# Patient Record
Sex: MALE | Race: WHITE | Employment: OTHER | URBAN - METROPOLITAN AREA
[De-identification: names, ages, dates, MRNs, and addresses within clinical notes are randomized per-mention and may not be internally consistent; named-entity substitution may affect disease eponyms.]

---

## 2017-01-30 ENCOUNTER — ANTICOAGULATION THERAPY VISIT (OUTPATIENT)
Dept: ANTICOAGULATION | Facility: CLINIC | Age: 77
End: 2017-01-30
Payer: COMMERCIAL

## 2017-01-30 DIAGNOSIS — Z79.01 LONG TERM CURRENT USE OF ANTICOAGULANT THERAPY: Primary | ICD-10-CM

## 2017-01-30 DIAGNOSIS — I63.9 COMPLETED STROKE (H): ICD-10-CM

## 2017-01-30 LAB — INR PPP: 1.6

## 2017-01-30 PROCEDURE — 99207 ZZC NO CHARGE NURSE ONLY: CPT | Performed by: REGISTERED NURSE

## 2017-01-30 NOTE — PROGRESS NOTES
ANTICOAGULATION FOLLOW-UP CLINIC VISIT    Patient Name:  Bradley Liz  Date:  1/30/2017  Contact Type:  INR resulted from an outside lab and faxed to [facility] today.  Pt called and given new dosing and recheck instructions  Pt veralized understanding.    SUBJECTIVE:     Patient Findings     Positives Diet Changes (more vit k than usual, he will get back to his usual weekly amount), Missed doses (he does not think he has missed any doses)           OBJECTIVE    INR   Date Value Ref Range Status   01/30/2017 1.6  Final       ASSESSMENT / PLAN  INR assessment SUB    Recheck INR In: 3 WEEKS    INR Location Outside lab      Anticoagulation Summary as of 1/30/2017     INR goal 2.0-3.0   Selected INR 1.6! (1/30/2017)   Maintenance plan 5 mg (5 mg x 1) every day   Full instructions 1/30: 7.5 mg; Otherwise 5 mg every day   Weekly total 35 mg   Plan last modified Ping Elizalde RN (9/16/2016)   Next INR check 2/20/2017   Priority INR   Target end date Indefinite    Indications   Completed stroke (H) [I63.9]  Long term current use of anticoagulant therapy [Z79.01]         Anticoagulation Episode Summary     INR check location     Preferred lab     Send INR reminders to [facility]    Comments * warfarin 5 mg tabs, leaving for [place], Sept 2016, will return around Thanksgiving. Leave message on MOBILE only. Takes warfarin in the AM      Anticoagulation Care Providers     Provider Role Specialty Phone number    Alex Mustafa MD [facility] [phone]            See the Encounter Report to view Anticoagulation Flowsheet and Dosing Calendar (Go to Encounters tab in chart review, and find the Anticoagulation Therapy Visit)        Mari Guerra RN

## 2017-02-22 ENCOUNTER — ANTICOAGULATION THERAPY VISIT (OUTPATIENT)
Dept: ANTICOAGULATION | Facility: CLINIC | Age: 77
End: 2017-02-22
Payer: COMMERCIAL

## 2017-02-22 DIAGNOSIS — Z79.01 LONG TERM CURRENT USE OF ANTICOAGULANT THERAPY: ICD-10-CM

## 2017-02-22 DIAGNOSIS — I63.9 COMPLETED STROKE (H): ICD-10-CM

## 2017-02-22 LAB — INR PPP: 1.7

## 2017-02-22 PROCEDURE — 99207 ZZC NO CHARGE NURSE ONLY: CPT

## 2017-02-22 RX ORDER — WARFARIN SODIUM 5 MG/1
TABLET ORAL
Qty: 120 TABLET | Refills: 3 | COMMUNITY
Start: 2017-02-22 | End: 2017-05-19

## 2017-02-22 NOTE — MR AVS SNAPSHOT
Bradley Liz   2/22/2017   Anticoagulation Therapy Visit    Description:  76 year old male   Provider:  Theresa Baltazar, RN   Department:  [facility]           INR as of 2/22/2017     Today's INR 1.7!      Anticoagulation Summary as of 2/22/2017     INR goal 2.0-3.0   Today's INR 1.7!   Full instructions 7.5 mg on Wed, Sat; 5 mg all other days   Next INR check 3/15/2017    Indications   Completed stroke (H) [I63.9]  Long term current use of anticoagulant therapy [Z79.01]         February 2017 Details    Sun Mon Tue Wed Thu Fri Sat        1               2               3               4                 5               6               7               8               9               10               11                 12               13               14               15               16               17               18                 19               20               21               22      7.5 mg   See details      23      5 mg         24      5 mg         25      7.5 mg           26      5 mg         27      5 mg         28      5 mg              Date Details   02/22 This INR check               How to take your warfarin dose     To take:  5 mg Take 1 of the 5 mg tablets.    To take:  7.5 mg Take 1.5 of the 5 mg tablets.           March 2017 Details    Sun Mon Tue Wed Thu Fri Sat        1      7.5 mg         2      5 mg         3      5 mg         4      7.5 mg           5      5 mg         6      5 mg         7      5 mg         8      7.5 mg         9      5 mg         10      5 mg         11      7.5 mg           12      5 mg         13      5 mg         14      5 mg         15            16               17               18                 19               20               21               22               23               24               25                 26               27               28               29               30               31                 Date Details   No additional details     Date of next INR:  3/15/2017         How to take your warfarin dose     To take:  5 mg Take 1 of the 5 mg tablets.    To take:  7.5 mg Take 1.5 of the 5 mg tablets.

## 2017-02-22 NOTE — PROGRESS NOTES
ANTICOAGULATION FOLLOW-UP CLINIC VISIT    Patient Name:  Bradley Liz  Date:  2/22/2017  Contact Type:  Telephone/ Tobin Shannonjayashree    SUBJECTIVE:     Patient Findings     Positives Unexplained INR or factor level change    Comments Two low INRs in a row. No missed doses or changes in diet (he is not eating extra greens). No changes in medications or activity level. Increasing dosing by ~14% and will recheck again at lab in 2-3 weeks.           OBJECTIVE    INR   Date Value Ref Range Status   02/22/2017 1.7  Final       ASSESSMENT / PLAN  INR assessment SUB    Recheck INR In: 3 WEEKS    INR Location Outside lab      Anticoagulation Summary as of 2/22/2017     INR goal 2.0-3.0   Today's INR 1.7!   Maintenance plan 7.5 mg (5 mg x 1.5) on Wed, Sat; 5 mg (5 mg x 1) all other days   Full instructions 7.5 mg on Wed, Sat; 5 mg all other days   Weekly total 40 mg   Plan last modified Theresa Baltazar RN (2/22/2017)   Next INR check 3/15/2017   Priority INR   Target end date Indefinite    Indications   Completed stroke (H) [I63.9]  Long term current use of anticoagulant therapy [Z79.01]         Anticoagulation Episode Summary     INR check location     Preferred lab     Send INR reminders to [facility]    Comments * warfarin 5 mg tabs, leaving for [place], Sept 2016, will return around Thanksgiving. Leave message on MOBILE only. Takes warfarin in the AM      Anticoagulation Care Providers     Provider Role Specialty Phone number    Alex Mustafa MD [facility] [phone]            See the Encounter Report to view Anticoagulation Flowsheet and Dosing Calendar (Go to Encounters tab in chart review, and find the Anticoagulation Therapy Visit)        Theresa Baltazar RN

## 2017-04-04 ENCOUNTER — ANTICOAGULATION THERAPY VISIT (OUTPATIENT)
Dept: ANTICOAGULATION | Facility: CLINIC | Age: 77
End: 2017-04-04
Payer: COMMERCIAL

## 2017-04-04 DIAGNOSIS — Z79.01 LONG TERM CURRENT USE OF ANTICOAGULANT THERAPY: ICD-10-CM

## 2017-04-04 DIAGNOSIS — I63.9 COMPLETED STROKE (H): ICD-10-CM

## 2017-04-04 LAB — INR PPP: 2

## 2017-04-04 PROCEDURE — 99207 ZZC NO CHARGE NURSE ONLY: CPT | Performed by: REGISTERED NURSE

## 2017-04-04 NOTE — PROGRESS NOTES
ANTICOAGULATION FOLLOW-UP CLINIC VISIT    Patient Name:  Bradley Liz  Date:  4/4/2017  Contact Type:  Face to Face    SUBJECTIVE:     Patient Findings     Positives No Problem Findings           OBJECTIVE    INR   Date Value Ref Range Status   04/04/2017 2.0  Final       ASSESSMENT / PLAN  No question data found.  Anticoagulation Summary as of 4/4/2017     INR goal 2.0-3.0   Today's INR 2.0   Maintenance plan 7.5 mg (5 mg x 1.5) on Wed, Sat; 5 mg (5 mg x 1) all other days   Full instructions 7.5 mg on Wed, Sat; 5 mg all other days   Weekly total 40 mg   Plan last modified Theresa Baltazar RN (2/22/2017)   Next INR check 5/23/2017   Priority INR   Target end date Indefinite    Indications   Completed stroke (H) [I63.9]  Long term current use of anticoagulant therapy [Z79.01]         Anticoagulation Episode Summary     INR check location     Preferred lab     Send INR reminders to [facility]    Comments * warfarin 5 mg tabs, leaving for [place], Sept 2016, will return around [facility]. Leave message on MOBILE only. Takes warfarin in the AM      Anticoagulation Care Providers     Provider Role Specialty Phone number    Alex Mustafa MD [facility] [phone]            See the Encounter Report to view Anticoagulation Flowsheet and Dosing Calendar (Go to Encounters tab in chart review, and find the Anticoagulation Therapy Visit)        Lela Lerner RN

## 2017-04-04 NOTE — MR AVS SNAPSHOT
Bradley Liz   4/4/2017   Anticoagulation Therapy Visit    Description:  76 year old male   Provider:  Lela Lerner, RN   Department:  [facility]           INR as of 4/4/2017     Today's INR 2.0      Anticoagulation Summary as of 4/4/2017     INR goal 2.0-3.0   Today's INR 2.0   Full instructions 7.5 mg on Wed, Sat; 5 mg all other days   Next INR check 5/23/2017    Indications   Completed stroke (H) [I63.9]  Long term current use of anticoagulant therapy [Z79.01]         Description     Warfarin dose: 7.5mg Wed/Sat and 5mg the rest of the days of the week.        April 2017 Details    Sun Mon Tue Wed Thu Fri Sat           1                 2               3               4      5 mg   See details      5      7.5 mg         6      5 mg         7      5 mg         8      7.5 mg           9      5 mg         10      5 mg         11      5 mg         12      7.5 mg         13      5 mg         14      5 mg         15      7.5 mg           16      5 mg         17      5 mg         18      5 mg         19      7.5 mg         20      5 mg         21      5 mg         22      7.5 mg           23      5 mg         24      5 mg         25      5 mg         26      7.5 mg         27      5 mg         28      5 mg         29      7.5 mg           30      5 mg                Date Details   04/04 This INR check               How to take your warfarin dose     To take:  5 mg Take 1 of the 5 mg tablets.    To take:  7.5 mg Take 1.5 of the 5 mg tablets.           May 2017 Details    Sun Mon Tue Wed Thu Fri Sat      1      5 mg         2      5 mg         3      7.5 mg         4      5 mg         5      5 mg         6      7.5 mg           7      5 mg         8      5 mg         9      5 mg         10      7.5 mg         11      5 mg         12      5 mg         13      7.5 mg           14      5 mg         15      5 mg         16      5 mg         17      7.5 mg         18      5 mg         19      5 mg         20       7.5 mg           21      5 mg         22      5 mg         23            24               25               26               27                 28               29               30               31                   Date Details   No additional details    Date of next INR:  5/23/2017         How to take your warfarin dose     To take:  5 mg Take 1 of the 5 mg tablets.    To take:  7.5 mg Take 1.5 of the 5 mg tablets.

## 2017-04-05 ENCOUNTER — TELEPHONE (OUTPATIENT)
Dept: FAMILY MEDICINE | Facility: CLINIC | Age: 77
End: 2017-04-05

## 2017-04-05 NOTE — TELEPHONE ENCOUNTER
Coumadin dose?  INR of 2.0 on 4/4/17.  Taking Coumadin 7.5 mg Wed and Sat, 5 mg ROW.  Pt in [place] and asking if needs to change his dose?  Advise.

## 2017-04-05 NOTE — TELEPHONE ENCOUNTER
Writer spoke with patient and reviewed instructions in 4-4-17 [facility] encounter.    BELINDA ManceraN, RN

## 2017-04-05 NOTE — TELEPHONE ENCOUNTER
Reason for Call:  Request for results:    Name of test or procedure: Pt had his coumadin checked in [place], where he is presently, yesterday and he states that Dr. Mustafa's nurse as supposed to call him with his dosage instructions.      Date of test of procedure: 04/05/17    Location of the test or procedure: [place]    OK to leave the result message on voice mail or with a family member? YES    Phone number Patient can be reached at:  Cell number on file:    Telephone Information:   Mobile [phone]       Additional comments: any    Call taken on 4/5/2017 at 12:59 PM by Margaret Marcum

## 2017-05-09 ENCOUNTER — HOSPITAL ENCOUNTER (EMERGENCY)
Facility: CLINIC | Age: 77
Discharge: HOME | End: 2017-05-09
Attending: STUDENT IN AN ORGANIZED HEALTH CARE EDUCATION/TRAINING PROGRAM | Admitting: STUDENT IN AN ORGANIZED HEALTH CARE EDUCATION/TRAINING PROGRAM
Payer: MEDICARE

## 2017-05-09 VITALS
OXYGEN SATURATION: 95 % | SYSTOLIC BLOOD PRESSURE: 145 MMHG | RESPIRATION RATE: 16 BRPM | DIASTOLIC BLOOD PRESSURE: 106 MMHG

## 2017-05-09 DIAGNOSIS — I48.91 NEW ONSET ATRIAL FIBRILLATION (H): ICD-10-CM

## 2017-05-09 LAB
ALBUMIN SERPL-MCNC: 3.2 G/DL (ref 3.4–5)
ALP SERPL-CCNC: 82 U/L (ref 40–150)
ALT SERPL W P-5'-P-CCNC: 25 U/L (ref 0–70)
ANION GAP SERPL CALCULATED.3IONS-SCNC: 9 MMOL/L (ref 3–14)
AST SERPL W P-5'-P-CCNC: 18 U/L (ref 0–45)
BASOPHILS # BLD AUTO: 0 10E9/L (ref 0–0.2)
BASOPHILS NFR BLD AUTO: 0.4 %
BILIRUB SERPL-MCNC: 0.5 MG/DL (ref 0.2–1.3)
BUN SERPL-MCNC: 11 MG/DL (ref 7–30)
CALCIUM SERPL-MCNC: 8.3 MG/DL (ref 8.5–10.1)
CHLORIDE SERPL-SCNC: 107 MMOL/L (ref 94–109)
CO2 SERPL-SCNC: 25 MMOL/L (ref 20–32)
CREAT SERPL-MCNC: 0.84 MG/DL (ref 0.66–1.25)
DIFFERENTIAL METHOD BLD: ABNORMAL
EOSINOPHIL # BLD AUTO: 0.2 10E9/L (ref 0–0.7)
EOSINOPHIL NFR BLD AUTO: 2.7 %
ERYTHROCYTE [DISTWIDTH] IN BLOOD BY AUTOMATED COUNT: 14.8 % (ref 10–15)
ETHANOL SERPL-MCNC: <0.01 G/DL
GFR SERPL CREATININE-BSD FRML MDRD: 89 ML/MIN/1.7M2
GLUCOSE SERPL-MCNC: 101 MG/DL (ref 70–99)
HCT VFR BLD AUTO: 53.9 % (ref 40–53)
HGB BLD-MCNC: 17.5 G/DL (ref 13.3–17.7)
IMM GRANULOCYTES # BLD: 0.1 10E9/L (ref 0–0.4)
IMM GRANULOCYTES NFR BLD: 0.7 %
INR PPP: 4.1 (ref 0.86–1.14)
LYMPHOCYTES # BLD AUTO: 2.1 10E9/L (ref 0.8–5.3)
LYMPHOCYTES NFR BLD AUTO: 24.3 %
MAGNESIUM SERPL-MCNC: 2.2 MG/DL (ref 1.6–2.3)
MCH RBC QN AUTO: 30.3 PG (ref 26.5–33)
MCHC RBC AUTO-ENTMCNC: 32.5 G/DL (ref 31.5–36.5)
MCV RBC AUTO: 93 FL (ref 78–100)
MONOCYTES # BLD AUTO: 0.6 10E9/L (ref 0–1.3)
MONOCYTES NFR BLD AUTO: 7.4 %
NEUTROPHILS # BLD AUTO: 5.5 10E9/L (ref 1.6–8.3)
NEUTROPHILS NFR BLD AUTO: 64.5 %
PLATELET # BLD AUTO: 268 10E9/L (ref 150–450)
POTASSIUM SERPL-SCNC: 4.2 MMOL/L (ref 3.4–5.3)
PROT SERPL-MCNC: 6.2 G/DL (ref 6.8–8.8)
RBC # BLD AUTO: 5.78 10E12/L (ref 4.4–5.9)
SODIUM SERPL-SCNC: 141 MMOL/L (ref 133–144)
TSH SERPL DL<=0.005 MIU/L-ACNC: 2 MU/L (ref 0.4–4)
WBC # BLD AUTO: 8.6 10E9/L (ref 4–11)

## 2017-05-09 PROCEDURE — 85025 COMPLETE CBC W/AUTO DIFF WBC: CPT | Performed by: STUDENT IN AN ORGANIZED HEALTH CARE EDUCATION/TRAINING PROGRAM

## 2017-05-09 PROCEDURE — 96374 THER/PROPH/DIAG INJ IV PUSH: CPT

## 2017-05-09 PROCEDURE — 25000125 ZZHC RX 250: Performed by: STUDENT IN AN ORGANIZED HEALTH CARE EDUCATION/TRAINING PROGRAM

## 2017-05-09 PROCEDURE — 93010 ELECTROCARDIOGRAM REPORT: CPT | Performed by: STUDENT IN AN ORGANIZED HEALTH CARE EDUCATION/TRAINING PROGRAM

## 2017-05-09 PROCEDURE — 96361 HYDRATE IV INFUSION ADD-ON: CPT

## 2017-05-09 PROCEDURE — 85610 PROTHROMBIN TIME: CPT | Performed by: STUDENT IN AN ORGANIZED HEALTH CARE EDUCATION/TRAINING PROGRAM

## 2017-05-09 PROCEDURE — 80053 COMPREHEN METABOLIC PANEL: CPT | Performed by: STUDENT IN AN ORGANIZED HEALTH CARE EDUCATION/TRAINING PROGRAM

## 2017-05-09 PROCEDURE — 99285 EMERGENCY DEPT VISIT HI MDM: CPT | Mod: 25 | Performed by: STUDENT IN AN ORGANIZED HEALTH CARE EDUCATION/TRAINING PROGRAM

## 2017-05-09 PROCEDURE — 25000132 ZZH RX MED GY IP 250 OP 250 PS 637: Mod: GY | Performed by: STUDENT IN AN ORGANIZED HEALTH CARE EDUCATION/TRAINING PROGRAM

## 2017-05-09 PROCEDURE — 93005 ELECTROCARDIOGRAM TRACING: CPT

## 2017-05-09 PROCEDURE — 80320 DRUG SCREEN QUANTALCOHOLS: CPT | Performed by: STUDENT IN AN ORGANIZED HEALTH CARE EDUCATION/TRAINING PROGRAM

## 2017-05-09 PROCEDURE — 83735 ASSAY OF MAGNESIUM: CPT | Performed by: STUDENT IN AN ORGANIZED HEALTH CARE EDUCATION/TRAINING PROGRAM

## 2017-05-09 PROCEDURE — 84443 ASSAY THYROID STIM HORMONE: CPT | Performed by: STUDENT IN AN ORGANIZED HEALTH CARE EDUCATION/TRAINING PROGRAM

## 2017-05-09 PROCEDURE — A9270 NON-COVERED ITEM OR SERVICE: HCPCS | Mod: GY | Performed by: STUDENT IN AN ORGANIZED HEALTH CARE EDUCATION/TRAINING PROGRAM

## 2017-05-09 PROCEDURE — 99284 EMERGENCY DEPT VISIT MOD MDM: CPT | Mod: 25

## 2017-05-09 PROCEDURE — 25000128 H RX IP 250 OP 636: Performed by: STUDENT IN AN ORGANIZED HEALTH CARE EDUCATION/TRAINING PROGRAM

## 2017-05-09 RX ORDER — LIDOCAINE 40 MG/G
CREAM TOPICAL
Status: DISCONTINUED | OUTPATIENT
Start: 2017-05-09 | End: 2017-05-09 | Stop reason: HOSPADM

## 2017-05-09 RX ORDER — METOPROLOL TARTRATE 25 MG/1
25 TABLET, FILM COATED ORAL EVERY 12 HOURS
Qty: 14 TABLET | Refills: 0 | Status: SHIPPED | OUTPATIENT
Start: 2017-05-09 | End: 2017-05-11

## 2017-05-09 RX ORDER — METOPROLOL TARTRATE 1 MG/ML
5 INJECTION, SOLUTION INTRAVENOUS EVERY 10 MIN PRN
Status: DISCONTINUED | OUTPATIENT
Start: 2017-05-09 | End: 2017-05-09 | Stop reason: HOSPADM

## 2017-05-09 RX ORDER — SODIUM CHLORIDE 9 MG/ML
INJECTION, SOLUTION INTRAVENOUS CONTINUOUS
Status: DISCONTINUED | OUTPATIENT
Start: 2017-05-09 | End: 2017-05-09 | Stop reason: HOSPADM

## 2017-05-09 RX ORDER — METOPROLOL TARTRATE 25 MG/1
25 TABLET, FILM COATED ORAL ONCE
Status: COMPLETED | OUTPATIENT
Start: 2017-05-09 | End: 2017-05-09

## 2017-05-09 RX ADMIN — SODIUM CHLORIDE: 9 INJECTION, SOLUTION INTRAVENOUS at 13:30

## 2017-05-09 RX ADMIN — METOPROLOL TARTRATE 5 MG: 5 INJECTION INTRAVENOUS at 14:02

## 2017-05-09 RX ADMIN — METOPROLOL TARTRATE 25 MG: 25 TABLET ORAL at 14:54

## 2017-05-09 NOTE — ED AVS SNAPSHOT
[facility]    [address]    [facility] [address]    Phone:  [phone]    Fax:  [phone]                                       Bradley Liz   MRN: [id number]    Department:  [facility]   Date of Visit:  5/9/2017           Patient Information     Date Of Birth          1940        Your diagnoses for this visit were:     New onset atrial fibrillation (H)        You were seen by Fab Canela DO.      Follow-up Information     Follow up with Alex Mustafa MD. Go in 1 day.    Specialty:  Family Practice    Why:  Follow-up at your scheduled appointment tomorrow, 8 AM, for reevaluation and continued management planning.    Contact information:    [facility]  [address]  [facility] [id number] [phone]          Discharge Instructions         Discharge Instructions for Atrial Fibrillation  You have been diagnosed with atrial fibrillation. With this condition, your heart s two upper chambers quiver rather than squeeze the blood out in a normal pattern. This leads to an irregular and sometimes rapid heartbeat. Some people will develop associated symptoms such as a flip-flopping heartbeat, lightheadedness, or shortness of breath. Other people may have no symptoms at all. Atrial fibrillation is serious because it affects the heart s ability to fill with blood as it should. Blood clots may form. This increases the risk for stroke. Untreated atrial fibrillation can also lead to heart failure. Atrial fibrillation can be controlled. With treatment, most people with atrial fibrillation lead normal lives. It is estimated that over 2.5 million Americans have atrial fibrillation.  Treatment options  Recommended treatment for atrial fibrillation depends on your age, symptoms, how long you have had atrial fibrillation, and other factors. You will have a complete evaluation to find out if you have any abnormalities that caused your  heart to go into atrial fibrillation. This might be blocked heart arteries or a thyroid problem. Your doctor will assess your particular case and discuss choices with you.  Treatment choices may include:    Treating an underlying disorder that puts you at risk for atrial fibrillation. For example, correcting an abnormal thyroid or electrolyte problem, or treating a blocked heart artery.    Restoring a normal heart rhythm with an electrical shock (cardioversion) or with an antiarrhythmic medicine (chemical cardioversion)    Using medication to control your heart rate in atrial fibrillation.    Preventing the risk for blood clot and stroke using blood-thinning medicines. Your doctor will tell you what he or she recommends. Choices may include aspirin, clopidogrel, warfarin, dabigatran, rivaroxaban, or apixaban.    Doing catheter ablation or maze procedure. These use different methods to destroy certain areas of heart tissue. This interrupts the electrical signals causing atrial fibrillation. One of these procedures may be a choice when medicines do not work.    Other treatment choices may be recommended for you by your doctor.  Managing risk factors for stroke and preventing heart failure are important parts of any treatment plan for atrial fibrillation.  Home care    Take your medicines exactly as directed. Don t skip doses.    Work with your doctor to find the right medicaines and doses for you.    Learn to take your own pulse. Keep a record of your results. Ask your doctor which pulse rates mean that you need medical attention. Slowing your pulse is often the goal of treatment. Ask your doctor if it s OK for you to use an automatic machine to check your pulse at home. Sometimes these machines don t count the pulse correctly when you have atrial fibrillation.    Limit your intake of coffee, tea, cola, and other beverages with caffeine to 2 cups per day. Talk with your doctor about whether you should eliminate  caffeine.    Avoid over-the-counter medicines that have caffeine in them.    Let your doctor know what medicines you take, including prescription and over-the-counter medicines, as well as any supplements. They interfere with some medicines given for atrial fibrillation.    Ask your doctor about whether you can drink alcohol. Some people need to avoid alcohol to better treat atrial fibrillation. If you are taking blood-thinner medicines, alcohol may interfere with them by increasing their effect.    Never take stimulants such as amphetamines or cocaine. These drugs can speed up your heart rate and trigger atrial fibrillation.  Follow-up  Make a follow-up appointment as directed by our staff.     When to call your doctor  Call your doctor immediately if you have any of the following:    Weakness    Dizziness    Fainting    Fatigue    Shortness of breath    Chest pain with increased activity    A change in the usual regularity of your heartbeat, or an unusually fast heartbeat     0371-4263 [employer]. [address] [place] [address]. All rights reserved. This information is not intended as a substitute for professional medical care. Always follow your healthcare professional's instructions.          Future Appointments        Provider Department Dept Phone Center    5/10/2017 8:00 AM Alex Mustafa MD [facility] [phone] [facility]      24 Hour Appointment Hotline       To make an appointment at any [facility], call [phone] ([phone]). If you don't have a family doctor or clinic, we will help you find one. [facility] are conveniently located to serve the needs of you and your family.             Review of your medicines      START taking        Dose / Directions Last dose taken    metoprolol 25 MG tablet   Commonly known as:  LOPRESSOR   Dose:  25 mg   Quantity:  14 tablet        Take 1 tablet (25 mg) by mouth every 12 hours for 7 days   Refills:  0           Our records show that you are taking the medicines listed below. If these are incorrect, please call your family doctor or clinic.        Dose / Directions Last dose taken    ASPIRIN NOT PRESCRIBED   Commonly known as:  INTENTIONAL        by Other route continuous prn.   Refills:  0        COUMADIN 5 MG tablet   Quantity:  120 tablet   Generic drug:  warfarin        As directed by Anticoagulation Clinic.  (Current dose 7.5mg Wed/Sat, 5 mg all other days)   Refills:  3        simvastatin 10 MG tablet   Commonly known as:  ZOCOR   Dose:  10 mg   Quantity:  90 tablet        Take 1 tablet (10 mg) by mouth At Bedtime   Refills:  3                Prescriptions were sent or printed at these locations (1 Prescription)                   [facility] - [place], [place] - [address] AT [facility] OF [address] [place] [address]   [address] [facility] [address]    Telephone:  [phone]   Fax:  [phone]   Hours:                  E-Prescribed (1 of 1)         metoprolol (LOPRESSOR) 25 MG tablet                Procedures and tests performed during your visit     Alcohol ethyl    CBC with platelets differential    Comprehensive metabolic panel    EKG 12-lead, tracing only    INR    Magnesium    TSH with free T4 reflex      Orders Needing Specimen Collection     None      Pending Results     Date and Time Order Name Status Description    5/9/2017 1328 Magnesium In process             Pending Culture Results     No orders found from 5/7/2017 to 5/10/2017.            Pending Results Instructions     If you had any lab results that were not finalized at the time of your Discharge, you can call the ED Lab Result RN at [phone]. You will be contacted by this team for any positive Lab results or changes in treatment. The nurses are available 7 days a week from 10A to 6:30P.  You can leave a message 24 hours per day and they will return your call.        Test Results From Your Hospital Stay        5/9/2017   1:59 PM      Component Results     Component Value Ref Range & Units Status    WBC 8.6 4.0 - 11.0 10e9/L Final    RBC Count 5.78 4.4 - 5.9 10e12/L Final    Hemoglobin 17.5 13.3 - 17.7 g/dL Final    Hematocrit 53.9 (H) 40.0 - 53.0 % Final    MCV 93 78 - 100 fl Final    MCH 30.3 26.5 - 33.0 pg Final    MCHC 32.5 31.5 - 36.5 g/dL Final    RDW 14.8 10.0 - 15.0 % Final    Platelet Count 268 150 - 450 10e9/L Final    Diff Method Automated Method  Final    % Neutrophils 64.5 % Final    % Lymphocytes 24.3 % Final    % Monocytes 7.4 % Final    % Eosinophils 2.7 % Final    % Basophils 0.4 % Final    % Immature Granulocytes 0.7 % Final    Absolute Neutrophil 5.5 1.6 - 8.3 10e9/L Final    Absolute Lymphocytes 2.1 0.8 - 5.3 10e9/L Final    Absolute Monocytes 0.6 0.0 - 1.3 10e9/L Final    Absolute Eosinophils 0.2 0.0 - 0.7 10e9/L Final    Absolute Basophils 0.0 0.0 - 0.2 10e9/L Final    Abs Immature Granulocytes 0.1 0 - 0.4 10e9/L Final         5/9/2017  3:14 PM      Component Results     Component Value Ref Range & Units Status    INR 4.10 (H) 0.86 - 1.14 Final         5/9/2017  2:14 PM      Component Results     Component Value Ref Range & Units Status    Sodium 141 133 - 144 mmol/L Final    Potassium 4.2 3.4 - 5.3 mmol/L Final    Chloride 107 94 - 109 mmol/L Final    Carbon Dioxide 25 20 - 32 mmol/L Final    Anion Gap 9 3 - 14 mmol/L Final    Glucose 101 (H) 70 - 99 mg/dL Final    Urea Nitrogen 11 7 - 30 mg/dL Final    Creatinine 0.84 0.66 - 1.25 mg/dL Final    GFR Estimate 89 >60 mL/min/1.7m2 Final    Non  GFR Calc    GFR Estimate If Black >90   GFR Calc   >60 mL/min/1.7m2 Final    Calcium 8.3 (L) 8.5 - 10.1 mg/dL Final    Bilirubin Total 0.5 0.2 - 1.3 mg/dL Final    Albumin 3.2 (L) 3.4 - 5.0 g/dL Final    Protein Total 6.2 (L) 6.8 - 8.8 g/dL Final    Alkaline Phosphatase 82 40 - 150 U/L Final    ALT 25 0 - 70 U/L Final    AST 18 0 - 45 U/L Final         5/9/2017  1:49 PM         5/9/2017   "2:20 PM      Component Results     Component Value Ref Range & Units Status    TSH 2.00 0.40 - 4.00 mU/L Final         2017  2:09 PM      Component Results     Component Value Ref Range & Units Status    Ethanol g/dL <0.01 <0.01 g/dL Final                Thank you for choosing [facility]       Thank you for choosing [facility] for your care. Our goal is always to provide you with excellent care. Hearing back from our patients is one way we can continue to improve our services. Please take a few minutes to complete the written survey that you may receive in the mail after you visit with us. Thank you!        [employer] Information     [employer] lets you send messages to your doctor, view your test results, renew your prescriptions, schedule appointments and more. To sign up, go to [email or web address] . Click on \"Log in\" on the left side of the screen, which will take you to the Welcome page. Then click on \"Sign up Now\" on the right side of the page.     You will be asked to enter the access code listed below, as well as some personal information. Please follow the directions to create your username and password.     Your access code is: [email or web address]  Expires: 2017  4:21 PM     Your access code will  in 90 days. If you need help or a new code, please call your [facility] clinic or [phone].        Care EveryWhere ID     This is your Care EveryWhere ID. This could be used by other organizations to access your [facility] medical records  [id number]        After Visit Summary       This is your record. Keep this with you and show to your community pharmacist(s) and doctor(s) at your next visit.                  "

## 2017-05-09 NOTE — ED NOTES
Lorena mayberry in store parking lot just pta with no injury - ems responded and pt found to have A FIB with RVR

## 2017-05-09 NOTE — ED AVS SNAPSHOT
[facility]    [address] [facility] [address]    Phone:  [phone]    Fax:  [phone]                                       Bradley Liz   MRN: [id number]    Department:  [facility]   Date of Visit:  5/9/2017           After Visit Summary Signature Page     I have received my discharge instructions, and my questions have been answered. I have discussed any challenges I see with this plan with the nurse or doctor.    ..........................................................................................................................................  Patient/Patient Representative Signature      ..........................................................................................................................................  Patient Representative Print Name and Relationship to Patient    ..................................................               ................................................  Date                                            Time    ..........................................................................................................................................  Reviewed by Signature/Title    ...................................................              ..............................................  Date                                                            Time

## 2017-05-09 NOTE — DISCHARGE INSTRUCTIONS
Discharge Instructions for Atrial Fibrillation  You have been diagnosed with atrial fibrillation. With this condition, your heart s two upper chambers quiver rather than squeeze the blood out in a normal pattern. This leads to an irregular and sometimes rapid heartbeat. Some people will develop associated symptoms such as a flip-flopping heartbeat, lightheadedness, or shortness of breath. Other people may have no symptoms at all. Atrial fibrillation is serious because it affects the heart s ability to fill with blood as it should. Blood clots may form. This increases the risk for stroke. Untreated atrial fibrillation can also lead to heart failure. Atrial fibrillation can be controlled. With treatment, most people with atrial fibrillation lead normal lives. It is estimated that over 2.5 million Americans have atrial fibrillation.  Treatment options  Recommended treatment for atrial fibrillation depends on your age, symptoms, how long you have had atrial fibrillation, and other factors. You will have a complete evaluation to find out if you have any abnormalities that caused your heart to go into atrial fibrillation. This might be blocked heart arteries or a thyroid problem. Your doctor will assess your particular case and discuss choices with you.  Treatment choices may include:    Treating an underlying disorder that puts you at risk for atrial fibrillation. For example, correcting an abnormal thyroid or electrolyte problem, or treating a blocked heart artery.    Restoring a normal heart rhythm with an electrical shock (cardioversion) or with an antiarrhythmic medicine (chemical cardioversion)    Using medication to control your heart rate in atrial fibrillation.    Preventing the risk for blood clot and stroke using blood-thinning medicines. Your doctor will tell you what he or she recommends. Choices may include aspirin, clopidogrel, warfarin, dabigatran, rivaroxaban, or apixaban.    Doing catheter ablation or  maze procedure. These use different methods to destroy certain areas of heart tissue. This interrupts the electrical signals causing atrial fibrillation. One of these procedures may be a choice when medicines do not work.    Other treatment choices may be recommended for you by your doctor.  Managing risk factors for stroke and preventing heart failure are important parts of any treatment plan for atrial fibrillation.  Home care    Take your medicines exactly as directed. Don t skip doses.    Work with your doctor to find the right medicaines and doses for you.    Learn to take your own pulse. Keep a record of your results. Ask your doctor which pulse rates mean that you need medical attention. Slowing your pulse is often the goal of treatment. Ask your doctor if it s OK for you to use an automatic machine to check your pulse at home. Sometimes these machines don t count the pulse correctly when you have atrial fibrillation.    Limit your intake of coffee, tea, cola, and other beverages with caffeine to 2 cups per day. Talk with your doctor about whether you should eliminate caffeine.    Avoid over-the-counter medicines that have caffeine in them.    Let your doctor know what medicines you take, including prescription and over-the-counter medicines, as well as any supplements. They interfere with some medicines given for atrial fibrillation.    Ask your doctor about whether you can drink alcohol. Some people need to avoid alcohol to better treat atrial fibrillation. If you are taking blood-thinner medicines, alcohol may interfere with them by increasing their effect.    Never take stimulants such as amphetamines or cocaine. These drugs can speed up your heart rate and trigger atrial fibrillation.  Follow-up  Make a follow-up appointment as directed by our staff.     When to call your doctor  Call your doctor immediately if you have any of the following:    Weakness    Dizziness    Fainting    Fatigue    Shortness of  breath    Chest pain with increased activity    A change in the usual regularity of your heartbeat, or an unusually fast heartbeat     3859-6983 [employer]. [address] [place] [address], [place] [address]. All rights reserved. This information is not intended as a substitute for professional medical care. Always follow your healthcare professional's instructions.
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
## 2017-05-10 ENCOUNTER — OFFICE VISIT (OUTPATIENT)
Dept: FAMILY MEDICINE | Facility: CLINIC | Age: 77
End: 2017-05-10
Payer: COMMERCIAL

## 2017-05-10 VITALS
HEIGHT: 69 IN | BODY MASS INDEX: 29.33 KG/M2 | HEART RATE: 89 BPM | OXYGEN SATURATION: 96 % | TEMPERATURE: 97.3 F | SYSTOLIC BLOOD PRESSURE: 98 MMHG | WEIGHT: 198 LBS | RESPIRATION RATE: 18 BRPM | DIASTOLIC BLOOD PRESSURE: 78 MMHG

## 2017-05-10 DIAGNOSIS — I48.91 ATRIAL FIBRILLATION, UNSPECIFIED TYPE (H): Primary | ICD-10-CM

## 2017-05-10 PROCEDURE — 99214 OFFICE O/P EST MOD 30 MIN: CPT | Performed by: FAMILY MEDICINE

## 2017-05-10 NOTE — MR AVS SNAPSHOT
After Visit Summary   5/10/2017    Bradley Liz    MRN: [id number]           Patient Information     Date Of Birth          1940        Visit Information        Provider Department      5/10/2017 8:00 AM Alex Mustafa MD [facility]        Today's Diagnoses     Atrial fibrillation, unspecified type (H)    -  1      Care Instructions          Thank you for choosing [facility].  You may be receiving a survey in the mail from [facility] [employer] regarding your visit today.  Please take a few minutes to complete and return the survey to let us know how we are doing.      Our Clinic hours are:  Mondays    7:20 am - 7 pm  Tues -  Fri  7:20 am - 5 pm    Clinic Phone: [phone]    The clinic lab opens at 7:30 am Mon - Fri and appointments are required.    [facility]. [phone]  Monday-Thursday 8 am - 7pm  Tues/Wed/Fri 8 am - 5:30 pm               Follow-ups after your visit        Additional Services     CARDIOLOGY EVAL ADULT REFERRAL       Your provider has referred you to:  [facility] [phone]   [email or web address]    Please be aware that coverage of these services is subject to the terms and limitations of your health insurance plan.  Call member services at your health plan with any benefit or coverage questions.      Type of Referral:  New Cardiology Consult    Timeframe requested:  1-3 Days    Please bring the following to your appointment:  >>   Any x-rays, CTs or MRIs which have been performed.  Contact the facility where they were done to arrange for  prior to your scheduled appointment.    >>   List of current medications  >>   This referral request   >>   Any documents/labs given to you for this referral                  Your next 10 appointments already scheduled     May 11, 2017 11:15 AM CDT   New Visit with Lai Brooks MD   [facility]  "[facility] ([facility])    [address]  [facility] [address]   [phone]            May 12, 2017 11:45 AM CDT   Anticoagulation Visit with CL ANTI COAG   [facility]    [address]  [facility] [id number] [phone]              Who to contact     If you have questions or need follow up information about today's clinic visit or your schedule please contact [facility] directly at [phone].  Normal or non-critical lab and imaging results will be communicated to you by MyChart, letter or phone within 4 business days after the clinic has received the results. If you do not hear from us within 7 days, please contact the clinic through MyChart or phone. If you have a critical or abnormal lab result, we will notify you by phone as soon as possible.  Submit refill requests through [employer] or call your pharmacy and they will forward the refill request to us. Please allow 3 business days for your refill to be completed.          Additional Information About Your Visit        MyChart Information     [employer] lets you send messages to your doctor, view your test results, renew your prescriptions, schedule appointments and more. To sign up, go to [email or web address] . Click on \"Log in\" on the left side of the screen, which will take you to the Welcome page. Then click on \"Sign up Now\" on the right side of the page.     You will be asked to enter the access code listed below, as well as some personal information. Please follow the directions to create your username and password.     Your access code is: [email or web address]  Expires: 2017  4:21 PM     Your access code will  in 90 days. If you need help or a new code, please call your [facility] or [phone].        Care EveryWhere ID     This is your Care EveryWhere ID. This could be used by other organizations to access your " "[facility] medical records  [id number]        Your Vitals Were     Pulse Temperature Respirations Height Pulse Oximetry BMI (Body Mass Index)    89 97.3  F (36.3  C) 18 5' 9\" (1.753 m) 96% 29.24 kg/m2       Blood Pressure from Last 3 Encounters:   05/10/17 98/78   05/09/17 (!) 145/106   08/08/16 112/64    Weight from Last 3 Encounters:   05/10/17 198 lb (89.8 kg)   08/08/16 196 lb (88.9 kg)   08/08/16 196 lb (88.9 kg)              We Performed the Following     CARDIOLOGY EVAL ADULT REFERRAL        Primary Care Provider Office Phone # Fax #    Alex Mustafa [phone]       [facility] [address] [facility] [address]        Thank you!     Thank you for choosing [facility]  for your care. Our goal is always to provide you with excellent care. Hearing back from our patients is one way we can continue to improve our services. Please take a few minutes to complete the written survey that you may receive in the mail after your visit with us. Thank you!             Your Updated Medication List - Protect others around you: Learn how to safely use, store and throw away your medicines at www.disposemymeds.org.          This list is accurate as of: 5/10/17 10:35 AM.  Always use your most recent med list.                   Brand Name Dispense Instructions for use    ASPIRIN NOT PRESCRIBED    INTENTIONAL     by Other route continuous prn.       COUMADIN 5 MG tablet   Generic drug:  warfarin     120 tablet    As directed by Anticoagulation Clinic.  (Current dose 7.5mg Wed/Sat, 5 mg all other days)       metoprolol 25 MG tablet    LOPRESSOR    14 tablet    Take 1 tablet (25 mg) by mouth every 12 hours for 7 days       simvastatin 10 MG tablet    ZOCOR    90 tablet    Take 1 tablet (10 mg) by mouth At Bedtime         "

## 2017-05-10 NOTE — PATIENT INSTRUCTIONS
Thank you for choosing [facility].  You may be receiving a survey in the mail from [facility] regarding your visit today.  Please take a few minutes to complete and return the survey to let us know how we are doing.      Our Clinic hours are:  Mondays    7:20 am - 7 pm  Tues -  Fri  7:20 am - 5 pm    Clinic Phone: [phone]    The clinic lab opens at 7:30 am Mon - Fri and appointments are required.    [facility]. [phone]  Monday-Thursday 8 am - 7pm  Tues/Wed/Fri 8 am - 5:30 pm

## 2017-05-10 NOTE — PROGRESS NOTES
SUBJECTIVE:                                                    Bradley Liz is a 76 year old male who presents to clinic today for the following health issues:      ED/UC Followup:    Facility:  [facility]   Date of visit: 5/9/17  Reason for visit: AFIB  Current Status: patient states he is feeling good today            Problem list and histories reviewed & adjusted, as indicated.  Additional history: as documented        Reviewed and updated as needed this visit by clinical staff  Tobacco  Allergies  Meds  Med Hx  Surg Hx  Fam Hx  Soc Hx      Reviewed and updated as needed this visit by Provider      Further history obtained, clarified or corrected by physician:  He was found to be in atrial fibrillation, new onset yesterday while in the emergency room. His rate was controlled with a single dose of oral metoprolol 25 mg. He has taken a second dose this morning. He has no complaints today and does not notice any heart issues including palpitations or heart racing or lightheadedness.    OBJECTIVE:  LUNGS: clear to auscultation, normal breath sounds  CV: IRIR without murmur  ABD: BS+, soft, nontender, no masses, no hepatosplenomegaly  EXTREMITIES: without joint tenderness, swelling or erythema.  No muscle tenderness or abnormality.  SKIN: No rashes or abnormalities  NEURO:non focal exam    ASSESSMENT:  Atrial fibrillation, unspecified type (H)    PLAN:  Orders Placed This Encounter     CARDIOLOGY EVAL ADULT REFERRAL     Discussed with cardiology on-call and since he has already anticoagulated because of his CVA he is in a good window for cardioversion so we are setting him up to see the cardiologist, appointment made for tomorrow to consider cardioversion.

## 2017-05-10 NOTE — NURSING NOTE
"Chief Complaint   Patient presents with     ER F/U       Initial BP 98/78  Pulse 89  Temp 97.3  F (36.3  C)  Resp 18  Ht 5' 9\" (1.753 m)  Wt 198 lb (89.8 kg)  SpO2 96%  BMI 29.24 kg/m2 Estimated body mass index is 29.24 kg/(m^2) as calculated from the following:    Height as of this encounter: 5' 9\" (1.753 m).    Weight as of this encounter: 198 lb (89.8 kg).  Medication Reconciliation: complete   Jennifer Peña CMA      "

## 2017-05-11 ENCOUNTER — OFFICE VISIT (OUTPATIENT)
Dept: CARDIOLOGY | Facility: CLINIC | Age: 77
End: 2017-05-11
Payer: COMMERCIAL

## 2017-05-11 VITALS
WEIGHT: 199 LBS | SYSTOLIC BLOOD PRESSURE: 124 MMHG | HEART RATE: 90 BPM | DIASTOLIC BLOOD PRESSURE: 69 MMHG | OXYGEN SATURATION: 94 % | BODY MASS INDEX: 29.39 KG/M2

## 2017-05-11 DIAGNOSIS — I63.9 CEREBROVASCULAR ACCIDENT (CVA), UNSPECIFIED MECHANISM (H): ICD-10-CM

## 2017-05-11 DIAGNOSIS — I48.21 PERMANENT ATRIAL FIBRILLATION (H): Primary | ICD-10-CM

## 2017-05-11 PROCEDURE — 99204 OFFICE O/P NEW MOD 45 MIN: CPT | Performed by: INTERNAL MEDICINE

## 2017-05-11 RX ORDER — METOPROLOL TARTRATE 25 MG/1
25 TABLET, FILM COATED ORAL EVERY 12 HOURS
Qty: 180 TABLET | Refills: 3 | Status: SHIPPED | OUTPATIENT
Start: 2017-05-11 | End: 2017-07-17

## 2017-05-11 NOTE — MR AVS SNAPSHOT
After Visit Summary   5/11/2017    Bradley Liz    MRN: [id number]           Patient Information     Date Of Birth          1940        Visit Information        Provider Department      5/11/2017 11:15 AM Lai Brooks MD [facility] PHYSICIAN HEART AT [facility]        Today's Diagnoses     Permanent atrial fibrillation (H)    -  1    Cerebrovascular accident (CVA), unspecified mechanism (H)           Follow-ups after your visit        Additional Services     Follow-Up with Cardiac Advanced Practice Provider                 Your next 10 appointments already scheduled     May 12, 2017 11:45 AM CDT   [facility]    [address] [id number] [phone]              Future tests that were ordered for you today     Open Future Orders        Priority Expected Expires Ordered    Follow-Up with Cardiac Advanced Practice Provider Routine 6/10/2017 5/11/2018 5/11/2017    Echocardiogram Routine 5/18/2017 5/11/2018 5/11/2017            Who to contact     If you have questions or need follow up information about today's clinic visit or your schedule please contact [facility] PHYSICIAN HEART AT [facility] directly at [phone].  Normal or non-critical lab and imaging results will be communicated to you by MyChart, letter or phone within 4 business days after the clinic has received the results. If you do not hear from us within 7 days, please contact the clinic through Well[employer] or phone. If you have a critical or abnormal lab result, we will notify you by phone as soon as possible.  Submit refill requests through [employer] or call your pharmacy and they will forward the refill request to us. Please allow 3 business days for your refill to be completed.          Additional Information About Your Visit        Wellhart Information     LeadPoint lets you send  "messages to your doctor, view your test results, renew your prescriptions, schedule appointments and more. To sign up, go to [email or web address] . Click on \"Log in\" on the left side of the screen, which will take you to the Welcome page. Then click on \"Sign up Now\" on the right side of the page.     You will be asked to enter the access code listed below, as well as some personal information. Please follow the directions to create your username and password.     Your access code is: [email or web address]  Expires: 2017  4:21 PM     Your access code will  in 90 days. If you need help or a new code, please call your [place] clinic or [phone].        Care EveryWhere ID     This is your Care EveryWhere ID. This could be used by other organizations to access your [place] medical records  [id number]        Your Vitals Were     Pulse Pulse Oximetry BMI (Body Mass Index)             90 94% 29.39 kg/m2          Blood Pressure from Last 3 Encounters:   17 124/69   05/10/17 98/78   17 (!) 145/106    Weight from Last 3 Encounters:   17 90.3 kg (199 lb)   05/10/17 89.8 kg (198 lb)   16 88.9 kg (196 lb)               Primary Care Provider Office Phone # Fax #    Alex Mustafa [phone]       [address] [address]        Thank you!     Thank you for choosing [facility] PHYSICIAN HEART AT [facility]  for your care. Our goal is always to provide you with excellent care. Hearing back from our patients is one way we can continue to improve our services. Please take a few minutes to complete the written survey that you may receive in the mail after your visit with us. Thank you!             Your Updated Medication List - Protect others around you: Learn how to safely use, store and throw away your medicines at www.disposemymeds.org.          This list is accurate as of: 17 11:20 AM.  Always use your most recent med " list.                   Brand Name Dispense Instructions for use    ASPIRIN NOT PRESCRIBED    INTENTIONAL     by Other route continuous prn.       COUMADIN 5 MG tablet   Generic drug:  warfarin     120 tablet    As directed by Anticoagulation Clinic.  (Current dose 7.5mg Wed/Sat, 5 mg all other days)       metoprolol 25 MG tablet    LOPRESSOR    14 tablet    Take 1 tablet (25 mg) by mouth every 12 hours for 7 days       simvastatin 10 MG tablet    ZOCOR    90 tablet    Take 1 tablet (10 mg) by mouth At Bedtime
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
## 2017-05-11 NOTE — PROGRESS NOTES
HPI and Plan:   See dictation    Orders Placed This Encounter   Procedures     Follow-Up with Cardiac Advanced Practice Provider     Echocardiogram       Orders Placed This Encounter   Medications     metoprolol (LOPRESSOR) 25 MG tablet     Sig: Take 1 tablet (25 mg) by mouth every 12 hours     Dispense:  180 tablet     Refill:  3       Medications Discontinued During This Encounter   Medication Reason     metoprolol (LOPRESSOR) 25 MG tablet Reorder         Encounter Diagnoses   Name Primary?     Permanent atrial fibrillation (H) Yes     Cerebrovascular accident (CVA), unspecified mechanism (H)        CURRENT MEDICATIONS:  Current Outpatient Prescriptions   Medication Sig Dispense Refill     metoprolol (LOPRESSOR) 25 MG tablet Take 1 tablet (25 mg) by mouth every 12 hours 180 tablet 3     warfarin (COUMADIN) 5 MG tablet As directed by Anticoagulation Clinic.  (Current dose 7.5mg Wed/Sat, 5 mg all other days) 120 tablet 3     simvastatin (ZOCOR) 10 MG tablet Take 1 tablet (10 mg) by mouth At Bedtime 90 tablet 3     ASPIRIN NOT PRESCRIBED, INTENTIONAL, by Other route continuous prn.  0     [DISCONTINUED] metoprolol (LOPRESSOR) 25 MG tablet Take 1 tablet (25 mg) by mouth every 12 hours for 7 days 14 tablet 0       ALLERGIES   No Known Allergies    PAST MEDICAL HISTORY:  No past medical history on file.    PAST SURGICAL HISTORY:  No past surgical history on file.    FAMILY HISTORY:  Family History   Problem Relation Age of Onset     Unknown/Adopted Mother      Prostate Cancer Father      C.A.D. Father      age 77       SOCIAL HISTORY:  Social History     Social History     Marital status:      Spouse name: N/A     Number of children: N/A     Years of education: N/A     Social History Main Topics     Smoking status: Former Smoker     Years: 50.00     Quit date: 12/5/2008     Smokeless tobacco: Never Used     Alcohol use Yes      Comment: couple beers occ     Drug use: No     Sexual activity: Yes     Partners:  Female     Other Topics Concern     Parent/Sibling W/ Cabg, Mi Or Angioplasty Before 65f 55m? No     Social History Narrative       Review of Systems:  Skin:  Negative       Eyes:  Negative      ENT:  Negative      Respiratory:  Negative for shortness of breath     Cardiovascular:  Negative for;palpitations;chest pain;syncope or near-syncope;fatigue;lightheadedness;dizziness edema;Positive for    Gastroenterology: Negative for nausea;vomiting;heartburn    Genitourinary:  Negative      Musculoskeletal:  not assessed      Neurologic:  Positive for stroke;numbness or tingling of hands;numbness or tingling of feet    Psychiatric:  Negative for anxiety;depression    Heme/Lymph/Imm:  Negative for bleeding disorder    Endocrine:  Negative for thyroid disorder;diabetes      Physical Exam:  Vitals: /69  Pulse 90  Wt 90.3 kg (199 lb)  SpO2 94%  BMI 29.39 kg/m2    Constitutional:  cooperative;well nourished        Skin:  warm and dry to the touch        Head:  normocephalic        Eyes:  sclera white        ENT:           Neck:  no stiffness        Chest:    prolonged expiration        Cardiac:   irregularly irregular rhythm     systolic ejection murmur          Abdomen:  abdomen soft        Vascular: pulses full and equal                                        Extremities and Back:        1+ 1+      Neurological:  affect appropriate              CC  No referring provider defined for this encounter.

## 2017-05-12 ENCOUNTER — ANTICOAGULATION THERAPY VISIT (OUTPATIENT)
Dept: ANTICOAGULATION | Facility: CLINIC | Age: 77
End: 2017-05-12
Payer: COMMERCIAL

## 2017-05-12 DIAGNOSIS — I63.9 COMPLETED STROKE (H): ICD-10-CM

## 2017-05-12 DIAGNOSIS — Z79.01 LONG TERM CURRENT USE OF ANTICOAGULANT THERAPY: ICD-10-CM

## 2017-05-12 LAB — INR POINT OF CARE: 4.2 (ref 0.86–1.14)

## 2017-05-12 PROCEDURE — 36416 COLLJ CAPILLARY BLOOD SPEC: CPT

## 2017-05-12 PROCEDURE — 99207 ZZC NO CHARGE NURSE ONLY: CPT

## 2017-05-12 PROCEDURE — 85610 PROTHROMBIN TIME: CPT | Mod: QW
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
## 2017-05-12 NOTE — MR AVS SNAPSHOT
Bradley Liz   5/12/2017 11:45 AM   Anticoagulation Therapy Visit    Description:  76 year old male   Provider:  [facility]   Department:  [facility]           INR as of 5/12/2017     Today's INR 4.2!      Anticoagulation Summary as of 5/12/2017     INR goal 2.0-3.0   Today's INR 4.2!   Full instructions 5/13: Hold; 5/14: 2.5 mg; Otherwise 5 mg every day   Next INR check 5/19/2017    Indications   Completed stroke (H) [I63.9]  Long term current use of anticoagulant therapy [Z79.01]         Description     Hold tomorrow then 2.5mg then begin 5mg daily      Your next Anticoagulation Clinic appointment(s)     May 19, 2017  2:00 PM CDT   Anticoagulation Visit with [place] ANTI COAG   [facility]     [facility] [id number] [phone]              Contact Numbers     Please call [phone] to cancel and/or reschedule your appointment.  Please call [phone] with any problems or questions regarding your therapy          May 2017 Details    Sun Mon Tue Wed Thu Fri Sat      1               2               3               4               5               6                 7               8               9               10               11               12      5 mg   See details      13      Hold           14      2.5 mg         15      5 mg         16      5 mg         17      5 mg         18      5 mg         19            20                 21               22               23               24               25               26               27                 28               29               30               31                   Date Details   05/12 This INR check       Date of next INR:  5/19/2017         How to take your warfarin dose     To take:  2.5 mg Take 0.5 of a 5 mg tablet.    To take:  5 mg Take 1 of the 5 mg tablets.    Hold Do not take your warfarin dose. See the Details table to the right for additional instructions.

## 2017-05-15 PROBLEM — I48.91 ATRIAL FIBRILLATION, UNSPECIFIED TYPE (H): Status: ACTIVE | Noted: 2017-05-15

## 2017-05-19 ENCOUNTER — ANTICOAGULATION THERAPY VISIT (OUTPATIENT)
Dept: ANTICOAGULATION | Facility: CLINIC | Age: 77
End: 2017-05-19
Payer: COMMERCIAL

## 2017-05-19 ENCOUNTER — HOSPITAL ENCOUNTER (OUTPATIENT)
Dept: CARDIOLOGY | Facility: CLINIC | Age: 77
Discharge: HOME | End: 2017-05-19
Attending: INTERNAL MEDICINE | Admitting: INTERNAL MEDICINE
Payer: MEDICARE

## 2017-05-19 DIAGNOSIS — Z79.01 LONG TERM CURRENT USE OF ANTICOAGULANT THERAPY: ICD-10-CM

## 2017-05-19 DIAGNOSIS — I48.21 PERMANENT ATRIAL FIBRILLATION (H): ICD-10-CM

## 2017-05-19 DIAGNOSIS — I63.9 CEREBROVASCULAR ACCIDENT (CVA), UNSPECIFIED MECHANISM (H): ICD-10-CM

## 2017-05-19 DIAGNOSIS — I63.9 COMPLETED STROKE (H): ICD-10-CM

## 2017-05-19 LAB — INR POINT OF CARE: 2.3 (ref 0.86–1.14)

## 2017-05-19 PROCEDURE — 36416 COLLJ CAPILLARY BLOOD SPEC: CPT

## 2017-05-19 PROCEDURE — 93306 TTE W/DOPPLER COMPLETE: CPT | Mod: 26 | Performed by: INTERNAL MEDICINE

## 2017-05-19 PROCEDURE — 25500064 ZZH RX 255 OP 636: Performed by: INTERNAL MEDICINE

## 2017-05-19 PROCEDURE — 40000264 ECHO COMPLETE WITH LUMASON

## 2017-05-19 PROCEDURE — 85610 PROTHROMBIN TIME: CPT | Mod: QW

## 2017-05-19 PROCEDURE — 99207 ZZC NO CHARGE NURSE ONLY: CPT

## 2017-05-19 RX ORDER — WARFARIN SODIUM 5 MG/1
TABLET ORAL
Qty: 120 TABLET | Refills: 3 | COMMUNITY
Start: 2017-05-19 | End: 2017-08-01

## 2017-05-19 RX ADMIN — SULFUR HEXAFLUORIDE 5 ML: KIT at 14:07

## 2017-05-19 NOTE — MR AVS SNAPSHOT
Bradley Liz   5/19/2017 2:00 PM   Anticoagulation Therapy Visit    Description:  76 year old male   Provider:  [place] [facility]   Department:  [place] [facility]           INR as of 5/19/2017     Today's INR 2.3      Anticoagulation Summary as of 5/19/2017     INR goal 2.0-3.0   Today's INR 2.3   Full instructions 5 mg every day   Next INR check 6/15/2017    Indications   Completed stroke (H) [I63.9]  Long term current use of anticoagulant therapy [Z79.01]         Your next Anticoagulation Clinic appointment(s)     Carlos 15, 2017 11:00 AM CDT   Anticoagulation Visit with [place] ANTI COAG   [facility]    [address] [facility] [address] [phone]              Contact Numbers     Please call [phone] to cancel and/or reschedule your appointment.  Please call [phone] with any problems or questions regarding your therapy          May 2017 Details    Sun Mon Tue Wed Thu Fri Sat      1               2               3               4               5               6                 7               8               9               10               11               12               13                 14               15               16               17               18               19      5 mg   See details      20      5 mg           21      5 mg         22      5 mg         23      5 mg         24      5 mg         25      5 mg         26      5 mg         27      5 mg           28      5 mg         29      5 mg         30      5 mg         31      5 mg             Date Details   05/19 This INR check               How to take your warfarin dose     To take:  5 mg Take 1 of the 5 mg tablets.           June 2017 Details    Sun Mon Tue Wed Thu Fri Sat         1      5 mg         2      5 mg         3      5 mg           4      5 mg         5      5 mg         6      5 mg         7      5 mg         8      5 mg         9      5 mg         10      5 mg           11       5 mg         12      5 mg         13      5 mg         14      5 mg         15            16               17                 18               19               20               21               22               23               24                 25               26               27               28               29               30                 Date Details   No additional details    Date of next INR:  6/15/2017         How to take your warfarin dose     To take:  5 mg Take 1 of the 5 mg tablets.
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
## 2017-06-15 ENCOUNTER — ANTICOAGULATION THERAPY VISIT (OUTPATIENT)
Dept: ANTICOAGULATION | Facility: CLINIC | Age: 77
End: 2017-06-15
Payer: COMMERCIAL

## 2017-06-15 ENCOUNTER — OFFICE VISIT (OUTPATIENT)
Dept: CARDIOLOGY | Facility: CLINIC | Age: 77
End: 2017-06-15
Attending: INTERNAL MEDICINE
Payer: COMMERCIAL

## 2017-06-15 VITALS
WEIGHT: 199 LBS | OXYGEN SATURATION: 95 % | DIASTOLIC BLOOD PRESSURE: 75 MMHG | SYSTOLIC BLOOD PRESSURE: 117 MMHG | HEART RATE: 84 BPM | BODY MASS INDEX: 29.39 KG/M2

## 2017-06-15 DIAGNOSIS — I48.19 PERSISTENT ATRIAL FIBRILLATION (H): ICD-10-CM

## 2017-06-15 DIAGNOSIS — Z79.01 LONG TERM CURRENT USE OF ANTICOAGULANT THERAPY: ICD-10-CM

## 2017-06-15 DIAGNOSIS — I63.9 COMPLETED STROKE (H): ICD-10-CM

## 2017-06-15 DIAGNOSIS — I42.9 CARDIOMYOPATHY, UNSPECIFIED (H): Primary | ICD-10-CM

## 2017-06-15 LAB — INR POINT OF CARE: 3.4 (ref 0.86–1.14)

## 2017-06-15 PROCEDURE — 99207 ZZC NO CHARGE NURSE ONLY: CPT

## 2017-06-15 PROCEDURE — 99213 OFFICE O/P EST LOW 20 MIN: CPT | Performed by: PHYSICIAN ASSISTANT

## 2017-06-15 PROCEDURE — 36416 COLLJ CAPILLARY BLOOD SPEC: CPT

## 2017-06-15 PROCEDURE — 85610 PROTHROMBIN TIME: CPT | Mod: QW
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
## 2017-06-15 NOTE — MR AVS SNAPSHOT
Bradley Liz   6/15/2017 11:00 AM   Anticoagulation Therapy Visit    Description:  76 year old male   Provider:  [facility]   Department:  [facility]           INR as of 6/15/2017     Today's INR 3.4!      Anticoagulation Summary as of 6/15/2017     INR goal 2.0-3.0   Today's INR 3.4!   Full instructions 5 mg every day   Next INR check 7/14/2017    Indications   Completed stroke (H) [I63.9]  Long term current use of anticoagulant therapy [Z79.01]         Your next Anticoagulation Clinic appointment(s)     Carlos 15, 2017 11:00 AM CDT   Anticoagulation Visit with [facility]   [facility]    [address] [address]   [phone]            Jul 14, 2017 11:00 AM CDT   Anticoagulation Visit with [facility] ANTI COAG   [facility] ([facility])    [address] [facility] [id number] [phone]              Contact Numbers     Please call [phone] to cancel and/or reschedule your appointment.  Please call [phone] with any problems or questions regarding your therapy          June 2017 Details    Sun Mon Tue Wed Thu Fri Sat         1               2               3                 4               5               6               7               8               9               10                 11               12               13               14               15      5 mg   See details      16      5 mg         17      5 mg           18      5 mg         19      5 mg         20      5 mg         21      5 mg         22      5 mg         23      5 mg         24      5 mg           25      5 mg         26      5 mg         27      5 mg         28      5 mg         29      5 mg         30      5 mg           Date Details   06/15 This INR check               How to take your warfarin dose     To take:  5 mg Take 1 of the 5 mg tablets.           July 2017 Details    Sun Mon Tue Wed Thu Fri Sat            1      5 mg           2      5 mg         3      5 mg         4      5 mg         5      5 mg         6      5 mg         7      5 mg         8      5 mg           9      5 mg         10      5 mg         11      5 mg         12      5 mg         13      5 mg         14            15                 16               17               18               19               20               21               22                 23               24               25               26               27               28               29                 30               31                     Date Details   No additional details    Date of next INR:  7/14/2017         How to take your warfarin dose     To take:  5 mg Take 1 of the 5 mg tablets.

## 2017-06-15 NOTE — PATIENT INSTRUCTIONS
Thank you for your [facility] visit today. Your provider has recommended the following:  Medication Changes:  No changes today  Recommendations:  3 Day ZioPatch (heart rhythm monitor)  Stress test in the next month  Echo in ~3 months  Follow-up:  See Dr Brooks for cardiology follow up in Sept.  We kindly ask that you call cardiology scheduling at [phone] three months prior to requested revisit date to schedule future cardiology appointments.  Reminder:  1. Please bring in your current medication list or your medication, over the counter supplements and vitamin bottles as we will review these at each office visit.               [facility]  [facility]~[address]~[place], [place]~[address]  Questions about your visit today?  Call your Cardiology Clinic RN's-Queenie Lin and/or Kay Echeverria at [phone].

## 2017-06-15 NOTE — MR AVS SNAPSHOT
After Visit Summary   6/15/2017    Bradley Liz    MRN: [id number]           Patient Information     Date Of Birth          1940        Visit Information        Provider Department      6/15/2017 10:20 AM Pamela Choi PA-C [facility] PHYSICIAN HEART AT [facility]        Today's Diagnoses     Cardiomyopathy, unspecified (H)    -  1    Permanent atrial fibrillation (H)        Cerebrovascular accident (CVA), unspecified mechanism (H)          Care Instructions    Thank you for your  Heart Care visit today. Your provider has recommended the following:  Medication Changes:  No changes today  Recommendations:  3 Day ZioPatch (heart rhythm monitor)  Stress test in the next month  Echo in ~3 months  Follow-up:  See Dr Brooks for cardiology follow up in Sept.  We kindly ask that you call cardiology scheduling at [phone] three months prior to requested revisit date to schedule future cardiology appointments.  Reminder:  1. Please bring in your current medication list or your medication, over the counter supplements and vitamin bottles as we will review these at each office visit.               [facility]~[address]~[place], [place]~[address]  Questions about your visit today?  Call your Cardiology Clinic RN's-Queenie Lin and/or Kay Echeverria at [phone].                Follow-ups after your visit        Additional Services     Follow-Up with Cardiologist                 Your next 10 appointments already scheduled     Carlos 15, 2017 11:00 AM CDT   Anticoagulation Visit with [facility]   [facility] ([facility]    [address] [id number] [phone]              Future tests that were ordered for you today     Open Future Orders        Priority Expected Expires Ordered    Follow-Up with Cardiologist Routine 9/15/2017 6/15/2019 6/15/2017    [facility] Lexiscan  "stress test Routine 2017 6/15/2018 6/15/2017    Echocardiogram Limited Routine 2017 6/15/2018 6/15/2017    Zio Patch Holter Routine 6/15/2017 2017 6/15/2017            Who to contact     If you have questions or need follow up information about today's clinic visit or your schedule please contact [facility] PHYSICIAN HEART AT [facility] directly at [phone].  Normal or non-critical lab and imaging results will be communicated to you by Jotkyhart, letter or phone within 4 business days after the clinic has received the results. If you do not hear from us within 7 days, please contact the clinic through Jotkyhart or phone. If you have a critical or abnormal lab result, we will notify you by phone as soon as possible.  Submit refill requests through Wix or call your pharmacy and they will forward the refill request to us. Please allow 3 business days for your refill to be completed.          Additional Information About Your Visit        Wix Information     Wix lets you send messages to your doctor, view your test results, renew your prescriptions, schedule appointments and more. To sign up, go to [email or web address] . Click on \"Log in\" on the left side of the screen, which will take you to the Welcome page. Then click on \"Sign up Now\" on the right side of the page.     You will be asked to enter the access code listed below, as well as some personal information. Please follow the directions to create your username and password.     Your access code is: [email or web address]  Expires: 2017  4:21 PM     Your access code will  in 90 days. If you need help or a new code, please call your [place] clinic or [phone].        Care EveryWhere ID     This is your Care EveryWhere ID. This could be used by other organizations to access your [place] medical records  [id number]        Your Vitals Were     Pulse Pulse Oximetry BMI (Body Mass Index)             84 95% 29.39 " kg/m2          Blood Pressure from Last 3 Encounters:   06/15/17 117/75   05/11/17 124/69   05/10/17 98/78    Weight from Last 3 Encounters:   06/15/17 90.3 kg (199 lb)   05/11/17 90.3 kg (199 lb)   05/10/17 89.8 kg (198 lb)              We Performed the Following     Follow-Up with Cardiac Advanced Practice Provider        Primary Care Provider Office Phone # Fax #    Alex Mustafa [phone]       [facility] [address] [facility] [address] [address]        Thank you!     Thank you for choosing [facility] PHYSICIAN HEART AT [facility]  for your care. Our goal is always to provide you with excellent care. Hearing back from our patients is one way we can continue to improve our services. Please take a few minutes to complete the written survey that you may receive in the mail after your visit with us. Thank you!             Your Updated Medication List - Protect others around you: Learn how to safely use, store and throw away your medicines at www.disposemymeds.org.          This list is accurate as of: 6/15/17 10:26 AM.  Always use your most recent med list.                   Brand Name Dispense Instructions for use    ASPIRIN NOT PRESCRIBED    INTENTIONAL     by Other route continuous prn.       COUMADIN 5 MG tablet   Generic drug:  warfarin     120 tablet    As directed by Anticoagulation Clinic.  (Current dose 5 mg daily)       metoprolol 25 MG tablet    LOPRESSOR    180 tablet    Take 1 tablet (25 mg) by mouth every 12 hours       simvastatin 10 MG tablet    ZOCOR    90 tablet    Take 1 tablet (10 mg) by mouth At Bedtime

## 2017-06-15 NOTE — PROGRESS NOTES
Please see separate dictation for HPI, PHYSICAL EXAM AND IMPRESSION/PLAN.    CURRENT MEDICATIONS:  Current Outpatient Prescriptions   Medication Sig Dispense Refill     warfarin (COUMADIN) 5 MG tablet As directed by Anticoagulation Clinic.  (Current dose 5 mg daily) 120 tablet 3     metoprolol (LOPRESSOR) 25 MG tablet Take 1 tablet (25 mg) by mouth every 12 hours 180 tablet 3     simvastatin (ZOCOR) 10 MG tablet Take 1 tablet (10 mg) by mouth At Bedtime 90 tablet 3     ASPIRIN NOT PRESCRIBED, INTENTIONAL, by Other route continuous prn.  0       ALLERGIES:   No Known Allergies    PAST MEDICAL HISTORY:  No past medical history on file.    PAST SURGICAL HISTORY:  No past surgical history on file.    SOCIAL HISTORY:  Social History     Social History     Marital status:      Spouse name: N/A     Number of children: N/A     Years of education: N/A     Social History Main Topics     Smoking status: Former Smoker     Years: 50.00     Quit date: 12/5/2008     Smokeless tobacco: Never Used     Alcohol use Yes      Comment: couple beers occ     Drug use: No     Sexual activity: Yes     Partners: Female     Other Topics Concern     Parent/Sibling W/ Cabg, Mi Or Angioplasty Before 65f 55m? No     Social History Narrative       FAMILY HISTORY:  Family History   Problem Relation Age of Onset     Unknown/Adopted Mother      Prostate Cancer Father      C.A.D. Father      age 77       Review of Systems:  Skin:  Negative       Eyes:  Negative      ENT:  Negative      Respiratory:  Negative for shortness of breath     Cardiovascular:  Negative for;palpitations;chest pain;syncope or near-syncope;fatigue;lightheadedness;dizziness;edema     Gastroenterology: Negative for nausea;vomiting;heartburn    Genitourinary:  Negative      Musculoskeletal:  not assessed      Neurologic:  Positive for stroke;numbness or tingling of hands;numbness or tingling of feet    Psychiatric:  Negative for anxiety;depression    Heme/Lymph/Imm:  Negative  for bleeding disorder    Endocrine:  Negative for thyroid disorder;diabetes       Reviewed. Remainder of the note dictated.    Pamela Choi PA-C
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
## 2017-06-16 ENCOUNTER — HOSPITAL ENCOUNTER (OUTPATIENT)
Dept: CARDIOLOGY | Facility: CLINIC | Age: 77
Discharge: HOME | End: 2017-06-16
Attending: PHYSICIAN ASSISTANT | Admitting: PHYSICIAN ASSISTANT
Payer: MEDICARE

## 2017-06-16 DIAGNOSIS — I48.19 PERSISTENT ATRIAL FIBRILLATION (H): ICD-10-CM

## 2017-06-16 PROCEDURE — 0296T ZIO PATCH HOLTER: CPT

## 2017-06-16 PROCEDURE — 0298T ZZC EXT ECG > 48HR TO 21 DAY REVIEW AND INTERPRETATN: CPT | Performed by: INTERNAL MEDICINE

## 2017-07-10 ENCOUNTER — HOSPITAL ENCOUNTER (OUTPATIENT)
Dept: NUCLEAR MEDICINE | Facility: CLINIC | Age: 77
Setting detail: NUCLEAR MEDICINE
Discharge: HOME | End: 2017-07-10
Attending: PHYSICIAN ASSISTANT | Admitting: PHYSICIAN ASSISTANT
Payer: MEDICARE

## 2017-07-10 DIAGNOSIS — I48.19 PERSISTENT ATRIAL FIBRILLATION (H): ICD-10-CM

## 2017-07-10 PROCEDURE — 93018 CV STRESS TEST I&R ONLY: CPT | Performed by: INTERNAL MEDICINE

## 2017-07-10 PROCEDURE — 78452 HT MUSCLE IMAGE SPECT MULT: CPT | Mod: 26 | Performed by: INTERNAL MEDICINE

## 2017-07-10 PROCEDURE — 34300033 ZZH RX 343: Performed by: PHYSICIAN ASSISTANT

## 2017-07-10 PROCEDURE — 25000128 H RX IP 250 OP 636: Performed by: PHYSICIAN ASSISTANT

## 2017-07-10 PROCEDURE — 93017 CV STRESS TEST TRACING ONLY: CPT

## 2017-07-10 PROCEDURE — A9502 TC99M TETROFOSMIN: HCPCS | Performed by: PHYSICIAN ASSISTANT

## 2017-07-10 PROCEDURE — 93016 CV STRESS TEST SUPVJ ONLY: CPT | Performed by: INTERNAL MEDICINE

## 2017-07-10 PROCEDURE — 78452 HT MUSCLE IMAGE SPECT MULT: CPT

## 2017-07-10 RX ORDER — REGADENOSON 0.08 MG/ML
0.4 INJECTION, SOLUTION INTRAVENOUS ONCE
Status: COMPLETED | OUTPATIENT
Start: 2017-07-10 | End: 2017-07-10

## 2017-07-10 RX ADMIN — REGADENOSON 0.4 MG: 0.08 INJECTION, SOLUTION INTRAVENOUS at 13:11

## 2017-07-10 RX ADMIN — TETROFOSMIN 32.7 MCI.: 1.38 INJECTION, POWDER, LYOPHILIZED, FOR SOLUTION INTRAVENOUS at 13:00

## 2017-07-10 RX ADMIN — TETROFOSMIN 10 MCI.: 1.38 INJECTION, POWDER, LYOPHILIZED, FOR SOLUTION INTRAVENOUS at 12:00

## 2017-07-14 ENCOUNTER — ANTICOAGULATION THERAPY VISIT (OUTPATIENT)
Dept: ANTICOAGULATION | Facility: CLINIC | Age: 77
End: 2017-07-14
Payer: COMMERCIAL

## 2017-07-14 DIAGNOSIS — I63.9 COMPLETED STROKE (H): ICD-10-CM

## 2017-07-14 DIAGNOSIS — Z79.01 LONG TERM CURRENT USE OF ANTICOAGULANT THERAPY: ICD-10-CM

## 2017-07-14 LAB — INR POINT OF CARE: 4.2 (ref 0.86–1.14)

## 2017-07-14 PROCEDURE — 99207 ZZC NO CHARGE NURSE ONLY: CPT

## 2017-07-14 PROCEDURE — 85610 PROTHROMBIN TIME: CPT | Mod: QW

## 2017-07-14 PROCEDURE — 36416 COLLJ CAPILLARY BLOOD SPEC: CPT

## 2017-07-14 NOTE — PROGRESS NOTES
ANTICOAGULATION FOLLOW-UP CLINIC VISIT    Patient Name:  Bradley Liz  Date:  7/14/2017  Contact Type:  Face to Face    SUBJECTIVE:     Patient Findings     Positives Change in diet/appetite (has 2-3 beers a day, nearly daily), No Problem Findings, Unexplained INR or factor level change    Comments Has cardiology appt Monday 7/17. Advised him to discuss the NOACs at that appt as pt expresses some displeasure with INRs and dose changes.  Med review done today.  Already took dose today.           OBJECTIVE    INR Protime   Date Value Ref Range Status   07/14/2017 4.2 (A) 0.86 - 1.14 Final       ASSESSMENT / PLAN  INR assessment SUPRA    Recheck INR In: 3 DAYS    INR Location Clinic      Anticoagulation Summary as of 7/14/2017     INR goal 2.0-3.0   Today's INR 4.2!   Maintenance plan 5 mg (5 mg x 1) every day   Full instructions 7/15: Hold; Otherwise 5 mg every day   Weekly total 35 mg   Plan last modified Lela Lerner RN (5/12/2017)   Next INR check 7/17/2017   Priority INR   Target end date Indefinite    Indications   Completed stroke (H) [I63.9]  Long term current use of anticoagulant therapy [Z79.01]         Anticoagulation Episode Summary     INR check location     Preferred lab     Send INR reminders to [facility] CLINIC POOL    Comments * warfarin 5 mg tabs. Leave message on MOBILE only. Takes warfarin in the AM      Anticoagulation Care Providers     Provider Role Specialty Phone number    Alex Mustafa MD [facility] [phone]            See the Encounter Report to view Anticoagulation Flowsheet and Dosing Calendar (Go to Encounters tab in chart review, and find the Anticoagulation Therapy Visit)    Ping Elizalde RN

## 2017-07-14 NOTE — MR AVS SNAPSHOT
Bradley Liz   7/14/2017 11:00 AM   Anticoagulation Therapy Visit    Description:  76 year old male   Provider:  YULIYA ANTI COAREDDY   Department:  Cl Anticoag           INR as of 7/14/2017     Today's INR 4.2!      Anticoagulation Summary as of 7/14/2017     INR goal 2.0-3.0   Today's INR 4.2!   Full instructions 7/15: Hold; Otherwise 5 mg every day   Next INR check 7/17/2017    Indications   Completed stroke (H) [I63.9]  Long term current use of anticoagulant therapy [Z79.01]         Description     No warfarin Saturday 7/15/17.  Take 5mg Sunday 7/16/17.  Wait until after your appointment Monday to take your warfarin.  INR recheck Monday 7/17/17 after cardiology appt at [place].   Take acetaminophen (Tylenol) for headache.      Your next Anticoagulation Clinic appointment(s)     Jul 14, 2017 11:00 AM CDT   Anticoagulation Visit with [facility] ANTI COAG   [facility]    [address] [id number] [phone]            Jul 17, 2017  2:45 PM CDT   [facility] [place] [facility]    [address] [address] [phone]              Contact Numbers     Please call [phone] to cancel and/or reschedule your appointment.  Please call [phone] with any problems or questions regarding your therapy          July 2017 Details    Sun Mon Tue Wed Thu Fri Sat           1                 2               3               4               5               6               7               8                 9               10               11               12               13               14      5 mg   See details      15      Hold           16      5 mg         17            18               19               20               21               22                 23               24               25               26               27               28               29                  30               31                     Date Details   07/14 This INR check       Date of next INR:  7/17/2017         How to take your warfarin dose     To take:  5 mg Take 1 of the 5 mg tablets.    Hold Do not take your warfarin dose. See the Details table to the right for additional instructions.

## 2017-07-17 ENCOUNTER — ANTICOAGULATION THERAPY VISIT (OUTPATIENT)
Dept: ANTICOAGULATION | Facility: CLINIC | Age: 77
End: 2017-07-17
Payer: COMMERCIAL

## 2017-07-17 ENCOUNTER — OFFICE VISIT (OUTPATIENT)
Dept: CARDIOLOGY | Facility: CLINIC | Age: 77
End: 2017-07-17
Payer: COMMERCIAL

## 2017-07-17 VITALS
SYSTOLIC BLOOD PRESSURE: 111 MMHG | OXYGEN SATURATION: 95 % | DIASTOLIC BLOOD PRESSURE: 81 MMHG | HEART RATE: 87 BPM | BODY MASS INDEX: 29.83 KG/M2 | WEIGHT: 202 LBS

## 2017-07-17 DIAGNOSIS — I42.8 NONISCHEMIC CARDIOMYOPATHY (H): ICD-10-CM

## 2017-07-17 DIAGNOSIS — Z79.01 LONG TERM CURRENT USE OF ANTICOAGULANT THERAPY: ICD-10-CM

## 2017-07-17 DIAGNOSIS — R94.39 ABNORMAL CARDIOVASCULAR STRESS TEST: ICD-10-CM

## 2017-07-17 DIAGNOSIS — I48.19 PERSISTENT ATRIAL FIBRILLATION (H): Primary | ICD-10-CM

## 2017-07-17 DIAGNOSIS — I63.9 COMPLETED STROKE (H): ICD-10-CM

## 2017-07-17 LAB — INR POINT OF CARE: 2.1 (ref 0.86–1.14)

## 2017-07-17 PROCEDURE — 99207 ZZC NO CHARGE NURSE ONLY: CPT

## 2017-07-17 PROCEDURE — 85610 PROTHROMBIN TIME: CPT | Mod: QW

## 2017-07-17 PROCEDURE — 99214 OFFICE O/P EST MOD 30 MIN: CPT | Performed by: NURSE PRACTITIONER

## 2017-07-17 PROCEDURE — 36416 COLLJ CAPILLARY BLOOD SPEC: CPT

## 2017-07-17 RX ORDER — METOPROLOL TARTRATE 25 MG/1
37.5 TABLET, FILM COATED ORAL EVERY 12 HOURS
Qty: 90 TABLET | Refills: 11
Start: 2017-07-17 | End: 2017-07-31
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
## 2017-07-17 NOTE — PATIENT INSTRUCTIONS
Thank you for your [facility] visit today. Your provider has recommended the following:  Medication Changes:  INCREASE metoprolol to 37.5 mg (1.5 tablets) twice a day   Recommendations:  1. Have holter monitor done prior to seeing Dr. Brooks to reassess heart rate after increasing metoprolol  2. Call with any lightheadedness/dizziness as metoprolol may make your blood pressure low  Follow-up:  See Dr. Brooks for cardiology follow up in September with echocardiogram and heart monitor prior.  Call [phone] two months prior to request date to schedule any future appointments.  Reminder:  1. Please bring in all current medications, over the counter supplements and vitamin bottles to your next appointment.               [facility]  [facility]~[address]~[place], [place]~[address]  Questions about your visit today?   Call your Cardiology Clinic RN's-Queeine Lin and/or Kay Echeverria at [phone].

## 2017-07-17 NOTE — PROGRESS NOTES
ANTICOAGULATION FOLLOW-UP CLINIC VISIT    Patient Name:  Bradley Liz  Date:  7/17/2017  Contact Type:  Face to Face    SUBJECTIVE:     Patient Findings     Positives Change in diet/appetite (he only likes spinach and asparagus and he eats these 2 times a week, encouraged to increase his serving size, verbalized understanding)    Comments He did not talk to the cardiologist about changing from warfarin  to NOAC's, states he will ask about then when he sees him next in Sept.           OBJECTIVE    INR Protime   Date Value Ref Range Status   07/17/2017 2.1 (A) 0.86 - 1.14 Final       ASSESSMENT / PLAN  INR assessment THER    Recheck INR In: 2 WEEKS    INR Location Clinic      Anticoagulation Summary as of 7/17/2017     INR goal 2.0-3.0   Today's INR 2.1   Maintenance plan 5 mg (5 mg x 1) every day   Full instructions 5 mg every day   Weekly total 35 mg   No change documented Mari Guerra RN   Plan last modified Lela Lerner RN (5/12/2017)   Next INR check 8/1/2017   Priority INR   Target end date Indefinite    Indications   Completed stroke (H) [I63.9]  Long term current use of anticoagulant therapy [Z79.01]         Anticoagulation Episode Summary     INR check location     Preferred lab     Send INR reminders to [facility] POOL    Comments * warfarin 5 mg tabs. Leave message on MOBILE only. Takes warfarin in the AM      Anticoagulation Care Providers     Provider Role Specialty Phone number    Alex Mustafa MD [facility] [phone]            See the Encounter Report to view Anticoagulation Flowsheet and Dosing Calendar (Go to Encounters tab in chart review, and find the Anticoagulation Therapy Visit)        Mari Guerra, RN
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
## 2017-07-17 NOTE — PROGRESS NOTES
HPI and Plan:   See dictation    Orders Placed This Encounter   Procedures     Holter Monitor 24 hour - Adult       Orders Placed This Encounter   Medications     metoprolol (LOPRESSOR) 25 MG tablet     Sig: Take 1.5 tablets (37.5 mg) by mouth every 12 hours     Dispense:  90 tablet     Refill:  11       Medications Discontinued During This Encounter   Medication Reason     metoprolol (LOPRESSOR) 25 MG tablet Reorder         Encounter Diagnoses   Name Primary?     Persistent atrial fibrillation (H) Yes     Nonischemic cardiomyopathy (H)      Abnormal cardiovascular stress test        CURRENT MEDICATIONS:  Current Outpatient Prescriptions   Medication Sig Dispense Refill     metoprolol (LOPRESSOR) 25 MG tablet Take 1.5 tablets (37.5 mg) by mouth every 12 hours 90 tablet 11     warfarin (COUMADIN) 5 MG tablet As directed by Anticoagulation Clinic.  (Current dose 5 mg daily) 120 tablet 3     simvastatin (ZOCOR) 10 MG tablet Take 1 tablet (10 mg) by mouth At Bedtime 90 tablet 3     ASPIRIN NOT PRESCRIBED, INTENTIONAL, by Other route continuous prn.  0     [DISCONTINUED] metoprolol (LOPRESSOR) 25 MG tablet Take 1 tablet (25 mg) by mouth every 12 hours 180 tablet 3       ALLERGIES   No Known Allergies    PAST MEDICAL HISTORY:  No past medical history on file.    PAST SURGICAL HISTORY:  No past surgical history on file.    FAMILY HISTORY:  Family History   Problem Relation Age of Onset     Unknown/Adopted Mother      Prostate Cancer Father      C.A.D. Father      age 77       SOCIAL HISTORY:  Social History     Social History     Marital status:      Spouse name: N/A     Number of children: N/A     Years of education: N/A     Social History Main Topics     Smoking status: Former Smoker     Years: 50.00     Quit date: 12/5/2008     Smokeless tobacco: Never Used     Alcohol use Yes      Comment: couple beers occ     Drug use: No     Sexual activity: Yes     Partners: Female     Other Topics Concern     Parent/Sibling  W/ Cabg, Mi Or Angioplasty Before 65f 55m? No     Social History Narrative       Review of Systems:  Skin:  Negative       Eyes:  Negative      ENT:  Negative      Respiratory:  Negative for shortness of breath;cough     Cardiovascular:  Negative for;palpitations;chest pain;syncope or near-syncope;fatigue;lightheadedness;dizziness;edema      Gastroenterology: Negative for nausea;vomiting;heartburn    Genitourinary:  Negative      Musculoskeletal:  not assessed      Neurologic:  Positive for stroke;numbness or tingling of hands;numbness or tingling of feet    Psychiatric:  Negative for anxiety;depression    Heme/Lymph/Imm:  Negative for bleeding disorder    Endocrine:  Negative for thyroid disorder;diabetes      Physical Exam:  Vitals: /81  Pulse 87  Wt 91.6 kg (202 lb)  SpO2 95%  BMI 29.83 kg/m2    Constitutional:  cooperative;well nourished        Skin:  warm and dry to the touch        Head:  normocephalic        Eyes:  sclera white        ENT:  no pallor or cyanosis        Neck:  no stiffness        Chest:    prolonged expiration (Coarse lung sounds bilaterally )        Cardiac:   irregularly irregular rhythm     systolic ejection murmur          Abdomen:  abdomen soft        Vascular: pulses full and equal                                        Extremities and Back:      bilateral LE edema;pitting;1+          Neurological:  affect appropriate              CC  No referring provider defined for this encounter.

## 2017-07-17 NOTE — MR AVS SNAPSHOT
Bradley Liz   7/17/2017 2:45 PM   Anticoagulation Therapy Visit    Description:  76 year old male   Provider:  [facility] [facility]   Department:  [facility]           INR as of 7/17/2017     Today's INR 2.1      Anticoagulation Summary as of 7/17/2017     INR goal 2.0-3.0   Today's INR 2.1   Full instructions 5 mg every day   Next INR check 8/1/2017    Indications   Completed stroke (H) [I63.9]  Long term current use of anticoagulant therapy [Z79.01]         Description     Recheck INR 2 weeks, 8/1/17  Continue warfarin 5 mg daily        Your next Anticoagulation Clinic appointment(s)     Jul 17, 2017  2:45 PM CDT   Anticoagulation Visit with [facility] [facility]   [facility] ([facility])    [address] [address]   [phone]            Aug 01, 2017 11:15 AM CDT   Anticoagulation Visit with [facility]   [facility]    [address]  [facility] [id number] [phone]              Contact Numbers     Please call [phone] to cancel and/or reschedule your appointment.  Please call [phone] with any problems or questions regarding your therapy          July 2017 Details    Sun Mon Tue Wed Thu Fri Sat           1                 2               3               4               5               6               7               8                 9               10               11               12               13               14               15                 16               17      5 mg   See details      18      5 mg         19      5 mg         20      5 mg         21      5 mg         22      5 mg           23      5 mg         24      5 mg         25      5 mg         26      5 mg         27      5 mg         28      5 mg         29      5 mg           30      5 mg         31      5 mg               Date Details   07/17 This INR check   increase intake of spinach and asparagus               How  to take your warfarin dose     To take:  5 mg Take 1 of the 5 mg tablets.           August 2017 Details    Sun Mon Tue Wed Thu Fri Sat       1            2               3               4               5                 6               7               8               9               10               11               12                 13               14               15               16               17               18               19                 20               21               22               23               24               25               26                 27               28               29               30               31                  Date Details   No additional details    Date of next INR:  8/1/2017         How to take your warfarin dose     To take:  5 mg Take 1 of the 5 mg tablets.

## 2017-07-31 DIAGNOSIS — I48.91 ATRIAL FIBRILLATION, UNSPECIFIED TYPE (H): Primary | ICD-10-CM

## 2017-07-31 RX ORDER — METOPROLOL TARTRATE 25 MG/1
37.5 TABLET, FILM COATED ORAL EVERY 12 HOURS
Qty: 270 TABLET | Refills: 3 | Status: SHIPPED | OUTPATIENT
Start: 2017-07-31 | End: 2017-09-19

## 2017-08-01 ENCOUNTER — ANTICOAGULATION THERAPY VISIT (OUTPATIENT)
Dept: ANTICOAGULATION | Facility: CLINIC | Age: 77
End: 2017-08-01
Payer: COMMERCIAL

## 2017-08-01 DIAGNOSIS — Z79.01 LONG TERM CURRENT USE OF ANTICOAGULANT THERAPY: ICD-10-CM

## 2017-08-01 DIAGNOSIS — I63.9 COMPLETED STROKE (H): ICD-10-CM

## 2017-08-01 LAB — INR POINT OF CARE: 3.8 (ref 0.86–1.14)

## 2017-08-01 PROCEDURE — 36416 COLLJ CAPILLARY BLOOD SPEC: CPT

## 2017-08-01 PROCEDURE — 99207 ZZC NO CHARGE NURSE ONLY: CPT

## 2017-08-01 PROCEDURE — 85610 PROTHROMBIN TIME: CPT | Mod: QW

## 2017-08-01 RX ORDER — WARFARIN SODIUM 5 MG/1
TABLET ORAL
Qty: 120 TABLET | Refills: 3 | COMMUNITY
Start: 2017-08-01 | End: 2017-08-11

## 2017-08-01 NOTE — MR AVS SNAPSHOT
Bradley Liz   8/1/2017 11:15 AM   Anticoagulation Therapy Visit    Description:  76 year old male   Provider:  Y[facility]TI [facility]AG   Department:  [facility] Anticoag           INR as of 8/1/2017     Today's INR 3.8!      Anticoagulation Summary as of 8/1/2017     INR goal 2.0-3.0   Today's INR 3.8!   Full instructions 2.5 mg on Tue, Sat; 5 mg all other days   Next INR check 8/11/2017    Indications   Completed stroke (H) [I63.9]  Long term current use of anticoagulant therapy [Z79.01]         Description     Recheck INR 10 days, 8/11/17  Decrease warfarin to 2.5 mg Tues, Fri, 5 mg rest of week       Your next Anticoagulation Clinic appointment(s)     Aug 11, 2017 11:15 AM CDT   [facility] with [facility]   [facility]    [address] [id number] [phone]              Contact Numbers     Please call [phone] to cancel and/or reschedule your appointment.  Please call [phone] with any problems or questions regarding your therapy          August 2017 Details    Sun Mon Tue Wed Thu Fri Sat       1      2.5 mg   See details      2      5 mg         3      5 mg         4      5 mg         5      2.5 mg           6      5 mg         7      5 mg         8      2.5 mg         9      5 mg         10      5 mg         11            12                 13               14               15               16               17               18               19                 20               21               22               23               24               25               26                 27               28               29               30               31                  Date Details   08/01 This INR check   Have an extra serving of greens today       Date of next INR:  8/11/2017         How to take your warfarin dose     To take:  2.5 mg Take 0.5 of a 5 mg tablet.    To take:  5 mg Take 1 of the 5 mg tablets.

## 2017-08-01 NOTE — PROGRESS NOTES
ANTICOAGULATION FOLLOW-UP CLINIC VISIT    Patient Name:  Bradley Liz  Date:  8/1/2017  Contact Type:  Face to Face    SUBJECTIVE:     Patient Findings     Positives Change in diet/appetite (he states he has increased his serving size of Vit K foods, he will have an extra serving today), Unexplained INR or factor level change (no reason found for elevated INR, will derease warfarin  dose)           OBJECTIVE    INR Protime   Date Value Ref Range Status   08/01/2017 3.8 (A) 0.86 - 1.14 Final       ASSESSMENT / PLAN  INR assessment SUPRA    Recheck INR In: 10 DAYS    INR Location Clinic      Anticoagulation Summary as of 8/1/2017     INR goal 2.0-3.0   Today's INR 3.8!   Maintenance plan 2.5 mg (5 mg x 0.5) on Tue, Sat; 5 mg (5 mg x 1) all other days   Full instructions 2.5 mg on Tue, Sat; 5 mg all other days   Weekly total 30 mg   Plan last modified Mari Guerra RN (8/1/2017)   Next INR check 8/11/2017   Priority INR   Target end date Indefinite    Indications   Completed stroke (H) [I63.9]  Long term current use of anticoagulant therapy [Z79.01]         Anticoagulation Episode Summary     INR check location     Preferred lab     Send INR reminders to [facility]    Comments * warfarin 5 mg tabs. Leave message on MOBILE only. Takes warfarin in the AM      Anticoagulation Care Providers     Provider Role Specialty Phone number    Alex Mustafa MD [facility] [phone]            See the Encounter Report to view Anticoagulation Flowsheet and Dosing Calendar (Go to Encounters tab in chart review, and find the Anticoagulation Therapy Visit)        Mari Guerra RN

## 2017-08-11 ENCOUNTER — ANTICOAGULATION THERAPY VISIT (OUTPATIENT)
Dept: ANTICOAGULATION | Facility: CLINIC | Age: 77
End: 2017-08-11
Payer: COMMERCIAL

## 2017-08-11 DIAGNOSIS — Z79.01 LONG TERM CURRENT USE OF ANTICOAGULANT THERAPY: ICD-10-CM

## 2017-08-11 DIAGNOSIS — I63.9 COMPLETED STROKE (H): ICD-10-CM

## 2017-08-11 LAB — INR POINT OF CARE: 3.8 (ref 0.86–1.14)

## 2017-08-11 PROCEDURE — 99207 ZZC NO CHARGE NURSE ONLY: CPT

## 2017-08-11 PROCEDURE — 85610 PROTHROMBIN TIME: CPT | Mod: QW

## 2017-08-11 PROCEDURE — 36416 COLLJ CAPILLARY BLOOD SPEC: CPT

## 2017-08-11 RX ORDER — WARFARIN SODIUM 5 MG/1
TABLET ORAL
Qty: 120 TABLET | Refills: 3 | COMMUNITY
Start: 2017-08-11 | End: 2017-09-15

## 2017-08-11 NOTE — MR AVS SNAPSHOT
Bradley Liz   8/11/2017 11:15 AM   Anticoagulation Therapy Visit    Description:  76 year old male   Provider:  Y[facility] ANTI [facility]AG   Department:  Cl Anticoag           INR as of 8/11/2017     Today's INR 3.8!      Anticoagulation Summary as of 8/11/2017     INR goal 2.0-3.0   Today's INR 3.8!   Full instructions 8/11: Hold; Otherwise 5 mg on Mon, Wed, Fri; 2.5 mg all other days   Next INR check 8/25/2017    Indications   Completed stroke (H) [I63.9]  Long term current use of anticoagulant therapy [Z79.01]         Description     Recheck INR 2 week, 8/25/17  No warfarin today, then decrease dose to 5 mg Mon, Wed, Fri, 2.5 mg rest of week       Your next Anticoagulation Clinic appointment(s)     Aug 25, 2017 11:45 AM CDT   Anticoagulation Visit with CL ANTI COAG   [facility] ([facility])    [address] [address] [phone]              Contact Numbers     Please call [phone] to cancel and/or reschedule your appointment.  Please call [phone] with any problems or questions regarding your therapy          August 2017 Details    Sun Mon Tue Wed Thu Fri Sat       1               2               3               4               5                 6               7               8               9               10               11      Hold   See details      12      2.5 mg           13      2.5 mg         14      5 mg         15      2.5 mg         16      5 mg         17      2.5 mg         18      5 mg         19      2.5 mg           20      2.5 mg         21      5 mg         22      2.5 mg         23      5 mg         24      2.5 mg         25            26                 27               28               29               30               31                  Date Details   08/11 This INR check   Hold dose   have an extra serving of spinach today if possible       Date of next INR:  8/25/2017         How to take your warfarin dose     To take:   2.5 mg Take 0.5 of a 5 mg tablet.    To take:  5 mg Take 1 of the 5 mg tablets.    Hold Do not take your warfarin dose. See the Details table to the right for additional instructions.

## 2017-08-11 NOTE — PROGRESS NOTES
ANTICOAGULATION FOLLOW-UP CLINIC VISIT    Patient Name:  Bradley Liz  Date:  8/11/2017  Contact Type:  Face to Face    SUBJECTIVE:     Patient Findings     Positives Change in diet/appetite (pt states he is consistent with spinach 2 times a week.  He does not like any  of the other Vit K foods.), Activity level change (he states no change in activity), Unexplained INR or factor level change (no reason found for the INR not decreasing in spite of lowering his dose.)           OBJECTIVE    INR Protime   Date Value Ref Range Status   08/11/2017 3.8 (A) 0.86 - 1.14 Final       ASSESSMENT / PLAN  INR assessment SUPRA    Recheck INR In: 2 WEEKS    INR Location Clinic      Anticoagulation Summary as of 8/11/2017     INR goal 2.0-3.0   Today's INR 3.8!   Maintenance plan 5 mg (5 mg x 1) on Mon, Wed, Fri; 2.5 mg (5 mg x 0.5) all other days   Full instructions 8/11: Hold; Otherwise 5 mg on Mon, Wed, Fri; 2.5 mg all other days   Weekly total 25 mg   Plan last modified Mari Guerra RN (8/11/2017)   Next INR check 8/25/2017   Priority INR   Target end date Indefinite    Indications   Completed stroke (H) [I63.9]  Long term current use of anticoagulant therapy [Z79.01]         Anticoagulation Episode Summary     INR check location     Preferred lab     Send INR reminders to  [facility] POOL    Comments * warfarin 5 mg tabs. Leave message on MOBILE only. Takes warfarin in the AM      Anticoagulation Care Providers     Provider Role Specialty Phone number    Alex Mustafa MD [facility] [phone]            See the Encounter Report to view Anticoagulation Flowsheet and Dosing Calendar (Go to Encounters tab in chart review, and find the Anticoagulation Therapy Visit)        Mari Guerra, RN

## 2017-08-25 ENCOUNTER — ANTICOAGULATION THERAPY VISIT (OUTPATIENT)
Dept: ANTICOAGULATION | Facility: CLINIC | Age: 77
End: 2017-08-25
Payer: COMMERCIAL

## 2017-08-25 DIAGNOSIS — I63.9 COMPLETED STROKE (H): ICD-10-CM

## 2017-08-25 DIAGNOSIS — Z79.01 LONG TERM CURRENT USE OF ANTICOAGULANT THERAPY: ICD-10-CM

## 2017-08-25 LAB — INR POINT OF CARE: 1.6 (ref 0.86–1.14)

## 2017-08-25 PROCEDURE — 36416 COLLJ CAPILLARY BLOOD SPEC: CPT

## 2017-08-25 PROCEDURE — 85610 PROTHROMBIN TIME: CPT | Mod: QW

## 2017-08-25 PROCEDURE — 99207 ZZC NO CHARGE NURSE ONLY: CPT

## 2017-08-25 NOTE — PROGRESS NOTES
ANTICOAGULATION FOLLOW-UP CLINIC VISIT    Patient Name:  Bradley Liz  Date:  8/25/2017  Contact Type:  Face to Face    SUBJECTIVE:     Patient Findings     Comments Pt denies missed doses, pt refused come in any sooner than 3 weeks.           OBJECTIVE    INR Protime   Date Value Ref Range Status   08/25/2017 1.6 (A) 0.86 - 1.14 Final       ASSESSMENT / PLAN  INR assessment SUB    Recheck INR In: 3 WEEKS    INR Location Clinic      Anticoagulation Summary as of 8/25/2017     INR goal 2.0-3.0   Today's INR 1.6!   Maintenance plan 2.5 mg (5 mg x 0.5) on Mon, Wed, Fri; 5 mg (5 mg x 1) all other days   Full instructions 8/25: 5 mg; Otherwise 2.5 mg on Mon, Wed, Fri; 5 mg all other days   Weekly total 27.5 mg   Plan last modified Lela Lerner RN (8/25/2017)   Next INR check 9/15/2017   Priority INR   Target end date Indefinite    Indications   Completed stroke (H) [I63.9]  Long term current use of anticoagulant therapy [Z79.01]         Anticoagulation Episode Summary     INR check location     Preferred lab     Send INR reminders to [facility] POOL    Comments * warfarin 5 mg tabs. Leave message on MOBILE only. Takes warfarin in the AM      Anticoagulation Care Providers     Provider Role Specialty Phone number    Alex Mustafa MD [facility] [phone]            See the Encounter Report to view Anticoagulation Flowsheet and Dosing Calendar (Go to Encounters tab in chart review, and find the Anticoagulation Therapy Visit)        Lela Lerner RN

## 2017-08-25 NOTE — MR AVS SNAPSHOT
Bradley Liz   8/25/2017 11:45 AM   Anticoagulation Therapy Visit    Description:  77 year old male   Provider:  YULIYA ANTI COAG   Department:  Cl Anticoag           INR as of 8/25/2017     Today's INR 1.6!      Anticoagulation Summary as of 8/25/2017     INR goal 2.0-3.0   Today's INR 1.6!   Full instructions 8/25: 5 mg; Otherwise 2.5 mg on Mon, Wed, Fri; 5 mg all other days   Next INR check 9/15/2017    Indications   Completed stroke (H) [I63.9]  Long term current use of anticoagulant therapy [Z79.01]         Description     Warfarin dose: 5mg Today then begin 2.5mg MWF and 5mg the rest of the days of the week.        Your next Anticoagulation Clinic appointment(s)     Sep 15, 2017 11:45 AM CDT   Anticoagulation Visit with YULIYA ANTI COAG   [facility] ([facility])    [address] [address] [phone]              Contact Numbers     Please call [phone] to cancel and/or reschedule your appointment.  Please call [phone] with any problems or questions regarding your therapy          August 2017 Details    Sun Mon Tue Wed Thu Fri Sat       1               2               3               4               5                 6               7               8               9               10               11               12                 13               14               15               16               17               18               19                 20               21               22               23               24               25      5 mg   See details      26      5 mg           27      5 mg         28      2.5 mg         29      5 mg         30      2.5 mg         31      5 mg            Date Details   08/25 This INR check               How to take your warfarin dose     To take:  2.5 mg Take 0.5 of a 5 mg tablet.    To take:  5 mg Take 1 of the 5 mg tablets.           September 2017 Details    Sun Mon Tue Wed Thu Fri Sat           1      2.5 mg         2      5 mg           3      5 mg         4      2.5 mg         5      5 mg         6      2.5 mg         7      5 mg         8      2.5 mg         9      5 mg           10      5 mg         11      2.5 mg         12      5 mg         13      2.5 mg         14      5 mg         15            16                 17               18               19               20               21               22               23                 24               25               26               27               28               29               30                Date Details   No additional details    Date of next INR:  9/15/2017         How to take your warfarin dose     To take:  2.5 mg Take 0.5 of a 5 mg tablet.    To take:  5 mg Take 1 of the 5 mg tablets.

## 2017-09-12 ENCOUNTER — HOSPITAL ENCOUNTER (OUTPATIENT)
Dept: CARDIOLOGY | Facility: CLINIC | Age: 77
Discharge: HOME | End: 2017-09-12
Attending: NURSE PRACTITIONER | Admitting: NURSE PRACTITIONER
Payer: MEDICARE

## 2017-09-12 DIAGNOSIS — I48.19 PERSISTENT ATRIAL FIBRILLATION (H): ICD-10-CM

## 2017-09-12 PROCEDURE — 93225 XTRNL ECG REC<48 HRS REC: CPT

## 2017-09-12 PROCEDURE — 93227 XTRNL ECG REC<48 HR R&I: CPT | Performed by: INTERNAL MEDICINE

## 2017-09-14 ENCOUNTER — HOSPITAL ENCOUNTER (OUTPATIENT)
Dept: CARDIOLOGY | Facility: CLINIC | Age: 77
Discharge: HOME | End: 2017-09-14
Attending: PHYSICIAN ASSISTANT | Admitting: PHYSICIAN ASSISTANT
Payer: MEDICARE

## 2017-09-14 DIAGNOSIS — I42.9 CARDIOMYOPATHY, UNSPECIFIED (H): ICD-10-CM

## 2017-09-14 DIAGNOSIS — I48.19 PERSISTENT ATRIAL FIBRILLATION (H): ICD-10-CM

## 2017-09-14 PROCEDURE — 93321 DOPPLER ECHO F-UP/LMTD STD: CPT | Mod: 26 | Performed by: INTERNAL MEDICINE

## 2017-09-14 PROCEDURE — 25500064 ZZH RX 255 OP 636: Performed by: PHYSICIAN ASSISTANT

## 2017-09-14 PROCEDURE — 40000264 ECHO LIMITED WITH OPTISON

## 2017-09-14 PROCEDURE — 93308 TTE F-UP OR LMTD: CPT

## 2017-09-14 PROCEDURE — 93308 TTE F-UP OR LMTD: CPT | Mod: 26 | Performed by: INTERNAL MEDICINE

## 2017-09-14 PROCEDURE — 93325 DOPPLER ECHO COLOR FLOW MAPG: CPT | Mod: 26 | Performed by: INTERNAL MEDICINE

## 2017-09-14 RX ADMIN — HUMAN ALBUMIN MICROSPHERES AND PERFLUTREN 2 ML: 10; .22 INJECTION, SOLUTION INTRAVENOUS at 13:39

## 2017-09-15 ENCOUNTER — ANTICOAGULATION THERAPY VISIT (OUTPATIENT)
Dept: ANTICOAGULATION | Facility: CLINIC | Age: 77
End: 2017-09-15
Payer: COMMERCIAL

## 2017-09-15 DIAGNOSIS — I63.9 COMPLETED STROKE (H): ICD-10-CM

## 2017-09-15 DIAGNOSIS — Z79.01 LONG TERM CURRENT USE OF ANTICOAGULANT THERAPY: ICD-10-CM

## 2017-09-15 LAB — INR POINT OF CARE: 1.7 (ref 0.86–1.14)

## 2017-09-15 PROCEDURE — 99207 ZZC NO CHARGE NURSE ONLY: CPT

## 2017-09-15 PROCEDURE — 85610 PROTHROMBIN TIME: CPT | Mod: QW

## 2017-09-15 PROCEDURE — 36416 COLLJ CAPILLARY BLOOD SPEC: CPT

## 2017-09-15 RX ORDER — WARFARIN SODIUM 5 MG/1
TABLET ORAL
Qty: 120 TABLET | Refills: 3 | COMMUNITY
Start: 2017-09-15 | End: 2017-11-20

## 2017-09-15 NOTE — MR AVS SNAPSHOT
Bradley Liz   9/15/2017 11:45 AM   Anticoagulation Therapy Visit    Description:  77 year old male   Provider:  YULIYA ANTI COAG   Department:  Cl Anticoag           INR as of 9/15/2017     Today's INR 1.7!      Anticoagulation Summary as of 9/15/2017     INR goal 2.0-3.0   Today's INR 1.7!   Full instructions 9/15: 5 mg; Otherwise 2.5 mg on Mon, Fri; 5 mg all other days   Next INR check 9/29/2017    Indications   Completed stroke (H) [I63.9]  Long term current use of anticoagulant therapy [Z79.01]         Description     Recheck INR 2 weeks, 9/29/17  Take warfarin 5 mg today, then increase dose to 2.5 mg Mon, Fri, 5 mg rest of week       Your next Anticoagulation Clinic appointment(s)     Sep 29, 2017 11:45 AM CDT   Anticoagulation Visit with CL ANTI COAG   [facility]    [address] [address] [phone]              Contact Numbers     Please call [phone] to cancel and/or reschedule your appointment.  Please call [phone] with any problems or questions regarding your therapy          September 2017 Details    Sun Mon Tue Wed Thu Fri Sat          1               2                 3               4               5               6               7               8               9                 10               11               12               13               14               15      5 mg   See details      16      5 mg           17      5 mg         18      2.5 mg         19      5 mg         20      5 mg         21      5 mg         22      2.5 mg         23      5 mg           24      5 mg         25      2.5 mg         26      5 mg         27      5 mg         28      5 mg         29            30                Date Details   09/15 This INR check       Date of next INR:  9/29/2017         How to take your warfarin dose     To take:  2.5 mg Take 0.5 of a 5 mg tablet.    To take:  5 mg Take 1 of the 5 mg tablets.

## 2017-09-15 NOTE — PROGRESS NOTES
ANTICOAGULATION FOLLOW-UP CLINIC VISIT    Patient Name:  Bradley Liz  Date:  9/15/2017  Contact Type:  Face to Face    SUBJECTIVE:     Patient Findings     Positives Unexplained INR or factor level change (no reason found for low INR today, pt denies any change in diet, medications or activity)    Comments He did not want to come back in 2 weeks but changed his mind when writer explained dose changes and keeping him safe.  Pt goes to [place] after [place] for 3 to 4 months.  He needs his snow bird letter  actually signed by Dr. Mustafa           OBJECTIVE    INR Protime   Date Value Ref Range Status   09/15/2017 1.7 (A) 0.86 - 1.14 Final       ASSESSMENT / PLAN  INR assessment SUB    Recheck INR In: 2 WEEKS    INR Location Clinic      Anticoagulation Summary as of 9/15/2017     INR goal 2.0-3.0   Today's INR 1.7!   Maintenance plan 2.5 mg (5 mg x 0.5) on Mon, Fri; 5 mg (5 mg x 1) all other days   Full instructions 9/15: 5 mg; Otherwise 2.5 mg on Mon, Fri; 5 mg all other days   Weekly total 30 mg   Plan last modified Mari Guerra RN (9/15/2017)   Next INR check 9/29/2017   Priority INR   Target end date Indefinite    Indications   Completed stroke (H) [I63.9]  Long term current use of anticoagulant therapy [Z79.01]         Anticoagulation Episode Summary     INR check location     Preferred lab     Send INR reminders to [facility] POOL    Comments * warfarin 5 mg tabs. Leave message on MOBILE only. Takes warfarin in the AM      Anticoagulation Care Providers     Provider Role Specialty Phone number    Alex Mustafa MD [facility] [phone]            See the Encounter Report to view Anticoagulation Flowsheet and Dosing Calendar (Go to Encounters tab in chart review, and find the Anticoagulation Therapy Visit)        Mari Guerra, RN

## 2017-09-19 ENCOUNTER — OFFICE VISIT (OUTPATIENT)
Dept: CARDIOLOGY | Facility: CLINIC | Age: 77
End: 2017-09-19
Attending: PHYSICIAN ASSISTANT
Payer: COMMERCIAL

## 2017-09-19 VITALS
BODY MASS INDEX: 30.33 KG/M2 | WEIGHT: 205.4 LBS | SYSTOLIC BLOOD PRESSURE: 117 MMHG | OXYGEN SATURATION: 96 % | DIASTOLIC BLOOD PRESSURE: 68 MMHG | HEART RATE: 85 BPM

## 2017-09-19 DIAGNOSIS — I48.19 PERSISTENT ATRIAL FIBRILLATION (H): ICD-10-CM

## 2017-09-19 DIAGNOSIS — I42.8 NICM (NONISCHEMIC CARDIOMYOPATHY) (H): Primary | ICD-10-CM

## 2017-09-19 DIAGNOSIS — I48.91 ATRIAL FIBRILLATION, UNSPECIFIED TYPE (H): ICD-10-CM

## 2017-09-19 PROCEDURE — 99214 OFFICE O/P EST MOD 30 MIN: CPT | Performed by: INTERNAL MEDICINE

## 2017-09-19 RX ORDER — METOPROLOL TARTRATE 50 MG
50 TABLET ORAL EVERY 12 HOURS
Qty: 180 TABLET | Refills: 3 | Status: SHIPPED | OUTPATIENT
Start: 2017-09-19 | End: 2018-06-13

## 2017-09-19 NOTE — LETTER
9/19/2017    Alex Mustafa MD  [address]  [facility] [address]    RE: Bradley Liz       Dear Colleague,    I had the pleasure of seeing Bradley SORIANO Agusto in the [facility].    HPI and Plan:     The patient is a pleasant 77-year-old gentleman with a history of a CVA in 2008, prior tobacco abuse, and new onset persistent atrial fibrillation for which he prefers to pursue a rate control strategy, along with a mild nonischemic cardiomyopathy with an ejection fraction of roughly 45% who returns in close clinical follow-up.    The patient underwent a repeat transthoracic echocardiogram prior to this visit showing an ejection fraction of 40-50%.  A previous transthoracic echogram showed an ejection fraction of 45-50%.  His nuclear stress test showed an ejection fraction of 53%.    The patient is asymptomatic from a cardiovascular standpoint denying any chest pain, chest pressure, shortness of breath, orthopnea, or paroxysmal nocturnal dyspnea.    At his last visit his beta blocker dose was increased due to poor rate control.  He tolerated this adjustment well.  The patient underwent a Holter monitor prior to this visit showing poor rate control with an average heart rate of 106 bpm.  There were no significant positives.    I reviewed his stress test again which showed no evidence for myocardial ischemia.    Orders Placed This Encounter   Procedures     Follow-Up with Cardiac Advanced Practice Provider       Orders Placed This Encounter   Medications     metoprolol (LOPRESSOR) 50 MG tablet     Sig: Take 1 tablet (50 mg) by mouth every 12 hours     Dispense:  180 tablet     Refill:  3       Medications Discontinued During This Encounter   Medication Reason     metoprolol (LOPRESSOR) 25 MG tablet Reorder         Encounter Diagnoses   Name Primary?     Persistent atrial fibrillation (H)      NICM (nonischemic cardiomyopathy) (H) Yes     Atrial fibrillation, unspecified type (H)         CURRENT MEDICATIONS:  Current Outpatient Prescriptions   Medication Sig Dispense Refill     metoprolol (LOPRESSOR) 50 MG tablet Take 1 tablet (50 mg) by mouth every 12 hours 180 tablet 3     warfarin (COUMADIN) 5 MG tablet As directed by Anticoagulation Clinic.  (Current dose 2.5 mg Mon, Fri, 5 mg rest of week ) 120 tablet 3     simvastatin (ZOCOR) 10 MG tablet Take 1 tablet (10 mg) by mouth At Bedtime 90 tablet 3     [DISCONTINUED] metoprolol (LOPRESSOR) 25 MG tablet Take 1.5 tablets (37.5 mg) by mouth every 12 hours 270 tablet 3     ASPIRIN NOT PRESCRIBED, INTENTIONAL, by Other route continuous prn.  0       ALLERGIES   No Known Allergies    PAST MEDICAL HISTORY:  No past medical history on file.    PAST SURGICAL HISTORY:  No past surgical history on file.    FAMILY HISTORY:  Family History   Problem Relation Age of Onset     Unknown/Adopted Mother      Prostate Cancer Father      C.A.D. Father      age 77       SOCIAL HISTORY:  Social History     Social History     Marital status:      Spouse name: N/A     Number of children: N/A     Years of education: N/A     Social History Main Topics     Smoking status: Former Smoker     Years: 50.00     Quit date: 12/5/2008     Smokeless tobacco: Never Used     Alcohol use Yes      Comment: couple beers occ     Drug use: No     Sexual activity: Yes     Partners: Female     Other Topics Concern     Parent/Sibling W/ Cabg, Mi Or Angioplasty Before 65f 55m? No     Social History Narrative       Review of Systems:  Skin:  Positive for bruising     Eyes:  Negative      ENT:  Negative      Respiratory:  Positive for shortness of breath     Cardiovascular:  Negative      Gastroenterology: Negative      Genitourinary:  Positive for urinary frequency;urgency    Musculoskeletal:  Negative      Neurologic:  Positive for stroke;numbness or tingling of hands;numbness or tingling of feet    Psychiatric:  Negative      Heme/Lymph/Imm:  Negative      Endocrine:  Negative         Physical Exam:  Vitals: /68 (BP Location: Right arm, Patient Position: Sitting, Cuff Size: Adult Regular)  Pulse 85  Wt 93.2 kg (205 lb 6.4 oz)  SpO2 96%  BMI 30.33 kg/m2    Constitutional:  cooperative;well nourished        Skin:  warm and dry to the touch        Head:  normocephalic        Eyes:  sclera white        ENT:  no pallor or cyanosis        Neck:  no stiffness        Chest:    prolonged expiration (Coarse lung sounds bilaterally )        Cardiac:   irregularly irregular rhythm     systolic ejection murmur          Abdomen:  abdomen soft        Vascular: pulses full and equal                                        Extremities and Back:        trace trace      Neurological:  affect appropriate        Impression/plan:    The patient is a pleasant 77-year-old gentleman with a prior history of a CVA, prior tobacco abuse, permanent atrial fibrillation with poor rate control, and mild nonischemic cardiomyopathy with an ejection fraction of roughly 45%.  He has had a previous stress test showing no evidence for myocardial ischemia.  At today's visit I asked him to increase his metoprolol tartrate 50 mg twice a day.  He will remain on warfarin unchanged for stroke prophylaxis.  Unfortunately he is traveling to [place] for the winter so we cannot set up a four to six month visit and I therefore asked him to come see us in nine months.  He does have a medical office near his winter residence where he will be having his INRs checked.  I have asked him to check in with one of the physicians there if he does not tolerate the beta blocker dosage adjustment that I have made at today's visit.    Thank you for allowing me to participate in the care of your patient.    Sincerely,     Lai Brooks MD     [facility]

## 2017-09-19 NOTE — PROGRESS NOTES
HPI and Plan:     The patient is a pleasant 77-year-old gentleman with a history of a CVA in 2008, prior tobacco abuse, and new onset persistent atrial fibrillation for which he prefers to pursue a rate control strategy, along with a mild nonischemic cardiomyopathy with an ejection fraction of roughly 45% who returns in close clinical follow-up.    The patient underwent a repeat transthoracic echocardiogram prior to this visit showing an ejection fraction of 40-50%.  A previous transthoracic echogram showed an ejection fraction of 45-50%.  His nuclear stress test showed an ejection fraction of 53%.    The patient is asymptomatic from a cardiovascular standpoint denying any chest pain, chest pressure, shortness of breath, orthopnea, or paroxysmal nocturnal dyspnea.    At his last visit his beta blocker dose was increased due to poor rate control.  He tolerated this adjustment well.  The patient underwent a Holter monitor prior to this visit showing poor rate control with an average heart rate of 106 bpm.  There were no significant positives.    I reviewed his stress test again which showed no evidence for myocardial ischemia.    Orders Placed This Encounter   Procedures     Follow-Up with Cardiac Advanced Practice Provider       Orders Placed This Encounter   Medications     metoprolol (LOPRESSOR) 50 MG tablet     Sig: Take 1 tablet (50 mg) by mouth every 12 hours     Dispense:  180 tablet     Refill:  3       Medications Discontinued During This Encounter   Medication Reason     metoprolol (LOPRESSOR) 25 MG tablet Reorder         Encounter Diagnoses   Name Primary?     Persistent atrial fibrillation (H)      NICM (nonischemic cardiomyopathy) (H) Yes     Atrial fibrillation, unspecified type (H)        CURRENT MEDICATIONS:  Current Outpatient Prescriptions   Medication Sig Dispense Refill     metoprolol (LOPRESSOR) 50 MG tablet Take 1 tablet (50 mg) by mouth every 12 hours 180 tablet 3     warfarin (COUMADIN) 5 MG  tablet As directed by Anticoagulation Clinic.  (Current dose 2.5 mg Mon, Fri, 5 mg rest of week ) 120 tablet 3     simvastatin (ZOCOR) 10 MG tablet Take 1 tablet (10 mg) by mouth At Bedtime 90 tablet 3     [DISCONTINUED] metoprolol (LOPRESSOR) 25 MG tablet Take 1.5 tablets (37.5 mg) by mouth every 12 hours 270 tablet 3     ASPIRIN NOT PRESCRIBED, INTENTIONAL, by Other route continuous prn.  0       ALLERGIES   No Known Allergies    PAST MEDICAL HISTORY:  No past medical history on file.    PAST SURGICAL HISTORY:  No past surgical history on file.    FAMILY HISTORY:  Family History   Problem Relation Age of Onset     Unknown/Adopted Mother      Prostate Cancer Father      C.A.D. Father      age 77       SOCIAL HISTORY:  Social History     Social History     Marital status:      Spouse name: N/A     Number of children: N/A     Years of education: N/A     Social History Main Topics     Smoking status: Former Smoker     Years: 50.00     Quit date: 12/5/2008     Smokeless tobacco: Never Used     Alcohol use Yes      Comment: couple beers occ     Drug use: No     Sexual activity: Yes     Partners: Female     Other Topics Concern     Parent/Sibling W/ Cabg, Mi Or Angioplasty Before 65f 55m? No     Social History Narrative       Review of Systems:  Skin:  Positive for bruising     Eyes:  Negative      ENT:  Negative      Respiratory:  Positive for shortness of breath     Cardiovascular:  Negative      Gastroenterology: Negative      Genitourinary:  Positive for urinary frequency;urgency    Musculoskeletal:  Negative      Neurologic:  Positive for stroke;numbness or tingling of hands;numbness or tingling of feet    Psychiatric:  Negative      Heme/Lymph/Imm:  Negative      Endocrine:  Negative        Physical Exam:  Vitals: /68 (BP Location: Right arm, Patient Position: Sitting, Cuff Size: Adult Regular)  Pulse 85  Wt 93.2 kg (205 lb 6.4 oz)  SpO2 96%  BMI 30.33 kg/m2    Constitutional:  cooperative;well  nourished        Skin:  warm and dry to the touch        Head:  normocephalic        Eyes:  sclera white        ENT:  no pallor or cyanosis        Neck:  no stiffness        Chest:    prolonged expiration (Coarse lung sounds bilaterally )        Cardiac:   irregularly irregular rhythm     systolic ejection murmur          Abdomen:  abdomen soft        Vascular: pulses full and equal                                        Extremities and Back:        trace trace      Neurological:  affect appropriate        Impression/plan:    The patient is a pleasant 77-year-old gentleman with a prior history of a CVA, prior tobacco abuse, permanent atrial fibrillation with poor rate control, and mild nonischemic cardiomyopathy with an ejection fraction of roughly 45%.  He has had a previous stress test showing no evidence for myocardial ischemia.  At today's visit I asked him to increase his metoprolol tartrate 50 mg twice a day.  He will remain on warfarin unchanged for stroke prophylaxis.  Unfortunately he is traveling to [place] for the winter so we cannot set up a four to six month visit and I therefore asked him to come see us in nine months.  He does have a medical office near his winter residence where he will be having his INRs checked.  I have asked him to check in with one of the physicians there if he does not tolerate the beta blocker dosage adjustment that I have made at today's visit.    Lai Brooks MD        CC  Pamela Choi PA-C  [address] [place] [address]

## 2017-09-19 NOTE — MR AVS SNAPSHOT
After Visit Summary   9/19/2017    Bradley Liz    MRN: [id number]           Patient Information     Date Of Birth          1940        Visit Information        Provider Department      9/19/2017 10:30 AM Lai Brooks MD [facility] PHYSICIAN HEART AT [facility]        Today's Diagnoses     NICM (nonischemic cardiomyopathy) (H)    -  1    Persistent atrial fibrillation (H)        Atrial fibrillation, unspecified type (H)           Follow-ups after your visit        Additional Services     Follow-Up with Cardiac Advanced Practice Provider                 Your next 10 appointments already scheduled     Sep 29, 2017 11:45 AM CDT   [facility] with CL [facility]   [facility]    [address] [facility] [address] [phone]              Future tests that were ordered for you today     Open Future Orders        Priority Expected Expires Ordered    Follow-Up with Cardiac Advanced Practice Provider Routine 6/16/2018 9/19/2018 9/19/2017            Who to contact     If you have questions or need follow up information about today's clinic visit or your schedule please contact [facility] PHYSICIAN HEART AT [facility] directly at [phone].  Normal or non-critical lab and imaging results will be communicated to you by MyChart, letter or phone within 4 business days after the clinic has received the results. If you do not hear from us within 7 days, please contact the clinic through MyChart or phone. If you have a critical or abnormal lab result, we will notify you by phone as soon as possible.  Submit refill requests through [employer] or call your pharmacy and they will forward the refill request to us. Please allow 3 business days for your refill to be completed.          Additional Information About Your Visit        MyChart Information     [employer] lets you send messages to your doctor, view  "your test results, renew your prescriptions, schedule appointments and more. To sign up, go to [email or web address] . Click on \"Log in\" on the left side of the screen, which will take you to the Welcome page. Then click on \"Sign up Now\" on the right side of the page.     You will be asked to enter the access code listed below, as well as some personal information. Please follow the directions to create your username and password.     Your access code is: [email or web address]  Expires: 2017 10:39 AM     Your access code will  in 90 days. If you need help or a new code, please call your [facility] clinic or [phone].        Care EveryWhere ID     This is your Care EveryWhere ID. This could be used by other organizations to access your [facility] medical records  [id number]        Your Vitals Were     Pulse Pulse Oximetry BMI (Body Mass Index)             85 96% 30.33 kg/m2          Blood Pressure from Last 3 Encounters:   17 117/68   17 111/81   06/15/17 117/75    Weight from Last 3 Encounters:   17 93.2 kg (205 lb 6.4 oz)   17 91.6 kg (202 lb)   06/15/17 90.3 kg (199 lb)              We Performed the Following     Follow-Up with Cardiologist          Today's Medication Changes          These changes are accurate as of: 17 10:39 AM.  If you have any questions, ask your nurse or doctor.               These medicines have changed or have updated prescriptions.        Dose/Directions    metoprolol 50 MG tablet   Commonly known as:  LOPRESSOR   This may have changed:    - medication strength  - how much to take   Used for:  Atrial fibrillation, unspecified type (H)   Changed by:  Lai Brooks MD        Dose:  50 mg   Take 1 tablet (50 mg) by mouth every 12 hours   Quantity:  180 tablet   Refills:  3            Where to get your medicines      These medications were sent to [id number] - [facility] [address] AT [facility] [address]  [address] [place] [address]     Phone:  [phone]     metoprolol 50 MG tablet                Primary Care Provider Office Phone # Fax #    Alex Mustafa [phone] [address]  [facility] [address]        Equal Access to Services     JONNATHAN POPE : Hadii aad ku hadasho Soomaali, waaxda luqadaha, qaybta kaalmada adeegyada, waxay idiin hayaan adeeg khdiegosh la'aan ah. So [facility] [phone].    ATENCIÓN: Si habla español, tiene a katz disposición servicios gratuitos de asistencia lingüística. Llame al [phone].    We comply with applicable federal civil rights laws and [place] laws. We do not discriminate on the basis of race, color, national origin, age, disability sex, sexual orientation or gender identity.            Thank you!     Thank you for choosing [facility] PHYSICIAN HEART AT [facility]  for your care. Our goal is always to provide you with excellent care. Hearing back from our patients is one way we can continue to improve our services. Please take a few minutes to complete the written survey that you may receive in the mail after your visit with us. Thank you!             Your Updated Medication List - Protect others around you: Learn how to safely use, store and throw away your medicines at www.disposemymeds.org.          This list is accurate as of: 9/19/17 10:39 AM.  Always use your most recent med list.                   Brand Name Dispense Instructions for use Diagnosis    ASPIRIN NOT PRESCRIBED    INTENTIONAL     by Other route continuous prn.        COUMADIN 5 MG tablet   Generic drug:  warfarin     120 tablet    As directed by Anticoagulation Clinic.  (Current dose 2.5 mg Mon, Fri, 5 mg rest of week )    Long term current use of anticoagulant therapy       metoprolol 50 MG tablet    LOPRESSOR    180 tablet    Take 1 tablet (50 mg) by mouth every 12 hours    Atrial fibrillation, unspecified type (H)       simvastatin 10 MG tablet    ZOCOR    90  tablet    Take 1 tablet (10 mg) by mouth At Bedtime    CARDIOVASCULAR SCREENING; LDL GOAL LESS THAN 100

## 2017-09-29 ENCOUNTER — ANTICOAGULATION THERAPY VISIT (OUTPATIENT)
Dept: ANTICOAGULATION | Facility: CLINIC | Age: 77
End: 2017-09-29
Payer: COMMERCIAL

## 2017-09-29 DIAGNOSIS — I63.9 COMPLETED STROKE (H): ICD-10-CM

## 2017-09-29 DIAGNOSIS — Z79.01 LONG TERM CURRENT USE OF ANTICOAGULANT THERAPY: ICD-10-CM

## 2017-09-29 LAB — INR POINT OF CARE: 2.3 (ref 0.86–1.14)

## 2017-09-29 PROCEDURE — 36416 COLLJ CAPILLARY BLOOD SPEC: CPT

## 2017-09-29 PROCEDURE — 99207 ZZC NO CHARGE NURSE ONLY: CPT

## 2017-09-29 PROCEDURE — 85610 PROTHROMBIN TIME: CPT | Mod: QW

## 2017-09-29 NOTE — MR AVS SNAPSHOT
Bradley Liz   9/29/2017 11:45 AM   Anticoagulation Therapy Visit    Description:  77 year old male   Provider:  YULIYA ANTI COAG   Department:  Cl Anticoag           INR as of 9/29/2017     Today's INR 2.3      Anticoagulation Summary as of 9/29/2017     INR goal 2.0-3.0   Today's INR 2.3   Full instructions 2.5 mg on Mon, Fri; 5 mg all other days   Next INR check 10/20/2017    Indications   Completed stroke (H) [I63.9]  Long term current use of anticoagulant therapy [Z79.01]         Description     No change, recheck INR in three weeks.       Your next Anticoagulation Clinic appointment(s)     Oct 20, 2017 11:15 AM CDT   [facility] CL [facility]TI [facility]AG   [facility]    [address] [id number] [phone]              Contact Numbers     Please call [phone] to cancel and/or reschedule your appointment.  Please call [phone] with any problems or questions regarding your therapy          September 2017 Details    Sun Mon Tue Wed Thu Fri Sat          1               2                 3               4               5               6               7               8               9                 10               11               12               13               14               15               16                 17               18               19               20               21               22               23                 24               25               26               27               28               29      2.5 mg   See details      30      5 mg          Date Details   09/29 This INR check               How to take your warfarin dose     To take:  2.5 mg Take 0.5 of a 5 mg tablet.    To take:  5 mg Take 1 of the 5 mg tablets.           October 2017 Details    Sun Mon Tue Wed Thu Fri Sat     1      5 mg         2      2.5 mg         3      5 mg         4      5 mg         5      5 mg         6      2.5 mg          7      5 mg           8      5 mg         9      2.5 mg         10      5 mg         11      5 mg         12      5 mg         13      2.5 mg         14      5 mg           15      5 mg         16      2.5 mg         17      5 mg         18      5 mg         19      5 mg         20            21                 22               23               24               25               26               27               28                 29               30               31                    Date Details   No additional details    Date of next INR:  10/20/2017         How to take your warfarin dose     To take:  2.5 mg Take 0.5 of a 5 mg tablet.    To take:  5 mg Take 1 of the 5 mg tablets.

## 2017-10-02 ENCOUNTER — OFFICE VISIT (OUTPATIENT)
Dept: FAMILY MEDICINE | Facility: CLINIC | Age: 77
End: 2017-10-02
Payer: COMMERCIAL

## 2017-10-02 VITALS
RESPIRATION RATE: 20 BRPM | WEIGHT: 201 LBS | HEIGHT: 69 IN | TEMPERATURE: 97.8 F | BODY MASS INDEX: 29.77 KG/M2 | DIASTOLIC BLOOD PRESSURE: 72 MMHG | SYSTOLIC BLOOD PRESSURE: 111 MMHG | OXYGEN SATURATION: 96 % | HEART RATE: 62 BPM

## 2017-10-02 DIAGNOSIS — Z79.01 LONG TERM CURRENT USE OF ANTICOAGULANT THERAPY: ICD-10-CM

## 2017-10-02 DIAGNOSIS — Z00.00 ROUTINE HISTORY AND PHYSICAL EXAMINATION OF ADULT: Primary | ICD-10-CM

## 2017-10-02 DIAGNOSIS — I63.9 COMPLETED STROKE (H): ICD-10-CM

## 2017-10-02 DIAGNOSIS — I48.91 ATRIAL FIBRILLATION, UNSPECIFIED TYPE (H): ICD-10-CM

## 2017-10-02 LAB
ANION GAP SERPL CALCULATED.3IONS-SCNC: 7 MMOL/L (ref 3–14)
BUN SERPL-MCNC: 10 MG/DL (ref 7–30)
CALCIUM SERPL-MCNC: 9.2 MG/DL (ref 8.5–10.1)
CHLORIDE SERPL-SCNC: 107 MMOL/L (ref 94–109)
CHOLEST SERPL-MCNC: 196 MG/DL
CO2 SERPL-SCNC: 27 MMOL/L (ref 20–32)
CREAT SERPL-MCNC: 0.97 MG/DL (ref 0.66–1.25)
GFR SERPL CREATININE-BSD FRML MDRD: 75 ML/MIN/1.7M2
GLUCOSE SERPL-MCNC: 96 MG/DL (ref 70–99)
HDLC SERPL-MCNC: 55 MG/DL
LDLC SERPL CALC-MCNC: 112 MG/DL
NONHDLC SERPL-MCNC: 141 MG/DL
POTASSIUM SERPL-SCNC: 4.2 MMOL/L (ref 3.4–5.3)
SODIUM SERPL-SCNC: 141 MMOL/L (ref 133–144)
TRIGL SERPL-MCNC: 143 MG/DL

## 2017-10-02 PROCEDURE — 80048 BASIC METABOLIC PNL TOTAL CA: CPT | Performed by: FAMILY MEDICINE

## 2017-10-02 PROCEDURE — 36415 COLL VENOUS BLD VENIPUNCTURE: CPT | Performed by: FAMILY MEDICINE

## 2017-10-02 PROCEDURE — G0438 PPPS, INITIAL VISIT: HCPCS | Performed by: FAMILY MEDICINE

## 2017-10-02 PROCEDURE — 80061 LIPID PANEL: CPT | Performed by: FAMILY MEDICINE

## 2017-10-02 NOTE — MR AVS SNAPSHOT
After Visit Summary   10/2/2017    Bradley Liz    MRN: [id number]           Patient Information     Date Of Birth          1940        Visit Information        Provider Department      10/2/2017 8:00 AM Alex Mustafa MD [facility]        Today's Diagnoses     Routine history and physical examination of adult    -  1    Atrial fibrillation, unspecified type (H)        Completed stroke (H)        Long term current use of anticoagulant therapy          Care Instructions      Preventive Health Recommendations:   Male Ages 65 and over    Yearly exam:             See your health care provider every year in order to  o   Review health changes.   o   Discuss preventive care.    o   Review your medicines if your doctor has prescribed any.    Talk with your health care provider about whether you should have a test to screen for prostate cancer (PSA).    Every 3 years, have a diabetes test (fasting glucose). If you are at risk for diabetes, you should have this test more often.    Every 5 years, have a cholesterol test. Have this test more often if you are at risk for high cholesterol or heart disease.     Every 10 years, have a colonoscopy. Or, have a yearly FIT test (stool test). These exams will check for colon cancer.    Talk to with your health care provider about screening for Abdominal Aortic Aneurysm if you have a family history of AAA or have a history of smoking.    Shots:     Get a flu shot each year.     Get a tetanus shot every 10 years.     Talk to your doctor about your pneumonia vaccines. There are now two you should receive - Pneumovax (PPSV 23) and Prevnar (PCV 13).     Talk to your doctor about a shingles vaccine.     Talk to your doctor about the hepatitis B vaccine.  Nutrition:     Eat at least 5 servings of fruits and vegetables each day.     Eat whole-grain bread, whole-wheat pasta and brown rice instead of white grains and rice.     Talk to your provider  "about Calcium and Vitamin D.   Lifestyle    Exercise for at least 150 minutes a week (30 minutes a day, 5 days a week). This will help you control your weight and prevent disease.     Limit alcohol to one drink per day.     No smoking.     Wear sunscreen to prevent skin cancer.     See your dentist every six months for an exam and cleaning.     See your eye doctor every 1 to 2 years to screen for conditions such as glaucoma, macular degeneration, cataracts, etc           Follow-ups after your visit        Your next 10 appointments already scheduled     Oct 20, 2017 11:15 AM CDT   Anticoagulation Visit with CL ANTI COAG   [facility] ([facility])    [address]  [facility] [address] [phone]              Who to contact     If you have questions or need follow up information about today's clinic visit or your schedule please contact [facility] directly at [phone].  Normal or non-critical lab and imaging results will be communicated to you by [employer], letter or phone within 4 business days after the clinic has received the results. If you do not hear from us within 7 days, please contact the clinic through [employer] or phone. If you have a critical or abnormal lab result, we will notify you by phone as soon as possible.  Submit refill requests through [employer] or call your pharmacy and they will forward the refill request to us. Please allow 3 business days for your refill to be completed.          Additional Information About Your Visit        [employer] Information     [employer] lets you send messages to your doctor, view your test results, renew your prescriptions, schedule appointments and more. To sign up, go to [email or web address] . Click on \"Log in\" on the left side of the screen, which will take you to the Welcome page. Then click on \"Sign up Now\" on the right side of the page.     You will be asked to enter the access code listed " "below, as well as some personal information. Please follow the directions to create your username and password.     Your access code is: [email or web address]  Expires: 2017 10:39 AM     Your access code will  in 90 days. If you need help or a new code, please call your [place] clinic or [phone].        Care EveryWhere ID     This is your Care EveryWhere ID. This could be used by other organizations to access your [place] medical records  [id number]        Your Vitals Were     Pulse Temperature Respirations Height Pulse Oximetry BMI (Body Mass Index)    62 97.8  F (36.6  C) 20 5' 9\" (1.753 m) 96% 29.68 kg/m2       Blood Pressure from Last 3 Encounters:   10/02/17 111/72   17 117/68   17 111/81    Weight from Last 3 Encounters:   10/02/17 201 lb (91.2 kg)   17 205 lb 6.4 oz (93.2 kg)   17 202 lb (91.6 kg)              We Performed the Following     Basic metabolic panel     Lipid panel reflex to direct LDL        Primary Care Provider Office Phone # Fax #    Alex Mustafa [phone] [address] [facility] [address]        Equal Access to Services     [facility]: Hadii aad ku hadasho Soomaali, waaxda luqadaha, qaybta kaalmada adeegyada, waxay idiin haymisha gan . So [facility] [phone].    ATENCIÓN: Si habla español, tiene a katz disposición servicios gratuitos de asistencia lingüística. Llame al [phone].    We comply with applicable federal civil rights laws and [place] laws. We do not discriminate on the basis of race, color, national origin, age, disability, sex, sexual orientation, or gender identity.            Thank you!     Thank you for choosing [facility]  for your care. Our goal is always to provide you with excellent care. Hearing back from our patients is one way we can continue to improve our services. Please take a few minutes to complete the written survey that you may receive in the mail after " your visit with us. Thank you!             Your Updated Medication List - Protect others around you: Learn how to safely use, store and throw away your medicines at www.disposemymeds.org.          This list is accurate as of: 10/2/17  8:33 AM.  Always use your most recent med list.                   Brand Name Dispense Instructions for use Diagnosis    ASPIRIN NOT PRESCRIBED    INTENTIONAL     by Other route continuous prn.        COUMADIN 5 MG tablet   Generic drug:  warfarin     120 tablet    As directed by Anticoagulation Clinic.  (Current dose 2.5 mg Mon, Fri, 5 mg rest of week )    Long term current use of anticoagulant therapy       metoprolol 50 MG tablet    LOPRESSOR    180 tablet    Take 1 tablet (50 mg) by mouth every 12 hours    Atrial fibrillation, unspecified type (H)       simvastatin 10 MG tablet    ZOCOR    90 tablet    Take 1 tablet (10 mg) by mouth At Bedtime    CARDIOVASCULAR SCREENING; LDL GOAL LESS THAN 100

## 2017-10-02 NOTE — NURSING NOTE
"Chief Complaint   Patient presents with     Physical       Initial /72  Pulse 62  Temp 97.8  F (36.6  C)  Resp 20  Ht 5' 9\" (1.753 m)  Wt 201 lb (91.2 kg)  SpO2 96%  BMI 29.68 kg/m2 Estimated body mass index is 29.68 kg/(m^2) as calculated from the following:    Height as of this encounter: 5' 9\" (1.753 m).    Weight as of this encounter: 201 lb (91.2 kg).  Medication Reconciliation: complete   Jennifer Peña CMA      "

## 2017-10-02 NOTE — PROGRESS NOTES
SUBJECTIVE:   CC: Bradley Liz is an 77 year old male who presents for preventative health visit.     Healthy Habits:    Do you get at least three servings of calcium containing foods daily (dairy, green leafy vegetables, etc.)? yes    Amount of exercise or daily activities, outside of work: 1 day(s) per week    Problems taking medications regularly No    Medication side effects: No    Have you had an eye exam in the past two years? no    Do you see a dentist twice per year? yes    Do you have sleep apnea, excessive snoring or daytime drowsiness?no          Hyperlipidemia Follow-Up      Rate your low fat/cholesterol diet?: good    Taking statin?  Yes, possible muscle aches from statin    Other lipid medications/supplements?:  none    Hypertension Follow-up      Outpatient blood pressures are not being checked.    Low Salt Diet: no added salt            Today's PHQ-2 Score: PHQ-2 ( 1999 Pfizer) 10/2/2017 8/8/2016   Q1: Little interest or pleasure in doing things 0 0   Q2: Feeling down, depressed or hopeless 0 0   PHQ-2 Score 0 0         Abuse: Current or Past(Physical, Sexual or Emotional)- No  Do you feel safe in your environment - Yes  Social History   Substance Use Topics     Smoking status: Former Smoker     Years: 50.00     Quit date: 12/5/2008     Smokeless tobacco: Never Used     Alcohol use Yes      Comment: couple beers occ     The patient does not drink >3 drinks per day nor >7 drinks per week.    Last PSA:   PSA   Date Value Ref Range Status   07/23/2012 0.97 0 - 4 ug/L Final       Reviewed orders with patient. Reviewed health maintenance and updated orders accordingly - Yes  Labs reviewed in EPIC    Reviewed and updated as needed this visit by clinical staff  Tobacco  Allergies  Meds  Med Hx  Surg Hx  Fam Hx  Soc Hx        Reviewed and updated as needed this visit by Provider            ROS:  C: NEGATIVE for fever, chills, change in weight  I: NEGATIVE for worrisome rashes, moles or  "lesions  E: NEGATIVE for vision changes or irritation  ENT: NEGATIVE for ear, mouth and throat problems  R: NEGATIVE for significant cough or SOB  CV: NEGATIVE for chest pain, palpitations or peripheral edema  GI: NEGATIVE for nausea, abdominal pain, heartburn, or change in bowel habits   male: negative for dysuria, hematuria, decreased urinary stream, erectile dysfunction, urethral discharge  M: NEGATIVE for significant arthralgias or myalgia  N: NEGATIVE for weakness, dizziness or paresthesias  P: NEGATIVE for changes in mood or affect    OBJECTIVE:   /72  Pulse 62  Temp 97.8  F (36.6  C)  Resp 20  Ht 5' 9\" (1.753 m)  Wt 201 lb (91.2 kg)  SpO2 96%  BMI 29.68 kg/m2  EXAM:  GENERAL: healthy, alert and no distress  EYES: Eyes grossly normal to inspection, PERRL and conjunctivae and sclerae normal  HENT: ear canals and TM's normal, nose and mouth without ulcers or lesions  NECK: no adenopathy, no asymmetry, masses, or scars and thyroid normal to palpation  RESP: lungs clear to auscultation - no rales, rhonchi or wheezes  CV: irregularly irregular rhythm, no murmur, click or rub, peripheral pulses strong and no peripheral edema  ABDOMEN: soft, nontender, no hepatosplenomegaly, no masses and bowel sounds normal  MS: no gross musculoskeletal defects noted, no edema  SKIN: no suspicious lesions or rashes  NEURO: Normal strength and tone, mentation intact and speech normal  PSYCH: mentation appears normal, affect normal/bright    ASSESSMENT/PLAN:   Bradley was seen today for physical.    Diagnoses and all orders for this visit:    Routine history and physical examination of adult  -     Basic metabolic panel  -     Lipid panel reflex to direct LDL    Atrial fibrillation, unspecified type (H)    Completed stroke (H)    Long term current use of anticoagulant therapy        COUNSELING:  Reviewed preventive health counseling, as reflected in patient instructions       Regular exercise       Healthy " "diet/nutrition         reports that he quit smoking about 8 years ago. He quit after 50.00 years of use. He has never used smokeless tobacco.      Estimated body mass index is 29.68 kg/(m^2) as calculated from the following:    Height as of this encounter: 5' 9\" (1.753 m).    Weight as of this encounter: 201 lb (91.2 kg).         Counseling Resources:  ATP IV Guidelines  Pooled Cohorts Equation Calculator  FRAX Risk Assessment  ICSI Preventive Guidelines  Dietary Guidelines for Americans, 2010  USDA's MyPlate  ASA Prophylaxis  Lung CA Screening    Alex Mustafa MD  [facility]  "

## 2017-10-20 ENCOUNTER — ANTICOAGULATION THERAPY VISIT (OUTPATIENT)
Dept: ANTICOAGULATION | Facility: CLINIC | Age: 77
End: 2017-10-20
Payer: COMMERCIAL

## 2017-10-20 DIAGNOSIS — Z79.01 LONG TERM CURRENT USE OF ANTICOAGULANT THERAPY: ICD-10-CM

## 2017-10-20 DIAGNOSIS — I63.9 COMPLETED STROKE (H): ICD-10-CM

## 2017-10-20 LAB — INR POINT OF CARE: 2.7 (ref 0.86–1.14)

## 2017-10-20 PROCEDURE — 85610 PROTHROMBIN TIME: CPT | Mod: QW

## 2017-10-20 PROCEDURE — 36416 COLLJ CAPILLARY BLOOD SPEC: CPT

## 2017-10-20 PROCEDURE — 99207 ZZC NO CHARGE NURSE ONLY: CPT

## 2017-10-20 NOTE — MR AVS SNAPSHOT
Bradley Liz   10/20/2017 11:15 AM   Anticoagulation Therapy Visit    Description:  77 year old male   Provider:  YULIYA ANTI COAG   Department:  Cl Anticoag           INR as of 10/20/2017     Today's INR 2.7      Anticoagulation Summary as of 10/20/2017     INR goal 2.0-3.0   Today's INR 2.7   Full instructions 2.5 mg on Mon, Fri; 5 mg all other days   Next INR check 11/24/2017    Indications   Completed stroke (H) [I63.9]  Long term current use of anticoagulant therapy [Z79.01]         Description     No change recheck INR in 5 weeks.      Your next Anticoagulation Clinic appointment(s)     Nov 24, 2017 11:30 AM CST   Anticoagulation Visit with CL ANTI COAG   [facility] ([facility]    [address] [facility] [address] [phone]              Contact Numbers     Please call [phone] to cancel and/or reschedule your appointment.  Please call [phone] with any problems or questions regarding your therapy          October 2017 Details    Sun Mon Tue Wed Thu Fri Sat     1               2               3               4               5               6               7                 8               9               10               11               12               13               14                 15               16               17               18               19               20      2.5 mg   See details      21      5 mg           22      5 mg         23      2.5 mg         24      5 mg         25      5 mg         26      5 mg         27      2.5 mg         28      5 mg           29      5 mg         30      2.5 mg         31      5 mg              Date Details   10/20 This INR check               How to take your warfarin dose     To take:  2.5 mg Take 0.5 of a 5 mg tablet.    To take:  5 mg Take 1 of the 5 mg tablets.           November 2017 Details    Sun Mon Tue Wed Thu Fri Sat        1      5 mg         2      5 mg         3      2.5 mg          4      5 mg           5      5 mg         6      2.5 mg         7      5 mg         8      5 mg         9      5 mg         10      2.5 mg         11      5 mg           12      5 mg         13      2.5 mg         14      5 mg         15      5 mg         16      5 mg         17      2.5 mg         18      5 mg           19      5 mg         20      2.5 mg         21      5 mg         22      5 mg         23      5 mg         24            25                 26               27               28               29               30                  Date Details   No additional details    Date of next INR:  11/24/2017         How to take your warfarin dose     To take:  2.5 mg Take 0.5 of a 5 mg tablet.    To take:  5 mg Take 1 of the 5 mg tablets.

## 2017-10-20 NOTE — PROGRESS NOTES
ANTICOAGULATION FOLLOW-UP CLINIC VISIT    Patient Name:  Bradley Liz  Date:  10/20/2017  Contact Type:  Face to Face    SUBJECTIVE:     Patient Findings     Positives No Problem Findings           OBJECTIVE    INR Protime   Date Value Ref Range Status   10/20/2017 2.7 (A) 0.86 - 1.14 Final       ASSESSMENT / PLAN  INR assessment THER    Recheck INR In: 5 WEEKS    INR Location Clinic      Anticoagulation Summary as of 10/20/2017     INR goal 2.0-3.0   Today's INR 2.7   Maintenance plan 2.5 mg (5 mg x 0.5) on Mon, Fri; 5 mg (5 mg x 1) all other days   Full instructions 2.5 mg on Mon, Fri; 5 mg all other days   Weekly total 30 mg   No change documented Ping Elizalde RN   Plan last modified Mari Guerra RN (9/15/2017)   Next INR check 11/24/2017   Priority INR   Target end date Indefinite    Indications   Completed stroke (H) [I63.9]  Long term current use of anticoagulant therapy [Z79.01]         Anticoagulation Episode Summary     INR check location     Preferred lab     Send INR reminders to CL ANTICOAG POOL    Comments * warfarin 5 mg tabs. Leave message on MOBILE only. Takes warfarin in the AM. Leaves for [place] at the end of Dec (will need snowbird - Dr. Mustafa has to sign paperwork!)      Anticoagulation Care Providers     Provider Role Specialty Phone number    Alex Mustafa MD [facility] [phone]            See the Encounter Report to view Anticoagulation Flowsheet and Dosing Calendar (Go to Encounters tab in chart review, and find the Anticoagulation Therapy Visit)      Ping Elizalde RN

## 2017-10-27 DIAGNOSIS — Z13.6 CARDIOVASCULAR SCREENING; LDL GOAL LESS THAN 100: ICD-10-CM

## 2017-10-31 RX ORDER — SIMVASTATIN 10 MG
TABLET ORAL
Qty: 90 TABLET | Refills: 1 | Status: SHIPPED | OUTPATIENT
Start: 2017-10-31 | End: 2018-07-25

## 2017-11-18 DIAGNOSIS — Z79.01 LONG TERM CURRENT USE OF ANTICOAGULANT THERAPY: ICD-10-CM

## 2017-11-20 RX ORDER — WARFARIN SODIUM 5 MG/1
TABLET ORAL
Qty: 72 TABLET | Refills: 0 | Status: SHIPPED | OUTPATIENT
Start: 2017-11-20 | End: 2018-03-13

## 2017-11-24 ENCOUNTER — ANTICOAGULATION THERAPY VISIT (OUTPATIENT)
Dept: ANTICOAGULATION | Facility: CLINIC | Age: 77
End: 2017-11-24
Payer: COMMERCIAL

## 2017-11-24 DIAGNOSIS — Z79.01 LONG TERM CURRENT USE OF ANTICOAGULANT THERAPY: ICD-10-CM

## 2017-11-24 DIAGNOSIS — I63.9 COMPLETED STROKE (H): ICD-10-CM

## 2017-11-24 LAB — INR POINT OF CARE: 2.2 (ref 0.86–1.14)

## 2017-11-24 PROCEDURE — 99207 ZZC NO CHARGE NURSE ONLY: CPT

## 2017-11-24 PROCEDURE — 85610 PROTHROMBIN TIME: CPT | Mod: QW

## 2017-11-24 PROCEDURE — 36416 COLLJ CAPILLARY BLOOD SPEC: CPT

## 2017-11-24 NOTE — MR AVS SNAPSHOT
Bradley Liz   11/24/2017 11:30 AM   Anticoagulation Therapy Visit    Description:  77 year old male   Provider:  YULIYA ANTI COAG   Department:  Cl Anticoag           INR as of 11/24/2017     Today's INR 2.2      Anticoagulation Summary as of 11/24/2017     INR goal 2.0-3.0   Today's INR 2.2   Full instructions 2.5 mg on Mon, Fri; 5 mg all other days   Next INR check 12/29/2017    Indications   Completed stroke (H) [I63.9]  Long term current use of anticoagulant therapy [Z79.01]         Description     No change, recheck INR in 5 weeks.      Your next Anticoagulation Clinic appointment(s)     Dec 29, 2017 11:15 AM CST   Anticoagulation Visit with CL ANTI COAG   [facility] ([facility]    [address] [address] [phone]              Contact Numbers     Please call [phone] to cancel and/or reschedule your appointment.  Please call [phone] with any problems or questions regarding your therapy          November 2017 Details    Sun Mon Tue Wed Thu Fri Sat        1               2               3               4                 5               6               7               8               9               10               11                 12               13               14               15               16               17               18                 19               20               21               22               23               24      2.5 mg   See details      25      5 mg           26      5 mg         27      2.5 mg         28      5 mg         29      5 mg         30      5 mg            Date Details   11/24 This INR check               How to take your warfarin dose     To take:  2.5 mg Take 0.5 of a 5 mg tablet.    To take:  5 mg Take 1 of the 5 mg tablets.           December 2017 Details    Sun Mon Tue Wed Thu Fri Sat          1      2.5 mg         2      5 mg           3      5 mg         4      2.5 mg         5       5 mg         6      5 mg         7      5 mg         8      2.5 mg         9      5 mg           10      5 mg         11      2.5 mg         12      5 mg         13      5 mg         14      5 mg         15      2.5 mg         16      5 mg           17      5 mg         18      2.5 mg         19      5 mg         20      5 mg         21      5 mg         22      2.5 mg         23      5 mg           24      5 mg         25      2.5 mg         26      5 mg         27      5 mg         28      5 mg         29            30                 31                      Date Details   No additional details    Date of next INR:  12/29/2017         How to take your warfarin dose     To take:  2.5 mg Take 0.5 of a 5 mg tablet.    To take:  5 mg Take 1 of the 5 mg tablets.

## 2017-11-24 NOTE — PROGRESS NOTES
ANTICOAGULATION FOLLOW-UP CLINIC VISIT    Patient Name:  Bradley Liz  Date:  11/24/2017  Contact Type:  Face to Face    SUBJECTIVE:     Patient Findings     Positives No Problem Findings           OBJECTIVE    INR Protime   Date Value Ref Range Status   11/24/2017 2.2 (A) 0.86 - 1.14 Final       ASSESSMENT / PLAN  No question data found.  Anticoagulation Summary as of 11/24/2017     INR goal 2.0-3.0   Today's INR 2.2   Maintenance plan 2.5 mg (5 mg x 0.5) on Mon, Fri; 5 mg (5 mg x 1) all other days   Full instructions 2.5 mg on Mon, Fri; 5 mg all other days   Weekly total 30 mg   No change documented Ping Elizalde RN   Plan last modified Mari Guerra RN (9/15/2017)   Next INR check 12/29/2017   Priority INR   Target end date Indefinite    Indications   Completed stroke (H) [I63.9]  Long term current use of anticoagulant therapy [Z79.01]         Anticoagulation Episode Summary     INR check location     Preferred lab     Send INR reminders to CL ANTICOAG POOL    Comments * warfarin 5 mg tabs. Leave message on MOBILE only. Takes warfarin in the AM. Leaves for [place] at the end of Dec (will need snowbird - Dr. Mustafa has to sign paperwork!)      Anticoagulation Care Providers     Provider Role Specialty Phone number    Alex Mustafa MD [facility] [phone]            See the Encounter Report to view Anticoagulation Flowsheet and Dosing Calendar (Go to Encounters tab in chart review, and find the Anticoagulation Therapy Visit)      Ping Elizalde, GEMA

## 2017-12-22 DIAGNOSIS — I48.91 ATRIAL FIBRILLATION, UNSPECIFIED TYPE (H): ICD-10-CM

## 2017-12-22 DIAGNOSIS — Z79.01 LONG TERM CURRENT USE OF ANTICOAGULANT THERAPY: Primary | ICD-10-CM

## 2017-12-22 DIAGNOSIS — I63.9 COMPLETED STROKE (H): ICD-10-CM

## 2017-12-26 ENCOUNTER — ANTICOAGULATION THERAPY VISIT (OUTPATIENT)
Dept: ANTICOAGULATION | Facility: CLINIC | Age: 77
End: 2017-12-26
Payer: COMMERCIAL

## 2017-12-26 DIAGNOSIS — Z79.01 LONG TERM CURRENT USE OF ANTICOAGULANT THERAPY: ICD-10-CM

## 2017-12-26 DIAGNOSIS — I63.9 COMPLETED STROKE (H): ICD-10-CM

## 2017-12-26 LAB — INR POINT OF CARE: 2.7 (ref 0.86–1.14)

## 2017-12-26 PROCEDURE — 85610 PROTHROMBIN TIME: CPT | Mod: QW

## 2017-12-26 PROCEDURE — 99207 ZZC NO CHARGE NURSE ONLY: CPT

## 2017-12-26 PROCEDURE — 36416 COLLJ CAPILLARY BLOOD SPEC: CPT

## 2017-12-26 NOTE — PROGRESS NOTES
ANTICOAGULATION FOLLOW-UP CLINIC VISIT    Patient Name:  Bradley Liz  Date:  12/26/2017  Contact Type:  Face to Face    SUBJECTIVE:        OBJECTIVE    INR Protime   Date Value Ref Range Status   12/26/2017 2.7 (A) 0.86 - 1.14 Corrected       ASSESSMENT / PLAN  No question data found.  Anticoagulation Summary as of 12/26/2017     INR goal 2.0-3.0   Today's INR 2.7   Maintenance plan 2.5 mg (5 mg x 0.5) on Mon, Fri; 5 mg (5 mg x 1) all other days   Full instructions 2.5 mg on Mon, Fri; 5 mg all other days   Weekly total 30 mg   Plan last modified Mari Guerra RN (9/15/2017)   Next INR check 2/6/2018   Priority INR   Target end date Indefinite    Indications   Completed stroke (H) [I63.9]  Long term current use of anticoagulant therapy [Z79.01]         Anticoagulation Episode Summary     INR check location     Preferred lab     Send INR reminders to  ANTICOAG POOL    Comments * warfarin 5 mg tabs. Leave message on MOBILE only. Takes warfarin in the AM. Leaves for [place] at the end of Dec (will need snowbird - Dr. Mustafa has to sign paperwork!) [phone] cell [address] [place] [id number]      Anticoagulation Care Providers     Provider Role Specialty Phone number    Alex Mustafa MD [facility] [phone]            See the Encounter Report to view Anticoagulation Flowsheet and Dosing Calendar (Go to Encounters tab in chart review, and find the Anticoagulation Therapy Visit)    Patient to continue the same warfarin maintenance dose, INR therapeutic.  A [place] [facility] [place]bird [place] was given to patient.    Lela Lerner RN

## 2017-12-26 NOTE — MR AVS SNAPSHOT
Bradley Liz   12/26/2017 10:45 AM   Anticoagulation Therapy Visit    Description:  77 year old male   Provider:  [facility]   Department:  [facility]           INR as of 12/26/2017     Today's INR 2.1      Anticoagulation Summary as of 12/26/2017     INR goal 2.0-3.0   Today's INR 2.1   Full instructions 2.5 mg on Mon, Fri; 5 mg all other days   Next INR check 2/6/2018    Indications   Completed stroke (H) [I63.9]  Long term current use of anticoagulant therapy [Z79.01]         December 2017 Details    Sun Mon Tue Wed Thu Fri Sat          1               2                 3               4               5               6               7               8               9                 10               11               12               13               14               15               16                 17               18               19               20               21               22               23                 24               25               26      5 mg   See details      27      5 mg         28      5 mg         29      2.5 mg         30      5 mg           31      5 mg                Date Details   12/26 This INR check               How to take your warfarin dose     To take:  2.5 mg Take 0.5 of a 5 mg tablet.    To take:  5 mg Take 1 of the 5 mg tablets.           January 2018 Details    Sun Mon Tue Wed Thu Fri Sat      1      2.5 mg         2      5 mg         3      5 mg         4      5 mg         5      2.5 mg         6      5 mg           7      5 mg         8      2.5 mg         9      5 mg         10      5 mg         11      5 mg         12      2.5 mg         13      5 mg           14      5 mg         15      2.5 mg         16      5 mg         17      5 mg         18      5 mg         19      2.5 mg         20      5 mg           21      5 mg         22      2.5 mg         23      5 mg         24      5 mg         25      5 mg         26      2.5 mg         27      5 mg            28      5 mg         29      2.5 mg         30      5 mg         31      5 mg             Date Details   No additional details            How to take your warfarin dose     To take:  2.5 mg Take 0.5 of a 5 mg tablet.    To take:  5 mg Take 1 of the 5 mg tablets.           February 2018 Details    Sun Mon Tue Wed Thu Fri Sat         1      5 mg         2      2.5 mg         3      5 mg           4      5 mg         5      2.5 mg         6            7               8               9               10                 11               12               13               14               15               16               17                 18               19               20               21               22               23               24                 25               26               27               28                   Date Details   No additional details    Date of next INR:  2/6/2018         How to take your warfarin dose     To take:  2.5 mg Take 0.5 of a 5 mg tablet.    To take:  5 mg Take 1 of the 5 mg tablets.

## 2018-01-27 ENCOUNTER — TRANSFERRED RECORDS (OUTPATIENT)
Dept: HEALTH INFORMATION MANAGEMENT | Facility: CLINIC | Age: 78
End: 2018-01-27

## 2018-01-27 LAB — TSH SERPL-ACNC: 1.33 UIU/ML (ref 0.34–5.6)

## 2018-01-28 ENCOUNTER — TRANSFERRED RECORDS (OUTPATIENT)
Dept: HEALTH INFORMATION MANAGEMENT | Facility: CLINIC | Age: 78
End: 2018-01-28

## 2018-01-28 LAB — EJECTION FRACTION: 38

## 2018-01-29 LAB — HBA1C MFR BLD: 5.7 % (ref 4.6–6.3)

## 2018-01-31 LAB
CREAT SERPL-MCNC: 0.8 MG/DL (ref 0.64–1.27)
GFR SERPL CREATININE-BSD FRML MDRD: >60 ML/MIN/1.73M2
GLUCOSE SERPL-MCNC: 161 MG/DL (ref 70–99)
INR PPP: 3.9
POTASSIUM SERPL-SCNC: 4 MMOL/L (ref 3.4–5.5)

## 2018-02-06 ENCOUNTER — ANTICOAGULATION THERAPY VISIT (OUTPATIENT)
Dept: ANTICOAGULATION | Facility: CLINIC | Age: 78
End: 2018-02-06
Payer: COMMERCIAL

## 2018-02-06 ENCOUNTER — TRANSFERRED RECORDS (OUTPATIENT)
Dept: HEALTH INFORMATION MANAGEMENT | Facility: CLINIC | Age: 78
End: 2018-02-06

## 2018-02-06 DIAGNOSIS — I63.9 COMPLETED STROKE (H): ICD-10-CM

## 2018-02-06 DIAGNOSIS — Z79.01 LONG TERM CURRENT USE OF ANTICOAGULANT THERAPY: ICD-10-CM

## 2018-02-06 LAB — INR PPP: 3.6

## 2018-02-06 PROCEDURE — 99207 ZZC NO CHARGE NURSE ONLY: CPT

## 2018-02-06 NOTE — PROGRESS NOTES
ANTICOAGULATION FOLLOW-UP CLINIC VISIT    Patient Name:  Bradley Liz  Date:  2/6/2018  Contact Type:  Telephone/ spoke with pt    SUBJECTIVE:     Patient Findings     Positives Unexplained INR or factor level change    Comments Pt denies changes in medications, diet, activity or health, reports taking warfarin as directed.    Patient had 30 mg in the previous 7 days, will have pt take 1x decreased dose to 27.5 mg (~8% decrease) on Wed 2/6 (pt already took today's dose), then resume maintenance dose and recheck INR in 2 weeks.             OBJECTIVE    INR   Date Value Ref Range Status   02/06/2018 3.6  Final       ASSESSMENT / PLAN  No question data found.  Anticoagulation Summary as of 2/6/2018     INR goal 2.0-3.0   Today's INR 3.6!   Maintenance plan 2.5 mg (5 mg x 0.5) on Mon, Fri; 5 mg (5 mg x 1) all other days   Full instructions 2/7: 2.5 mg; Otherwise 2.5 mg on Mon, Fri; 5 mg all other days   Weekly total 30 mg   Plan last modified Mari Guerra RN (9/15/2017)   Next INR check 2/20/2018   Priority INR   Target end date Indefinite    Indications   Completed stroke (H) [I63.9]  Long term current use of anticoagulant therapy [Z79.01]         Anticoagulation Episode Summary     INR check location     Preferred lab     Send INR reminders to [facility] POOL    Comments * warfarin 5 mg tabs. Leave message on MOBILE only. Takes warfarin in the AM. Leaves for [place] at the end of Dec (will need snowbird - Dr. Mustafa has to sign paperwork!) [phone] [facility] [address] [place] [id number]      Anticoagulation Care Providers     Provider Role Specialty Phone number    Alex Mustafa MD [facility] [phone]            See the Encounter Report to view Anticoagulation Flowsheet and Dosing Calendar (Go to Encounters tab in chart review, and find the Anticoagulation Therapy Visit)        Afua Farnsworth RN

## 2018-02-06 NOTE — MR AVS SNAPSHOT
Bradley Liz   2/6/2018   Anticoagulation Therapy Visit    Description:  77 year old male   Provider:  Afua Farnsworth, RN   Department:  [facility]           INR as of 2/6/2018     Today's INR 3.6!      Anticoagulation Summary as of 2/6/2018     INR goal 2.0-3.0   Today's INR 3.6!   Full instructions 2/7: 2.5 mg; Otherwise 2.5 mg on Mon, Fri; 5 mg all other days   Next INR check 2/20/2018    Indications   Completed stroke (H) [I63.9]  Long term current use of anticoagulant therapy [Z79.01]         February 2018 Details    Sun Mon Tue Wed Thu Fri Sat         1               2               3                 4               5               6      5 mg   See details      7      2.5 mg         8      5 mg         9      2.5 mg         10      5 mg           11      5 mg         12      2.5 mg         13      5 mg         14      5 mg         15      5 mg         16      2.5 mg         17      5 mg           18      5 mg         19      2.5 mg         20            21               22               23               24                 25               26               27               28                   Date Details   02/06 This INR check       Date of next INR:  2/20/2018         How to take your warfarin dose     To take:  2.5 mg Take 0.5 of a 5 mg tablet.    To take:  5 mg Take 1 of the 5 mg tablets.

## 2018-02-27 ENCOUNTER — TELEPHONE (OUTPATIENT)
Dept: ANTICOAGULATION | Facility: CLINIC | Age: 78
End: 2018-02-27

## 2018-02-27 NOTE — TELEPHONE ENCOUNTER
Writer left voicemail to call [facility] back regarding if he has had his INR checked yet. He is currently in [place] and was due on 2-20-18.    Theresa Baltazar, BELINDAN, RN

## 2018-02-28 ENCOUNTER — TRANSFERRED RECORDS (OUTPATIENT)
Dept: HEALTH INFORMATION MANAGEMENT | Facility: CLINIC | Age: 78
End: 2018-02-28

## 2018-03-02 ENCOUNTER — ANTICOAGULATION THERAPY VISIT (OUTPATIENT)
Dept: ANTICOAGULATION | Facility: CLINIC | Age: 78
End: 2018-03-02
Payer: COMMERCIAL

## 2018-03-02 DIAGNOSIS — I63.9 COMPLETED STROKE (H): ICD-10-CM

## 2018-03-02 DIAGNOSIS — Z79.01 LONG TERM CURRENT USE OF ANTICOAGULANT THERAPY: ICD-10-CM

## 2018-03-02 LAB — INR PPP: 2.4

## 2018-03-02 PROCEDURE — 99207 ZZC NO CHARGE NURSE ONLY: CPT

## 2018-03-02 NOTE — MR AVS SNAPSHOT
Bradley Liz   3/2/2018   Anticoagulation Therapy Visit    Description:  77 year old male   Provider:  Afua Farnsworth, RN   Department:  [facility]           INR as of 3/2/2018     Today's INR 2.4 (2/28/2018)      Anticoagulation Summary as of 3/2/2018     INR goal 2.0-3.0   Today's INR 2.4 (2/28/2018)   Full instructions 2.5 mg on Mon, Fri; 5 mg all other days   Next INR check 4/4/2018    Indications   Completed stroke (H) [I63.9]  Long term current use of anticoagulant therapy [Z79.01]         March 2018 Details    Sun Mon Tue Wed Thu Fri Sat         1               2      2.5 mg   See details      3      5 mg           4      5 mg         5      2.5 mg         6      5 mg         7      5 mg         8      5 mg         9      2.5 mg         10      5 mg           11      5 mg         12      2.5 mg         13      5 mg         14      5 mg         15      5 mg         16      2.5 mg         17      5 mg           18      5 mg         19      2.5 mg         20      5 mg         21      5 mg         22      5 mg         23      2.5 mg         24      5 mg           25      5 mg         26      2.5 mg         27      5 mg         28      5 mg         29      5 mg         30      2.5 mg         31      5 mg          Date Details   03/02 This INR check               How to take your warfarin dose     To take:  2.5 mg Take 0.5 of a 5 mg tablet.    To take:  5 mg Take 1 of the 5 mg tablets.           April 2018 Details    Sun Mon Tue Wed Thu Fri Sat     1      5 mg         2      2.5 mg         3      5 mg         4            5               6               7                 8               9               10               11               12               13               14                 15               16               17               18               19               20               21                 22               23               24               25               26               27                28                 29               30                     Date Details   No additional details    Date of next INR:  4/4/2018         How to take your warfarin dose     To take:  2.5 mg Take 0.5 of a 5 mg tablet.    To take:  5 mg Take 1 of the 5 mg tablets.

## 2018-03-02 NOTE — PROGRESS NOTES
"    ANTICOAGULATION FOLLOW-UP CLINIC VISIT    Patient Name:  Bradley Liz  Date:  3/2/2018  Contact Type:  Telephone/ spoke with pt and [facility] lab personnel, FAX not received at time of phone call    SUBJECTIVE:     Patient Findings     Positives No Problem Findings    Comments Pt called [facility] wondering if we had received results of his lab draw, which we had not. Writer instructed pt to call the lab he had INR drawn at, but pt stated \"I've been doing this for years and I've never had to call, you guys do that\".  Pt did not have phone number for lab, but stated it was [facility] in [place], [place] ([phone]).  Writer able to contact lab, who reported INR and stated they had the wrong FAX number for [facility], reports they will fax results as well.    .Pt denies changes in medications, diet, activity or health, reports taking warfarin as directed.             OBJECTIVE    INR   Date Value Ref Range Status   02/28/2018 2.4  Final       ASSESSMENT / PLAN  No question data found.  Anticoagulation Summary as of 3/2/2018     INR goal 2.0-3.0   Today's INR 2.4 (2/28/2018)   Maintenance plan 2.5 mg (5 mg x 0.5) on Mon, Fri; 5 mg (5 mg x 1) all other days   Full instructions 2.5 mg on Mon, Fri; 5 mg all other days   Weekly total 30 mg   Plan last modified Mari Guerra RN (9/15/2017)   Next INR check 4/4/2018   Priority INR   Target end date Indefinite    Indications   Completed stroke (H) [I63.9]  Long term current use of anticoagulant therapy [Z79.01]         Anticoagulation Episode Summary     INR check location     Preferred lab     Send INR reminders to [facility] POOL    Comments * warfarin 5 mg tabs. Leave message on MOBILE only. Takes warfarin in the AM. Leaves for [place] at the end of Dec (will need snowbird - Dr. Mustafa has to sign paperwork!) [phone] cell [address] [facility] [address]      Anticoagulation Care Providers     Provider Role Specialty Phone number    Moon, " Alex AHMADI MD [facility] [phone]            See the Encounter Report to view Anticoagulation Flowsheet and Dosing Calendar (Go to Encounters tab in chart review, and find the Anticoagulation Therapy Visit)        Afua Farnsworth RN

## 2018-04-04 ENCOUNTER — ANTICOAGULATION THERAPY VISIT (OUTPATIENT)
Dept: ANTICOAGULATION | Facility: CLINIC | Age: 78
End: 2018-04-04
Payer: COMMERCIAL

## 2018-04-04 ENCOUNTER — TRANSFERRED RECORDS (OUTPATIENT)
Dept: HEALTH INFORMATION MANAGEMENT | Facility: CLINIC | Age: 78
End: 2018-04-04

## 2018-04-04 DIAGNOSIS — I63.9 COMPLETED STROKE (H): ICD-10-CM

## 2018-04-04 DIAGNOSIS — Z79.01 LONG TERM CURRENT USE OF ANTICOAGULANT THERAPY: ICD-10-CM

## 2018-04-04 LAB — INR PPP: 2.9

## 2018-04-04 PROCEDURE — 99207 ZZC NO CHARGE NURSE ONLY: CPT

## 2018-04-04 NOTE — MR AVS SNAPSHOT
Bradley Liz   4/4/2018   Anticoagulation Therapy Visit    Description:  77 year old male   Provider:  Theresa Baltazar, RN   [facility]           INR as of 4/4/2018     Today's INR 2.9      Anticoagulation Summary as of 4/4/2018     INR goal 2.0-3.0   Today's INR 2.9   Full instructions 2.5 mg on Mon, Fri; 5 mg all other days   Next INR check 5/15/2018    Indications   Completed stroke (H) [I63.9]  Long term current use of anticoagulant therapy [Z79.01]         April 2018 Details    Sun Mon Tue Wed Thu Fri Sat     1               2               3               4      5 mg   See details      5      5 mg         6      2.5 mg         7      5 mg           8      5 mg         9      2.5 mg         10      5 mg         11      5 mg         12      5 mg         13      2.5 mg         14      5 mg           15      5 mg         16      2.5 mg         17      5 mg         18      5 mg         19      5 mg         20      2.5 mg         21      5 mg           22      5 mg         23      2.5 mg         24      5 mg         25      5 mg         26      5 mg         27      2.5 mg         28      5 mg           29      5 mg         30      2.5 mg               Date Details   04/04 This INR check               How to take your warfarin dose     To take:  2.5 mg Take 0.5 of a 5 mg tablet.    To take:  5 mg Take 1 of the 5 mg tablets.           May 2018 Details    Sun Mon Tue Wed Thu Fri Sat       1      5 mg         2      5 mg         3      5 mg         4      2.5 mg         5      5 mg           6      5 mg         7      2.5 mg         8      5 mg         9      5 mg         10      5 mg         11      2.5 mg         12      5 mg           13      5 mg         14      2.5 mg         15            16               17               18               19                 20               21               22               23               24               25               26                 27                28               29               30               31                  Date Details   No additional details    Date of next INR:  5/15/2018         How to take your warfarin dose     To take:  2.5 mg Take 0.5 of a 5 mg tablet.    To take:  5 mg Take 1 of the 5 mg tablets.

## 2018-04-04 NOTE — PROGRESS NOTES
ANTICOAGULATION FOLLOW-UP CLINIC VISIT    Patient Name:  Bradley Liz  Date:  4/4/2018  Contact Type:  Telephone/ INR faxed from [facility]. Writer spoke with Bradley on phone    SUBJECTIVE:     Patient Findings     Positives No Problem Findings    Comments No changes in diet, activity level, medications (including over the counter), or health. No missed doses of warfarin. Patient took dosing as prescribed. No signs of clots or bleeding concerns. Patient will continue maintenance warfarin dosing.    Patient will be back in [place] at the end of April. He will call once back to schedule next [facility] appointment.           OBJECTIVE    INR   Date Value Ref Range Status   04/04/2018 2.9  Final       ASSESSMENT / PLAN  INR assessment THER    Recheck INR In: 6 WEEKS    INR Location Outside lab      Anticoagulation Summary as of 4/4/2018     INR goal 2.0-3.0   Today's INR 2.9   Maintenance plan 2.5 mg (5 mg x 0.5) on Mon, Fri; 5 mg (5 mg x 1) all other days   Full instructions 2.5 mg on Mon, Fri; 5 mg all other days   Weekly total 30 mg   No change documented Theresa Baltazar, RN   Plan last modified Mari Guerra RN (9/15/2017)   Next INR check 5/15/2018   Priority INR   Target end date Indefinite    Indications   Completed stroke (H) [I63.9]  Long term current use of anticoagulant therapy [Z79.01]         Anticoagulation Episode Summary     INR check location     Preferred lab     Send INR reminders to [facility] POOL    Comments * warfarin 5 mg tabs. Leave message on MOBILE only. Takes warfarin in the AM. Leaves for [place] at the end of Dec (will need snowbird - Dr. Mustafa has to sign paperwork!) [phone] cell [phone] [facility] [address]      Anticoagulation Care Providers     Provider Role Specialty Phone number    Alex Mustafa MD [facility] [phone]            See the Encounter Report to view Anticoagulation Flowsheet and Dosing Calendar (Go to Encounters tab  in chart review, and find the Anticoagulation Therapy Visit)        Theresa Baltazar RN

## 2018-05-22 ENCOUNTER — ANTICOAGULATION THERAPY VISIT (OUTPATIENT)
Dept: ANTICOAGULATION | Facility: CLINIC | Age: 78
End: 2018-05-22
Payer: COMMERCIAL

## 2018-05-22 DIAGNOSIS — Z79.01 LONG TERM CURRENT USE OF ANTICOAGULANT THERAPY: ICD-10-CM

## 2018-05-22 DIAGNOSIS — I63.9 COMPLETED STROKE (H): ICD-10-CM

## 2018-05-22 LAB — INR POINT OF CARE: 3.5 (ref 0.86–1.14)

## 2018-05-22 PROCEDURE — 85610 PROTHROMBIN TIME: CPT | Mod: QW

## 2018-05-22 PROCEDURE — 99207 ZZC NO CHARGE NURSE ONLY: CPT

## 2018-05-22 PROCEDURE — 36416 COLLJ CAPILLARY BLOOD SPEC: CPT

## 2018-05-22 NOTE — MR AVS SNAPSHOT
Bradley Liz   5/22/2018 11:45 AM   Anticoagulation Therapy Visit    Description:  77 year old male   Provider:  [facility]   Department:  [facility]           INR as of 5/22/2018     Today's INR 3.5!      Anticoagulation Summary as of 5/22/2018     INR goal 2.0-3.0   Today's INR 3.5!   Full instructions 5/23: Hold; Otherwise 2.5 mg on Mon, Fri; 5 mg all other days   Next INR check 6/5/2018    Indications   Completed stroke (H) [I63.9]  Long term current use of anticoagulant therapy [Z79.01]         Your next Anticoagulation Clinic appointment(s)     Jun 05, 2018 11:30 AM CDT   Anticoagulation Visit with [facility]   [facility] ([facility]    [address] [facility] [address] [phone]              Contact Numbers     Please call [phone] with any problems or questions regarding your therapy.    If you need to cancel and/or reschedule your appointment please call one of the following numbers:  [facility] [phone]  [place] - [phone]  [facility] [phone]  [facility] [place] [facility]"" [phone]  [place] - [phone]            May 2018 Details    Sun Mon Tue Wed Thu Fri Sat       1               2               3               4               5                 6               7               8               9               10               11               12                 13               14               15               16               17               18               19                 20               21               22      5 mg   See details      23      Hold         24      5 mg         25      2.5 mg         26      5 mg           27      5 mg         28      2.5 mg         29      5 mg         30      5 mg         31      5 mg            Date Details   05/22 This INR check               How to take your warfarin dose     To take:  2.5 mg Take 0.5 of a 5 mg tablet.    To take:  5 mg Take 1 of the 5 mg tablets.    Hold Do not  take your warfarin dose. See the Details table to the right for additional instructions.                June 2018 Details    Sun Mon Tue Wed Thu Fri Sat          1      2.5 mg         2      5 mg           3      5 mg         4      2.5 mg         5            6               7               8               9                 10               11               12               13               14               15               16                 17               18               19               20               21               22               23                 24               25               26               27               28               29               30                Date Details   No additional details    Date of next INR:  6/5/2018         How to take your warfarin dose     To take:  2.5 mg Take 0.5 of a 5 mg tablet.    To take:  5 mg Take 1 of the 5 mg tablets.

## 2018-05-22 NOTE — PROGRESS NOTES
"  ANTICOAGULATION FOLLOW-UP CLINIC VISIT    Patient Name:  Bradley Liz  Date:  5/22/2018  Contact Type:  Face to Face    SUBJECTIVE:     Patient Findings     Positives Change in diet/appetite    Comments Patient is angry and tells writer he has been eating different because he has not had a refrigerator for about a week. He does plan on getting a new one. He states he already took his warfarin dose today, writer will have him hold his warfarin dose tomorrow then and then resume his current warfarin maintenance dose. Writer requested that he recheck his INR in 10 days. He states he \"will not come in on a Friday period\". Writer make his recheck appt for two weeks on a Tuesday. Patient denies signs and symptoms of bleeding. Patient was educated regarding what signs and symptoms to be aware of and when seek immediate medical attention at ED versus PCP's Clinic. Patient verbalized understanding.             OBJECTIVE    INR Protime   Date Value Ref Range Status   05/22/2018 3.5 (A) 0.86 - 1.14 Final       ASSESSMENT / PLAN  INR assessment SUPRA    Recheck INR In: 2 WEEKS    INR Location Clinic      Anticoagulation Summary as of 5/22/2018     INR goal 2.0-3.0   Today's INR 3.5!   Maintenance plan 2.5 mg (5 mg x 0.5) on Mon, Fri; 5 mg (5 mg x 1) all other days   Full instructions 5/23: Hold; Otherwise 2.5 mg on Mon, Fri; 5 mg all other days   Weekly total 30 mg   Plan last modified Mari Guerra RN (9/15/2017)   Next INR check 6/5/2018   Priority INR   Target end date Indefinite    Indications   Completed stroke (H) [I63.9]  Long term current use of anticoagulant therapy [Z79.01]         Anticoagulation Episode Summary     INR check location     Preferred lab     Send INR reminders to [facility] PO[facility]    Comments * warfarin 5 mg tabs. Leave message on MOBILE only. Takes warfarin in the AM. Leaves for [place] at the end of Dec (will need snowbird - Dr. Mustafa has to sign paperwork!) [phone] cell [address] [facility] " [facility] [address]      Anticoagulation Care Providers     Provider Role Specialty Phone number    Alex Mustafa MD [facility] [phone]            See the Encounter Report to view Anticoagulation Flowsheet and Dosing Calendar (Go to Encounters tab in chart review, and find the Anticoagulation Therapy Visit)        Lela Lerner RN

## 2018-06-05 ENCOUNTER — ANTICOAGULATION THERAPY VISIT (OUTPATIENT)
Dept: ANTICOAGULATION | Facility: CLINIC | Age: 78
End: 2018-06-05
Payer: COMMERCIAL

## 2018-06-05 DIAGNOSIS — Z79.01 LONG TERM CURRENT USE OF ANTICOAGULANT THERAPY: ICD-10-CM

## 2018-06-05 DIAGNOSIS — I63.9 COMPLETED STROKE (H): ICD-10-CM

## 2018-06-05 LAB — INR POINT OF CARE: 2.8 (ref 0.86–1.14)

## 2018-06-05 PROCEDURE — 85610 PROTHROMBIN TIME: CPT | Mod: QW

## 2018-06-05 PROCEDURE — 36416 COLLJ CAPILLARY BLOOD SPEC: CPT

## 2018-06-05 PROCEDURE — 99207 ZZC NO CHARGE NURSE ONLY: CPT

## 2018-06-05 NOTE — MR AVS SNAPSHOT
Bradley Liz   6/5/2018 11:30 AM   Anticoagulation Therapy Visit    Description:  77 year old male   Provider:  [facility]   Department:  [facility]           INR as of 6/5/2018     Today's INR 2.8      Anticoagulation Summary as of 6/5/2018     INR goal 2.0-3.0   Today's INR 2.8   Full warfarin instructions 2.5 mg on Mon, Fri; 5 mg all other days   Next INR check 7/17/2018    Indications   Completed stroke (H) [I63.9]  Long term current use of anticoagulant therapy [Z79.01]         Your next Anticoagulation Clinic appointment(s)     Jul 17, 2018 11:00 AM CDT   Anticoagulation Visit with [facility]   [facility] ([facility]    [address] [facility] [address] [phone]              Contact Numbers     Please call [phone] with any problems or questions regarding your therapy.    If you need to cancel and/or reschedule your appointment please call one of the following numbers:  [facility] [phone]  [place] - [phone]  [facility] [phone]  [facility] [phone]  [place] - [phone]            June 2018 Details    Sun Mon Tue Wed Thu Fri Sat          1               2                 3               4               5      5 mg   See details      6      5 mg         7      5 mg         8      2.5 mg         9      5 mg           10      5 mg         11      2.5 mg         12      5 mg         13      5 mg         14      5 mg         15      2.5 mg         16      5 mg           17      5 mg         18      2.5 mg         19      5 mg         20      5 mg         21      5 mg         22      2.5 mg         23      5 mg           24      5 mg         25      2.5 mg         26      5 mg         27      5 mg         28      5 mg         29      2.5 mg         30      5 mg          Date Details   06/05 This INR check               How to take your warfarin dose     To take:  2.5 mg Take 0.5 of a 5 mg tablet.    To take:  5 mg  Take 1 of the 5 mg tablets.           July 2018 Details    Sun Mon Tue Wed Thu Fri Sat     1      5 mg         2      2.5 mg         3      5 mg         4      5 mg         5      5 mg         6      2.5 mg         7      5 mg           8      5 mg         9      2.5 mg         10      5 mg         11      5 mg         12      5 mg         13      2.5 mg         14      5 mg           15      5 mg         16      2.5 mg         17            18               19               20               21                 22               23               24               25               26               27               28                 29               30               31                    Date Details   No additional details    Date of next INR:  7/17/2018         How to take your warfarin dose     To take:  2.5 mg Take 0.5 of a 5 mg tablet.    To take:  5 mg Take 1 of the 5 mg tablets.

## 2018-06-05 NOTE — PROGRESS NOTES
ANTICOAGULATION FOLLOW-UP CLINIC VISIT    Patient Name:  Bradley Liz  Date:  6/5/2018  Contact Type:  Face to Face    SUBJECTIVE:     Patient Findings     Positives Intentional hold of therapy (5-23-18)    Comments Patient is back to his usual diet and amount of greens. No changes in activity level, medications (including over the counter), or health. No missed doses of warfarin. Patient took dosing as prescribed. No signs of clots or bleeding concerns. Patient will continue maintenance warfarin dosing.             OBJECTIVE    INR Protime   Date Value Ref Range Status   06/05/2018 2.8 (A) 0.86 - 1.14 Final       ASSESSMENT / PLAN  INR assessment THER    Recheck INR In: 6 WEEKS    INR Location Clinic      Anticoagulation Summary as of 6/5/2018     INR goal 2.0-3.0   Today's INR 2.8   Warfarin maintenance plan 2.5 mg (5 mg x 0.5) on Mon, Fri; 5 mg (5 mg x 1) all other days   Full warfarin instructions 2.5 mg on Mon, Fri; 5 mg all other days   Weekly warfarin total 30 mg   No change documented Theresa Baltazar RN   Plan last modified Mari Guerra RN (9/15/2017)   Next INR check 7/17/2018   Priority INR   Target end date Indefinite    Indications   Completed stroke (H) [I63.9]  Long term current use of anticoagulant therapy [Z79.01]         Anticoagulation Episode Summary     INR check location     Preferred lab     Send INR reminders to [facility] POOL    Comments * warfarin 5 mg tabs. Leave message on MOBILE only. Takes warfarin in the AM. Leaves for [place] at the end of Dec (will need snowbird - Dr. Mustafa has to sign paperwork!) [phone] cell [address] [address]      Anticoagulation Care Providers     Provider Role Specialty Phone number    Alex Mustafa MD [facility] [phone]            See the Encounter Report to view Anticoagulation Flowsheet and Dosing Calendar (Go to Encounters tab in chart review, and find the Anticoagulation Therapy  Visit)        Theresa Baltazar RN

## 2018-06-13 ENCOUNTER — OFFICE VISIT (OUTPATIENT)
Dept: CARDIOLOGY | Facility: CLINIC | Age: 78
End: 2018-06-13
Attending: INTERNAL MEDICINE
Payer: COMMERCIAL

## 2018-06-13 VITALS
HEART RATE: 80 BPM | SYSTOLIC BLOOD PRESSURE: 118 MMHG | WEIGHT: 200.8 LBS | BODY MASS INDEX: 29.65 KG/M2 | DIASTOLIC BLOOD PRESSURE: 86 MMHG | OXYGEN SATURATION: 97 %

## 2018-06-13 DIAGNOSIS — I48.20 CHRONIC ATRIAL FIBRILLATION (H): Primary | ICD-10-CM

## 2018-06-13 DIAGNOSIS — I42.8 NICM (NONISCHEMIC CARDIOMYOPATHY) (H): ICD-10-CM

## 2018-06-13 PROCEDURE — 99214 OFFICE O/P EST MOD 30 MIN: CPT | Performed by: NURSE PRACTITIONER

## 2018-06-13 RX ORDER — METOPROLOL TARTRATE 100 MG
100 TABLET ORAL EVERY 12 HOURS
COMMUNITY
Start: 2018-06-13 | End: 2018-08-06

## 2018-06-13 NOTE — PATIENT INSTRUCTIONS
Thank you for your [facility] visit today. Your provider has recommended the following:  Medication Changes:  No changes   Recommendations:  1. Check daily weights and call the clinic if your weight has increased more than 2 lbs in one day or 5 lbs in one week.  2. 2000 mg sodium/salt diet   Follow-up:  See Dr. Brooks for cardiology follow up in 1 month.  Call [phone] two months prior to request date to schedule any future appointments.  Reminder:  1. Please bring in all current medications, over the counter supplements and vitamin bottles to your next appointment.               [facility]  [facility]~[address]~[place], [place]~[address]  Questions about your visit today?   Call your Cardiology Clinic RN's-Queenie Lin and/or Kay Echeverria at [phone].

## 2018-06-13 NOTE — MR AVS SNAPSHOT
After Visit Summary   6/13/2018    Bradley Liz    MRN: [id number]           Patient Information     Date Of Birth          1940        Visit Information        Provider Department      6/13/2018 11:00 AM Lela Leos APRN CNP [facility]        Today's Diagnoses     Chronic atrial fibrillation (H)    -  1    NICM (nonischemic cardiomyopathy) (H)          Care Instructions    Thank you for your [facility] visit today. Your provider has recommended the following:  Medication Changes:  No changes   Recommendations:  1. Check daily weights and call the clinic if your weight has increased more than 2 lbs in one day or 5 lbs in one week.  2. 2000 mg sodium/salt diet   Follow-up:  See Dr. Brooks for cardiology follow up in 1 month.  Call [phone] two months prior to request date to schedule any future appointments.  Reminder:  1. Please bring in all current medications, over the counter supplements and vitamin bottles to your next appointment.               [facility]~[address]~[place], [place]~[address]  Questions about your visit today?   Call your Cardiology Clinic RN's-Queenie Lin and/or Kay Echeverria at [phone].            Follow-ups after your visit        Additional Services     Follow-Up with Cardiologist                 Your next 10 appointments already scheduled     Jul 17, 2018 11:00 AM CDT   Anticoagulation Visit with CL ANTI COAG   [facility] ([facility])    [address] [id number] [phone]              Future tests that were ordered for you today     Open Future Orders        Priority Expected Expires Ordered    Holter Monitor 24 hour - Adult Routine 6/13/2018 6/13/2019 6/13/2018    Follow-Up with Cardiologist Routine 6/13/2019 6/12/2020 6/13/2018            Who to contact     If you have  questions or need follow up information about today's clinic visit or your schedule please contact [facility] directly at [phone].  Normal or non-critical lab and imaging results will be communicated to you by MyChart, letter or phone within 4 business days after the clinic has received the results. If you do not hear from us within 7 days, please contact the clinic through MyChart or phone. If you have a critical or abnormal lab result, we will notify you by phone as soon as possible.  Submit refill requests through [employer] or call your pharmacy and they will forward the refill request to us. Please allow 3 business days for your refill to be completed.          Additional Information About Your Visit        Care EveryWhere ID     This is your Care EveryWhere ID. This could be used by other organizations to access your [place] medical records  [id number]        Your Vitals Were     Pulse Pulse Oximetry BMI (Body Mass Index)             80 97% 29.65 kg/m2          Blood Pressure from Last 3 Encounters:   06/13/18 118/86   10/02/17 111/72   09/19/17 117/68    Weight from Last 3 Encounters:   06/13/18 91.1 kg (200 lb 12.8 oz)   10/02/17 91.2 kg (201 lb)   09/19/17 93.2 kg (205 lb 6.4 oz)              We Performed the Following     Follow-Up with Cardiac Advanced Practice Provider        Primary Care Provider Office Phone # Fax #    Alex Mustafa [phone] [address] [address]        Equal Access to Services     JONNATHAN POPE : Hadii aad ku hadasho Soomaali, waaxda luqadaha, qaybta kaalmada adeegyada, val metcalf. So [facility] [phone].    ATENCIÓN: Si habla español, tiene a katz disposición servicios gratuitos de asistencia lingüística. Llame al [phone].    We comply with applicable federal civil rights laws and [place] laws. We do not discriminate on the basis of race, color, national  origin, age, disability, sex, sexual orientation, or gender identity.            Thank you!     Thank you for choosing [facility]  for your care. Our goal is always to provide you with excellent care. Hearing back from our patients is one way we can continue to improve our services. Please take a few minutes to complete the written survey that you may receive in the mail after your visit with us. Thank you!             Your Updated Medication List - Protect others around you: Learn how to safely use, store and throw away your medicines at www.disposemymeds.org.          This list is accurate as of 6/13/18 11:36 AM.  Always use your most recent med list.                   Brand Name Dispense Instructions for use Diagnosis    ASPIRIN NOT PRESCRIBED    INTENTIONAL     by Other route continuous prn.        metoprolol tartrate 50 MG tablet    LOPRESSOR    180 tablet    Take 1 tablet (50 mg) by mouth every 12 hours    Atrial fibrillation, unspecified type (H)       order for DME     3 Month    Please draw INR as ordered and as needed. Call results to [facility] at (p) [phone] or fax them to (f) [phone]    Long term current use of anticoagulant therapy, Completed stroke (H), Atrial fibrillation, unspecified type (H)       simvastatin 10 MG tablet    ZOCOR    90 tablet    TAKE 1 TABLET BY MOUTH AT BEDTIME    CARDIOVASCULAR SCREENING; LDL GOAL LESS THAN 100       warfarin 5 MG tablet    COUMADIN    72 tablet    TAKE ONE-HALF TABLET BY MOUTH EVERY MONDAY AND FRIDAY, AND ONE TABLET ALL OTHER DAYS OR AS DIRECTED BY CLINIC    Long term current use of anticoagulant therapy

## 2018-06-13 NOTE — PROGRESS NOTES
HPI and Plan:   See dictation    Orders Placed This Encounter   Procedures     Follow-Up with Cardiologist     Holter Monitor 24 hour - Adult       Orders Placed This Encounter   Medications     metoprolol tartrate (LOPRESSOR) 100 MG tablet     Sig: Take 1 tablet (100 mg) by mouth every 12 hours       Medications Discontinued During This Encounter   Medication Reason     metoprolol (LOPRESSOR) 50 MG tablet Reorder         Encounter Diagnoses   Name Primary?     NICM (nonischemic cardiomyopathy) (H)      Chronic atrial fibrillation (H) Yes       CURRENT MEDICATIONS:  Current Outpatient Prescriptions   Medication Sig Dispense Refill     metoprolol tartrate (LOPRESSOR) 100 MG tablet Take 1 tablet (100 mg) by mouth every 12 hours       simvastatin (ZOCOR) 10 MG tablet TAKE 1 TABLET BY MOUTH AT BEDTIME 90 tablet 1     warfarin (COUMADIN) 5 MG tablet TAKE ONE-HALF TABLET BY MOUTH EVERY MONDAY AND FRIDAY, AND ONE TABLET ALL OTHER DAYS OR AS DIRECTED BY CLINIC 72 tablet 0     ASPIRIN NOT PRESCRIBED, INTENTIONAL, by Other route continuous prn.  0     order for DME Please draw INR as ordered and as needed. Call results to [facility] at (p) [phone] or fax them to (f) [phone] (Patient not taking: Reported on 6/13/2018) 3 Month 3     [DISCONTINUED] metoprolol (LOPRESSOR) 50 MG tablet Take 1 tablet (50 mg) by mouth every 12 hours 180 tablet 3       ALLERGIES   No Known Allergies    PAST MEDICAL HISTORY:  No past medical history on file.    PAST SURGICAL HISTORY:  No past surgical history on file.    FAMILY HISTORY:  Family History   Problem Relation Age of Onset     Unknown/Adopted Mother      Prostate Cancer Father      C.A.D. Father      age 77       SOCIAL HISTORY:  Social History     Social History     Marital status:      Spouse name: N/A     Number of children: N/A     Years of education: N/A     Social History Main Topics     Smoking status: Former Smoker     Years: 50.00     Quit  date: 12/5/2008     Smokeless tobacco: Never Used     Alcohol use Yes      Comment: couple beers occ     Drug use: No     Sexual activity: Yes     Partners: Female     Other Topics Concern     Parent/Sibling W/ Cabg, Mi Or Angioplasty Before 65f 55m? No     Social History Narrative       Review of Systems:  Skin:  Positive for bruising     Eyes:  Negative      ENT:  Negative      Respiratory:  Positive for shortness of breath     Cardiovascular:    lower extremity symptoms;edema;Positive for    Gastroenterology: Negative      Genitourinary:  Positive for urinary frequency;urgency    Musculoskeletal:  Negative      Neurologic:  Positive for stroke;numbness or tingling of hands;numbness or tingling of feet    Psychiatric:  Negative      Heme/Lymph/Imm:  Negative      Endocrine:  Negative        Physical Exam:  Vitals: /86 (BP Location: Right arm, Patient Position: Sitting, Cuff Size: Adult Regular)  Pulse 80  Wt 91.1 kg (200 lb 12.8 oz)  SpO2 97%  BMI 29.65 kg/m2    Constitutional:  cooperative;well nourished        Skin:  warm and dry to the touch          Head:  normocephalic        Eyes:  sclera white        Lymph:      ENT:  no pallor or cyanosis        Neck:    JVP 8-10      Respiratory:    prolonged expiration       Cardiac:   irregularly irregular rhythm     systolic ejection murmur        pulses full and equal                                        GI:  abdomen soft        Extremities and Muscular Skeletal:      bilateral LE edema;pitting;2+          Neurological:  affect appropriate        Psych:  Alert and Oriented x 3        CC  Lai Brooks MD  [address]  [place] [address]

## 2018-06-13 NOTE — LETTER
6/13/2018    Alex Mustafa MD  [address]  [facility] [address]    RE: Bradley Liz       Dear Colleague,    I had the pleasure of seeing Bradley Liz in the [facility].    Cardiology Clinic Progress Note  Bradley Liz MRN# [id number]   YOB: 1940 Age: 76 year old     Reason For Visit:  9 month follow-up   Primary Cardiologist:   Dr. Brooks          History of Presenting Illness:    Bradley Liz is a pleasant 76 year old patient with a past cardiac history significant for atrial fibrillation on Coumadin, hyperlipidemia, and cardiomyopathy with LVEF 45-50%.  Past medical history significant for CVA in 2008 and prior tobacco use with cessation in 2008.      Patient was last seen by Dr. Brooks on 9/18/2017.  Repeat echocardiogram showed LVEF was stable at 40-50% with nuclear stress test showing LVEF 53%.  The patient remained asymptomatic.  Holter monitor prior to this visit showed average heart rate of 106 so metoprolol was increased to 50 mg twice daily.    Pt presents today for 9 month follow-up.  Unfortunately, the patient was hospitalized in January or February of this year for atrial fibrillation with RVR.  This occurred while he was in [place] and I do not have access to those records at this time.  He did bring with him a hospital discharge medication list.  He is unsure if he had a repeat echocardiogram during his hospitalization.  His Lopressor was increased to 100 mg twice daily and started on digoxin 0.125 mg daily.  During his cardiology follow-up, in [place], he was started on Entresto 24/26 mg daily and Lasix 20 mg daily with potassium 10 mEq daily.  During his second cardiology follow-up, his Lasix was discontinued and Entresto was increased to 24/26 mg twice daily.    Today, the patient tells me that he only took digoxin for 1 month as he did not have any refills.  Also, his Entresto was denied by insurance, originally, so he never  actually took this medication.  It has since been approved but was never filled.  He has been wearing a wrist monitor and tells me HR is as low as 50 bpm and as high as 110 bpm.  His weight today in the clinic has been stable at 200 pounds.  At home he checks his weight about once a week with no increases greater than 5 pounds per week.  He does have chronic bilateral lower extremity edema which is 3+ pitting to above the ankle. His JVP is elevated but denies SOB, orthopnea, or PND. He denies any side effects of increased metoprolol.  We discussed 2000 mg sodium diet, daily weights, and compression stockings.  He does not believe he has had any other episodes of RVR.  On exam today, his heart rhythm remains irregularly irregular.  Patient reports no chest pain, shortness of breath, PND, orthopnea, presyncope, syncope, heart racing, or palpitations.      Current Cardiac Medications   Coumadin   Lopressor 100 mg b.i.d.  Simvastatin 10 mg daily                    Assessment and Plan:     Plan  1.  24-hour Holter monitor to assess average heart rate in atrial fibrillation  2.  Follow-up with [facility] in 1 month      1. Chronic atrial fibrillation    Asymptomatic    beta blocker for rate control and Coumadin for anticoagulation      2. Nonischemic cardiomyopathy    Likely related to atrial fibrillation with tachycardia mediated cardiomyopathy    LVEF 40-50% 9/2017    Stress test showing no ischemia with EF 53%    Lower extremity edema which has been chronic, no other signs of heart failure    Continue beta blocker      3. Presumed coronary disease    Lexiscan showing fixed defect of the apex and inferior inferoseptal wall, no ischemia noted    No known history of coronary disease    No angina    Continue statin, beta blocker         Thank you for allowing me to participate in this delightful patient's care.      This note was completed in part using Dragon voice recognition software. Although reviewed after completion, some  word and grammatical errors may occur.    Lela Leos, APRN, CNP           Data:   All laboratory data reviewed      HPI and Plan:   See dictation    Orders Placed This Encounter   Procedures     Follow-Up with Cardiologist     Holter Monitor 24 hour - Adult       Orders Placed This Encounter   Medications     metoprolol tartrate (LOPRESSOR) 100 MG tablet     Sig: Take 1 tablet (100 mg) by mouth every 12 hours       Medications Discontinued During This Encounter   Medication Reason     metoprolol (LOPRESSOR) 50 MG tablet Reorder         Encounter Diagnoses   Name Primary?     NICM (nonischemic cardiomyopathy) (H)      Chronic atrial fibrillation (H) Yes       CURRENT MEDICATIONS:  Current Outpatient Prescriptions   Medication Sig Dispense Refill     metoprolol tartrate (LOPRESSOR) 100 MG tablet Take 1 tablet (100 mg) by mouth every 12 hours       simvastatin (ZOCOR) 10 MG tablet TAKE 1 TABLET BY MOUTH AT BEDTIME 90 tablet 1     warfarin (COUMADIN) 5 MG tablet TAKE ONE-HALF TABLET BY MOUTH EVERY MONDAY AND FRIDAY, AND ONE TABLET ALL OTHER DAYS OR AS DIRECTED BY CLINIC 72 tablet 0     ASPIRIN NOT PRESCRIBED, INTENTIONAL, by Other route continuous prn.  0     order for DME Please draw INR as ordered and as needed. Call results to [facility] at (p) [phone] or fax them to (f) [phone] (Patient not taking: Reported on 6/13/2018) 3 Month 3     [DISCONTINUED] metoprolol (LOPRESSOR) 50 MG tablet Take 1 tablet (50 mg) by mouth every 12 hours 180 tablet 3       ALLERGIES   No Known Allergies    PAST MEDICAL HISTORY:  No past medical history on file.    PAST SURGICAL HISTORY:  No past surgical history on file.    FAMILY HISTORY:  Family History   Problem Relation Age of Onset     Unknown/Adopted Mother      Prostate Cancer Father      C.A.D. Father      age 77       SOCIAL HISTORY:  Social History     Social History     Marital status:      Spouse name: N/A     Number of children:  N/A     Years of education: N/A     Social History Main Topics     Smoking status: Former Smoker     Years: 50.00     Quit date: 12/5/2008     Smokeless tobacco: Never Used     Alcohol use Yes      Comment: couple beers occ     Drug use: No     Sexual activity: Yes     Partners: Female     Other Topics Concern     Parent/Sibling W/ Cabg, Mi Or Angioplasty Before 65f 55m? No     Social History Narrative       Review of Systems:  Skin:  Positive for bruising     Eyes:  Negative      ENT:  Negative      Respiratory:  Positive for shortness of breath     Cardiovascular:    lower extremity symptoms;edema;Positive for    Gastroenterology: Negative      Genitourinary:  Positive for urinary frequency;urgency    Musculoskeletal:  Negative      Neurologic:  Positive for stroke;numbness or tingling of hands;numbness or tingling of feet    Psychiatric:  Negative      Heme/Lymph/Imm:  Negative      Endocrine:  Negative        Physical Exam:  Vitals: /86 (BP Location: Right arm, Patient Position: Sitting, Cuff Size: Adult Regular)  Pulse 80  Wt 91.1 kg (200 lb 12.8 oz)  SpO2 97%  BMI 29.65 kg/m2    Constitutional:  cooperative;well nourished        Skin:  warm and dry to the touch          Head:  normocephalic        Eyes:  sclera white        Lymph:      ENT:  no pallor or cyanosis        Neck:    JVP 8-10      Respiratory:    prolonged expiration       Cardiac:   irregularly irregular rhythm     systolic ejection murmur        pulses full and equal                                        GI:  abdomen soft        Extremities and Muscular Skeletal:      bilateral LE edema;pitting;2+          Neurological:  affect appropriate        Psych:  Alert and Oriented x 3        Thank you for allowing me to participate in the care of your patient.    Sincerely,     PALOMO Glover [facility]

## 2018-06-13 NOTE — PROGRESS NOTES
Cardiology Clinic Progress Note  Bradley Liz MRN# [id number]   YOB: 1940 Age: 76 year old     Reason For Visit:  9 month follow-up   Primary Cardiologist:   Dr. Brooks          History of Presenting Illness:    Bradley Liz is a pleasant 76 year old patient with a past cardiac history significant for atrial fibrillation on Coumadin, hyperlipidemia, and cardiomyopathy with LVEF 45-50%.  Past medical history significant for CVA in 2008 and prior tobacco use with cessation in 2008.      Patient was last seen by Dr. Brooks on 9/18/2017.  Repeat echocardiogram showed LVEF was stable at 40-50% with nuclear stress test showing LVEF 53%.  The patient remained asymptomatic.  Holter monitor prior to this visit showed average heart rate of 106 so metoprolol was increased to 50 mg twice daily.    Pt presents today for 9 month follow-up.  Unfortunately, the patient was hospitalized in January or February of this year for atrial fibrillation with RVR.  This occurred while he was in [place] and I do not have access to those records at this time.  He did bring with him a hospital discharge medication list.  He is unsure if he had a repeat echocardiogram during his hospitalization.  His Lopressor was increased to 100 mg twice daily and started on digoxin 0.125 mg daily.  During his cardiology follow-up, in [place], he was started on Entresto 24/26 mg daily and Lasix 20 mg daily with potassium 10 mEq daily.  During his second cardiology follow-up, his Lasix was discontinued and Entresto was increased to 24/26 mg twice daily.    Today, the patient tells me that he only took digoxin for 1 month as he did not have any refills.  Also, his Entresto was denied by insurance, originally, so he never actually took this medication.  It has since been approved but was never filled.  He has been wearing a wrist monitor and tells me HR is as low as 50 bpm and as high as 110 bpm.  His weight today in the clinic has been  stable at 200 pounds.  At home he checks his weight about once a week with no increases greater than 5 pounds per week.  He does have chronic bilateral lower extremity edema which is 3+ pitting to above the ankle. His JVP is elevated but denies SOB, orthopnea, or PND. He denies any side effects of increased metoprolol.  We discussed 2000 mg sodium diet, daily weights, and compression stockings.  He does not believe he has had any other episodes of RVR.  On exam today, his heart rhythm remains irregularly irregular.  Patient reports no chest pain, shortness of breath, PND, orthopnea, presyncope, syncope, heart racing, or palpitations.    ADDENDUM:   [facility] records were reviewed. 1/2018 AF RVR found after a fall. Echo showed EF 35-40% (prev 40-50), mild MR, RV mod to sever dilatation and dec function, mod KARSON. He was started on digoxin and metop inc prior to discharge with adequate rate control, no heart failure during admission. No notes from outpatient cardiology clinic were sent.       Current Cardiac Medications   Coumadin   Lopressor 100 mg b.i.d.  Simvastatin 10 mg daily                    Assessment and Plan:     Plan  1.  24-hour Holter monitor to assess average heart rate in atrial fibrillation  2.  Follow-up with [facility] in 1 month       1. Chronic atrial fibrillation    Asymptomatic    beta blocker for rate control and Coumadin for anticoagulation      2. Nonischemic cardiomyopathy    Likely related to atrial fibrillation with tachycardia mediated cardiomyopathy    LVEF 40-50% 9/2017    Stress test 2017 showing no ischemia with EF 53%    Lower extremity edema which has been chronic, no other signs of heart failure    Continue beta blocker      3. Presumed coronary disease    Lexiscan 2017 showing fixed defect of the apex and inferior inferoseptal wall, no ischemia noted    No known history of coronary disease    No angina    Continue statin, beta blocker         Thank you for allowing me to  participate in this delightful patient's care.      This note was completed in part using Dragon voice recognition software. Although reviewed after completion, some word and grammatical errors may occur.    PALOMO Wade, CNP           Data:   All laboratory data reviewed

## 2018-06-20 ENCOUNTER — HOSPITAL ENCOUNTER (OUTPATIENT)
Dept: CARDIOLOGY | Facility: CLINIC | Age: 78
Discharge: HOME | End: 2018-06-20
Attending: NURSE PRACTITIONER | Admitting: NURSE PRACTITIONER
Payer: MEDICARE

## 2018-06-20 DIAGNOSIS — I48.20 CHRONIC ATRIAL FIBRILLATION (H): ICD-10-CM

## 2018-06-20 PROCEDURE — 93227 XTRNL ECG REC<48 HR R&I: CPT | Performed by: INTERNAL MEDICINE

## 2018-06-20 PROCEDURE — 93225 XTRNL ECG REC<48 HRS REC: CPT

## 2018-06-22 LAB — INTERPRETATION MONITOR -MUSE: NORMAL

## 2018-06-25 ENCOUNTER — TELEPHONE (OUTPATIENT)
Dept: FAMILY MEDICINE | Facility: CLINIC | Age: 78
End: 2018-06-25

## 2018-06-25 NOTE — TELEPHONE ENCOUNTER
Daughter called to report some concerns she has for the patient.  We do not have permission to discuss his chart with her so no information was provider to her.   Daughter is concerned about the patient driving to [place] each year and would like the patient to consider flying but is not sure if he should do to his heart condition.  Daughter is concerned about his hospitalization in [place] if he had an concussion.  Writer did not see records in the chart.  Advised her to contact them to get records from the hospitalization.   Daughter does not want provider to say the daughter is concerned because he will get upset with her.  Will send to provider for FYI.    Thank you  Pamela STINSON RN

## 2018-06-25 NOTE — TELEPHONE ENCOUNTER
Reason for Call:  Other health concern    Detailed comments: Daughter Karon would like to speak with the nurse about concerns  she and her brothers have.    Phone Number Patient can be reached at: Home number on file [phone] (home)    Best Time: any    Can we leave a detailed message on this number? YES    Call taken on 6/25/2018 at 3:57 PM by Karon Smith

## 2018-06-29 DIAGNOSIS — I48.91 ATRIAL FIBRILLATION (H): Primary | ICD-10-CM

## 2018-06-29 RX ORDER — DILTIAZEM HYDROCHLORIDE 120 MG/1
120 CAPSULE, EXTENDED RELEASE ORAL DAILY
Qty: 90 CAPSULE | Refills: 3 | Status: SHIPPED | OUTPATIENT
Start: 2018-06-29 | End: 2018-07-25 | Stop reason: SINTOL

## 2018-06-29 RX ORDER — DILTIAZEM HYDROCHLORIDE 120 MG/1
120 CAPSULE, EXTENDED RELEASE ORAL DAILY
Qty: 30 CAPSULE | Refills: 3 | Status: SHIPPED | OUTPATIENT
Start: 2018-06-29 | End: 2018-06-29

## 2018-07-20 ENCOUNTER — ANTICOAGULATION THERAPY VISIT (OUTPATIENT)
Dept: ANTICOAGULATION | Facility: CLINIC | Age: 78
End: 2018-07-20
Payer: COMMERCIAL

## 2018-07-20 ENCOUNTER — HOSPITAL ENCOUNTER (OUTPATIENT)
Dept: CARDIOLOGY | Facility: CLINIC | Age: 78
Discharge: HOME | End: 2018-07-20
Attending: NURSE PRACTITIONER | Admitting: NURSE PRACTITIONER
Payer: MEDICARE

## 2018-07-20 DIAGNOSIS — I48.91 ATRIAL FIBRILLATION (H): ICD-10-CM

## 2018-07-20 DIAGNOSIS — Z79.01 LONG TERM CURRENT USE OF ANTICOAGULANT THERAPY: ICD-10-CM

## 2018-07-20 DIAGNOSIS — I63.9 COMPLETED STROKE (H): ICD-10-CM

## 2018-07-20 LAB — INR POINT OF CARE: 2.4 (ref 0.86–1.14)

## 2018-07-20 PROCEDURE — 99207 ZZC NO CHARGE NURSE ONLY: CPT

## 2018-07-20 PROCEDURE — 36416 COLLJ CAPILLARY BLOOD SPEC: CPT

## 2018-07-20 PROCEDURE — 93227 XTRNL ECG REC<48 HR R&I: CPT | Performed by: INTERNAL MEDICINE

## 2018-07-20 PROCEDURE — 93225 XTRNL ECG REC<48 HRS REC: CPT | Performed by: NURSE PRACTITIONER

## 2018-07-20 PROCEDURE — 85610 PROTHROMBIN TIME: CPT | Mod: QW

## 2018-07-20 NOTE — MR AVS SNAPSHOT
Bradley Liz   7/20/2018 11:00 AM   Anticoagulation Therapy Visit    Description:  77 year old male   Provider:  YULIYA ANTI COAREDDY   Department:  Cl Anticoag           INR as of 7/20/2018     Today's INR 2.4      Anticoagulation Summary as of 7/20/2018     INR goal 2.0-3.0   Today's INR 2.4   Full warfarin instructions 2.5 mg on Mon, Fri; 5 mg all other days   Next INR check 8/28/2018    Indications   Completed stroke (H) [I63.9]  Long term current use of anticoagulant therapy [Z79.01]         Your next Anticoagulation Clinic appointment(s)     Aug 28, 2018 11:15 AM CDT   Anticoagulation Visit with YULIYA ANTI COAG   [facility] ([facility])    [address]  [facility] [address] [phone]              Contact Numbers     Please call [phone] with any problems or questions regarding your therapy.    If you need to cancel and/or reschedule your appointment please call one of the following numbers:  [facility] [phone]  [facility] [phone]  [facility] [phone]  \[facility] [place] [facility]\"" [phone]  [place] - [phone]            July 2018 Details    Sun Mon Tue Wed Thu Fri Sat     1               2               3               4               5               6               7                 8               9               10               11               12               13               14                 15               16               17               18               19               20      2.5 mg   See details      21      5 mg           22      5 mg         23      2.5 mg         24      5 mg         25      5 mg         26      5 mg         27      2.5 mg         28      5 mg           29      5 mg         30      2.5 mg         31      5 mg              Date Details   07/20 This INR check               How to take your warfarin dose     To take:  2.5 mg Take 0.5 of a 5 mg tablet.    To take:  5 mg Take 1 of the 5 mg tablets.           August  2018 Details    Sun Mon Tue Wed Thu Fri Sat        1      5 mg         2      5 mg         3      2.5 mg         4      5 mg           5      5 mg         6      2.5 mg         7      5 mg         8      5 mg         9      5 mg         10      2.5 mg         11      5 mg           12      5 mg         13      2.5 mg         14      5 mg         15      5 mg         16      5 mg         17      2.5 mg         18      5 mg           19      5 mg         20      2.5 mg         21      5 mg         22      5 mg         23      5 mg         24      2.5 mg         25      5 mg           26      5 mg         27      2.5 mg         28            29               30               31                 Date Details   No additional details    Date of next INR:  8/28/2018         How to take your warfarin dose     To take:  2.5 mg Take 0.5 of a 5 mg tablet.    To take:  5 mg Take 1 of the 5 mg tablets.

## 2018-07-20 NOTE — PROGRESS NOTES
ANTICOAGULATION FOLLOW-UP CLINIC VISIT    Patient Name:  Bradley Liz  Date:  7/20/2018  Contact Type:  Face to Face    SUBJECTIVE:     Patient Findings     Positives No Problem Findings    Comments No changes in medications, diet, or activity noted. Took warfarin as instructed, denies any missed doses.             OBJECTIVE    INR Protime   Date Value Ref Range Status   07/20/2018 2.4 (A) 0.86 - 1.14 Final       ASSESSMENT / PLAN  INR assessment THER    Recheck INR In: 6 WEEKS    INR Location Clinic      Anticoagulation Summary as of 7/20/2018     INR goal 2.0-3.0   Today's INR 2.4   Warfarin maintenance plan 2.5 mg (5 mg x 0.5) on Mon, Fri; 5 mg (5 mg x 1) all other days   Full warfarin instructions 2.5 mg on Mon, Fri; 5 mg all other days   Weekly warfarin total 30 mg   No change documented Paulina Montesinos RN   Plan last modified Mari Guerra RN (9/15/2017)   Next INR check 8/28/2018   Priority INR   Target end date Indefinite    Indications   Completed stroke (H) [I63.9]  Long term current use of anticoagulant therapy [Z79.01]         Anticoagulation Episode Summary     INR check location     Preferred lab     Send INR reminders to CL ANTICOAG POOL    Comments * warfarin 5 mg tabs. Leave message on MOBILE only. Takes warfarin in the AM. Leaves for [place] at the end of Dec (will need snowbird - Dr. Mustafa has to sign paperwork!) [phone] cell [address] [facility] [address]      Anticoagulation Care Providers     Provider Role Specialty Phone number    Alex Mustafa MD [facility] [phone]            See the Encounter Report to view Anticoagulation Flowsheet and Dosing Calendar (Go to Encounters tab in chart review, and find the Anticoagulation Therapy Visit)        Paulina Montesinos RN, CACP

## 2018-07-25 ENCOUNTER — OFFICE VISIT (OUTPATIENT)
Dept: CARDIOLOGY | Facility: CLINIC | Age: 78
End: 2018-07-25
Payer: COMMERCIAL

## 2018-07-25 VITALS
DIASTOLIC BLOOD PRESSURE: 64 MMHG | WEIGHT: 206.2 LBS | SYSTOLIC BLOOD PRESSURE: 100 MMHG | HEART RATE: 77 BPM | BODY MASS INDEX: 30.45 KG/M2 | OXYGEN SATURATION: 96 %

## 2018-07-25 DIAGNOSIS — I50.23 ACUTE ON CHRONIC SYSTOLIC CONGESTIVE HEART FAILURE (H): Primary | ICD-10-CM

## 2018-07-25 DIAGNOSIS — I48.20 CHRONIC ATRIAL FIBRILLATION (H): ICD-10-CM

## 2018-07-25 DIAGNOSIS — I42.8 NICM (NONISCHEMIC CARDIOMYOPATHY) (H): ICD-10-CM

## 2018-07-25 LAB — INTERPRETATION MONITOR -MUSE: NORMAL

## 2018-07-25 PROCEDURE — 99214 OFFICE O/P EST MOD 30 MIN: CPT | Performed by: NURSE PRACTITIONER

## 2018-07-25 RX ORDER — SIMVASTATIN 10 MG
10 TABLET ORAL AT BEDTIME
Qty: 90 TABLET | Refills: 1 | COMMUNITY
Start: 2018-07-25 | End: 2018-07-25 | Stop reason: SINTOL

## 2018-07-25 RX ORDER — FUROSEMIDE 20 MG
20 TABLET ORAL DAILY
Qty: 90 TABLET | Refills: 3 | Status: CANCELLED | OUTPATIENT
Start: 2018-07-25

## 2018-07-25 RX ORDER — FUROSEMIDE 20 MG
20 TABLET ORAL DAILY
Qty: 30 TABLET | Refills: 3 | Status: SHIPPED | OUTPATIENT
Start: 2018-07-25 | End: 2018-08-24

## 2018-07-25 RX ORDER — ROSUVASTATIN CALCIUM 5 MG/1
5 TABLET, COATED ORAL DAILY
Qty: 30 TABLET | Refills: 11 | Status: SHIPPED | OUTPATIENT
Start: 2018-07-25 | End: 2018-08-24

## 2018-07-25 NOTE — PROGRESS NOTES
Cardiology Clinic Progress Note  Bradley Liz MRN# [id number]   YOB: 1940 Age: 76 year old     Reason For Visit:  1 month follow-up   Primary Cardiologist:   Dr. Brooks          History of Presenting Illness:    Bradley Liz is a pleasant 76 year old patient with a past cardiac history significant for atrial fibrillation on Coumadin, hyperlipidemia, and cardiomyopathy with LVEF 35-40% (previously 40-50%).  Past medical history significant for CVA in 2008 and prior tobacco use with cessation in 2008.      Patient saw Dr. Brooks on 9/18/2017.  Repeat echocardiogram showed LVEF was stable at 40-50% with nuclear stress test showing LVEF 53%.  His metoprolol had been increased due to poor rate control on Holter monitor.    He had been hospitalized in [place], in January, for atrial fibrillation with RVR which was found after having a fall.  Echocardiogram showed LVEF 35-40% which was decreased from his previous 40-50%, along with mild MR, moderate to severely dilated RV and decreased RV function with moderate KARSON.  He had been started on digoxin and metoprolol was increased, prior to discharging.  At his follow-up cardiology visit in [place], he had been prescribed Entresto but had not actually started this medication.  He also required some diuresis with Lasix in the outpatient setting.    Patient was last seen by me on 6/13/2018 for follow-up after returning home from [place]. He had no longer been on his digoxin as he only had enough for 1 month and did not get this refilled.  He had not noted any increases in weight and continued with chronic bilateral lower extremity edema.  He denied any other symptoms of heart failure.     24-hour Holter monitor 6/22/2018 showed atrial fibrillation with average heart rate 85, minimum 51, maximum 160, 1 pause at 2.1 seconds, occasional PVCs, and no events were reported.  He was started on diltiazem  mg daily with recommendations to repeat Holter  monitor.    Pt presents today for 1 month follow-up.  Repeat Holter monitor 7/20/2018 showed atrial fibrillation with average heart rate 74, minimum 38, maximum 142, there were 140 pauses greater than 2 seconds with the longest pause being 3.1 seconds, occasional PVCs, and no events were reported.  These results were reviewed with him today.    Although his average heart rate is improved, he now has a significant number of pauses after starting diltiazem.  We have discussed discontinuing diltiazem and we will continue with metoprolol for rate control.  His blood pressure today is 100/64 and he denies any significant lightheadedness.  His weight today in the clinic is up 6 pounds from a month ago.  He tells me he has been checking his weights approximately once a week and is unable to read the scale as he has not been wearing his glasses when he checks his weight.  He has noted increased lower extremity edema since he was last seen.  On exam, his JVP is difficult to assess given his neck fullness.  We again reviewed low-sodium diet and daily weights, in detail.  He is agreeable to starting Lasix.  We will consider his dry weight to be 200 pounds.  He has noted lower extremity myalgias while being on simvastatin and would like to change to a different statin medication. On exam, his heart rhythm remains irregularly irregular.  Patient reports no chest pain, shortness of breath, PND, orthopnea, presyncope, syncope, heart racing, or palpitations.      Current Cardiac Medications   Coumadin   Lopressor 100 mg b.i.d.  Diltiazem  mg daily  Simvastatin 10 mg daily                    Assessment and Plan:     Plan  1.  Discontinue diltiazem d/t significant number of pauses   2.  Change simvastatin to rosuvastatin 5 mg daily d/t myalgias   3.  Start lasix 20 mg daily for HF   4.  Check daily weights and call the clinic if your weight has increased more than 2 lbs in one day or 5 lbs in one week.  5.  CORE enrollment with  BMP prior in 2 weeks   6.  Echocardiogram in 3 months to reassess LVEF after improved rate control  7.  Follow-up with Dr. Brooks 6/2019      1. Chronic atrial fibrillation    Asymptomatic    Holter monitor 6/2018 with average heart rate 85 and 1 pause 2.1 sec     After adding diltiazem, he had 140 pauses, longest 3.1 sec, so this was discontinued     beta blocker for rate control and Coumadin for anticoagulation      2. Nonischemic cardiomyopathy with acute on chronic heart failure     Likely related to atrial fibrillation with tachycardia mediated cardiomyopathy    LVEF 35-40% 1/2018 after episode of AF RVR (40-50% 9/2017)    Stress test 2017 showing no ischemia with EF 53%    Bilateral Lower extremity edema    Continue beta blocker    Consider starting ACE inhibitor or Entresto if LVEF remains less than 40% and there is room in his BP       3. Presumed coronary disease    Lexiscan 2017 showing fixed defect of the apex and inferior inferoseptal wall, no ischemia noted    No known history of coronary disease    No angina     Continue statin, beta blocker         Thank you for allowing me to participate in this delightful patient's care.      This note was completed in part using Dragon voice recognition software. Although reviewed after completion, some word and grammatical errors may occur.    Lela Leos, PALOMO, CNP           Data:   All laboratory data reviewed

## 2018-07-25 NOTE — MR AVS SNAPSHOT
After Visit Summary   7/25/2018    Bradley Liz    MRN: [id number]           Patient Information     Date Of Birth          1940        Visit Information        Provider Department      7/25/2018 11:00 AM Lela Leos APRN CNP [facility]        Today's Diagnoses     Acute on chronic systolic congestive heart failure (H)    -  1      Care Instructions    Thank you for your [facility] visit today. Your provider has recommended the following:  Medication Changes:  1. STOP diltiazem   2. START rosuvastatin 5 mg daily   3. START lasix (furosemide) 20 mg daily to help get rid of your excess water retention.   Recommendations:  1. Check daily weights and call the clinic if your weight has increased more than 2 lbs in one day or 5 lbs in one week.    2.  Your baseline weight was 200 lb   3. 2000 mg of sodium/salt daily   Follow-up:  See Bernice Link PA-C for CORE Clinic enrollment in [place], [place] on: Monday, August 6th at 11:00 am.  Please arrive at 10:00 am to have a non-fasting lab drawn downstairs. These will be reviewed with you at your visit at 11:00 am  Call [phone] two months prior to request date to schedule any future appointments.  Reminder:  1. Please bring in all current medications, over the counter supplements and vitamin bottles to your next appointment.               [facility]~[address]~[place], [place]~[address]  Questions about your visit today?   Call your Cardiology Clinic RN's-Queenie Lin and/or Kay Echeverria at [phone].            Follow-ups after your visit        Your next 10 appointments already scheduled     Aug 06, 2018 11:00 AM CDT   CORE Enrollment with Bernice Link PA-C   [facility] ([facility])    [address] [facility] [address] [phone]            Aug 28,  2018 11:15 AM CDT   Anticoagulation Visit with CL ANTI COAG   [facility] ([facility])    [address]  [facility] [id number] [phone]              Future tests that were ordered for you today     Open Future Orders        Priority Expected Expires Ordered    Basic metabolic panel Routine 8/8/2018 7/25/2019 7/25/2018            Who to contact     If you have questions or need follow up information about today's clinic visit or your schedule please contact [facility] directly at [phone].  Normal or non-critical lab and imaging results will be communicated to you by MyChart, letter or phone within 4 business days after the clinic has received the results. If you do not hear from us within 7 days, please contact the clinic through MyChart or phone. If you have a critical or abnormal lab result, we will notify you by phone as soon as possible.  Submit refill requests through [employer] or call your pharmacy and they will forward the refill request to us. Please allow 3 business days for your refill to be completed.          Additional Information About Your Visit        Care EveryWhere ID     This is your Care EveryWhere ID. This could be used by other organizations to access your [place] medical records  [id number]        Your Vitals Were     Pulse Pulse Oximetry BMI (Body Mass Index)             77 96% 30.45 kg/m2          Blood Pressure from Last 3 Encounters:   07/25/18 100/64   06/13/18 118/86   10/02/17 111/72    Weight from Last 3 Encounters:   07/25/18 93.5 kg (206 lb 3.2 oz)   06/13/18 91.1 kg (200 lb 12.8 oz)   10/02/17 91.2 kg (201 lb)                 Today's Medication Changes          These changes are accurate as of 7/25/18 12:07 PM.  If you have any questions, ask your nurse or doctor.               Start taking these medicines.        Dose/Directions    furosemide 20 MG tablet   Commonly known as:  LASIX    Used for:  Acute on chronic systolic congestive heart failure (H)   Started by:  Lela Leos APRN CNP        Dose:  20 mg   Take 1 tablet (20 mg) by mouth daily   Quantity:  30 tablet   Refills:  3       rosuvastatin 5 MG tablet   Commonly known as:  CRESTOR   Used for:  Acute on chronic systolic congestive heart failure (H)   Started by:  Lela Leos APRN CNP        Dose:  5 mg   Take 1 tablet (5 mg) by mouth daily   Quantity:  30 tablet   Refills:  11         Stop taking these medicines if you haven't already. Please contact your care team if you have questions.     diltiazem 120 MG 24 hr capsule   Commonly known as:  DILACOR XR   Stopped by:  Lela Leos APRN CNP           simvastatin 10 MG tablet   Commonly known as:  ZOCOR   Stopped by:  Lela Leos APRN CNP                Where to get your medicines      These medications were sent to [facility] [address] [place] [address] AT [address] [address]     Phone:  [phone]     furosemide 20 MG tablet    rosuvastatin 5 MG tablet                Primary Care Provider Office Phone # Fax #    Alexalex Mustafa [phone] [address] [facility] [address]        Equal Access to Services     JONNATHAN POPE : Hadii aad ku hadasho Soomaali, waaxda luqadaha, qaybta kaalmada adeegyada, val metcalf. So [facility] [phone].    ATENCIÓN: Si habla español, tiene a katz disposición servicios gratuitos de asistencia lingüística. Lltiti al [phone].    We comply with applicable federal civil rights laws and [place] laws. We do not discriminate on the basis of race, color, national origin, age, disability, sex, sexual orientation, or gender identity.            Thank you!     Thank you for choosing [facility]  for your care. Our goal is always to  provide you with excellent care. Hearing back from our patients is one way we can continue to improve our services. Please take a few minutes to complete the written survey that you may receive in the mail after your visit with us. Thank you!             Your Updated Medication List - Protect others around you: Learn how to safely use, store and throw away your medicines at www.disposemymeds.org.          This list is accurate as of 7/25/18 12:07 PM.  Always use your most recent med list.                   Brand Name Dispense Instructions for use Diagnosis    ASPIRIN NOT PRESCRIBED    INTENTIONAL     by Other route continuous prn.        furosemide 20 MG tablet    LASIX    30 tablet    Take 1 tablet (20 mg) by mouth daily    Acute on chronic systolic congestive heart failure (H)       metoprolol tartrate 100 MG tablet    LOPRESSOR     Take 1 tablet (100 mg) by mouth every 12 hours        order for DME     3 Month    Please draw INR as ordered and as needed. Call results to [facility] at (p) [phone] or fax them to (f) [phone]    Long term current use of anticoagulant therapy, Completed stroke (H), Atrial fibrillation, unspecified type (H)       rosuvastatin 5 MG tablet    CRESTOR    30 tablet    Take 1 tablet (5 mg) by mouth daily    Acute on chronic systolic congestive heart failure (H)       warfarin 5 MG tablet    COUMADIN    72 tablet    TAKE ONE-HALF TABLET BY MOUTH EVERY MONDAY AND FRIDAY, AND ONE TABLET ALL OTHER DAYS OR AS DIRECTED BY CLINIC    Long term current use of anticoagulant therapy

## 2018-07-25 NOTE — LETTER
7/25/2018    Alex Mustafa MD  [id number]  [facility] [address]    RE: Bradley Liz       Dear Colleague,    I had the pleasure of seeing Bradley Liz in the [facility].    Cardiology Clinic Progress Note  Bradley Liz MRN# [id number]   YOB: 1940 Age: 76 year old     Reason For Visit:  1 month follow-up   Primary Cardiologist:   Dr. Brooks          History of Presenting Illness:    Bradley Liz is a pleasant 76 year old patient with a past cardiac history significant for atrial fibrillation on Coumadin, hyperlipidemia, and cardiomyopathy with LVEF 35-40% (previously 40-50%).  Past medical history significant for CVA in 2008 and prior tobacco use with cessation in 2008.      Patient saw Dr. Brooks on 9/18/2017.  Repeat echocardiogram showed LVEF was stable at 40-50% with nuclear stress test showing LVEF 53%.  His metoprolol had been increased due to poor rate control on Holter monitor.    He had been hospitalized in [place], in January, for atrial fibrillation with RVR which was found after having a fall.  Echocardiogram showed LVEF 35-40% which was decreased from his previous 40-50%, along with mild MR, moderate to severely dilated RV and decreased RV function with moderate KARSON.  He had been started on digoxin and metoprolol was increased, prior to discharging.  At his follow-up cardiology visit in [place], he had been prescribed Entresto but had not actually started this medication.  He also required some diuresis with Lasix in the outpatient setting.    Patient was last seen by me on 6/13/2018 for follow-up after returning home from [place]. He had no longer been on his digoxin as he only had enough for 1 month and did not get this refilled.  He had not noted any increases in weight and continued with chronic bilateral lower extremity edema.  He denied any other symptoms of heart failure.     24-hour Holter monitor 6/22/2018 showed atrial  fibrillation with average heart rate 85, minimum 51, maximum 160, 1 pause at 2.1 seconds, occasional PVCs, and no events were reported.  He was started on diltiazem  mg daily with recommendations to repeat Holter monitor.    Pt presents today for 1 month follow-up.  Repeat Holter monitor 7/20/2018 showed atrial fibrillation with average heart rate 74, minimum 38, maximum 142, there were 140 pauses greater than 2 seconds with the longest pause being 3.1 seconds, occasional PVCs, and no events were reported.  These results were reviewed with him today.    Although his average heart rate is improved, he now has a significant number of pauses after starting diltiazem.  We have discussed discontinuing diltiazem and we will continue with metoprolol for rate control.  His blood pressure today is 100/64 and he denies any significant lightheadedness.  His weight today in the clinic is up 6 pounds from a month ago.  He tells me he has been checking his weights approximately once a week and is unable to read the scale as he has not been wearing his glasses when he checks his weight.  He has noted increased lower extremity edema since he was last seen.  On exam, his JVP is difficult to assess given his neck fullness.  We again reviewed low-sodium diet and daily weights, in detail.  He is agreeable to starting Lasix.  We will consider his dry weight to be 200 pounds.  He has noted lower extremity myalgias while being on simvastatin and would like to change to a different statin medication. On exam, his heart rhythm remains irregularly irregular.  Patient reports no chest pain, shortness of breath, PND, orthopnea, presyncope, syncope, heart racing, or palpitations.      Current Cardiac Medications   Coumadin   Lopressor 100 mg b.i.d.  Diltiazem  mg daily  Simvastatin 10 mg daily                    Assessment and Plan:     Plan  1.  Discontinue diltiazem d/t significant number of pauses   2.  Change simvastatin to  rosuvastatin 5 mg daily d/t myalgias   3.  Start lasix 20 mg daily for HF   4.  Check daily weights and call the clinic if your weight has increased more than 2 lbs in one day or 5 lbs in one week.  5.  [facility] enrollment with BMP prior in 2 weeks   6.  Echocardiogram in 3 months to reassess LVEF after improved rate control  7.  Follow-up with Dr. Brooks 6/2019      1. Chronic atrial fibrillation    Asymptomatic    Holter monitor 6/2018 with average heart rate 85 and 1 pause 2.1 sec     After adding diltiazem, he had 140 pauses, longest 3.1 sec, so this was discontinued     beta blocker for rate control and Coumadin for anticoagulation      2. Nonischemic cardiomyopathy with acute on chronic heart failure     Likely related to atrial fibrillation with tachycardia mediated cardiomyopathy    LVEF 35-40% 1/2018 after episode of AF RVR (40-50% 9/2017)    Stress test 2017 showing no ischemia with EF 53%    Bilateral Lower extremity edema    Continue beta blocker    Consider starting ACE inhibitor or Entresto if LVEF remains less than 40% and there is room in his BP       3. Presumed coronary disease    Lexiscan 2017 showing fixed defect of the apex and inferior inferoseptal wall, no ischemia noted    No known history of coronary disease    No angina     Continue statin, beta blocker         Thank you for allowing me to participate in this delightful patient's care.      This note was completed in part using Dragon voice recognition software. Although reviewed after completion, some word and grammatical errors may occur.    Lela Leos, APRN, CNP           Data:   All laboratory data reviewed      HPI and Plan:   See dictation    Orders Placed This Encounter   Procedures     Basic metabolic panel     Follow-Up with [facility]     Echocardiogram Complete       Orders Placed This Encounter   Medications     DISCONTD: simvastatin (ZOCOR) 10 MG tablet     Sig: Take 1 tablet (10 mg) by mouth At Bedtime     Dispense:   90 tablet     Refill:  1     furosemide (LASIX) 20 MG tablet     Sig: Take 1 tablet (20 mg) by mouth daily     Dispense:  30 tablet     Refill:  3     rosuvastatin (CRESTOR) 5 MG tablet     Sig: Take 1 tablet (5 mg) by mouth daily     Dispense:  30 tablet     Refill:  11       Medications Discontinued During This Encounter   Medication Reason     simvastatin (ZOCOR) 10 MG tablet      diltiazem (DILACOR XR) 120 MG 24 hr capsule Side effects     simvastatin (ZOCOR) 10 MG tablet Side effects         Encounter Diagnoses   Name Primary?     Acute on chronic systolic congestive heart failure (H) Yes     NICM (nonischemic cardiomyopathy) (H)      Chronic atrial fibrillation (H)        CURRENT MEDICATIONS:  Current Outpatient Prescriptions   Medication Sig Dispense Refill     ASPIRIN NOT PRESCRIBED, INTENTIONAL, by Other route continuous prn.  0     furosemide (LASIX) 20 MG tablet Take 1 tablet (20 mg) by mouth daily 30 tablet 3     metoprolol tartrate (LOPRESSOR) 100 MG tablet Take 1 tablet (100 mg) by mouth every 12 hours       order for DME Please draw INR as ordered and as needed. Call results to [facility] at [phone]) [phone] or fax them to (f) [phone] 3 Month 3     rosuvastatin (CRESTOR) 5 MG tablet Take 1 tablet (5 mg) by mouth daily 30 tablet 11     warfarin (COUMADIN) 5 MG tablet TAKE ONE-HALF TABLET BY MOUTH EVERY MONDAY AND FRIDAY, AND ONE TABLET ALL OTHER DAYS OR AS DIRECTED BY CLINIC 72 tablet 0       ALLERGIES   No Known Allergies    PAST MEDICAL HISTORY:  No past medical history on file.    PAST SURGICAL HISTORY:  No past surgical history on file.    FAMILY HISTORY:  Family History   Problem Relation Age of Onset     Unknown/Adopted Mother      Prostate Cancer Father      C.A.D. Father      age 77       SOCIAL HISTORY:  Social History     Social History     Marital status:      Spouse name: N/A     Number of children: N/A     Years of education: N/A     Social History Main  Topics     Smoking status: Former Smoker     Years: 50.00     Quit date: 12/5/2008     Smokeless tobacco: Never Used     Alcohol use Yes      Comment: couple beers occ     Drug use: No     Sexual activity: Yes     Partners: Female     Other Topics Concern     Parent/Sibling W/ Cabg, Mi Or Angioplasty Before 65f 55m? No     Social History Narrative       Review of Systems:  Skin:  Positive for bruising     Eyes:  Negative      ENT:  Negative      Respiratory:  Negative for shortness of breath;cough     Cardiovascular:  Negative for;palpitations;chest pain;syncope or near-syncope;fatigue;lightheadedness;dizziness lower extremity symptoms;edema;Positive for    Gastroenterology: Negative nausea;vomiting;heartburn    Genitourinary:  Positive for urinary frequency;urgency    Musculoskeletal:  Negative      Neurologic:  Positive for stroke;numbness or tingling of hands;numbness or tingling of feet    Psychiatric:  Negative anxiety;depression    Heme/Lymph/Imm:  Negative bleeding disorder    Endocrine:  Negative thyroid disorder;diabetes      Physical Exam:  Vitals: /64  Pulse 77  Wt 93.5 kg (206 lb 3.2 oz)  SpO2 96%  BMI 30.45 kg/m2    Constitutional:  cooperative;well nourished        Skin:  warm and dry to the touch          Head:  normocephalic        Eyes:  sclera white        Lymph:      ENT:  no pallor or cyanosis        Neck:  no stiffness        Respiratory:    prolonged expiration       Cardiac:   irregularly irregular rhythm     systolic ejection murmur        pulses full and equal                                        GI:  abdomen soft        Extremities and Muscular Skeletal:      bilateral LE edema;pitting;2+          Neurological:  affect appropriate        Psych:  Alert and Oriented x 3        CC  No referring provider defined for this encounter.                Thank you for allowing me to participate in the care of your patient.      Sincerely,     PALOMO Glover CNP      [facility]    cc:   No referring provider defined for this encounter.

## 2018-07-25 NOTE — LETTER
7/25/2018    Alex Mustafa MD  [id number]  [facility] [address]    RE: Bradley Liz       Dear Colleague,    I had the pleasure of seeing Bradley Liz in the [facility].    Cardiology Clinic Progress Note  Bradley Liz MRN# [id number]   YOB: 1940 Age: 76 year old     Reason For Visit:  1 month follow-up   Primary Cardiologist:   Dr. Brooks          History of Presenting Illness:    Bradley Liz is a pleasant 76 year old patient with a past cardiac history significant for atrial fibrillation on Coumadin, hyperlipidemia, and cardiomyopathy with LVEF 35-40% (previously 40-50%).  Past medical history significant for CVA in 2008 and prior tobacco use with cessation in 2008.      Patient saw Dr. Brooks on 9/18/2017.  Repeat echocardiogram showed LVEF was stable at 40-50% with nuclear stress test showing LVEF 53%.  His metoprolol had been increased due to poor rate control on Holter monitor.    He had been hospitalized in [place], in January, for atrial fibrillation with RVR which was found after having a fall.  Echocardiogram showed LVEF 35-40% which was decreased from his previous 40-50%, along with mild MR, moderate to severely dilated RV and decreased RV function with moderate KARSON.  He had been started on digoxin and metoprolol was increased, prior to discharging.  At his follow-up cardiology visit in [place], he had been prescribed Entresto but had not actually started this medication.  He also required some diuresis with Lasix in the outpatient setting.    Patient was last seen by me on 6/13/2018 for follow-up after returning home from [place]. He had no longer been on his digoxin as he only had enough for 1 month and did not get this refilled.  He had not noted any increases in weight and continued with chronic bilateral lower extremity edema.  He denied any other symptoms of heart failure.     24-hour Holter monitor 6/22/2018 showed atrial  fibrillation with average heart rate 85, minimum 51, maximum 160, 1 pause at 2.1 seconds, occasional PVCs, and no events were reported.  He was started on diltiazem  mg daily with recommendations to repeat Holter monitor.    Pt presents today for 1 month follow-up.  Repeat Holter monitor 7/20/2018 showed atrial fibrillation with average heart rate 74, minimum 38, maximum 142, there were 140 pauses greater than 2 seconds with the longest pause being 3.1 seconds, occasional PVCs, and no events were reported.  These results were reviewed with him today.    Although his average heart rate is improved, he now has a significant number of pauses after starting diltiazem.  We have discussed discontinuing diltiazem and we will continue with metoprolol for rate control.  His blood pressure today is 100/64 and he denies any significant lightheadedness.  His weight today in the clinic is up 6 pounds from a month ago.  He tells me he has been checking his weights approximately once a week and is unable to read the scale as he has not been wearing his glasses when he checks his weight.  He has noted increased lower extremity edema since he was last seen.  On exam, his JVP is difficult to assess given his neck fullness.  We again reviewed low-sodium diet and daily weights, in detail.  He is agreeable to starting Lasix.  We will consider his dry weight to be 200 pounds.  He has noted lower extremity myalgias while being on simvastatin and would like to change to a different statin medication. On exam, his heart rhythm remains irregularly irregular.  Patient reports no chest pain, shortness of breath, PND, orthopnea, presyncope, syncope, heart racing, or palpitations.      Current Cardiac Medications   Coumadin   Lopressor 100 mg b.i.d.  Diltiazem  mg daily  Simvastatin 10 mg daily                    Assessment and Plan:     Plan  1.  Discontinue diltiazem d/t significant number of pauses   2.  Change simvastatin to  rosuvastatin 5 mg daily d/t myalgias   3.  Start lasix 20 mg daily for HF   4.  Check daily weights and call the clinic if your weight has increased more than 2 lbs in one day or 5 lbs in one week.  5.  [facility] enrollment with BMP prior in 2 weeks   6.  Echocardiogram in 3 months to reassess LVEF after improved rate control  7.  Follow-up with Dr. Brooks 6/2019      1. Chronic atrial fibrillation    Asymptomatic    Holter monitor 6/2018 with average heart rate 85 and 1 pause 2.1 sec     After adding diltiazem, he had 140 pauses, longest 3.1 sec, so this was discontinued     beta blocker for rate control and Coumadin for anticoagulation      2. Nonischemic cardiomyopathy with acute on chronic heart failure     Likely related to atrial fibrillation with tachycardia mediated cardiomyopathy    LVEF 35-40% 1/2018 after episode of AF RVR (40-50% 9/2017)    Stress test 2017 showing no ischemia with EF 53%    Bilateral Lower extremity edema    Continue beta blocker    Consider starting ACE inhibitor or Entresto if LVEF remains less than 40% and there is room in his BP       3. Presumed coronary disease    Lexiscan 2017 showing fixed defect of the apex and inferior inferoseptal wall, no ischemia noted    No known history of coronary disease    No angina     Continue statin, beta blocker         Thank you for allowing me to participate in this delightful patient's care.      This note was completed in part using Dragon voice recognition software. Although reviewed after completion, some word and grammatical errors may occur.    Lela Leos, APRN, CNP           Data:   All laboratory data reviewed      HPI and Plan:   See dictation    Orders Placed This Encounter   Procedures     Basic metabolic panel     Follow-Up with [facility]     Echocardiogram Complete       Orders Placed This Encounter   Medications     DISCONTD: simvastatin (ZOCOR) 10 MG tablet     Sig: Take 1 tablet (10 mg) by mouth At Bedtime     Dispense:   90 tablet     Refill:  1     furosemide (LASIX) 20 MG tablet     Sig: Take 1 tablet (20 mg) by mouth daily     Dispense:  30 tablet     Refill:  3     rosuvastatin (CRESTOR) 5 MG tablet     Sig: Take 1 tablet (5 mg) by mouth daily     Dispense:  30 tablet     Refill:  11       Medications Discontinued During This Encounter   Medication Reason     simvastatin (ZOCOR) 10 MG tablet      diltiazem (DILACOR XR) 120 MG 24 hr capsule Side effects     simvastatin (ZOCOR) 10 MG tablet Side effects         Encounter Diagnoses   Name Primary?     Acute on chronic systolic congestive heart failure (H) Yes     NICM (nonischemic cardiomyopathy) (H)      Chronic atrial fibrillation (H)        CURRENT MEDICATIONS:  Current Outpatient Prescriptions   Medication Sig Dispense Refill     ASPIRIN NOT PRESCRIBED, INTENTIONAL, by Other route continuous prn.  0     furosemide (LASIX) 20 MG tablet Take 1 tablet (20 mg) by mouth daily 30 tablet 3     metoprolol tartrate (LOPRESSOR) 100 MG tablet Take 1 tablet (100 mg) by mouth every 12 hours       order for DME Please draw INR as ordered and as needed. Call results to [facility] at (p) [phone] or fax them to (f) [phone] 3 Month 3     rosuvastatin (CRESTOR) 5 MG tablet Take 1 tablet (5 mg) by mouth daily 30 tablet 11     warfarin (COUMADIN) 5 MG tablet TAKE ONE-HALF TABLET BY MOUTH EVERY MONDAY AND FRIDAY, AND ONE TABLET ALL OTHER DAYS OR AS DIRECTED BY CLINIC 72 tablet 0       ALLERGIES   No Known Allergies    PAST MEDICAL HISTORY:  No past medical history on file.    PAST SURGICAL HISTORY:  No past surgical history on file.    FAMILY HISTORY:  Family History   Problem Relation Age of Onset     Unknown/Adopted Mother      Prostate Cancer Father      C.A.D. Father      age 77       SOCIAL HISTORY:  Social History     Social History     Marital status:      Spouse name: N/A     Number of children: N/A     Years of education: N/A     Social History Main  Topics     Smoking status: Former Smoker     Years: 50.00     Quit date: 12/5/2008     Smokeless tobacco: Never Used     Alcohol use Yes      Comment: couple beers occ     Drug use: No     Sexual activity: Yes     Partners: Female     Other Topics Concern     Parent/Sibling W/ Cabg, Mi Or Angioplasty Before 65f 55m? No     Social History Narrative       Review of Systems:  Skin:  Positive for bruising     Eyes:  Negative      ENT:  Negative      Respiratory:  Negative for shortness of breath;cough     Cardiovascular:  Negative for;palpitations;chest pain;syncope or near-syncope;fatigue;lightheadedness;dizziness lower extremity symptoms;edema;Positive for    Gastroenterology: Negative nausea;vomiting;heartburn    Genitourinary:  Positive for urinary frequency;urgency    Musculoskeletal:  Negative      Neurologic:  Positive for stroke;numbness or tingling of hands;numbness or tingling of feet    Psychiatric:  Negative anxiety;depression    Heme/Lymph/Imm:  Negative bleeding disorder    Endocrine:  Negative thyroid disorder;diabetes      Physical Exam:  Vitals: /64  Pulse 77  Wt 93.5 kg (206 lb 3.2 oz)  SpO2 96%  BMI 30.45 kg/m2    Constitutional:  cooperative;well nourished        Skin:  warm and dry to the touch          Head:  normocephalic        Eyes:  sclera white        Lymph:      ENT:  no pallor or cyanosis        Neck:  no stiffness        Respiratory:    prolonged expiration       Cardiac:   irregularly irregular rhythm     systolic ejection murmur        pulses full and equal                                        GI:  abdomen soft        Extremities and Muscular Skeletal:      bilateral LE edema;pitting;2+          Neurological:  affect appropriate        Psych:  Alert and Oriented x 3        Thank you for allowing me to participate in the care of your patient.    Sincerely,     PALOMO Glover [facility]

## 2018-07-25 NOTE — PATIENT INSTRUCTIONS
Thank you for your [facility] visit today. Your provider has recommended the following:  Medication Changes:  1. STOP diltiazem   2. START rosuvastatin 5 mg daily   3. START lasix (furosemide) 20 mg daily to help get rid of your excess water retention.   Recommendations:  1. Check daily weights and call the clinic if your weight has increased more than 2 lbs in one day or 5 lbs in one week.    2.  Your baseline weight was 200 lb   3. 2000 mg of sodium/salt daily   Follow-up:  See Bernice Link PA-C for [facility] enrollment in [place], [place] on: Monday, August 6th at 11:00 am.  Please arrive at 10:00 am to have a non-fasting lab drawn downstairs. These will be reviewed with you at your visit at 11:00 am  Call [phone] two months prior to request date to schedule any future appointments.  Reminder:  1. Please bring in all current medications, over the counter supplements and vitamin bottles to your next appointment.               [facility]  [facility]~[address]~[place], [place]~[address]  Questions about your visit today?   Call your Cardiology Clinic RN's-Queenie Lin and/or Kay Echeverria at [phone].

## 2018-07-25 NOTE — PROGRESS NOTES
HPI and Plan:   See dictation    Orders Placed This Encounter   Procedures     Basic metabolic panel     Follow-Up with [facility]     Echocardiogram Complete       Orders Placed This Encounter   Medications     DISCONTD: simvastatin (ZOCOR) 10 MG tablet     Sig: Take 1 tablet (10 mg) by mouth At Bedtime     Dispense:  90 tablet     Refill:  1     furosemide (LASIX) 20 MG tablet     Sig: Take 1 tablet (20 mg) by mouth daily     Dispense:  30 tablet     Refill:  3     rosuvastatin (CRESTOR) 5 MG tablet     Sig: Take 1 tablet (5 mg) by mouth daily     Dispense:  30 tablet     Refill:  11       Medications Discontinued During This Encounter   Medication Reason     simvastatin (ZOCOR) 10 MG tablet      diltiazem (DILACOR XR) 120 MG 24 hr capsule Side effects     simvastatin (ZOCOR) 10 MG tablet Side effects         Encounter Diagnoses   Name Primary?     Acute on chronic systolic congestive heart failure (H) Yes     NICM (nonischemic cardiomyopathy) (H)      Chronic atrial fibrillation (H)        CURRENT MEDICATIONS:  Current Outpatient Prescriptions   Medication Sig Dispense Refill     ASPIRIN NOT PRESCRIBED, INTENTIONAL, by Other route continuous prn.  0     furosemide (LASIX) 20 MG tablet Take 1 tablet (20 mg) by mouth daily 30 tablet 3     metoprolol tartrate (LOPRESSOR) 100 MG tablet Take 1 tablet (100 mg) by mouth every 12 hours       order for DME Please draw INR as ordered and as needed. Call results to [facility] at (p) [phone] or fax them to (f) [phone] 3 Month 3     rosuvastatin (CRESTOR) 5 MG tablet Take 1 tablet (5 mg) by mouth daily 30 tablet 11     warfarin (COUMADIN) 5 MG tablet TAKE ONE-HALF TABLET BY MOUTH EVERY MONDAY AND FRIDAY, AND ONE TABLET ALL OTHER DAYS OR AS DIRECTED BY CLINIC 72 tablet 0       ALLERGIES   No Known Allergies    PAST MEDICAL HISTORY:  No past medical history on file.    PAST SURGICAL HISTORY:  No past surgical history on file.    FAMILY  HISTORY:  Family History   Problem Relation Age of Onset     Unknown/Adopted Mother      Prostate Cancer Father      C.A.D. Father      age 77       SOCIAL HISTORY:  Social History     Social History     Marital status:      Spouse name: N/A     Number of children: N/A     Years of education: N/A     Social History Main Topics     Smoking status: Former Smoker     Years: 50.00     Quit date: 12/5/2008     Smokeless tobacco: Never Used     Alcohol use Yes      Comment: couple beers occ     Drug use: No     Sexual activity: Yes     Partners: Female     Other Topics Concern     Parent/Sibling W/ Cabg, Mi Or Angioplasty Before 65f 55m? No     Social History Narrative       Review of Systems:  Skin:  Positive for bruising     Eyes:  Negative      ENT:  Negative      Respiratory:  Negative for shortness of breath;cough     Cardiovascular:  Negative for;palpitations;chest pain;syncope or near-syncope;fatigue;lightheadedness;dizziness lower extremity symptoms;edema;Positive for    Gastroenterology: Negative nausea;vomiting;heartburn    Genitourinary:  Positive for urinary frequency;urgency    Musculoskeletal:  Negative      Neurologic:  Positive for stroke;numbness or tingling of hands;numbness or tingling of feet    Psychiatric:  Negative anxiety;depression    Heme/Lymph/Imm:  Negative bleeding disorder    Endocrine:  Negative thyroid disorder;diabetes      Physical Exam:  Vitals: /64  Pulse 77  Wt 93.5 kg (206 lb 3.2 oz)  SpO2 96%  BMI 30.45 kg/m2    Constitutional:  cooperative;well nourished        Skin:  warm and dry to the touch          Head:  normocephalic        Eyes:  sclera white        Lymph:      ENT:  no pallor or cyanosis        Neck:  no stiffness        Respiratory:    prolonged expiration       Cardiac:   irregularly irregular rhythm     systolic ejection murmur        pulses full and equal                                        GI:  abdomen soft        Extremities and Muscular Skeletal:       bilateral LE edema;pitting;2+          Neurological:  affect appropriate        Psych:  Alert and Oriented x 3        CC  No referring provider defined for this encounter.

## 2018-08-06 ENCOUNTER — OFFICE VISIT (OUTPATIENT)
Dept: CARDIOLOGY | Facility: CLINIC | Age: 78
End: 2018-08-06
Payer: COMMERCIAL

## 2018-08-06 VITALS
WEIGHT: 200.4 LBS | BODY MASS INDEX: 29.59 KG/M2 | HEART RATE: 92 BPM | OXYGEN SATURATION: 95 % | SYSTOLIC BLOOD PRESSURE: 112 MMHG | DIASTOLIC BLOOD PRESSURE: 62 MMHG

## 2018-08-06 DIAGNOSIS — I50.23 ACUTE ON CHRONIC SYSTOLIC CONGESTIVE HEART FAILURE (H): ICD-10-CM

## 2018-08-06 DIAGNOSIS — I48.91 ATRIAL FIBRILLATION, UNSPECIFIED TYPE (H): Primary | ICD-10-CM

## 2018-08-06 LAB
ANION GAP SERPL CALCULATED.3IONS-SCNC: 6 MMOL/L (ref 3–14)
BUN SERPL-MCNC: 11 MG/DL (ref 7–30)
CALCIUM SERPL-MCNC: 9 MG/DL (ref 8.5–10.1)
CHLORIDE SERPL-SCNC: 107 MMOL/L (ref 94–109)
CO2 SERPL-SCNC: 28 MMOL/L (ref 20–32)
CREAT SERPL-MCNC: 0.94 MG/DL (ref 0.66–1.25)
GFR SERPL CREATININE-BSD FRML MDRD: 77 ML/MIN/1.7M2
GLUCOSE SERPL-MCNC: 150 MG/DL (ref 70–99)
POTASSIUM SERPL-SCNC: 4.2 MMOL/L (ref 3.4–5.3)
SODIUM SERPL-SCNC: 141 MMOL/L (ref 133–144)

## 2018-08-06 PROCEDURE — 99214 OFFICE O/P EST MOD 30 MIN: CPT | Performed by: PHYSICIAN ASSISTANT

## 2018-08-06 PROCEDURE — 36415 COLL VENOUS BLD VENIPUNCTURE: CPT | Performed by: NURSE PRACTITIONER

## 2018-08-06 PROCEDURE — 80048 BASIC METABOLIC PNL TOTAL CA: CPT | Performed by: NURSE PRACTITIONER

## 2018-08-06 RX ORDER — METOPROLOL SUCCINATE 200 MG/1
200 TABLET, EXTENDED RELEASE ORAL DAILY
Qty: 90 TABLET | Refills: 3 | Status: SHIPPED | OUTPATIENT
Start: 2018-08-06 | End: 2018-08-24

## 2018-08-06 NOTE — MR AVS SNAPSHOT
After Visit Summary   8/6/2018    Bradley Liz    MRN: [id number]           Patient Information     Date Of Birth          1940        Visit Information        Provider Department      8/6/2018 11:00 AM Bernice Link PA-C [facility]        Today's Diagnoses     Atrial fibrillation, unspecified type (H)    -  1    Acute on chronic systolic congestive heart failure (H)          Care Instructions    Please call [facility] nurse for any questions or concerns:     [phone]    1. Medication changes from today:  STOP Metoprolol tartrate. START Metoprolol succinate (Metoprolol XL) 200 mg ONCE daily. This is to help control your heart rate.      2. Weigh yourself daily and write it down.     3. Call [facility] nurse if your weight is up more than 2 pounds in one day or 5 pounds in one week.     4. Call [facility] nurse if you feel more short of breath, have more abdominal bloating, or leg swelling OR if you notice that your heart rate is > 100 several times a day.      5. Continue low sodium diet (less than 2000 mg daily). If you eat less salt, you will retain less fluid.     6. Alcohol can weaken your heart further. You should avoid alcohol or limit its use to special times, such as a holiday or birthday.      7. Do NOT take Aleve or ibuprofen without talking to your doctor first.      8. Lab Results: your labs today are still pending. We will call you with the results.    9. Follow up plan: scheduled an echocardiogram to re-evaluate your heart failure. Schedule an appointment with Dr. Brooks after the ultrasound.     Phone numbers:              [facility]: Cardiomyopathy, Optimization, Rehabilitation, Education  The [facility] is a heart failure specialty clinic within the [facility] where you will work with specialized nurse practitioners, physician assistants, doctors, and registered nurses. They are dedicated to helping patients with heart  failure to carefully adjust medications, receive education, and learn who and when to call if symptoms develop. They specialize in helping you better understand your condition, slow the progression of your disease, improve the length and quality of your life, help you detect future heart problems before they become life threatening, and avoid hospitalizations.              Follow-ups after your visit        Additional Services     Follow-Up with Cardiologist                 Your next 10 appointments already scheduled     Aug 28, 2018 11:15 AM CDT   Anticoagulation Visit with CL ANTI COAG   [facility] ([facility]    [address]  [facility] [address]   [phone]              Future tests that were ordered for you today     Open Future Orders        Priority Expected Expires Ordered    Follow-Up with Cardiologist Routine 10/25/2018 8/6/2019 8/6/2018            Who to contact     If you have questions or need follow up information about today's clinic visit or your schedule please contact [facility] directly at [phone].  Normal or non-critical lab and imaging results will be communicated to you by MyChart, letter or phone within 4 business days after the clinic has received the results. If you do not hear from us within 7 days, please contact the clinic through MyChart or phone. If you have a critical or abnormal lab result, we will notify you by phone as soon as possible.  Submit refill requests through [employer] or call your pharmacy and they will forward the refill request to us. Please allow 3 business days for your refill to be completed.          Additional Information About Your Visit        Care EveryWhere ID     This is your Care EveryWhere ID. This could be used by other organizations to access your [place] medical records  [id number]        Your Vitals Were     Pulse Pulse Oximetry BMI (Body Mass Index)              92 95% 29.59 kg/m2          Blood Pressure from Last 3 Encounters:   08/06/18 112/62   07/25/18 100/64   06/13/18 118/86    Weight from Last 3 Encounters:   08/06/18 90.9 kg (200 lb 6.4 oz)   07/25/18 93.5 kg (206 lb 3.2 oz)   06/13/18 91.1 kg (200 lb 12.8 oz)              We Performed the Following     Follow-Up with [facility]          Today's Medication Changes          These changes are accurate as of 8/6/18 11:18 AM.  If you have any questions, ask your nurse or doctor.               Start taking these medicines.        Dose/Directions    metoprolol succinate 200 MG 24 hr tablet   Commonly known as:  TOPROL-XL   Used for:  Atrial fibrillation, unspecified type (H)   Started by:  Bernice Link PA-C        Dose:  200 mg   Take 1 tablet (200 mg) by mouth daily   Quantity:  90 tablet   Refills:  3         Stop taking these medicines if you haven't already. Please contact your care team if you have questions.     metoprolol tartrate 100 MG tablet   Commonly known as:  LOPRESSOR   Stopped by:  Bernice Link PA-C                Where to get your medicines      These medications were sent to [facility] [address] [place] [address] AT [address]  [address] [facility] [address]     Phone:  [phone]     metoprolol succinate 200 MG 24 hr tablet                Primary Care Provider Office Phone # Fax #    Alex Mustafa [phone]       [address] [place] [id number]        Equal Access to Services     [facility] AH: Hadii harmeet reid Sobk, wasueda luqadaha, qaybta kaalval lindsay. So [facility] [phone].    ATENCIÓN: Si habla español, tiene a katz disposición servicios gratuitos de asistencia lingüística. Lisa christine [phone].    We comply with applicable federal civil rights laws and [place] laws. We do not discriminate on the basis of race, color,  national origin, age, disability, sex, sexual orientation, or gender identity.            Thank you!     Thank you for choosing [facility]  for your care. Our goal is always to provide you with excellent care. Hearing back from our patients is one way we can continue to improve our services. Please take a few minutes to complete the written survey that you may receive in the mail after your visit with us. Thank you!             Your Updated Medication List - Protect others around you: Learn how to safely use, store and throw away your medicines at www.disposemymeds.org.          This list is accurate as of 8/6/18 11:18 AM.  Always use your most recent med list.                   Brand Name Dispense Instructions for use Diagnosis    ASPIRIN NOT PRESCRIBED    INTENTIONAL     by Other route continuous prn.        furosemide 20 MG tablet    LASIX    30 tablet    Take 1 tablet (20 mg) by mouth daily    Acute on chronic systolic congestive heart failure (H)       metoprolol succinate 200 MG 24 hr tablet    TOPROL-XL    90 tablet    Take 1 tablet (200 mg) by mouth daily    Atrial fibrillation, unspecified type (H)       order for DME     3 Month    Please draw INR as ordered and as needed. Call results to [facility] at (p) [phone] or fax them to (f) [phone]    Long term current use of anticoagulant therapy, Completed stroke (H), Atrial fibrillation, unspecified type (H)       rosuvastatin 5 MG tablet    CRESTOR    30 tablet    Take 1 tablet (5 mg) by mouth daily    Acute on chronic systolic congestive heart failure (H)       warfarin 5 MG tablet    COUMADIN    72 tablet    TAKE ONE-HALF TABLET BY MOUTH EVERY MONDAY AND FRIDAY, AND ONE TABLET ALL OTHER DAYS OR AS DIRECTED BY CLINIC    Long term current use of anticoagulant therapy

## 2018-08-06 NOTE — NURSING NOTE
The After Visit Summary was reviewed with the patient following their office visit with Yina Link PA-C. Patient was educated about any medication changes, the importance of following a low sodium diet, importance of recording daily weights, and when to call [facility] clinic. Patient verbalized understanding of the information and agrees to call with any questions or concerns.     Return appointment: Patient was brought to scheduling to schedule Echocardiogram and follow up with Dr. Brooks.     Cindy Stoll RN  [facility] Care Coordinator  [phone]

## 2018-08-06 NOTE — LETTER
8/6/2018    Alex Mustafa MD  [address]  [facility] [address]    RE: Bradley Howarddanni       Dear Colleague,    I had the pleasure of seeing Bradley Liz in the [facility].      Cardiology Progress Note    Date of Service: 08/06/2018  Patient seen today in follow up of: [facility] enrollment  Primary cardiologist: Dr. Brooks    HPI:  Bradley Liz is a very pleasant 77 year old male with a history of permanent atrial fibrillation maintained on Coumadin, hyperlipidemia, cardiomyopathy with an LVEF of 35-40% (previously 40-50%), CVA in 2008, and prior tobacco use with cessation in 2008.    He was initially seen by Dr. Brooks in May 2017 for atrial fibrillation with some rapid rates.  Rate control strategy was pursued as he was asymptomatic from a cardiac standpoint. Prior echocardiograms have shown a mild cardiomyopathy with an LVEF of 40-50%. Last summer, his metoprolol was titrated up for better rate control as repeat Holter monitors showed poor heart rate control. He had a nuclear stress test done in July 2017 which was technically difficult and showed a fixed defect in the apex and inferior septal wall but no evidence of ischemia.      He recently was seen in clinic by PALOMO Man in June after her returned from [place]. Unfortunately, he was hospitalized in January 2018 with atrial fibrillation with RVR in [place].  Echocardiogram showed an LVEF of 35-40%.  There was mild MR. The RV was moderately to severely dilated with decreased RV function.  He was started on digoxin and metoprolol was increased prior to discharge.  Additionally, they discuss and starting Entresto but he had not done so. Additionally, he was not taking his digoxin.     He had a Holter monitor this June which showed an average heart rate of 85 bpm with a maximum heart rate of 160 bpm.  Diltiazem was started but a follow up holter showed many pauses greater than 2 seconds and this  was discontinued. He was also started on low dose lasix 20 mg daily.     He is here today for [facility] clinic enrollment. Overall, he tells me he feels pretty good.  His leg edema has improved with the addition of Lasix.  He denies any shortness of breath, orthopnea, or PND.  He wears a heart rate monitor on his wrist and tells me his heart rates are typically less than 100.  They occasionally go over 100 but not for long.  The most activity he does in a days of walking about 2 blocks or so.  He does this without symptoms.  His weight is down from his last visit about 6 pounds.    He lives at home with his wife.  She does all the cooking.  He manages his own medications.    ASSESSMENT/PLAN:  1.  Chronic systolic congestive heart failure.  Lasix 20 mg daily started at last visit.  2.  Persistent atrial fibrillation.  On metoprolol for rate control and warfarin for anticoagulations.  3.  Cardiomyopathy, nonischemic. Possibly tachycardia induced. Prior stress testing showed no evidence of ischemia. Last echocardiogram from [place] in January showed LVEF of 35 - 40%.  4.  Hyperlipidemia.  Simvastatin was recently changed to rosuvastatin due to myalgias.  He has noted improvement with this.    Bradley is here today for core clinic enrollment.  He has a nonischemic cardiomyopathy with an LVEF of 35-40% on last echocardiogram.  Prior ischemic workup is negative and presumably this is due to persistent rapid atrial fibrillation.  He has now on metoprolol tartrate 100 mg twice daily for rate control. His last Holter monitor showed fairly decent rate control. Today, given his systolic dysfunction I recommend we change him from metoprolol tartrate to metoprolol succinate 200 mg daily.  He is agreeable.  We could consider the addition of lisinopril or Entresto in the future if his blood pressure allows, particularly if his LVEF remains depressed.  His weight is down about 6 pounds and his peripheral edema has improved since low-dose Lasix  was added at his last visit.  He continues to have some edema.  We discussed possibly wearing compression stockings but he has used these previously and does not want to consider this.  A basic metabolic panel was drawn today but is pending at the time of this visit.  Assuming this is stable, we will continue him on this dose.  CHF teaching was given today and we discussed sodium restriction.  Asked him to continue to weigh himself daily and to call us with any weight gain, worsening peripheral edema, worsening shortness of breath, or if he notices his heart rates are running greater than 100 bpm more frequently.  A repeat echocardiogram is already been ordered for October for reevaluation of his LVEF.  I will plan to have him see Dr. Brooks at that time as he is due for yearly follow-up. Of course, I will see him sooner with any concerns or issues.     This note was completed in part using Dragon voice recognition software. Although reviewed after completion, some word and grammatical errors may occur.    Orders this Visit:  Orders Placed This Encounter   Procedures     Follow-Up with Cardiologist     Orders Placed This Encounter   Medications     metoprolol succinate (TOPROL-XL) 200 MG 24 hr tablet     Sig: Take 1 tablet (200 mg) by mouth daily     Dispense:  90 tablet     Refill:  3     Medications Discontinued During This Encounter   Medication Reason     metoprolol tartrate (LOPRESSOR) 100 MG tablet        CURRENT MEDICATIONS:  Current Outpatient Prescriptions   Medication Sig Dispense Refill     furosemide (LASIX) 20 MG tablet Take 1 tablet (20 mg) by mouth daily 30 tablet 3     metoprolol succinate (TOPROL-XL) 200 MG 24 hr tablet Take 1 tablet (200 mg) by mouth daily 90 tablet 3     rosuvastatin (CRESTOR) 5 MG tablet Take 1 tablet (5 mg) by mouth daily 30 tablet 11     warfarin (COUMADIN) 5 MG tablet TAKE ONE-HALF TABLET BY MOUTH EVERY MONDAY AND FRIDAY, AND ONE TABLET ALL OTHER DAYS OR AS DIRECTED BY CLINIC  72 tablet 0     ASPIRIN NOT PRESCRIBED, INTENTIONAL, by Other route continuous prn.  0     order for DME Please draw INR as ordered and as needed. Call results to [facility] at (p) [phone] or fax them to (f) [phone] (Patient not taking: Reported on 8/6/2018) 3 Month 3     ALLERGIES  No Known Allergies  PAST MEDICAL HISTORY:  No past medical history on file.  PAST SURGICAL HISTORY:  No past surgical history on file.  FAMILY HISTORY:  Family History   Problem Relation Age of Onset     Unknown/Adopted Mother      Prostate Cancer Father      C.A.D. Father      age 77     SOCIAL HISTORY:  Social History     Social History     Marital status:      Spouse name: N/A     Number of children: N/A     Years of education: N/A     Social History Main Topics     Smoking status: Former Smoker     Years: 50.00     Quit date: 12/5/2008     Smokeless tobacco: Never Used     Alcohol use Yes      Comment: couple beers occ     Drug use: No     Sexual activity: Yes     Partners: Female     Other Topics Concern     Parent/Sibling W/ Cabg, Mi Or Angioplasty Before 65f 55m? No     Social History Narrative     Review of Systems:  Skin:  Positive for bruising   Eyes:  Negative    ENT:  Negative    Respiratory:  Positive for dyspnea on exertion  Cardiovascular:    Positive for;lower extremity symptoms;edema  Gastroenterology: Negative    Genitourinary:  Positive for urinary frequency;urgency  Musculoskeletal:  Negative    Neurologic:  Positive for stroke;numbness or tingling of hands;numbness or tingling of feet  Psychiatric:  Negative    Heme/Lymph/Imm:  Negative    Endocrine:  Negative       Physical Exam:  Vitals: /62 (BP Location: Right arm, Patient Position: Sitting, Cuff Size: Adult Regular)  Pulse 92  Wt 90.9 kg (200 lb 6.4 oz)  SpO2 95%  BMI 29.59 kg/m2   Wt Readings from Last 4 Encounters:   08/06/18 90.9 kg (200 lb 6.4 oz)   07/25/18 93.5 kg (206 lb 3.2 oz)   06/13/18 91.1 kg (200 lb  12.8 oz)   10/02/17 91.2 kg (201 lb)     GEN: well nourished, in no acute distress.  HEENT:  Pupils equal, round. Sclerae nonicteric.   NECK: Supple, no masses appreciated.  JVP is difficult to assess but it does not appear significantly elevated at 30 .  C/V: Irregular rate and rhythm, no murmur, rub or gallop.    RESP: Respirations are unlabored.  Breath sounds are fairly clear.  GI: Abdomen soft, nontender.  EXTREM: 2+ LE edema.  NEURO: Alert and oriented, cooperative.  SKIN: Warm and dry.     Recent Lab Results:  LIPID RESULTS:  Lab Results   Component Value Date    CHOL 196 10/02/2017    HDL 55 10/02/2017     (H) 10/02/2017    TRIG 143 10/02/2017    CHOLHDLRATIO 3.1 08/25/2015     LIVER ENZYME RESULTS:  Lab Results   Component Value Date    AST 18 05/09/2017    ALT 25 05/09/2017     CBC RESULTS:  Lab Results   Component Value Date    WBC 8.6 05/09/2017    RBC 5.78 05/09/2017    HGB 17.5 05/09/2017    HCT 53.9 (H) 05/09/2017    MCV 93 05/09/2017    MCH 30.3 05/09/2017    MCHC 32.5 05/09/2017    RDW 14.8 05/09/2017     05/09/2017     BMP RESULTS:  Lab Results   Component Value Date     10/02/2017    POTASSIUM 4.0 01/31/2018    CHLORIDE 107 10/02/2017    CO2 27 10/02/2017    ANIONGAP 7 10/02/2017     (H) 01/31/2018    BUN 10 10/02/2017    CR 0.8 01/31/2018    GFRESTIMATED >60 01/31/2018    GFRESTBLACK >90 10/02/2017    DARON 9.2 10/02/2017      A1C RESULTS:  Lab Results   Component Value Date    A1C 5.7 01/29/2018     INR RESULTS:  Lab Results   Component Value Date    INR 2.4 (A) 07/20/2018    INR 2.8 (A) 06/05/2018    INR 2.9 04/04/2018    INR 2.4 02/28/2018       New/Pertinent imaging results since last visit:  None        Thank you for allowing me to participate in the care of your patient.    Sincerely,     Bernice Link PA-C     [facility]

## 2018-08-06 NOTE — PROGRESS NOTES
Cardiology Progress Note    Date of Service: 08/06/2018  Patient seen today in follow up of: CORE enrollment  Primary cardiologist: Dr. Brooks    HPI:  Bradley Liz is a very pleasant 77 year old male with a history of permanent atrial fibrillation maintained on Coumadin, hyperlipidemia, cardiomyopathy with an LVEF of 35-40% (previously 40-50%), CVA in 2008, and prior tobacco use with cessation in 2008.    He was initially seen by Dr. Brooks in May 2017 for atrial fibrillation with some rapid rates.  Rate control strategy was pursued as he was asymptomatic from a cardiac standpoint. Prior echocardiograms have shown a mild cardiomyopathy with an LVEF of 40-50%. Last summer, his metoprolol was titrated up for better rate control as repeat Holter monitors showed poor heart rate control. He had a nuclear stress test done in July 2017 which was technically difficult and showed a fixed defect in the apex and inferior septal wall but no evidence of ischemia.      He recently was seen in clinic by PALOMO Man in June after her returned from [place]. Unfortunately, he was hospitalized in January 2018 with atrial fibrillation with RVR in [place].  Echocardiogram showed an LVEF of 35-40%.  There was mild MR. The RV was moderately to severely dilated with decreased RV function.  He was started on digoxin and metoprolol was increased prior to discharge.  Additionally, they discuss and starting Entresto but he had not done so. Additionally, he was not taking his digoxin.     He had a Holter monitor this June which showed an average heart rate of 85 bpm with a maximum heart rate of 160 bpm.  Diltiazem was started but a follow up holter showed many pauses greater than 2 seconds and this was discontinued. He was also started on low dose lasix 20 mg daily.     He is here today for CORE clinic enrollment. Overall, he tells me he feels pretty good.  His leg edema has improved with the addition of Lasix.  He  denies any shortness of breath, orthopnea, or PND.  He wears a heart rate monitor on his wrist and tells me his heart rates are typically less than 100.  They occasionally go over 100 but not for long.  The most activity he does in a days of walking about 2 blocks or so.  He does this without symptoms.  His weight is down from his last visit about 6 pounds.    He lives at home with his wife.  She does all the cooking.  He manages his own medications.    ASSESSMENT/PLAN:  1.  Chronic systolic congestive heart failure.  Lasix 20 mg daily started at last visit.  2.  Persistent atrial fibrillation.  On metoprolol for rate control and warfarin for anticoagulations.  3.  Cardiomyopathy, nonischemic. Possibly tachycardia induced. Prior stress testing showed no evidence of ischemia. Last echocardiogram from [place] in January showed LVEF of 35 - 40%.  4.  Hyperlipidemia.  Simvastatin was recently changed to rosuvastatin due to myalgias.  He has noted improvement with this.    Bradley is here today for core clinic enrollment.  He has a nonischemic cardiomyopathy with an LVEF of 35-40% on last echocardiogram.  Prior ischemic workup is negative and presumably this is due to persistent rapid atrial fibrillation.  He has now on metoprolol tartrate 100 mg twice daily for rate control. His last Holter monitor showed fairly decent rate control. Today, given his systolic dysfunction I recommend we change him from metoprolol tartrate to metoprolol succinate 200 mg daily.  He is agreeable.  We could consider the addition of lisinopril or Entresto in the future if his blood pressure allows, particularly if his LVEF remains depressed.  His weight is down about 6 pounds and his peripheral edema has improved since low-dose Lasix was added at his last visit.  He continues to have some edema.  We discussed possibly wearing compression stockings but he has used these previously and does not want to consider this.  A basic metabolic panel was drawn  today but is pending at the time of this visit.  Assuming this is stable, we will continue him on this dose.  CHF teaching was given today and we discussed sodium restriction.  Asked him to continue to weigh himself daily and to call us with any weight gain, worsening peripheral edema, worsening shortness of breath, or if he notices his heart rates are running greater than 100 bpm more frequently.  A repeat echocardiogram is already been ordered for October for reevaluation of his LVEF.  I will plan to have him see Dr. Brooks at that time as he is due for yearly follow-up. Of course, I will see him sooner with any concerns or issues.     This note was completed in part using Dragon voice recognition software. Although reviewed after completion, some word and grammatical errors may occur.    Orders this Visit:  Orders Placed This Encounter   Procedures     Follow-Up with Cardiologist     Orders Placed This Encounter   Medications     metoprolol succinate (TOPROL-XL) 200 MG 24 hr tablet     Sig: Take 1 tablet (200 mg) by mouth daily     Dispense:  90 tablet     Refill:  3     Medications Discontinued During This Encounter   Medication Reason     metoprolol tartrate (LOPRESSOR) 100 MG tablet        CURRENT MEDICATIONS:  Current Outpatient Prescriptions   Medication Sig Dispense Refill     furosemide (LASIX) 20 MG tablet Take 1 tablet (20 mg) by mouth daily 30 tablet 3     metoprolol succinate (TOPROL-XL) 200 MG 24 hr tablet Take 1 tablet (200 mg) by mouth daily 90 tablet 3     rosuvastatin (CRESTOR) 5 MG tablet Take 1 tablet (5 mg) by mouth daily 30 tablet 11     warfarin (COUMADIN) 5 MG tablet TAKE ONE-HALF TABLET BY MOUTH EVERY MONDAY AND FRIDAY, AND ONE TABLET ALL OTHER DAYS OR AS DIRECTED BY CLINIC 72 tablet 0     ASPIRIN NOT PRESCRIBED, INTENTIONAL, by Other route continuous prn.  0     order for DME Please draw INR as ordered and as needed. Call results to [facility] at (p) [phone] or  fax them to (f) [phone] (Patient not taking: Reported on 8/6/2018) 3 Month 3     ALLERGIES  No Known Allergies  PAST MEDICAL HISTORY:  No past medical history on file.  PAST SURGICAL HISTORY:  No past surgical history on file.  FAMILY HISTORY:  Family History   Problem Relation Age of Onset     Unknown/Adopted Mother      Prostate Cancer Father      C.A.D. Father      age 77     SOCIAL HISTORY:  Social History     Social History     Marital status:      Spouse name: N/A     Number of children: N/A     Years of education: N/A     Social History Main Topics     Smoking status: Former Smoker     Years: 50.00     Quit date: 12/5/2008     Smokeless tobacco: Never Used     Alcohol use Yes      Comment: couple beers occ     Drug use: No     Sexual activity: Yes     Partners: Female     Other Topics Concern     Parent/Sibling W/ Cabg, Mi Or Angioplasty Before 65f 55m? No     Social History Narrative     Review of Systems:  Skin:  Positive for bruising   Eyes:  Negative    ENT:  Negative    Respiratory:  Positive for dyspnea on exertion  Cardiovascular:    Positive for;lower extremity symptoms;edema  Gastroenterology: Negative    Genitourinary:  Positive for urinary frequency;urgency  Musculoskeletal:  Negative    Neurologic:  Positive for stroke;numbness or tingling of hands;numbness or tingling of feet  Psychiatric:  Negative    Heme/Lymph/Imm:  Negative    Endocrine:  Negative       Physical Exam:  Vitals: /62 (BP Location: Right arm, Patient Position: Sitting, Cuff Size: Adult Regular)  Pulse 92  Wt 90.9 kg (200 lb 6.4 oz)  SpO2 95%  BMI 29.59 kg/m2   Wt Readings from Last 4 Encounters:   08/06/18 90.9 kg (200 lb 6.4 oz)   07/25/18 93.5 kg (206 lb 3.2 oz)   06/13/18 91.1 kg (200 lb 12.8 oz)   10/02/17 91.2 kg (201 lb)     GEN: well nourished, in no acute distress.  HEENT:  Pupils equal, round. Sclerae nonicteric.   NECK: Supple, no masses appreciated.  JVP is difficult to assess but it does not  appear significantly elevated at 30 .  C/V: Irregular rate and rhythm, no murmur, rub or gallop.    RESP: Respirations are unlabored.  Breath sounds are fairly clear.  GI: Abdomen soft, nontender.  EXTREM: 2+ LE edema.  NEURO: Alert and oriented, cooperative.  SKIN: Warm and dry.     Recent Lab Results:  LIPID RESULTS:  Lab Results   Component Value Date    CHOL 196 10/02/2017    HDL 55 10/02/2017     (H) 10/02/2017    TRIG 143 10/02/2017    CHOLHDLRATIO 3.1 08/25/2015     LIVER ENZYME RESULTS:  Lab Results   Component Value Date    AST 18 05/09/2017    ALT 25 05/09/2017     CBC RESULTS:  Lab Results   Component Value Date    WBC 8.6 05/09/2017    RBC 5.78 05/09/2017    HGB 17.5 05/09/2017    HCT 53.9 (H) 05/09/2017    MCV 93 05/09/2017    MCH 30.3 05/09/2017    MCHC 32.5 05/09/2017    RDW 14.8 05/09/2017     05/09/2017     BMP RESULTS:  Lab Results   Component Value Date     10/02/2017    POTASSIUM 4.0 01/31/2018    CHLORIDE 107 10/02/2017    CO2 27 10/02/2017    ANIONGAP 7 10/02/2017     (H) 01/31/2018    BUN 10 10/02/2017    CR 0.8 01/31/2018    GFRESTIMATED >60 01/31/2018    GFRESTBLACK >90 10/02/2017    DARON 9.2 10/02/2017      A1C RESULTS:  Lab Results   Component Value Date    A1C 5.7 01/29/2018     INR RESULTS:  Lab Results   Component Value Date    INR 2.4 (A) 07/20/2018    INR 2.8 (A) 06/05/2018    INR 2.9 04/04/2018    INR 2.4 02/28/2018       New/Pertinent imaging results since last visit:  None    Bernice Link PA-C  [facility]

## 2018-08-06 NOTE — PATIENT INSTRUCTIONS
Please call CORE nurse for any questions or concerns:     [phone]    1. Medication changes from today:  STOP Metoprolol tartrate. START Metoprolol succinate (Metoprolol XL) 200 mg ONCE daily. This is to help control your heart rate.      2. Weigh yourself daily and write it down.     3. Call CORE nurse if your weight is up more than 2 pounds in one day or 5 pounds in one week.     4. Call CORE nurse if you feel more short of breath, have more abdominal bloating, or leg swelling OR if you notice that your heart rate is > 100 several times a day.      5. Continue low sodium diet (less than 2000 mg daily). If you eat less salt, you will retain less fluid.     6. Alcohol can weaken your heart further. You should avoid alcohol or limit its use to special times, such as a holiday or birthday.      7. Do NOT take Aleve or ibuprofen without talking to your doctor first.      8. Lab Results: your labs today are still pending. We will call you with the results.    9. Follow up plan: scheduled an echocardiogram to re-evaluate your heart failure. Schedule an appointment with Dr. Brooks after the ultrasound.     Phone numbers:              [facility]: Cardiomyopathy, Optimization, Rehabilitation, Education  The [facility] is a heart failure specialty clinic within the [facility] where you will work with specialized nurse practitioners, physician assistants, doctors, and registered nurses. They are dedicated to helping patients with heart failure to carefully adjust medications, receive education, and learn who and when to call if symptoms develop. They specialize in helping you better understand your condition, slow the progression of your disease, improve the length and quality of your life, help you detect future heart problems before they become life threatening, and avoid hospitalizations.

## 2018-08-24 ENCOUNTER — OFFICE VISIT (OUTPATIENT)
Dept: FAMILY MEDICINE | Facility: CLINIC | Age: 78
End: 2018-08-24
Payer: COMMERCIAL

## 2018-08-24 VITALS
HEART RATE: 92 BPM | BODY MASS INDEX: 28.49 KG/M2 | SYSTOLIC BLOOD PRESSURE: 110 MMHG | TEMPERATURE: 96.4 F | WEIGHT: 199 LBS | RESPIRATION RATE: 18 BRPM | HEIGHT: 70 IN | OXYGEN SATURATION: 96 % | DIASTOLIC BLOOD PRESSURE: 70 MMHG

## 2018-08-24 DIAGNOSIS — I48.91 ATRIAL FIBRILLATION, UNSPECIFIED TYPE (H): ICD-10-CM

## 2018-08-24 DIAGNOSIS — Z79.01 LONG TERM CURRENT USE OF ANTICOAGULANT THERAPY: ICD-10-CM

## 2018-08-24 DIAGNOSIS — Z00.00 ROUTINE GENERAL MEDICAL EXAMINATION AT A HEALTH CARE FACILITY: Primary | ICD-10-CM

## 2018-08-24 DIAGNOSIS — I50.23 ACUTE ON CHRONIC SYSTOLIC CONGESTIVE HEART FAILURE (H): ICD-10-CM

## 2018-08-24 LAB
ANION GAP SERPL CALCULATED.3IONS-SCNC: 7 MMOL/L (ref 3–14)
BUN SERPL-MCNC: 14 MG/DL (ref 7–30)
CALCIUM SERPL-MCNC: 8.4 MG/DL (ref 8.5–10.1)
CHLORIDE SERPL-SCNC: 102 MMOL/L (ref 94–109)
CHOLEST SERPL-MCNC: 138 MG/DL
CO2 SERPL-SCNC: 29 MMOL/L (ref 20–32)
CREAT SERPL-MCNC: 0.92 MG/DL (ref 0.66–1.25)
GFR SERPL CREATININE-BSD FRML MDRD: 79 ML/MIN/1.7M2
GLUCOSE SERPL-MCNC: 97 MG/DL (ref 70–99)
HDLC SERPL-MCNC: 42 MG/DL
LDLC SERPL CALC-MCNC: 66 MG/DL
NONHDLC SERPL-MCNC: 96 MG/DL
POTASSIUM SERPL-SCNC: 4.9 MMOL/L (ref 3.4–5.3)
SODIUM SERPL-SCNC: 138 MMOL/L (ref 133–144)
TRIGL SERPL-MCNC: 152 MG/DL

## 2018-08-24 PROCEDURE — 36415 COLL VENOUS BLD VENIPUNCTURE: CPT | Performed by: FAMILY MEDICINE

## 2018-08-24 PROCEDURE — 99397 PER PM REEVAL EST PAT 65+ YR: CPT | Performed by: FAMILY MEDICINE

## 2018-08-24 PROCEDURE — 80061 LIPID PANEL: CPT | Performed by: FAMILY MEDICINE

## 2018-08-24 PROCEDURE — 80048 BASIC METABOLIC PNL TOTAL CA: CPT | Performed by: FAMILY MEDICINE

## 2018-08-24 RX ORDER — WARFARIN SODIUM 5 MG/1
TABLET ORAL
Qty: 80 TABLET | Refills: 3 | Status: SHIPPED | OUTPATIENT
Start: 2018-08-24 | End: 2018-09-21

## 2018-08-24 RX ORDER — ROSUVASTATIN CALCIUM 5 MG/1
5 TABLET, COATED ORAL DAILY
Qty: 90 TABLET | Refills: 3 | Status: SHIPPED | OUTPATIENT
Start: 2018-08-24 | End: 2019-08-15

## 2018-08-24 RX ORDER — FUROSEMIDE 20 MG
20 TABLET ORAL DAILY
Qty: 90 TABLET | Refills: 3 | Status: SHIPPED | OUTPATIENT
Start: 2018-08-24 | End: 2019-07-30

## 2018-08-24 RX ORDER — METOPROLOL SUCCINATE 200 MG/1
200 TABLET, EXTENDED RELEASE ORAL DAILY
Qty: 90 TABLET | Refills: 3 | Status: SHIPPED | OUTPATIENT
Start: 2018-08-24 | End: 2019-08-15

## 2018-08-24 ASSESSMENT — PAIN SCALES - GENERAL: PAINLEVEL: NO PAIN (0)

## 2018-08-24 NOTE — PROGRESS NOTES
SUBJECTIVE:   Bradley Liz is a 77 year old male who presents for Preventive Visit.      Are you in the first 12 months of your Medicare Part B coverage?  No    Healthy Habits:    Do you get at least three servings of calcium containing foods daily (dairy, green leafy vegetables, etc.)? yes    Amount of exercise or daily activities, outside of work: 2 day(s) per week    Problems taking medications regularly No    Medication side effects: No    Have you had an eye exam in the past two years? no    Do you see a dentist twice per year? yes    Do you have sleep apnea, excessive snoring or daytime drowsiness?no      Ability to successfully perform activities of daily living: no    Home safety:  none identified     Hearing impairment: No    Fall risk:  Fallen 2 or more times in the past year?: No  Any fall with injury in the past year?: No        COGNITIVE SCREEN  1) Repeat 3 items (Leader, Season, Table)    2) Clock draw: NORMAL  3) 3 item recall: Recalls 3 objects  Results: 3 items recalled: COGNITIVE IMPAIRMENT LESS LIKELY    Mini-CogTM Copyright [employer]. Licensed by the author for use in [facility] cookdinner; reprinted with permission (soob@[facility]). All rights reserved.            Hyperlipidemia Follow-Up      Rate your low fat/cholesterol diet?: good    Taking statin?  Yes, no muscle aches from statin    Other lipid medications/supplements?:  none    Hypertension Follow-up      Outpatient blood pressures are not being checked.    Low Salt Diet: no added salt      Reviewed and updated as needed this visit by clinical staff  Tobacco  Allergies  Meds         Reviewed and updated as needed this visit by Provider        Social History   Substance Use Topics     Smoking status: Former Smoker     Years: 50.00     Quit date: 12/5/2008     Smokeless tobacco: Never Used     Alcohol use Yes      Comment: couple beers occ       If you drink alcohol do you typically have >3 drinks per day or >7 drinks per week? No    "                     Today's PHQ-2 Score:   PHQ-2 ( 1999 [employer]) 8/24/2018 10/2/2017   Q1: Little interest or pleasure in doing things 0 0   Q2: Feeling down, depressed or hopeless 0 0   PHQ-2 Score 0 0       Do you feel safe in your environment - Yes    Do you have a Health Care Directive?: No: Advance care planning was reviewed with patient; patient declined at this time.    Current providers sharing in care for this patient include:   Patient Care Team:  Alex Mustafa MD as PCP - General    The following health maintenance items are reviewed in Epic and correct as of today:  Health Maintenance   Topic Date Due     HF ACTION PLAN Q3 YR  1940     OP ANNUAL INR REFERRAL  02/08/2012     ADVANCE DIRECTIVE PLANNING Q5 YRS  08/24/2016     ALT Q1 YR  05/09/2018     CBC Q1 YR  05/09/2018     INFLUENZA VACCINE (1) 09/01/2018     FALL RISK ASSESSMENT  10/02/2018     LIPID MONITORING Q1 YEAR  10/02/2018     PHQ-2 Q1 YR  10/02/2018     BMP Q6 MOS  02/06/2019     TETANUS IMMUNIZATION (SYSTEM ASSIGNED)  12/02/2019     PNEUMOCOCCAL  Completed     Labs reviewed in EPIC        ROS:  Constitutional, HEENT, cardiovascular, pulmonary, GI, , musculoskeletal, neuro, skin, endocrine and psych systems are negative, except as otherwise noted.    OBJECTIVE:   /70  Pulse 92  Temp 96.4  F (35.8  C)  Resp 18  Ht 5' 9.5\" (1.765 m)  Wt 199 lb (90.3 kg)  SpO2 96%  BMI 28.97 kg/m2 Estimated body mass index is 28.97 kg/(m^2) as calculated from the following:    Height as of this encounter: 5' 9.5\" (1.765 m).    Weight as of this encounter: 199 lb (90.3 kg).  EXAM:   GENERAL: healthy, alert and no distress  EYES: Eyes grossly normal to inspection, PERRL and conjunctivae and sclerae normal  HENT: ear canals and TM's normal, nose and mouth without ulcers or lesions  NECK: no adenopathy, no asymmetry, masses, or scars and thyroid normal to palpation  RESP: lungs clear to auscultation - no rales, rhonchi or wheezes  CV: regular " "rate and rhythm, normal S1 S2, no S3 or S4, no murmur, click or rub, no peripheral edema and peripheral pulses strong  ABDOMEN: soft, nontender, no hepatosplenomegaly, no masses and bowel sounds normal  MS: no gross musculoskeletal defects noted, no edema  SKIN: no suspicious lesions or rashes  NEURO: Normal strength and tone, mentation intact and speech normal  PSYCH: mentation appears normal, affect normal/bright    Diagnostic Test Results:  none     ASSESSMENT / PLAN:   Bradley was seen today for physical.    Diagnoses and all orders for this visit:    Routine general medical examination at a health care facility  -     Basic metabolic panel  -     Lipid panel reflex to direct LDL Fasting    Acute on chronic systolic congestive heart failure (H)  -     furosemide (LASIX) 20 MG tablet; Take 1 tablet (20 mg) by mouth daily  -     rosuvastatin (CRESTOR) 5 MG tablet; Take 1 tablet (5 mg) by mouth daily    Atrial fibrillation, unspecified type (H)  -     metoprolol succinate (TOPROL-XL) 200 MG 24 hr tablet; Take 1 tablet (200 mg) by mouth daily    Long term current use of anticoagulant therapy  -     warfarin (COUMADIN) 5 MG tablet; TAKE ONE-HALF TABLET BY MOUTH EVERY MONDAY AND FRIDAY, AND ONE TABLET ALL OTHER DAYS OR AS DIRECTED BY CLINIC        End of Life Planning:  Patient currently has an advanced directive:     COUNSELING:  Reviewed preventive health counseling, as reflected in patient instructions       Regular exercise       Healthy diet/nutrition    BP Readings from Last 1 Encounters:   08/24/18 110/70     Estimated body mass index is 28.97 kg/(m^2) as calculated from the following:    Height as of this encounter: 5' 9.5\" (1.765 m).    Weight as of this encounter: 199 lb (90.3 kg).           reports that he quit smoking about 9 years ago. He quit after 50.00 years of use. He has never used smokeless tobacco.      Appropriate preventive services were discussed with this patient, including applicable screening " as appropriate for cardiovascular disease, diabetes, osteopenia/osteoporosis, and glaucoma.  As appropriate for age/gender, discussed screening for colorectal cancer, prostate cancer, breast cancer, and cervical cancer. Checklist reviewing preventive services available has been given to the patient.    Reviewed patients plan of care and provided an AVS. The Intermediate Care Plan ( asthma action plan, low back pain action plan, and migraine action plan) for Bradley meets the Care Plan requirement. This Care Plan has been established and reviewed with the Patient.    Counseling Resources:  ATP IV Guidelines  Pooled Cohorts Equation Calculator  Breast Cancer Risk Calculator  FRAX Risk Assessment  ICSI Preventive Guidelines  Dietary Guidelines for Americans, 2010  USDA's MyPlate  ASA Prophylaxis  Lung CA Screening    Alex Mustafa MD  [facility]

## 2018-08-24 NOTE — MR AVS SNAPSHOT
After Visit Summary   8/24/2018    Bradley Liz    MRN: [id number]           Patient Information     Date Of Birth          1940        Visit Information        Provider Department      8/24/2018 9:40 AM Alex Mustafa MD [facility]        Today's Diagnoses     Routine general medical examination at a health care facility    -  1    Acute on chronic systolic congestive heart failure (H)        Atrial fibrillation, unspecified type (H)        Long term current use of anticoagulant therapy          Care Instructions      Preventive Health Recommendations:       Male Ages 65 and over    Yearly exam:             See your health care provider every year in order to  o   Review health changes.   o   Discuss preventive care.    o   Review your medicines if your doctor has prescribed any.    Talk with your health care provider about whether you should have a test to screen for prostate cancer (PSA).    Every 3 years, have a diabetes test (fasting glucose). If you are at risk for diabetes, you should have this test more often.    Every 5 years, have a cholesterol test. Have this test more often if you are at risk for high cholesterol or heart disease.     Every 10 years, have a colonoscopy. Or, have a yearly FIT test (stool test). These exams will check for colon cancer.    Talk to with your health care provider about screening for Abdominal Aortic Aneurysm if you have a family history of AAA or have a history of smoking.  Shots:     Get a flu shot each year.     Get a tetanus shot every 10 years.     Talk to your doctor about your pneumonia vaccines. There are now two you should receive - Pneumovax (PPSV 23) and Prevnar (PCV 13).    Talk to your pharmacist about a shingles vaccine.     Talk to your doctor about the hepatitis B vaccine.  Nutrition:     Eat at least 5 servings of fruits and vegetables each day.     Eat whole-grain bread, whole-wheat pasta and brown rice instead of  white grains and rice.     Get adequate Calcium and Vitamin D.   Lifestyle    Exercise for at least 150 minutes a week (30 minutes a day, 5 days a week). This will help you control your weight and prevent disease.     Limit alcohol to one drink per day.     No smoking.     Wear sunscreen to prevent skin cancer.     See your dentist every six months for an exam and cleaning.     See your eye doctor every 1 to 2 years to screen for conditions such as glaucoma, macular degeneration and cataracts.          Follow-ups after your visit        Your next 10 appointments already scheduled     Aug 28, 2018 11:15 AM CDT   Anticoagulation [facility] with CL ANTI COAG   [facility]    [address]  [facility] [address] [phone]            Oct 08, 2018 10:45 AM CDT   Ech Complete with KATIE   [facility] ([facility])    [address] [facility] [address] [phone]           1.  Please bring or wear a comfortable two-piece outfit. 2.  You may eat, drink and take your normal medicines. 3.  For any questions that cannot be answered, please contact the ordering physician 4.  Please do not wear perfumes or scented lotions on the day of your exam.            Oct 10, 2018  9:30 AM CDT   Return Visit with Lai Brooks MD   [facility] ([facility])    [address] [facility] [address] [phone]              Who to contact     If you have questions or need follow up information about today's clinic visit or your schedule please contact [facility] directly at [phone].  Normal or non-critical lab and imaging results will be communicated to you by MyChart, letter or phone within 4 business days after the clinic has received the results. If you do not hear from us within 7 days, please contact the clinic through MyChart or phone. If you have a  "critical or abnormal lab result, we will notify you by phone as soon as possible.  Submit refill requests through [employer] or call your pharmacy and they will forward the refill request to us. Please allow 3 business days for your refill to be completed.          Additional Information About Your Visit        Care EveryWhere ID     This is your Care EveryWhere ID. This could be used by other organizations to access your [facility] medical records  [id number]        Your Vitals Were     Pulse Temperature Respirations Height Pulse Oximetry BMI (Body Mass Index)    92 96.4  F (35.8  C) 18 5' 9.5\" (1.765 m) 96% 28.97 kg/m2       Blood Pressure from Last 3 Encounters:   08/24/18 110/70   08/06/18 112/62   07/25/18 100/64    Weight from Last 3 Encounters:   08/24/18 199 lb (90.3 kg)   08/06/18 200 lb 6.4 oz (90.9 kg)   07/25/18 206 lb 3.2 oz (93.5 kg)              We Performed the Following     Basic metabolic panel     Lipid panel reflex to direct LDL Fasting          Where to get your medicines      These medications were sent to [facility] [address] [place] [address] [address] [facility] AT [facility] OF [address] [place] [address]  [address] [facility] [address]     Phone:  [phone]     furosemide 20 MG tablet    metoprolol succinate 200 MG 24 hr tablet    rosuvastatin 5 MG tablet    warfarin 5 MG tablet          Primary Care Provider Office Phone # Fax #    Alex Mustafa [phone] [address] [facility] [address]        Equal Access to Services     [facility] AH: Hadii aad jany hadasho Soomaali, waaxda luqadaha, qaybta kaalmada sauloegyalinda, avl metcalf. So [facility] [phone].    ATENCIÓN: Si habla español, tiene a katz disposición servicios gratuitos de asistencia lingüística. Llame al [phone].    We comply with applicable federal civil rights laws and [place] laws. We do not discriminate on the basis of race, color, national origin, age, " disability, sex, sexual orientation, or gender identity.            Thank you!     Thank you for choosing [facility]  for your care. Our goal is always to provide you with excellent care. Hearing back from our patients is one way we can continue to improve our services. Please take a few minutes to complete the written survey that you may receive in the mail after your visit with us. Thank you!             Your Updated Medication List - Protect others around you: Learn how to safely use, store and throw away your medicines at www.disposemymeds.org.          This list is accurate as of 8/24/18 11:08 AM.  Always use your most recent med list.                   Brand Name Dispense Instructions for use Diagnosis    ASPIRIN NOT PRESCRIBED    INTENTIONAL     by Other route continuous prn.        furosemide 20 MG tablet    LASIX    90 tablet    Take 1 tablet (20 mg) by mouth daily    Acute on chronic systolic congestive heart failure (H)       metoprolol succinate 200 MG 24 hr tablet    TOPROL-XL    90 tablet    Take 1 tablet (200 mg) by mouth daily    Atrial fibrillation, unspecified type (H)       order for DME     3 Month    Please draw INR as ordered and as needed. Call results to [facility] at ([phone]) [phone] or fax them to (f) [phone]    Long term current use of anticoagulant therapy, Completed stroke (H), Atrial fibrillation, unspecified type (H)       rosuvastatin 5 MG tablet    CRESTOR    90 tablet    Take 1 tablet (5 mg) by mouth daily    Acute on chronic systolic congestive heart failure (H)       warfarin 5 MG tablet    COUMADIN    80 tablet    TAKE ONE-HALF TABLET BY MOUTH EVERY MONDAY AND FRIDAY, AND ONE TABLET ALL OTHER DAYS OR AS DIRECTED BY CLINIC    Long term current use of anticoagulant therapy

## 2018-08-27 PROBLEM — I50.23 ACUTE ON CHRONIC SYSTOLIC CONGESTIVE HEART FAILURE (H): Status: ACTIVE | Noted: 2018-08-27

## 2018-08-28 ENCOUNTER — ANTICOAGULATION THERAPY VISIT (OUTPATIENT)
Dept: ANTICOAGULATION | Facility: CLINIC | Age: 78
End: 2018-08-28
Payer: COMMERCIAL

## 2018-08-28 DIAGNOSIS — Z79.01 LONG TERM CURRENT USE OF ANTICOAGULANT THERAPY: ICD-10-CM

## 2018-08-28 DIAGNOSIS — I63.9 COMPLETED STROKE (H): ICD-10-CM

## 2018-08-28 LAB — INR POINT OF CARE: 4.3 (ref 0.86–1.14)

## 2018-08-28 PROCEDURE — 99207 ZZC NO CHARGE NURSE ONLY: CPT

## 2018-08-28 PROCEDURE — 36416 COLLJ CAPILLARY BLOOD SPEC: CPT

## 2018-08-28 PROCEDURE — 85610 PROTHROMBIN TIME: CPT | Mod: QW

## 2018-08-28 NOTE — PROGRESS NOTES
ANTICOAGULATION FOLLOW-UP CLINIC VISIT    Patient Name:  Bradley Liz  Date:  8/28/2018  Contact Type:  Face to Face    SUBJECTIVE:     Patient Findings     Positives Change in medications    Comments Chronic systolic congestive heart failure.  Lasix 20 mg daily started at last cardiology visit. Patient had bilat KAY today. Patient already took warfarin today so will eat a spinach salad today, hold tomorrow then take 2.5mg Thursday then resume his current maintenance dose. He will recheck his INR 9/7/18.           OBJECTIVE    INR Protime   Date Value Ref Range Status   08/28/2018 4.3 (A) 0.86 - 1.14 Final       ASSESSMENT / PLAN  INR assessment SUPRA    Recheck INR In: 10 DAYS    INR Location Clinic      Anticoagulation Summary as of 8/28/2018     INR goal 2.0-3.0   Today's INR 4.3!   Warfarin maintenance plan 2.5 mg (5 mg x 0.5) on Mon, Fri; 5 mg (5 mg x 1) all other days   Full warfarin instructions 8/29: Hold; 8/30: 2.5 mg; Otherwise 2.5 mg on Mon, Fri; 5 mg all other days   Weekly warfarin total 30 mg   Plan last modified Mari Guerra RN (9/15/2017)   Next INR check 9/7/2018   Priority INR   Target end date Indefinite    Indications   Completed stroke (H) [I63.9]  Long term current use of anticoagulant therapy [Z79.01]         Anticoagulation Episode Summary     INR check location     Preferred lab     Send INR reminders to [facility] POOL    Comments * warfarin 5 mg tabs. Leave message on MOBILE only. Takes warfarin in the AM. Leaves for [place] at the end of Dec (will need snowbird - Dr. Mustafa has to sign paperwork!) [phone] cell [phone] [facility] [address]      Anticoagulation Care Providers     Provider Role Specialty Phone number    Alex Mustafa MD [facility] [phone]            See the Encounter Report to view Anticoagulation Flowsheet and Dosing Calendar (Go to Encounters tab in chart review, and find the Anticoagulation Therapy  Visit)        Lela Lerner RN

## 2018-08-28 NOTE — MR AVS SNAPSHOT
Bradley Liz   8/28/2018 11:15 AM   Anticoagulation Therapy Visit    Description:  78 year old male   Provider:  YULIYA ANTI COAG   Department:  Cl Anticoag           INR as of 8/28/2018     Today's INR 4.3!      Anticoagulation Summary as of 8/28/2018     INR goal 2.0-3.0   Today's INR 4.3!   Full warfarin instructions 8/29: Hold; 8/30: 2.5 mg; Otherwise 2.5 mg on Mon, Fri; 5 mg all other days   Next INR check 9/7/2018    Indications   Completed stroke (H) [I63.9]  Long term current use of anticoagulant therapy [Z79.01]         Your next Anticoagulation Clinic appointment(s)     Sep 07, 2018  1:00 PM CDT   Anticoagulation Visit with YULIYA ANTI ZARINAG   [facility]    [address] [facility] [address] [phone]              Contact Numbers     Please call [phone] with any problems or questions regarding your therapy.    If you need to cancel and/or reschedule your appointment please call one of the following numbers:  [facility] - [phone]  [place] - [phone]  [facility] [phone]  [facility] [phone]  [place] - [phone]            August 2018 Details    Sun Mon Tue Wed Thu Fri Sat        1               2               3               4                 5               6               7               8               9               10               11                 12               13               14               15               16               17               18                 19               20               21               22               23               24               25                 26               27               28      5 mg   See details      29      Hold         30      2.5 mg         31      2.5 mg           Date Details   08/28 This INR check   patient will have a serving of spinach today               How to take your warfarin dose     To take:  2.5 mg Take 0.5 of a 5 mg tablet.    To take:   5 mg Take 1 of the 5 mg tablets.    Hold Do not take your warfarin dose. See the Details table to the right for additional instructions.                September 2018 Details    Sun Mon Tue Wed Thu Fri Sat           1      5 mg           2      5 mg         3      2.5 mg         4      5 mg         5      5 mg         6      5 mg         7            8                 9               10               11               12               13               14               15                 16               17               18               19               20               21               22                 23               24               25               26               27               28               29                 30                      Date Details   No additional details    Date of next INR:  9/7/2018         How to take your warfarin dose     To take:  2.5 mg Take 0.5 of a 5 mg tablet.    To take:  5 mg Take 1 of the 5 mg tablets.

## 2018-09-07 ENCOUNTER — ANTICOAGULATION THERAPY VISIT (OUTPATIENT)
Dept: ANTICOAGULATION | Facility: CLINIC | Age: 78
End: 2018-09-07
Payer: COMMERCIAL

## 2018-09-07 DIAGNOSIS — Z79.01 LONG TERM CURRENT USE OF ANTICOAGULANT THERAPY: ICD-10-CM

## 2018-09-07 DIAGNOSIS — I63.9 COMPLETED STROKE (H): ICD-10-CM

## 2018-09-07 LAB — INR POINT OF CARE: 3.8 (ref 0.86–1.14)

## 2018-09-07 PROCEDURE — 85610 PROTHROMBIN TIME: CPT | Mod: QW

## 2018-09-07 PROCEDURE — 99207 ZZC NO CHARGE NURSE ONLY: CPT

## 2018-09-07 PROCEDURE — 36416 COLLJ CAPILLARY BLOOD SPEC: CPT

## 2018-09-07 NOTE — MR AVS SNAPSHOT
Bradley Liz   9/7/2018 1:00 PM   Anticoagulation Therapy Visit    Description:  78 year old male   Provider:  YULIYA ANTI COAG   Department:  Cl Anticoag           INR as of 9/7/2018     Today's INR 3.8!      Anticoagulation Summary as of 9/7/2018     INR goal 2.0-3.0   Today's INR 3.8!   Full warfarin instructions 9/8: Hold; Otherwise 5 mg on Sun, Tue, Thu; 2.5 mg all other days   Next INR check 9/21/2018    Indications   Completed stroke (H) [I63.9]  Long term current use of anticoagulant therapy [Z79.01]         Your next Anticoagulation Clinic appointment(s)     Sep 21, 2018 11:15 AM CDT   Anticoagulation Visit with CL ANTI COAG   [facility]    [address] [facility] [address] [phone]              Contact Numbers     Please call [phone] with any problems or questions regarding your therapy.    If you need to cancel and/or reschedule your appointment please call one of the following numbers:  [facility] [phone]  [place] - [phone]  [facility] [phone]  [facility] [phone]  [place] - [phone]            September 2018 Details    Sun Mon Tue Wed Thu Fri Sat           1                 2               3               4               5               6               7      2.5 mg   See details      8      Hold           9      5 mg         10      2.5 mg         11      5 mg         12      2.5 mg         13      5 mg         14      2.5 mg         15      2.5 mg           16      5 mg         17      2.5 mg         18      5 mg         19      2.5 mg         20      5 mg         21            22                 23               24               25               26               27               28               29                 30                      Date Details   09/07 This INR check       Date of next INR:  9/21/2018         How to take your warfarin dose     To take:  2.5 mg Take 0.5 of a 5 mg tablet.     To take:  5 mg Take 1 of the 5 mg tablets.    Hold Do not take your warfarin dose. See the Details table to the right for additional instructions.

## 2018-09-07 NOTE — PROGRESS NOTES
ANTICOAGULATION FOLLOW-UP CLINIC VISIT    Patient Name:  Bradley Liz  Date:  9/7/2018  Contact Type:  Face to Face    SUBJECTIVE:     Patient Findings     Positives Unexplained INR or factor level change    Comments Patient denies missed warfarin doses changes to diet, activity or medications, states took warfarin as instructed. No signs or symptoms of increased bruising or bleeding reported. He is angry about his INR and having to come back. Writer explained to him it is for his safety. Writer instructed him to hold his warfarin tomorrow, then begin 5mg Sun,Tue,Thu and 2.5mg the rest of the days of the week, a 16.7% reduction.           OBJECTIVE    INR Protime   Date Value Ref Range Status   09/07/2018 3.8 (A) 0.86 - 1.14 Final       ASSESSMENT / PLAN  INR assessment SUPRA    Recheck INR In: 2 WEEKS    INR Location Clinic      Anticoagulation Summary as of 9/7/2018     INR goal 2.0-3.0   Today's INR 3.8!   Warfarin maintenance plan 5 mg (5 mg x 1) on Sun, Tue, Thu; 2.5 mg (5 mg x 0.5) all other days   Full warfarin instructions 9/8: Hold; Otherwise 5 mg on Sun, Tue, Thu; 2.5 mg all other days   Weekly warfarin total 25 mg   Plan last modified Lela Lerner, RN (9/7/2018)   Next INR check 9/21/2018   Priority INR   Target end date Indefinite    Indications   Completed stroke (H) [I63.9]  Long term current use of anticoagulant therapy [Z79.01]         Anticoagulation Episode Summary     INR check location     Preferred lab     Send INR reminders to  ANTICOAG POOL    Comments * warfarin 5 mg tabs. Leave message on MOBILE only. Takes warfarin in the AM. Leaves for [place] at the end of Dec (will need snowchund - Dr. Mustafa has to sign paperwork!) [phone] cell [phone] [facility] [address]      Anticoagulation Care Providers     Provider Role Specialty Phone number    Alex Mustafa MD [facility] [phone]            See the Encounter Report to view Anticoagulation  Flowsheet and Dosing Calendar (Go to Encounters tab in chart review, and find the Anticoagulation Therapy Visit)        Lela Lerner RN

## 2018-09-21 ENCOUNTER — ANTICOAGULATION THERAPY VISIT (OUTPATIENT)
Dept: ANTICOAGULATION | Facility: CLINIC | Age: 78
End: 2018-09-21
Payer: COMMERCIAL

## 2018-09-21 DIAGNOSIS — Z79.01 LONG TERM CURRENT USE OF ANTICOAGULANT THERAPY: ICD-10-CM

## 2018-09-21 DIAGNOSIS — I63.9 COMPLETED STROKE (H): ICD-10-CM

## 2018-09-21 LAB — INR POINT OF CARE: 2.7 (ref 0.86–1.14)

## 2018-09-21 PROCEDURE — 99207 ZZC NO CHARGE NURSE ONLY: CPT

## 2018-09-21 PROCEDURE — 36416 COLLJ CAPILLARY BLOOD SPEC: CPT

## 2018-09-21 PROCEDURE — 85610 PROTHROMBIN TIME: CPT | Mod: QW

## 2018-09-21 RX ORDER — WARFARIN SODIUM 5 MG/1
TABLET ORAL
Qty: 80 TABLET | Refills: 3 | COMMUNITY
Start: 2018-09-21 | End: 2019-07-23

## 2018-09-21 NOTE — PROGRESS NOTES
ANTICOAGULATION FOLLOW-UP CLINIC VISIT    Patient Name:  Bradley Liz  Date:  9/21/2018  Contact Type:  Face to Face    SUBJECTIVE:     Patient Findings     Positives Intentional hold of therapy (9-8-18)    Comments No changes in diet, activity level, medications (including over the counter), or health. No missed doses of warfarin. Patient took dosing as prescribed. No signs of clots or bleeding concerns. Patient will continue recently reduced maintenance warfarin dosing.             OBJECTIVE    INR Protime   Date Value Ref Range Status   09/21/2018 2.7 (A) 0.86 - 1.14 Final       ASSESSMENT / PLAN  INR assessment THER    Recheck INR In: 4 WEEKS    INR Location Clinic      Anticoagulation Summary as of 9/21/2018     INR goal 2.0-3.0   Today's INR 2.7   Warfarin maintenance plan 5 mg (5 mg x 1) on Sun, Tue, Thu; 2.5 mg (5 mg x 0.5) all other days   Full warfarin instructions 5 mg on Sun, Tue, Thu; 2.5 mg all other days   Weekly warfarin total 25 mg   No change documented Theresa Baltazar RN   Plan last modified Lela Lerner RN (9/7/2018)   Next INR check 10/19/2018   Priority INR   Target end date Indefinite    Indications   Completed stroke (H) [I63.9]  Long term current use of anticoagulant therapy [Z79.01]         Anticoagulation Episode Summary     INR check location     Preferred lab     Send INR reminders to  [facility] POOL    Comments * warfarin 5 mg tabs. Leave message on MOBILE only. Takes warfarin in the AM. Leaves for [place] at the end of Dec ([facility] [phone] [facility] [address] [facility] [address]      Anticoagulation Care Providers     Provider Role Specialty Phone number    Alex Mustafa MD [facility] [phone]            See the Encounter Report to view Anticoagulation Flowsheet and Dosing Calendar (Go to Encounters tab in chart review, and find the Anticoagulation Therapy Visit)        Theresa Baltazar RN

## 2018-09-21 NOTE — MR AVS SNAPSHOT
Bradley Liz   9/21/2018 11:15 AM   Anticoagulation Therapy Visit    Description:  78 year old male   Provider:  YULIYA ANTI COAG   Department:  Cl Anticoag           INR as of 9/21/2018     Today's INR 2.7      Anticoagulation Summary as of 9/21/2018     INR goal 2.0-3.0   Today's INR 2.7   Full warfarin instructions 5 mg on Sun, Tue, Thu; 2.5 mg all other days   Next INR check 10/19/2018    Indications   Completed stroke (H) [I63.9]  Long term current use of anticoagulant therapy [Z79.01]         Your next Anticoagulation Clinic appointment(s)     Oct 19, 2018 11:15 AM CDT   Anticoagulation [facility] with YULIYA ANTI COAG   [facility]    [address] [facility] [address] [phone]              Contact Numbers     Please call [phone] with any problems or questions regarding your therapy.    If you need to cancel and/or reschedule your appointment please call one of the following numbers:  [facility] - [phone]  [place] - [phone]  [place] - [phone]  [place] - [phone]  [place] - [phone]            September 2018 Details    Sun Mon Tue Wed Thu Fri Sat           1                 2               3               4               5               6               7               8                 9               10               11               12               13               14               15                 16               17               18               19               20               21      2.5 mg   See details      22      2.5 mg           23      5 mg         24      2.5 mg         25      5 mg         26      2.5 mg         27      5 mg         28      2.5 mg         29      2.5 mg           30      5 mg                Date Details   09/21 This INR check               How to take your warfarin dose     To take:  2.5 mg Take 0.5 of a 5 mg tablet.    To take:  5 mg Take 1 of the 5 mg tablets.            October 2018 Details    Sun Mon Tue Wed Thu Fri Sat      1      2.5 mg         2      5 mg         3      2.5 mg         4      5 mg         5      2.5 mg         6      2.5 mg           7      5 mg         8      2.5 mg         9      5 mg         10      2.5 mg         11      5 mg         12      2.5 mg         13      2.5 mg           14      5 mg         15      2.5 mg         16      5 mg         17      2.5 mg         18      5 mg         19            20                 21               22               23               24               25               26               27                 28               29               30               31                   Date Details   No additional details    Date of next INR:  10/19/2018         How to take your warfarin dose     To take:  2.5 mg Take 0.5 of a 5 mg tablet.    To take:  5 mg Take 1 of the 5 mg tablets.

## 2018-10-08 ENCOUNTER — HOSPITAL ENCOUNTER (OUTPATIENT)
Dept: CARDIOLOGY | Facility: CLINIC | Age: 78
Discharge: HOME | End: 2018-10-08
Attending: NURSE PRACTITIONER | Admitting: NURSE PRACTITIONER
Payer: MEDICARE

## 2018-10-08 DIAGNOSIS — I50.23 ACUTE ON CHRONIC SYSTOLIC CONGESTIVE HEART FAILURE (H): ICD-10-CM

## 2018-10-08 DIAGNOSIS — I42.8 NICM (NONISCHEMIC CARDIOMYOPATHY) (H): ICD-10-CM

## 2018-10-08 PROCEDURE — 25500064 ZZH RX 255 OP 636: Performed by: NURSE PRACTITIONER

## 2018-10-08 PROCEDURE — 93306 TTE W/DOPPLER COMPLETE: CPT | Mod: 26 | Performed by: INTERNAL MEDICINE

## 2018-10-08 PROCEDURE — 93306 TTE W/DOPPLER COMPLETE: CPT

## 2018-10-08 RX ADMIN — HUMAN ALBUMIN MICROSPHERES AND PERFLUTREN 2 ML: 10; .22 INJECTION, SOLUTION INTRAVENOUS at 11:35

## 2018-10-10 ENCOUNTER — OFFICE VISIT (OUTPATIENT)
Dept: CARDIOLOGY | Facility: CLINIC | Age: 78
End: 2018-10-10
Attending: PHYSICIAN ASSISTANT
Payer: COMMERCIAL

## 2018-10-10 VITALS
WEIGHT: 204 LBS | BODY MASS INDEX: 29.69 KG/M2 | OXYGEN SATURATION: 95 % | HEART RATE: 104 BPM | DIASTOLIC BLOOD PRESSURE: 66 MMHG | SYSTOLIC BLOOD PRESSURE: 113 MMHG

## 2018-10-10 DIAGNOSIS — I50.23 ACUTE ON CHRONIC SYSTOLIC CONGESTIVE HEART FAILURE (H): ICD-10-CM

## 2018-10-10 DIAGNOSIS — I48.91 ATRIAL FIBRILLATION, UNSPECIFIED TYPE (H): Primary | ICD-10-CM

## 2018-10-10 PROCEDURE — 99214 OFFICE O/P EST MOD 30 MIN: CPT | Performed by: INTERNAL MEDICINE

## 2018-10-10 NOTE — PROGRESS NOTES
HPI and Plan:   See dictation    Orders Placed This Encounter   Procedures     Basic metabolic panel     Follow-Up with Cardiologist     Echocardiogram       No orders of the defined types were placed in this encounter.      There are no discontinued medications.      Encounter Diagnoses   Name Primary?     Acute on chronic systolic congestive heart failure (H)      Atrial fibrillation, unspecified type (H) Yes       CURRENT MEDICATIONS:  Current Outpatient Prescriptions   Medication Sig Dispense Refill     ASPIRIN NOT PRESCRIBED, INTENTIONAL, by Other route continuous prn.  0     furosemide (LASIX) 20 MG tablet Take 1 tablet (20 mg) by mouth daily 90 tablet 3     metoprolol succinate (TOPROL-XL) 200 MG 24 hr tablet Take 1 tablet (200 mg) by mouth daily 90 tablet 3     order for DME Please draw INR as ordered and as needed. Call results to [facility] at (p) [phone] or fax them to (f) [phone] 3 Month 3     rosuvastatin (CRESTOR) 5 MG tablet Take 1 tablet (5 mg) by mouth daily 90 tablet 3     warfarin (COUMADIN) 5 MG tablet 5 mg Sun, Tues, Thurs; 2.5 mg all other days or as directed by ACC 80 tablet 3       ALLERGIES   No Known Allergies    PAST MEDICAL HISTORY:  No past medical history on file.    PAST SURGICAL HISTORY:  No past surgical history on file.    FAMILY HISTORY:  Family History   Problem Relation Age of Onset     Unknown/Adopted Mother      Prostate Cancer Father      C.A.D. Father      age 77       SOCIAL HISTORY:  Social History     Social History     Marital status:      Spouse name: N/A     Number of children: N/A     Years of education: N/A     Social History Main Topics     Smoking status: Former Smoker     Years: 50.00     Quit date: 12/5/2008     Smokeless tobacco: Never Used     Alcohol use Yes      Comment: couple beers occ     Drug use: No     Sexual activity: Yes     Partners: Female     Other Topics Concern     Parent/Sibling W/ Cabg, Mi Or Angioplasty  Before 65f 55m? No     Social History Narrative       Review of Systems:  Skin:  Positive for bruising     Eyes:  Negative      ENT:  Negative      Respiratory:  Positive for shortness of breath     Cardiovascular:    Positive for;lower extremity symptoms;edema    Gastroenterology: Negative      Genitourinary:  Positive for urinary frequency;urgency    Musculoskeletal:  Negative      Neurologic:  Positive for stroke    Psychiatric:  Negative      Heme/Lymph/Imm:  Negative      Endocrine:  Negative        Physical Exam:  Vitals: /66 (BP Location: Right arm, Patient Position: Sitting, Cuff Size: Adult Regular)  Pulse 104  Wt 92.5 kg (204 lb)  SpO2 95%  BMI 29.69 kg/m2    Constitutional:  cooperative;well nourished        Skin:  warm and dry to the touch          Head:  normocephalic        Eyes:  sclera white        Lymph:No Cervical lymphadenopathy present     ENT:  no pallor or cyanosis        Neck:  no stiffness        Respiratory:  clear to auscultation         Cardiac:   irregularly irregular rhythm;tachycardic     systolic ejection murmur        pulses full and equal                                        GI:  abdomen soft obese      Extremities and Muscular Skeletal:        1+ trace      Neurological:  affect appropriate        Psych:  Alert and Oriented x 3        CC  Bernice Link PA-C  [address]  [place] [id number]

## 2018-10-10 NOTE — LETTER
10/10/2018      Alex Mustafa MD  [address]  [facility] [address]      RE: Bradley SORIANO Agusto       Dear Colleague,    I had the pleasure of seeing Bradley Liz in the [facility].    Service Date: 10/10/2018      HISTORY OF PRESENT ILLNESS:  Mr. 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  He saw Yina [facility] on 08/[facility]/2018.      Unfortunately, the patient was admitted to the hospital in [place] in 01/2018 with atrial fibrillation with a rapid ventricular response.  A transthoracic echocardiogram showed a reduction in his ejection fraction to 35%-40% and the patient was placed on higher dose beta blocker.  He was also initially placed on digoxin, but the patient did not remain on digoxin.  Previously there has been an attempt to put him on dual AV london blocking agents including high dose beta blocker and diltiazem, but this resulted in significant pauses.  When he saw Yina Link last, he was stable and she replaced his metoprolol tartrate with metoprolol succinate 200 mg daily.      Since the patient has seen Yina, he has been clinically stable.  He denies any chest pain, chest pressure, shortness of breath, orthopnea or paroxysmal nocturnal dyspnea.  He has persistent edema, but this is improved with his Lasix.  It has not worsened since January of this year.      The patient underwent a transthoracic echocardiogram prior to our visit.  This shows a visually estimated ejection fraction that has improved into the low normal range of 50%-55%.  The patient also has normal right ventricular systolic function.  There was a question in [place] as to whether he had RV dysfunction.      Please see my separate note with his full physical examination.       IMPRESSION AND PLAN:   1.  Atrial fibrillation - the patient is well rate controlled as witnessed by his improved ejection fraction.  While his heart rate is slightly elevated in the office today at 104, his most recent Holter monitor showed an average heart rate of 85 beats per minute.  At this time, he will remain on warfarin for stroke prophylaxis unchanged.  I will continue his metoprolol succinate to 100 mg daily for rate control.  We will continue to pursue a rate control strategy at this time.   2.  Nonischemic cardiomyopathy - the patient has undergone a previous nuclear stress test showing no evidence for myocardial ischemia.  With increased beta blockade and treatment with metoprolol succinate, his EF has normalized into the low normal range.  At this time, he is not manifesting any significant signs or symptoms of heart failure.  While he does have some lower extremity edema, this is improved and I will continue his Lasix unchanged.      I will plan to see Mr. Liz back in routine followup in 1 year with a transthoracic echocardiogram and BMP to be performed at that time.         ABDIRASHID CARVAJAL MD             D: 10/10/2018   T: 10/10/2018   MT: SHANA      Name:     YVONNE LIZ   MRN:      [id number]        Account:      [id number]   :      1940           Service Date: 10/10/2018      Document: [id number]         Outpatient Encounter Prescriptions as of 10/10/2018   Medication Sig Dispense Refill     ASPIRIN NOT PRESCRIBED, INTENTIONAL, by Other route continuous prn.  0     furosemide (LASIX) 20 MG tablet Take 1 tablet (20 mg) by mouth daily 90 tablet 3     metoprolol succinate (TOPROL-XL) 200 MG 24 hr tablet Take 1 tablet (200 mg) by mouth daily 90 tablet 3     order for DME Please draw INR as ordered and as needed. Call results to [facility] at p) [phone] or fax them to (f) [phone] 3 Month 3     rosuvastatin (CRESTOR) 5 MG tablet Take 1 tablet (5 mg) by  mouth daily 90 tablet 3     warfarin (COUMADIN) 5 MG tablet 5 mg Sun, Tues, Thurs; 2.5 mg all other days or as directed by ACC 80 tablet 3     No facility-administered encounter medications on file as of 10/10/2018.        Again, thank you for allowing me to participate in the care of your patient.      Sincerely,    Lai Brooks MD     [facility]

## 2018-10-10 NOTE — PROGRESS NOTES
Service Date: 10/10/2018      HISTORY OF PRESENT ILLNESS:  Mr. 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  He saw [facility] Link in [facility] on 08/06/2018.      Unfortunately, the patient was admitted to the hospital in [place] in 01/2018 with atrial fibrillation with a rapid ventricular response.  A transthoracic echocardiogram showed a reduction in his ejection fraction to 35%-40% and the patient was placed on higher dose beta blocker.  He was also initially placed on digoxin, but the patient did not remain on digoxin.  Previously there has been an attempt to put him on dual AV lodnon blocking agents including high dose beta blocker and diltiazem, but this resulted in significant pauses.  When he saw Yina Link last, he was stable and she replaced his metoprolol tartrate with metoprolol succinate 200 mg daily.      Since the patient has seen Yina, he has been clinically stable.  He denies any chest pain, chest pressure, shortness of breath, orthopnea or paroxysmal nocturnal dyspnea.  He has persistent edema, but this is improved with his Lasix.  It has not worsened since January of this year.      The patient underwent a transthoracic echocardiogram prior to our visit.  This shows a visually estimated ejection fraction that has improved into the low normal range of 50%-55%.  The patient also has normal right ventricular systolic function.  There was a question in [place] as to whether he had RV dysfunction.      Please see my separate note with his full physical examination.      IMPRESSION AND PLAN:   1.  Atrial fibrillation - the patient is well rate controlled as witnessed by his improved ejection fraction.  While his heart rate is slightly elevated in the office today at 104, his most recent  Holter monitor showed an average heart rate of 85 beats per minute.  At this time, he will remain on warfarin for stroke prophylaxis unchanged.  I will continue his metoprolol succinate to 100 mg daily for rate control.  We will continue to pursue a rate control strategy at this time.   2.  Nonischemic cardiomyopathy - the patient has undergone a previous nuclear stress test showing no evidence for myocardial ischemia.  With increased beta blockade and treatment with metoprolol succinate, his EF has normalized into the low normal range.  At this time, he is not manifesting any significant signs or symptoms of heart failure.  While he does have some lower extremity edema, this is improved and I will continue his Lasix unchanged.      I will plan to see Mr. Liz back in routine followup in 1 year with a transthoracic echocardiogram and BMP to be performed at that time.         ABDIRASHID CARVAJAL MD             D: 10/10/2018   T: 10/10/2018   MT: SHANA      Name:     YVONNE LIZ   MRN:      [id number]        Account:      [id number]   :      1940           Service Date: 10/10/2018      Document: [id number]

## 2018-10-10 NOTE — MR AVS SNAPSHOT
After Visit Summary   10/10/2018    Bradley Liz    MRN: [id number]           Patient Information     Date Of Birth          1940        Visit Information        Provider Department      10/10/2018 9:30 AM Lai Brooks MD [facility]        Today's Diagnoses     Atrial fibrillation, unspecified type (H)    -  1    Acute on chronic systolic congestive heart failure (H)           Follow-ups after your visit        Additional Services     Follow-Up with Cardiologist                 Your next 10 appointments already scheduled     Oct 19, 2018 11:15 AM CDT   Anticoagulation Visit with CL ANTI COAG   [facility] (Formerly named [facility])    [address] [facility] [id number] [phone]              Future tests that were ordered for you today     Open Future Orders        Priority Expected Expires Ordered    Basic metabolic panel Routine 10/10/2019 10/11/2019 10/10/2018    Echocardiogram Routine 10/10/2019 10/11/2019 10/10/2018    Follow-Up with Cardiologist Routine 10/10/2019 10/11/2019 10/10/2018            Who to contact     If you have questions or need follow up information about today's clinic visit or your schedule please contact [facility] directly at [phone].  Normal or non-critical lab and imaging results will be communicated to you by MyChart, letter or phone within 4 business days after the clinic has received the results. If you do not hear from us within 7 days, please contact the clinic through MyChart or phone. If you have a critical or abnormal lab result, we will notify you by phone as soon as possible.  Submit refill requests through [employer] or call your pharmacy and they will forward the refill request to us. Please allow 3 business days for your refill to be completed.          Additional Information About Your Visit        Care EveryWhere ID     This  is your Care EveryWhere ID. This could be used by other organizations to access your [place] medical records  [id number]        Your Vitals Were     Pulse Pulse Oximetry BMI (Body Mass Index)             104 95% 29.69 kg/m2          Blood Pressure from Last 3 Encounters:   10/10/18 113/66   08/24/18 110/70   08/06/18 112/62    Weight from Last 3 Encounters:   10/10/18 92.5 kg (204 lb)   08/24/18 90.3 kg (199 lb)   08/06/18 90.9 kg (200 lb 6.4 oz)              We Performed the Following     Follow-Up with Cardiologist        Primary Care Provider Office Phone # Fax #    Alex Mustafa [phone] [address] [facility] [address]        Equal Access to Services     JONNATHAN POPE : Hadii harmeet carmichael hadasho Soomaali, waaxda luqadaha, qaybta kaalmada adeegyada, val gan . So [facility] [phone].    ATENCIÓN: Si habla español, tiene a katz disposición servicios gratuitos de asistencia lingüística. Lisa al [phone].    We comply with applicable federal civil rights laws and [place] laws. We do not discriminate on the basis of race, color, national origin, age, disability, sex, sexual orientation, or gender identity.            Thank you!     Thank you for choosing [facility]  for your care. Our goal is always to provide you with excellent care. Hearing back from our patients is one way we can continue to improve our services. Please take a few minutes to complete the written survey that you may receive in the mail after your visit with us. Thank you!             Your Updated Medication List - Protect others around you: Learn how to safely use, store and throw away your medicines at www.disposemymeds.org.          This list is accurate as of 10/10/18  9:39 AM.  Always use your most recent med list.                   Brand Name Dispense Instructions for use Diagnosis    ASPIRIN NOT PRESCRIBED    INTENTIONAL     by Other  route continuous prn.        furosemide 20 MG tablet    LASIX    90 tablet    Take 1 tablet (20 mg) by mouth daily    Acute on chronic systolic congestive heart failure (H)       metoprolol succinate 200 MG 24 hr tablet    TOPROL-XL    90 tablet    Take 1 tablet (200 mg) by mouth daily    Atrial fibrillation, unspecified type (H)       order for DME     3 Month    Please draw INR as ordered and as needed. Call results to [facility] at (p) [phone] or fax them to (f) [phone]    Long term current use of anticoagulant therapy, Completed stroke (H), Atrial fibrillation, unspecified type (H)       rosuvastatin 5 MG tablet    CRESTOR    90 tablet    Take 1 tablet (5 mg) by mouth daily    Acute on chronic systolic congestive heart failure (H)       warfarin 5 MG tablet    COUMADIN    80 tablet    5 mg Sun, Tues, Thurs; 2.5 mg all other days or as directed by ACC    Long term current use of anticoagulant therapy

## 2018-10-19 ENCOUNTER — ANTICOAGULATION THERAPY VISIT (OUTPATIENT)
Dept: ANTICOAGULATION | Facility: CLINIC | Age: 78
End: 2018-10-19
Payer: COMMERCIAL

## 2018-10-19 DIAGNOSIS — I63.9 COMPLETED STROKE (H): ICD-10-CM

## 2018-10-19 DIAGNOSIS — Z79.01 LONG TERM CURRENT USE OF ANTICOAGULANT THERAPY: ICD-10-CM

## 2018-10-19 LAB — INR POINT OF CARE: 2 (ref 0.86–1.14)

## 2018-10-19 PROCEDURE — 99207 ZZC NO CHARGE NURSE ONLY: CPT

## 2018-10-19 PROCEDURE — 85610 PROTHROMBIN TIME: CPT | Mod: QW

## 2018-10-19 PROCEDURE — 36416 COLLJ CAPILLARY BLOOD SPEC: CPT

## 2018-10-19 NOTE — MR AVS SNAPSHOT
Bradley Liz   10/19/2018 11:15 AM   Anticoagulation Therapy Visit    Description:  78 year old male   Provider:  YULIYA ANTI COAG   Department:  Cl Anticoag           INR as of 10/19/2018     Today's INR 2.0      Anticoagulation Summary as of 10/19/2018     INR goal 2.0-3.0   Today's INR 2.0   Full warfarin instructions 5 mg on Sun, Tue, Thu; 2.5 mg all other days   Next INR check 11/30/2018    Indications   Completed stroke (H) [I63.9]  Long term current use of anticoagulant therapy [Z79.01]         Your next Anticoagulation Clinic appointment(s)     Nov 30, 2018 11:15 AM CST   Anticoagulation Visit with CL ANTI COAG   [facility]    [address]  [facility] [address] [phone]              Contact Numbers     Please call [phone] with any problems or questions regarding your therapy.    If you need to cancel and/or reschedule your appointment please call one of the following numbers:  [facility] - [phone]  [facility] [phone]  [place] - [phone]  [place] - [phone]  [place] - [phone]            October 2018 Details    Sun Mon Tue Wed Thu Fri Sat      1               2               3               4               5               6                 7               8               9               10               11               12               13                 14               15               16               17               18               19      2.5 mg   See details      20      2.5 mg           21      5 mg         22      2.5 mg         23      5 mg         24      2.5 mg         25      5 mg         26      2.5 mg         27      2.5 mg           28      5 mg         29      2.5 mg         30      5 mg         31      2.5 mg             Date Details   10/19 This INR check               How to take your warfarin dose     To take:  2.5 mg Take 0.5 of a 5 mg tablet.    To take:  5 mg Take 1 of the 5 mg  tablets.           November 2018 Details    Sun Mon Tue Wed Thu Fri Sat         1      5 mg         2      2.5 mg         3      2.5 mg           4      5 mg         5      2.5 mg         6      5 mg         7      2.5 mg         8      5 mg         9      2.5 mg         10      2.5 mg           11      5 mg         12      2.5 mg         13      5 mg         14      2.5 mg         15      5 mg         16      2.5 mg         17      2.5 mg           18      5 mg         19      2.5 mg         20      5 mg         21      2.5 mg         22      5 mg         23      2.5 mg         24      2.5 mg           25      5 mg         26      2.5 mg         27      5 mg         28      2.5 mg         29      5 mg         30              Date Details   No additional details    Date of next INR:  11/30/2018         How to take your warfarin dose     To take:  2.5 mg Take 0.5 of a 5 mg tablet.    To take:  5 mg Take 1 of the 5 mg tablets.

## 2018-10-19 NOTE — PROGRESS NOTES
ANTICOAGULATION FOLLOW-UP CLINIC VISIT    Patient Name:  Bradley Liz  Date:  10/19/2018  Contact Type:  Face to Face    SUBJECTIVE:     Patient Findings     Positives Change in diet/appetite (had some spinach last night)    Comments No changes in activity level, medications (including over the counter), or health. No missed doses of warfarin. Patient took dosing as prescribed. No signs of clots or bleeding concerns. Patient will continue maintenance warfarin dosing.    Pt is aware to be careful when eating foods high in vit K and that consistency would be best. He says he has been doing this for 20 years and knows what an okay amount is to eat. States he has spinach once every 2 weeks. Writer did review which foods are going to be higher in vitamin K.             OBJECTIVE    INR Protime   Date Value Ref Range Status   10/19/2018 2.0 (A) 0.86 - 1.14 Final       ASSESSMENT / PLAN  INR assessment THER    Recheck INR In: 6 WEEKS    INR Location Clinic      Anticoagulation Summary as of 10/19/2018     INR goal 2.0-3.0   Today's INR 2.0   Warfarin maintenance plan 5 mg (5 mg x 1) on Sun, Tue, Thu; 2.5 mg (5 mg x 0.5) all other days   Full warfarin instructions 5 mg on Sun, Tue, Thu; 2.5 mg all other days   Weekly warfarin total 25 mg   No change documented Theresa Baltazar, RN   Plan last modified Lela Lerner, RN (9/7/2018)   Next INR check 11/30/2018   Priority INR   Target end date Indefinite    Indications   Completed stroke (H) [I63.9]  Long term current use of anticoagulant therapy [Z79.01]         Anticoagulation Episode Summary     INR check location     Preferred lab     Send INR reminders to CL ANTICOAG POOL    Comments * warfarin 5 mg tabs. Leave message on MOBILE only. Takes warfarin in the AM. Leaves for [place] at the end of Dec (will need snowbird - Dr. Mustafa has to sign paperwork!) [phone] cell [address] [facility] [address]      Anticoagulation Care Providers     Provider Role  Specialty Phone number    Alex Mustafa MD [facility] [phone]            See the Encounter Report to view Anticoagulation Flowsheet and Dosing Calendar (Go to Encounters tab in chart review, and find the Anticoagulation Therapy Visit)        Theresa Baltazar RN

## 2018-11-30 ENCOUNTER — ANTICOAGULATION THERAPY VISIT (OUTPATIENT)
Dept: ANTICOAGULATION | Facility: CLINIC | Age: 78
End: 2018-11-30
Payer: COMMERCIAL

## 2018-11-30 DIAGNOSIS — Z79.01 LONG TERM CURRENT USE OF ANTICOAGULANT THERAPY: ICD-10-CM

## 2018-11-30 DIAGNOSIS — I63.9 COMPLETED STROKE (H): ICD-10-CM

## 2018-11-30 LAB — INR POINT OF CARE: 2.5 (ref 0.86–1.14)

## 2018-11-30 PROCEDURE — 36416 COLLJ CAPILLARY BLOOD SPEC: CPT

## 2018-11-30 PROCEDURE — 99207 ZZC NO CHARGE NURSE ONLY: CPT

## 2018-11-30 PROCEDURE — 85610 PROTHROMBIN TIME: CPT | Mod: QW

## 2018-11-30 NOTE — MR AVS SNAPSHOT
Bradley Liz   11/30/2018 11:15 AM   Anticoagulation Therapy Visit    Description:  78 year old male   Provider:  [facility]   Department:  [facility]           INR as of 11/30/2018     Today's INR 2.5      Anticoagulation Summary as of 11/30/2018     INR goal 2.0-3.0   Today's INR 2.5   Full warfarin instructions 5 mg on Sun, Tue, Thu; 2.5 mg all other days   Next INR check 1/11/2019    Indications   Completed stroke (H) [I63.9]  Long term current use of anticoagulant therapy [Z79.01]         Contact Numbers     Please call [phone] with any problems or questions regarding your therapy.    If you need to cancel and/or reschedule your appointment please call one of the following numbers:  [facility] - [phone]  [place] - [phone]  [place] - [phone]  [place] - [phone]  [place] - [phone]            November 2018 Details    Sun Mon Tue Wed Thu Fri Sat         1               2               3                 4               5               6               7               8               9               10                 11               12               13               14               15               16               17                 18               19               20               21               22               23               24                 25               26               27               28               29               30      2.5 mg   See details        Date Details   11/30 This INR check               How to take your warfarin dose     To take:  2.5 mg Take 0.5 of a 5 mg tablet.           December 2018 Details    Sun Mon Tue Wed Thu Fri Sat           1      2.5 mg           2      5 mg         3      2.5 mg         4      5 mg         5      2.5 mg         6      5 mg         7      2.5 mg         8      2.5 mg           9      5 mg         10      2.5 mg         11      5 mg         12      2.5 mg         13      5 mg         14      2.5 mg          15      2.5 mg           16      5 mg         17      2.5 mg         18      5 mg         19      2.5 mg         20      5 mg         21      2.5 mg         22      2.5 mg           23      5 mg         24      2.5 mg         25      5 mg         26      2.5 mg         27      5 mg         28      2.5 mg         29      2.5 mg           30      5 mg         31      2.5 mg               Date Details   No additional details            How to take your warfarin dose     To take:  2.5 mg Take 0.5 of a 5 mg tablet.    To take:  5 mg Take 1 of the 5 mg tablets.           January 2019 Details    Sun Mon Tue Wed Thu Fri Sat       1      5 mg         2      2.5 mg         3      5 mg         4      2.5 mg         5      2.5 mg           6      5 mg         7      2.5 mg         8      5 mg         9      2.5 mg         10      5 mg         11            12                 13               14               15               16               17               18               19                 20               21               22               23               24               25               26                 27               28               29               30               31                  Date Details   No additional details    Date of next INR:  1/11/2019         How to take your warfarin dose     To take:  2.5 mg Take 0.5 of a 5 mg tablet.    To take:  5 mg Take 1 of the 5 mg tablets.

## 2018-11-30 NOTE — PROGRESS NOTES
Addendum: Patient called [facility] back. He is still in [place] and will check INR on 1-18-19 at [facility].    Theresa Baltazar RN, BSN, PHN  1-15-19 at 12:07 pm  ADDENDUM: Writer called [phone] (number in [facility] encounter), left a non-detailed voicemail for patient to call [facility] back once the message was received.    Patient is due for an INR check, since he is [place] of state, writer was calling to see if he did indeed to in for a lab draw. If not, would it be possible for him to do so by the end of this week?    Xavier AHMADI RN, CACP 1/15/2019 at 10:08 AM    ANTICOAGULATION FOLLOW-UP CLINIC VISIT    Patient Name:  Bradley Liz  Date:  11/30/2018  Contact Type:  Face to Face    SUBJECTIVE:     Patient Findings     Positives No Problem Findings    Comments No changes in diet, activity level, medications (including over the counter), or health. No missed doses of warfarin. Patient took dosing as prescribed. No signs of clots or bleeding concerns. Patient will continue maintenance warfarin dosing.    Pt is leaving for [place] after Christmas. He will be having INR drawn at a lab there. Writer gave patient a SIGNED standing lab order from Dr. Mustafa today.            OBJECTIVE    INR Protime   Date Value Ref Range Status   11/30/2018 2.5 (A) 0.86 - 1.14 Final       ASSESSMENT / PLAN  INR assessment THER    Recheck INR In: 6 WEEKS    INR Location Clinic      Anticoagulation Summary as of 11/30/2018     INR goal 2.0-3.0   Today's INR 2.5   Warfarin maintenance plan 5 mg (5 mg x 1) on Sun, Tue, Thu; 2.5 mg (5 mg x 0.5) all other days   Full warfarin instructions 5 mg on Sun, Tue, Thu; 2.5 mg all other days   Weekly warfarin total 25 mg   No change documented Theresa Baltazar, RN   Plan last modified Lela Lerner RN (9/7/2018)   Next INR check 1/11/2019   Priority INR   Target end date Indefinite    Indications   Completed stroke (H) [I63.9]  Long term current use of anticoagulant therapy [Z79.01]         Anticoagulation Episode  Summary     INR check location     Preferred lab     Send INR reminders to [place] PHONE ANTICO POOL    Comments * warfarin 5 mg tabs. Leave message on MOBILE only. Takes warfarin in the AM. Leaves for [place] at the end of Dec (will need snowbird - Dr. Mustafa has to sign paperwork!) [phone] cell [address] [facility] [address]      Anticoagulation Care Providers     Provider Role Specialty Phone number    Alex Mustafa MD [facility] [phone]            See the Encounter Report to view Anticoagulation Flowsheet and Dosing Calendar (Go to Encounters tab in chart review, and find the Anticoagulation Therapy Visit)        Theresa Baltazar RN

## 2019-01-18 ENCOUNTER — ANTICOAGULATION THERAPY VISIT (OUTPATIENT)
Dept: ANTICOAGULATION | Facility: CLINIC | Age: 79
End: 2019-01-18
Payer: COMMERCIAL

## 2019-01-18 DIAGNOSIS — Z79.01 LONG TERM CURRENT USE OF ANTICOAGULANT THERAPY: ICD-10-CM

## 2019-01-18 DIAGNOSIS — I63.9 COMPLETED STROKE (H): ICD-10-CM

## 2019-01-18 LAB — INR POINT OF CARE: 2.9 (ref 0.86–1.14)

## 2019-01-18 PROCEDURE — 99207 ZZC NO CHARGE NURSE ONLY: CPT

## 2019-01-18 PROCEDURE — 36416 COLLJ CAPILLARY BLOOD SPEC: CPT

## 2019-01-18 PROCEDURE — 85610 PROTHROMBIN TIME: CPT | Mod: QW

## 2019-01-18 NOTE — PROGRESS NOTES
ADDENDUM: Writer called patient's number again. There is a number listed for patient in [facility] encounter, [phone]. Writer spoke with patient's wife. She will ask patient to call the local hospital/lab to have them re-fax the INR result as [facility] did not receive it.     Patient to call back with any questions.    Xavier AHMADI RN, LÁZAROP 3/11/2019 at 11:58 AM       ADDENDUM: Patient left a voicemail for [facility] stating he had in INR drawn this week but has not heard from our clinic. There is no fax available from the lab he went to. Writer attempted to call the phone number listed, it is his home phone -- patient is currently in [place]. He did not leave a call back number so writer is unable to return his call.    Xavier AHMADI RN, LÁZAROP 3/7/2019 at 1:13 PM         ADDENDUM: Patient due for an INR. Writer spoke with patient who stated he will go in early next week to the lab for his INR check.     Karlene Napoles RN  Anticoagulation Clinic   [phone]      ANTICOAGULATION FOLLOW-UP CLINIC VISIT    Patient Name:  Bradley Liz  Date:  1/18/2019  Contact Type:  Face to Face    SUBJECTIVE:     Patient Findings     Positives:   No Problem Findings    Comments:   No changes in medication, activity, or diet. Patient reports has taken warfarin as instructed, no missed doses. Patient reports no abnormal bruising or bleeding.   Will continue maintenance dose and recheck INR in 6 weeks. Patient states he will be in [place] at that time so will have his INR drawn at a lab there. Patient states that he already has his order for that. Patient to call [facility] with any changes or concerns. Patient verbalized understanding of all instructions, denies questions or concerns at this time.              OBJECTIVE    INR Protime   Date Value Ref Range Status   11/30/2018 2.5 (A) 0.86 - 1.14 Final       ASSESSMENT / PLAN  INR assessment THER    Recheck INR In: 6 WEEKS    INR Location Clinic      Anticoagulation Summary  As of 1/18/2019    INR goal:    2.0-3.0   TTR:   51.9 % (3.7 y)   INR used for dosing:      Warfarin maintenance plan:   5 mg (5 mg x 1) every Sun, Tue, Thu; 2.5 mg (5 mg x 0.5) all other days   Full warfarin instructions:   5 mg every Sun, Tue, Thu; 2.5 mg all other days   Weekly warfarin total:   25 mg   No change documented:   Karlene Napoles RN   Plan last modified:   Lela Lerner RN (9/7/2018)   Next INR check:   3/1/2019   Priority:   INR   Target end date:   Indefinite    Indications    Completed stroke (H) [I63.9]  Long term current use of anticoagulant therapy [Z79.01]             Anticoagulation Episode Summary     INR check location:       Preferred lab:       Send INR reminders to:   [facility] POOL    Comments:   * warfarin 5 mg tabs. Leave message on MOBILE only. Takes warfarin in the AM. Leaves for [place] at the end of Dec (will need snowbird - Dr. Mustafa has to sign paperwork!) [phone] cell [phone] [facility] [phone]      Anticoagulation Care Providers     Provider Role Specialty Phone number    Alex Mustafa MD [facility] [phone]            See the Encounter Report to view Anticoagulation Flowsheet and Dosing Calendar (Go to Encounters tab in chart review, and find the Anticoagulation Therapy Visit)        Karlene Napoles, GEMA

## 2019-01-23 ENCOUNTER — TELEPHONE (OUTPATIENT)
Dept: FAMILY MEDICINE | Facility: CLINIC | Age: 79
End: 2019-01-23

## 2019-02-07 ENCOUNTER — DOCUMENTATION ONLY (OUTPATIENT)
Dept: OTHER | Facility: CLINIC | Age: 79
End: 2019-02-07

## 2019-03-06 ENCOUNTER — TRANSFERRED RECORDS (OUTPATIENT)
Dept: HEALTH INFORMATION MANAGEMENT | Facility: CLINIC | Age: 79
End: 2019-03-06

## 2019-03-06 LAB
CREAT SERPL-MCNC: 0.81 MG/DL (ref 0.64–1.27)
GFR SERPL CREATININE-BSD FRML MDRD: >60 ML/MIN/1.73M2
GLUCOSE SERPL-MCNC: 90 MG/DL (ref 70–99)
INR PPP: 2.5
POTASSIUM SERPL-SCNC: 4.7 MMOL/L (ref 3.4–5.5)

## 2019-04-10 NOTE — LETTER
10/10/2018    Alex Mustafa MD  [address]  [facility] [address]    RE: Bradley Liz       Dear Colleague,    I had the pleasure of seeing Bradley Liz in the [facility].    HPI and Plan:   See dictation    Orders Placed This Encounter   Procedures     Basic metabolic panel     Follow-Up with Cardiologist     Echocardiogram       No orders of the defined types were placed in this encounter.      There are no discontinued medications.      Encounter Diagnoses   Name Primary?     Acute on chronic systolic congestive heart failure (H)      Atrial fibrillation, unspecified type (H) Yes       CURRENT MEDICATIONS:  Current Outpatient Prescriptions   Medication Sig Dispense Refill     ASPIRIN NOT PRESCRIBED, INTENTIONAL, by Other route continuous prn.  0     furosemide (LASIX) 20 MG tablet Take 1 tablet (20 mg) by mouth daily 90 tablet 3     metoprolol succinate (TOPROL-XL) 200 MG 24 hr tablet Take 1 tablet (200 mg) by mouth daily 90 tablet 3     order for DME Please draw INR as ordered and as needed. Call results to [facility] at (p) [phone] or fax them to (f) [phone] 3 Month 3     rosuvastatin (CRESTOR) 5 MG tablet Take 1 tablet (5 mg) by mouth daily 90 tablet 3     warfarin (COUMADIN) 5 MG tablet 5 mg Sun, Tues, Thurs; 2.5 mg all other days or as directed by ACC 80 tablet 3       ALLERGIES   No Known Allergies    PAST MEDICAL HISTORY:  No past medical history on file.    PAST SURGICAL HISTORY:  No past surgical history on file.    FAMILY HISTORY:  Family History   Problem Relation Age of Onset     Unknown/Adopted Mother      Prostate Cancer Father      C.A.D. Father      age 77       SOCIAL HISTORY:  Social History     Social History     Marital status:      Spouse name: N/A     Number of children: N/A     Years of education: N/A     Social History Main Topics     Smoking status: Former Smoker     Years: 50.00     Quit date: 12/5/2008      Smokeless tobacco: Never Used     Alcohol use Yes      Comment: couple beers occ     Drug use: No     Sexual activity: Yes     Partners: Female     Other Topics Concern     Parent/Sibling W/ Cabg, Mi Or Angioplasty Before 65f 55m? No     Social History Narrative       Review of Systems:  Skin:  Positive for bruising     Eyes:  Negative      ENT:  Negative      Respiratory:  Positive for shortness of breath     Cardiovascular:    Positive for;lower extremity symptoms;edema    Gastroenterology: Negative      Genitourinary:  Positive for urinary frequency;urgency    Musculoskeletal:  Negative      Neurologic:  Positive for stroke    Psychiatric:  Negative      Heme/Lymph/Imm:  Negative      Endocrine:  Negative        Physical Exam:  Vitals: /66 (BP Location: Right arm, Patient Position: Sitting, Cuff Size: Adult Regular)  Pulse 104  Wt 92.5 kg (204 lb)  SpO2 95%  BMI 29.69 kg/m2    Constitutional:  cooperative;well nourished        Skin:  warm and dry to the touch          Head:  normocephalic        Eyes:  sclera white        Lymph:No Cervical lymphadenopathy present     ENT:  no pallor or cyanosis        Neck:  no stiffness        Respiratory:  clear to auscultation         Cardiac:   irregularly irregular rhythm;tachycardic     systolic ejection murmur        pulses full and equal                                        GI:  abdomen soft obese      Extremities and Muscular Skeletal:        1+ trace      Neurological:  affect appropriate        Psych:  Alert and Oriented x 3        CC  Bernice Link PA-C  [address]  [address]  [place] [address]                Thank you for allowing me to participate in the care of your patient.      Sincerely,     Lai Brooks MD     [facility]    cc:   Bernice Link PA-C  [address]  [address]  [place] [address]         B/L finger flexion contractures, R knee ROM (0-20), R ankle DF to neutral position. L Knee flexion 90 degree

## 2019-04-17 ENCOUNTER — TRANSFERRED RECORDS (OUTPATIENT)
Dept: HEALTH INFORMATION MANAGEMENT | Facility: CLINIC | Age: 79
End: 2019-04-17

## 2019-04-17 LAB — INR PPP: 2.1

## 2019-06-07 ENCOUNTER — ANTICOAGULATION THERAPY VISIT (OUTPATIENT)
Dept: ANTICOAGULATION | Facility: CLINIC | Age: 79
End: 2019-06-07
Payer: COMMERCIAL

## 2019-06-07 DIAGNOSIS — I63.9 COMPLETED STROKE (H): ICD-10-CM

## 2019-06-07 DIAGNOSIS — Z79.01 LONG TERM CURRENT USE OF ANTICOAGULANT THERAPY: ICD-10-CM

## 2019-06-07 LAB — INR POINT OF CARE: 2.3 (ref 0.86–1.14)

## 2019-06-07 PROCEDURE — 99207 ZZC NO CHARGE NURSE ONLY: CPT

## 2019-06-07 PROCEDURE — 85610 PROTHROMBIN TIME: CPT | Mod: QW

## 2019-06-07 PROCEDURE — 36416 COLLJ CAPILLARY BLOOD SPEC: CPT

## 2019-06-07 NOTE — PROGRESS NOTES
"ANTICOAGULATION FOLLOW-UP CLINIC VISIT    Patient Name:  Bradley Liz  Date:  6/7/2019  Contact Type:  Face to Face    SUBJECTIVE:  Patient Findings     Comments:   Patient was in [place] since the beginning of January. He stated he went to the clinic there for a lab draw every 6 weeks, but they did not send us the results. He is frustrated with this, states \"well maybe I need to switch to Eliquis or something so I don't need to worry about doing the INRs\". Patient encouraged to speak with his PCP if this is something he would like to do, also advised that he check with his insurance regarding the cost of Eliquis. Also advised that [facility] could speak with the clinic he is using in [place] to help resolve any communication/fax issues with getting his results to our clinic. For now, will plan to continue his current maintenance dose and recheck INR in 6 weeks. Patient denies bleeding or abnormal bruising concerns. Patient to call ACC with any changes or concerns. Patient verbalized understanding of all instructions, denies questions or concerns at this time.             Clinical Outcomes     Negatives:   Major bleeding event, Thromboembolic event, Anticoagulation-related hospital admission, Anticoagulation-related ED visit, Anticoagulation-related fatality    Comments:   Patient was in [place] since the beginning of January. He stated he went to the clinic there for a lab draw every 6 weeks, but they did not send us the results. He is frustrated with this, states \"well maybe I need to switch to Eliquis or something so I don't need to worry about doing the INRs\". Patient encouraged to speak with his PCP if this is something he would like to do, also advised that he check with his insurance regarding the cost of Eliquis. Also advised that ACC could speak with the clinic he is using in [place] to help resolve any communication/fax issues with getting his results to our clinic. For now, will plan to continue his current " maintenance dose and recheck INR in 6 weeks. Patient denies bleeding or abnormal bruising concerns. Patient to call [facility] with any changes or concerns. Patient verbalized understanding of all instructions, denies questions or concerns at this time.                OBJECTIVE    INR Protime   Date Value Ref Range Status   2019 2.3 (A) 0.86 - 1.14 Final       ASSESSMENT / PLAN  INR assessment THER    Recheck INR In: 6 WEEKS    INR Location Clinic      Anticoagulation Summary  As of 2019    INR goal:   2.0-3.0   TTR:   57.9 % (4.2 y)   INR used for dosin.3 (2019)   Warfarin maintenance plan:   5 mg (5 mg x 1) every Sun, Tue, Thu; 2.5 mg (5 mg x 0.5) all other days   Full warfarin instructions:   5 mg every Sun, Tue, Thu; 2.5 mg all other days   Weekly warfarin total:   25 mg   No change documented:   Karlene Napoles RN   Plan last modified:   Lela Lerner RN (2018)   Next INR check:   2019   Priority:   INR   Target end date:   Indefinite    Indications    Completed stroke (H) [I63.9]  Long term current use of anticoagulant therapy [Z79.01]             Anticoagulation Episode Summary     INR check location:       Preferred lab:       Send INR reminders to:   [facility] POOL    Comments:   * warfarin 5 mg tabs. Leave message on MOBILE only. Takes warfarin in the AM. Leaves for [place] at the end of Dec (will need snowbird - Dr. Mustafa has to sign paperwork!) [phone] cell [address] [place] [phone]      Anticoagulation Care Providers     Provider Role Specialty Phone number    Alex Mustafa MD [facility] [phone]            See the Encounter Report to view Anticoagulation Flowsheet and Dosing Calendar (Go to Encounters tab in chart review, and find the Anticoagulation Therapy Visit)      Karlene Napoles, GEMA

## 2019-07-09 ENCOUNTER — ANESTHESIA EVENT (OUTPATIENT)
Dept: SURGERY | Facility: CLINIC | Age: 79
DRG: 853 | End: 2019-07-09
Payer: COMMERCIAL

## 2019-07-09 ENCOUNTER — APPOINTMENT (OUTPATIENT)
Dept: CT IMAGING | Facility: CLINIC | Age: 79
End: 2019-07-09
Attending: EMERGENCY MEDICINE
Payer: COMMERCIAL

## 2019-07-09 ENCOUNTER — ANESTHESIA (OUTPATIENT)
Dept: SURGERY | Facility: CLINIC | Age: 79
DRG: 853 | End: 2019-07-09
Payer: COMMERCIAL

## 2019-07-09 ENCOUNTER — TELEPHONE (OUTPATIENT)
Dept: FAMILY MEDICINE | Facility: CLINIC | Age: 79
End: 2019-07-09

## 2019-07-09 ENCOUNTER — APPOINTMENT (OUTPATIENT)
Dept: ULTRASOUND IMAGING | Facility: CLINIC | Age: 79
End: 2019-07-09
Attending: EMERGENCY MEDICINE
Payer: COMMERCIAL

## 2019-07-09 ENCOUNTER — APPOINTMENT (OUTPATIENT)
Dept: GENERAL RADIOLOGY | Facility: CLINIC | Age: 79
DRG: 853 | End: 2019-07-09
Attending: INTERNAL MEDICINE
Payer: COMMERCIAL

## 2019-07-09 ENCOUNTER — HOSPITAL ENCOUNTER (EMERGENCY)
Facility: CLINIC | Age: 79
Discharge: ANOTHER HOSPITAL | End: 2019-07-09
Attending: EMERGENCY MEDICINE | Admitting: EMERGENCY MEDICINE
Payer: COMMERCIAL

## 2019-07-09 ENCOUNTER — HOSPITAL ENCOUNTER (INPATIENT)
Facility: CLINIC | Age: 79
LOS: 8 days | Discharge: NURSING, REHAB OR LONG-TERM CARE | DRG: 853 | End: 2019-07-17
Attending: INTERNAL MEDICINE | Admitting: INTERNAL MEDICINE
Payer: COMMERCIAL

## 2019-07-09 VITALS
WEIGHT: 195 LBS | HEART RATE: 147 BPM | HEIGHT: 69 IN | RESPIRATION RATE: 28 BRPM | OXYGEN SATURATION: 92 % | TEMPERATURE: 97.6 F | SYSTOLIC BLOOD PRESSURE: 90 MMHG | BODY MASS INDEX: 28.88 KG/M2 | DIASTOLIC BLOOD PRESSURE: 72 MMHG

## 2019-07-09 DIAGNOSIS — K81.9 CHOLECYSTITIS: ICD-10-CM

## 2019-07-09 DIAGNOSIS — K56.609 SBO (SMALL BOWEL OBSTRUCTION) (H): ICD-10-CM

## 2019-07-09 DIAGNOSIS — A41.9 SEPSIS, DUE TO UNSPECIFIED ORGANISM: ICD-10-CM

## 2019-07-09 DIAGNOSIS — Z79.01 LONG TERM CURRENT USE OF ANTICOAGULANT THERAPY: ICD-10-CM

## 2019-07-09 DIAGNOSIS — I48.91 ATRIAL FIBRILLATION, UNSPECIFIED TYPE (H): ICD-10-CM

## 2019-07-09 DIAGNOSIS — K81.0 ACUTE GANGRENOUS CHOLECYSTITIS: Primary | ICD-10-CM

## 2019-07-09 PROBLEM — K83.09 CHOLANGITIS (H): Status: ACTIVE | Noted: 2019-07-09

## 2019-07-09 LAB
ABO + RH BLD: NORMAL
ABO + RH BLD: NORMAL
ALBUMIN SERPL-MCNC: 2.9 G/DL (ref 3.4–5)
ALBUMIN SERPL-MCNC: 3.6 G/DL (ref 3.4–5)
ALP SERPL-CCNC: 290 U/L (ref 40–150)
ALP SERPL-CCNC: 325 U/L (ref 40–150)
ALT SERPL W P-5'-P-CCNC: 620 U/L (ref 0–70)
ALT SERPL W P-5'-P-CCNC: 792 U/L (ref 0–70)
ANION GAP SERPL CALCULATED.3IONS-SCNC: 10 MMOL/L (ref 3–14)
ANION GAP SERPL CALCULATED.3IONS-SCNC: 6 MMOL/L (ref 3–14)
AST SERPL W P-5'-P-CCNC: 395 U/L (ref 0–45)
AST SERPL W P-5'-P-CCNC: 569 U/L (ref 0–45)
BASE DEFICIT BLDA-SCNC: 2.3 MMOL/L
BASOPHILS # BLD AUTO: 0.2 10E9/L (ref 0–0.2)
BASOPHILS NFR BLD AUTO: 0.6 %
BILIRUB SERPL-MCNC: 6.5 MG/DL (ref 0.2–1.3)
BILIRUB SERPL-MCNC: 9.9 MG/DL (ref 0.2–1.3)
BLD GP AB SCN SERPL QL: NORMAL
BLOOD BANK CMNT PATIENT-IMP: NORMAL
BUN SERPL-MCNC: 43 MG/DL (ref 7–30)
BUN SERPL-MCNC: 52 MG/DL (ref 7–30)
CALCIUM SERPL-MCNC: 10.3 MG/DL (ref 8.5–10.1)
CALCIUM SERPL-MCNC: 9.5 MG/DL (ref 8.5–10.1)
CHLORIDE SERPL-SCNC: 106 MMOL/L (ref 94–109)
CHLORIDE SERPL-SCNC: 107 MMOL/L (ref 94–109)
CO2 SERPL-SCNC: 24 MMOL/L (ref 20–32)
CO2 SERPL-SCNC: 30 MMOL/L (ref 20–32)
CREAT SERPL-MCNC: 2.73 MG/DL (ref 0.66–1.25)
CREAT SERPL-MCNC: 3.01 MG/DL (ref 0.66–1.25)
DIFFERENTIAL METHOD BLD: ABNORMAL
EOSINOPHIL # BLD AUTO: 0 10E9/L (ref 0–0.7)
EOSINOPHIL NFR BLD AUTO: 0 %
ERYTHROCYTE [DISTWIDTH] IN BLOOD BY AUTOMATED COUNT: 15.6 % (ref 10–15)
ERYTHROCYTE [DISTWIDTH] IN BLOOD BY AUTOMATED COUNT: 17.2 % (ref 10–15)
GFR SERPL CREATININE-BSD FRML MDRD: 19 ML/MIN/{1.73_M2}
GFR SERPL CREATININE-BSD FRML MDRD: 21 ML/MIN/{1.73_M2}
GLUCOSE BLDC GLUCOMTR-MCNC: 119 MG/DL (ref 70–99)
GLUCOSE SERPL-MCNC: 117 MG/DL (ref 70–99)
GLUCOSE SERPL-MCNC: 150 MG/DL (ref 70–99)
HCO3 BLD-SCNC: 22 MMOL/L (ref 21–28)
HCT VFR BLD AUTO: 58.1 % (ref 40–53)
HCT VFR BLD AUTO: 64.1 % (ref 40–53)
HGB BLD-MCNC: 19.9 G/DL (ref 13.3–17.7)
HGB BLD-MCNC: 20.5 G/DL (ref 13.3–17.7)
IMM GRANULOCYTES # BLD: 0.6 10E9/L (ref 0–0.4)
IMM GRANULOCYTES NFR BLD: 2.5 %
INR PPP: 3.38 (ref 0.86–1.14)
INR PPP: NORMAL (ref 0.86–1.14)
INR PPP: NORMAL (ref 0.86–1.14)
LACTATE BLD-SCNC: 2.5 MMOL/L (ref 0.7–2)
LACTATE SERPL-SCNC: 2.3 MMOL/L (ref 0.4–2)
LIPASE SERPL-CCNC: 34 U/L (ref 73–393)
LYMPHOCYTES # BLD AUTO: 2.4 10E9/L (ref 0.8–5.3)
LYMPHOCYTES NFR BLD AUTO: 9.9 %
MCH RBC QN AUTO: 29.8 PG (ref 26.5–33)
MCH RBC QN AUTO: 31.9 PG (ref 26.5–33)
MCHC RBC AUTO-ENTMCNC: 32 G/DL (ref 31.5–36.5)
MCHC RBC AUTO-ENTMCNC: 34.3 G/DL (ref 31.5–36.5)
MCV RBC AUTO: 93 FL (ref 78–100)
MCV RBC AUTO: 93 FL (ref 78–100)
MONOCYTES # BLD AUTO: 1.4 10E9/L (ref 0–1.3)
MONOCYTES NFR BLD AUTO: 5.6 %
NEUTROPHILS # BLD AUTO: 20 10E9/L (ref 1.6–8.3)
NEUTROPHILS NFR BLD AUTO: 81.4 %
NRBC # BLD AUTO: 0 10*3/UL
NRBC BLD AUTO-RTO: 0 /100
OXYHGB MFR BLD: 97 % (ref 92–100)
PCO2 BLD: 36 MM HG (ref 35–45)
PH BLD: 7.39 PH (ref 7.35–7.45)
PLATELET # BLD AUTO: 397 10E9/L (ref 150–450)
PLATELET # BLD AUTO: 502 10E9/L (ref 150–450)
PO2 BLD: 115 MM HG (ref 80–105)
POTASSIUM SERPL-SCNC: 4.8 MMOL/L (ref 3.4–5.3)
POTASSIUM SERPL-SCNC: 5.4 MMOL/L (ref 3.4–5.3)
PROT SERPL-MCNC: 6.3 G/DL (ref 6.8–8.8)
PROT SERPL-MCNC: 7.4 G/DL (ref 6.8–8.8)
RADIOLOGIST FLAGS: ABNORMAL
RBC # BLD AUTO: 6.24 10E12/L (ref 4.4–5.9)
RBC # BLD AUTO: 6.89 10E12/L (ref 4.4–5.9)
SODIUM SERPL-SCNC: 141 MMOL/L (ref 133–144)
SODIUM SERPL-SCNC: 142 MMOL/L (ref 133–144)
SPECIMEN EXP DATE BLD: NORMAL
WBC # BLD AUTO: 24.6 10E9/L (ref 4–11)
WBC # BLD AUTO: 24.6 10E9/L (ref 4–11)

## 2019-07-09 PROCEDURE — 40000170 ZZH STATISTIC PRE-PROCEDURE ASSESSMENT II: Performed by: SURGERY

## 2019-07-09 PROCEDURE — 96366 THER/PROPH/DIAG IV INF ADDON: CPT | Performed by: EMERGENCY MEDICINE

## 2019-07-09 PROCEDURE — 20000003 ZZH R&B ICU

## 2019-07-09 PROCEDURE — 25000128 H RX IP 250 OP 636: Performed by: EMERGENCY MEDICINE

## 2019-07-09 PROCEDURE — 86901 BLOOD TYPING SEROLOGIC RH(D): CPT | Performed by: ANESTHESIOLOGY

## 2019-07-09 PROCEDURE — 76705 ECHO EXAM OF ABDOMEN: CPT

## 2019-07-09 PROCEDURE — 96361 HYDRATE IV INFUSION ADD-ON: CPT | Performed by: EMERGENCY MEDICINE

## 2019-07-09 PROCEDURE — 25800025 ZZH RX 258: Performed by: SURGERY

## 2019-07-09 PROCEDURE — 85610 PROTHROMBIN TIME: CPT | Performed by: FAMILY MEDICINE

## 2019-07-09 PROCEDURE — 87040 BLOOD CULTURE FOR BACTERIA: CPT | Performed by: EMERGENCY MEDICINE

## 2019-07-09 PROCEDURE — 99285 EMERGENCY DEPT VISIT HI MDM: CPT | Mod: 25 | Performed by: EMERGENCY MEDICINE

## 2019-07-09 PROCEDURE — 0FT40ZZ RESECTION OF GALLBLADDER, OPEN APPROACH: ICD-10-PCS | Performed by: SURGERY

## 2019-07-09 PROCEDURE — 96365 THER/PROPH/DIAG IV INF INIT: CPT | Performed by: EMERGENCY MEDICINE

## 2019-07-09 PROCEDURE — 80053 COMPREHEN METABOLIC PANEL: CPT | Performed by: INTERNAL MEDICINE

## 2019-07-09 PROCEDURE — C9132 KCENTRA, PER I.U.: HCPCS

## 2019-07-09 PROCEDURE — 87641 MR-STAPH DNA AMP PROBE: CPT | Performed by: INTERNAL MEDICINE

## 2019-07-09 PROCEDURE — 99291 CRITICAL CARE FIRST HOUR: CPT | Performed by: INTERNAL MEDICINE

## 2019-07-09 PROCEDURE — 36000058 ZZH SURGERY LEVEL 3 EA 15 ADDTL MIN: Performed by: SURGERY

## 2019-07-09 PROCEDURE — 25000125 ZZHC RX 250: Performed by: INTERNAL MEDICINE

## 2019-07-09 PROCEDURE — 83690 ASSAY OF LIPASE: CPT | Performed by: EMERGENCY MEDICINE

## 2019-07-09 PROCEDURE — 86900 BLOOD TYPING SEROLOGIC ABO: CPT | Performed by: ANESTHESIOLOGY

## 2019-07-09 PROCEDURE — 83605 ASSAY OF LACTIC ACID: CPT | Performed by: EMERGENCY MEDICINE

## 2019-07-09 PROCEDURE — 85025 COMPLETE CBC W/AUTO DIFF WBC: CPT | Performed by: EMERGENCY MEDICINE

## 2019-07-09 PROCEDURE — 37000009 ZZH ANESTHESIA TECHNICAL FEE, EACH ADDTL 15 MIN: Performed by: SURGERY

## 2019-07-09 PROCEDURE — 36000056 ZZH SURGERY LEVEL 3 1ST 30 MIN: Performed by: SURGERY

## 2019-07-09 PROCEDURE — 96375 TX/PRO/DX INJ NEW DRUG ADDON: CPT | Performed by: EMERGENCY MEDICINE

## 2019-07-09 PROCEDURE — 25000566 ZZH SEVOFLURANE, EA 15 MIN: Performed by: SURGERY

## 2019-07-09 PROCEDURE — 85027 COMPLETE CBC AUTOMATED: CPT | Performed by: INTERNAL MEDICINE

## 2019-07-09 PROCEDURE — 96367 TX/PROPH/DG ADDL SEQ IV INF: CPT | Performed by: EMERGENCY MEDICINE

## 2019-07-09 PROCEDURE — 82805 BLOOD GASES W/O2 SATURATION: CPT | Performed by: SURGERY

## 2019-07-09 PROCEDURE — 86850 RBC ANTIBODY SCREEN: CPT | Performed by: ANESTHESIOLOGY

## 2019-07-09 PROCEDURE — 25000128 H RX IP 250 OP 636

## 2019-07-09 PROCEDURE — 25000128 H RX IP 250 OP 636: Performed by: INTERNAL MEDICINE

## 2019-07-09 PROCEDURE — 36415 COLL VENOUS BLD VENIPUNCTURE: CPT | Performed by: INTERNAL MEDICINE

## 2019-07-09 PROCEDURE — 27210794 ZZH OR GENERAL SUPPLY STERILE: Performed by: SURGERY

## 2019-07-09 PROCEDURE — 25800030 ZZH RX IP 258 OP 636: Performed by: EMERGENCY MEDICINE

## 2019-07-09 PROCEDURE — 83605 ASSAY OF LACTIC ACID: CPT | Performed by: INTERNAL MEDICINE

## 2019-07-09 PROCEDURE — 80048 BASIC METABOLIC PNL TOTAL CA: CPT | Performed by: INTERNAL MEDICINE

## 2019-07-09 PROCEDURE — 87640 STAPH A DNA AMP PROBE: CPT | Performed by: INTERNAL MEDICINE

## 2019-07-09 PROCEDURE — 71045 X-RAY EXAM CHEST 1 VIEW: CPT

## 2019-07-09 PROCEDURE — 25000128 H RX IP 250 OP 636: Performed by: NURSE ANESTHETIST, CERTIFIED REGISTERED

## 2019-07-09 PROCEDURE — 25800030 ZZH RX IP 258 OP 636: Performed by: NURSE ANESTHETIST, CERTIFIED REGISTERED

## 2019-07-09 PROCEDURE — 99221 1ST HOSP IP/OBS SF/LOW 40: CPT | Mod: 57 | Performed by: SURGERY

## 2019-07-09 PROCEDURE — 85610 PROTHROMBIN TIME: CPT | Performed by: EMERGENCY MEDICINE

## 2019-07-09 PROCEDURE — 00000146 ZZHCL STATISTIC GLUCOSE BY METER IP

## 2019-07-09 PROCEDURE — 37000008 ZZH ANESTHESIA TECHNICAL FEE, 1ST 30 MIN: Performed by: SURGERY

## 2019-07-09 PROCEDURE — 80053 COMPREHEN METABOLIC PANEL: CPT | Performed by: EMERGENCY MEDICINE

## 2019-07-09 PROCEDURE — 99291 CRITICAL CARE FIRST HOUR: CPT | Mod: Z6 | Performed by: EMERGENCY MEDICINE

## 2019-07-09 PROCEDURE — 85610 PROTHROMBIN TIME: CPT | Performed by: INTERNAL MEDICINE

## 2019-07-09 PROCEDURE — 47562 LAPAROSCOPIC CHOLECYSTECTOMY: CPT | Performed by: SURGERY

## 2019-07-09 PROCEDURE — 25800030 ZZH RX IP 258 OP 636: Performed by: INTERNAL MEDICINE

## 2019-07-09 PROCEDURE — 25000125 ZZHC RX 250: Performed by: NURSE ANESTHETIST, CERTIFIED REGISTERED

## 2019-07-09 PROCEDURE — 74176 CT ABD & PELVIS W/O CONTRAST: CPT

## 2019-07-09 PROCEDURE — 25000555 ZZHC RX FACTOR IP 250 OP 636

## 2019-07-09 RX ORDER — SODIUM CHLORIDE 9 MG/ML
INJECTION, SOLUTION INTRAVENOUS CONTINUOUS PRN
Status: DISCONTINUED | OUTPATIENT
Start: 2019-07-09 | End: 2019-07-10

## 2019-07-09 RX ORDER — SODIUM CHLORIDE, SODIUM LACTATE, POTASSIUM CHLORIDE, CALCIUM CHLORIDE 600; 310; 30; 20 MG/100ML; MG/100ML; MG/100ML; MG/100ML
INJECTION, SOLUTION INTRAVENOUS CONTINUOUS PRN
Status: DISCONTINUED | OUTPATIENT
Start: 2019-07-09 | End: 2019-07-10

## 2019-07-09 RX ORDER — ONDANSETRON 2 MG/ML
4 INJECTION INTRAMUSCULAR; INTRAVENOUS ONCE
Status: COMPLETED | OUTPATIENT
Start: 2019-07-09 | End: 2019-07-09

## 2019-07-09 RX ORDER — SODIUM CHLORIDE 9 MG/ML
INJECTION, SOLUTION INTRAVENOUS CONTINUOUS
Status: DISCONTINUED | OUTPATIENT
Start: 2019-07-09 | End: 2019-07-12

## 2019-07-09 RX ORDER — VASOPRESSIN 20 U/ML
INJECTION PARENTERAL PRN
Status: DISCONTINUED | OUTPATIENT
Start: 2019-07-09 | End: 2019-07-10

## 2019-07-09 RX ORDER — FENTANYL CITRATE 50 UG/ML
INJECTION, SOLUTION INTRAMUSCULAR; INTRAVENOUS PRN
Status: DISCONTINUED | OUTPATIENT
Start: 2019-07-09 | End: 2019-07-10

## 2019-07-09 RX ORDER — PROPOFOL 10 MG/ML
INJECTION, EMULSION INTRAVENOUS PRN
Status: DISCONTINUED | OUTPATIENT
Start: 2019-07-09 | End: 2019-07-10

## 2019-07-09 RX ORDER — AMOXICILLIN 250 MG
1 CAPSULE ORAL 2 TIMES DAILY PRN
Status: DISCONTINUED | OUTPATIENT
Start: 2019-07-09 | End: 2019-07-17 | Stop reason: HOSPADM

## 2019-07-09 RX ORDER — ONDANSETRON 2 MG/ML
INJECTION INTRAMUSCULAR; INTRAVENOUS
Status: COMPLETED
Start: 2019-07-09 | End: 2019-07-09

## 2019-07-09 RX ORDER — IOPAMIDOL 755 MG/ML
90 INJECTION, SOLUTION INTRAVASCULAR ONCE
Status: DISCONTINUED | OUTPATIENT
Start: 2019-07-09 | End: 2019-07-09 | Stop reason: HOSPADM

## 2019-07-09 RX ORDER — ESMOLOL HYDROCHLORIDE 10 MG/ML
INJECTION INTRAVENOUS PRN
Status: DISCONTINUED | OUTPATIENT
Start: 2019-07-09 | End: 2019-07-10

## 2019-07-09 RX ORDER — HYDROMORPHONE HYDROCHLORIDE 1 MG/ML
.3-.5 INJECTION, SOLUTION INTRAMUSCULAR; INTRAVENOUS; SUBCUTANEOUS
Status: DISCONTINUED | OUTPATIENT
Start: 2019-07-09 | End: 2019-07-17 | Stop reason: HOSPADM

## 2019-07-09 RX ORDER — AMOXICILLIN 250 MG
2 CAPSULE ORAL 2 TIMES DAILY PRN
Status: DISCONTINUED | OUTPATIENT
Start: 2019-07-09 | End: 2019-07-17 | Stop reason: HOSPADM

## 2019-07-09 RX ORDER — NALOXONE HYDROCHLORIDE 0.4 MG/ML
.1-.4 INJECTION, SOLUTION INTRAMUSCULAR; INTRAVENOUS; SUBCUTANEOUS
Status: DISCONTINUED | OUTPATIENT
Start: 2019-07-09 | End: 2019-07-17 | Stop reason: HOSPADM

## 2019-07-09 RX ORDER — LIDOCAINE HYDROCHLORIDE 20 MG/ML
INJECTION, SOLUTION INFILTRATION; PERINEURAL PRN
Status: DISCONTINUED | OUTPATIENT
Start: 2019-07-09 | End: 2019-07-10

## 2019-07-09 RX ORDER — POLYETHYLENE GLYCOL 3350 17 G/17G
17 POWDER, FOR SOLUTION ORAL DAILY PRN
Status: DISCONTINUED | OUTPATIENT
Start: 2019-07-09 | End: 2019-07-17 | Stop reason: HOSPADM

## 2019-07-09 RX ORDER — ACETAMINOPHEN 325 MG/1
650 TABLET ORAL EVERY 4 HOURS PRN
Status: DISCONTINUED | OUTPATIENT
Start: 2019-07-09 | End: 2019-07-17 | Stop reason: HOSPADM

## 2019-07-09 RX ORDER — BISACODYL 10 MG
10 SUPPOSITORY, RECTAL RECTAL DAILY PRN
Status: DISCONTINUED | OUTPATIENT
Start: 2019-07-09 | End: 2019-07-17 | Stop reason: HOSPADM

## 2019-07-09 RX ORDER — SODIUM CHLORIDE, SODIUM LACTATE, POTASSIUM CHLORIDE, CALCIUM CHLORIDE 600; 310; 30; 20 MG/100ML; MG/100ML; MG/100ML; MG/100ML
INJECTION, SOLUTION INTRAVENOUS CONTINUOUS
Status: DISCONTINUED | OUTPATIENT
Start: 2019-07-09 | End: 2019-07-10 | Stop reason: HOSPADM

## 2019-07-09 RX ORDER — METOPROLOL TARTRATE 1 MG/ML
5 INJECTION, SOLUTION INTRAVENOUS EVERY 4 HOURS PRN
Status: DISCONTINUED | OUTPATIENT
Start: 2019-07-09 | End: 2019-07-17 | Stop reason: HOSPADM

## 2019-07-09 RX ORDER — ONDANSETRON 2 MG/ML
4 INJECTION INTRAMUSCULAR; INTRAVENOUS EVERY 6 HOURS PRN
Status: DISCONTINUED | OUTPATIENT
Start: 2019-07-09 | End: 2019-07-17 | Stop reason: HOSPADM

## 2019-07-09 RX ORDER — ONDANSETRON 4 MG/1
4 TABLET, ORALLY DISINTEGRATING ORAL EVERY 6 HOURS PRN
Status: DISCONTINUED | OUTPATIENT
Start: 2019-07-09 | End: 2019-07-17 | Stop reason: HOSPADM

## 2019-07-09 RX ORDER — PIPERACILLIN SODIUM, TAZOBACTAM SODIUM 2; .25 G/10ML; G/10ML
2.25 INJECTION, POWDER, LYOPHILIZED, FOR SOLUTION INTRAVENOUS EVERY 6 HOURS
Status: DISCONTINUED | OUTPATIENT
Start: 2019-07-09 | End: 2019-07-10

## 2019-07-09 RX ORDER — METOPROLOL TARTRATE 1 MG/ML
5 INJECTION, SOLUTION INTRAVENOUS EVERY 6 HOURS
Status: DISCONTINUED | OUTPATIENT
Start: 2019-07-09 | End: 2019-07-10

## 2019-07-09 RX ORDER — ACETAMINOPHEN 650 MG/1
650 SUPPOSITORY RECTAL EVERY 4 HOURS PRN
Status: DISCONTINUED | OUTPATIENT
Start: 2019-07-09 | End: 2019-07-17 | Stop reason: HOSPADM

## 2019-07-09 RX ADMIN — SODIUM CHLORIDE, POTASSIUM CHLORIDE, SODIUM LACTATE AND CALCIUM CHLORIDE 1000 ML: 600; 310; 30; 20 INJECTION, SOLUTION INTRAVENOUS at 18:46

## 2019-07-09 RX ADMIN — PHENYLEPHRINE HYDROCHLORIDE 200 MCG: 10 INJECTION INTRAVENOUS at 23:33

## 2019-07-09 RX ADMIN — ONDANSETRON 4 MG: 2 INJECTION INTRAMUSCULAR; INTRAVENOUS at 20:27

## 2019-07-09 RX ADMIN — PHENYLEPHRINE HYDROCHLORIDE 200 MCG: 10 INJECTION INTRAVENOUS at 23:28

## 2019-07-09 RX ADMIN — PHENYLEPHRINE HYDROCHLORIDE 200 MCG: 10 INJECTION INTRAVENOUS at 23:32

## 2019-07-09 RX ADMIN — PHENYLEPHRINE HYDROCHLORIDE 200 MCG: 10 INJECTION INTRAVENOUS at 23:34

## 2019-07-09 RX ADMIN — ONDANSETRON 4 MG: 2 INJECTION INTRAMUSCULAR; INTRAVENOUS at 14:04

## 2019-07-09 RX ADMIN — SODIUM CHLORIDE, POTASSIUM CHLORIDE, SODIUM LACTATE AND CALCIUM CHLORIDE 1000 ML: 600; 310; 30; 20 INJECTION, SOLUTION INTRAVENOUS at 15:15

## 2019-07-09 RX ADMIN — ESMOLOL HYDROCHLORIDE 40 MG: 10 INJECTION, SOLUTION INTRAVENOUS at 23:28

## 2019-07-09 RX ADMIN — ONDANSETRON 4 MG: 2 INJECTION INTRAMUSCULAR; INTRAVENOUS at 18:46

## 2019-07-09 RX ADMIN — VASOPRESSIN 0.5 UNITS: 20 INJECTION INTRAVENOUS at 23:51

## 2019-07-09 RX ADMIN — SODIUM CHLORIDE: 9 INJECTION, SOLUTION INTRAVENOUS at 23:09

## 2019-07-09 RX ADMIN — PHENYLEPHRINE HYDROCHLORIDE 200 MCG: 10 INJECTION INTRAVENOUS at 23:29

## 2019-07-09 RX ADMIN — SODIUM CHLORIDE, POTASSIUM CHLORIDE, SODIUM LACTATE AND CALCIUM CHLORIDE: 600; 310; 30; 20 INJECTION, SOLUTION INTRAVENOUS at 23:34

## 2019-07-09 RX ADMIN — VASOPRESSIN 0.5 UNITS: 20 INJECTION INTRAVENOUS at 23:44

## 2019-07-09 RX ADMIN — ESMOLOL HYDROCHLORIDE 60 MG: 10 INJECTION, SOLUTION INTRAVENOUS at 23:31

## 2019-07-09 RX ADMIN — PIPERACILLIN SODIUM,TAZOBACTAM SODIUM 2.25 G: 2; .25 INJECTION, POWDER, FOR SOLUTION INTRAVENOUS at 20:30

## 2019-07-09 RX ADMIN — VASOPRESSIN 0.5 UNITS: 20 INJECTION INTRAVENOUS at 23:39

## 2019-07-09 RX ADMIN — VASOPRESSIN 0.5 UNITS: 20 INJECTION INTRAVENOUS at 23:54

## 2019-07-09 RX ADMIN — PHENYLEPHRINE HYDROCHLORIDE 200 MCG: 10 INJECTION INTRAVENOUS at 23:31

## 2019-07-09 RX ADMIN — PHENYLEPHRINE HYDROCHLORIDE 0.5 MCG/KG/MIN: 10 INJECTION INTRAVENOUS at 23:51

## 2019-07-09 RX ADMIN — PHENYLEPHRINE HYDROCHLORIDE 200 MCG: 10 INJECTION INTRAVENOUS at 23:25

## 2019-07-09 RX ADMIN — VANCOMYCIN HYDROCHLORIDE 1500 MG: 1 INJECTION, POWDER, LYOPHILIZED, FOR SOLUTION INTRAVENOUS at 17:23

## 2019-07-09 RX ADMIN — LIDOCAINE HYDROCHLORIDE 100 MG: 20 INJECTION, SOLUTION INFILTRATION; PERINEURAL at 23:23

## 2019-07-09 RX ADMIN — SODIUM CHLORIDE, POTASSIUM CHLORIDE, SODIUM LACTATE AND CALCIUM CHLORIDE 1000 ML: 600; 310; 30; 20 INJECTION, SOLUTION INTRAVENOUS at 14:03

## 2019-07-09 RX ADMIN — VASOPRESSIN 0.5 UNITS: 20 INJECTION INTRAVENOUS at 23:40

## 2019-07-09 RX ADMIN — VASOPRESSIN 0.5 UNITS: 20 INJECTION INTRAVENOUS at 23:42

## 2019-07-09 RX ADMIN — PROPOFOL 70 MG: 10 INJECTION, EMULSION INTRAVENOUS at 23:23

## 2019-07-09 RX ADMIN — PHENYLEPHRINE HYDROCHLORIDE 200 MCG: 10 INJECTION INTRAVENOUS at 23:30

## 2019-07-09 RX ADMIN — VASOPRESSIN 0.5 UNITS: 20 INJECTION INTRAVENOUS at 23:32

## 2019-07-09 RX ADMIN — PHENYLEPHRINE HYDROCHLORIDE 200 MCG: 10 INJECTION INTRAVENOUS at 23:27

## 2019-07-09 RX ADMIN — PHYTONADIONE 10 MG: 10 INJECTION, EMULSION INTRAMUSCULAR; INTRAVENOUS; SUBCUTANEOUS at 22:14

## 2019-07-09 RX ADMIN — PHENYLEPHRINE HYDROCHLORIDE 200 MCG: 10 INJECTION INTRAVENOUS at 23:26

## 2019-07-09 RX ADMIN — TAZOBACTAM SODIUM AND PIPERACILLIN SODIUM 2.25 G: 250; 2 INJECTION, SOLUTION INTRAVENOUS at 15:09

## 2019-07-09 RX ADMIN — FAMOTIDINE 20 MG: 10 INJECTION, SOLUTION INTRAVENOUS at 21:00

## 2019-07-09 RX ADMIN — VASOPRESSIN 0.5 UNITS: 20 INJECTION INTRAVENOUS at 23:36

## 2019-07-09 RX ADMIN — ROCURONIUM BROMIDE 100 MG: 10 INJECTION INTRAVENOUS at 23:23

## 2019-07-09 RX ADMIN — FENTANYL CITRATE 125 MCG: 50 INJECTION, SOLUTION INTRAMUSCULAR; INTRAVENOUS at 23:23

## 2019-07-09 RX ADMIN — SODIUM CHLORIDE: 9 INJECTION, SOLUTION INTRAVENOUS at 20:26

## 2019-07-09 ASSESSMENT — MIFFLIN-ST. JEOR: SCORE: 1594.89

## 2019-07-09 NOTE — TELEPHONE ENCOUNTER
Reason for call:  Patient reporting a symptom    Symptom or request: Pt's spouse Barbara calling.  Pt has bad back pain since last night and pt is unable to get up by himself.  He also vomited x 2 last night.  Transferred call to Clinic RN as high priority    Duration (how long have symptoms been present): 2 days    Have you been treated for this before? Yes    Additional comments:     Phone Number patient can be reached at:  Home number on file [phone] (home)    Best Time:  any    Can we leave a detailed message on this number:  YES    Call taken on 7/9/2019 at 12:24 PM by Margaret Marcum

## 2019-07-09 NOTE — TELEPHONE ENCOUNTER
Pts legs are very weak, hard to stand after sitting.  Also, vomited in bed x 2 last night.  Vomit was very dark brown, concerned it could be blood?  Spoke with wife and daughter-in-law and they will take the pt to the ER for evaluation of weakness and possible blood in emesis.  KpavelRN

## 2019-07-09 NOTE — ED NOTES
Increased weakness starting 2 days ago, has had n/v. Emesis 2x/day. Denies diarrhea. Had low back pain center of back, that is gone now. Denies other pain. No fevers. States is limited to 2 beers/day now since stroke.

## 2019-07-09 NOTE — ED NOTES
DATE:  7/9/2019   TIME OF RECEIPT FROM LAB:  5274  LAB TEST:  Lactic Acid  LAB VALUE:  2.5  RESULTS GIVEN WITH READ-BACK TO (PROVIDER):  Cliff Ashford MD  TIME LAB VALUE REPORTED TO PROVIDER:   4293

## 2019-07-09 NOTE — PHARMACY-VANCOMYCIN DOSING SERVICE
Pharmacy Vancomycin Initial Note  Date of Service 2019  Patient's  1940  78 year old, male    Indication: Intra-abdominal infection    Current estimated CrCl = Estimated Creatinine Clearance: 22.3 mL/min (A) (based on SCr of 3.01 mg/dL (H)).    Creatinine for last 3 days  2019:  1:37 PM Creatinine 3.01 mg/dL    Recent Vancomycin Level(s) for last 3 days  No results found for requested labs within last 72 hours.      Vancomycin IV Administrations (past 72 hours)      No vancomycin orders with administrations in past 72 hours.                Nephrotoxins and other renal medications (From now, onward)    Start     Dose/Rate Route Frequency Ordered Stop    19 1654  vancomycin (VANCOCIN) 1,500 mg in sodium chloride 0.9 % 250 mL intermittent infusion      1,500 mg  over 90 Minutes Intravenous EVERY 48 HOURS 19 1654      19 1445  piperacillin-tazobactam (ZOSYN) infusion 2.25 g      2.25 g  100 mL/hr over 30 Minutes Intravenous EVERY 6 HOURS 19 1435            Contrast Orders - past 72 hours (72h ago, onward)    Start     Dose/Rate Route Frequency Ordered Stop    19 1351  iopamidol (ISOVUE-370) solution 90 mL      90 mL Intravenous ONCE 19 1350                  Plan:  1.  Start vancomycin  1500 mg IV q48h.   2.  Goal Trough Level: 10-15 mg/L   3.  Pharmacy will check trough levels as appropriate in 1-3 Days.    4. Serum creatinine levels will be ordered daily for the first week of therapy and at least twice weekly for subsequent weeks.    5. Brewster method utilized to dose vancomycin therapy: Method 1    Danielle Juares

## 2019-07-10 ENCOUNTER — APPOINTMENT (OUTPATIENT)
Dept: GENERAL RADIOLOGY | Facility: CLINIC | Age: 79
DRG: 853 | End: 2019-07-10
Payer: COMMERCIAL

## 2019-07-10 LAB
ALBUMIN SERPL-MCNC: 2.4 G/DL (ref 3.4–5)
ALBUMIN UR-MCNC: 30 MG/DL
ALP SERPL-CCNC: 203 U/L (ref 40–150)
ALT SERPL W P-5'-P-CCNC: 411 U/L (ref 0–70)
ANION GAP SERPL CALCULATED.3IONS-SCNC: 10 MMOL/L (ref 3–14)
ANION GAP SERPL CALCULATED.3IONS-SCNC: 12 MMOL/L (ref 3–14)
APPEARANCE UR: ABNORMAL
AST SERPL W P-5'-P-CCNC: 256 U/L (ref 0–45)
BACTERIA #/AREA URNS HPF: ABNORMAL /HPF
BASE DEFICIT BLDA-SCNC: 3.5 MMOL/L
BILIRUB SERPL-MCNC: 6.3 MG/DL (ref 0.2–1.3)
BILIRUB UR QL STRIP: ABNORMAL
BLD PROD TYP BPU: NORMAL
BLD UNIT ID BPU: 0
BLD UNIT ID BPU: 0
BLOOD PRODUCT CODE: NORMAL
BLOOD PRODUCT CODE: NORMAL
BPU ID: NORMAL
BPU ID: NORMAL
BUN SERPL-MCNC: 50 MG/DL (ref 7–30)
BUN SERPL-MCNC: 52 MG/DL (ref 7–30)
CALCIUM SERPL-MCNC: 8.3 MG/DL (ref 8.5–10.1)
CALCIUM SERPL-MCNC: 8.6 MG/DL (ref 8.5–10.1)
CHLORIDE SERPL-SCNC: 111 MMOL/L (ref 94–109)
CHLORIDE SERPL-SCNC: 111 MMOL/L (ref 94–109)
CO2 SERPL-SCNC: 23 MMOL/L (ref 20–32)
CO2 SERPL-SCNC: 24 MMOL/L (ref 20–32)
COLOR UR AUTO: ABNORMAL
CORTIS SERPL-MCNC: 66.4 UG/DL (ref 4–22)
CREAT SERPL-MCNC: 2.27 MG/DL (ref 0.66–1.25)
CREAT SERPL-MCNC: 2.55 MG/DL (ref 0.66–1.25)
ERYTHROCYTE [DISTWIDTH] IN BLOOD BY AUTOMATED COUNT: 15.6 % (ref 10–15)
ERYTHROCYTE [DISTWIDTH] IN BLOOD BY AUTOMATED COUNT: 15.9 % (ref 10–15)
GFR SERPL CREATININE-BSD FRML MDRD: 23 ML/MIN/{1.73_M2}
GFR SERPL CREATININE-BSD FRML MDRD: 26 ML/MIN/{1.73_M2}
GLUCOSE BLDC GLUCOMTR-MCNC: 111 MG/DL (ref 70–99)
GLUCOSE BLDC GLUCOMTR-MCNC: 138 MG/DL (ref 70–99)
GLUCOSE BLDC GLUCOMTR-MCNC: 144 MG/DL (ref 70–99)
GLUCOSE BLDC GLUCOMTR-MCNC: 151 MG/DL (ref 70–99)
GLUCOSE BLDC GLUCOMTR-MCNC: 156 MG/DL (ref 70–99)
GLUCOSE BLDC GLUCOMTR-MCNC: 159 MG/DL (ref 70–99)
GLUCOSE SERPL-MCNC: 117 MG/DL (ref 70–99)
GLUCOSE SERPL-MCNC: 141 MG/DL (ref 70–99)
GLUCOSE UR STRIP-MCNC: NEGATIVE MG/DL
HCO3 BLD-SCNC: 24 MMOL/L (ref 21–28)
HCT VFR BLD AUTO: 50.5 % (ref 40–53)
HCT VFR BLD AUTO: 51.7 % (ref 40–53)
HGB BLD-MCNC: 16.9 G/DL (ref 13.3–17.7)
HGB BLD-MCNC: 17.9 G/DL (ref 13.3–17.7)
HGB UR QL STRIP: ABNORMAL
INR PPP: 1.28 (ref 0.86–1.14)
INR PPP: 1.37 (ref 0.86–1.14)
KETONES UR STRIP-MCNC: NEGATIVE MG/DL
LACTATE BLD-SCNC: 1.7 MMOL/L (ref 0.7–2)
LEUKOCYTE ESTERASE UR QL STRIP: ABNORMAL
MCH RBC QN AUTO: 31.4 PG (ref 26.5–33)
MCH RBC QN AUTO: 31.9 PG (ref 26.5–33)
MCHC RBC AUTO-ENTMCNC: 33.5 G/DL (ref 31.5–36.5)
MCHC RBC AUTO-ENTMCNC: 34.6 G/DL (ref 31.5–36.5)
MCV RBC AUTO: 92 FL (ref 78–100)
MCV RBC AUTO: 94 FL (ref 78–100)
MRSA DNA SPEC QL NAA+PROBE: NEGATIVE
MUCOUS THREADS #/AREA URNS LPF: PRESENT /LPF
NITRATE UR QL: NEGATIVE
NUM BPU REQUESTED: 2
OXYHGB MFR BLD: 96 % (ref 92–100)
PCO2 BLD: 50 MM HG (ref 35–45)
PH BLD: 7.29 PH (ref 7.35–7.45)
PH UR STRIP: 5 PH (ref 5–7)
PLATELET # BLD AUTO: 367 10E9/L (ref 150–450)
PLATELET # BLD AUTO: 430 10E9/L (ref 150–450)
PO2 BLD: 95 MM HG (ref 80–105)
POTASSIUM SERPL-SCNC: 4.4 MMOL/L (ref 3.4–5.3)
POTASSIUM SERPL-SCNC: 4.8 MMOL/L (ref 3.4–5.3)
PROT SERPL-MCNC: 5.3 G/DL (ref 6.8–8.8)
RBC # BLD AUTO: 5.38 10E12/L (ref 4.4–5.9)
RBC # BLD AUTO: 5.62 10E12/L (ref 4.4–5.9)
RBC #/AREA URNS AUTO: >182 /HPF (ref 0–2)
SODIUM SERPL-SCNC: 145 MMOL/L (ref 133–144)
SODIUM SERPL-SCNC: 146 MMOL/L (ref 133–144)
SOURCE: ABNORMAL
SP GR UR STRIP: 1.02 (ref 1–1.03)
SPECIMEN SOURCE: NORMAL
SQUAMOUS #/AREA URNS AUTO: 1 /HPF (ref 0–1)
TRANSFUSION STATUS PATIENT QL: NORMAL
UROBILINOGEN UR STRIP-MCNC: 2 MG/DL (ref 0–2)
WBC # BLD AUTO: 22.3 10E9/L (ref 4–11)
WBC # BLD AUTO: 24.7 10E9/L (ref 4–11)
WBC #/AREA URNS AUTO: 19 /HPF (ref 0–5)

## 2019-07-10 PROCEDURE — 88304 TISSUE EXAM BY PATHOLOGIST: CPT | Mod: 26 | Performed by: SURGERY

## 2019-07-10 PROCEDURE — 00000146 ZZHCL STATISTIC GLUCOSE BY METER IP

## 2019-07-10 PROCEDURE — P9059 PLASMA, FRZ BETWEEN 8-24HOUR: HCPCS | Performed by: INTERNAL MEDICINE

## 2019-07-10 PROCEDURE — 25000128 H RX IP 250 OP 636: Performed by: NURSE ANESTHETIST, CERTIFIED REGISTERED

## 2019-07-10 PROCEDURE — 93010 ELECTROCARDIOGRAM REPORT: CPT | Performed by: INTERNAL MEDICINE

## 2019-07-10 PROCEDURE — 20000003 ZZH R&B ICU

## 2019-07-10 PROCEDURE — 87102 FUNGUS ISOLATION CULTURE: CPT | Performed by: SURGERY

## 2019-07-10 PROCEDURE — 3E043XZ INTRODUCTION OF VASOPRESSOR INTO CENTRAL VEIN, PERCUTANEOUS APPROACH: ICD-10-PCS | Performed by: SURGERY

## 2019-07-10 PROCEDURE — 93005 ELECTROCARDIOGRAM TRACING: CPT

## 2019-07-10 PROCEDURE — 87070 CULTURE OTHR SPECIMN AEROBIC: CPT | Performed by: SURGERY

## 2019-07-10 PROCEDURE — 25000125 ZZHC RX 250: Performed by: NURSE ANESTHETIST, CERTIFIED REGISTERED

## 2019-07-10 PROCEDURE — 40000344 ZZHCL STATISTIC THAWING COMPONENT: Performed by: INTERNAL MEDICINE

## 2019-07-10 PROCEDURE — 88304 TISSUE EXAM BY PATHOLOGIST: CPT | Performed by: SURGERY

## 2019-07-10 PROCEDURE — 81001 URINALYSIS AUTO W/SCOPE: CPT

## 2019-07-10 PROCEDURE — 25000128 H RX IP 250 OP 636: Performed by: INTERNAL MEDICINE

## 2019-07-10 PROCEDURE — 25000125 ZZHC RX 250

## 2019-07-10 PROCEDURE — 99291 CRITICAL CARE FIRST HOUR: CPT

## 2019-07-10 PROCEDURE — 83605 ASSAY OF LACTIC ACID: CPT

## 2019-07-10 PROCEDURE — 80053 COMPREHEN METABOLIC PANEL: CPT | Performed by: INTERNAL MEDICINE

## 2019-07-10 PROCEDURE — 94002 VENT MGMT INPAT INIT DAY: CPT

## 2019-07-10 PROCEDURE — 25000131 ZZH RX MED GY IP 250 OP 636 PS 637: Performed by: INTERNAL MEDICINE

## 2019-07-10 PROCEDURE — 25000125 ZZHC RX 250: Performed by: INTERNAL MEDICINE

## 2019-07-10 PROCEDURE — 25800030 ZZH RX IP 258 OP 636: Performed by: NURSE ANESTHETIST, CERTIFIED REGISTERED

## 2019-07-10 PROCEDURE — 82533 TOTAL CORTISOL: CPT | Performed by: INTERNAL MEDICINE

## 2019-07-10 PROCEDURE — 25800030 ZZH RX IP 258 OP 636: Performed by: INTERNAL MEDICINE

## 2019-07-10 PROCEDURE — 25000128 H RX IP 250 OP 636: Performed by: SURGERY

## 2019-07-10 PROCEDURE — 40000275 ZZH STATISTIC RCP TIME EA 10 MIN

## 2019-07-10 PROCEDURE — 85610 PROTHROMBIN TIME: CPT | Performed by: INTERNAL MEDICINE

## 2019-07-10 PROCEDURE — 87075 CULTR BACTERIA EXCEPT BLOOD: CPT | Performed by: SURGERY

## 2019-07-10 PROCEDURE — 82805 BLOOD GASES W/O2 SATURATION: CPT | Performed by: INTERNAL MEDICINE

## 2019-07-10 PROCEDURE — 25000125 ZZHC RX 250: Performed by: SURGERY

## 2019-07-10 PROCEDURE — 71045 X-RAY EXAM CHEST 1 VIEW: CPT

## 2019-07-10 PROCEDURE — 25800030 ZZH RX IP 258 OP 636

## 2019-07-10 PROCEDURE — 25000132 ZZH RX MED GY IP 250 OP 250 PS 637

## 2019-07-10 PROCEDURE — 25000128 H RX IP 250 OP 636

## 2019-07-10 PROCEDURE — 40000008 ZZH STATISTIC AIRWAY CARE

## 2019-07-10 PROCEDURE — 85027 COMPLETE CBC AUTOMATED: CPT | Performed by: INTERNAL MEDICINE

## 2019-07-10 RX ORDER — NOREPINEPHRINE BITARTRATE 0.06 MG/ML
0.03-0.4 INJECTION, SOLUTION INTRAVENOUS CONTINUOUS
Status: DISCONTINUED | OUTPATIENT
Start: 2019-07-10 | End: 2019-07-12

## 2019-07-10 RX ORDER — NICOTINE POLACRILEX 4 MG
15-30 LOZENGE BUCCAL
Status: DISCONTINUED | OUTPATIENT
Start: 2019-07-10 | End: 2019-07-17 | Stop reason: HOSPADM

## 2019-07-10 RX ORDER — NALOXONE HYDROCHLORIDE 0.4 MG/ML
.1-.4 INJECTION, SOLUTION INTRAMUSCULAR; INTRAVENOUS; SUBCUTANEOUS
Status: DISCONTINUED | OUTPATIENT
Start: 2019-07-10 | End: 2019-07-11 | Stop reason: CLARIF

## 2019-07-10 RX ORDER — SODIUM CHLORIDE, SODIUM LACTATE, POTASSIUM CHLORIDE, CALCIUM CHLORIDE 600; 310; 30; 20 MG/100ML; MG/100ML; MG/100ML; MG/100ML
INJECTION, SOLUTION INTRAVENOUS CONTINUOUS
Status: DISCONTINUED | OUTPATIENT
Start: 2019-07-10 | End: 2019-07-13

## 2019-07-10 RX ORDER — CHLORHEXIDINE GLUCONATE ORAL RINSE 1.2 MG/ML
15 SOLUTION DENTAL EVERY 12 HOURS
Status: DISCONTINUED | OUTPATIENT
Start: 2019-07-10 | End: 2019-07-10

## 2019-07-10 RX ORDER — BUPIVACAINE HYDROCHLORIDE 2.5 MG/ML
INJECTION, SOLUTION INFILTRATION; PERINEURAL PRN
Status: DISCONTINUED | OUTPATIENT
Start: 2019-07-10 | End: 2019-07-10 | Stop reason: HOSPADM

## 2019-07-10 RX ORDER — SODIUM CHLORIDE, SODIUM LACTATE, POTASSIUM CHLORIDE, CALCIUM CHLORIDE 600; 310; 30; 20 MG/100ML; MG/100ML; MG/100ML; MG/100ML
INJECTION, SOLUTION INTRAVENOUS CONTINUOUS PRN
Status: DISCONTINUED | OUTPATIENT
Start: 2019-07-10 | End: 2019-07-10

## 2019-07-10 RX ORDER — MAGNESIUM HYDROXIDE 1200 MG/15ML
LIQUID ORAL PRN
Status: DISCONTINUED | OUTPATIENT
Start: 2019-07-10 | End: 2019-07-10 | Stop reason: HOSPADM

## 2019-07-10 RX ORDER — PROPOFOL 10 MG/ML
5-75 INJECTION, EMULSION INTRAVENOUS CONTINUOUS
Status: DISCONTINUED | OUTPATIENT
Start: 2019-07-10 | End: 2019-07-10 | Stop reason: CLARIF

## 2019-07-10 RX ORDER — PIPERACILLIN SODIUM, TAZOBACTAM SODIUM 3; .375 G/15ML; G/15ML
3.38 INJECTION, POWDER, LYOPHILIZED, FOR SOLUTION INTRAVENOUS EVERY 6 HOURS
Status: DISCONTINUED | OUTPATIENT
Start: 2019-07-10 | End: 2019-07-17

## 2019-07-10 RX ORDER — DEXTROSE MONOHYDRATE 25 G/50ML
25-50 INJECTION, SOLUTION INTRAVENOUS
Status: DISCONTINUED | OUTPATIENT
Start: 2019-07-10 | End: 2019-07-17 | Stop reason: HOSPADM

## 2019-07-10 RX ORDER — PROPOFOL 10 MG/ML
INJECTION, EMULSION INTRAVENOUS CONTINUOUS PRN
Status: DISCONTINUED | OUTPATIENT
Start: 2019-07-10 | End: 2019-07-10

## 2019-07-10 RX ORDER — DILTIAZEM HYDROCHLORIDE 5 MG/ML
INJECTION INTRAVENOUS PRN
Status: DISCONTINUED | OUTPATIENT
Start: 2019-07-10 | End: 2019-07-10

## 2019-07-10 RX ADMIN — SODIUM CHLORIDE, POTASSIUM CHLORIDE, SODIUM LACTATE AND CALCIUM CHLORIDE: 600; 310; 30; 20 INJECTION, SOLUTION INTRAVENOUS at 00:19

## 2019-07-10 RX ADMIN — PROPOFOL 40 MCG/KG/MIN: 10 INJECTION, EMULSION INTRAVENOUS at 00:59

## 2019-07-10 RX ADMIN — VASOPRESSIN 2.4 UNITS/HR: 20 INJECTION INTRAVENOUS at 04:10

## 2019-07-10 RX ADMIN — SODIUM CHLORIDE: 9 INJECTION, SOLUTION INTRAVENOUS at 07:47

## 2019-07-10 RX ADMIN — DILTIAZEM HYDROCHLORIDE 5 MG: 5 INJECTION INTRAVENOUS at 00:35

## 2019-07-10 RX ADMIN — FAMOTIDINE 20 MG: 10 INJECTION, SOLUTION INTRAVENOUS at 20:45

## 2019-07-10 RX ADMIN — PROPOFOL 15 MCG/KG/MIN: 10 INJECTION, EMULSION INTRAVENOUS at 07:17

## 2019-07-10 RX ADMIN — SODIUM CHLORIDE, POTASSIUM CHLORIDE, SODIUM LACTATE AND CALCIUM CHLORIDE: 600; 310; 30; 20 INJECTION, SOLUTION INTRAVENOUS at 17:21

## 2019-07-10 RX ADMIN — NOREPINEPHRINE BITARTRATE 0.12 MCG/KG/MIN: 1 INJECTION INTRAVENOUS at 07:41

## 2019-07-10 RX ADMIN — SODIUM CHLORIDE, POTASSIUM CHLORIDE, SODIUM LACTATE AND CALCIUM CHLORIDE: 600; 310; 30; 20 INJECTION, SOLUTION INTRAVENOUS at 03:13

## 2019-07-10 RX ADMIN — SODIUM CHLORIDE, POTASSIUM CHLORIDE, SODIUM LACTATE AND CALCIUM CHLORIDE 500 ML: 600; 310; 30; 20 INJECTION, SOLUTION INTRAVENOUS at 03:55

## 2019-07-10 RX ADMIN — PIPERACILLIN SODIUM,TAZOBACTAM SODIUM 3.38 G: 3; .375 INJECTION, POWDER, FOR SOLUTION INTRAVENOUS at 20:45

## 2019-07-10 RX ADMIN — HYDROCORTISONE SODIUM SUCCINATE 50 MG: 100 INJECTION, POWDER, FOR SOLUTION INTRAMUSCULAR; INTRAVENOUS at 09:17

## 2019-07-10 RX ADMIN — VASOPRESSIN 2.4 UNITS/HR: 20 INJECTION INTRAVENOUS at 11:54

## 2019-07-10 RX ADMIN — DILTIAZEM HYDROCHLORIDE 10 MG: 5 INJECTION INTRAVENOUS at 00:14

## 2019-07-10 RX ADMIN — DILTIAZEM HYDROCHLORIDE 5 MG/HR: 5 INJECTION INTRAVENOUS at 00:34

## 2019-07-10 RX ADMIN — HYDROCORTISONE SODIUM SUCCINATE 50 MG: 100 INJECTION, POWDER, FOR SOLUTION INTRAMUSCULAR; INTRAVENOUS at 14:49

## 2019-07-10 RX ADMIN — VASOPRESSIN 0.5 UNITS: 20 INJECTION INTRAVENOUS at 00:49

## 2019-07-10 RX ADMIN — SODIUM CHLORIDE, POTASSIUM CHLORIDE, SODIUM LACTATE AND CALCIUM CHLORIDE: 600; 310; 30; 20 INJECTION, SOLUTION INTRAVENOUS at 10:19

## 2019-07-10 RX ADMIN — PIPERACILLIN SODIUM,TAZOBACTAM SODIUM 3.38 G: 3; .375 INJECTION, POWDER, FOR SOLUTION INTRAVENOUS at 09:20

## 2019-07-10 RX ADMIN — CHLORHEXIDINE GLUCONATE 15 ML: 1.2 RINSE ORAL at 07:59

## 2019-07-10 RX ADMIN — VASOPRESSIN 0.5 UNITS: 20 INJECTION INTRAVENOUS at 00:37

## 2019-07-10 RX ADMIN — INSULIN ASPART 1 UNITS: 100 INJECTION, SOLUTION INTRAVENOUS; SUBCUTANEOUS at 21:43

## 2019-07-10 RX ADMIN — PIPERACILLIN SODIUM,TAZOBACTAM SODIUM 2.25 G: 2; .25 INJECTION, POWDER, FOR SOLUTION INTRAVENOUS at 02:52

## 2019-07-10 RX ADMIN — DILTIAZEM HYDROCHLORIDE 5 MG/HR: 5 INJECTION INTRAVENOUS at 01:52

## 2019-07-10 RX ADMIN — HYDROMORPHONE HYDROCHLORIDE 0.5 MG: 1 INJECTION, SOLUTION INTRAMUSCULAR; INTRAVENOUS; SUBCUTANEOUS at 06:27

## 2019-07-10 RX ADMIN — HYDROMORPHONE HYDROCHLORIDE 0.5 MG: 1 INJECTION, SOLUTION INTRAMUSCULAR; INTRAVENOUS; SUBCUTANEOUS at 02:51

## 2019-07-10 RX ADMIN — PIPERACILLIN SODIUM,TAZOBACTAM SODIUM 3.38 G: 3; .375 INJECTION, POWDER, FOR SOLUTION INTRAVENOUS at 14:55

## 2019-07-10 RX ADMIN — Medication 1 MCG/KG/MIN: at 08:52

## 2019-07-10 RX ADMIN — HYDROCORTISONE SODIUM SUCCINATE 50 MG: 100 INJECTION, POWDER, FOR SOLUTION INTRAMUSCULAR; INTRAVENOUS at 20:45

## 2019-07-10 ASSESSMENT — ACTIVITIES OF DAILY LIVING (ADL)
ADLS_ACUITY_SCORE: 13

## 2019-07-10 ASSESSMENT — LIFESTYLE VARIABLES: TOBACCO_USE: 1

## 2019-07-10 NOTE — ANESTHESIA POSTPROCEDURE EVALUATION
Patient: Bradley Liz    Procedure(s):  Laparoscopic cholecystectomy    Diagnosis:unknown  Diagnosis Additional Information: No value filed.    Anesthesia Type:  General, RSI, ETT    Note:  Anesthesia Post Evaluation    Patient location during evaluation: ICU  Patient participation: Unable to evaluate secondary to administered sedation  Pain management: unable to assess  Airway patency: patent  Cardiovascular status: acceptable  Respiratory status: ETT and ventilator  Hydration status: acceptable  PONV: unable to assess     Anesthetic complications: None    Comments: Pt transferred to the ICU intubated and sedated with propofol.  Pt in Afib with RVR the majority of the procedure, but able to rate control at the end with diltiazem.  He was on a little bit of phenylephrine to maintain BP (avoided norepinephrine given Afib).  Given his age and hemodynamic instability during the case, along with his underlying disease, decision made with the surgeon to leave him intubated and transferred to the ICU.          Last vitals:  Vitals:    07/09/19 2130 07/09/19 2145 07/09/19 2200   BP: 94/66 92/80 96/73   Pulse: 134 133 142   Resp: 27 27 28   Temp:      SpO2: 97% 96% 96%         Electronically Signed By: Ravindra Coon MD  July 10, 2019  1:21 AM

## 2019-07-10 NOTE — PLAN OF CARE
Patient arrived to unit around 2000. BP was soft. 5 L NC. Pain rated 4/10. Disoriented to year. Transferred to Pre-op around 2220. Returned to unit 0130. Sedated and intubated. BP supported by 3 pressors. Diltiazem stopped. Sedation held for about 30 minutes. Patient woke, started following commands, sedation re-started at lower dose. A. Fib CVR/RVR. Lung sounds coarse/diminished. Minimal secretions from ETT. NG to low intermittent suction with thick, dark red/brown output. NG flushed multiple times to maintain patency. Abdominal incisions CDI. MATT drain dressing reinforced for serosanguinous drainage. Hypoactive bowel sounds. Allen patent with low urine output, MD aware.

## 2019-07-10 NOTE — ANESTHESIA PROCEDURE NOTES
ARTERIAL LINE PROCEDURE NOTE:   Pre-Procedure  Performed by Ravindra Coon MD  Location: pre-op      Pre-Anesthestic Checklist: patient identified, IV checked, risks and benefits discussed, informed consent and pre-op evaluation    Timeout  Correct Patient: Yes   Correct Procedure: Yes   Correct Site: Yes   Correct Laterality: Yes   Correct Position: Yes   Site Marked: N/A   .   Procedure Documentation  Procedure: arterial line    Supine  Insertion Site:radial, right.Skin infiltrated with mL of 1% lidocaine. Injection technique: ultrasound guided  .  Artery evaluated via ultrasound confirming patency.  Using realtime imaging the artery was punctured and needle was observed entering artery..  Patient Prep;chlorhexidine gluconate and isopropyl alcohol    Assessment/Narrative    Catheter: 20 gauge, 1.75 in/4.5 cm quick cath (integral wire)     Secured by other  Tegaderm dressing used.    Arterial waveform: Yes     Comments:  Ultrasound used for placement (no image)  Secured with adhesive spray, tegaderm, and tape

## 2019-07-10 NOTE — ANESTHESIA PREPROCEDURE EVALUATION
Anesthesia Pre-Procedure Evaluation    Patient: Bradley Liz   MRN: [id number] : 1940          Preoperative Diagnosis: unknown    Procedure(s):  Laparoscopic cholecystectomy    History reviewed. No pertinent past medical history.  History reviewed. No pertinent surgical history.    Anesthesia Evaluation     . Pt has not had prior anesthetic            ROS/MED HX    ENT/Pulmonary:     (+)tobacco use (50yr history), , . .   (-) sleep apnea   Neurologic:     (+)CVA date:  with deficits- Some right sided weakness,     Cardiovascular:     (+) Dyslipidemia, hypertension-range: controlled, ---. : . . . :. .       METS/Exercise Tolerance:     Hematologic:         Musculoskeletal:         GI/Hepatic:     (+) cholecystitis/cholelithiasis, Other GI/Hepatic septic from cholecystitis     (-) GERD   Renal/Genitourinary:         Endo:         Psychiatric:         Infectious Disease:         Malignancy:         Other:                          Physical Exam      Airway   Mallampati: III  TM distance: >3 FB  Neck ROM: full    Dental   (+) chipped    Cardiovascular   Rhythm and rate: irregular and abnormal      Pulmonary    breath sounds clear to auscultation            Lab Results   Component Value Date    WBC 22.3 (H) 2019    HGB 17.9 (H) 2019    HCT 51.7 2019     2019    SED 10 2009     (H) 2019    POTASSIUM 4.4 2019    CHLORIDE 111 (H) 2019    CO2 23 2019    BUN 52 (H) 2019    CR 2.55 (H) 2019     (H) 2019    DARON 8.6 2019    MAG 2.2 2017    ALBUMIN 2.9 (L) 2019    PROTTOTAL 6.3 (L) 2019     (HH) 2019     (H) 2019    ALKPHOS 290 (H) 2019    BILITOTAL 6.5 (H) 2019    LIPASE 34 (L) 2019    PTT  2008     Test canceled - Lab  error  CHELSIE [id number]  CORRECTED ON  AT 0759: PREVIOUSLY REPORTED AS 41    INR 3.38 (H) 2019    TSH 1.334  "01/27/2018       Preop Vitals  BP Readings from Last 3 Encounters:   07/09/19 96/73   07/09/19 90/72   10/10/18 113/66    Pulse Readings from Last 3 Encounters:   07/09/19 142   07/09/19 147   10/10/18 104      Resp Readings from Last 3 Encounters:   07/09/19 28   07/09/19 28   08/24/18 18    SpO2 Readings from Last 3 Encounters:   07/09/19 96%   07/09/19 92%   10/10/18 95%      Temp Readings from Last 1 Encounters:   07/09/19 37.1  C (98.8  F) (Oral)    Ht Readings from Last 1 Encounters:   07/09/19 1.753 m (5' 9\")      Wt Readings from Last 1 Encounters:   07/09/19 88.5 kg (195 lb)    Estimated body mass index is 28.8 kg/m  as calculated from the following:    Height as of an earlier encounter on 7/9/19: 1.753 m (5' 9\").    Weight as of an earlier encounter on 7/9/19: 88.5 kg (195 lb).       Anesthesia Plan      History & Physical Review  History and physical reviewed and following examination; no interval change.    ASA Status:  4 emergent.    NPO Status:  > 8 hours    Plan for General, RSI and ETT with Intravenous induction. Maintenance will be Balanced.      Additional equipment: Videolaryngoscope, 2nd IV, Arterial Line and Central Line Pre induction Sera  Post induction central line  Bolus IVF until labs improve  Order FFP        Postoperative Care  Postoperative pain management:  IV analgesics.      Consents  Anesthetic plan, risks, benefits and alternatives discussed with:  Patient..                 Ravindra Coon MD  "

## 2019-07-10 NOTE — H&P
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     CT showing for bowel necrosis with possible SBO and portal venous gas and pneumatosis of the gastric body and fundus.     At presentation he is afebrile with a temperature 98.8, and atrial fibrillation with heart rate of 131 blood pressure 103/71 respirations 25 oxygen 91% on room air, his lactic acid is only 2.3 but white blood cell count is 25 hemoglobin 20 platelets 397 creatinine 2.73 up from a baseline of 1.8 BUN 52.  Liver panel reveals bilirubin of 6.5 with ALT of 620 AST of 395.  INR was corrected for hemoglobin at 3.5.     Ultrasound revealed cholelithiasis and gallbladder's sludge with diffuse gallbladder wall thickening.  Common bile talked was only 9 mm.      Sepsis with concern for bowel ischemia - suspect possible small bowel obstruction verses cholecystitis. He does have an inguinal hernia but per exam it does not appear that he has any bowel incarcerated in the hernia. No history of abdominal surgery.   - General surgery was consulted and I discussed the case with Dr. Peck who came to bedside to examine is recommending urgent exploratory laparotomy.   - We will reverse INR with Kcentra, vitamin K 10 mg IV  - He received Zosyn in the outside ER and will continue this.   - S/p 3 L lactated Ringer's, blood pressures stable, start IVF @ 200 ml/h as long as respiratory status stable (CXR clear).     Possible Cholecystitis with gallstones.   Acute hepatitis with hyperbilirubinemia,  without evidence of CBD obstruction  -liver enzymes elevated as above.  Bilirubin was previously 9.9 at outside hospital, now 6.5.  Ultrasound showed stones, sludge and distended GB but does not reveal an obstruction.  Discussed the case with Dr. West.  Bilirubin and liver enzyme elevation is likely secondary to portal venous air from bowel necrosis.  Agrees with surgical consult.  3.9 cm nonspecific hyperechoic focus in the posterior right lobe of the liver without abnormality on noncontrast CT.   - F/u CT with contrast recommended to further evaluate when Cr improved.      Hypoxia - No documented hypoxia but patient is currently on 4 L satting at 91% . Exam reveals bilateral rhonchi.  Chest x-ray personally reviewed reveals some platelike atelectasis in the right lower lung fields but otherwise clear without evidence of CHF or infiltrate.  He has a history of smoking quit 10 years ago.  -Continue oxygen to keep saturations > 90%   -IS post surgery.     Acute kidney injury -creatinine 3.01 at presentation outside hospital (baseline 0.8) this is secondary to sepsis with dehydration and is improving with IV fluids  Mild hyperkalemia (5.4)   -Continue to follow with IV fluid hydration avoid potassium and IV fluids.  Recent Labs   Lab 07/09/19 2043 07/09/19  1337   CR 2.73* 3.01*       A. fib with rapid ventricular rate, h/o stroke in 2008 on Coumadin with INR 3.5  -IV metoprolol ordered 5 mg every 6 hours with hold parameters.  -Holding PTA metoprolol 200 mg daily  -Reversal of coumadin as above. Resume anticoagulant, consider DOAC, as soon as cleared by surgery given h/o stroke.      Polycythemia and thrombocytosis, resolving likely secondary to dehydration acute stress.   Recent Labs   Lab 07/09/19 2043 07/09/19  1337   HGB 19.9* 20.5*   WBC 24.6* 24.6*    502*       History of probable tachycardia mediated cardiomyopathy patient has a history of a low EF of 35 to 40%.  Subsequent echocardiograms showed  normalization to 50 to 55%.  Patient denies any history of fluid overload.  He had a nuclear stress test which revealed no evidence of ischemia. it is unclear whether he still takes Lasix.     Hyperlipidemia  -Holding PTA Crestor     Diet: NPO for Medical/Clinical Reasons Except for: Meds    DVT Prophylaxis: Pneumatic Compression Devices  GI prophylaxis: Ranitidine.   Allen Catheter: not present    Disposition Plan   Expected discharge: 4 - 7 days,   Entered: Roseanna Renee MD 07/09/2019, 9:02 PM     The patient's care was discussed with the Patient, Patient's Family and surgery and GI  Consultant.    Roseanna Renee MD  [facility]    Time Spent on this Encounter   Bradley was seen and evaluated by me on 07/09/19.  He was in critical condition as the result of acute abdomen with sepsis.    His condition is now stable but needs urgent surgery.     Total Critical Care time spent by me, excluding procedures, was 70 minutes.    Roseanna Renee MD      ______________________________________________________________________    Chief Complaint   Abdominal pain distention nausea vomiting    History is obtained from the patient    History of Present Illness   Bradley Liz is a 78 year old male with history of persistent atrial fibrillation on Coumadin who is transferred from outside hospital due to concern for bowel necrosis with sepsis.  Patient woke up yesterday and had 2 episodes of episodes of coffee-ground emesis.  He subsequently had severe back pain throughout the day and was not able to move very well.  This then resolved.  He continues to have distention mild diffuse abdominal pain.  He had further nausea with coffee-ground emesis this morning and once again in the ED where he presented.     CT showed for bowel necrosis with possible SBO and portal venous gas and pneumatosis of the gastric body and fundus.     Ultrasound revealed cholelithiasis and gallbladder's sludge with diffuse gallbladder  wall thickening.  Common bile talked was only 9 mm.      At presentation to [facility] he is afebrile with a temperature 98.8, and atrial fibrillation with heart rate of 131 blood pressure 103/71 respirations 25 oxygen 91% on room air, his lactic acid is only 2.3 but white blood cell count is 25 hemoglobin 20 platelets 397 creatinine 2.73 (baseline 0.8)  BUN 52.  Liver panel reveals bilirubin of 6.5 (9.9 at outside hospital) with ALT of 620 AST of 395.  INR was corrected for hemoglobin at 3.5.     Review of Systems    The 10 point Review of Systems is negative other than noted in the HPI.     Past Medical History    Atrial fibrillation on coumadin  CVA in 2008  Nonischemic cardiomyopathy without history of decompensation. Prior NM stress test showed no evidence of ischemia. EF has normalized.   HTN  HLD      Past Surgical History   I have reviewed this patient's surgical history and updated it with pertinent information if needed.  No past surgical history on file.    Social History   I have reviewed this patient's social history and updated it with pertinent information if needed.  Social History     Tobacco Use     Smoking status: Former Smoker     Years: 50.00     Last attempt to quit: 12/5/2008     Years since quitting: 10.5     Smokeless tobacco: Never Used   Substance Use Topics     Alcohol use: Yes     Comment: couple beers occ     Drug use: No       Family History   I have reviewed this patient's family history and updated it with pertinent information if needed.   Family History   Problem Relation Age of Onset     Unknown/Adopted Mother      Prostate Cancer Father      C.A.D. Father         age 77       Prior to Admission Medications   Prior to Admission Medications   Prescriptions Last Dose Informant Patient Reported? Taking?   ASPIRIN NOT PRESCRIBED, INTENTIONAL,  Self Yes No   Sig: by Other route continuous prn.   furosemide (LASIX) 20 MG tablet Past Month at Unknown time Self No Yes   Sig: Take 1 tablet (20  mg) by mouth daily   metoprolol succinate (TOPROL-XL) 200 MG 24 hr tablet 2019 at am Self No Yes   Sig: Take 1 tablet (200 mg) by mouth daily   order for DME  Self No No   Sig: Please draw INR as ordered and as needed. Call results to [facility] at p) [phone] or fax them to (f) [phone]   rosuvastatin (CRESTOR) 5 MG tablet Past Month at Unknown time Self No Yes   Sig: Take 1 tablet (5 mg) by mouth daily   warfarin (COUMADIN) 5 MG tablet 2019 at am Self Yes Yes   Si mg Sun, Tues, Thurs; 2.5 mg all other days or as directed by [facility]      Facility-Administered Medications: None     Allergies   No Known Allergies    Physical Exam   Vital Signs: Temp: 98.8  F (37.1  C) Temp src: Oral BP: 111/76 Pulse: 135 Heart Rate: 138 Resp: 25 SpO2: 96 % O2 Device: Nasal cannula Oxygen Delivery: 5 LPM  Weight: 0 lbs 0 oz    Constitutional:   awake, alert, cooperative, no apparent distress, and appears stated age     Eyes:   Lids and lashes normal, pupils equal, round and reactive to light, extra ocular muscles intact, sclera clear, conjunctiva normal     ENT:   Normocephalic, without obvious abnormality, atramatic, oral pharynx with moist mucus membranes, tonsils without erythema or exudates.      Neck:   Supple, symmetrical, trachea midline, no adenopathy, thyroid symmetric, not enlarged and no tenderness, skin normal     Lungs:   Mild increased work of breathing, decreased air exchange, bilateral rhonchi, no crackles or wheezing     Cardiovascular:   Irregular rate and rhythm, normal S1 and S2, no S3 or S4, and no murmur noted. Extremities are warm. No edema.      Abdomen:   Decreased bowel sounds, distended with diffuse tenderness throughout the upper abdomen with deep palpation, no guarding or rebound, palpable R inguinal hernia without tenderness, no masses palpated, no hepatosplenomegally     Musculoskeletal:   There is no redness, warmth, or swelling of the joints. Normal build.       Neurologic:   Awake, alert, oriented to name, place and time.  Cranial nerves II-XII are grossly intact.  Moving a 4 extremities without gross focal weakness.     Neuropsychiatric:   General: normal, calm and normal eye contact     Skin:   no bruising or bleeding, no redness, warmth, or swelling and no rashes       Data   Data reviewed today: I reviewed all medications, new labs and imaging results over the last 24 hours. I personally reviewed the chest x-ray image(s) showing as above  and the chest CT image(s) showing as above.    Recent Labs   Lab 07/09/19  2212 07/09/19  2043 07/09/19  1458 07/09/19  1337   WBC  --  24.6*  --  24.6*   HGB  --  19.9*  --  20.5*   MCV  --  93  --  93   PLT  --  397  --  502*   INR 3.38*  --  Special tube used to correct for high hematocrit Canceled, Test credited   NA  --  142  --  141   POTASSIUM  --  5.4*  --  4.8   CHLORIDE  --  106  --  107   CO2  --  30  --  24   BUN  --  52*  --  43*   CR  --  2.73*  --  3.01*   ANIONGAP  --  6  --  10   DARON  --  9.5  --  10.3*   GLC  --  117*  --  150*   ALBUMIN  --  2.9*  --  3.6   PROTTOTAL  --  6.3*  --  7.4   BILITOTAL  --  6.5*  --  9.9*   ALKPHOS  --  290*  --  325*   ALT  --  620*  --  792*   AST  --  395*  --  569*   LIPASE  --   --   --  34*     Recent Results (from the past 24 hour(s))   CT Abdomen Pelvis w/o Contrast   Result Value    Radiologist flags (AA)     Gastric pneumatosis and small bowel obstruction with    Narrative    CT ABDOMEN AND PELVIS WITHOUT CONTRAST 7/9/2019 3:08 PM     HISTORY: Abdominal distension. Abdominal pain, diverticulitis  suspected.     TECHNIQUE: Axial images from the lung bases to the upper thighs are  performed with additional coronal reformatted images. No contrast is  utilized. Radiation dose for this scan was reduced using automated  exposure control, adjustment of the mA and/or kV according to patient  size, or iterative reconstruction technique.    FINDINGS:     Calcified granulomas noted in  the right middle lobe. Lung bases are  otherwise clear.    Abdomen: Pneumatosis is noted in the region of the fundus of the  stomach extending down towards the gastric body. This does not appear  to affect the gastric antrum. Air is noted in the portal veins  concerning for necrotic bowel. Additionally there is a calcified  gallstone in the gallbladder on image 37 with marked gallbladder wall  thickening concerning for acute cholecystitis. The spleen, pancreas,  adrenal glands and kidneys are unremarkable. No hydronephrosis or  renal calculi. Left renal cortical cyst is noted of doubtful clinical  significance on series 2, image 38 measuring 2 cm in diameter. No  enlarged lymph nodes. Aorta demonstrates calcified plaque without  aneurysm. Numerous loops of dilated small bowel are present with  air-fluid levels concerning for small bowel obstruction. Distal small  bowel is decompressed as is the colon. No diverticulitis. Appendix is  normal. Transition point is at the level of a large right inguinal  hernia containing loops of small bowel. The small bowel loops in the  right inguinal hernia show no evidence of wall thickening or  incarceration. This in combination with the portal venous gas is  concerning for bowel ischemia and possible necrosis. Questionable  additional transition point in the mid abdomen involving a loop of  small bowel just proximal to the bowel loop extending down towards the  hernia and is noted on series 2, image 56. Pneumatosis of the stomach  is of uncertain etiology but can be seen in patients with diabetes  therefore correlation with patient's medical history would be helpful.    Pelvis: Large right inguinal hernia contains loops of small bowel as  described above. The bladder, prostate and rectum are unremarkable. No  enlarged pelvic lymph nodes or free fluid. Bone window examination  demonstrates degenerative spine changes.      Impression    IMPRESSION:  1. Multiple acute changes are  noted in the abdomen and pelvis. There  appears to be a small bowel obstruction due to a right inguinal hernia  containing multiple loops of small bowel. This causes dilatation of  the more proximal small bowel in the abdomen. Bowel loops in the  hernia do not appear significantly abnormal and no surrounding  inflammation is evident in the mesenteric fat involving these inguinal  bowel loops. However, there is a questionable, more proximal area of  stenosis in the region of the small bowel possibly due to an adhesion.  2. Portal venous gas highly concerning for bowel necrosis. Pneumatosis  is noted in the region of the gastric body and fundus. This can be  seen in patients with underlying diabetes with a more benign  prognosis, but with the presence of portal venous gas gastric wall  necrosis must be considered.  3. CT findings suspicious for acute cholecystitis.  4. Simple left renal cyst.    [Critical Result: Gastric pneumatosis and small bowel obstruction with  portal venous gas]    Finding was identified on 7/9/2019 3:28 PM.     Dr. Ashford was contacted by me on 7/9/2019 3:41 PM and verbalized  understanding of the critical result.     ARIANA GARCÍA MD   Abdomen US, limited (RUQ only)    Narrative    US ABDOMEN LIMITED 7/9/2019 4:21 PM    HISTORY: Abnormal gallbladder on CT.    COMPARISON: CT abdomen and pelvis 7/9/2019 at 1448.    FINDINGS:  Gallbladder: Multiple gallstones as well as a large amount of sludge  are seen in the gallbladder lumen. There is abnormal diffuse  thickening of the gallbladder wall, up to 7 mm. A small amount of  pericholecystic fluid is also present.    Common bile duct: Slightly prominent at 9 mm.    Liver: Heterogeneous echotexture. There is a hyperechoic focus in the  posterior right lobe of the liver measuring 2.8 x 2.9 x 3.9 cm. No  definite abnormality was seen on noncontrast CT in this region. A  contrast-enhanced CT would be helpful when the patient is clinically  able. The  ultrasound findings are nonspecific    Pancreas: Completely obscured by overlying bowel gas.    Right kidney: Normal.      Impression    IMPRESSION:   1. Cholelithiasis and gallbladder sludge, diffuse gallbladder wall  thickening and some pericholecystic fluid.  2. Slightly prominent common bile duct.  3. Pancreas completely obscured.  4. 3.9 cm nonspecific hyperechoic focus in the posterior right lobe of  the liver without abnormality on noncontrast CT. Contrast-enhanced CT  is recommended when the patient is clinically able.    EDI VERNON MD   XR Chest Port 1 View    Narrative    PORTABLE CHEST ONE VIEW   7/9/2019 9:40 PM    HISTORY: Hypoxia in patient with sepsis.    COMPARISON: None.      Impression    IMPRESSION: There is mild atelectasis of the right lung base. The  lungs are otherwise clear. No other abnormality is seen.     JIGNESH OGLESBY MD

## 2019-07-10 NOTE — OP NOTE
General Surgery Operative Note    PREOPERATIVE DIAGNOSIS:  Sepsis, possible cholecystitis, possible gastric necrosis    POSTOPERATIVE DIAGNOSIS:  Severe gangrenous cholecystitis    PROCEDURE:   Procedure(s):  Laparoscopic subtotal cholecystectomy, (D-1 severe gangrene)    ANESTHESIA:  General.      SURGEON:  Ad Peck MD    ASSISTANT:  Seema Monique MD The physician  was medically necessary for their expertise in camera management, suctioning, and retraction    INDICATIONS:  The patient has signs of sepsis and a worrisome scan for cholecystitis or gastric ischemia. The risks, including but not limited to bleeding, infection, bile duct or bowel injury, anesthesia, and the possible need for an open approach were reviewed. The patient appeared to understand and wished to proceed with operation.    PROCEDURE:  The patient was taken to the operating suite.  The operative area was prepped and draped in a sterile fashion.  Surgeon initiated timeout was acknowledged.      Under general anesthesia the abdomen was insufflated through a supraumbilical incision with a veress needle. Over 3 liters were place with low pressures. A 5mm trocar was placed. There was no injury seen when the camera was placed. Under direct vision two 5mm trocars were placed in the right upper quadrant and an 11mm trocar placed below the xiphoid. The stomach was distended but viable. The bowel was inflamed but viable. The gallbladder was gangrenous.The gallbladder was grasped. It was friable. I tried to drain it, only 20cc of black tar came out. I tried to dissect out the lower part of the gallbladder, it fell apart. I took it down with cautery from the top down. I got to where I could not be certain where the critical structures lay. I had most of the gallbladder free, so I stapled the small remaining gallbladder with a 45 green load. The gallbladder was set aside and hemostasis assured on the liver. Irrigation was used and suctioned out. The bed  was dry and the clips were intact. The gallbladder was placed into a bag and  removed through the larger incision, which was enlarged as needed to permit passage of the gallbladder. We then irrigated again, and saw no sign of blood of bile leak. We checked for veress needle injury, there was none. The large trocar was removed and the fascia closed with 0 Vicryl. Marcaine was instilled. Gas was suctioned out. Trocars were removed. Sponge count was reported as correct. All incisions were closed with 4-0 Vicryl and steri-strips.            INTRAOPERATIVE FINDINGS:  Gangrenous cholecystitis    Ad Peck

## 2019-07-10 NOTE — ANESTHESIA CARE TRANSFER NOTE
Patient: Bradley Liz    Procedure(s):  Laparoscopic cholecystectomy    Diagnosis: unknown  Diagnosis Additional Information: No value filed.    Anesthesia Type:   General, RSI, ETT     Note:  Airway :Ventilator and ETT  Patient transferred to:ICU  Comments: Level of Conscious:Intubated, sedated on propofol gtt  Vital Signs   BP:105/59   HR:79   RR:12, , PEEP 5, FiO2 0.7   O2 Saturation:99  Dentition:Unchanged from preop  Patient Status:Stable on nam gtt  Report to ICU RN.ICU Handoff: Call for PAUSE to initiate/utilize ICU HANDOFF, Identified Patient, Identified Responsible Provider, Reviewed the Pertinent Medical History, Discussed Surgical Course, Reviewed Intra-OP Anesthesia Management and Issues during Anesthesia, Set Expectations for Post Procedure Period and Allowed Opportunity for Questions and Acknowledgement of Understanding      Vitals: (Last set prior to Anesthesia Care Transfer)    CRNA VITALS  7/10/2019 0037 - 7/10/2019 0120      7/10/2019             Ht Rate:  120    Resp Rate (set):  10                Electronically Signed By: Christine Marie Volp Hodgkins, CRNA, APRN CRNA  July 10, 2019  1:20 AM

## 2019-07-10 NOTE — PROGRESS NOTES
Surgery Progress Note  7/10/19    POD#0 from laparoscopic cholecystectomy for gangrenous cholecystitis.    S:  Intubated and sedated. Septic.  He is on a significant amount of pressor.  Afib appears rate controlled.    O: Awakens, follows commands.  Abdomen soft, minimally distended, expected tenderness to palpation  R inguinal hernia stable, non tender  Incisions clean and dry except for s/s drainage from RUQ MATT drain to bulb suction.  There is some drainage from the drain tract itself.  Drain stripped.    Orange urine in Allen bag.    Lactate has normalized.  WBC relatively stable 24K.    Hgb reflective of hemoconcentration.  INR 1.4  Cr 2.27 downtrending. K 4.8.  ALT/AST downtrending, with ongoing elevation tBili.     A/P:  Patient is critically ill.  Sepsis 2/2 gangrenous cholecystitis with significant peritoneal contamination at risk for cyst duct/artery leak.  Also likely ileus.  Low suspicion for obstructive process of the bowel related to his hernia at this time.  -- Appreciate ongoing ICU cares, pressor weaning as tolerated.  -- Continue zosyn.  Follow-up on intra-op cultures as available.  -- Continue to trend LFTs.  -- Continue NG to LIS.  NPO until ROBF.  -- RUQ MATT drain to bulb suction.  Please record output and quality.  Monitoring for biliary or cystic artery stump leak.  Please notify surgery with any concerning changes.    Will discuss further with Dr. Peck/on call surgery staff today.    Seema Monique, PGY4 General Surgery Resident

## 2019-07-10 NOTE — PROGRESS NOTES
[facility] ICU RESPIRATORY NOTE        Date of Admission: 7/9/2019    Date of Intubation (most recent): 7/9/2019    Reason for Mechanical Ventilation: Protect airway    Number of Days on Mechanical Ventilation: 1    Met Criteria for Pressure Support Trial: No    Reason for No Pressure Support Trial: Per MD    Significant Events Today: None    ABG Results:   Recent Labs   Lab 07/09/19  2300   PH 7.39   PCO2 36   PO2 115*   HCO3 22       Current Vent Settings: Ventilation Mode: CMV/AC  (Continuous Mandatory Ventilation/ Assist Control)  FiO2 (%): 75 %  Rate Set (breaths/minute): 12 breaths/min  Tidal Volume Set (mL): 550 mL  PEEP (cm H2O): 5 cmH2O  Oxygen Concentration (%): 75 %  Resp: 12      Skin Assessment: Intact      Plan: Continue to provide ventilatory support and assess ability to wean     Jesse Mathews

## 2019-07-10 NOTE — BRIEF OP NOTE
[facility]    Brief Operative Note    Pre-operative diagnosis: cholecystitis  Post-operative diagnosis * No post-op diagnosis entered *same  Procedure: Procedure(s):  Laparoscopic cholecystectomy  Surgeon: Surgeon(s) and Role:     * Ad Peck MD - Primary     * Seema Monique MD - Assisting  Anesthesia: * No anesthesia type entered *   Estimated blood loss: Less than 100 ml  Drains: Anil-Chun  Specimens:   ID Type Source Tests Collected by Time Destination   1 : Bile Fluid Other ANAEROBIC BACTERIAL CULTURE, FLUID CULTURE AEROBIC BACTERIAL, FUNGUS CULTURE Ad Peck MD 7/10/2019 12:10 AM    A :  Tissue Gallbladder and Contents SURGICAL PATHOLOGY EXAM Ad Peck MD 7/10/2019 12:42 AM      Findings:   None.  Complications: None.  Implants:  * No implants in log *

## 2019-07-10 NOTE — PROGRESS NOTES
Afebrile, on pressors and vent. Drain output serous. NG output getting more thin  OK to anti-coagulate. Hold on nutrition for now.

## 2019-07-10 NOTE — CONSULTS
[facility] Consultation/H&P    Bradley Liz MRN#: [id number]   Age: 78 year old YOB: 1940     Date of Admission:          7/9/2019  Reason for consult/H&P: Abdominal pain   Surgeon:      Ad Peck MD                  Chief Complaint:   vomiting         History of Present Illness:   This patient is a 78 year old  male who presented to the [facility] ER with transfer from [facility]. Denies fever, chills, change in BM or urination. He has known gallstones. He has a known hernia. No recent trauma, no jaundice, no cancers.  Denies having any previous episodes or abdominal surgery. History is obtained from the patient and chart.         Past Medical History:    has no past medical history on file. Complex:  Stroke, A-fib          Past Surgical History:   No past surgical history on file.         Medications:     Prior to Admission medications    Medication Sig Start Date End Date Taking? Authorizing Provider   furosemide (LASIX) 20 MG tablet Take 1 tablet (20 mg) by mouth daily 8/24/18  Yes Alex Mustafa MD   metoprolol succinate (TOPROL-XL) 200 MG 24 hr tablet Take 1 tablet (200 mg) by mouth daily 8/24/18  Yes Alex Mustafa MD   rosuvastatin (CRESTOR) 5 MG tablet Take 1 tablet (5 mg) by mouth daily 8/24/18  Yes Alex Mustafa MD   warfarin (COUMADIN) 5 MG tablet 5 mg Sun, Tues, Thurs; 2.5 mg all other days or as directed by [facility] 9/21/18  Yes Alex Mustafa MD   ASPIRIN NOT PRESCRIBED, INTENTIONAL, by Other route continuous prn. 9/14/11   Alex Mustafa MD   order for DME Please draw INR as ordered and as needed. Call results to [facility] at (p) [phone] or fax them to (f) [phone] 12/22/17   Alex Mustafa MD            Allergies:   No Known Allergies         Social History:     Social History     Tobacco Use     Smoking status: Former Smoker     Years: 50.00     Last attempt to quit: 12/5/2008     Years since quitting: 10.5     Smokeless  tobacco: Never Used   Substance Use Topics     Alcohol use: Yes     Comment: couple beers occ             Family History:    This patient has no significant relevant family history.  Family history is reviewed in detail.          Review of Systems:   Complete ROS is negative other than noted in the HPI.  C: NEGATIVE for fever, chills, change in weight  R: NEGATIVE for significant cough or SOB  CV: NEGATIVE for chest pain, palpitations or peripheral edema  GI:  NEGATIVE for dysuria, heartburn, or change in bowel habits  H: NEGATIVE for bleeding problems         Physical Exam:   Blood pressure 92/80, pulse 133, temperature 98.8  F (37.1  C), temperature source Oral, resp. rate 27, SpO2 96 %.  No intake/output data recorded.    General - This is a well developed, well nourished male .  HEENT - Normocephalic. Atraumatic. Moist mucous membranes. Pupils equal.  No scleral icterus. Nose normal.  Neck - Supple without masses. No cervical adenopathy or thyromegaly  Lungs - Breathing not labored  Chest - Not tender. CVA's nontender  Heart - Regular rate & rhythm   Abdomen - He is fairly tender, upper more than lower. Both sides fairly equal. No point RUQ tenderness, nondistended with +bowel sounds, no organomegaly.  Extremities - Moves all extremities. Warm without edema. Pulses noted  Neurologic - Nonfocal. Alert and oriented          Data:   Labs:  Recent Labs   Lab Test 07/09/19 1337 05/09/17  1336   WBC 24.6* 8.6   HGB 20.5* 17.5   HCT 64.1* 53.9*   * 268     Recent Labs   Lab Test 07/09/19 2043 07/09/19 1337 03/06/19 08/24/18  1014   POTASSIUM 5.4* 4.8 4.7 4.9   CHLORIDE 106 107  --  102   CO2 30 24  --  29   BUN 52* 43*  --  14   CR 2.73* 3.01* 0.81 0.92     Recent Labs   Lab Test 07/09/19 2043 07/09/19 1337 05/09/17  1336   BILITOTAL 6.5* 9.9* 0.5   * 792* 25   * 569* 18   ALKPHOS 290* 325* 82   LIPASE  --  34*  --      Recent Labs   Lab Test 07/09/19  1458 07/09/19 1337 06/07/19   INR  Special tube used to correct for high hematocrit Canceled, Test credited 2.3*     Recent Labs   Lab Test 07/09/19 2043 07/09/19 1337 08/24/18  1014  05/09/17  1336   DARON 9.5 10.3* 8.4*   < > 8.3*   MAG  --   --   --   --  2.2    < > = values in this interval not displayed.     Recent Labs   Lab Test 07/09/19 2043 07/09/19 1337 08/24/18  1014  05/09/17  1336   ANIONGAP 6 10 7   < > 9   ALBUMIN 2.9* 3.6  --   --  3.2*    < > = values in this interval not displayed.       CT scan of the abdomen: images reviewed in detail. Intramural gastric air. Thick gallbladder. Large gallstones. Right hernia with bowel, very wide mouthed, not obstructing    Ultrasound of the abdomen: gallstones, CBD 9mm. Thick GB wall.                Assessment:   Cholecystitis, gallstones, possible ischemic stomach. Asymptomatic hernia. Bili up.          Plan:    laparoscopy, possible open exploration. Possible cholecystectomy, possible gastric or bowel resection. High risk. Anti-coagulated. Will start to reverse warfarin. Discussed with patient and family. He appears to understand and wishes to proceed.        I have discussed the history, physical, and plan with the patient.   Ad Peck M.D.

## 2019-07-10 NOTE — ED PROVIDER NOTES
History     Chief Complaint   Patient presents with     Generalized Weakness     bilateral legs     HPI  Bradley Liz is a 78 year old male who presents from home with daughter-in-law, 2 days of vomiting, anorectic.  Denies associated fever.  No hematemesis or coffee-ground emesis.  He is chronically anticoagulated on Coumadin secondary to atrial fibrillation.  Denies black or bloody stool.  No prior abdominal surgery.  Denies cough chest pain or shortness of air.  Generalized weakness inability to get up out of his chair today prompting presentation here for further evaluation.  No headache, focal neurologic change, visual change, difficulty speaking or swallowing.    Allergies:  No Known Allergies    Problem List:    Patient Active Problem List    Diagnosis Date Noted     Cholangitis 07/09/2019     Priority: Medium     Acute on chronic systolic congestive heart failure (H) 08/27/2018     Priority: Medium     Atrial fibrillation, unspecified type (H) 05/15/2017     Priority: Medium     Long term current use of anticoagulant therapy 03/24/2015     Priority: Medium     Problem list name updated by automated process. Provider to review       CARDIOVASCULAR SCREENING; LDL GOAL LESS THAN 100 10/31/2010     Priority: Medium     Completed stroke (H) 01/06/2009     Priority: Medium     L hemisphere 12/2008    Diagnosis updated by automated process. Provider to review and confirm.          Past Medical History:    History reviewed. No pertinent past medical history.    Past Surgical History:    History reviewed. No pertinent surgical history.    Family History:    Family History   Problem Relation Age of Onset     Unknown/Adopted Mother      Prostate Cancer Father      C.A.D. Father         age 77       Social History:  Marital Status:   [2]  Social History     Tobacco Use     Smoking status: Former Smoker     Years: 50.00     Last attempt to quit: 12/5/2008     Years since quitting: 10.5     Smokeless tobacco:  "Never Used   Substance Use Topics     Alcohol use: Yes     Comment: couple beers occ     Drug use: No        Medications:      No current outpatient medications on file.      Review of Systems  All other systems reviewed and are negative.    Physical Exam   BP: 129/74  Pulse: 101  Heart Rate: 59  Temp: 97.6  F (36.4  C)  Resp: 16  Height: 175.3 cm (5' 9\")  Weight: 88.5 kg (195 lb)  SpO2: 93 %      Physical Exam  Nontoxic appearing no respiratory distress alert and oriented ×3  Head atraumatic normocephalic  Conjunctiva noninjected, oropharynx moist without lesions or erythema  Neck supple full active painless range of motion  Lungs clear to auscultation  Heart regular no murmur  Abdomen soft mildly distended, tympanitic, tenderness bilateral upper quadrants with voluntary guarding no rebound bowel sounds positive no masses or HSM  Generalized weakness, requires 1-2 person assist to go from supine to sitting, unable to stand on his own.  He has 4/5 strength in his extremities when lying on the bed.  Speech is fluent, good eye contact, thought processes are rational  Lower extremities without swelling, redness or tenderness  Pedal pulses symmetrical and strong    ED Course        Procedures               Critical Care time:  none       The patient has signs of Severe Sepsis as evidenced by:    1. 2 SIRS criteria, AND  2. Suspected infection, AND   3. Organ dysfunction: Lactic Acid > 1.9          3 Hour Severe Sepsis Bundle Completion:  1. Initial Lactic Acid Result:   Recent Labs   Lab Test 07/09/19 2043 07/09/19  1337   LACT 2.3* 2.5*     2. Blood Cultures before Antibiotics: No, antibiotics were started prior to blood culture collection because waiting for the blood culture to be collected would have resulted in a delay of 45 minutes or more to the administration of antibiotics.  3. Broad Spectrum Antibiotics Administered: Yes     Anti-infectives (From admission through now)    None        4. 3000 ml of IV " fluids.  Ideal body weight: 70.7 kg (155 lb 13.8 oz)  Adjusted ideal body weight: 77.8 kg (171 lb 8.3 oz)    Severe Sepsis reassessment:  1. Repeat Lactic Acid Level:   2. MAP>65 after initial IVF bolus, will continue to monitor fluid status and vital signs                 Results for orders placed or performed during the hospital encounter of 07/09/19 (from the past 24 hour(s))   CBC with platelets differential   Result Value Ref Range    WBC 24.6 (H) 4.0 - 11.0 10e9/L    RBC Count 6.89 (H) 4.4 - 5.9 10e12/L    Hemoglobin 20.5 (H) 13.3 - 17.7 g/dL    Hematocrit 64.1 (H) 40.0 - 53.0 %    MCV 93 78 - 100 fl    MCH 29.8 26.5 - 33.0 pg    MCHC 32.0 31.5 - 36.5 g/dL    RDW 17.2 (H) 10.0 - 15.0 %    Platelet Count 502 (H) 150 - 450 10e9/L    Diff Method Automated Method     % Neutrophils 81.4 %    % Lymphocytes 9.9 %    % Monocytes 5.6 %    % Eosinophils 0.0 %    % Basophils 0.6 %    % Immature Granulocytes 2.5 %    Nucleated RBCs 0 0 /100    Absolute Neutrophil 20.0 (H) 1.6 - 8.3 10e9/L    Absolute Lymphocytes 2.4 0.8 - 5.3 10e9/L    Absolute Monocytes 1.4 (H) 0.0 - 1.3 10e9/L    Absolute Eosinophils 0.0 0.0 - 0.7 10e9/L    Absolute Basophils 0.2 0.0 - 0.2 10e9/L    Abs Immature Granulocytes 0.6 (H) 0 - 0.4 10e9/L    Absolute Nucleated RBC 0.0    Comprehensive metabolic panel   Result Value Ref Range    Sodium 141 133 - 144 mmol/L    Potassium 4.8 3.4 - 5.3 mmol/L    Chloride 107 94 - 109 mmol/L    Carbon Dioxide 24 20 - 32 mmol/L    Anion Gap 10 3 - 14 mmol/L    Glucose 150 (H) 70 - 99 mg/dL    Urea Nitrogen 43 (H) 7 - 30 mg/dL    Creatinine 3.01 (H) 0.66 - 1.25 mg/dL    GFR Estimate 19 (L) >60 mL/min/[1.73_m2]    GFR Estimate If Black 22 (L) >60 mL/min/[1.73_m2]    Calcium 10.3 (H) 8.5 - 10.1 mg/dL    Bilirubin Total 9.9 (H) 0.2 - 1.3 mg/dL    Albumin 3.6 3.4 - 5.0 g/dL    Protein Total 7.4 6.8 - 8.8 g/dL    Alkaline Phosphatase 325 (H) 40 - 150 U/L     (HH) 0 - 70 U/L     (HH) 0 - 45 U/L   INR   Result  Value Ref Range    INR Canceled, Test credited 0.86 - 1.14   Lactic acid whole blood   Result Value Ref Range    Lactic Acid 2.5 (H) 0.7 - 2.0 mmol/L   Lipase   Result Value Ref Range    Lipase 34 (L) 73 - 393 U/L   INR   Result Value Ref Range    INR Special tube used to correct for high hematocrit 0.86 - 1.14   CT Abdomen Pelvis w/o Contrast   Result Value Ref Range    Radiologist flags (AA)      Gastric pneumatosis and small bowel obstruction with    Narrative    CT ABDOMEN AND PELVIS WITHOUT CONTRAST 7/9/2019 3:08 PM     HISTORY: Abdominal distension. Abdominal pain, diverticulitis  suspected.     TECHNIQUE: Axial images from the lung bases to the upper thighs are  performed with additional coronal reformatted images. No contrast is  utilized. Radiation dose for this scan was reduced using automated  exposure control, adjustment of the mA and/or kV according to patient  size, or iterative reconstruction technique.    FINDINGS:     Calcified granulomas noted in the right middle lobe. Lung bases are  otherwise clear.    Abdomen: Pneumatosis is noted in the region of the fundus of the  stomach extending down towards the gastric body. This does not appear  to affect the gastric antrum. Air is noted in the portal veins  concerning for necrotic bowel. Additionally there is a calcified  gallstone in the gallbladder on image 37 with marked gallbladder wall  thickening concerning for acute cholecystitis. The spleen, pancreas,  adrenal glands and kidneys are unremarkable. No hydronephrosis or  renal calculi. Left renal cortical cyst is noted of doubtful clinical  significance on series 2, image 38 measuring 2 cm in diameter. No  enlarged lymph nodes. Aorta demonstrates calcified plaque without  aneurysm. Numerous loops of dilated small bowel are present with  air-fluid levels concerning for small bowel obstruction. Distal small  bowel is decompressed as is the colon. No diverticulitis. Appendix is  normal. Transition  point is at the level of a large right inguinal  hernia containing loops of small bowel. The small bowel loops in the  right inguinal hernia show no evidence of wall thickening or  incarceration. This in combination with the portal venous gas is  concerning for bowel ischemia and possible necrosis. Questionable  additional transition point in the mid abdomen involving a loop of  small bowel just proximal to the bowel loop extending down towards the  hernia and is noted on series 2, image 56. Pneumatosis of the stomach  is of uncertain etiology but can be seen in patients with diabetes  therefore correlation with patient's medical history would be helpful.    Pelvis: Large right inguinal hernia contains loops of small bowel as  described above. The bladder, prostate and rectum are unremarkable. No  enlarged pelvic lymph nodes or free fluid. Bone window examination  demonstrates degenerative spine changes.      Impression    IMPRESSION:  1. Multiple acute changes are noted in the abdomen and pelvis. There  appears to be a small bowel obstruction due to a right inguinal hernia  containing multiple loops of small bowel. This causes dilatation of  the more proximal small bowel in the abdomen. Bowel loops in the  hernia do not appear significantly abnormal and no surrounding  inflammation is evident in the mesenteric fat involving these inguinal  bowel loops. However, there is a questionable, more proximal area of  stenosis in the region of the small bowel possibly due to an adhesion.  2. Portal venous gas highly concerning for bowel necrosis. Pneumatosis  is noted in the region of the gastric body and fundus. This can be  seen in patients with underlying diabetes with a more benign  prognosis, but with the presence of portal venous gas gastric wall  necrosis must be considered.  3. CT findings suspicious for acute cholecystitis.  4. Simple left renal cyst.    [Critical Result: Gastric pneumatosis and small bowel  obstruction with  portal venous gas]    Finding was identified on 7/9/2019 3:28 PM.     Dr. Ashford was contacted by me on 7/9/2019 3:41 PM and verbalized  understanding of the critical result.     ARIANA GARCÍA MD   Abdomen US, limited (RUQ only)    Narrative    US ABDOMEN LIMITED 7/9/2019 4:21 PM    HISTORY: Abnormal gallbladder on CT.    COMPARISON: CT abdomen and pelvis 7/9/2019 at 1448.    FINDINGS:  Gallbladder: Multiple gallstones as well as a large amount of sludge  are seen in the gallbladder lumen. There is abnormal diffuse  thickening of the gallbladder wall, up to 7 mm. A small amount of  pericholecystic fluid is also present.    Common bile duct: Slightly prominent at 9 mm.    Liver: Heterogeneous echotexture. There is a hyperechoic focus in the  posterior right lobe of the liver measuring 2.8 x 2.9 x 3.9 cm. No  definite abnormality was seen on noncontrast CT in this region. A  contrast-enhanced CT would be helpful when the patient is clinically  able. The ultrasound findings are nonspecific    Pancreas: Completely obscured by overlying bowel gas.    Right kidney: Normal.      Impression    IMPRESSION:   1. Cholelithiasis and gallbladder sludge, diffuse gallbladder wall  thickening and some pericholecystic fluid.  2. Slightly prominent common bile duct.  3. Pancreas completely obscured.  4. 3.9 cm nonspecific hyperechoic focus in the posterior right lobe of  the liver without abnormality on noncontrast CT. Contrast-enhanced CT  is recommended when the patient is clinically able.    EDI VERNON MD   Blood culture   Result Value Ref Range    Specimen Description Blood Right Arm     Special Requests Aerobic and anaerobic bottles received     Culture Micro PENDING    Blood culture   Result Value Ref Range    Specimen Description Blood Right Hand     Special Requests Aerobic and anaerobic bottles received     Culture Micro PENDING        Medications   lactated ringers BOLUS 1,000 mL (0 mLs Intravenous  Stopped 7/9/19 1509)   ondansetron (ZOFRAN) injection 4 mg (4 mg Intravenous Given 7/9/19 1404)   lactated ringers BOLUS 1,000 mL (0 mLs Intravenous Stopped 7/9/19 1700)   ondansetron (ZOFRAN) injection 4 mg (4 mg Intravenous Given 7/9/19 1846)   lactated ringers BOLUS 1,000 mL (0 mLs Intravenous ED Infusing on Admission/transfer 7/9/19 1856)       Assessments & Plan (with Medical Decision Making)  78-year-old male presents with vomiting abdominal distention mild diffuse abdominal tenderness.  CT scan abdomen significant for cholecystitis cholelithiasis, pneumatosis of the stomach, air in the portal vein, small bowel obstruction/ileus thought to be related to right inguinal hernia, although right inguinal hernia is easily reducible x2 during patient stay, he was without significant complaint with respect to his hernia tells me that the discomfort is baseline.  He has no clinical evidence for incarcerated hernia.  Believe findings on CT scan secondary to ileus secondary to cholecystitis.  Patient has chronic atrial fibrillation with rapid ventricular response, despite 3 L crystalloid heart rates in the 140-150 range throughout stay.  Despite this the pressures remain within normal limits.  He is treated with Zosyn and vancomycin.  Reviewed with Dr.Iqbal Jinny Quinn accepts patient in transfer.  He likely will require IR procedure for source control with respect to cholecystitis.  He is currently a poor surgical candidate given the fact that he is chronically anticoagulated and septic.  He is critically ill, this is reviewed with he and his family.  There was prolonged delay in transferring him with respect to finding an ICU bed.     I have reviewed the nursing notes.    I have reviewed the findings, diagnosis, plan and need for follow up with the patient.     Critical Care Addendum    My initial assessment, based on my review of nursing observations, review of vital signs, focused history and physical exam,  established that Bradley Liz has and Sepsis, which requires immediate intervention, and therefore he is critically ill.     After the initial assessment, the care team initiated multiple lab tests, initiated IV fluid administration and initiated medication therapy with Zosyn and vancomycin to provide stabilization care. Due to the critical nature of this patient, I reassessed nursing observations, vital signs, physical exam, review of cardiac rhythm monitor, 12 lead ECG analysis, mental status and respiratory status multiple times prior to his disposition.     Time also spent performing documentation, discussion with family to obtain medical information for decision making, reviewing test results, discussion with consultants and coordination of care.     Critical care time (excluding teaching time and procedures): 60 minutes.        Medication List      There are no discharge medications for this visit.         Final diagnoses:   Cholecystitis   SBO (small bowel obstruction) (H)   Sepsis, due to unspecified organism (H)       7/9/2019   [facility] EMERGENCY DEPARTMENT     Cliff Ashford MD  07/09/19 4537

## 2019-07-10 NOTE — PROGRESS NOTES
Report given to Roxann WILLIS RN in pre-op. Informed consent obtained. K-centra and vitamin K started. Patient transported on bed to pre-op. Family at bedside.

## 2019-07-10 NOTE — PROGRESS NOTES
Pt extubated to high flow nasal cannula 40% and 30Litres. Sats>90%.   Will continue to monitor    Memo [email or web address]

## 2019-07-10 NOTE — ANESTHESIA PROCEDURE NOTES
CENTRAL LINE INSERTION PROCEDURE NOTE:   Pre-Procedure  Performed by Ravindra Coon MD  Location:     Pre-Anesthestic Checklist: patient identified, IV checked, risks and benefits discussed and pre-op evaluation    Timeout  Correct Patient: Yes   Correct Procedure: Yes       Correct Position: Yes     .   Procedure Documentation   Procedure: central line  Position: Trendelenburg  Patient Prep;all elements of maximal sterile barrier technique followed, mask, hat, sterile gown, sterile gloves, draped, hand hygiene, chlorhexidine gluconate and isopropyl alcohol, patient draped      Insertion Site:right, internal jugular    Skin infiltrated with 5 mL of 1% lidocaine.    Assessment/Narrative         Secured by suture  Tegaderm and Biopatch dressing used.  blood aspirated from all lumens  All lumens flushed: Yes  Verification method: Placement to be verified post-op  Comments:  -2-Lumen central placed in OR  -Tegaderm completely covering central line site including insertion site and suture site.  -Ultrasound used (no image).  Wire visualized within vessel in plane with ultrasound prior to placement of catheter  -Central line placed from 2326 to 0010

## 2019-07-10 NOTE — PROGRESS NOTES
Critical Care Progress Note      07/10/2019    Name: Bradley Liz MRN#: [id number]   Age: 78 year old YOB: 1940     Hsptl Day# 1  ICU DAY # 0    MV DAY # 0             Problem List:   Active Problems:    Cholangitis   Septic Shock   Atrial Fibrillation with RVR   Acute Renal Failure   Hypernatremia         Summary/Hospital Course:     78 year old male that was admitted to the hospital on on 7/9 after presenting with abdominal pain, and coffee ground emesis.  Abdominal CT scan showed portal venous gas and signs concerning for SBO.  Was taken for laparoscopy and found to have a gangrenous gall bladder.  Cholecystectomy was performed.  Bowel appeared inflamed, but not ischemic.  He had hemodynamic instability during the case with a fib RVR.  Rates in the 180s.  Was started on diltiazem and phenylephrine.  Remained intubated after the surgery and was transferred to the ICU.       Assessment and plan :     Bradley Liz IS a 78 year old male admitted on 7/9/2019 for septic shock from cholecystitis  I have personally reviewed the daily labs, imaging studies, cultures     My assessment and plan by system for this patient is as follows:    Neurology/Psychiatry:   1. Sedation / pain.     Plan  Propofol RASS goal -1.   Dilaudid PRN    Cardiovascular:   1. A fib RVR  2. Shock  3. History of NICM    Plan  Diltiazem infusion - goal   Phenylephrine and vasopressin to keep MAP > 65  Avoiding levophed due to tachyarrhythmia  IV fluids.   Restart full dose anticoagulation when okay per surgery    Pulmonary/Ventilator Management:   1. Hypoxemic respiratory failure - likely post op related as well as due to a fib and sepsis with pulmonary edema  Plan  - wean FIO2  - continue mechanical ventilation as his hemodynamics recover     GI and Nutrition :   1. Cholecystitis  2. Portal venous gas - related to gangrenous cholecystitis    Plan  - see ID  - NG tube to LIS    Renal/Fluids/Electrolytes:   1. Acute Renal  failure - suspect at least pre-renal. Likely also ATN    Plan  - check UA micro macro  - monitor function and electrolytes as needed with replacement per ICU protocols.  - generally avoid nephrotoxic agents such as NSAID, IV contrast unless specifically required  - adjust medications as needed for renal clearance  - follow I/O's as appropriate.    Infectious Disease:   1. Septic shock  2. cholecystitis    Plan  - zosyn  - await blood culture and OR cultures    Endocrine:   1. No acute issues  Plan  - ICU insulin protocol, goal sugar <180      Hematology/Oncology:   1. Polycythemia - suspect due to hemoconcentration  2. Coagulopathy - due to warfarin.  S/p Kcentra and FFP  Plan  - follow CBC and coags        IV/Access:   1. Venous access - CVC  2. Arterial access - art line  3.  Plan  - central access required and necessary      ICU Prophylaxis:   1. DVT: will discuss with surgery today.   2. VAP: HOB 30 degrees, chlorhexidine rinse  3. Stress Ulcer: H2 blocker  4. Restraints: Nonviolent soft two point restraints required and necessary for patient safety and continued cares and good effect as patient continues to pull at necessary lines, tubes despite education and distraction. Will readdress daily.   5. Wound care - per unit routine   6. Feeding - NPO   7. Family Update:no  8. Disposition - critically ill        Key goals for next 24 hours:   1. Wean sedation  2. Decrease diltiazem and vasopressor dose  3. Continue IV fluids            Key Medications:       famotidine  20 mg Intravenous Q24H     lactated ringers  500 mL Intravenous Once     piperacillin-tazobactam  2.25 g Intravenous Q6H       diltiazem (CARDIZEM) infusion ADULT Stopped (07/10/19 0333)     lactated ringers 150 mL/hr at 07/10/19 0313     norepinephrine 0.09 mcg/kg/min (07/10/19 0421)     phenylephrine 1 mcg/kg/min (07/10/19 0357)     propofol (DIPRIVAN) infusion 20 mcg/kg/min (07/10/19 0411)     sodium chloride 10 mL/hr at 07/10/19 0311      vasopressin (PITRESSIN) infusion ADULT (40 mL) 2.4 Units/hr (07/10/19 0410)               Physical Examination:   Temp:  [96.1  F (35.6  C)-98.8  F (37.1  C)] 96.8  F (36  C)  Pulse:  [] 105  Heart Rate:  [] 112  Resp:  [9-32] 12  BP: ()/() 129/71  MAP:  [55 mmHg-96 mmHg] 69 mmHg  Arterial Line BP: ()/(40-74) 91/51  FiO2 (%):  [50 %-75 %] 50 %  SpO2:  [89 %-100 %] 94 %    Intake/Output Summary (Last 24 hours) at 7/10/2019 0452  Last data filed at 7/10/2019 0400  Gross per 24 hour   Intake 5440.84 ml   Output 1175 ml   Net 4265.84 ml     Wt Readings from Last 4 Encounters:   07/09/19 88.5 kg (195 lb)   10/10/18 92.5 kg (204 lb)   08/24/18 90.3 kg (199 lb)   08/06/18 90.9 kg (200 lb 6.4 oz)     Arterial Line BP: ()/(40-74) 91/51  MAP:  [55 mmHg-96 mmHg] 69 mmHg  BP - Mean:  [] 89  CVP:  [10 mmHg-17 mmHg] 11 mmHg  Ventilation Mode: CMV/AC  (Continuous Mandatory Ventilation/ Assist Control)  FiO2 (%): 50 %  Rate Set (breaths/minute): 12 breaths/min  Tidal Volume Set (mL): 550 mL  PEEP (cm H2O): 5 cmH2O  Oxygen Concentration (%): 75 %  Resp: 12    Recent Labs   Lab 07/09/19  2300   PH 7.39   PCO2 36   PO2 115*   HCO3 22       GEN: no acute distress   HEENT: head ncat, sclera anicteric, OP patent, trachea midline   PULM: unlabored synchronous with vent, clear anteriorly    CV/COR: tachycardic, S1S2 no gallop,  No rub, no murmur  ABD: soft nontender, hypoactive bowel sounds, no mass  EXT:  warm  NEURO: sedated, not following commands.  withdrawals to pain.   SKIN: no obvious rash  LINES: clean, dry intact         Data:   All data and imaging reviewed     ROUTINE ICU LABS (Last four results)  CMP  Recent Labs   Lab 07/10/19  0324 07/09/19  2300 07/09/19  2043 07/09/19  1337   * 146* 142 141   POTASSIUM 4.8 4.4 5.4* 4.8   CHLORIDE 111* 111* 106 107   CO2 24 23 30 24   ANIONGAP 10 12 6 10   * 117* 117* 150*   BUN 50* 52* 52* 43*   CR 2.27* 2.55* 2.73* 3.01*   GFRESTIMATED  26* 23* 21* 19*   GFRESTBLACK 31* 27* 25* 22*   DARON 8.3* 8.6 9.5 10.3*   PROTTOTAL 5.3*  --  6.3* 7.4   ALBUMIN 2.4*  --  2.9* 3.6   BILITOTAL 6.3*  --  6.5* 9.9*   ALKPHOS 203*  --  290* 325*   *  --  395* 569*   *  --  620* 792*     CBC  Recent Labs   Lab 07/10/19  0324 07/09/19  2300 07/09/19  2043 07/09/19  1337   WBC 24.7* 22.3* 24.6* 24.6*   RBC 5.38 5.62 6.24* 6.89*   HGB 16.9 17.9* 19.9* 20.5*   HCT 50.5 51.7 58.1* 64.1*   MCV 94 92 93 93   MCH 31.4 31.9 31.9 29.8   MCHC 33.5 34.6 34.3 32.0   RDW 15.9* 15.6* 15.6* 17.2*    367 397 502*     INR  Recent Labs   Lab 07/10/19  0324 07/09/19  2212 07/09/19  1458 07/09/19  1337   INR 1.37* 3.38* Special tube used to correct for high hematocrit Canceled, Test credited     Arterial Blood Gas  Recent Labs   Lab 07/09/19  2300   PH 7.39   PCO2 36   PO2 115*   HCO3 22       All cultures:  Recent Labs   Lab 07/09/19  1722 07/09/19  1715   CULT No growth after 6 hours No growth after 6 hours     Recent Results (from the past 24 hour(s))   CT Abdomen Pelvis w/o Contrast   Result Value    Radiologist flags (AA)     Gastric pneumatosis and small bowel obstruction with    Narrative    CT ABDOMEN AND PELVIS WITHOUT CONTRAST 7/9/2019 3:08 PM     HISTORY: Abdominal distension. Abdominal pain, diverticulitis  suspected.     TECHNIQUE: Axial images from the lung bases to the upper thighs are  performed with additional coronal reformatted images. No contrast is  utilized. Radiation dose for this scan was reduced using automated  exposure control, adjustment of the mA and/or kV according to patient  size, or iterative reconstruction technique.    FINDINGS:     Calcified granulomas noted in the right middle lobe. Lung bases are  otherwise clear.    Abdomen: Pneumatosis is noted in the region of the fundus of the  stomach extending down towards the gastric body. This does not appear  to affect the gastric antrum. Air is noted in the portal veins  concerning for  necrotic bowel. Additionally there is a calcified  gallstone in the gallbladder on image 37 with marked gallbladder wall  thickening concerning for acute cholecystitis. The spleen, pancreas,  adrenal glands and kidneys are unremarkable. No hydronephrosis or  renal calculi. Left renal cortical cyst is noted of doubtful clinical  significance on series 2, image 38 measuring 2 cm in diameter. No  enlarged lymph nodes. Aorta demonstrates calcified plaque without  aneurysm. Numerous loops of dilated small bowel are present with  air-fluid levels concerning for small bowel obstruction. Distal small  bowel is decompressed as is the colon. No diverticulitis. Appendix is  normal. Transition point is at the level of a large right inguinal  hernia containing loops of small bowel. The small bowel loops in the  right inguinal hernia show no evidence of wall thickening or  incarceration. This in combination with the portal venous gas is  concerning for bowel ischemia and possible necrosis. Questionable  additional transition point in the mid abdomen involving a loop of  small bowel just proximal to the bowel loop extending down towards the  hernia and is noted on series 2, image 56. Pneumatosis of the stomach  is of uncertain etiology but can be seen in patients with diabetes  therefore correlation with patient's medical history would be helpful.    Pelvis: Large right inguinal hernia contains loops of small bowel as  described above. The bladder, prostate and rectum are unremarkable. No  enlarged pelvic lymph nodes or free fluid. Bone window examination  demonstrates degenerative spine changes.      Impression    IMPRESSION:  1. Multiple acute changes are noted in the abdomen and pelvis. There  appears to be a small bowel obstruction due to a right inguinal hernia  containing multiple loops of small bowel. This causes dilatation of  the more proximal small bowel in the abdomen. Bowel loops in the  hernia do not appear  significantly abnormal and no surrounding  inflammation is evident in the mesenteric fat involving these inguinal  bowel loops. However, there is a questionable, more proximal area of  stenosis in the region of the small bowel possibly due to an adhesion.  2. Portal venous gas highly concerning for bowel necrosis. Pneumatosis  is noted in the region of the gastric body and fundus. This can be  seen in patients with underlying diabetes with a more benign  prognosis, but with the presence of portal venous gas gastric wall  necrosis must be considered.  3. CT findings suspicious for acute cholecystitis.  4. Simple left renal cyst.    [Critical Result: Gastric pneumatosis and small bowel obstruction with  portal venous gas]    Finding was identified on 7/9/2019 3:28 PM.     Dr. Ashford was contacted by me on 7/9/2019 3:41 PM and verbalized  understanding of the critical result.     ARIANA GARCÍA MD   Abdomen US, limited (RUQ only)    Narrative    US ABDOMEN LIMITED 7/9/2019 4:21 PM    HISTORY: Abnormal gallbladder on CT.    COMPARISON: CT abdomen and pelvis 7/9/2019 at 1448.    FINDINGS:  Gallbladder: Multiple gallstones as well as a large amount of sludge  are seen in the gallbladder lumen. There is abnormal diffuse  thickening of the gallbladder wall, up to 7 mm. A small amount of  pericholecystic fluid is also present.    Common bile duct: Slightly prominent at 9 mm.    Liver: Heterogeneous echotexture. There is a hyperechoic focus in the  posterior right lobe of the liver measuring 2.8 x 2.9 x 3.9 cm. No  definite abnormality was seen on noncontrast CT in this region. A  contrast-enhanced CT would be helpful when the patient is clinically  able. The ultrasound findings are nonspecific    Pancreas: Completely obscured by overlying bowel gas.    Right kidney: Normal.      Impression    IMPRESSION:   1. Cholelithiasis and gallbladder sludge, diffuse gallbladder wall  thickening and some pericholecystic fluid.  2.  Slightly prominent common bile duct.  3. Pancreas completely obscured.  4. 3.9 cm nonspecific hyperechoic focus in the posterior right lobe of  the liver without abnormality on noncontrast CT. Contrast-enhanced CT  is recommended when the patient is clinically able.    EDI VERNON MD   XR Chest Port 1 View    Narrative    PORTABLE CHEST ONE VIEW   7/9/2019 9:40 PM    HISTORY: Hypoxia in patient with sepsis.    COMPARISON: None.      Impression    IMPRESSION: There is mild atelectasis of the right lung base. The  lungs are otherwise clear. No other abnormality is seen.     JIGNESH OGLESBY MD         Billing: This patient is critically ill: Yes. Total critical care time today 40 min.

## 2019-07-11 ENCOUNTER — APPOINTMENT (OUTPATIENT)
Dept: OCCUPATIONAL THERAPY | Facility: CLINIC | Age: 79
DRG: 853 | End: 2019-07-11
Attending: NURSE PRACTITIONER
Payer: COMMERCIAL

## 2019-07-11 ENCOUNTER — APPOINTMENT (OUTPATIENT)
Dept: PHYSICAL THERAPY | Facility: CLINIC | Age: 79
DRG: 853 | End: 2019-07-11
Attending: NURSE PRACTITIONER
Payer: COMMERCIAL

## 2019-07-11 ENCOUNTER — APPOINTMENT (OUTPATIENT)
Dept: ULTRASOUND IMAGING | Facility: CLINIC | Age: 79
DRG: 853 | End: 2019-07-11
Attending: INTERNAL MEDICINE
Payer: COMMERCIAL

## 2019-07-11 ENCOUNTER — APPOINTMENT (OUTPATIENT)
Dept: ULTRASOUND IMAGING | Facility: CLINIC | Age: 79
DRG: 853 | End: 2019-07-11
Attending: NURSE PRACTITIONER
Payer: COMMERCIAL

## 2019-07-11 LAB
ALBUMIN SERPL-MCNC: 2 G/DL (ref 3.4–5)
ALP SERPL-CCNC: 129 U/L (ref 40–150)
ALT SERPL W P-5'-P-CCNC: 225 U/L (ref 0–70)
ANION GAP SERPL CALCULATED.3IONS-SCNC: 7 MMOL/L (ref 3–14)
AST SERPL W P-5'-P-CCNC: 64 U/L (ref 0–45)
BILIRUB SERPL-MCNC: 3.3 MG/DL (ref 0.2–1.3)
BUN SERPL-MCNC: 51 MG/DL (ref 7–30)
CALCIUM SERPL-MCNC: 7.6 MG/DL (ref 8.5–10.1)
CHLORIDE SERPL-SCNC: 114 MMOL/L (ref 94–109)
CO2 SERPL-SCNC: 25 MMOL/L (ref 20–32)
COPATH REPORT: NORMAL
CREAT SERPL-MCNC: 2 MG/DL (ref 0.66–1.25)
ERYTHROCYTE [DISTWIDTH] IN BLOOD BY AUTOMATED COUNT: 16 % (ref 10–15)
GFR SERPL CREATININE-BSD FRML MDRD: 31 ML/MIN/{1.73_M2}
GLUCOSE BLDC GLUCOMTR-MCNC: 118 MG/DL (ref 70–99)
GLUCOSE BLDC GLUCOMTR-MCNC: 121 MG/DL (ref 70–99)
GLUCOSE BLDC GLUCOMTR-MCNC: 131 MG/DL (ref 70–99)
GLUCOSE BLDC GLUCOMTR-MCNC: 142 MG/DL (ref 70–99)
GLUCOSE BLDC GLUCOMTR-MCNC: 159 MG/DL (ref 70–99)
GLUCOSE BLDC GLUCOMTR-MCNC: 98 MG/DL (ref 70–99)
GLUCOSE SERPL-MCNC: 167 MG/DL (ref 70–99)
HBA1C MFR BLD: 5.5 % (ref 0–5.6)
HCT VFR BLD AUTO: 46.4 % (ref 40–53)
HGB BLD-MCNC: 15.3 G/DL (ref 13.3–17.7)
INR PPP: 1.31 (ref 0.86–1.14)
MCH RBC QN AUTO: 31.2 PG (ref 26.5–33)
MCHC RBC AUTO-ENTMCNC: 33 G/DL (ref 31.5–36.5)
MCV RBC AUTO: 95 FL (ref 78–100)
PLATELET # BLD AUTO: 316 10E9/L (ref 150–450)
POTASSIUM SERPL-SCNC: 4.3 MMOL/L (ref 3.4–5.3)
PROT SERPL-MCNC: 5 G/DL (ref 6.8–8.8)
RBC # BLD AUTO: 4.91 10E12/L (ref 4.4–5.9)
SODIUM SERPL-SCNC: 146 MMOL/L (ref 133–144)
WBC # BLD AUTO: 16.8 10E9/L (ref 4–11)

## 2019-07-11 PROCEDURE — 99291 CRITICAL CARE FIRST HOUR: CPT | Mod: GC | Performed by: INTERNAL MEDICINE

## 2019-07-11 PROCEDURE — 00000146 ZZHCL STATISTIC GLUCOSE BY METER IP

## 2019-07-11 PROCEDURE — 93931 UPPER EXTREMITY STUDY: CPT | Mod: LT

## 2019-07-11 PROCEDURE — 25000128 H RX IP 250 OP 636: Performed by: INTERNAL MEDICINE

## 2019-07-11 PROCEDURE — 25000125 ZZHC RX 250

## 2019-07-11 PROCEDURE — 97535 SELF CARE MNGMENT TRAINING: CPT | Mod: GO

## 2019-07-11 PROCEDURE — 40000275 ZZH STATISTIC RCP TIME EA 10 MIN

## 2019-07-11 PROCEDURE — 97530 THERAPEUTIC ACTIVITIES: CPT | Mod: GO

## 2019-07-11 PROCEDURE — 25800030 ZZH RX IP 258 OP 636

## 2019-07-11 PROCEDURE — 20000003 ZZH R&B ICU

## 2019-07-11 PROCEDURE — 25800030 ZZH RX IP 258 OP 636: Performed by: SURGERY

## 2019-07-11 PROCEDURE — 25000128 H RX IP 250 OP 636: Performed by: NURSE PRACTITIONER

## 2019-07-11 PROCEDURE — 97110 THERAPEUTIC EXERCISES: CPT | Mod: GP

## 2019-07-11 PROCEDURE — 25000128 H RX IP 250 OP 636

## 2019-07-11 PROCEDURE — 97163 PT EVAL HIGH COMPLEX 45 MIN: CPT | Mod: GP

## 2019-07-11 PROCEDURE — 85610 PROTHROMBIN TIME: CPT | Performed by: INTERNAL MEDICINE

## 2019-07-11 PROCEDURE — 97166 OT EVAL MOD COMPLEX 45 MIN: CPT | Mod: GO

## 2019-07-11 PROCEDURE — 25000125 ZZHC RX 250: Performed by: INTERNAL MEDICINE

## 2019-07-11 PROCEDURE — 80053 COMPREHEN METABOLIC PANEL: CPT | Performed by: INTERNAL MEDICINE

## 2019-07-11 PROCEDURE — 83036 HEMOGLOBIN GLYCOSYLATED A1C: CPT | Performed by: INTERNAL MEDICINE

## 2019-07-11 PROCEDURE — 93971 EXTREMITY STUDY: CPT | Mod: LT

## 2019-07-11 PROCEDURE — 85027 COMPLETE CBC AUTOMATED: CPT | Performed by: INTERNAL MEDICINE

## 2019-07-11 PROCEDURE — 27210300 ZZH CANNULA HIGH FLOW, ADULT

## 2019-07-11 RX ORDER — HEPARIN SODIUM 5000 [USP'U]/.5ML
5000 INJECTION, SOLUTION INTRAVENOUS; SUBCUTANEOUS EVERY 12 HOURS
Status: DISCONTINUED | OUTPATIENT
Start: 2019-07-11 | End: 2019-07-17 | Stop reason: HOSPADM

## 2019-07-11 RX ADMIN — HYDROCORTISONE SODIUM SUCCINATE 50 MG: 100 INJECTION, POWDER, FOR SOLUTION INTRAMUSCULAR; INTRAVENOUS at 02:06

## 2019-07-11 RX ADMIN — FAMOTIDINE 20 MG: 10 INJECTION, SOLUTION INTRAVENOUS at 20:47

## 2019-07-11 RX ADMIN — PIPERACILLIN SODIUM,TAZOBACTAM SODIUM 3.38 G: 3; .375 INJECTION, POWDER, FOR SOLUTION INTRAVENOUS at 08:58

## 2019-07-11 RX ADMIN — HEPARIN SODIUM 5000 UNITS: 5000 INJECTION, SOLUTION INTRAVENOUS; SUBCUTANEOUS at 20:48

## 2019-07-11 RX ADMIN — SODIUM CHLORIDE, POTASSIUM CHLORIDE, SODIUM LACTATE AND CALCIUM CHLORIDE: 600; 310; 30; 20 INJECTION, SOLUTION INTRAVENOUS at 18:04

## 2019-07-11 RX ADMIN — SODIUM CHLORIDE: 9 INJECTION, SOLUTION INTRAVENOUS at 07:47

## 2019-07-11 RX ADMIN — HEPARIN SODIUM 5000 UNITS: 5000 INJECTION, SOLUTION INTRAVENOUS; SUBCUTANEOUS at 11:15

## 2019-07-11 RX ADMIN — HYDROCORTISONE SODIUM SUCCINATE 50 MG: 100 INJECTION, POWDER, FOR SOLUTION INTRAMUSCULAR; INTRAVENOUS at 20:47

## 2019-07-11 RX ADMIN — SODIUM CHLORIDE, POTASSIUM CHLORIDE, SODIUM LACTATE AND CALCIUM CHLORIDE 150 ML/HR: 600; 310; 30; 20 INJECTION, SOLUTION INTRAVENOUS at 02:10

## 2019-07-11 RX ADMIN — SODIUM CHLORIDE, POTASSIUM CHLORIDE, SODIUM LACTATE AND CALCIUM CHLORIDE: 600; 310; 30; 20 INJECTION, SOLUTION INTRAVENOUS at 08:39

## 2019-07-11 RX ADMIN — HYDROCORTISONE SODIUM SUCCINATE 50 MG: 100 INJECTION, POWDER, FOR SOLUTION INTRAMUSCULAR; INTRAVENOUS at 08:47

## 2019-07-11 RX ADMIN — PIPERACILLIN SODIUM,TAZOBACTAM SODIUM 3.38 G: 3; .375 INJECTION, POWDER, FOR SOLUTION INTRAVENOUS at 02:06

## 2019-07-11 RX ADMIN — INSULIN ASPART 1 UNITS: 100 INJECTION, SOLUTION INTRAVENOUS; SUBCUTANEOUS at 02:01

## 2019-07-11 RX ADMIN — VASOPRESSIN 2.4 UNITS/HR: 20 INJECTION INTRAVENOUS at 02:06

## 2019-07-11 RX ADMIN — PIPERACILLIN SODIUM,TAZOBACTAM SODIUM 3.38 G: 3; .375 INJECTION, POWDER, FOR SOLUTION INTRAVENOUS at 14:25

## 2019-07-11 RX ADMIN — PIPERACILLIN SODIUM,TAZOBACTAM SODIUM 3.38 G: 3; .375 INJECTION, POWDER, FOR SOLUTION INTRAVENOUS at 20:47

## 2019-07-11 RX ADMIN — HYDROCORTISONE SODIUM SUCCINATE 50 MG: 100 INJECTION, POWDER, FOR SOLUTION INTRAMUSCULAR; INTRAVENOUS at 14:25

## 2019-07-11 RX ADMIN — FAMOTIDINE 20 MG: 10 INJECTION, SOLUTION INTRAVENOUS at 10:08

## 2019-07-11 RX ADMIN — NOREPINEPHRINE BITARTRATE 0.07 MCG/KG/MIN: 1 INJECTION INTRAVENOUS at 15:41

## 2019-07-11 ASSESSMENT — ACTIVITIES OF DAILY LIVING (ADL)
ADLS_ACUITY_SCORE: 13

## 2019-07-11 NOTE — PROGRESS NOTES
Paged Dr. Ad Peck of surgery team for increased output around MATT drain site since 1400, Dr. Peck is OK to keep changing ABD pads and/or bagging the incision site so long as the drainage is serosanguinous, please notify surgery teamo for bilious drainage.

## 2019-07-11 NOTE — PROGRESS NOTES
07/11/19 1410   Quick Adds   Type of Visit Initial PT Evaluation   Living Environment   Lives With spouse   Living Arrangements house   Home Accessibility no concerns   Living Environment Comment All needs met on one level - no stairs to enter home or basement per pt   Self-Care   Usual Activity Tolerance good   Current Activity Tolerance poor   Equipment Currently Used at Home none   Functional Level Prior   Ambulation 0-->independent   Transferring 0-->independent   Toileting 0-->independent   Bathing 0-->independent   Communication 0-->understands/communicates without difficulty   Swallowing 0-->swallows foods/liquids without difficulty   Cognition 0 - no cognition issues reported   Fall history within last six months no   Number of times patient has fallen within last six months 1  (per chart 1 fall, pt denies falls)   Which of the above functional risks had a recent onset or change? ambulation;transferring   Prior Functional Level Comment  Lives in house with no stairs with his wife, reports drives, does grocery shopping and IND with all aspects of mobility without an assistive device, reports typically able to walk about 200yds without stopping.    General Information   Onset of Illness/Injury or Date of Surgery - Date 07/09/19   Referring Physician Angel Andino NP   Patient/Family Goals Statement none stated.    Pertinent History of Current Problem (include personal factors and/or comorbidities that impact the POC) 77y/o F POD#1 laparoscopic cholecystectomy for gangrenous cholecystitis, septic shock, a-fib with RVR, acute renal failure. Extubated this AM and was up with OT standing earlier today.  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 art line   Cognitive Status Examination   Orientation person;place   Level of Consciousness alert   Follows  Commands and Answers Questions 100% of the time   Personal Safety and Judgment at risk behaviors demonstrated   Cognitive Comment oriented to year, not specific date   Pain Assessment   Patient Currently in Pain No   Range of Motion (ROM)   ROM Comment bilateral LE WFL, left UE appears WFL below 90deg, pt did not lift his right arm due to ICU monitoring and lines   Strength   Strength Comments left LE: 5/5 strength, right LE: ankle DF/PF: 5/5, hamstring and quad: 5/5, hip abd:4/5, hip flex: 3+/5, decreased strength noted with AROM right UE with mild drift, right LE appears WFL   Bed Mobility   Bed Mobility Comments mod A x 2 with supine <>sit with cues for log rolling with elevated HOB, max A x 2 with supine boosting.    Transfer Skills   Transfer Comments deferred to next session due to fatigue level   Gait   Gait Comments deferred to next session due to fatigue level   Balance   Balance Comments min to mod A x 1 required with seated balance at EOB   Sensory Examination   Sensory Perception Comments intact to light touch grossly bilateral feet, pt denies numbness/tingling   General Therapy Interventions   Planned Therapy Interventions balance training;bed mobility training;gait training;strengthening;transfer training;home program guidelines;progressive activity/exercise   Clinical Impression   Criteria for Skilled Therapeutic Intervention yes, treatment indicated   PT Diagnosis impaired transfers   Influenced by the following impairments impaired balance, impaired strength, pain, impaired activity tolerance   Functional limitations due to impairments impaired IND with functional mobility    Clinical Presentation Unstable/Unpredictable   Clinical Presentation Rationale based on clinical status, ICU monitoring, PMHx, clinical judgement   Clinical Decision Making (Complexity) High complexity   Therapy Frequency Daily   Predicted Duration of Therapy Intervention (days/wks) 1 week   Anticipated Equipment Needs at  "Discharge front wheeled walker  (TBD based on progress)   Anticipated Discharge Disposition Acute Rehabilitation Facility   Risk & Benefits of therapy have been explained Yes   Patient, Family & other staff in agreement with plan of care Yes   Clinical Impression Comments  Tolerated sitting at EOB with mod to min A x 1, fatigued following 3 min and requesting to lie back down with mod A x 2. Max A x 2 to assist with boosting in bed. Elevated GU=266-496's on telemetry with sitting EOB. GQ=987/93mmHg, SPO2=98% on [place]. (supine BP post ex: 146/82mmHg, ZB=658vph). Pt presents with generalized weakness, impaired activity tolerance,    [facility] AM-PAC TM \"6 Clicks\"   2016, Trustees of [facility], under license to [employer].  All rights reserved.   6 Clicks Short Forms Basic Mobility Inpatient Short Form   [facility] AM-PAC  \"6 Clicks\" V.2 Basic Mobility Inpatient Short Form   1. Turning from your back to your side while in a flat bed without using bedrails? 2 - A Lot   2. Moving from lying on your back to sitting on the side of a flat bed without using bedrails? 2 - A Lot   3. Moving to and from a bed to a chair (including a wheelchair)? 2 - A Lot   4. Standing up from a chair using your arms (e.g., wheelchair, or bedside chair)? 2 - A Lot   5. To walk in hospital room? 1 - Total   6. Climbing 3-5 steps with a railing? 1 - Total   Basic Mobility Raw Score (Score out of 24.Lower scores equate to lower levels of function) 10   Total Evaluation Time   Total Evaluation Time (Minutes) 18     "

## 2019-07-11 NOTE — PROGRESS NOTES
[facility]  Comprehensive Daily ICU Note        Bradley Liz MRN# [id number]   Age: 78 year old YOB: 1940     Date of Admission: 7/9/2019    Primary care provider: Alex Mustafa     CODE STATUS: Full    Problem List:   Gangrenous cholecystitis  Septic Shock  Atrial Fibrillation with RVR  Acute Renal Failure   Hypernatremia         Treatment goals for next 24 hours:   Wean pressors  Hold diet until pressors down  LUE ultrasound pending given aberrant BP reading  Continue abx for now; cultures pending        Subjective/ Last 24 hours:   Events: Pressors being weaned. Had bowel movement. Pain tolerable. Denies chest pain or shortness of breath. NICOM hooked up, doesn't appear fluid responsive.          Mechanical Ventilation/Vitalsigns/IsandOs:   B/P: 112/53, T: 98.4, P: 86, R: 10    Ventilation Mode: CPAP/PS  (Continuous positive airway pressure with Pressure Support)  FiO2 (%): 50 %  Rate Set (breaths/minute): 12 breaths/min  Tidal Volume Set (mL): 550 mL  PEEP (cm H2O): 5 cmH2O  Pressure Support (cm H2O): 5 cmH2O  Oxygen Concentration (%): 30 %  Resp: 10    IN: 5.3L  OUT: 1365         Physical Examination:   General: Sleeping but awakens, appears comfortabel  HEENT: No icterus  Lungs: Slightly coarse  CVS: Irregular  Abdomen: Soft, nondistended. Appropriate incisional tenderness. Drain output is serous, NOT bilious  Extremities/musculoskeletal: No significant edema. Palpable distal pulses  Neurology: Interactive and conversant. Disoriented. Follows commands x4.  Skin: Irritation around RUQ drain site  Psychiatry: Appropriate  Exam of Line sites:  Art line, internal jugular no erythema or drainage    Hospital Procedures   Laparoscopic cholecystectomy, drain placement  internal jugular CVC placement  Arterial line placement         Feeding/Glucose:   Sips of clears    Blood glucose/Insulin requirement last 24 hours: SSI         Medications:       famotidine  20 mg Intravenous Q24H      hydrocortisone sodium succinate PF  50 mg Intravenous Q6H     insulin aspart  1-6 Units Subcutaneous Q4H     piperacillin-tazobactam  3.375 g Intravenous Q6H             Labs/Diagnostic studies:   LABS:  Results for BRADLEY LIZ (MRN [id number]) as of 7/11/2019 11:08   7/11/2019 04:23   Sodium 146 (H)   Potassium 4.3   Chloride 114 (H)   Carbon Dioxide 25   Urea Nitrogen 51 (H)   Creatinine 2.00 (H)   GFR Estimate 31 (L)   GFR Estimate If Black 36 (L)   Calcium 7.6 (L)   Anion Gap 7   Albumin 2.0 (L)   Protein Total 5.0 (L)   Bilirubin Total 3.3 (H)   Alkaline Phosphatase 129    (H)   AST 64 (H)   Hemoglobin A1C 5.5   Glucose 167 (H)   WBC 16.8 (H)   Hemoglobin 15.3   Hematocrit 46.4   Platelet Count 316   INR 1.31 (H)           Imaging:   US pending         Assessment and Plan:     Summary:  Bradley Liz IS a 78 year old male admitted on 7/9/2019 for septic shock from gangrenous cholecystitis. He is s/p laparoscopic cholecystectomy. Slowly improving post operatively.     I have personally reviewed the daily labs, imaging studies, cultures      My assessment and plan by system for this patient is as follows:     Neurology/Psychiatry:   1. Sedation / pain.     Plan  Tylenol, dilaudid PRN     Cardiovascular:   1. A fib RVR  2. Shock  3. History of NICM     Plan  -Weaning levophed as able.   -Stopping vaso. If tachycardic will switch back.   -Continue steroids through today     Pulmonary/Ventilator Management:   -Extubated. On high flow. Continue to wean O2 as able.   -Pulm toilet: Aggressive IS     GI and Nutrition :   1. Cholecystitis  2. Portal venous gas - related to gangrenous cholecystitis     Plan  -Sips of clears  -LFTs improving, follow     Renal/Fluids/Electrolytes:   1. Acute Renal failure - suspect at least pre-renal. Likely also ATN. Improving.      Plan  - Monitor UOP, continue MIVF. Replete lytes PRN     Infectious Disease:   1. Septic shock  2. cholecystitis     Plan  - zosyn  -  await blood culture and OR cultures     Endocrine:   1. No acute issues  Plan  - ICU insulin protocol, goal sugar <180  - Continue hydrocortisone through today        Hematology/Oncology:   1. Polycythemia - suspect due to hemoconcentration  2. Coagulopathy - due to warfarin.  S/p Kcentra and FFP  Plan  - follow CBC and coags           IV/Access:   1. Venous access - CVC  2. Arterial access - art line  3.  Plan  - central access required and necessary        ICU Prophylaxis:   1. DVT: sq heparin   2. VAP: Not indicated  3. Stress Ulcer: H2 blocker  4. Restraints: Not required  5. Wound care - per unit routine   6. Feeding - Sips of clears  7. Family Update: no  8. Disposition - critically ill but improving    Kami Catalan MD, PhD  SICU fellow  [phone]

## 2019-07-11 NOTE — PLAN OF CARE
Discharge Planner OT   Patient plan for discharge: hopes to return home w/spouse A  Current status: OT corine completed and treatment initiated in ICU this AM POD#1 lap valentin. Daughter present. Pt lives with spouse in house, all needs met on one level. At baseline, he is indep with mobilities without an adaptive device and all self-cares, driving, meds, finances. Pt's history includes CVA with residual mild R sided weakness. Today pt O x 3, completed supine to sit with Min A of 1, sit to stand CGA to Min A of 2, tolerated standing 3min with CGA. Sit to supine Mod A of 2, Max A of 2 to scoot to HOB. Pt able to feed himself, required Max A to don socks due to abdominal pain (and per post-op precautions).  OT to see daily to advance ADLs.  Barriers to return to prior living situation: current level of A  Recommendations for discharge: ARC  Rationale for recommendations: Pt meets criteria for IP acute rehab based on level of motivation, good tolerance and functional gains made on POD#1 with OT, anticipate he will be able to tolerate 3o daily therapy upon discharge. He has supportive family and spouse available to assist at home as needed.  Pending pt's LOS and progress he may advance enough to return home.         Entered by: Molina Ruiz 07/11/2019 12:05 PM

## 2019-07-11 NOTE — PROGRESS NOTES
Surgery Progress Note  7/11/19    POD1 from laparoscopic cholecystectomy for gangrenous cholecystitis.    S:  Extubated on HFNC this morning.  Decreasing pressor requirement.  Awake, though sleepy this morning.  Interactive.  Oriented to person and situation overall.  He denies much pain.  He is requesting ice water.    O:   Abdomen soft, expected diffuse tenderness to palpation  R inguinal hernia stable, non tender  Incisions cdi  S/s drainage, scanr from RUQ MATT drain to bulb suction.  There is some drainage from the drain tract itself.  Drain stripped.    Allen remains.    Lactate has normalized.  WBC improving 24K>16.    Hgb reflective of hemoconcentration.  INR 1.4  Cr 2.00 downtrending. K 4.3  ALT/AST downtrending, tbili downtrending 6>3    A/P:  Sepsis 2/2 gangrenous cholecystitis with significant peritoneal contamination at risk for cyst duct/artery leak.  Also likely ileus.  Low suspicion for obstructive process of the bowel related to his hernia at this time.  He is improving overall.    -- Appreciate ongoing ICU cares, pressor weaning as tolerated.  -- Continue zosyn.  Follow-up on intra-op cultures as available.  -- Continue to trend LFTs.  -- Ok for SLP evaluation and sips of clears today if safe for that.  -- Ok for DVT ppx.  -- RUQ MATT drain to bulb suction.  Please record output and quality.  Monitoring for biliary or cystic artery stump leak.  Please notify surgery with any concerning changes.    Will discuss further with Dr. Peck today.    Seema Monique, PGY4 General Surgery Resident

## 2019-07-11 NOTE — PROGRESS NOTES
07/11/19 1144   Quick Adds   Type of Visit Initial Occupational Therapy Evaluation   Living Environment   Lives With spouse   Living Arrangements house   Home Accessibility no concerns   Living Environment Comment All needs met on one level - no stairs to enter home   Self-Care   Usual Activity Tolerance good   Current Activity Tolerance poor   Functional Level   Ambulation 0-->independent   Transferring 0-->independent   Toileting 0-->independent   Bathing 0-->independent   Dressing 0-->independent   Eating 0-->independent   Communication 0-->understands/communicates without difficulty   Swallowing 0-->swallows foods/liquids without difficulty   Cognition 0 - no cognition issues reported   Fall history within last six months no   Number of times patient has fallen within last six months 1   Prior Functional Level Comment Pt reports being indep all self-cares, meds, finances, amb. He and spouse share HH tasks, he does not do outdoor work.    General Information   Onset of Illness/Injury or Date of Surgery - Date 07/09/19   Referring Physician Angel Andino NP   Patient/Family Goals Statement return home   Additional Occupational Profile Info/Pertinent History of Current Problem 79yo male admitted with nausea/vomiting and found to be septic - now POD#1 lap cholecystectomy due to gangrenous cholecystitis. Extubated and now on HFNC. PMH includes CHF, afib and CVI w/mild residual R side weakness.    Precautions/Limitations fall precautions;abdominal precautions;oxygen therapy device and L/min  (HFNC, arterial line (in ICU))   Cognitive Status Examination   Orientation orientation to person, place and time   Level of Consciousness alert   Follows Commands (Cognition) follows one step commands;over 90% accuracy   Visual Perception   Visual Perception No deficits were identified  (readers only)   Pain Assessment   Patient Currently in Pain Yes, see Vital Sign flowsheet   Integumentary/Edema   Integumentary/Edema Comments  Pt in ICU: many dressings on abdomen and R side.    Range of Motion (ROM)   ROM Comment Not assessed due to multiple lines/tubing. Pt reports WNL at baseline.   Strength   Strength Comments Not assessed due to multiple lines/tubing. Pt reports BUE strength WFL, L stronger than R due to old stroke per above   Mobility   Bed Mobility Comments Supine to sit Min A of 1, sit to supine Mod A of 2, scoot to HOB Max A of 2   Transfer Skill: Sit to Stand   Level of [place]: Sit/Stand contact guard   Physical Assist/Nonphysical Assist: Sit/Stand 2 persons   Assistive Device for Transfer: Sit/Stand   (none available - does not use AD at baseline)   Balance   Balance Comments Static standing at EOB CGA of 2 provided for safety, pt able to take small steps to R side toward HOB with Nicolasa of 1, CGA of another provided for safety and monitoring of lines/equip   Activities of Daily Living Analysis   Impairments Contributing to Impaired Activities of Daily Living pain;post surgical precautions;strength decreased   ADL Comments Not fully assessed in ICU setting as yet - pt's first time up to EOB/stand post-op   General Therapy Interventions   Planned Therapy Interventions ADL retraining;cognition;transfer training   Clinical Impression   Criteria for Skilled Therapeutic Interventions Met yes, treatment indicated   OT Diagnosis decreased ADL/IADL performance   Influenced by the following impairments weakness, post-op abdominal precautions/pain   Assessment of Occupational Performance 3-5 Performance Deficits   Identified Performance Deficits functional mobility, dressing, toileting, bathing, household mgmt   Clinical Decision Making (Complexity) Moderate complexity   Therapy Frequency Daily   Predicted Duration of Therapy Intervention (days/wks) 5 days   Anticipated Discharge Disposition Acute Rehabilitation Facility   Risks and Benefits of Treatment have been explained. Yes   Patient, Family & other staff in agreement with plan  "of care Yes   [facility] AM-PAC TM \"6 Clicks\"   2016, Trustees of [facility], under license to [employer].  All rights reserved.   6 Clicks Short Forms Daily Activity Inpatient Short Form   [facility]  \"6 Clicks\" Daily Activity Inpatient Short Form   1. Putting on and taking off regular lower body clothing? 2 - A Lot   2. Bathing (including washing, rinsing, drying)? 2 - A Lot   3. Toileting, which includes using toilet, bedpan or urinal? 3 - A Little   4. Putting on and taking off regular upper body clothing? 3 - A Little   5. Taking care of personal grooming such as brushing teeth? 3 - A Little   6. Eating meals? 4 - None   Daily Activity Raw Score (Score out of 24.Lower scores equate to lower levels of function) 17   Total Evaluation Time   Total Evaluation Time (Minutes) 15     "

## 2019-07-11 NOTE — PLAN OF CARE
Discharge Planner PT   Patient plan for discharge: hopes to return home with spouse  Current status: PT eval and treatment initiated in ICU. 77y/o F POD#1 laparoscopic cholecystectomy for gangrenous cholecystitis, septic shock, a-fib with RVR, acute renal failure. Extubated this AM and was up with OT standing earlier today. PLOF: Lives in house with no stairs with his wife, reports drives, does grocery shopping and IND with all aspects of mobility without an assistive device, reports typically able to walk about 200yds without stopping.     Pt oriented to situation, self, place and year. Mild fatigued with supine LE ex, noted weakness on right LE/UE (hx of CVA with residual mild right sided weakness). Mod A x 2 with supine to sit with cues for technique. Tolerated sitting at EOB with mod to min A x 1, fatigued following 3 min and requesting to lie back down with mod A x 2. Max A x 2 to assist with boosting in bed. Elevated NJ=153-636's on telemetry with sitting EOB. OS=614/93mmHg, SPO2=98% on [place]. (supine BP post ex: 146/82mmHg, YU=092bns). Pt presents with generalized weakness, impaired activity tolerance, impaired balance and would benefit from skilled PT to promote return to independence.   Barriers to return to prior living situation: level of assistance, medical status  Recommendations for discharge: ARC  Rationale for recommendations: PT motivated, appears to have good family support. Would benefit from intensive skilled PT to maximize his return to IND with mobility prior to return home with his family. Anticipate pt will be able to tolerate 3 hours of therapy by time of discharge.        Entered by: Kaci Samuel 07/11/2019 4:38 PM

## 2019-07-11 NOTE — PLAN OF CARE
Pt. Remains on HFNC, coarse lung sounds, using IP and acapella, good urine output through treadwell, CVl and barbie-line in place, vaso turned off, and titrating levo., left arm ultrasound, OT worked with pt. And pt. Stood, small BM, sips of water and ice chips tolerated, MATT continues to drain at site and dressing changed, pt. Remains confused at baseline, and is pleasant.

## 2019-07-11 NOTE — PROGRESS NOTES
Discussed pt's INR values with Dr. Emil Chu of the ICU, no need to start anti-coagulation studies tonight.

## 2019-07-11 NOTE — PLAN OF CARE
Pt. extuubted to HFNC early in 12H shift successfully, adequate perfusion, art line not correlating with cuff pressure and investigated with new art-line re-threaded by MD which was then found to correlate with lower leg cuff pressures, ultrasound ordered for left arm (arm currently shows good pulses, refill and color), adequate concentrated urine output through treadwell, CVL in place with nam. Gtt ultimately turned off and levo increased with vaso drip still on, pt' remains confused to time and place at baseline and is still drowsy but does arouse to voice and is pleasant to talk to, he is somewhat slower and weaker with his right extremities at baseline from a previous CVA per his and his wife's report.

## 2019-07-11 NOTE — PLAN OF CARE
Pts arouses to voices and oriented to self, place and situation. Follows command. Afib control ventricular rhythm. On low dose of levo/vaso gtt for blood pressure support. Clear and diminish in the bases. Infrequent cough. Aggressive pulmonary hygiene done overnight with IS / aerobika.Enrolled in vit C study. Lab noted this am. Adequate moo UOP. Pulled NG out this am. No drainage from NG since 7/10 evening. Will defer decision to re-insert NG to am nurse when sx rounds. No audible bowel sounds. Noninvasive fluid responsiveness challenge done with cheetah device at beginning of th shift. Pts SVI 8.9% meaning wont benefit from fluid. Will continue to monitor.

## 2019-07-11 NOTE — PROGRESS NOTES
Doing well. Extubated. Drain serous. Wounds OK. Sit as tolerated. Give a few ice chips. Pressors down

## 2019-07-12 ENCOUNTER — APPOINTMENT (OUTPATIENT)
Dept: PHYSICAL THERAPY | Facility: CLINIC | Age: 79
DRG: 853 | End: 2019-07-12
Attending: NURSE PRACTITIONER
Payer: COMMERCIAL

## 2019-07-12 ENCOUNTER — APPOINTMENT (OUTPATIENT)
Dept: OCCUPATIONAL THERAPY | Facility: CLINIC | Age: 79
DRG: 853 | End: 2019-07-12
Attending: INTERNAL MEDICINE
Payer: COMMERCIAL

## 2019-07-12 LAB
ALBUMIN SERPL-MCNC: 1.8 G/DL (ref 3.4–5)
ALP SERPL-CCNC: 129 U/L (ref 40–150)
ALT SERPL W P-5'-P-CCNC: 145 U/L (ref 0–70)
ANION GAP SERPL CALCULATED.3IONS-SCNC: 8 MMOL/L (ref 3–14)
AST SERPL W P-5'-P-CCNC: 29 U/L (ref 0–45)
BILIRUB SERPL-MCNC: 1.8 MG/DL (ref 0.2–1.3)
BUN SERPL-MCNC: 48 MG/DL (ref 7–30)
CALCIUM SERPL-MCNC: 7.8 MG/DL (ref 8.5–10.1)
CHLORIDE SERPL-SCNC: 116 MMOL/L (ref 94–109)
CO2 SERPL-SCNC: 25 MMOL/L (ref 20–32)
CREAT SERPL-MCNC: 1.72 MG/DL (ref 0.66–1.25)
ERYTHROCYTE [DISTWIDTH] IN BLOOD BY AUTOMATED COUNT: 16.5 % (ref 10–15)
GFR SERPL CREATININE-BSD FRML MDRD: 37 ML/MIN/{1.73_M2}
GLUCOSE BLDC GLUCOMTR-MCNC: 102 MG/DL (ref 70–99)
GLUCOSE BLDC GLUCOMTR-MCNC: 119 MG/DL (ref 70–99)
GLUCOSE BLDC GLUCOMTR-MCNC: 90 MG/DL (ref 70–99)
GLUCOSE BLDC GLUCOMTR-MCNC: 92 MG/DL (ref 70–99)
GLUCOSE BLDC GLUCOMTR-MCNC: 92 MG/DL (ref 70–99)
GLUCOSE BLDC GLUCOMTR-MCNC: 95 MG/DL (ref 70–99)
GLUCOSE SERPL-MCNC: 109 MG/DL (ref 70–99)
HCT VFR BLD AUTO: 44.5 % (ref 40–53)
HGB BLD-MCNC: 14.2 G/DL (ref 13.3–17.7)
INR PPP: 1.27 (ref 0.86–1.14)
INTERPRETATION ECG - MUSE: NORMAL
MCH RBC QN AUTO: 30.7 PG (ref 26.5–33)
MCHC RBC AUTO-ENTMCNC: 31.9 G/DL (ref 31.5–36.5)
MCV RBC AUTO: 96 FL (ref 78–100)
PLATELET # BLD AUTO: 251 10E9/L (ref 150–450)
POTASSIUM SERPL-SCNC: 3.8 MMOL/L (ref 3.4–5.3)
PROT SERPL-MCNC: 4.8 G/DL (ref 6.8–8.8)
RBC # BLD AUTO: 4.62 10E12/L (ref 4.4–5.9)
SODIUM SERPL-SCNC: 149 MMOL/L (ref 133–144)
WBC # BLD AUTO: 10.8 10E9/L (ref 4–11)

## 2019-07-12 PROCEDURE — 97535 SELF CARE MNGMENT TRAINING: CPT | Mod: GO

## 2019-07-12 PROCEDURE — 25000132 ZZH RX MED GY IP 250 OP 250 PS 637: Performed by: INTERNAL MEDICINE

## 2019-07-12 PROCEDURE — 25000128 H RX IP 250 OP 636: Performed by: NURSE PRACTITIONER

## 2019-07-12 PROCEDURE — 25000125 ZZHC RX 250: Performed by: INTERNAL MEDICINE

## 2019-07-12 PROCEDURE — 25000128 H RX IP 250 OP 636: Performed by: INTERNAL MEDICINE

## 2019-07-12 PROCEDURE — 00000146 ZZHCL STATISTIC GLUCOSE BY METER IP

## 2019-07-12 PROCEDURE — 25000132 ZZH RX MED GY IP 250 OP 250 PS 637: Performed by: SURGERY

## 2019-07-12 PROCEDURE — 80053 COMPREHEN METABOLIC PANEL: CPT | Performed by: INTERNAL MEDICINE

## 2019-07-12 PROCEDURE — 85610 PROTHROMBIN TIME: CPT | Performed by: INTERNAL MEDICINE

## 2019-07-12 PROCEDURE — 20000003 ZZH R&B ICU

## 2019-07-12 PROCEDURE — 99291 CRITICAL CARE FIRST HOUR: CPT | Mod: GC | Performed by: INTERNAL MEDICINE

## 2019-07-12 PROCEDURE — 25800030 ZZH RX IP 258 OP 636: Performed by: SURGERY

## 2019-07-12 PROCEDURE — 85027 COMPLETE CBC AUTOMATED: CPT | Performed by: INTERNAL MEDICINE

## 2019-07-12 PROCEDURE — 97530 THERAPEUTIC ACTIVITIES: CPT | Mod: GP | Performed by: PHYSICAL THERAPIST

## 2019-07-12 PROCEDURE — 25000125 ZZHC RX 250: Performed by: SURGERY

## 2019-07-12 RX ORDER — FUROSEMIDE 10 MG/ML
40 INJECTION INTRAMUSCULAR; INTRAVENOUS ONCE
Status: COMPLETED | OUTPATIENT
Start: 2019-07-12 | End: 2019-07-12

## 2019-07-12 RX ORDER — METOPROLOL TARTRATE 1 MG/ML
5 INJECTION, SOLUTION INTRAVENOUS EVERY 4 HOURS
Status: COMPLETED | OUTPATIENT
Start: 2019-07-12 | End: 2019-07-12

## 2019-07-12 RX ORDER — METOPROLOL TARTRATE 100 MG
100 TABLET ORAL 2 TIMES DAILY
Status: DISCONTINUED | OUTPATIENT
Start: 2019-07-12 | End: 2019-07-17 | Stop reason: HOSPADM

## 2019-07-12 RX ORDER — WARFARIN SODIUM 5 MG/1
5 TABLET ORAL
Status: COMPLETED | OUTPATIENT
Start: 2019-07-12 | End: 2019-07-12

## 2019-07-12 RX ADMIN — PIPERACILLIN SODIUM,TAZOBACTAM SODIUM 3.38 G: 3; .375 INJECTION, POWDER, FOR SOLUTION INTRAVENOUS at 03:21

## 2019-07-12 RX ADMIN — FUROSEMIDE 40 MG: 10 INJECTION, SOLUTION INTRAVENOUS at 18:33

## 2019-07-12 RX ADMIN — HEPARIN SODIUM 5000 UNITS: 5000 INJECTION, SOLUTION INTRAVENOUS; SUBCUTANEOUS at 21:10

## 2019-07-12 RX ADMIN — SODIUM CHLORIDE, POTASSIUM CHLORIDE, SODIUM LACTATE AND CALCIUM CHLORIDE: 600; 310; 30; 20 INJECTION, SOLUTION INTRAVENOUS at 06:24

## 2019-07-12 RX ADMIN — PIPERACILLIN SODIUM,TAZOBACTAM SODIUM 3.38 G: 3; .375 INJECTION, POWDER, FOR SOLUTION INTRAVENOUS at 15:35

## 2019-07-12 RX ADMIN — HYDROCORTISONE SODIUM SUCCINATE 50 MG: 100 INJECTION, POWDER, FOR SOLUTION INTRAMUSCULAR; INTRAVENOUS at 03:21

## 2019-07-12 RX ADMIN — HYDROCORTISONE SODIUM SUCCINATE 50 MG: 100 INJECTION, POWDER, FOR SOLUTION INTRAMUSCULAR; INTRAVENOUS at 08:45

## 2019-07-12 RX ADMIN — HEPARIN SODIUM 5000 UNITS: 5000 INJECTION, SOLUTION INTRAVENOUS; SUBCUTANEOUS at 08:45

## 2019-07-12 RX ADMIN — METOPROLOL TARTRATE 100 MG: 100 TABLET, FILM COATED ORAL at 21:10

## 2019-07-12 RX ADMIN — METOPROLOL TARTRATE 5 MG: 5 INJECTION INTRAVENOUS at 17:35

## 2019-07-12 RX ADMIN — PIPERACILLIN SODIUM,TAZOBACTAM SODIUM 3.38 G: 3; .375 INJECTION, POWDER, FOR SOLUTION INTRAVENOUS at 21:11

## 2019-07-12 RX ADMIN — FAMOTIDINE 20 MG: 10 INJECTION, SOLUTION INTRAVENOUS at 21:10

## 2019-07-12 RX ADMIN — METOPROLOL TARTRATE 5 MG: 5 INJECTION INTRAVENOUS at 13:34

## 2019-07-12 RX ADMIN — FAMOTIDINE 20 MG: 10 INJECTION, SOLUTION INTRAVENOUS at 08:45

## 2019-07-12 RX ADMIN — PIPERACILLIN SODIUM,TAZOBACTAM SODIUM 3.38 G: 3; .375 INJECTION, POWDER, FOR SOLUTION INTRAVENOUS at 08:58

## 2019-07-12 RX ADMIN — WARFARIN SODIUM 5 MG: 5 TABLET ORAL at 17:35

## 2019-07-12 ASSESSMENT — ACTIVITIES OF DAILY LIVING (ADL)
ADLS_ACUITY_SCORE: 13
ADLS_ACUITY_SCORE: 12
ADLS_ACUITY_SCORE: 19
ADLS_ACUITY_SCORE: 12
ADLS_ACUITY_SCORE: 19
ADLS_ACUITY_SCORE: 19

## 2019-07-12 NOTE — PLAN OF CARE
Alert and oriented but forgetful. Flat affect. Afebrile. Tele Afib +RVR. Off of all vasopressor medication.  LS diminished,  crackles in bases, Loose nonproductive cough.  IV and PO metoprolol initiated for HR control.  Up with PT/OT today, Up with strong assist of 2. Treadwell patent. BMs x3. New Scrotal and akin area edema.  RUQ MATT drain patent, Copious serous fluid draining, dressings changed multiple times. Family updated at bedside with plan of care, Supportive.     Update 2238. Pt remains alert but forgetful. SOB and tachypnea observed, Pt denied any difficulty breathing.  LS coarse, loose cough. IVF stopped per verbal order from ICU MD, Lasix given. See treadwell output. Supportive CPAP. Pt resting comfortably at this time. Copious drainage around MATT site continued. Nursing to follow closely.

## 2019-07-12 NOTE — PROGRESS NOTES
Surgery Progress Note  7/12/19    POD2 from laparoscopic cholecystectomy for gangrenous cholecystitis.    S:  On nasal cannula this morning.  Denies pain. Tolerating ice chips.  No complaints.    O: VSSAF, Afib rate controlled.  Abdomen distended somewhat firm but non tender  Incisions cdi, large ABD over drain site.  S/s drainage, scant from RUQ MATT drain to bulb suction.  Allen remains.    Lactate has normalized.  WBC now normal.  Cr 1.72 downtrending. K 3.8  ALT/AST downtrending, tbili downtrending 6>3>2    A/P:  Sepsis 2/2 gangrenous cholecystitis, now resolved.   Also likely ileus.  Low suspicion for obstructive process of the bowel related to his hernia at this time.  He is improving.    -- Could transition to intermediate cares.  -- Could likely discontinue zosyn POD3 if cultures remain negative.  -- Ok for diet advancement to clears today if appropriate per SLP.  -- Ok for DVT ppx, ok to resume therapeutic anti-coag for Afib.  -- RUQ MATT drain to bulb suction.  To remain in place at least 7 days post op.    Seema Monique, PGY4 General Surgery Resident

## 2019-07-12 NOTE — PROGRESS NOTES
CM    I: SW received request to meet with spouse regarding some questions about insurance/financial. GABI and RN CC have attempted to meet with spouse X3 today, spouse has not been present in room or on unit. SW also was updated therapies are recommending ARU for patient. GABI placed referral thru DOD to: [facility]. Pt has [employer] Medicare Advantage, thus GABI called [employer] to begin authorization process, faxing all requested info. [employer] Request #: [id number]    P: Continue to assist as needed.    ANGELINE Nassar

## 2019-07-12 NOTE — PLAN OF CARE
The patient is alert and oriented. He has slept some this shift. Follows commands. Strength appears equal in all extremities with some generalized weakness.   PAtient weaned to 2L nasal cannula from high flow nasal cannula.   No pressors used during my shift. BP via cuff within goal range. Arterial line becomes positional on occasion.   No BM.   Allen cares done.   Deniz Wayne RN 7:24 AM 07/12/19

## 2019-07-12 NOTE — PLAN OF CARE
Discharge Planner PT   Patient plan for discharge: None stated during session.  Current status: Pt in supine upon arrival of therapist, initially refused to participate in therapy, although agreeable to sit EOB with encouragement. Pt performed supine <> sit with Linda of 2. Pt tolerated sitting EOB x 10 minutes with SBA. Pt assisted back to supine at end of session, dependent for boost up in bed.   Barriers to return to prior living situation: Level of assist, Decreased activity tolerance  Recommendations for discharge: ARC  Rationale for recommendations: Pt will benefit from intensive therapy in order to increase independence and safety with mobility. Anticipate pt will be able to tolerate 3 hours of therapy a day at time of discharge.        Entered by: Esther Baig 07/12/2019 3:18 PM

## 2019-07-12 NOTE — PLAN OF CARE
Discharge Planner OT   Patient plan for discharge: none stated   Current status: Pt went from supine to sit EOB with moderate assist of 2. Pt went from sit<>Stand at the walker with minimum assist. Pt transferred to the bedside chair with moderate assist and walker, and extra time. Pt demonstrated increase in SOB and fatigue with activity.   Barriers to return to prior living situation: Decreased independence in I/ADLs; current level of assist   Recommendations for discharge: ARU  Rationale for recommendations: Pt is below baseline in I/ADLs and will benefit from skilled therapy to address independence and safety in I/ADLs. Anticipate pt will tolerate 3 hours of therapy.        Entered by: Judy Law 07/12/2019 10:00 AM

## 2019-07-12 NOTE — PHARMACY-ANTICOAGULATION SERVICE
Clinical Pharmacy - Warfarin Dosing Consult     Pharmacy has been consulted to manage this patient s warfarin therapy.  Indication: Atrial Fibrillation  Therapy Goal: INR 2-3  Warfarin Prior to Admission: Yes  Warfarin PTA Regimen: 5 mg Sun, Tu,Th - 2.5 mg ROW  Significant drug interactions: none at this time  Recent documented change in oral intake/nutrition: Unknown    INR   Date Value Ref Range Status   07/12/2019 1.27 (H) 0.86 - 1.14 Final   07/11/2019 1.31 (H) 0.86 - 1.14 Final       Recommend warfarin 5 mg today.  Pharmacy will monitor Bradley SORIANO Agusto daily and order warfarin doses to achieve specified goal.      Please contact pharmacy as soon as possible if the warfarin needs to be held for a procedure or if the warfarin goals change.

## 2019-07-13 LAB
ALBUMIN SERPL-MCNC: 2.1 G/DL (ref 3.4–5)
ALP SERPL-CCNC: 115 U/L (ref 40–150)
ALT SERPL W P-5'-P-CCNC: 112 U/L (ref 0–70)
ANION GAP SERPL CALCULATED.3IONS-SCNC: 6 MMOL/L (ref 3–14)
AST SERPL W P-5'-P-CCNC: 28 U/L (ref 0–45)
BILIRUB SERPL-MCNC: 1.5 MG/DL (ref 0.2–1.3)
BUN SERPL-MCNC: 47 MG/DL (ref 7–30)
CALCIUM SERPL-MCNC: 8.1 MG/DL (ref 8.5–10.1)
CHLORIDE SERPL-SCNC: 116 MMOL/L (ref 94–109)
CO2 SERPL-SCNC: 30 MMOL/L (ref 20–32)
CREAT SERPL-MCNC: 1.63 MG/DL (ref 0.66–1.25)
ERYTHROCYTE [DISTWIDTH] IN BLOOD BY AUTOMATED COUNT: 16.2 % (ref 10–15)
GFR SERPL CREATININE-BSD FRML MDRD: 40 ML/MIN/{1.73_M2}
GLUCOSE BLDC GLUCOMTR-MCNC: 80 MG/DL (ref 70–99)
GLUCOSE BLDC GLUCOMTR-MCNC: 87 MG/DL (ref 70–99)
GLUCOSE BLDC GLUCOMTR-MCNC: 88 MG/DL (ref 70–99)
GLUCOSE BLDC GLUCOMTR-MCNC: 95 MG/DL (ref 70–99)
GLUCOSE BLDC GLUCOMTR-MCNC: 96 MG/DL (ref 70–99)
GLUCOSE SERPL-MCNC: 90 MG/DL (ref 70–99)
HCT VFR BLD AUTO: 50.6 % (ref 40–53)
HGB BLD-MCNC: 16.2 G/DL (ref 13.3–17.7)
INR PPP: 1.27 (ref 0.86–1.14)
LACTATE BLD-SCNC: 1.6 MMOL/L (ref 0.7–2)
MCH RBC QN AUTO: 31.2 PG (ref 26.5–33)
MCHC RBC AUTO-ENTMCNC: 32 G/DL (ref 31.5–36.5)
MCV RBC AUTO: 98 FL (ref 78–100)
PLATELET # BLD AUTO: 290 10E9/L (ref 150–450)
POTASSIUM SERPL-SCNC: 3.5 MMOL/L (ref 3.4–5.3)
PROT SERPL-MCNC: 5.1 G/DL (ref 6.8–8.8)
RBC # BLD AUTO: 5.19 10E12/L (ref 4.4–5.9)
SODIUM SERPL-SCNC: 152 MMOL/L (ref 133–144)
WBC # BLD AUTO: 13.1 10E9/L (ref 4–11)

## 2019-07-13 PROCEDURE — 25000128 H RX IP 250 OP 636: Performed by: INTERNAL MEDICINE

## 2019-07-13 PROCEDURE — 25000132 ZZH RX MED GY IP 250 OP 250 PS 637: Performed by: INTERNAL MEDICINE

## 2019-07-13 PROCEDURE — 25000125 ZZHC RX 250: Performed by: INTERNAL MEDICINE

## 2019-07-13 PROCEDURE — 25800030 ZZH RX IP 258 OP 636: Performed by: INTERNAL MEDICINE

## 2019-07-13 PROCEDURE — 80053 COMPREHEN METABOLIC PANEL: CPT | Performed by: INTERNAL MEDICINE

## 2019-07-13 PROCEDURE — 25000132 ZZH RX MED GY IP 250 OP 250 PS 637: Performed by: SURGERY

## 2019-07-13 PROCEDURE — 00000146 ZZHCL STATISTIC GLUCOSE BY METER IP

## 2019-07-13 PROCEDURE — 85027 COMPLETE CBC AUTOMATED: CPT | Performed by: INTERNAL MEDICINE

## 2019-07-13 PROCEDURE — 99233 SBSQ HOSP IP/OBS HIGH 50: CPT | Performed by: INTERNAL MEDICINE

## 2019-07-13 PROCEDURE — 36415 COLL VENOUS BLD VENIPUNCTURE: CPT | Performed by: INTERNAL MEDICINE

## 2019-07-13 PROCEDURE — 25000128 H RX IP 250 OP 636: Performed by: NURSE PRACTITIONER

## 2019-07-13 PROCEDURE — 83605 ASSAY OF LACTIC ACID: CPT | Performed by: INTERNAL MEDICINE

## 2019-07-13 PROCEDURE — 85610 PROTHROMBIN TIME: CPT | Performed by: INTERNAL MEDICINE

## 2019-07-13 PROCEDURE — 12000000 ZZH R&B MED SURG/OB

## 2019-07-13 RX ORDER — DEXTROSE MONOHYDRATE, SODIUM CHLORIDE, AND POTASSIUM CHLORIDE 50; 1.49; 4.5 G/1000ML; G/1000ML; G/1000ML
INJECTION, SOLUTION INTRAVENOUS CONTINUOUS
Status: DISCONTINUED | OUTPATIENT
Start: 2019-07-13 | End: 2019-07-16

## 2019-07-13 RX ORDER — WARFARIN SODIUM 5 MG/1
5 TABLET ORAL
Status: COMPLETED | OUTPATIENT
Start: 2019-07-13 | End: 2019-07-13

## 2019-07-13 RX ORDER — FUROSEMIDE 10 MG/ML
40 INJECTION INTRAMUSCULAR; INTRAVENOUS ONCE
Status: COMPLETED | OUTPATIENT
Start: 2019-07-13 | End: 2019-07-13

## 2019-07-13 RX ORDER — DEXTROSE MONOHYDRATE 25 G/50ML
25-50 INJECTION, SOLUTION INTRAVENOUS
Status: DISCONTINUED | OUTPATIENT
Start: 2019-07-13 | End: 2019-07-13

## 2019-07-13 RX ORDER — NICOTINE POLACRILEX 4 MG
15-30 LOZENGE BUCCAL
Status: DISCONTINUED | OUTPATIENT
Start: 2019-07-13 | End: 2019-07-13

## 2019-07-13 RX ADMIN — PIPERACILLIN SODIUM,TAZOBACTAM SODIUM 3.38 G: 3; .375 INJECTION, POWDER, FOR SOLUTION INTRAVENOUS at 20:12

## 2019-07-13 RX ADMIN — PIPERACILLIN SODIUM,TAZOBACTAM SODIUM 3.38 G: 3; .375 INJECTION, POWDER, FOR SOLUTION INTRAVENOUS at 15:24

## 2019-07-13 RX ADMIN — METOPROLOL TARTRATE 100 MG: 100 TABLET, FILM COATED ORAL at 09:41

## 2019-07-13 RX ADMIN — POTASSIUM CHLORIDE, DEXTROSE MONOHYDRATE AND SODIUM CHLORIDE: 150; 5; 450 INJECTION, SOLUTION INTRAVENOUS at 15:20

## 2019-07-13 RX ADMIN — FUROSEMIDE 40 MG: 10 INJECTION, SOLUTION INTRAVENOUS at 09:41

## 2019-07-13 RX ADMIN — HEPARIN SODIUM 5000 UNITS: 5000 INJECTION, SOLUTION INTRAVENOUS; SUBCUTANEOUS at 09:41

## 2019-07-13 RX ADMIN — PIPERACILLIN SODIUM,TAZOBACTAM SODIUM 3.38 G: 3; .375 INJECTION, POWDER, FOR SOLUTION INTRAVENOUS at 09:41

## 2019-07-13 RX ADMIN — FAMOTIDINE 20 MG: 10 INJECTION, SOLUTION INTRAVENOUS at 20:13

## 2019-07-13 RX ADMIN — HEPARIN SODIUM 5000 UNITS: 5000 INJECTION, SOLUTION INTRAVENOUS; SUBCUTANEOUS at 20:13

## 2019-07-13 RX ADMIN — FAMOTIDINE 20 MG: 10 INJECTION, SOLUTION INTRAVENOUS at 09:42

## 2019-07-13 RX ADMIN — WARFARIN SODIUM 5 MG: 5 TABLET ORAL at 17:38

## 2019-07-13 RX ADMIN — METOPROLOL TARTRATE 100 MG: 100 TABLET, FILM COATED ORAL at 20:13

## 2019-07-13 RX ADMIN — PIPERACILLIN SODIUM,TAZOBACTAM SODIUM 3.38 G: 3; .375 INJECTION, POWDER, FOR SOLUTION INTRAVENOUS at 03:26

## 2019-07-13 ASSESSMENT — ACTIVITIES OF DAILY LIVING (ADL)
ADLS_ACUITY_SCORE: 15
ADLS_ACUITY_SCORE: 17
ADLS_ACUITY_SCORE: 19

## 2019-07-13 NOTE — PROGRESS NOTES
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 CT showing for possible bowel necrosis with possible SBO and portal venous gas and pneumatosis of the gastric body and fundus. Ultrasound revealed cholelithiasis and gallbladder's sludge with diffuse gallbladder wall thickening.  Common bile talked was only 9 mm.       Acute respiratory failure secondary to gangrenous cholecystitis with sepsis.  -- s/p intubated and now extubated and stable on nasal cannula oxygen  -- continue IS, pulmonary toilet and increase activity as able.  -- currently on zosyn  -- no evidence for pulmonary infection  -- He has a history of smoking quit 10 years ago.    Sepsis secondary to gangrenous cholecystitis POD # 3      - General surgery following  - diet as tolerated  - drains in place with surgery management.   - continue IVF until he is able to eat more     Acute kidney injury   --creatinine 3.01 at presentation outside hospital (baseline 0.8) this is secondary to sepsis with dehydration and is improving with IV fluids  -- creatinine improving 2 > 1.72 > 1.63 although not at baseline.    Mild hyperkalemia/hypernatremia   -- Continue to follow with IV fluid hydration   -- change to D5 1/2 NS with 20 KCl.       A. fib with rapid ventricular rate, h/o stroke in 2008 on Coumadin   -continue metoprolol 200 mg daily  -coumadin restarted, INR 1.27       History of probable tachycardia mediated cardiomyopathy patient has a history of a low EF of 35 to 40%.  Subsequent echocardiograms showed  normalization to 50 to 55%.  Patient denies any history of fluid overload.  He had a nuclear stress test which revealed no evidence of ischemia. it is unclear whether he still takes Lasix.      Hyperlipidemia  -Holding PTA Crestor     Diet:  full liquid diet  DVT Prophylaxis: sq heparin  GI prophylaxis: iv pepcid  Allen Catheter: not present     Disposition Plan  -- transfer to medical floor  -- start PT/OT  -- will need TCU near [facility]      Interval History   -- discussed with critical care  -- daughter at bedside  -- poor appetite    -Data reviewed today: I reviewed all new labs and imaging over the last 24 hours. I personally reviewed no images or EKG's today.    Physical Exam   Heart Rate: 98, Blood pressure 137/84, pulse 110, temperature 97.6  F (36.4  C), temperature source Axillary, resp. rate 16, weight 96 kg (211 lb 10.3 oz), SpO2 96 %.  Vitals:    07/09/19 2000 07/11/19 0615   Weight: 86.3 kg (190 lb 4.1 oz) 96 kg (211 lb 10.3 oz)     Vital Signs with Ranges  Temp:  [97.6  F (36.4  C)-98.7  F (37.1  C)] 97.6  F (36.4  C)  Pulse:  [] 110  Heart Rate:  [] 98  Resp:  [8-23] 16  BP: (125-158)/() 137/84  SpO2:  [94 %-100 %] 96 %  I/O's Last 24 hours  I/O last 3 completed shifts:  In: 1040 [P.O.:640; I.V.:400]  Out: 4880 [Urine:4725; Drains:155]    Constitutional: Awake, alert, cooperative, no apparent distress  Respiratory: Clear to auscultation bilaterally, no crackles or wheezing  Cardiovascular: Regular rate and rhythm, normal S1 and S2, and no murmur noted  GI: hypoactive bowel sounds, soft, non-distended, non-tender  Skin/Integumen: No rashes, no cyanosis, no edema  Other:      Medications   All medications were reviewed.    dextrose 5% and 0.45% NaCl + KCl 20 mEq/L 100 mL/hr at 07/13/19 1520     Warfarin Therapy Reminder         famotidine  20 mg Intravenous Q12H     heparin  5,000 Units Subcutaneous Q12H     insulin aspart  1-7 Units Subcutaneous TID AC     insulin aspart  1-5  Units Subcutaneous At Bedtime     metoprolol tartrate  100 mg Oral BID     piperacillin-tazobactam  3.375 g Intravenous Q6H     sodium chloride (PF)  10 mL Intracatheter Q8H     sodium chloride (PF)  3 mL Intracatheter Q8H     warfarin  5 mg Oral ONCE at 18:00        Data   Recent Labs   Lab 07/13/19  0417 07/12/19  0420 07/11/19  0423  07/09/19  1337   WBC 13.1* 10.8 16.8*   < > 24.6*   HGB 16.2 14.2 15.3   < > 20.5*   MCV 98 96 95   < > 93    251 316   < > 502*   INR 1.27* 1.27* 1.31*   < > Canceled, Test credited   * 149* 146*   < > 141   POTASSIUM 3.5 3.8 4.3   < > 4.8   CHLORIDE 116* 116* 114*   < > 107   CO2 30 25 25   < > 24   BUN 47* 48* 51*   < > 43*   CR 1.63* 1.72* 2.00*   < > 3.01*   ANIONGAP 6 8 7   < > 10   DARON 8.1* 7.8* 7.6*   < > 10.3*   GLC 90 109* 167*   < > 150*   ALBUMIN 2.1* 1.8* 2.0*   < > 3.6   PROTTOTAL 5.1* 4.8* 5.0*   < > 7.4   BILITOTAL 1.5* 1.8* 3.3*   < > 9.9*   ALKPHOS 115 129 129   < > 325*   * 145* 225*   < > 792*   AST 28 29 64*   < > 569*   LIPASE  --   --   --   --  34*    < > = values in this interval not displayed.       No results found for this or any previous visit (from the past 24 hour(s)).    Ad Vizcarra MD  Text Page  (7am to 6pm)

## 2019-07-13 NOTE — PROGRESS NOTES
DATE & TIME: 7/13/2019 6062-2361                   Cognitive Concerns Orientation: alert, disoriented to time, forgetful  BEHAVIOR & AGGRESSION TOOL COLOR: Green  CIWA SCORE: N/A  ABNL VS/O2: VSS on 3L O2 ex can be tachycardic, in a fib (no tele needed dr FRANCO)  MOBILITY: heavy A2 GB and walker   PAIN MANAGMENT: denies pain  DIET: full liquids, fair appetite   BOWEL/BLADDER: incontinent bowel, large loose stool this afternoon, treadwell in place  ABNL LAB/BG: Na 152,Cr 1.63,    DRAIN/DEVICES: IVF infusing  TELEMETRY RHYTHM: N/A  SKIN: sanna BLE, abdominal incisions/lap sites, MATT drain small output (but significant leaking around the drain)  TESTS/PROCEDURES: lap valentin POD #3  D/C DAY/GOALS/PLACE: pending  OTHER IMPORTANT INFO: scheduled Metoprolol for rate control, on IV Zosyn, BGS 84 at dinner

## 2019-07-13 NOTE — PROGRESS NOTES
Care Coordination:    Received fax from [facility] (formerly [facility])  That they need more information faxed to them. Faxed H&P, current MAR, current therapy notes, current radiology reports, and current labs to [facility].    Harmony Israel RN BSN  [facility] Care Coordinator  Phone [phone]

## 2019-07-13 NOTE — PROGRESS NOTES
Surgery  Patient recovering from challenging cholecystectomy.  Feeling better.  Is hungry.  Okay to start clear liquids and advance as tolerated.  Ambulation.  Patient could transfer out of ICU if okay with medical team.  Musa Larson MD  General Surgery, Office [phone]

## 2019-07-13 NOTE — PLAN OF CARE
VSS overnight. No neuro changes. LS remain coarse. Pt refused CPAP overnight, MD aware. Good UO. A.fib. BP within parameters. Minimal output out of MATT. NO BM. Waiting on morning labs. Will continue to monitor and assess for any changes.

## 2019-07-13 NOTE — PROGRESS NOTES
[facility]  Comprehensive Daily ICU Note        Bradley Liz MRN# [id number]   Age: 78 year old YOB: 1940     Date of Admission: 7/9/2019    Primary care provider: Alex Mustafa     CODE STATUS: Full    Problem List:   Gangrenous cholecystitis  Septic Shock  Atrial Fibrillation with RVR  Acute Renal Failure   Hypernatremia         Treatment goals for next 24 hours:   Clear liquids > Full liquid  Diurese  Free water   Transfer to floor           Subjective/ Last 24 hours:     No major events - HTN and tachycardic late in day No neuro changes. Coarse bs but unlabored . CPAP ordered but patient refused . Diuresis started            Mechanical Ventilation/Vitalsigns/IsandOs:   BP (!) 140/96   Pulse 105   Temp 98.2  F (36.8  C) (Axillary)   Resp 13   Wt 96 kg (211 lb 10.3 oz)   SpO2 100%   BMI 31.25 kg/m         Intake/Output Summary (Last 24 hours) at 7/13/2019 0854  Last data filed at 7/13/2019 0800  Gross per 24 hour   Intake 1170 ml   Output 3460 ml   Net -2290 ml              Physical Examination:   General: Awake, interactive, appears comfortable  HEENT: No icterus  Lungs: Slightly coarse, unlabored   CVS: Tachy, irregular  Abdomen: Soft, mild distension. Appropriate incisional tenderness. Drain output serous diminished  Extremities/musculoskeletal: No edema  Neurology: Oriented to person. Follows x4. Interactive.   Skin: No breakdown  Psychiatry: Difficult to assess. Flat affect  Exam of Line sites:  RIJ CDI, R radial art site CDI    Hospital Procedures   Laparoscopic cholecystectomy, drain placement  internal jugular CVC placement  Arterial line placement         Feeding/Glucose:   Clears    Blood glucose/Insulin requirement last 24 hours: SSI         Medications:       famotidine  20 mg Intravenous Q12H     heparin  5,000 Units Subcutaneous Q12H     insulin aspart  1-6 Units Subcutaneous Q4H     metoprolol tartrate  100 mg Oral BID     piperacillin-tazobactam  3.375 g  Intravenous Q6H     sodium chloride (PF)  10 mL Intracatheter Q8H     sodium chloride (PF)  3 mL Intracatheter Q8H             Labs/Diagnostic studies:     ROUTINE ICU LABS (Last four results)  CMP  Recent Labs   Lab 07/13/19  0417 07/12/19  0420 07/11/19  0423 07/10/19  0324   * 149* 146* 145*   POTASSIUM 3.5 3.8 4.3 4.8   CHLORIDE 116* 116* 114* 111*   CO2 30 25 25 24   ANIONGAP 6 8 7 10   GLC 90 109* 167* 141*   BUN 47* 48* 51* 50*   CR 1.63* 1.72* 2.00* 2.27*   GFRESTIMATED 40* 37* 31* 26*   GFRESTBLACK 46* 43* 36* 31*   DARON 8.1* 7.8* 7.6* 8.3*   PROTTOTAL 5.1* 4.8* 5.0* 5.3*   ALBUMIN 2.1* 1.8* 2.0* 2.4*   BILITOTAL 1.5* 1.8* 3.3* 6.3*   ALKPHOS 115 129 129 203*   AST 28 29 64* 256*   * 145* 225* 411*     CBC  Recent Labs   Lab 07/13/19  0417 07/12/19  0420 07/11/19  0423 07/10/19  0324   WBC 13.1* 10.8 16.8* 24.7*   RBC 5.19 4.62 4.91 5.38   HGB 16.2 14.2 15.3 16.9   HCT 50.6 44.5 46.4 50.5   MCV 98 96 95 94   MCH 31.2 30.7 31.2 31.4   MCHC 32.0 31.9 33.0 33.5   RDW 16.2* 16.5* 16.0* 15.9*    251 316 430     INR  Recent Labs   Lab 07/13/19 0417 07/12/19 0420 07/11/19  0423 07/10/19  1351   INR 1.27* 1.27* 1.31* 1.28*     Arterial Blood Gas  Recent Labs   Lab 07/10/19  0915 07/09/19  2300   PH 7.29* 7.39   PCO2 50* 36   PO2 95 115*   HCO3 24 22               Imaging:   No results found for this or any previous visit (from the past 24 hour(s)).           Assessment and Plan:     Summary:  Bradley Liz IS a 78 year old male admitted on 7/9/2019 for septic shock from gangrenous cholecystitis. He is s/p laparoscopic cholecystectomy. Improving post operatively.      I have personally reviewed the daily labs, imaging studies, cultures      My assessment and plan by system for this patient is as follows:     Neurology/Psychiatry:   1. Sedation / pain.   Plan  Tylenol, dilaudid PRN     Cardiovascular:   1. A fib RVR  2. HTN - 2/2 fluid overload  3. History of NICM -  4. Septic shock - resolved     Plan  - restarted oral   - scheduled IV metoprolol 5q4. > 100 mg BID oral (home)   - prn lasix      Pulmonary/Ventilator Management:   -Extubated. On nasal cannula, consider NIV if increased WOB  -Pulm toilet: Aggressive IS     GI and Nutrition :   1. Cholecystitis with cholangitis . Drain management per surgery. No evidence of bile leak clinically. Labs normalizing.    Plan  -Clear liquids  -LFTs improving, follow     Renal/Fluids/Electrolytes:   1. Acute Renal failure - suspect at least pre-renal. Likely also ATN. Improving.   2. Hypernatremia - related to fluid shifts  Plan  - Monitor UOP, decrease MIVF. Replete lytes PRN  - free water PO      Infectious Disease:   1. Septic shock - 2/2 cholecystitis/cholangitis - s/p cholecystectomy, Resolved  Plan  - zosyn 5/10   - FUP  blood culture and OR cultures     Endocrine:   1. No acute issues  Plan  - ICU insulin protocol, goal sugar <180     Hematology/Oncology:   1. Polycythemia - suspect due to hemoconcentration  2. Coagulopathy - due to warfarin.  S/p Kcentra and FFP. Start coumadin.   Plan  - follow CBC and coags    IV/Access:   1. Venous access - CVC  Plan  - Remove today if we are able      ICU Prophylaxis:   1. DVT: sq heparin   2. VAP: Not indicated  3. Stress Ulcer: Can stop with starting diet  4. Restraints: Not required  5. Wound care - per unit routine   6. Feeding - Clear liquids  7. Family Update: no  8. Disposition - Stable       Shree Chu  L3

## 2019-07-13 NOTE — PLAN OF CARE
Alert and oriented but forgetful. Flat affect. Afebrile. Tele Afib+RVR, 100s.  HTN at baseline, PO metoprolol restarted. LS remain coarse but improved vs last night. 3L NC. Up to chair and BSC, Strong assist of 2. Allen, Multiple BMs, can be incontinent. Advanced to FLD and tolerated well. MATT site to bulb suction, Copious serous drainage around site, Dressings change multiple times. Family at bedside and updated on plan of care, Supportive. Report given and Pt transferred to [facility]

## 2019-07-13 NOTE — PLAN OF CARE
PT:  Attempted to see patient for PT this AM. Patient not agreeable to working with PT at this time as he has visitors; does offer that he would be willing to participate this PM.     2nd attempt: Attempted to see patient for 2nd time this PM. Patient recently transferred from ICU to station 66. RN working to get patient settled upon arrival of therapist, states patient is not available to work with PT at this time.

## 2019-07-14 ENCOUNTER — APPOINTMENT (OUTPATIENT)
Dept: PHYSICAL THERAPY | Facility: CLINIC | Age: 79
DRG: 853 | End: 2019-07-14
Attending: INTERNAL MEDICINE
Payer: COMMERCIAL

## 2019-07-14 ENCOUNTER — APPOINTMENT (OUTPATIENT)
Dept: GENERAL RADIOLOGY | Facility: CLINIC | Age: 79
DRG: 853 | End: 2019-07-14
Attending: INTERNAL MEDICINE
Payer: COMMERCIAL

## 2019-07-14 LAB
ALBUMIN SERPL-MCNC: 2.2 G/DL (ref 3.4–5)
ALP SERPL-CCNC: 117 U/L (ref 40–150)
ALT SERPL W P-5'-P-CCNC: 112 U/L (ref 0–70)
ANION GAP SERPL CALCULATED.3IONS-SCNC: 8 MMOL/L (ref 3–14)
AST SERPL W P-5'-P-CCNC: 57 U/L (ref 0–45)
BASOPHILS # BLD AUTO: 0 10E9/L (ref 0–0.2)
BASOPHILS NFR BLD AUTO: 0.2 %
BILIRUB SERPL-MCNC: 1.6 MG/DL (ref 0.2–1.3)
BUN SERPL-MCNC: 41 MG/DL (ref 7–30)
CALCIUM SERPL-MCNC: 8.3 MG/DL (ref 8.5–10.1)
CHLORIDE SERPL-SCNC: 109 MMOL/L (ref 94–109)
CO2 SERPL-SCNC: 30 MMOL/L (ref 20–32)
CREAT SERPL-MCNC: 1.69 MG/DL (ref 0.66–1.25)
DIFFERENTIAL METHOD BLD: ABNORMAL
EOSINOPHIL # BLD AUTO: 0.1 10E9/L (ref 0–0.7)
EOSINOPHIL NFR BLD AUTO: 0.9 %
ERYTHROCYTE [DISTWIDTH] IN BLOOD BY AUTOMATED COUNT: 15.4 % (ref 10–15)
GFR SERPL CREATININE-BSD FRML MDRD: 38 ML/MIN/{1.73_M2}
GLUCOSE BLDC GLUCOMTR-MCNC: 104 MG/DL (ref 70–99)
GLUCOSE BLDC GLUCOMTR-MCNC: 104 MG/DL (ref 70–99)
GLUCOSE BLDC GLUCOMTR-MCNC: 122 MG/DL (ref 70–99)
GLUCOSE BLDC GLUCOMTR-MCNC: 82 MG/DL (ref 70–99)
GLUCOSE SERPL-MCNC: 92 MG/DL (ref 70–99)
HCT VFR BLD AUTO: 55.4 % (ref 40–53)
HGB BLD-MCNC: 17.9 G/DL (ref 13.3–17.7)
IMM GRANULOCYTES # BLD: 0.5 10E9/L (ref 0–0.4)
IMM GRANULOCYTES NFR BLD: 3.9 %
INR PPP: 1.86 (ref 0.86–1.14)
INR PPP: NORMAL (ref 0.86–1.14)
LACTATE BLD-SCNC: 1.4 MMOL/L (ref 0.7–2)
LYMPHOCYTES # BLD AUTO: 1.7 10E9/L (ref 0.8–5.3)
LYMPHOCYTES NFR BLD AUTO: 14.1 %
MAGNESIUM SERPL-MCNC: 2.3 MG/DL (ref 1.6–2.3)
MCH RBC QN AUTO: 31 PG (ref 26.5–33)
MCHC RBC AUTO-ENTMCNC: 32.3 G/DL (ref 31.5–36.5)
MCV RBC AUTO: 96 FL (ref 78–100)
MONOCYTES # BLD AUTO: 1.2 10E9/L (ref 0–1.3)
MONOCYTES NFR BLD AUTO: 9.4 %
NEUTROPHILS # BLD AUTO: 8.7 10E9/L (ref 1.6–8.3)
NEUTROPHILS NFR BLD AUTO: 71.5 %
NRBC # BLD AUTO: 0 10*3/UL
NRBC BLD AUTO-RTO: 0 /100
PLATELET # BLD AUTO: 239 10E9/L (ref 150–450)
POTASSIUM SERPL-SCNC: 3.2 MMOL/L (ref 3.4–5.3)
POTASSIUM SERPL-SCNC: 3.7 MMOL/L (ref 3.4–5.3)
PROT SERPL-MCNC: 5.2 G/DL (ref 6.8–8.8)
RBC # BLD AUTO: 5.77 10E12/L (ref 4.4–5.9)
SODIUM SERPL-SCNC: 147 MMOL/L (ref 133–144)
WBC # BLD AUTO: 12.2 10E9/L (ref 4–11)

## 2019-07-14 PROCEDURE — 85025 COMPLETE CBC W/AUTO DIFF WBC: CPT | Performed by: INTERNAL MEDICINE

## 2019-07-14 PROCEDURE — 83605 ASSAY OF LACTIC ACID: CPT | Performed by: INTERNAL MEDICINE

## 2019-07-14 PROCEDURE — 25000128 H RX IP 250 OP 636: Performed by: INTERNAL MEDICINE

## 2019-07-14 PROCEDURE — 12000000 ZZH R&B MED SURG/OB

## 2019-07-14 PROCEDURE — 84132 ASSAY OF SERUM POTASSIUM: CPT | Performed by: INTERNAL MEDICINE

## 2019-07-14 PROCEDURE — 97116 GAIT TRAINING THERAPY: CPT | Mod: GP

## 2019-07-14 PROCEDURE — 25000132 ZZH RX MED GY IP 250 OP 250 PS 637: Performed by: INTERNAL MEDICINE

## 2019-07-14 PROCEDURE — 80053 COMPREHEN METABOLIC PANEL: CPT | Performed by: INTERNAL MEDICINE

## 2019-07-14 PROCEDURE — 85610 PROTHROMBIN TIME: CPT | Performed by: INTERNAL MEDICINE

## 2019-07-14 PROCEDURE — 36415 COLL VENOUS BLD VENIPUNCTURE: CPT | Performed by: INTERNAL MEDICINE

## 2019-07-14 PROCEDURE — 25000125 ZZHC RX 250: Performed by: INTERNAL MEDICINE

## 2019-07-14 PROCEDURE — 00000146 ZZHCL STATISTIC GLUCOSE BY METER IP

## 2019-07-14 PROCEDURE — 99207 ZZC MOONLIGHTING INDICATOR: CPT | Performed by: INTERNAL MEDICINE

## 2019-07-14 PROCEDURE — 71045 X-RAY EXAM CHEST 1 VIEW: CPT

## 2019-07-14 PROCEDURE — 83735 ASSAY OF MAGNESIUM: CPT | Performed by: INTERNAL MEDICINE

## 2019-07-14 PROCEDURE — 99233 SBSQ HOSP IP/OBS HIGH 50: CPT | Performed by: INTERNAL MEDICINE

## 2019-07-14 PROCEDURE — 97110 THERAPEUTIC EXERCISES: CPT | Mod: GP

## 2019-07-14 RX ORDER — WARFARIN SODIUM 1 MG/1
1 TABLET ORAL
Status: COMPLETED | OUTPATIENT
Start: 2019-07-14 | End: 2019-07-14

## 2019-07-14 RX ORDER — POTASSIUM CHLORIDE 7.45 MG/ML
10 INJECTION INTRAVENOUS
Status: DISCONTINUED | OUTPATIENT
Start: 2019-07-14 | End: 2019-07-17 | Stop reason: HOSPADM

## 2019-07-14 RX ORDER — POTASSIUM CL/LIDO/0.9 % NACL 10MEQ/0.1L
10 INTRAVENOUS SOLUTION, PIGGYBACK (ML) INTRAVENOUS
Status: DISCONTINUED | OUTPATIENT
Start: 2019-07-14 | End: 2019-07-17 | Stop reason: HOSPADM

## 2019-07-14 RX ORDER — POTASSIUM CHLORIDE 1500 MG/1
20-40 TABLET, EXTENDED RELEASE ORAL
Status: DISCONTINUED | OUTPATIENT
Start: 2019-07-14 | End: 2019-07-17 | Stop reason: HOSPADM

## 2019-07-14 RX ORDER — POTASSIUM CHLORIDE 1.5 G/1.58G
20-40 POWDER, FOR SOLUTION ORAL
Status: DISCONTINUED | OUTPATIENT
Start: 2019-07-14 | End: 2019-07-17 | Stop reason: HOSPADM

## 2019-07-14 RX ORDER — MAGNESIUM SULFATE HEPTAHYDRATE 40 MG/ML
2 INJECTION, SOLUTION INTRAVENOUS DAILY PRN
Status: DISCONTINUED | OUTPATIENT
Start: 2019-07-14 | End: 2019-07-17 | Stop reason: HOSPADM

## 2019-07-14 RX ORDER — POTASSIUM CHLORIDE 29.8 MG/ML
20 INJECTION INTRAVENOUS
Status: DISCONTINUED | OUTPATIENT
Start: 2019-07-14 | End: 2019-07-17 | Stop reason: HOSPADM

## 2019-07-14 RX ORDER — MAGNESIUM SULFATE HEPTAHYDRATE 40 MG/ML
4 INJECTION, SOLUTION INTRAVENOUS EVERY 4 HOURS PRN
Status: DISCONTINUED | OUTPATIENT
Start: 2019-07-14 | End: 2019-07-17 | Stop reason: HOSPADM

## 2019-07-14 RX ADMIN — FAMOTIDINE 20 MG: 10 INJECTION, SOLUTION INTRAVENOUS at 09:35

## 2019-07-14 RX ADMIN — METOPROLOL TARTRATE 100 MG: 100 TABLET, FILM COATED ORAL at 09:34

## 2019-07-14 RX ADMIN — POTASSIUM CHLORIDE 20 MEQ: 1500 TABLET, EXTENDED RELEASE ORAL at 22:55

## 2019-07-14 RX ADMIN — PIPERACILLIN SODIUM,TAZOBACTAM SODIUM 3.38 G: 3; .375 INJECTION, POWDER, FOR SOLUTION INTRAVENOUS at 15:36

## 2019-07-14 RX ADMIN — HEPARIN SODIUM 5000 UNITS: 5000 INJECTION, SOLUTION INTRAVENOUS; SUBCUTANEOUS at 09:34

## 2019-07-14 RX ADMIN — HEPARIN SODIUM 5000 UNITS: 5000 INJECTION, SOLUTION INTRAVENOUS; SUBCUTANEOUS at 20:47

## 2019-07-14 RX ADMIN — WARFARIN SODIUM 1 MG: 1 TABLET ORAL at 17:45

## 2019-07-14 RX ADMIN — POTASSIUM CHLORIDE 40 MEQ: 1.5 POWDER, FOR SOLUTION ORAL at 15:32

## 2019-07-14 RX ADMIN — METOPROLOL TARTRATE 100 MG: 100 TABLET, FILM COATED ORAL at 20:47

## 2019-07-14 RX ADMIN — PIPERACILLIN SODIUM,TAZOBACTAM SODIUM 3.38 G: 3; .375 INJECTION, POWDER, FOR SOLUTION INTRAVENOUS at 20:47

## 2019-07-14 RX ADMIN — PIPERACILLIN SODIUM,TAZOBACTAM SODIUM 3.38 G: 3; .375 INJECTION, POWDER, FOR SOLUTION INTRAVENOUS at 02:22

## 2019-07-14 RX ADMIN — POTASSIUM CHLORIDE 20 MEQ: 1500 TABLET, EXTENDED RELEASE ORAL at 17:45

## 2019-07-14 RX ADMIN — PIPERACILLIN SODIUM,TAZOBACTAM SODIUM 3.38 G: 3; .375 INJECTION, POWDER, FOR SOLUTION INTRAVENOUS at 09:35

## 2019-07-14 RX ADMIN — FAMOTIDINE 20 MG: 10 INJECTION, SOLUTION INTRAVENOUS at 20:47

## 2019-07-14 ASSESSMENT — ACTIVITIES OF DAILY LIVING (ADL)
ADLS_ACUITY_SCORE: 15
ADLS_ACUITY_SCORE: 16
ADLS_ACUITY_SCORE: 16

## 2019-07-14 NOTE — PLAN OF CARE
DATE & TIME: 7/13/2019 7283-7200              Cognitive Concerns Orientation: alert, disoriented to time, forgetful  BEHAVIOR & AGGRESSION TOOL COLOR: Green  CIWA SCORE: N/A  ABNL VS/O2: VSS on 3L O2 ex can be tachycardic, in a fib (no tele needed dr FRANCO)  MOBILITY: heavy A2 GB and walker to BSC  PAIN MANAGMENT: denies pain  DIET: full liquids, fair appetite   BOWEL/BLADDER: incontinent BM and then to BSC with more loose dark BM this evening. treadwell in place  ABNL LAB/BG: Na 152,Cr 1.63,  , BG 88 at bedtime, refusing 2am BG   DRAIN/DEVICES: IVF SL  TELEMETRY RHYTHM: N/A  SKIN: sanna BLE, abdominal incisions/lap sites, MATT drain small output (but significant leaking around the drain)  TESTS/PROCEDURES: lap valentin POD #3  D/C DAY/GOALS/PLACE: pending  OTHER IMPORTANT INFO: scheduled Metoprolol for rate control, on IV Zosyn, refused intervention for BG 88 at HS (no hx of DM), stopped IVF due to high BP and possible fluid overload.

## 2019-07-14 NOTE — PROVIDER NOTIFICATION
MD Notification    Notified Person: MD    Notified Person Name: Dr. Vaca    Notification Date/Time: 11:27 AM July 14, 2019    Notification Interaction: paged    Purpose of Notification: FYI K: 3.2, Na: 147. Creatinine 1.69. See labs. No IVF running currently. Potassium replace?     Orders Received: pending    Comments:

## 2019-07-14 NOTE — PROGRESS NOTES
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 CT showing for possible bowel necrosis with possible SBO and portal venous gas and pneumatosis of the gastric body and fundus. Ultrasound revealed cholelithiasis and gallbladder's sludge with diffuse gallbladder wall thickening.  Common bile talked was only 9 mm.       Acute respiratory failure secondary to gangrenous cholecystitis with sepsis.  -- s/p intubated and now extubated and stable on nasal cannula oxygen  -- continue IS, pulmonary toilet and increase activity as able.  -- currently on zosyn  -- no evidence for pulmonary infection  -- He has a history of smoking quit 10 years ago.    Sepsis secondary to gangrenous cholecystitis POD # 3      - General surgery following  - diet as tolerated  - drains in place with surgery management.   - continue IVF until he is able to eat more     Acute kidney injury   --creatinine 3.01 at presentation outside hospital (baseline 0.8) this is secondary to sepsis with dehydration and is improving with IV fluids  -- creatinine improving 2 > 1.72 > 1.63 although not at baseline.    Mild hyperkalemia/hypernatremia   -- Continue to follow with IV fluid hydration   -- change to D5 1/2 NS with 20 KCl.       A. fib with rapid ventricular rate, h/o stroke in 2008 on Coumadin   -continue metoprolol 200 mg daily  -coumadin restarted, INR 1.27       History of probable tachycardia mediated cardiomyopathy patient has a history of a low EF of 35 to 40%.  Subsequent echocardiograms showed  normalization to 50 to 55%.  Patient denies any history of fluid overload.  He had a nuclear stress test which revealed no evidence of ischemia. it is unclear whether he still takes Lasix.      Hyperlipidemia  -Holding PTA Crestor     Diet:  full liquid diet  DVT Prophylaxis: sq heparin  GI prophylaxis: iv pepcid  Allen Catheter: not present     Disposition Plan  -- start PT/OT  -- will need TCU near [facility]      Interval History   -- Sleepy today  -- poor appetite    -Data reviewed today: I reviewed all new labs and imaging over the last 24 hours. I personally reviewed no images or EKG's today.    Physical Exam   Heart Rate: 103, Blood pressure (!) 152/94, pulse 110, temperature 97.4  F (36.3  C), temperature source Oral, resp. rate 18, weight 97.4 kg (214 lb 11.7 oz), SpO2 96 %.  Vitals:    07/09/19 2000 07/11/19 0615 07/14/19 0500   Weight: 86.3 kg (190 lb 4.1 oz) 96 kg (211 lb 10.3 oz) 97.4 kg (214 lb 11.7 oz)     Vital Signs with Ranges  Temp:  [97.4  F (36.3  C)-98.5  F (36.9  C)] 97.4  F (36.3  C)  Pulse:  [] 110  Heart Rate:  [] 103  Resp:  [13-18] 18  BP: (130-163)/(83-94) 152/94  SpO2:  [94 %-99 %] 96 %  I/O's Last 24 hours  I/O last 3 completed shifts:  In: 540 [P.O.:540]  Out: 3705 [Urine:3525; Drains:180]    Constitutional: Awake, alert, cooperative, no apparent distress  Respiratory: Clear to auscultation bilaterally, no crackles or wheezing  Cardiovascular: Regular rate and rhythm, normal S1 and S2, and no murmur noted  GI: hypoactive bowel sounds, soft, non-distended, non-tender  Skin/Integumen: No rashes, no cyanosis, no edema  Other:      Medications   All medications were reviewed.    dextrose 5% and 0.45% NaCl + KCl 20 mEq/L 100 mL/hr at 07/13/19 1520     Warfarin Therapy Reminder         famotidine  20 mg Intravenous Q12H     heparin  5,000 Units Subcutaneous Q12H     insulin aspart  1-7 Units Subcutaneous TID AC     insulin aspart  1-5 Units Subcutaneous At Bedtime      metoprolol tartrate  100 mg Oral BID     piperacillin-tazobactam  3.375 g Intravenous Q6H     sodium chloride (PF)  10 mL Intracatheter Q8H     sodium chloride (PF)  3 mL Intracatheter Q8H        Data   Recent Labs   Lab 07/14/19  0901 07/13/19  0417 07/12/19  0420  07/09/19  1337   WBC 12.2* 13.1* 10.8   < > 24.6*   HGB 17.9* 16.2 14.2   < > 20.5*   MCV 96 98 96   < > 93    290 251   < > 502*   INR Canceled, Test credited, specimen discarded 1.27* 1.27*   < > Canceled, Test credited   * 152* 149*   < > 141   POTASSIUM 3.2* 3.5 3.8   < > 4.8   CHLORIDE 109 116* 116*   < > 107   CO2 30 30 25   < > 24   BUN 41* 47* 48*   < > 43*   CR 1.69* 1.63* 1.72*   < > 3.01*   ANIONGAP 8 6 8   < > 10   DARON 8.3* 8.1* 7.8*   < > 10.3*   GLC 92 90 109*   < > 150*   ALBUMIN 2.2* 2.1* 1.8*   < > 3.6   PROTTOTAL 5.2* 5.1* 4.8*   < > 7.4   BILITOTAL 1.6* 1.5* 1.8*   < > 9.9*   ALKPHOS 117 115 129   < > 325*   * 112* 145*   < > 792*   AST 57* 28 29   < > 569*   LIPASE  --   --   --   --  34*    < > = values in this interval not displayed.       No results found for this or any previous visit (from the past 24 hour(s)).

## 2019-07-14 NOTE — PLAN OF CARE
Discharge Planner PT   Patient plan for discharge: Did not discuss  Current status: Pt participated in seated LE there ex with PT's guidance. Pt noted with baseline RLE weakness. STS with min assist x 2 and verbal cues. Gait x 100 ft using RW and min assist x 1 for safety, RLE dragging. Pt was left in chair, alarm engaged, all needs within reach, vitals stable.   Barriers to return to prior living situation: Fall risk, Level of assistance needed with mobility, decreased strength and activity tolerance.   Recommendations for discharge: ARU  Rationale for recommendations: Pt will benefit from intensive therapy in order to increase independence and safety with mobility. Anticipate pt will be able to tolerate 3 hours of therapy a day at time of discharge.        Entered by: Lucy Sanon 07/14/2019 4:14 PM

## 2019-07-14 NOTE — PLAN OF CARE
DATE & TIME: July 14, 2019 [id number]           Cognitive Concerns Orientation: Alert and forgetful. Flat affect and withdrawn at times.  BEHAVIOR & AGGRESSION TOOL COLOR: Green.  CIWA SCORE: n/a  ABNL VS/O2: Tachycardic (Hx-a-fib RVR) on RA  MOBILITY: assist x 1with GB and walker to bathroom.  PAIN MANAGMENT: denies pain  DIET: advanced to regular diet, fair to poor appetite  BOWEL/BLADDER: incontinent  of BM, loose dark green to brown. Allen in place, patent.  ABNL LAB/BG: AST/ALT elevated. Na 147,Cr 1.69,  K 3.2 replaced per protocol.  DRAIN/DEVICES: R hand  PIV SL  TELEMETRY RHYTHM: N/A  SKIN:Generalized edema +1, abdominal incisions/lap sites CDI, MATT drain moderate serosanguinous output (leaking around the drain reduced). Changed dressing, drainage noted.  TESTS/PROCEDURES: lap valentin POD #4  D/C DAY/GOALS/PLACE: pending progress, ARU at discharge.   OTHER IMPORTANT INFO: scheduled Metoprolol for rate control, on IV Zosyn. PT/OT following

## 2019-07-14 NOTE — PROGRESS NOTES
Surgery  Patient continues to clinically improve.  Is hungry, wants to eat.  He can have a regular diet.  Drain output continues to be serous.  Recommend removing drain prior to discharge.  Musa Larson MD  General Surgery, Office [phone]

## 2019-07-14 NOTE — PLAN OF CARE
DATE & TIME: 7/14/19 0444            Cognitive Concerns Orientation: alert, disoriented to time, forgetful  BEHAVIOR & AGGRESSION TOOL COLOR: Green, can be irritable with directions.  CIWA SCORE: n/a  ABNL VS/O2: VSS on 3L O2 ex can be tachycardic, in a fib RVR (no tele needed per MD)  MOBILITY: heavy A2 GB and walker to bathroom, refuses bedside commode.   PAIN MANAGMENT: denies pain  DIET: full liquids, fair appetite   BOWEL/BLADDER: incontinent of BM. Allen in place, patent.  ABNL LAB/BG: Na 152,Cr 1.63,  . Refused 2am BG   DRAIN/DEVICES: IVF SL  TELEMETRY RHYTHM: N/A  SKIN: sanna BLE, abdominal incisions/lap sites, MATT drain moderate serosanguinous output (leaking around the drain reduced)  TESTS/PROCEDURES: lap valentin POD #4  D/C DAY/GOALS/PLACE: pending progress  OTHER IMPORTANT INFO: scheduled Metoprolol for rate control, on IV Zosyn, refused intervention for BG 88 at HS (no hx of DM), stopped IVF prior to shift due to high BP and possible fluid overload.

## 2019-07-14 NOTE — PLAN OF CARE
OT: Attempted session. Encouragement provided. Pt not receptive to session at this time, requesting to sleep.

## 2019-07-14 NOTE — PLAN OF CARE
DATE & TIME: 7/14/19 700-1400           Cognitive Concerns Orientation: alert, disoriented to time, forgetful. Flat affect and withdrawn at times  BEHAVIOR & AGGRESSION TOOL COLOR: Green.  CIWA SCORE: n/a  ABNL VS/O2: VSS on 1L O2 NC  MOBILITY: assist x 1with GB and walker to bathroom.  PAIN MANAGMENT: denies pain  DIET: advanced to regular diet, fair to poor appetite  BOWEL/BLADDER: incontinent of BM. Allen in place, patent.  ABNL LAB/BG: Na 147,Cr 1.69,  K 3.2  DRAIN/DEVICES: R hand  PIV SL  TELEMETRY RHYTHM: N/A  SKIN: sanna BLE, abdominal incisions/lap sites, MATT drain moderate serosanguinous output (leaking around the drain reduced). Changed dressing, drainage noted.  TESTS/PROCEDURES: lap valentin POD #4  D/C DAY/GOALS/PLACE: pending progress  OTHER IMPORTANT INFO: scheduled Metoprolol for rate control, on IV Zosyn.

## 2019-07-15 ENCOUNTER — APPOINTMENT (OUTPATIENT)
Dept: PHYSICAL THERAPY | Facility: CLINIC | Age: 79
DRG: 853 | End: 2019-07-15
Attending: INTERNAL MEDICINE
Payer: COMMERCIAL

## 2019-07-15 ENCOUNTER — APPOINTMENT (OUTPATIENT)
Dept: OCCUPATIONAL THERAPY | Facility: CLINIC | Age: 79
DRG: 853 | End: 2019-07-15
Attending: INTERNAL MEDICINE
Payer: COMMERCIAL

## 2019-07-15 LAB
ALBUMIN SERPL-MCNC: 2 G/DL (ref 3.4–5)
ALP SERPL-CCNC: 100 U/L (ref 40–150)
ALT SERPL W P-5'-P-CCNC: 92 U/L (ref 0–70)
ANION GAP SERPL CALCULATED.3IONS-SCNC: 8 MMOL/L (ref 3–14)
AST SERPL W P-5'-P-CCNC: 45 U/L (ref 0–45)
BACTERIA SPEC CULT: NO GROWTH
BILIRUB SERPL-MCNC: 1.3 MG/DL (ref 0.2–1.3)
BUN SERPL-MCNC: 36 MG/DL (ref 7–30)
CALCIUM SERPL-MCNC: 8 MG/DL (ref 8.5–10.1)
CHLORIDE SERPL-SCNC: 110 MMOL/L (ref 94–109)
CO2 SERPL-SCNC: 29 MMOL/L (ref 20–32)
CREAT SERPL-MCNC: 1.6 MG/DL (ref 0.66–1.25)
GFR SERPL CREATININE-BSD FRML MDRD: 40 ML/MIN/{1.73_M2}
GLUCOSE BLDC GLUCOMTR-MCNC: 101 MG/DL (ref 70–99)
GLUCOSE BLDC GLUCOMTR-MCNC: 136 MG/DL (ref 70–99)
GLUCOSE BLDC GLUCOMTR-MCNC: 157 MG/DL (ref 70–99)
GLUCOSE BLDC GLUCOMTR-MCNC: 94 MG/DL (ref 70–99)
GLUCOSE BLDC GLUCOMTR-MCNC: 99 MG/DL (ref 70–99)
GLUCOSE SERPL-MCNC: 95 MG/DL (ref 70–99)
INR PPP: 1.93 (ref 0.86–1.14)
LACTATE BLD-SCNC: 1.5 MMOL/L (ref 0.7–2)
Lab: NORMAL
Lab: NORMAL
POTASSIUM SERPL-SCNC: 3.6 MMOL/L (ref 3.4–5.3)
PROT SERPL-MCNC: 4.7 G/DL (ref 6.8–8.8)
SODIUM SERPL-SCNC: 147 MMOL/L (ref 133–144)
SPECIMEN SOURCE: NORMAL

## 2019-07-15 PROCEDURE — 25000128 H RX IP 250 OP 636: Performed by: INTERNAL MEDICINE

## 2019-07-15 PROCEDURE — 97530 THERAPEUTIC ACTIVITIES: CPT | Mod: GO

## 2019-07-15 PROCEDURE — 80053 COMPREHEN METABOLIC PANEL: CPT | Performed by: INTERNAL MEDICINE

## 2019-07-15 PROCEDURE — 25000132 ZZH RX MED GY IP 250 OP 250 PS 637: Performed by: INTERNAL MEDICINE

## 2019-07-15 PROCEDURE — 97535 SELF CARE MNGMENT TRAINING: CPT | Mod: GO

## 2019-07-15 PROCEDURE — 97530 THERAPEUTIC ACTIVITIES: CPT | Mod: GP

## 2019-07-15 PROCEDURE — 97110 THERAPEUTIC EXERCISES: CPT | Mod: GP

## 2019-07-15 PROCEDURE — 99232 SBSQ HOSP IP/OBS MODERATE 35: CPT | Performed by: INTERNAL MEDICINE

## 2019-07-15 PROCEDURE — 12000000 ZZH R&B MED SURG/OB

## 2019-07-15 PROCEDURE — 40000141 ZZH STATISTIC PERIPHERAL IV START W/O US GUIDANCE

## 2019-07-15 PROCEDURE — 36415 COLL VENOUS BLD VENIPUNCTURE: CPT | Performed by: INTERNAL MEDICINE

## 2019-07-15 PROCEDURE — 83605 ASSAY OF LACTIC ACID: CPT | Performed by: INTERNAL MEDICINE

## 2019-07-15 PROCEDURE — 85610 PROTHROMBIN TIME: CPT | Performed by: INTERNAL MEDICINE

## 2019-07-15 PROCEDURE — 00000146 ZZHCL STATISTIC GLUCOSE BY METER IP

## 2019-07-15 RX ORDER — FAMOTIDINE 20 MG/1
20 TABLET, FILM COATED ORAL AT BEDTIME
Status: DISCONTINUED | OUTPATIENT
Start: 2019-07-15 | End: 2019-07-17 | Stop reason: HOSPADM

## 2019-07-15 RX ORDER — WARFARIN SODIUM 1 MG/1
1 TABLET ORAL
Status: COMPLETED | OUTPATIENT
Start: 2019-07-15 | End: 2019-07-15

## 2019-07-15 RX ADMIN — PIPERACILLIN SODIUM,TAZOBACTAM SODIUM 3.38 G: 3; .375 INJECTION, POWDER, FOR SOLUTION INTRAVENOUS at 03:17

## 2019-07-15 RX ADMIN — HEPARIN SODIUM 5000 UNITS: 5000 INJECTION, SOLUTION INTRAVENOUS; SUBCUTANEOUS at 09:32

## 2019-07-15 RX ADMIN — WARFARIN SODIUM 1 MG: 1 TABLET ORAL at 17:50

## 2019-07-15 RX ADMIN — POTASSIUM CHLORIDE 20 MEQ: 1500 TABLET, EXTENDED RELEASE ORAL at 09:32

## 2019-07-15 RX ADMIN — METOPROLOL TARTRATE 100 MG: 100 TABLET, FILM COATED ORAL at 21:39

## 2019-07-15 RX ADMIN — PIPERACILLIN SODIUM,TAZOBACTAM SODIUM 3.38 G: 3; .375 INJECTION, POWDER, FOR SOLUTION INTRAVENOUS at 15:06

## 2019-07-15 RX ADMIN — FAMOTIDINE 20 MG: 20 TABLET, FILM COATED ORAL at 21:39

## 2019-07-15 RX ADMIN — PIPERACILLIN SODIUM,TAZOBACTAM SODIUM 3.38 G: 3; .375 INJECTION, POWDER, FOR SOLUTION INTRAVENOUS at 21:39

## 2019-07-15 RX ADMIN — HEPARIN SODIUM 5000 UNITS: 5000 INJECTION, SOLUTION INTRAVENOUS; SUBCUTANEOUS at 21:39

## 2019-07-15 RX ADMIN — METOPROLOL TARTRATE 100 MG: 100 TABLET, FILM COATED ORAL at 09:32

## 2019-07-15 RX ADMIN — PIPERACILLIN SODIUM,TAZOBACTAM SODIUM 3.38 G: 3; .375 INJECTION, POWDER, FOR SOLUTION INTRAVENOUS at 09:02

## 2019-07-15 ASSESSMENT — ACTIVITIES OF DAILY LIVING (ADL)
ADLS_ACUITY_SCORE: 14
ADLS_ACUITY_SCORE: 18
ADLS_ACUITY_SCORE: 14
ADLS_ACUITY_SCORE: 14
ADLS_ACUITY_SCORE: 15
ADLS_ACUITY_SCORE: 15

## 2019-07-15 NOTE — PROGRESS NOTES
GABI  D) Care Transitions is following for rehab placement. Therapies continue to recommend ARU. Awaiting decision from [employer] regarding authorization.    I) Reviewed chart data and did not see a referral on DOD to  Acute, so this was sent today.    P) Continuing to follow.    Addendum  Received a message from Burton with Bill at [phone] who reports patient was not approved for acute rehab. He reports the acute level of care is not necessary as there is no advantage for 3 hours of therapy per day, over 2 hours of therapy as provided by a SNF.    Spoke with Barbara lew about TCU options and she prefers [facility] [place]. Second and third choices are [facility] [place] and [facility] in [place]. She believes family can provide transport.     Will need to start referrals on 7/16 and to call Bill to start a new authorization process for SNF placement.

## 2019-07-15 NOTE — PROGRESS NOTES
[facility]    Medicine Progress Note - Hospitalist Service        Date of Admission:  7/9/2019  7:55 PM    Assessment & Plan:   Bradley Liz is a 78 year old male admitted on 7/9/2019 with history of permanent atrial fib on chronic coumadin, nonischemic cardiomyopathy thought to be due to tachycardia-mediated cardiomyopathy, history of CVA (2008) and history of tobacco abuse (quit) who was directly admitted to [facility] due to sepsis, presenting with coffee ground emesis, abdominal distention and pain. CT showing for possible bowel necrosis with possible SBO and portal venous gas and pneumatosis of the gastric body and fundus. Ultrasound revealed cholelithiasis and gallbladder's sludge with diffuse gallbladder wall thickening.      Septic shock secondary to gangrenous cholecystitis   -Presented with severe back pain and coffee-ground emesis.  Initially admitted with concerns for bowel ischemia versus cholecystitis   -General surgery consulted  -Patient underwent laparoscopic cholecystectomy on 7/10 which showed severe gangrenous cholecystitis  -Initially admitted to the ICU after surgery and was on the vent.  -MATT drain still in place, defer management of that to general surgery   -Diet advance to regular diet, still with poor appetite though  -Encourage oral intake  -Continues on Zosyn     Acute hypoxic respiratory failure   -Was on the vent overnight after surgery, subsequently extubated on 7/11  -Currently off oxygen  -continue IS, pulmonary toilet and increase activity as able.    Acute kidney injury   -creatinine 3.01 at presentation outside hospital (baseline 0.8); secondary to sepsis with dehydration  -creatinine improving, now down to 1.6  -Off IV fluids at this time, encourage generous fluid intake orally     A. fib with rapid ventricular rate, h/o stroke in 2008 on Coumadin   -continue metoprolol 200 mg daily  -coumadin restarted, INR 1.93  -Pharmacy to dose  "Coumadin      History of probable tachycardia mediated cardiomyopathy   -patient has a history of a low EF of 35 to 40%.  Subsequent echocardiograms showed normalization to 50 to 55%.    -He had a nuclear stress test which revealed no evidence of ischemia.  -Continue to monitor volume status closely.    Hyperlipidemia  -Holding PTA Crestor    Diet: Regular Diet Adult  Room Service     DVT Prophylaxis: Warfarin   Allen Catheter: not present  Code Status: Full Code     Disposition Plan    Expected discharge: 1-2 days to TCU  Entered: Farzad Sexton MD 07/15/2019, 2:41 PM        The patient's care was discussed with the Bedside Nurse and Patient.    Farzad Sexton MD  Hospitalist Service  [facility]    ______________________________________________________________________    Interval History   Dyspnea improving.  Feels overall very weak.  Appetite poor.  Denies abdominal pain    Data reviewed today: I reviewed all medications, new labs and imaging results over the last 24 hours. I personally reviewed no images or EKG's today.    Physical Exam   Vital signs:  Temp: 98.2  F (36.8  C) Temp src: Oral BP: 138/76 Pulse: 91 Heart Rate: 74 Resp: 18 SpO2: 95 % O2 Device: None (Room air) Oxygen Delivery: 1 LPM   Weight: 97.4 kg (214 lb 11.7 oz)  Estimated body mass index is 31.71 kg/m  as calculated from the following:    Height as of an earlier encounter on 7/9/19: 1.753 m (5' 9\").    Weight as of this encounter: 97.4 kg (214 lb 11.7 oz).      Wt Readings from Last 2 Encounters:   07/14/19 97.4 kg (214 lb 11.7 oz)   07/09/19 88.5 kg (195 lb)       Gen: AAOX3, NAD, comfortable  HEENT:  no pallor  Resp: Coarse breath sounds bilaterally, normal effort of breathing  CVS: RRR, no murmur  Abd/GI: Soft, slightly distended, MATT drain in the right upper quadrant.  Bowel sounds normoactive.    MSK: No joint deformities, trace pedal edema  Neuro- CN- intact. No focal deficits.       Data   Recent Labs   Lab " 07/15/19  0718 07/14/19  2136 07/14/19  1126 07/14/19  0901 07/13/19  0417 07/12/19  0420  07/09/19  1337   WBC  --   --   --  12.2* 13.1* 10.8   < > 24.6*   HGB  --   --   --  17.9* 16.2 14.2   < > 20.5*   MCV  --   --   --  96 98 96   < > 93   PLT  --   --   --  239 290 251   < > 502*   INR 1.93*  --  1.86* Canceled, Test credited, specimen discarded 1.27* 1.27*   < > Canceled, Test credited   *  --   --  147* 152* 149*   < > 141   POTASSIUM 3.6 3.7  --  3.2* 3.5 3.8   < > 4.8   CHLORIDE 110*  --   --  109 116* 116*   < > 107   CO2 29  --   --  30 30 25   < > 24   BUN 36*  --   --  41* 47* 48*   < > 43*   CR 1.60*  --   --  1.69* 1.63* 1.72*   < > 3.01*   ANIONGAP 8  --   --  8 6 8   < > 10   DARON 8.0*  --   --  8.3* 8.1* 7.8*   < > 10.3*   GLC 95  --   --  92 90 109*   < > 150*   ALBUMIN 2.0*  --   --  2.2* 2.1* 1.8*   < > 3.6   PROTTOTAL 4.7*  --   --  5.2* 5.1* 4.8*   < > 7.4   BILITOTAL 1.3  --   --  1.6* 1.5* 1.8*   < > 9.9*   ALKPHOS 100  --   --  117 115 129   < > 325*   ALT 92*  --   --  112* 112* 145*   < > 792*   AST 45  --   --  57* 28 29   < > 569*   LIPASE  --   --   --   --   --   --   --  34*    < > = values in this interval not displayed.       No results found for this or any previous visit (from the past 24 hour(s)).  Medications     dextrose 5% and 0.45% NaCl + KCl 20 mEq/L Stopped (07/14/19 1122)     Warfarin Therapy Reminder         famotidine  20 mg Oral At Bedtime     heparin  5,000 Units Subcutaneous Q12H     insulin aspart  1-7 Units Subcutaneous TID AC     insulin aspart  1-5 Units Subcutaneous At Bedtime     metoprolol tartrate  100 mg Oral BID     piperacillin-tazobactam  3.375 g Intravenous Q6H     sodium chloride (PF)  10 mL Intracatheter Q8H     sodium chloride (PF)  3 mL Intracatheter Q8H     warfarin  1 mg Oral ONCE at 18:00

## 2019-07-15 NOTE — PLAN OF CARE
PT:  Discharge Planner PT   Patient plan for discharge: Did not state  Current status: Pt in chair, required mod A for sit to stand with FWW. Pt was incontinent of stool in his breif and it had spilled out the backside. Transferred to [facility] with CGA with unsteady balance and able to stand 2x for cleaning and donning of new breif. Pt back in chair and participated in seated strengthening as he was too fatigued to walk after being cleaned up.  Barriers to return to prior living situation: Falls risk, needs assist with all mobility using an AD, low activity tolerance.  Recommendations for discharge: TCU  Rationale for recommendations: Pt would benefit from continued PT to improve strength, balance, mobility to increase independence, reduce falls risk and increase safety before returning home. Pt was not approved by his insurance for ARU, therefore now recommend discharge to TCU. Pt is not safe to return home at this time.       Entered by: Margaret Castro 07/15/2019 5:06 PM

## 2019-07-15 NOTE — PROGRESS NOTES
Surgery    POD6 from laparoscopic cholecystectomy for gangrenous cholecystitis.    S:  Transferred to intermediate care yesterday.  Pain is controlled.  Poor appetite.  Having bowel movements, incontinent.  Getting up with PT.    O: VSSAF, Afib rate controlled.  Non-toxic.  Alert oriented interactive.  Flat affect.  Rattling cough.  Abdomen mildly distended,  Incisions with steri strips intact  S/s drainage from RUQ MATT drain to bulb suction.   Allen remains.    WBC 14>12  Hgb reflective of hemoconcentration.  INR 1.8  Cr stable at 1.7  AST/ALT, tBili overall improved.    A/P:  Sepsis 2/2 gangrenous cholecystitis with significant peritoneal contamination at risk for cyst duct/artery leak.  Sepsis resolved.  Also likely ileus.  Low suspicion for obstructive process of the bowel related to his R inguinal hernia at this time.  Continues to improve.  At risk for PNA.    -- Encourage IS, cough.  -- Encourage ambulation, up with PT, PO intake.  -- Ongoing antibiotics  -- RUQ MATT drain to bulb suction.  Continue to monitor.  -- Consider trial of void.    Seema Monique, PGY4 General Surgery Resident

## 2019-07-15 NOTE — PROGRESS NOTES
"SPIRITUAL HEALTH SERVICES Progress Note  [facility]    Initial visit with pt Bradley AVELAR.     Pt said that he is doing fine, and there was no need this moment.      let pt know that SH is available, and pt showed me a smile and said, \"I will.\"    I and SH remain available if needs arise.       Charmaine Wiseman  Chaplain Resident    "

## 2019-07-15 NOTE — RESULT ENCOUNTER NOTE
Final blood culture report is NEGATIVE.    No treatment or change in treatment per [facility] ED Lab Result protocol.

## 2019-07-15 NOTE — PLAN OF CARE
DATE & TIME: 7/15/19 Day             Cognitive Concerns/ Orientation : Alert to self, forgetful   BEHAVIOR & AGGRESSION TOOL COLOR: Green  CIWA SCORE: NA  ABNL VS/O2: VSS on RA.  MOBILITY: AX1-2 BG/Walker  PAIN MANAGMENT: Denies  DIET: Regular- poor appetite  BOWEL/BLADDER: Allen removed- has voided/ 1 BM today. Incontinent of B&B  ABNL LAB/BG: BG was 94 and 138, K was 3.6- on high replacement  DRAIN/DEVICES: PIV SL, MATT drain to bulb suction  TELEMETRY RHYTHM: NA  SKIN: Generalized edema, lap sites, sanna  TESTS/PROCEDURES: NA  D/C DAY/GOALS/PLACE: Pending medical improvement, surgery following, still has MATT drain in  OTHER IMPORTANT INFO: NA

## 2019-07-15 NOTE — PLAN OF CARE
Discharge Planner OT   Patient plan for discharge: none stated  Current status: Patient sit to stand with MOD A and FWW. Patient ambulated with CGA and FWW slowly dragging R foot. Patient needed VCs to take big steps with R foot, appeared to get a little annoyed with cues, but wasn't demonstrating carryover of the skill. Patient stood at sink for 4 minutes to shave face with CGA. Patient stand to sit with MIN A and FWW.   Barriers to return to prior living situation: decreased activity tolerance, decreased strength on R side  Recommendations for discharge: ARC  Rationale for recommendations: Patient would benefit from OT services to build activity tolerance and IND in ADL. If R-sided weakness is residual from CVA 10 years ago, unsure if strength could be built if patient goes to ARC. Strength in RLE appears to be worsened by ICU stay and prolonged immobility.       Entered by: Melissa Orr 07/15/2019 2:53 PM

## 2019-07-15 NOTE — PLAN OF CARE
DATE & TIME: July 15, [id number]           Cognitive Concerns Orientation: Alert and forgetful. Flat affect and withdrawn at times.  BEHAVIOR & AGGRESSION TOOL COLOR: Green.  CIWA SCORE: n/a  ABNL VS/O2: Tachycardic (Hx-a-fib RVR) on RA no tele  MOBILITY: assist 2lift .  PAIN MANAGMENT: denies pain  DIET: advanced to regular diet, fair to poor appetite  BOWEL/BLADDER: incontinent  of BM, loose dark green to brown. Allen in place, patent.  ABNL LAB/BG: AST/ALT elevated. Na 147,Cr 1.69,  K 3.7 replaced per protocol.  DRAIN/DEVICES: R hand  PIV SL  TELEMETRY RHYTHM: N/A  SKIN:Generalized edema +1, abdominal incisions/lap sites CDI, MATT drain moderate serosanguinous output (leaking around the drain reduced). Changed dressing, drainage noted.  TESTS/PROCEDURES: lap valentin POD #5  D/C DAY/GOALS/PLACE: pending progress, ARU at discharge.   OTHER IMPORTANT INFO:Pt alert to self forgetful VSS on RA denies pain,turn and rpo Q2hr, abdominal dressing is soaked wt. New dressing changed  scheduled Metoprolol for rate control, on IV Zosyn. PT/OT following plan to go home pending continue to monitor.

## 2019-07-16 ENCOUNTER — APPOINTMENT (OUTPATIENT)
Dept: GENERAL RADIOLOGY | Facility: CLINIC | Age: 79
DRG: 853 | End: 2019-07-16
Attending: INTERNAL MEDICINE
Payer: COMMERCIAL

## 2019-07-16 ENCOUNTER — APPOINTMENT (OUTPATIENT)
Dept: OCCUPATIONAL THERAPY | Facility: CLINIC | Age: 79
DRG: 853 | End: 2019-07-16
Attending: INTERNAL MEDICINE
Payer: COMMERCIAL

## 2019-07-16 ENCOUNTER — APPOINTMENT (OUTPATIENT)
Dept: PHYSICAL THERAPY | Facility: CLINIC | Age: 79
DRG: 853 | End: 2019-07-16
Attending: INTERNAL MEDICINE
Payer: COMMERCIAL

## 2019-07-16 LAB
ALBUMIN SERPL-MCNC: 2.2 G/DL (ref 3.4–5)
ALP SERPL-CCNC: 96 U/L (ref 40–150)
ALT SERPL W P-5'-P-CCNC: 103 U/L (ref 0–70)
ANION GAP SERPL CALCULATED.3IONS-SCNC: 5 MMOL/L (ref 3–14)
AST SERPL W P-5'-P-CCNC: 59 U/L (ref 0–45)
BILIRUB SERPL-MCNC: 1.2 MG/DL (ref 0.2–1.3)
BUN SERPL-MCNC: 30 MG/DL (ref 7–30)
CALCIUM SERPL-MCNC: 8.3 MG/DL (ref 8.5–10.1)
CHLORIDE SERPL-SCNC: 110 MMOL/L (ref 94–109)
CO2 SERPL-SCNC: 30 MMOL/L (ref 20–32)
CREAT SERPL-MCNC: 1.39 MG/DL (ref 0.66–1.25)
ERYTHROCYTE [DISTWIDTH] IN BLOOD BY AUTOMATED COUNT: 14.9 % (ref 10–15)
GFR SERPL CREATININE-BSD FRML MDRD: 48 ML/MIN/{1.73_M2}
GLUCOSE BLDC GLUCOMTR-MCNC: 138 MG/DL (ref 70–99)
GLUCOSE BLDC GLUCOMTR-MCNC: 152 MG/DL (ref 70–99)
GLUCOSE BLDC GLUCOMTR-MCNC: 186 MG/DL (ref 70–99)
GLUCOSE BLDC GLUCOMTR-MCNC: 81 MG/DL (ref 70–99)
GLUCOSE BLDC GLUCOMTR-MCNC: 99 MG/DL (ref 70–99)
GLUCOSE SERPL-MCNC: 143 MG/DL (ref 70–99)
HCT VFR BLD AUTO: 51.2 % (ref 40–53)
HGB BLD-MCNC: 17 G/DL (ref 13.3–17.7)
INR PPP: 1.83 (ref 0.86–1.14)
MAGNESIUM SERPL-MCNC: 2.4 MG/DL (ref 1.6–2.3)
MCH RBC QN AUTO: 31.2 PG (ref 26.5–33)
MCHC RBC AUTO-ENTMCNC: 33.2 G/DL (ref 31.5–36.5)
MCV RBC AUTO: 94 FL (ref 78–100)
PLATELET # BLD AUTO: 267 10E9/L (ref 150–450)
POTASSIUM SERPL-SCNC: 3.5 MMOL/L (ref 3.4–5.3)
PROT SERPL-MCNC: 5.2 G/DL (ref 6.8–8.8)
RBC # BLD AUTO: 5.45 10E12/L (ref 4.4–5.9)
SODIUM SERPL-SCNC: 143 MMOL/L (ref 133–144)
SODIUM SERPL-SCNC: 145 MMOL/L (ref 133–144)
WBC # BLD AUTO: 12.8 10E9/L (ref 4–11)

## 2019-07-16 PROCEDURE — 97116 GAIT TRAINING THERAPY: CPT | Mod: GP | Performed by: PHYSICAL THERAPIST

## 2019-07-16 PROCEDURE — 84295 ASSAY OF SERUM SODIUM: CPT | Performed by: INTERNAL MEDICINE

## 2019-07-16 PROCEDURE — 97530 THERAPEUTIC ACTIVITIES: CPT | Mod: GO

## 2019-07-16 PROCEDURE — 25000132 ZZH RX MED GY IP 250 OP 250 PS 637: Performed by: INTERNAL MEDICINE

## 2019-07-16 PROCEDURE — 83735 ASSAY OF MAGNESIUM: CPT | Performed by: INTERNAL MEDICINE

## 2019-07-16 PROCEDURE — 97535 SELF CARE MNGMENT TRAINING: CPT | Mod: GO

## 2019-07-16 PROCEDURE — 25000128 H RX IP 250 OP 636: Performed by: INTERNAL MEDICINE

## 2019-07-16 PROCEDURE — 71045 X-RAY EXAM CHEST 1 VIEW: CPT

## 2019-07-16 PROCEDURE — 25800030 ZZH RX IP 258 OP 636: Performed by: INTERNAL MEDICINE

## 2019-07-16 PROCEDURE — 12000000 ZZH R&B MED SURG/OB

## 2019-07-16 PROCEDURE — 36415 COLL VENOUS BLD VENIPUNCTURE: CPT | Performed by: INTERNAL MEDICINE

## 2019-07-16 PROCEDURE — 99233 SBSQ HOSP IP/OBS HIGH 50: CPT | Performed by: INTERNAL MEDICINE

## 2019-07-16 PROCEDURE — 97530 THERAPEUTIC ACTIVITIES: CPT | Mod: GP | Performed by: PHYSICAL THERAPIST

## 2019-07-16 PROCEDURE — 80053 COMPREHEN METABOLIC PANEL: CPT | Performed by: INTERNAL MEDICINE

## 2019-07-16 PROCEDURE — 85027 COMPLETE CBC AUTOMATED: CPT | Performed by: INTERNAL MEDICINE

## 2019-07-16 PROCEDURE — 85610 PROTHROMBIN TIME: CPT | Performed by: INTERNAL MEDICINE

## 2019-07-16 PROCEDURE — 00000146 ZZHCL STATISTIC GLUCOSE BY METER IP

## 2019-07-16 RX ORDER — WARFARIN SODIUM 5 MG/1
5 TABLET ORAL
Status: COMPLETED | OUTPATIENT
Start: 2019-07-16 | End: 2019-07-16

## 2019-07-16 RX ORDER — DEXTROSE MONOHYDRATE 50 MG/ML
INJECTION, SOLUTION INTRAVENOUS CONTINUOUS
Status: DISCONTINUED | OUTPATIENT
Start: 2019-07-16 | End: 2019-07-17

## 2019-07-16 RX ADMIN — METOPROLOL TARTRATE 100 MG: 100 TABLET, FILM COATED ORAL at 20:18

## 2019-07-16 RX ADMIN — PIPERACILLIN SODIUM,TAZOBACTAM SODIUM 3.38 G: 3; .375 INJECTION, POWDER, FOR SOLUTION INTRAVENOUS at 15:19

## 2019-07-16 RX ADMIN — PIPERACILLIN SODIUM,TAZOBACTAM SODIUM 3.38 G: 3; .375 INJECTION, POWDER, FOR SOLUTION INTRAVENOUS at 03:02

## 2019-07-16 RX ADMIN — HEPARIN SODIUM 5000 UNITS: 5000 INJECTION, SOLUTION INTRAVENOUS; SUBCUTANEOUS at 20:18

## 2019-07-16 RX ADMIN — HEPARIN SODIUM 5000 UNITS: 5000 INJECTION, SOLUTION INTRAVENOUS; SUBCUTANEOUS at 09:09

## 2019-07-16 RX ADMIN — DEXTROSE MONOHYDRATE: 50 INJECTION, SOLUTION INTRAVENOUS at 11:14

## 2019-07-16 RX ADMIN — PIPERACILLIN SODIUM,TAZOBACTAM SODIUM 3.38 G: 3; .375 INJECTION, POWDER, FOR SOLUTION INTRAVENOUS at 20:17

## 2019-07-16 RX ADMIN — FAMOTIDINE 20 MG: 20 TABLET, FILM COATED ORAL at 22:45

## 2019-07-16 RX ADMIN — METOPROLOL TARTRATE 100 MG: 100 TABLET, FILM COATED ORAL at 09:09

## 2019-07-16 RX ADMIN — POTASSIUM CHLORIDE 20 MEQ: 1500 TABLET, EXTENDED RELEASE ORAL at 11:52

## 2019-07-16 RX ADMIN — PIPERACILLIN SODIUM,TAZOBACTAM SODIUM 3.38 G: 3; .375 INJECTION, POWDER, FOR SOLUTION INTRAVENOUS at 09:08

## 2019-07-16 RX ADMIN — WARFARIN SODIUM 5 MG: 5 TABLET ORAL at 17:18

## 2019-07-16 ASSESSMENT — ACTIVITIES OF DAILY LIVING (ADL)
ADLS_ACUITY_SCORE: 18
ADLS_ACUITY_SCORE: 18
ADLS_ACUITY_SCORE: 19
ADLS_ACUITY_SCORE: 18

## 2019-07-16 NOTE — PROGRESS NOTES
GABI    D) Patient's anticipated discharge date is now 7/17 and a TCU stay is planned.    I) Received a call from daughter Camilla at [phone] asking to discuss the discharge plan. She noted she has siblings and an in-law who are involved. Explained that patient's spouse Barbara was contacted on 7/15 and SNF referrals are being sent today to the facilities of her choice. Noted their family needs to designate the . Camilla stated it should continue to be Barbara. Encouraged her to keep in contact with Barbara, who is her mother. She can then provide updates to other family members.    Sent DOD referrals to [facility] in [facility], [facility] in [place] and [facility] in [place]. Left a message for Admissions at [facility] noting  they are the preferred facility.    Called Bill for authorization from patient's [facility] Medicare [employer]age. Spoke with John at [phone]. The authorization had already been provided after ARU was denied. Noted the anticipated discharge date is 7/17. John updated the start date to 7/16 and the end date is 7/18. The authorization number is [id number].     P) Awaiting responses from care facilities.    Update  Eryn from [facility] [place] believes they will be able to accept patient on 7/17 as long as he is off all injection meds. The MATT drain does not need to be out. Faxed the [facility] Notice of Coverage to her (ph. [phone] fax [phone]). The notice was placed on patient's chart and there is another copy in the [facility] office on [address].    Received a call from [facility]. They could also be an option for patient. Noted family does prefer a closer facility.

## 2019-07-16 NOTE — PROGRESS NOTES
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 CT showing for possible bowel necrosis with possible SBO and portal venous gas and pneumatosis of the gastric body and fundus. Ultrasound revealed cholelithiasis and gallbladder's sludge with diffuse gallbladder wall thickening.       Assessment & Plan     Active Problems:  Septic shock secondary to gangrenous cholecystitis   -Presented with severe back pain and coffee-ground emesis.  Initially admitted with concerns for bowel ischemia versus cholecystitis.General surgery was consulted  Patient underwent laparoscopic cholecystectomy on 7/10 which showed severe gangrenous cholecystitis, Initially admitted to the ICU after surgery and was on the vent.    -MATT drain still in place, defer management of that to general surgery   -Diet advance to regular diet, still with poor appetite though, long discussion with patient regarding need to increase his water and protein intake   -Encourage oral intake  -Continues on Zosyn, will change to Augmentin on discharge to complete total of ten day course un;less recommended different by surgery team.     Acute hypoxic respiratory failure :-Was on the vent overnight after surgery, subsequently extubated on 7/11,Currently off oxygen, does have some rub and fine crackles in bases     - portable chest xray this morning  -continue IS, pulmonary toilet and increase activity as able.     Acute kidney injury   -creatinine 3.01 at  presentation outside hospital (baseline 0.8); secondary to sepsis with dehydration  Hypernatremia : most likely hypovolemic hyponatremia intravascularly although patient seems to have anasarca most likely from fluid resuscitation and hypoalbuminemia     -creatinine improving, now down to 1.6, awaiting lab results from today   -Encourage generous fluid intake orally  - will give patient D5 at 50 ml/hr for 24 hours   - recheck sodium later in day   - will avoid diuresis due to poor oral intake and hypernatremia   - OT consultation for lymphedema wraps   - patient was admitted at 190 lb and his weight went up as much as 211, but now as his renal functions are improving and his urine out put is improving his weight is down to 206 lb.As above , continue to monitor fluid status closely      A. fib with rapid ventricular rate, h/o stroke in 2008 on Coumadin   -continue metoprolol 200 mg daily  -coumadin restarted, INR 1.93 yesterday, today,s lab are pending   -Pharmacy to dose Coumadin      History of probable tachycardia mediated cardiomyopathy: patient has a history of a low EF of 35 to 40%.  Subsequent echocardiograms showed normalization to 50 to 55%. He had a nuclear stress test which revealed no evidence of ischemia.    -Continue to monitor volume status closely.     Hyperlipidemia  -Holding PTA Crestor     Diet: Regular Diet Adult  Room Service    Allen Catheter: not present  DVT Prophylaxis: Warfarin  Code Status: Full Code    The patient's care was discussed with the Bedside Nurse and Patient.    Disposition Plan   Expected discharge: Tomorrow, recommended to transitional care unit once his electrolytes are improved     Jenn Salvador MD, FACP  Text Page (7am - 6pm)    ----------------------------------------------------------------------------------------------------------------------    Interval History   Patient care was assumed this morning , seen and examined, sitting in chair , feels weak , encouraged him to  increase his water and protein intake , denying chest pain or palpitations , does not think that he is ready for discharge today .    -Data reviewed today: I reviewed all new labs and imaging results over the last 24 hours.    I personally reviewed no images or EKG's today.will review labs and chest xray results     Physical Exam   Temp: 98  F (36.7  C) Temp src: Oral BP: 138/88 Pulse: 99 Heart Rate: 98 Resp: 18 SpO2: 94 % O2 Device: None (Room air)    Vitals:    07/11/19 0615 07/14/19 0500 07/16/19 0616   Weight: 96 kg (211 lb 10.3 oz) 97.4 kg (214 lb 11.7 oz) 93.5 kg (206 lb 2.1 oz)     Vital Signs with Ranges  Temp:  [97.5  F (36.4  C)-98  F (36.7  C)] 98  F (36.7  C)  Pulse:  [99] 99  Heart Rate:  [] 98  Resp:  [17-20] 18  BP: (128-149)/(67-90) 138/88  SpO2:  [92 %-95 %] 94 %  I/O last 3 completed shifts:  In: 506 [P.O.:500; I.V.:6]  Out: 1026 [Urine:700; Drains:326]    GENERAL: Alert , awake and oriented. NAD. Conversational, appropriate.sitting in chair  HEENT: Normocephalic. EOMI. No icterus or injection. Nares normal.   LUNGS: Good air entry, pleural rub in RUL, bilateral fine crackles in bases  HEART: Regular rate. Extremities perfused.   ABDOMEN: Soft, nontender, and nondistended. Positive bowel sounds. MATT drain present in RUQ  EXTREMITIES: 2+ LE edema noted.   NEUROLOGIC: Moves extremities x4 on command. No acute focal neurologic abnormalities noted.     Medications     dextrose 5% and 0.45% NaCl + KCl 20 mEq/L Stopped (07/14/19 1122)     Warfarin Therapy Reminder         famotidine  20 mg Oral At Bedtime     heparin  5,000 Units Subcutaneous Q12H     insulin aspart  1-7 Units Subcutaneous TID AC     insulin aspart  1-5 Units Subcutaneous At Bedtime     metoprolol tartrate  100 mg Oral BID     piperacillin-tazobactam  3.375 g Intravenous Q6H     sodium chloride (PF)  10 mL Intracatheter Q8H     sodium chloride (PF)  3 mL Intracatheter Q8H       Data   Recent Labs   Lab 07/15/19  0718 07/14/19  7259  07/14/19  1126 07/14/19  0901 07/13/19  0417 07/12/19  0420  07/09/19  1337   WBC  --   --   --  12.2* 13.1* 10.8   < > 24.6*   HGB  --   --   --  17.9* 16.2 14.2   < > 20.5*   MCV  --   --   --  96 98 96   < > 93   PLT  --   --   --  239 290 251   < > 502*   INR 1.93*  --  1.86* Canceled, Test credited, specimen discarded 1.27* 1.27*   < > Canceled, Test credited   *  --   --  147* 152* 149*   < > 141   POTASSIUM 3.6 3.7  --  3.2* 3.5 3.8   < > 4.8   CHLORIDE 110*  --   --  109 116* 116*   < > 107   CO2 29  --   --  30 30 25   < > 24   BUN 36*  --   --  41* 47* 48*   < > 43*   CR 1.60*  --   --  1.69* 1.63* 1.72*   < > 3.01*   ANIONGAP 8  --   --  8 6 8   < > 10   DARON 8.0*  --   --  8.3* 8.1* 7.8*   < > 10.3*   GLC 95  --   --  92 90 109*   < > 150*   ALBUMIN 2.0*  --   --  2.2* 2.1* 1.8*   < > 3.6   PROTTOTAL 4.7*  --   --  5.2* 5.1* 4.8*   < > 7.4   BILITOTAL 1.3  --   --  1.6* 1.5* 1.8*   < > 9.9*   ALKPHOS 100  --   --  117 115 129   < > 325*   ALT 92*  --   --  112* 112* 145*   < > 792*   AST 45  --   --  57* 28 29   < > 569*   LIPASE  --   --   --   --   --   --   --  34*    < > = values in this interval not displayed.       Imaging:   No results found for this or any previous visit (from the past 24 hour(s)).

## 2019-07-16 NOTE — PLAN OF CARE
POD #6 from a lap valentin with gallbladder removal. A&Ox3, forgetful. CMS baseline numbness and tingling in right extremities. Bowel sounds audible, passing flatus, BM this shift, incontinent bladder and bowel. Tolerating regular diet with poor appetite. VS-LS rub-provider notified and chest xray completed. Potassium replaced, recheck tomorrow AM. Na elevated, trending down with fluids infusing. Dressing to MATT drain site changed, output this shift 90 ml's. Up with assist x1 with gb/w. Denies pain. Plan possible transfer to TCU tomorrow.

## 2019-07-16 NOTE — PLAN OF CARE
Discharge Planner PT   Patient plan for discharge: TCU  Current status: PT: Patient up in chair upon therapist arrival;VSS; O2 sats 94-96% at rest on room air; mod SOB noted with activity; Min/CGA and safety cues for pushing off chair and walker safety upon standing; mild unsteadiness; denies dizziness; SOB with effort; assist to manage drain and IV pole; once in standing patient needed safety cues for proximity to walker; baseline R LE weakness from prior stroke; cues for advancement of R LE at times; patient irritable with cues to advance R LE and prefers no cueing per conversation; mod SOB after walking 60 feet in hallway; min/CGA and safety cues; CGA and safety cues to return to sit safely and to control sit by reaching back;O2 sats upon return to sittin-94%; HR up to 104 with activity; declined ex's this date; encouraged to ambulate with nursing as tolerated.  Lymphedema orders received: patient with noted significant edema throughout UE's, LE's, and abdomen; Per discussion with patient, he is not open to Lymphedema services her or at TCU and wants orders discontinued; edema not current inhibiting ability to ambulate. Order will be completed per patient request. Noted possible discharge to TCU tomorrow.  Barriers to return to prior living situation: current level of assist; decreased activity tolerance; falls risk; weakness  Recommendations for discharge: TCU  Rationale for recommendations:  Pt would benefit from continued PT to improve strength, balance, mobility to increase independence, reduce falls risk and increase safety before returning home       Entered by: Virginia Lanier 2019 4:02 PM

## 2019-07-16 NOTE — PLAN OF CARE
Discharge Planner OT   Patient plan for discharge: none stated  Current status: Pt was educated on walker safety for transfers and demonstrated a STS transfer x2 with carry over of hand placement and Mod A. Once standing pt required VC to look forward in order to stand fully. Pt was able to ambulate to the bathroom with CGA, but required VC to stay close to the walker for safety and  RLE while walking. In the bathroom pt demonstrated g/h at the sink with CGA and stated that he had not fatigue or pain. After pt was able to ambulate back to the chair with CGA assist and perform a transfer to sit with Min A and VC for hand placement.   Barriers to return to prior living situation: decreased Strength on R side, activity tolerance, increased A needed with ADL and mobility   Recommendations for discharge: TCU  Rationale for recommendations: Patient was I at baseline, not using an assistive device. Patient well below baseline now, with increased RLE weakness from his baseline affecting balance and I.      Entered by: Ezequiel Ojeda 07/16/2019 10:24 AM

## 2019-07-16 NOTE — PLAN OF CARE
DATE & TIME: 7/15/19 5658-4413             Cognitive Concerns/ Orientation : Alert to self , time, knew he was at a hospital. Confused to situation.  forgetful   BEHAVIOR & AGGRESSION TOOL COLOR: Green  CIWA SCORE: NA  ABNL VS/O2: VSS on RA.  MOBILITY: AX1 Walker GB  PAIN MANAGMENT: Denies  DIET: Regular ate 100% at dinner   BOWEL/BLADDER:  Incontinent of B&B, uses urinal at times  ABNL LAB/BG: BG was 101, K was 3.6-  Replaced per high protocol  DRAIN/DEVICES: PIV SL, MATT drain to bulb suction  TELEMETRY RHYTHM: NA  SKIN: Generalized edema, lap sites, sanna/blotchy extremeties  TESTS/PROCEDURES: NA  D/C DAY/GOALS/PLACE: Pending medical improvement, surgery following, still has MATT drain in  OTHER IMPORTANT INFO: SOB with exertion, R lung base with fine crackles L lung with fine crackles upper lobe, coarse crackles LL. Loose congested cough

## 2019-07-16 NOTE — PROGRESS NOTES
"CLINICAL NUTRITION SERVICES  -  ASSESSMENT NOTE    Malnutrition:   % Weight Loss:  None noted  % Intake:  </= 50% for >/= 5 days (severe malnutrition)  Subcutaneous Fat Loss:  Orbital region mild depletion  Muscle Loss:  Temporal region mild depletion and Clavicle bone region mild depletion  Fluid Retention:  Severe 4+ Edema    Malnutrition Diagnosis: Non-Severe malnutrition  In Context of:  Acute on chronic illness or injury     REASON FOR ASSESSMENT  Bradley Liz is a 78 year old male seen by Registered Dietitian for LOS    NUTRITION HISTORY  - Information obtained from patient, EMR.  - PMH: afib, nonischemic cardiomyopathy, h/o CVA in 2008, tobacco use hx (quit).   - Presented with sepsis, coffee ground emesis, abd distention and pain related to gangrenous cholecystitis.    - Gian denies any history of poor appetite/intake, no weight loss PTA.   - Normally eats breakfast and dinner daily. Skips lunch.   - NKFA      CURRENT NUTRITION ORDERS  Diet Order:     Since 7/14 - regular  7/13 - FLD  7/12 - advanced to CLD    Current Intake/Tolerance:  Intakes documented % of meals since diet advancement 2 days ago. Expect appetite to continue to improve. Patient endorses low appetite since advancement the other day. Tolerated most of breakfast this morning. Overall meeting <50% x7 days.      NUTRITION FOCUSED PHYSICAL ASSESSMENT (NFPA)  Completed:  Yes Visual assessment only         Observed:    Muscle wasting, fat loss (see maln below) - some mass loss related to aging process    Obtained from Chart/Interdisciplinary Team:  Surgery consulted on admission --> pt getting stronger, eating some.  7/10: lap valentin. Post op ileus, now resolved  Last BM 7/16. Stooling almost daily.   3-4+ Edema bilat extremities, upper and lower    ANTHROPOMETRICS  Height: 5' 9\"  Weight: 206 lbs 2.08 oz (93.5 kg)   Body mass index is 30.44 kg/m .  Weight Status:  Obesity Grade I BMI 30-34.9  IBW: 72.7 kg  % IBW: 129%  Weight History: " #.   Wt Readings from Last 10 Encounters:   07/16/19 93.5 kg (206 lb 2.1 oz)   07/09/19 88.5 kg (195 lb)   10/10/18 92.5 kg (204 lb)   08/24/18 90.3 kg (199 lb)   08/06/18 90.9 kg (200 lb 6.4 oz)   07/25/18 93.5 kg (206 lb 3.2 oz)   06/13/18 91.1 kg (200 lb 12.8 oz)   10/02/17 91.2 kg (201 lb)   09/19/17 93.2 kg (205 lb 6.4 oz)   07/17/17 91.6 kg (202 lb)     LABS  Labs reviewed    Recent Labs   Lab Test 07/16/19  1054 07/14/19  0901 05/09/17  1336   MAG 2.4* 2.3 2.2     Recent Labs   Lab Test 07/16/19  1054 07/15/19  0718 07/14/19  0901 07/13/19  0417 07/12/19  0420   * 147* 147* 152* 149*     Recent Labs   Lab Test 07/16/19  1054 07/15/19  0718 07/14/19  0901 07/13/19  0417 07/12/19  0420   CR 1.39* 1.60* 1.69* 1.63* 1.72*     Recent Labs   Lab 07/16/19  1054 07/15/19  0718 07/14/19  0901 07/13/19  0417 07/12/19  0420 07/11/19  0423 07/10/19  0324 07/09/19  2300 07/09/19  2043   * 95 92 90 109* 167* 141* 117* 117*     MEDICATIONS  Medications reviewed  MSSI    ASSESSED NUTRITION NEEDS PER APPROVED PRACTICE GUIDELINES:  Dosing Weight 78 kg - adjusted  Estimated Energy Needs: 8998-7772 kcals (25-30 Kcal/Kg)  Justification: maintenance  Estimated Protein Needs:  grams protein (1.2-1.5 g pro/Kg)  Justification: preservation of lean body mass  Estimated Fluid Needs: 1 mL/kcal   Justification: maintenance    MALNUTRITION:  % Weight Loss:  None noted  % Intake:  </= 50% for >/= 5 days (severe malnutrition)  Subcutaneous Fat Loss:  Orbital region mild depletion  Muscle Loss:  Temporal region mild depletion and Clavicle bone region mild depletion  Fluid Retention:  Severe 4+ Edema    Malnutrition Diagnosis: Non-Severe malnutrition  In Context of:  Acute on chronic illness or injury    NUTRITION DIAGNOSIS:  Inadequate oral intake related to altered GI function with slowly advancing diet and poor appetite as evidenced by pt eating % small meals since diet advancement 2 days ago, overall  <50-75% intakes over the past 7 days.       NUTRITION INTERVENTIONS  Recommendations / Nutrition Prescription  Diet per MD - Regular    Add Supplements PRN       Implementation  Nutrition education: Provided education on slowing improving appetite, supplements available.   Medical Food Supplement: PRN      Nutrition Goals  Intake of 75% meals BID to show improvement.       MONITORING AND EVALUATION:  Progress towards goals will be monitored and evaluated per protocol and Practice Guidelines    Cecile Cox RD, LD

## 2019-07-16 NOTE — PROGRESS NOTES
Afebrile, pulse OK. Up in chair. Getting stronger. Eating some.  Exam: drain serous. Leave a day or two more.   Bili normal. Multiple lactates normal.   I do not think he needs antibiotics for much longer. His sepsis was from the gallbladder, which is gone.

## 2019-07-16 NOTE — PROGRESS NOTES
DATE & TIME: 0167-9551    Cognitive Concerns/ Orientation : A&OX3, frustrated at times, slow to respond, slight aphagia  BEHAVIOR & AGGRESSION TOOL COLOR: Green  CIWA SCORE: N/A  ABNL VS/O2: VSS on RA, LS rub and coarse  MOBILITY: Ax1 with gb/w  PAIN MANAGMENT: Denies  DIET: Regular, snacks with meals, encourage fluids, poor appetite-refused to eat lunch  BOWEL/BLADDER: Incontinent bladder/bowel-intermittently, can use urinal appropriately   ABNL LAB/BG: K low-replaced and recheck in AM, Na elevated-fluids initiated recheck Na at 3pm today  DRAIN/DEVICES: PIV infusing D5  TELEMETRY RHYTHM: N/A  SKIN: right side extremities weak, right arm edema +3 & cool, bilat lower extremities +4 little weeping fluids, lymph wraps consult ordered today  TESTS/PROCEDURES: notified provider about LS and poss rub chest xray ordered  D/C DAY/GOALS/PLACE: Poss discharge to TCU tomorrow, insurance auth approved   OTHER IMPORTANT INFO: Strict I&O's. Surgery following POD 7 laparoscopic cholecystectomy, MATT drain on R side with serous drainage-surgery note today stated to leave drain in for another day or two. Frequent non-productive cough, pt unable to cough up secretions-history of smoking for 60 years pt reported. Right sided weakness from previous stroke.

## 2019-07-16 NOTE — PLAN OF CARE
DATE & TIME: 7/15/2019 1530-5776                        Cognitive Concerns/ Orientation : Alert to self. Calm and sleeping at the beginning of the shift. Awake yelling around 0230, attempting to get out of bed and asking to be placed on the floor. Explained to pt this was not possible. Pt calmed down after sitting him on the side of the bed and ambulating to BR and then sat up in chair for a few hours.   BEHAVIOR & AGGRESSION TOOL COLOR: Green  CIWA SCORE: N/A  ABNL VS/O2: VSS on RA  MOBILITY: Up w/ A1 gaitbelt and walker  PAIN MANAGMENT: Denies  DIET: Regular, drank water overnight   BOWEL/BLADDER: Inconintent of urine at times, continent and walked to BR x1. BM x1  ABNL LAB/BG: . Sodium 147, Cr 1.6  DRAIN/DEVICES: PIV SL  TELEMETRY RHYTHM: N/A  SKIN: Generalized +1-+2 edema, R arm/hand +3 and BLE +3. Blotchy/fragile skin. Excoriation in groin/penis. Coccyx red blanchable.  TESTS/PROCEDURES: None  D/C DAY/GOALS/PLACE: Pending, TCU/ARU placement.  OTHER IMPORTANT INFO: Surgery following, POD 7 laparoscopic cholecystectomy, MATT drain on R side with increased output overnight. Lap valentin sites on abdomen, steri strips in place. LS coarse w/ crackles, and intermittent wheezing. RIOS. Frequent non-productive cough, pt unable to cough up phlegm. RLE weakness noted from previous stroke.

## 2019-07-16 NOTE — PLAN OF CARE
DATE & TIME: 7/15/19 2300     Cognitive Concerns/ Orientation : A&O x2-3, very forgetful   BEHAVIOR & AGGRESSION TOOL COLOR: Green  CIWA SCORE: NA   ABNL VS/O2: VSS on RA  MOBILITY: A1 and walker  PAIN MANAGMENT: Denies pain  DIET: Regular  BOWEL/BLADDER: Incontinent at times, uses urinal  ABNL LAB/BG: NA  DRAIN/DEVICES: MATT drain in RLQ from lap valentin  TELEMETRY RHYTHM: NA  SKIN: Edema to BLE and R hand  TESTS/PROCEDURES: NA  D/C DAY/GOALS/PLACE: Discharge pending medical improvement  OTHER IMPORTANT INFO: Pt needs frequent encouragement to cough, has a lot of congestion

## 2019-07-17 ENCOUNTER — APPOINTMENT (OUTPATIENT)
Dept: OCCUPATIONAL THERAPY | Facility: CLINIC | Age: 79
DRG: 853 | End: 2019-07-17
Attending: INTERNAL MEDICINE
Payer: COMMERCIAL

## 2019-07-17 VITALS
WEIGHT: 207.23 LBS | DIASTOLIC BLOOD PRESSURE: 83 MMHG | SYSTOLIC BLOOD PRESSURE: 138 MMHG | HEART RATE: 99 BPM | TEMPERATURE: 97.7 F | BODY MASS INDEX: 30.6 KG/M2 | RESPIRATION RATE: 16 BRPM | OXYGEN SATURATION: 98 %

## 2019-07-17 LAB
ALBUMIN SERPL-MCNC: 2.1 G/DL (ref 3.4–5)
ALP SERPL-CCNC: 89 U/L (ref 40–150)
ALT SERPL W P-5'-P-CCNC: 94 U/L (ref 0–70)
ANION GAP SERPL CALCULATED.3IONS-SCNC: 7 MMOL/L (ref 3–14)
AST SERPL W P-5'-P-CCNC: 51 U/L (ref 0–45)
BACTERIA SPEC CULT: NORMAL
BILIRUB SERPL-MCNC: 1.1 MG/DL (ref 0.2–1.3)
BUN SERPL-MCNC: 26 MG/DL (ref 7–30)
CALCIUM SERPL-MCNC: 8.1 MG/DL (ref 8.5–10.1)
CHLORIDE SERPL-SCNC: 108 MMOL/L (ref 94–109)
CO2 SERPL-SCNC: 30 MMOL/L (ref 20–32)
CREAT SERPL-MCNC: 1.45 MG/DL (ref 0.66–1.25)
GFR SERPL CREATININE-BSD FRML MDRD: 46 ML/MIN/{1.73_M2}
GLUCOSE BLDC GLUCOMTR-MCNC: 126 MG/DL (ref 70–99)
GLUCOSE BLDC GLUCOMTR-MCNC: 90 MG/DL (ref 70–99)
GLUCOSE BLDC GLUCOMTR-MCNC: 91 MG/DL (ref 70–99)
GLUCOSE SERPL-MCNC: 120 MG/DL (ref 70–99)
INR PPP: 2.22 (ref 0.86–1.14)
Lab: NORMAL
POTASSIUM SERPL-SCNC: 3.4 MMOL/L (ref 3.4–5.3)
PROT SERPL-MCNC: 4.6 G/DL (ref 6.8–8.8)
SODIUM SERPL-SCNC: 145 MMOL/L (ref 133–144)
SPECIMEN SOURCE: NORMAL

## 2019-07-17 PROCEDURE — 25000128 H RX IP 250 OP 636: Performed by: INTERNAL MEDICINE

## 2019-07-17 PROCEDURE — 80053 COMPREHEN METABOLIC PANEL: CPT | Performed by: INTERNAL MEDICINE

## 2019-07-17 PROCEDURE — 25000132 ZZH RX MED GY IP 250 OP 250 PS 637: Performed by: INTERNAL MEDICINE

## 2019-07-17 PROCEDURE — 00000146 ZZHCL STATISTIC GLUCOSE BY METER IP

## 2019-07-17 PROCEDURE — 97530 THERAPEUTIC ACTIVITIES: CPT | Mod: GO

## 2019-07-17 PROCEDURE — 84132 ASSAY OF SERUM POTASSIUM: CPT | Performed by: INTERNAL MEDICINE

## 2019-07-17 PROCEDURE — 85610 PROTHROMBIN TIME: CPT | Performed by: INTERNAL MEDICINE

## 2019-07-17 PROCEDURE — 99239 HOSP IP/OBS DSCHRG MGMT >30: CPT | Performed by: INTERNAL MEDICINE

## 2019-07-17 PROCEDURE — 36415 COLL VENOUS BLD VENIPUNCTURE: CPT | Performed by: INTERNAL MEDICINE

## 2019-07-17 RX ORDER — WARFARIN SODIUM 2.5 MG/1
2.5 TABLET ORAL
Status: DISCONTINUED | OUTPATIENT
Start: 2019-07-17 | End: 2019-07-17 | Stop reason: HOSPADM

## 2019-07-17 RX ORDER — FAMOTIDINE 20 MG/1
20 TABLET, FILM COATED ORAL AT BEDTIME
DISCHARGE
Start: 2019-07-17 | End: 2019-08-15

## 2019-07-17 RX ORDER — FUROSEMIDE 10 MG/ML
40 INJECTION INTRAMUSCULAR; INTRAVENOUS ONCE
Status: COMPLETED | OUTPATIENT
Start: 2019-07-17 | End: 2019-07-17

## 2019-07-17 RX ORDER — ACETAMINOPHEN 325 MG/1
650 TABLET ORAL EVERY 4 HOURS PRN
DISCHARGE
Start: 2019-07-17 | End: 2021-07-16

## 2019-07-17 RX ADMIN — HEPARIN SODIUM 5000 UNITS: 5000 INJECTION, SOLUTION INTRAVENOUS; SUBCUTANEOUS at 08:33

## 2019-07-17 RX ADMIN — FUROSEMIDE 40 MG: 10 INJECTION, SOLUTION INTRAVENOUS at 08:33

## 2019-07-17 RX ADMIN — METOPROLOL TARTRATE 100 MG: 100 TABLET, FILM COATED ORAL at 08:33

## 2019-07-17 RX ADMIN — PIPERACILLIN SODIUM,TAZOBACTAM SODIUM 3.38 G: 3; .375 INJECTION, POWDER, FOR SOLUTION INTRAVENOUS at 02:05

## 2019-07-17 RX ADMIN — POTASSIUM CHLORIDE 20 MEQ: 1500 TABLET, EXTENDED RELEASE ORAL at 09:42

## 2019-07-17 ASSESSMENT — ACTIVITIES OF DAILY LIVING (ADL)
ADLS_ACUITY_SCORE: 19
ADLS_ACUITY_SCORE: 18

## 2019-07-17 NOTE — PROGRESS NOTES
Surgery    POD7 from laparoscopic cholecystectomy for gangrenous cholecystitis.    S:  Intermittent confusion. Pain is controlled.  Poor appetite.  Having bowel movements, incontinent.  Getting up with PT.    O: VSSAF, Afib rate controlled.  Non-toxic.  Alert oriented interactive.  Flat affect.  Rattling cough.  Abdomen mildly distended,  Incisions with steri strips intact  S/s drainage from RUQ MATT drain to bulb suction.     WBC 12>13  Cr stable at 1.7  AST/ALT, tBili normalized    A/P:  Sepsis 2/2 gangrenous cholecystitis with significant peritoneal contamination at risk for cyst duct/artery leak.  Sepsis resolved.  Also likely ileus, resolved.  Low suspicion for obstructive process of the bowel related to his R inguinal hernia at this time.  Continues to improve.  At risk for PNA.    -- Ok with discharge to TCU today if medically appropriate.  -- Will discuss drain removal with Dr. Peck prior to the patient leaving today. Update: drain pulled wo issue.   -- Encourage IS, cough.  -- Encourage ambulation, up with PT, PO intake.  -- Will place orders/instructions for wound care. Patient to follow-up with Dr Peck in 2 weeks.    Seema Monique, PGY4 General Surgery Resident

## 2019-07-17 NOTE — PLAN OF CARE
Physical Therapy Discharge Summary    Reason for therapy discharge:    Discharged to transitional care facility.    Progress towards therapy goal(s). See goals on Care Plan in [facility] electronic health record for goal details.  Goals not met.  Barriers to achieving goals:   discharge from facility.    Therapy recommendation(s):    Continued therapy is recommended.  Rationale/Recommendations:   Pt would benefit from continued PT to improve strength, balance, mobility to increase independence, reduce falls risk and increase safety before returning home.

## 2019-07-17 NOTE — PLAN OF CARE
Discharge Planner OT   Patient plan for discharge: Not discussed  Current status: Pt was able to move EOB with Min A. After Pt performed a STS with Min A. During transfer pt was given VC for proper hand positioning and demonstrated follow through. Pt ambulated to chair and sat with CGA and VC for walker safety.   Barriers to return to prior living situation: R sided weakness, Level of assist, activity tolerance.   Recommendations for discharge: TCU  Rationale for recommendations: Pt previously I at baseline, however weakness is still affecting transfers, ADL I and balance.        Entered by: Ezequiel Ojeda 07/17/2019 8:31 AM        Occupational Therapy Discharge Summary    Reason for therapy discharge:    Discharged to transitional care facility.    Progress towards therapy goal(s). See goals on Care Plan in [facility] electronic health record for goal details.  Goals not met.  Barriers to achieving goals:   discharge from facility.    Therapy recommendation(s):    Continued therapy is recommended.  Rationale/Recommendations:  To maximize I in ADL/IADL.

## 2019-07-17 NOTE — PLAN OF CARE
[id number]: Patient disoriented to time, VSS on room air except elevated BP. Up with assist of 1, walker, GB, denies any pain. Incontinent of bowel and bladder, adequate urine output and BM this shift. MATT with serous output of 120+ml. Incision CDI. Tolerating diet but poor appetite, BG's WNL. Bilateral lower extremity edema +2. RUE +2 edema, elevated. LS with crackles and frequent congested cough, encouraging IS, patient reluctant to participate, needs a lot of encouragement.Turning and repositioning Q 2 hrs, slept in between cares. Plan for discharge to TCU, possibly today with MATT drain.

## 2019-07-17 NOTE — DISCHARGE INSTRUCTIONS
[facility]  Discharge Instructions: Post-Operative Laparoscopic Cholecystectomy    ACTIVITY    Expect to feel tired after your surgery.  This will gradually resolve.      Take frequent, short walks and increase your activity gradually.      Avoid strenuous physical activity or heavy lifting greater than 15-20 lbs. for 2-3 weeks.  You may climb stairs.    You may drive without restrictions when you are not using any prescription pain medication and feel comfortable in a car.    You may return to work/school when you are comfortable without any prescription pain medication.    WOUND CARE    You may have steri-strips (looks like white tape) on your incision.  You may peel off the steri-strips 2 weeks after your surgery if they have not peeled off on their own.\    There is a drain site in the RUQ.  Replace the gauze and tape as needed for drainage until the site is healed.     Do not soak your incisions in a tub or pool for 2 weeks.     Do not apply any lotions, creams, or ointments to your incisions.    A ridge under your incisions is normal and will gradually resolve.    DIET    Start with liquids, then gradually resume your regular diet as tolerated.  Avoid heavy, spicy, and greasy meals for 2-3 days.    Drink plenty of fluids to stay hydrated.    It is not uncommon to experience some loose stools or diarrhea after surgery.  This is your body s way of adapting to the bile which will slowly drain into your intestine.  A low fat diet may help with this.  This should improve over 1-2 months.    PAIN    Expect some tenderness and discomfort at the incision sites.  Use the prescribed pain medication at your discretion.  Expect gradual resolution of your pain over several days.    You may take ibuprofen with food (unless you have been told not to) instead of or in addition to your prescribed pain medication.  If you are taking Norco or Percocet, do not take any additional  acetaminophen/APAP/Tylenol.    Do not drink alcohol or drive while you are taking pain medications.    You may apply ice to your incisions in 20 minute intervals as needed for the next 48 hours.  After that time, consider switching to heat if you prefer.    EXPECTATIONS    Pain medications can cause constipation.  Limit use when possible.  Take over the counter stool softener/stimulant, such as Colace or Senna, 1-2 times a day with plenty of water.  You may take a mild over the counter laxative, such as Miralax or a suppository, as needed.  You may discontinue these medications once you are having regular bowel movements and/or are no longer taking your narcotic pain medication.      You may have shoulder or upper back discomfort due to the gas used in surgery.  This is temporary and should resolve in 48-72 hours.  Short, frequent walks may help with this.    FOLLOW UP    Our office will contact you approximately 2 weeks to check on your progress and answer any questions you may have.  If you are doing well, you will not need to return for a follow up appointment.  If any concerns are identified over the phone, we will help you make an appointment to see a provider.     If you have not received a phone call, have any questions or concerns, or would like to be seen, please call us at [phone] and ask to speak with our nurse.  We are located at [address] [place] [address].    CALL OUR OFFICE -[phone] IF YOU HAVE:     Chills or fever above 101 F.    Increased redness, warmth, or drainage at your incisions.    Significant bleeding.    Pain not relieved by your pain medication or rest.    Increasing pain after the first 48 hours.    Any other concerns or questions.    Your surgeon was Dr. Peck    Revised January 2018

## 2019-07-17 NOTE — PROGRESS NOTES
SW:  D:  Patient discharged today to [facility] in [place].  His son, Prabhjot, transported him.  Orders were sent thru DOD.  Time of discharge coordinated with facility.  PA approved.  Effective date: 7/17/19  PA reference #: [id number]  Pt. notified:   wife

## 2019-07-17 NOTE — PLAN OF CARE
Discharge    Patient discharged to [facility] TCU via wheelchair with son  Care plan note: VSS, on RA. Discharge instructions and medication information reviewed with pt and son. Questions encouraged and answered. PIV removed.    Listed belongings gathered and returned to patient. Yes  Care Plan and Patient education resolved: Yes  Prescriptions if needed, hard copies sent with patient  NA  Home and hospital acquired medications returned to patient: NA  Medication Bin checked and emptied on discharge Yes  Follow up appointment made for patient: No, surgery will call pt to schedule follow up.

## 2019-07-17 NOTE — PLAN OF CARE
PT: Attempted intervention. RN is assisting patient to bathroom and then giving morning meds at time of attempt.

## 2019-07-17 NOTE — DISCHARGE SUMMARY
[facility]  Hospitalist Discharge Summary       Date of Admission:  7/9/2019  Date of Discharge:  7/17/2019  Discharging Provider: Jenn Salvador MD      Discharge Diagnoses   Acute gangrenous cholecystitis, status post laparoscopic cholecystectomy.  Septic shock, secondary to above, resolved.  Acute kidney injury.  Hyponatremia.  Anasarca from fluid resuscitation and hypoalbuminemia, resolving.  Hyperlipidemia  History of permanent atrial fibrillation on chronic anticoagulation with Coumadin.  History of tachycardia mediated cardiomyopathy.  History of CVA in 2008.  History of tobacco abuse,  does not to smoke now.    Follow-ups Needed After Discharge   Follow-up Appointments     Follow Up and recommended labs and tests      Follow up with primary care provider in 2 weeks once you are done with   rehab  No follow up labs or test are needed.  Follow up with surgery as recommended bu Surgery.           Unresulted Labs Ordered in the Past 30 Days of this Admission     Date and Time Order Name Status Description    7/10/2019 0012 Fungus Culture, non-blood Preliminary       These results will be followed up by PCP    Discharge Disposition   Discharged to short-term care facility  Condition at discharge: Stable    Hospital Course   Cumulative Summary: Bradley Liz is a 78 year old male who was admitted on 7/9/2019 with history of permanent atrial fib on chronic coumadin, nonischemic cardiomyopathy thought to be due to tachycardia-mediated cardiomyopathy, history of CVA (2008) and history of tobacco abuse (quit) who was directly admitted to [facility] due to sepsis, presenting with coffee ground emesis, abdominal distention and pain. CT showing for possible bowel necrosis with possible SBO and portal venous gas and pneumatosis of the gastric body and fundus. Ultrasound revealed cholelithiasis and gallbladder's sludge with diffuse gallbladder wall thickening.     Here are further details from  hospitalization :       Assessment & Plan  Active Problems    Septic shock secondary to gangrenous cholecystitis : Presented with severe back pain and coffee-ground emesis.  Initially admitted with concerns for bowel ischemia versus cholecystitis.General surgery was consulted. Patient underwent laparoscopic cholecystectomy emergently on 7/10 which showed severe gangrenous cholecystitis, Initially admitted to the ICU after surgery and was on the vent.Patient was extubated and transferred to medical floor , have finished one week of IV antibiotic course, per surgery does not need any further antibiotics      -MATT drain still in place, Follow up with general surgery   -Diet advance to regular diet, still with poor appetite though, long discussion with patient regarding need to increase his free water and protein intake and continue to work on it after discharge also      Acute hypoxic respiratory failure :-Was on the vent overnight after surgery, subsequently extubated on 7/11,Currently off oxygen,chest xray done yesterday morning showing improvement in B/L pleural effusion, no infilaterates or signs of pulm congestion , he does have some congested cough and he is recommended to contibue with incentive spirometry  -continue IS, pulmonary toilet and increase activity as able.     Acute kidney injury , improving . Creatinine is down to 1.4 today , it was 3.01 at presentation , most likely sec to sepsis with acute organ injury  Hypernatremia : most likely hypovolemic hyponatremia intravascularly although patient seems to have anasarca most likely from fluid resuscitation and hypoalbuminemia      -creatinine improving, now down to 1.4, BMP is ordered for friday  -Encourage oral fluid intake orally  - Sodium is slowly improving , he has received significant fluids in the last few days  - will give patient one dose of Lasix IV 40 mg today and then will continue on his home dose of Lasix 20 mg po daily from tomorrow , with  close monitoring of sodium and creatinine , BMP ordered for Friday    - OT consultation for lymphedema wraps , will recommend continued use of compression stockings and good protein intake   - patient was admitted at 190 lb and his weight went up as much as 211, but now as his renal functions are improving and his urine out put is improving his weight is down to 207 lb.As above , continue to monitor fluid status closely, he has been started back on his diuretics from today , will recommend weighing him daily for next week      A. fib with rapid ventricular rate, h/o stroke in 2008 on Coumadin   -continue metoprolol 200 mg daily  -coumadin restarted, INR in therapeutic range , off antibiotics , continue warfarin at PTA dose and recheck INR on Friday   -Pharmacy to dose Coumadin      History of probable tachycardia mediated cardiomyopathy: patient has a history of a low EF of 35 to 40%.  Subsequent echocardiograms showed normalization to 50 to 55%. He had a nuclear stress test which revealed no evidence of ischemia.      Hyperlipidemia  -Were Holding PTA Crestor, ok to restart on discharge      Patient was seen and examined on the day of discharge , he is feeling well, does not have any complaints , I did review the discharge medications and instructions with the patient and plan for him to follow up with the PCP after the hospitalization .patient was in agreement , he is discharged in stable condition back to [facility].    Consultations This Hospital Stay   PHARMACY IP CONSULT  SURGERY GENERAL IP CONSULT  PHYSICAL THERAPY ADULT IP CONSULT  OCCUPATIONAL THERAPY ADULT IP CONSULT  PHARMACY TO DOSE WARFARIN  LYMPHEDEMA THERAPY IP CONSULT  PHYSICAL THERAPY ADULT IP CONSULT  OCCUPATIONAL THERAPY ADULT IP CONSULT    Code Status   Full Code    Time Spent on this Encounter   I, Jenn Salvador, personally saw the patient today and spent greater than 30 minutes discharging this patient.     Jenn Salvador MD, FACP  [facility]  Hospital  ______________________________________________________________________    Physical Exam   Vital Signs: Temp: 97.7  F (36.5  C) Temp src: Oral BP: 138/83   Heart Rate: 89 Resp: 16 SpO2: 98 % O2 Device: None (Room air)    Weight: 207 lbs 3.72 oz   Physical Exam   Constitutional: He is oriented to person, place, and time. He appears well-developed and well-nourished.   HENT:   Head: Normocephalic and atraumatic.   Eyes: Pupils are equal, round, and reactive to light. EOM are normal.   Neck: Normal range of motion. Neck supple. No JVD present. No thyromegaly present.   Cardiovascular: Normal rate, regular rhythm and normal heart sounds.   Pulmonary/Chest: Effort normal. No respiratory distress. He has rales.   Abdominal: Soft. Bowel sounds are normal. There is no tenderness. There is rebound.   Surgical drain in place    Musculoskeletal: Normal range of motion. He exhibits edema. He exhibits no tenderness.   2+pitting edema , improved from before    Neurological: He is alert and oriented to person, place, and time. No cranial nerve deficit.   Skin: Skin is warm and dry. No rash noted. No erythema.   Psychiatric: He has a normal mood and affect. His behavior is normal.   Nursing note and vitals reviewed.         Primary Care Physician   Alex Mustafa    Discharge Orders      INR     Basic metabolic panel     General info for SNF    Length of Stay Estimate: Short Term Care: Estimated # of Days <30  Condition at Discharge: Improving  Level of care:skilled   Rehabilitation Potential: Good  Admission H&P remains valid and up-to-date: Yes  Recent Chemotherapy: N/A  Use Nursing Home Standing Orders: Yes     Mantoux instructions    Give two-step Mantoux (PPD) Per Facility Policy Yes     Reason for your hospital stay    You were admitted to the hospital secondary to Acute gangrenous cholecystitis with sepsis .     Follow Up and recommended labs and tests    Follow up with primary care provider in 2 weeks once you are  done with rehab  No follow up labs or test are needed.  Follow up with surgery as recommended bu Surgery.     Activity - Up with nursing assistance     Encourage PO fluids     Full Code     Physical Therapy Adult Consult    Evaluate and treat as clinically indicated.    Reason: Acute illness debilitation from hospitalization     Occupational Therapy Adult Consult    Evaluate and treat as clinically indicated.    Reason: Acute illness debilitation from acute illness     Fall precautions     Advance Diet as Tolerated    Follow this diet upon discharge: Orders Placed This Encounter      Room Service      Snacks/Supplements Adult: Other; Shake PRN; With Meals      Regular Diet Adult         Significant Results and Procedures   Results for orders placed or performed during the hospital encounter of 07/09/19   XR Chest Port 1 View    Narrative    PORTABLE CHEST ONE VIEW   7/9/2019 9:40 PM    HISTORY: Hypoxia in patient with sepsis.    COMPARISON: None.      Impression    IMPRESSION: There is mild atelectasis of the right lung base. The  lungs are otherwise clear. No other abnormality is seen.     JIGNESH OGLESBY MD   XR Chest Port 1 View    Narrative    CHEST PORTABLE ONE VIEW July 10, 2019 8:19 AM     HISTORY: Hypoxemic respiratory failure.    COMPARISON: Chest x-ray 7/9/2019.      Impression    IMPRESSION: Portable view of the chest is performed. Lungs are clear.  Right IJ central venous line terminates in the SVC. Nasogastric tube  extends below the left hemidiaphragm presumably in the stomach.  Endotracheal tube is in good position approximately 3 cm above the  joshua. No pneumothorax or pleural effusions.    ARIANA GARCÍA MD   US Upper Extremity Venous Duplex Left    Narrative    VENOUS DOPPLER LEFT UPPER EXTREMITY 7/11/2019 9:38 AM    HISTORY: Left upper extremity swelling.    COMPARISON: None.    FINDINGS: Color-flow imaging and Doppler waveform spectral analysis  were utilized. There is normal compressibility  and spontaneous flow  throughout the left upper extremity deep and visualized superficial  veins.      Impression    IMPRESSION: No evidence for deep venous thrombosis.    EDI VERNON MD   US Upper Extremity Arterial Duplex Left    Narrative    ULTRASOUND UPPER EXTREMITY ARTERIAL DUPLEX LEFT  7/11/2019 9:46 AM     HISTORY:  Differences in blood pressure between the right and left  arms.    COMPARISON: None.    FINDINGS: Color Doppler and spectral waveform analysis performed.    Per velocity criteria, no evidence for significant stenosis in the  sampled arteries of the left upper extremity. Multiphasic waveforms  are noted throughout.      Impression    IMPRESSION: No evidence for significant steno-occlusive disease in the  arteries of the left upper extremity.    EUNICE PARRISH MD   XR Chest Port 1 View    Narrative    CHEST ONE VIEW PORTABLE   7/14/2019 12:21 PM     HISTORY: SOB.    COMPARISON: 7/10/2019.      Impression    IMPRESSION: Prominent heart size similar to prior. There appear to be  trace bilateral pleural effusions. No lobar consolidation or  pneumothorax.    DANIEL BENDER MD   XR Chest Port 1 View    Narrative    CHEST ONE VIEW PORTABLE   7/16/2019 10:12 AM     HISTORY:  Follow up on bilateral pleural effusion, pleural rub in  right upper lung.    COMPARISON: 7/14/2019.      Impression    IMPRESSION: There are bibasilar airspace opacities and small bilateral  pleural effusions. Heart appears mildly enlarged but similar to prior.  No pneumothorax.    DANIEL BENDER MD       Discharge Medications   Current Discharge Medication List      START taking these medications    Details   acetaminophen (TYLENOL) 325 MG tablet Take 2 tablets (650 mg) by mouth every 4 hours as needed for mild pain    Associated Diagnoses: Acute gangrenous cholecystitis      famotidine (PEPCID) 20 MG tablet Take 1 tablet (20 mg) by mouth At Bedtime for 7 days    Associated Diagnoses: Acute gangrenous cholecystitis          CONTINUE these medications which have NOT CHANGED    Details   furosemide (LASIX) 20 MG tablet Take 1 tablet (20 mg) by mouth daily  Qty: 90 tablet, Refills: 3    Associated Diagnoses: Acute on chronic systolic congestive heart failure (H)      metoprolol succinate (TOPROL-XL) 200 MG 24 hr tablet Take 1 tablet (200 mg) by mouth daily  Qty: 90 tablet, Refills: 3    Associated Diagnoses: Atrial fibrillation, unspecified type (H)      rosuvastatin (CRESTOR) 5 MG tablet Take 1 tablet (5 mg) by mouth daily  Qty: 90 tablet, Refills: 3    Associated Diagnoses: Acute on chronic systolic congestive heart failure (H)      warfarin (COUMADIN) 5 MG tablet 5 mg Sun, Tues, Thurs; 2.5 mg all other days or as directed by ACC  Qty: 80 tablet, Refills: 3    Associated Diagnoses: Long term current use of anticoagulant therapy      ASPIRIN NOT PRESCRIBED, INTENTIONAL, by Other route continuous prn.  Refills: 0      order for DME Please draw INR as ordered and as needed. Call results to [facility] at (p) [phone] or fax them to (f) [phone]  Qty: 3 Month, Refills: 3    Associated Diagnoses: Long term current use of anticoagulant therapy; Completed stroke (H); Atrial fibrillation, unspecified type (H)           Allergies   No Known Allergies

## 2019-07-18 ENCOUNTER — TELEPHONE (OUTPATIENT)
Dept: FAMILY MEDICINE | Facility: CLINIC | Age: 79
End: 2019-07-18

## 2019-07-18 ENCOUNTER — ANTICOAGULATION THERAPY VISIT (OUTPATIENT)
Dept: ANTICOAGULATION | Facility: CLINIC | Age: 79
End: 2019-07-18
Payer: COMMERCIAL

## 2019-07-18 DIAGNOSIS — Z79.01 LONG TERM CURRENT USE OF ANTICOAGULANT THERAPY: ICD-10-CM

## 2019-07-18 DIAGNOSIS — I63.9 COMPLETED STROKE (H): ICD-10-CM

## 2019-07-18 LAB — INR PPP: 3.1 (ref 0.8–1.1)

## 2019-07-18 PROCEDURE — 99207 ZZC NO CHARGE NURSE ONLY: CPT

## 2019-07-18 NOTE — PROGRESS NOTES
ANTICOAGULATION FOLLOW-UP CLINIC VISIT    Patient Name:  Bradley Liz  Date:  7/18/2019  Contact Type:  Telephone/ [facility]    SUBJECTIVE:  Patient Findings     Positives:   Hospital admission (Acute gangrenous cholecystitis, status post laparoscopic cholecystectomy.)    Comments:   Due to gangrenous cholecystitis patient had issues with septic shock (now resolved), acute kidney injury, and hyponatremia.    Patient was discharged to the TCU yesterday. Per their report patient is doing well. He is up and moving around, pain is well controlled, he is not on antibiotics. No issues with bleeding or unusual bruising noted.         Clinical Outcomes     Comments:   Due to gangrenous cholecystitis patient had issues with septic shock (now resolved), acute kidney injury, and hyponatremia.    Patient was discharged to the TCU yesterday. Per their report patient is doing well. He is up and moving around, pain is well controlled, he is not on antibiotics. No issues with bleeding or unusual bruising noted.            OBJECTIVE    INR   Date Value Ref Range Status   07/18/2019 3.1 (A) 0.8 - 1.1 Final       ASSESSMENT / PLAN  INR assessment SUPRA    Recheck INR In: 4 DAYS    INR Location TCU [facility]     Anticoagulation Summary  As of 7/18/2019    INR goal:   2.0-3.0   TTR:   57.9 % (4.2 y)   INR used for dosing:   3.1! (7/18/2019)   Warfarin maintenance plan:   5 mg (5 mg x 1) every Sun, Tue, Thu; 2.5 mg (5 mg x 0.5) all other days   Full warfarin instructions:   7/18: 2.5 mg; Otherwise 5 mg every Sun, Tue, Thu; 2.5 mg all other days   Weekly warfarin total:   25 mg   Plan last modified:   Lela Lerner RN (9/7/2018)   Next INR check:   7/22/2019   Priority:   INR   Target end date:   Indefinite    Indications    Completed stroke (H) [I63.9]  Long term current use of anticoagulant therapy [Z79.01]             Anticoagulation Episode Summary     INR check location:       Preferred lab:       Send INR reminders  to:   [facility]    Comments:   * 7/18/19- [facility]  Leave message on MOBILE only. Takes warfarin in the AM. Leaves for [place] at the end of Dec (will need snowbird - Dr. Mustafa has to sign paperwork!) [phone] cell [phone] [facility] [address]      Anticoagulation Care Providers     Provider Role Specialty Phone number    Alex Mustafa MD [facility] [phone]            See the Encounter Report to view Anticoagulation Flowsheet and Dosing Calendar (Go to Encounters tab in chart review, and find the Anticoagulation Therapy Visit)        Paulina Montesinos RN CACP

## 2019-07-18 NOTE — TELEPHONE ENCOUNTER
"  ED for acute condition Discharge Protocol    \"Hi, my name is Afua Reilly, a registered nurse, and I am calling from [facility].  I am calling to follow up and see how things are going for you after your recent emergency visit.\"    Tell me how you are doing now that you are home?\" Gangrenous [facility] ED on 7/9/19. Discharged from hospital on 7/17/19. LM to [facility] clinic/RN to schedule a 2 week f/u appt with . [id number]  Pt is in [place]/rehab. Will f/u when he is discharged. Kpavandryn                  "

## 2019-07-21 VITALS
DIASTOLIC BLOOD PRESSURE: 75 MMHG | SYSTOLIC BLOOD PRESSURE: 145 MMHG | RESPIRATION RATE: 18 BRPM | BODY MASS INDEX: 29.09 KG/M2 | OXYGEN SATURATION: 94 % | HEART RATE: 92 BPM | TEMPERATURE: 98.2 F | WEIGHT: 197 LBS

## 2019-07-21 RX ORDER — ALBUTEROL SULFATE 0.83 MG/ML
2.5 SOLUTION RESPIRATORY (INHALATION) EVERY 4 HOURS PRN
COMMUNITY
End: 2019-10-01

## 2019-07-21 RX ORDER — WARFARIN SODIUM 1 MG/1
TABLET ORAL SEE ADMIN INSTRUCTIONS
COMMUNITY
End: 2019-08-05

## 2019-07-21 RX ORDER — BISACODYL 10 MG
10 SUPPOSITORY, RECTAL RECTAL DAILY PRN
COMMUNITY
End: 2019-08-15

## 2019-07-22 ENCOUNTER — ANTICOAGULATION THERAPY VISIT (OUTPATIENT)
Dept: ANTICOAGULATION | Facility: CLINIC | Age: 79
End: 2019-07-22

## 2019-07-22 ENCOUNTER — NURSING HOME VISIT (OUTPATIENT)
Dept: GERIATRICS | Facility: CLINIC | Age: 79
End: 2019-07-22
Payer: COMMERCIAL

## 2019-07-22 DIAGNOSIS — Z79.01 LONG TERM CURRENT USE OF ANTICOAGULANT THERAPY: ICD-10-CM

## 2019-07-22 DIAGNOSIS — R60.1 ANASARCA: ICD-10-CM

## 2019-07-22 DIAGNOSIS — I48.91 ATRIAL FIBRILLATION, UNSPECIFIED TYPE (H): ICD-10-CM

## 2019-07-22 DIAGNOSIS — N17.9 ACUTE KIDNEY INJURY (H): ICD-10-CM

## 2019-07-22 DIAGNOSIS — I63.9 COMPLETED STROKE (H): ICD-10-CM

## 2019-07-22 DIAGNOSIS — Z86.73 HISTORY OF CVA (CEREBROVASCULAR ACCIDENT): ICD-10-CM

## 2019-07-22 DIAGNOSIS — K81.0 ACUTE CHOLECYSTITIS: Primary | ICD-10-CM

## 2019-07-22 DIAGNOSIS — I50.23 ACUTE ON CHRONIC SYSTOLIC CONGESTIVE HEART FAILURE (H): ICD-10-CM

## 2019-07-22 DIAGNOSIS — A41.9 SEPSIS, DUE TO UNSPECIFIED ORGANISM: ICD-10-CM

## 2019-07-22 LAB — INR PPP: 4.5 (ref 0.8–1.1)

## 2019-07-22 PROCEDURE — 99207 ZZC CDG-MDM COMPONENT: MEETS LOW - DOWN CODED: CPT | Performed by: NURSE PRACTITIONER

## 2019-07-22 PROCEDURE — 99309 SBSQ NF CARE MODERATE MDM 30: CPT | Performed by: NURSE PRACTITIONER

## 2019-07-22 PROCEDURE — 99207 ZZC NO CHARGE NURSE ONLY: CPT

## 2019-07-22 NOTE — PROGRESS NOTES
[facility]  PRIMARY CARE PROVIDER AND CLINIC:  Alex Mustafa MD, [address] [facility] [address]  Chief Complaint   Patient presents with     Hospital F/U     [place] Medical Record Number:  [id number]  Place of Service where encounter took place:  [facility] [[id number]]    Bradley Liz  is a 78 year old  (1940), admitted to the above facility from  [facility]. Hospital stay 7/9/19 through 7/17/19..  Admitted to this facility for  rehab, medical management and nursing care.    HPI:    HPI information obtained from: facility chart records, facility staff, patient report and [facility] chart review.   Brief Summary of Hospital Course: Bradley Liz has a past medical history of CVA in 2008, afib on chronic warfarin, cardiomyopathy.  S/he was admitted to the hospital with acute back pain, abdominal distention, coffee-ground emesis and found to have sepsis due to acute gangrenous cholecystitis.  S/he underwent laparoscopic cholecystectomy . The hospital stay was complicated by acute kidney injury with creat peak at 3.01, anasarca.    Updates on Status Since Skilled nursing Admission: Bradley reports no new concerns, feeling better every day.  Nursing reports ongoing bilateral LE edema with some weeping, blisters.       CODE STATUS/ADVANCE DIRECTIVES DISCUSSION:   CPR/Full code   Patient's living condition: lives with spouse  ALLERGIES: Patient has no known allergies.  PAST MEDICAL HISTORY:  has no past medical history on file.  PAST SURGICAL HISTORY:   has a past surgical history that includes Laparoscopic cholecystectomy (N/A, 7/9/2019).  FAMILY HISTORY: family history includes C.A.D. in his father; Prostate Cancer in his father; Unknown/Adopted in his mother.  SOCIAL HISTORY:   reports that he quit smoking about 10 years ago. He quit after 50.00 years of use. He has never used smokeless tobacco. He reports that he drinks alcohol. He  reports that he does not use drugs.    Post Discharge Medication Reconciliation Status: discharge medications reconciled and changed, per note/orders (see AVS)    Current Outpatient Medications   Medication Sig Dispense Refill     acetaminophen (TYLENOL) 325 MG tablet Take 2 tablets (650 mg) by mouth every 4 hours as needed for mild pain       albuterol (PROVENTIL) (2.5 MG/3ML) 0.083% neb solution Take 2.5 mg by nebulization every 4 hours as needed for shortness of breath / dyspnea or wheezing       ASPIRIN NOT PRESCRIBED, INTENTIONAL, by Other route continuous prn.  0     bisacodyl (DULCOLAX) 10 MG suppository Place 10 mg rectally daily as needed for constipation       famotidine (PEPCID) 20 MG tablet Take 1 tablet (20 mg) by mouth At Bedtime for 7 days       furosemide (LASIX) 20 MG tablet Take 1 tablet (20 mg) by mouth daily 90 tablet 3     magnesium hydroxide (MILK OF MAGNESIA) 400 MG/5ML suspension Take 30 mLs by mouth daily as needed for constipation or heartburn       metoprolol succinate (TOPROL-XL) 200 MG 24 hr tablet Take 1 tablet (200 mg) by mouth daily 90 tablet 3     order for DME Please draw INR as ordered and as needed. Call results to [facility] at (p) [phone] or fax them to (f) [phone] 3 Month 3     rosuvastatin (CRESTOR) 5 MG tablet Take 1 tablet (5 mg) by mouth daily 90 tablet 3     warfarin (COUMADIN) 1 MG tablet Take by mouth See Admin Instructions Take as directed by the Anticoagulation Clinic based on INR result - re-establishing maintenance dose while at TCU       warfarin (COUMADIN) 5 MG tablet Take as directed by the Anticoagulation Clinic based on INR result - re-establishing maintenance dose while at TCU 80 tablet 3       ROS:  10 point ROS of systems including Constitutional, Eyes, Respiratory, Cardiovascular, Gastroenterology, Genitourinary, Integumentary, Musculoskeletal, Psychiatric were all negative except for pertinent positives noted in my  HPI.    Vitals:  /75   Pulse 92   Temp 98.2  F (36.8  C)   Resp 18   Wt 89.4 kg (197 lb)   SpO2 94%   BMI 29.09 kg/m    Exam:  GENERAL APPEARANCE:  Alert, in no acute distress   HEAD:  Normal, normocephalic, atraumatic  EYE EXAM: normal external eye, conjunctiva, lids, KAROL  NECK EXAM: supple, no JVD  CHEST/RESP:  respiratory effort normal, lung sounds CTA , no respiratory distress  CV:  Rate reg, rhythm reg, no murmur, 3+ peripheral edema bilateral weeping, pitting  GI/ABDOMEN:  normal bowel sounds, soft, nontender, no palpable masses  M/S:   extremities  abnormal, gait abnormal-using rolling walker and slowly, normal muscle tone, and range of motion normal   SKIN EXAM: small incisions of abdomen are CDI,  glued , no erythema, no warmth, no drainage   NEUROLOGIC EXAM: Normal gross motor movement, tone and coordination. No tremor. Cranial nerves 2-12 are normal tested and grossly at patient's baseline  PSYCH:  Alert and oriented to person-place-time, affect pleasant , judgement appropriate      Lab/Diagnostic data:  Recent labs in [facility] reviewed by me today. notable for elevated WBC, elevated ALT, AST, elevated Creat    ASSESSMENT/PLAN:  Acute cholecystitis  Sepsis, due to unspecified organism (H)  Patient S/P lap valentin due to gangrenous cholecystitis, gradually improving every day.  Eating well, bowels moving well. Gradually feeling better, incisions CDI.  -making progress in therapy, goal to return to independent living    Acute on chronic systolic congestive heart failure (H)  Anasarca  Patient with chronic known CHF with EF 50-55%.  On lasix at baseline, no K+ supplement  -check labs.  -consider increase in lasix    Acute kidney injury (H)  Baseline creat within normal limits .  Creat elevated to 3.01 and developed anasarca thought due to volume overload, critical illness.   Last creat decreased to 1.45, without known CKD. 3/6/19, creat 0.81.   -Avoid nephrotoxic medications  -Renal dosing of  medications  -monitor kidney function this week     Atrial fibrillation, unspecified type (H)  Long term current use of anticoagulant therapy  Patient with known history of afib on warfarin As directed by Anticoagulation Clinic. INR today elevated at 4.5 with goal 2-3.  See ACC notes for dosing.     History of CVA (cerebrovascular accident)  Patient with known history of CVA in 2008, no new symptoms. Stable.        Orders written by provider at facility and transcribed by : Whitney Brock CMA  1.  Please schedule f/u appt with surgeon (Dr. Peck) 1-2 weeks.  2.  Lab draw 7/24/19   CMP Dx:  Cholecystitis   CBC Dx:  Anemia, sepsis.    Total time spent with patient visit at the skilled nursing facility was 39 minutes including patient visit and review of past records. Greater than 50% of total time spent with counseling and coordinating care due to coordinating care with follow up labs and appointments, the plan of SNF stay and projected length of stay, current medication reconciled from the hospital and recent past lab and imaging results and subsequent treatment plan.  Electronically signed by:  PALOMO Rees CNP

## 2019-07-22 NOTE — PROGRESS NOTES
ANTICOAGULATION FOLLOW-UP CLINIC VISIT    Patient Name:  Bradley Liz  Date:  2019  Contact Type:  Telephone/ [facility]    SUBJECTIVE:  Patient Findings     Positives:   Change in health (Recent hospitalization for acute gangrenous cholecystitis)    Comments:   He has been at the TCU since Thursday last week. No changes in medications noted by TCU nurse, not taking antibiotics.     No issues with bleeding or unusual bruising noted.         Clinical Outcomes     Negatives:   Major bleeding event, Thromboembolic event, Anticoagulation-related hospital admission, Anticoagulation-related ED visit, Anticoagulation-related fatality    Comments:   He has been at the TCU since Thursday last week. No changes in medications noted by TCU nurse, not taking antibiotics.     No issues with bleeding or unusual bruising noted.            OBJECTIVE    INR   Date Value Ref Range Status   2019 4.5 (A) 0.8 - 1.1 Final       ASSESSMENT / PLAN  No question data found.  Anticoagulation Summary  As of 2019    INR goal:   2.0-3.0   TTR:   57.7 % (4.2 y)   INR used for dosin.5! (2019)   Warfarin maintenance plan:   5 mg (5 mg x 1) every Sun, Tue, Thu; 2.5 mg (5 mg x 0.5) all other days   Full warfarin instructions:   : Hold; : 2.5 mg; Otherwise 5 mg every Sun, Tue, Thu; 2.5 mg all other days   Weekly warfarin total:   25 mg   Plan last modified:   Lela Lerner RN (2018)   Next INR check:   2019   Priority:   INR   Target end date:   Indefinite    Indications    Completed stroke (H) [I63.9]  Long term current use of anticoagulant therapy [Z79.01]             Anticoagulation Episode Summary     INR check location:       Preferred lab:       Send INR reminders to:   [place] PHONE [facility] POOL    Comments:   * 19- [facility]  Leave message on MOBILE only. Takes warfarin in the AM. Leaves for [place] at the end of Dec (will need snowchund - Dr. Mustafa has to sign paperwork!)  [phone] cell [address] [facility] [address]      Anticoagulation Care Providers     Provider Role Specialty Phone number    Alex Mustafa MD [facility] [phone]            See the Encounter Report to view Anticoagulation Flowsheet and Dosing Calendar (Go to Encounters tab in chart review, and find the Anticoagulation Therapy Visit)        Paulina Montesinos RN [facility]

## 2019-07-22 NOTE — LETTER
7/22/2019        RE: Bradley Liz  [address]   [facility] [address]        [facility]  PRIMARY CARE PROVIDER AND CLINIC:  Alex Mustafa MD, [address] / [place] [address]  Chief Complaint   Patient presents with     Hospital F/U     [facility] Medical Record Number:  [id number]  Place of Service where encounter took place:  [facility] ([facility]) [[id number]]    Bradley Liz  is a 78 year old  (1940), admitted to the above facility from  [facility]. Hospital stay 7/9/19 through 7/17/19..  Admitted to this facility for  rehab, medical management and nursing care.    HPI:    HPI information obtained from: facility chart records, facility staff, patient report and [facility] chart review.   Brief Summary of Hospital Course: Bradley Liz has a past medical history of CVA in 2008, afib on chronic warfarin, cardiomyopathy.  S/he was admitted to the hospital with acute back pain, abdominal distention, coffee-ground emesis and found to have sepsis due to acute gangrenous cholecystitis.  S/he underwent laparoscopic cholecystectomy . The hospital stay was complicated by acute kidney injury with creat peak at 3.01, anasarca.    Updates on Status Since Skilled nursing Admission: Bradley reports no new concerns, feeling better every day.  Nursing reports ongoing bilateral LE edema with some weeping, blisters.       CODE STATUS/ADVANCE DIRECTIVES DISCUSSION:   CPR/Full code   Patient's living condition: lives with spouse  ALLERGIES: Patient has no known allergies.  PAST MEDICAL HISTORY:  has no past medical history on file.  PAST SURGICAL HISTORY:   has a past surgical history that includes Laparoscopic cholecystectomy (N/A, 7/9/2019).  FAMILY HISTORY: family history includes C.A.D. in his father; Prostate Cancer in his father; Unknown/Adopted in his mother.  SOCIAL HISTORY:   reports that he quit smoking about 10 years ago. He quit after  50.00 years of use. He has never used smokeless tobacco. He reports that he drinks alcohol. He reports that he does not use drugs.    Post Discharge Medication Reconciliation Status: discharge medications reconciled and changed, per note/orders (see AVS)    Current Outpatient Medications   Medication Sig Dispense Refill     acetaminophen (TYLENOL) 325 MG tablet Take 2 tablets (650 mg) by mouth every 4 hours as needed for mild pain       albuterol (PROVENTIL) (2.5 MG/3ML) 0.083% neb solution Take 2.5 mg by nebulization every 4 hours as needed for shortness of breath / dyspnea or wheezing       ASPIRIN NOT PRESCRIBED, INTENTIONAL, by Other route continuous prn.  0     bisacodyl (DULCOLAX) 10 MG suppository Place 10 mg rectally daily as needed for constipation       famotidine (PEPCID) 20 MG tablet Take 1 tablet (20 mg) by mouth At Bedtime for 7 days       furosemide (LASIX) 20 MG tablet Take 1 tablet (20 mg) by mouth daily 90 tablet 3     magnesium hydroxide (MILK OF MAGNESIA) 400 MG/5ML suspension Take 30 mLs by mouth daily as needed for constipation or heartburn       metoprolol succinate (TOPROL-XL) 200 MG 24 hr tablet Take 1 tablet (200 mg) by mouth daily 90 tablet 3     order for DME Please draw INR as ordered and as needed. Call results to [facility] at (p) [phone] or fax them to (f) [phone] 3 Month 3     rosuvastatin (CRESTOR) 5 MG tablet Take 1 tablet (5 mg) by mouth daily 90 tablet 3     warfarin (COUMADIN) 1 MG tablet Take by mouth See Admin Instructions Take as directed by the Anticoagulation Clinic based on INR result - re-establishing maintenance dose while at TCU       warfarin (COUMADIN) 5 MG tablet Take as directed by the Anticoagulation Clinic based on INR result - re-establishing maintenance dose while at TCU 80 tablet 3       ROS:  10 point ROS of systems including Constitutional, Eyes, Respiratory, Cardiovascular, Gastroenterology, Genitourinary, Integumentary,  Musculoskeletal, Psychiatric were all negative except for pertinent positives noted in my HPI.    Vitals:  /75   Pulse 92   Temp 98.2  F (36.8  C)   Resp 18   Wt 89.4 kg (197 lb)   SpO2 94%   BMI 29.09 kg/m     Exam:  GENERAL APPEARANCE:  Alert, in no acute distress   HEAD:  Normal, normocephalic, atraumatic  EYE EXAM: normal external eye, conjunctiva, lids, KAROL  NECK EXAM: supple, no JVD  CHEST/RESP:  respiratory effort normal, lung sounds CTA , no respiratory distress  CV:  Rate reg, rhythm reg, no murmur, 3+ peripheral edema bilateral weeping, pitting  GI/ABDOMEN:  normal bowel sounds, soft, nontender, no palpable masses  M/S:   extremities  abnormal, gait abnormal-using rolling walker and slowly, normal muscle tone, and range of motion normal   SKIN EXAM: small incisions of abdomen are CDI,  glued , no erythema, no warmth, no drainage   NEUROLOGIC EXAM: Normal gross motor movement, tone and coordination. No tremor. Cranial nerves 2-12 are normal tested and grossly at patient's baseline  PSYCH:  Alert and oriented to person-place-time, affect pleasant , judgement appropriate      Lab/Diagnostic data:  Recent labs in EPIC reviewed by me today. notable for elevated WBC, elevated ALT, AST, elevated Creat    ASSESSMENT/PLAN:  Acute cholecystitis  Sepsis, due to unspecified organism (H)  Patient S/P lap valentin due to gangrenous cholecystitis, gradually improving every day.  Eating well, bowels moving well. Gradually feeling better, incisions CDI.  -making progress in therapy, goal to return to independent living    Acute on chronic systolic congestive heart failure (H)  Anasarca  Patient with chronic known CHF with EF 50-55%.  On lasix at baseline, no K+ supplement  -check labs.  -consider increase in lasix    Acute kidney injury (H)  Baseline creat within normal limits .  Creat elevated to 3.01 and developed anasarca thought due to volume overload, critical illness.   Last creat decreased to 1.45, without  known CKD. 3/6/19, creat 0.81.   -Avoid nephrotoxic medications  -Renal dosing of medications  -monitor kidney function this week     Atrial fibrillation, unspecified type (H)  Long term current use of anticoagulant therapy  Patient with known history of afib on warfarin As directed by Anticoagulation Clinic. INR today elevated at 4.5 with goal 2-3.  See [facility] notes for dosing.     History of CVA (cerebrovascular accident)  Patient with known history of CVA in 2008, no new symptoms. Stable.        Orders written by provider at facility and transcribed by : Whitney Brock CMA  1.  Please schedule f/u appt with surgeon (Dr. Peck) 1-2 weeks.  2.  Lab draw 7/24/19   CMP Dx:  Cholecystitis   CBC Dx:  Anemia, sepsis.    Total time spent with patient visit at the skilled nursing facility was 39 minutes including patient visit and review of past records. Greater than 50% of total time spent with counseling and coordinating care due to coordinating care with follow up labs and appointments, the plan of SNF stay and projected length of stay, current medication reconciled from the hospital and recent past lab and imaging results and subsequent treatment plan.  Electronically signed by:  PALOMO Rees CNP                       Sincerely,        PALOMO Rees CNP

## 2019-07-24 VITALS
RESPIRATION RATE: 18 BRPM | DIASTOLIC BLOOD PRESSURE: 86 MMHG | OXYGEN SATURATION: 95 % | TEMPERATURE: 97.5 F | HEIGHT: 69 IN | SYSTOLIC BLOOD PRESSURE: 147 MMHG | WEIGHT: 197 LBS | BODY MASS INDEX: 29.18 KG/M2 | HEART RATE: 99 BPM

## 2019-07-24 ASSESSMENT — MIFFLIN-ST. JEOR: SCORE: 1603.97

## 2019-07-25 ENCOUNTER — ANTICOAGULATION THERAPY VISIT (OUTPATIENT)
Dept: ANTICOAGULATION | Facility: CLINIC | Age: 79
End: 2019-07-25

## 2019-07-25 ENCOUNTER — NURSING HOME VISIT (OUTPATIENT)
Dept: GERIATRICS | Facility: CLINIC | Age: 79
End: 2019-07-25
Payer: COMMERCIAL

## 2019-07-25 DIAGNOSIS — I63.9 COMPLETED STROKE (H): ICD-10-CM

## 2019-07-25 DIAGNOSIS — K81.0 ACUTE CHOLECYSTITIS: ICD-10-CM

## 2019-07-25 DIAGNOSIS — Z79.01 LONG TERM CURRENT USE OF ANTICOAGULANT THERAPY: ICD-10-CM

## 2019-07-25 DIAGNOSIS — A41.9 SEPSIS, DUE TO UNSPECIFIED ORGANISM: ICD-10-CM

## 2019-07-25 DIAGNOSIS — N17.9 ACUTE KIDNEY INJURY (H): ICD-10-CM

## 2019-07-25 DIAGNOSIS — R60.1 ANASARCA: Primary | ICD-10-CM

## 2019-07-25 LAB — INR PPP: 2 (ref 0.8–1.1)

## 2019-07-25 PROCEDURE — 99207 ZZC NO CHARGE NURSE ONLY: CPT

## 2019-07-25 PROCEDURE — 99309 SBSQ NF CARE MODERATE MDM 30: CPT | Performed by: NURSE PRACTITIONER

## 2019-07-25 NOTE — LETTER
7/25/2019        RE: Bradley Liz  [address] [place] [address]        [facility]  [facility] Medical Record Number:  [id number]  Place of Service where encounter took place[facility]  [facility] ([facility] [[id number]]  Chief Complaint   Patient presents with     Nursing Home Acute       HPI:    Bradley Liz  is a 78 year old (1940), who is being seen today for an episodic care visit.  HPI information obtained from: facility chart records, facility staff, patient report and [facility] chart review. Today's concern is:     Anasarca  Acute kidney injury (H)  Acute cholecystitis  Sepsis, due to unspecified organism (H)   Bradley reports no new concerns, would like to have a beer now & then.  Nursing reports ongoing LE edema, not improved much.       Past Medical and Surgical History reviewed in Epic today.    MEDICATIONS:  Current Outpatient Medications   Medication Sig Dispense Refill     acetaminophen (TYLENOL) 325 MG tablet Take 2 tablets (650 mg) by mouth every 4 hours as needed for mild pain       albuterol (PROVENTIL) (2.5 MG/3ML) 0.083% neb solution Take 2.5 mg by nebulization every 4 hours as needed for shortness of breath / dyspnea or wheezing       ASPIRIN NOT PRESCRIBED, INTENTIONAL, by Other route continuous prn.  0     bisacodyl (DULCOLAX) 10 MG suppository Place 10 mg rectally daily as needed for constipation       furosemide (LASIX) 20 MG tablet Take 1 tablet (20 mg) by mouth daily 90 tablet 3     magnesium hydroxide (MILK OF MAGNESIA) 400 MG/5ML suspension Take 30 mLs by mouth daily as needed for constipation or heartburn       metoprolol succinate (TOPROL-XL) 200 MG 24 hr tablet Take 1 tablet (200 mg) by mouth daily 90 tablet 3     order for DME Please draw INR as ordered and as needed. Call results to [facility] at (p) [phone] or fax them to (f) [phone] 3 Month 3     rosuvastatin (CRESTOR) 5 MG tablet Take 1 tablet  "(5 mg) by mouth daily 90 tablet 3     warfarin (COUMADIN) 1 MG tablet Take by mouth See Admin Instructions Take as directed by the Anticoagulation Clinic based on INR result - re-establishing maintenance dose while at TCU       REVIEW OF SYSTEMS:  4 point ROS including Respiratory, CV, GI and , other than that noted in the HPI,  is negative    Objective:  /86   Pulse 99   Temp 97.5  F (36.4  C)   Resp 18   Ht 1.753 m (5' 9\")   Wt 89.4 kg (197 lb)   SpO2 95%   BMI 29.09 kg/m     Exam:  GENERAL APPEARANCE:  Alert, in no distress  ENT:  Mouth and posterior oropharynx normal, moist mucous membranes, hearing acuity hard of hearing   EYES:  EOM, conjunctivae, lids, pupils and irises normal  RESP:  respiratory effort normal, no respiratory distress, Lung sounds CTA  CV:  Auscultation of heart done , rate and rhythm reg, no murmur, no rub or gallop, 3+ edema from mid-calf down  M/S:   Gait and station abnormal-using rolling walker for ambulation, Digits and nails within normal limits   SKIN:  No weeping today of bilateral LE  NEURO: Cranial nerves 2-12 in normal limits and at patient's baseline  PSYCH:  insight and judgement normal, memory normal , affect and mood normal      Labs:     Most Recent 3 CBC's:  Recent Labs   Lab Test 07/16/19  1054 07/14/19  0901 07/13/19  0417   WBC 12.8* 12.2* 13.1*   HGB 17.0 17.9* 16.2   MCV 94 96 98    239 290     Most Recent 3 BMP's:  Recent Labs   Lab Test 07/17/19  0625 07/16/19  1528 07/16/19  1054 07/15/19  0718   * 143 145* 147*   POTASSIUM 3.4  --  3.5 3.6   CHLORIDE 108  --  110* 110*   CO2 30  --  30 29   BUN 26  --  30 36*   CR 1.45*  --  1.39* 1.60*   ANIONGAP 7  --  5 8   DARON 8.1*  --  8.3* 8.0*   *  --  143* 95       ASSESSMENT/PLAN:  Anasarca  Ongoing bilateral LE edema, with some previous weeping.  Still with significant edema today, but is improved a bit.    -extra lasix 20 mg daily x 3 days  -daily weights    Acute kidney injury (H)  Last " creat 1.45, will recheck this week    Acute cholecystitis  Sepsis, due to unspecified organism (H)  Stable, without symptoms.  Eating well, feeling well, afebrile, incisions healing well.       Orders written by provider at facility and transcribed by : Whitney Brock CMA  1.  OK for 1 beer per day-family to supply.  2.  Daily weights-notify NP of 3 lbs in 1 day or 5 lbs in 1 week.  3.  Extra Lasix 20 mg po every day x 3 days.  DX:  Edema.  4.  Labs this week to include CBC and CMP      Electronically signed by:  PALOMO Rees CNP             Sincerely,        PALOMO Rees CNP

## 2019-07-25 NOTE — PROGRESS NOTES
"ANTICOAGULATION FOLLOW-UP CLINIC VISIT    Patient Name:  Bradley Liz  Date:  2019  Contact Type:  Telephone/ [facility]    SUBJECTIVE:  Patient Findings     Positives:   Change in health (swollen calves/feet), Change in medications (increased lasix due to edema), Change in diet/appetite (refusing lunch, according to TCU this has been normal for him)    Comments:   Patient was seen by provider today, per notes,   \"1. OK for 1 beer per day-family to supply.  2.  Daily weights-notify NP of 3 lbs in 1 day or 5 lbs in 1 week.  3.  Extra Lasix 20 mg po every day x 3 days.  DX:  Edema.\"        Clinical Outcomes     Comments:   Patient was seen by provider today, per notes,   \"1. OK for 1 beer per day-family to supply.  2.  Daily weights-notify NP of 3 lbs in 1 day or 5 lbs in 1 week.  3.  Extra Lasix 20 mg po every day x 3 days.  DX:  Edema.\"           OBJECTIVE    INR   Date Value Ref Range Status   2019 2.0 (A) 0.8 - 1.1 Final       ASSESSMENT / PLAN  INR assessment THER    Recheck INR In: 4 DAYS    INR Location [facility]CU [facility]     Anticoagulation Summary  As of 2019    INR goal:   2.0-3.0   TTR:   57.7 % (4.2 y)   INR used for dosin.0 (2019)   Warfarin maintenance plan:   5 mg (5 mg x 1) every Sun, Tue, Thu; 2.5 mg (5 mg x 0.5) all other days   Full warfarin instructions:   5 mg every Sun, Tue, Thu; 2.5 mg all other days   Weekly warfarin total:   25 mg   No change documented:   Paulina Montesinos RN   Plan last modified:   Lela Lerner RN (2018)   Next INR check:   2019   Priority:   INR   Target end date:   Indefinite    Indications    Completed stroke (H) [I63.9]  Long term current use of anticoagulant therapy [Z79.01]             Anticoagulation Episode Summary     INR check location:       Preferred lab:       Send INR reminders to:   [place] POOL    Comments:   * 19- [facility]  Leave message on MOBILE only. Takes warfarin in the AM. Leaves for [place] " at the end of Dec (will need snowbird - Dr. Mustafa has to sign paperwork!) [phone] cell [address] [facility] [address]      Anticoagulation Care Providers     Provider Role Specialty Phone number    Alex Mustafa MD [facility] [phone]            See the Encounter Report to view Anticoagulation Flowsheet and Dosing Calendar (Go to Encounters tab in chart review, and find the Anticoagulation Therapy Visit)        Paulina Montesinos RN [facility]

## 2019-07-25 NOTE — PROGRESS NOTES
[facility]  [facility] Medical Record Number:  [id number]  Place of Service where encounter took place:  [facility] ([facility] [[id number]]  Chief Complaint   Patient presents with     Nursing Home Acute       HPI:    Bradley Liz  is a 78 year old (1940), who is being seen today for an episodic care visit.  HPI information obtained from: facility chart records, facility staff, patient report and [facility] chart review. Today's concern is:     Anasarca  Acute kidney injury (H)  Acute cholecystitis  Sepsis, due to unspecified organism (H)   Bradley reports no new concerns, would like to have a beer now & then.  Nursing reports ongoing LE edema, not improved much.       Past Medical and Surgical History reviewed in Epic today.    MEDICATIONS:  Current Outpatient Medications   Medication Sig Dispense Refill     acetaminophen (TYLENOL) 325 MG tablet Take 2 tablets (650 mg) by mouth every 4 hours as needed for mild pain       albuterol (PROVENTIL) (2.5 MG/3ML) 0.083% neb solution Take 2.5 mg by nebulization every 4 hours as needed for shortness of breath / dyspnea or wheezing       ASPIRIN NOT PRESCRIBED, INTENTIONAL, by Other route continuous prn.  0     bisacodyl (DULCOLAX) 10 MG suppository Place 10 mg rectally daily as needed for constipation       furosemide (LASIX) 20 MG tablet Take 1 tablet (20 mg) by mouth daily 90 tablet 3     magnesium hydroxide (MILK OF MAGNESIA) 400 MG/5ML suspension Take 30 mLs by mouth daily as needed for constipation or heartburn       metoprolol succinate (TOPROL-XL) 200 MG 24 hr tablet Take 1 tablet (200 mg) by mouth daily 90 tablet 3     order for DME Please draw INR as ordered and as needed. Call results to [facility] at (p) [phone] or fax them to (f) [phone] 3 Month 3     rosuvastatin (CRESTOR) 5 MG tablet Take 1 tablet (5 mg) by mouth daily 90 tablet 3     warfarin (COUMADIN) 1 MG tablet Take by mouth See Admin  "Instructions Take as directed by the Anticoagulation Clinic based on INR result - re-establishing maintenance dose while at TCU       REVIEW OF SYSTEMS:  4 point ROS including Respiratory, CV, GI and , other than that noted in the HPI,  is negative    Objective:  /86   Pulse 99   Temp 97.5  F (36.4  C)   Resp 18   Ht 1.753 m (5' 9\")   Wt 89.4 kg (197 lb)   SpO2 95%   BMI 29.09 kg/m    Exam:  GENERAL APPEARANCE:  Alert, in no distress  ENT:  Mouth and posterior oropharynx normal, moist mucous membranes, hearing acuity hard of hearing   EYES:  EOM, conjunctivae, lids, pupils and irises normal  RESP:  respiratory effort normal, no respiratory distress, Lung sounds CTA  CV:  Auscultation of heart done , rate and rhythm reg, no murmur, no rub or gallop, 3+ edema from mid-calf down  M/S:   Gait and station abnormal-using rolling walker for ambulation, Digits and nails within normal limits   SKIN:  No weeping today of bilateral LE  NEURO: Cranial nerves 2-12 in normal limits and at patient's baseline  PSYCH:  insight and judgement normal, memory normal , affect and mood normal      Labs:     Most Recent 3 CBC's:  Recent Labs   Lab Test 07/16/19  1054 07/14/19  0901 07/13/19  0417   WBC 12.8* 12.2* 13.1*   HGB 17.0 17.9* 16.2   MCV 94 96 98    239 290     Most Recent 3 BMP's:  Recent Labs   Lab Test 07/17/19  0625 07/16/19  1528 07/16/19  1054 07/15/19  0718   * 143 145* 147*   POTASSIUM 3.4  --  3.5 3.6   CHLORIDE 108  --  110* 110*   CO2 30  --  30 29   BUN 26  --  30 36*   CR 1.45*  --  1.39* 1.60*   ANIONGAP 7  --  5 8   DARON 8.1*  --  8.3* 8.0*   *  --  143* 95       ASSESSMENT/PLAN:  Anasarca  Ongoing bilateral LE edema, with some previous weeping.  Still with significant edema today, but is improved a bit.    -extra lasix 20 mg daily x 3 days  -daily weights    Acute kidney injury (H)  Last creat 1.45, will recheck this week    Acute cholecystitis  Sepsis, due to unspecified organism " (H)  Stable, without symptoms.  Eating well, feeling well, afebrile, incisions healing well.       Orders written by provider at facility and transcribed by : Whitney Brock CMA  1.  OK for 1 beer per day-family to supply.  2.  Daily weights-notify NP of 3 lbs in 1 day or 5 lbs in 1 week.  3.  Extra Lasix 20 mg po every day x 3 days.  DX:  Edema.  4.  Labs this week to include CBC and CMP      Electronically signed by:  PALOMO Rees CNP

## 2019-07-26 ENCOUNTER — HOSPITAL LABORATORY (OUTPATIENT)
Facility: OTHER | Age: 79
End: 2019-07-26

## 2019-07-26 LAB
ALBUMIN SERPL-MCNC: 2.5 G/DL (ref 3.4–5)
ALP SERPL-CCNC: 99 U/L (ref 40–150)
ALT SERPL W P-5'-P-CCNC: 32 U/L (ref 0–70)
ANION GAP SERPL CALCULATED.3IONS-SCNC: 5 MMOL/L (ref 3–14)
AST SERPL W P-5'-P-CCNC: 29 U/L (ref 0–45)
BILIRUB SERPL-MCNC: 1.1 MG/DL (ref 0.2–1.3)
BUN SERPL-MCNC: 15 MG/DL (ref 7–30)
CALCIUM SERPL-MCNC: 8.1 MG/DL (ref 8.5–10.1)
CHLORIDE SERPL-SCNC: 102 MMOL/L (ref 94–109)
CO2 SERPL-SCNC: 37 MMOL/L (ref 20–32)
CREAT SERPL-MCNC: 1.25 MG/DL (ref 0.66–1.25)
ERYTHROCYTE [DISTWIDTH] IN BLOOD BY AUTOMATED COUNT: 14.4 % (ref 10–15)
GFR SERPL CREATININE-BSD FRML MDRD: 54 ML/MIN/{1.73_M2}
GLUCOSE SERPL-MCNC: 69 MG/DL (ref 70–99)
HCT VFR BLD AUTO: 49.3 % (ref 40–53)
HGB BLD-MCNC: 15.1 G/DL (ref 13.3–17.7)
MCH RBC QN AUTO: 29.8 PG (ref 26.5–33)
MCHC RBC AUTO-ENTMCNC: 30.6 G/DL (ref 31.5–36.5)
MCV RBC AUTO: 97 FL (ref 78–100)
PLATELET # BLD AUTO: 347 10E9/L (ref 150–450)
POTASSIUM SERPL-SCNC: 3.3 MMOL/L (ref 3.4–5.3)
PROT SERPL-MCNC: 5.3 G/DL (ref 6.8–8.8)
RBC # BLD AUTO: 5.06 10E12/L (ref 4.4–5.9)
SODIUM SERPL-SCNC: 144 MMOL/L (ref 133–144)
WBC # BLD AUTO: 9.4 10E9/L (ref 4–11)

## 2019-07-29 ENCOUNTER — NURSING HOME VISIT (OUTPATIENT)
Dept: GERIATRICS | Facility: CLINIC | Age: 79
End: 2019-07-29
Payer: COMMERCIAL

## 2019-07-29 ENCOUNTER — ANTICOAGULATION THERAPY VISIT (OUTPATIENT)
Dept: ANTICOAGULATION | Facility: CLINIC | Age: 79
End: 2019-07-29

## 2019-07-29 VITALS
HEIGHT: 69 IN | WEIGHT: 182 LBS | HEART RATE: 92 BPM | OXYGEN SATURATION: 96 % | RESPIRATION RATE: 18 BRPM | DIASTOLIC BLOOD PRESSURE: 73 MMHG | BODY MASS INDEX: 26.96 KG/M2 | SYSTOLIC BLOOD PRESSURE: 114 MMHG | TEMPERATURE: 97.8 F

## 2019-07-29 DIAGNOSIS — R60.1 ANASARCA: ICD-10-CM

## 2019-07-29 DIAGNOSIS — L03.115 CELLULITIS OF RIGHT LOWER EXTREMITY: ICD-10-CM

## 2019-07-29 DIAGNOSIS — Z79.01 LONG TERM CURRENT USE OF ANTICOAGULANT THERAPY: ICD-10-CM

## 2019-07-29 DIAGNOSIS — I63.9 COMPLETED STROKE (H): ICD-10-CM

## 2019-07-29 DIAGNOSIS — I50.23 ACUTE ON CHRONIC SYSTOLIC CONGESTIVE HEART FAILURE (H): ICD-10-CM

## 2019-07-29 DIAGNOSIS — I48.91 ATRIAL FIBRILLATION, UNSPECIFIED TYPE (H): ICD-10-CM

## 2019-07-29 DIAGNOSIS — Z90.49 S/P LAPAROSCOPIC CHOLECYSTECTOMY: ICD-10-CM

## 2019-07-29 DIAGNOSIS — N17.9 ACUTE KIDNEY INJURY (H): ICD-10-CM

## 2019-07-29 DIAGNOSIS — K81.0 ACUTE CHOLECYSTITIS: ICD-10-CM

## 2019-07-29 DIAGNOSIS — A41.9 SEPSIS, DUE TO UNSPECIFIED ORGANISM: Primary | ICD-10-CM

## 2019-07-29 LAB — INR PPP: 1.5 (ref 0.8–1.1)

## 2019-07-29 PROCEDURE — 99309 SBSQ NF CARE MODERATE MDM 30: CPT | Performed by: NURSE PRACTITIONER

## 2019-07-29 PROCEDURE — 99207 ZZC NO CHARGE NURSE ONLY: CPT

## 2019-07-29 RX ORDER — POTASSIUM CHLORIDE 1.5 G/1.58G
40 POWDER, FOR SOLUTION ORAL 2 TIMES DAILY
COMMUNITY
End: 2019-10-01

## 2019-07-29 RX ORDER — CEPHALEXIN 500 MG/1
500 CAPSULE ORAL EVERY 8 HOURS
COMMUNITY
Start: 2019-07-29 | End: 2019-08-15

## 2019-07-29 RX ORDER — FUROSEMIDE 40 MG
40 TABLET ORAL DAILY
COMMUNITY
End: 2019-10-01

## 2019-07-29 ASSESSMENT — MIFFLIN-ST. JEOR: SCORE: 1535.93

## 2019-07-29 NOTE — PROGRESS NOTES
ANTICOAGULATION FOLLOW-UP CLINIC VISIT    Patient Name:  Bradley Liz  Date:  2019  Contact Type:  Telephone/ [facility]    SUBJECTIVE:  Patient Findings     Positives:   Change in medications ( -- increase in lasix dose due to bilateral lower extremity edema)    Comments:   Patient had 20mg in the previous 7 days, will increase dose to 25mg by the next INR check on Wednesday -- which is his prior maintenance dose.     Overall patient is doing well according to TCU nurse.        Clinical Outcomes     Comments:   Patient had 20mg in the previous 7 days, will increase dose to 25mg by the next INR check on Wednesday -- which is his prior maintenance dose.     Overall patient is doing well according to TCU nurse.           OBJECTIVE    INR   Date Value Ref Range Status   2019 1.5 (A) 0.8 - 1.1 Final       ASSESSMENT / PLAN  No question data found.  Anticoagulation Summary  As of 2019    INR goal:   2.0-3.0   TTR:   57.6 % (4.2 y)   INR used for dosin.5! (2019)   Warfarin maintenance plan:   5 mg (5 mg x 1) every Sun, Tue, Thu; 2.5 mg (5 mg x 0.5) all other days   Full warfarin instructions:   : 5 mg; : 2.5 mg; Otherwise 5 mg every Sun, Tue, Thu; 2.5 mg all other days   Weekly warfarin total:   25 mg   Plan last modified:   Lela Lerner RN (2018)   Next INR check:   2019   Priority:   INR   Target end date:   Indefinite    Indications    Completed stroke (H) [I63.9]  Long term current use of anticoagulant therapy [Z79.01]             Anticoagulation Episode Summary     INR check location:       Preferred lab:       Send INR reminders to:   [place] PHONE [facility] POOL    Comments:   * 19- [facility]  Leave message on MOBILE only. Takes warfarin in the AM. Leaves for [place] at the end of Dec (will need snowbird - Dr. Mustafa has to sign paperwork!) [phone] cell [address] [place] [address]      Anticoagulation Care Providers      Provider Role Specialty Phone number    Alex Mustafa MD [facility] [phone]            See the Encounter Report to view Anticoagulation Flowsheet and Dosing Calendar (Go to Encounters tab in chart review, and find the Anticoagulation Therapy Visit)        Paulina Montesinos RN [facility]

## 2019-07-29 NOTE — LETTER
7/29/2019        RE: Bradley Liz  [address] [place] [address]        [place] [facility]  [place] Medical Record Number:  [id number]  Place of Service where encounter took place:  [facility] ([facility] [[id number]]  Chief Complaint   Patient presents with     Nursing Home Acute       HPI:    Bradley Liz  is a 78 year old (1940), who is being seen today for an episodic care visit.  HPI information obtained from: facility chart records, facility staff and patient report. Today's concern is:     Sepsis, due to unspecified organism (H)  Acute cholecystitis  S/P laparoscopic cholecystectomy  Acute on chronic systolic congestive heart failure (H)  Anasarca  Acute kidney injury (H)  Atrial fibrillation, unspecified type (H)  Long term current use of anticoagulant therapy  Cellulitis of right lower extremity   Bradley reports no new concerns.  Nursing reports reddened area on R top of foot needs evaluation today.       Past Medical and Surgical History reviewed in Epic today.    MEDICATIONS:  Current Outpatient Medications   Medication Sig Dispense Refill     acetaminophen (TYLENOL) 325 MG tablet Take 2 tablets (650 mg) by mouth every 4 hours as needed for mild pain       albuterol (PROVENTIL) (2.5 MG/3ML) 0.083% neb solution Take 2.5 mg by nebulization every 4 hours as needed for shortness of breath / dyspnea or wheezing       ASPIRIN NOT PRESCRIBED, INTENTIONAL, by Other route continuous prn.  0     bisacodyl (DULCOLAX) 10 MG suppository Place 10 mg rectally daily as needed for constipation       cephALEXin (KEFLEX) 500 MG capsule Take 500 mg by mouth every 8 hours       furosemide (LASIX) 40 MG tablet Take 40 mg by mouth daily       magnesium hydroxide (MILK OF MAGNESIA) 400 MG/5ML suspension Take 30 mLs by mouth daily as needed for constipation or heartburn       metoprolol succinate (TOPROL-XL) 200 MG 24 hr tablet Take 1 tablet (200 mg) by mouth daily 90 tablet  "3     order for DME Please draw INR as ordered and as needed. Call results to [facility] at p) [phone] or fax them to (f) [phone] 3 Month 3     potassium chloride (KLOR-CON) 20 MEQ packet Take 40 mEq by mouth daily       rosuvastatin (CRESTOR) 5 MG tablet Take 1 tablet (5 mg) by mouth daily 90 tablet 3     warfarin (COUMADIN) 1 MG tablet Take by mouth See Admin Instructions Take as directed by the Anticoagulation Clinic based on INR result - re-establishing maintenance dose while at TCU       REVIEW OF SYSTEMS:  4 point ROS including Respiratory, CV, GI and , other than that noted in the HPI,  is negative    Objective:  /73   Pulse 92   Temp 97.8  F (36.6  C)   Resp 18   Ht 1.753 m (5' 9\")   Wt 82.6 kg (182 lb)   SpO2 96%   BMI 26.88 kg/m     Exam:  GENERAL APPEARANCE:  Alert, in no distress   HEAD:  Normal, normocephalic, atraumatic  EYE EXAM: normal external eye, conjunctiva, lids, KAROL  CHEST/RESP:  respiratory effort normal, lung sounds CTA , no respiratory distress  CV:  Rate reg, rhythm reg, no murmur, 2+ peripheral edema  M/S:   extremities normal, gait normal-walking with rolling walker short distances   SKIN:  Top of R foot is swollen, reddened but not warm, scab on the skin near the reddened area.  Posterior distal R calf with 1 cm superficial open area, likely popped blister without surrounding redness.   NEUROLOGIC EXAM: Normal gross motor movement, tone and coordination. No tremor. Cranial nerves 2-12 are normal tested and grossly at patient's baseline  PSYCH:  Alert and oriented to person-place-time with mild forgetfulness, affect pleasant      Labs:     Most Recent 3 CBC's:  Recent Labs   Lab Test 07/26/19  1159 07/16/19  1054 07/14/19  0901   WBC 9.4 12.8* 12.2*   HGB 15.1 17.0 17.9*   MCV 97 94 96    267 239     Most Recent 3 BMP's:  Recent Labs   Lab Test 07/26/19  1159 07/17/19  0625 07/16/19  1528 07/16/19  1054    145* 143 145* "   POTASSIUM 3.3* 3.4  --  3.5   CHLORIDE 102 108  --  110*   CO2 37* 30  --  30   BUN 15 26  --  30   CR 1.25 1.45*  --  1.39*   ANIONGAP 5 7  --  5   DARON 8.1* 8.1*  --  8.3*   GLC 69* 120*  --  143*       ASSESSMENT/PLAN:  Sepsis, due to unspecified organism (H)  Acute cholecystitis  S/P laparoscopic cholecystectomy  Eating well, feeling well.  Stable.     Acute on chronic systolic congestive heart failure (H)  Anasarca  Patient with known CHF, LE edema which is improving dramatically.  Weight down 15 pounds with much less LE edema.  Will continue on lasix 40 mg daily, last K+ low so will add K+ supplement.   -recheck BMP this week     Acute kidney injury (H)  Baseline creat wnl.  Last creat 1.25 with eGFR 54  -Avoid nephrotoxic medications  -Renal dosing of medications  -monitor kidney function 7/31/19      Atrial fibrillation, unspecified type (H)  Long term current use of anticoagulant therapy  On warfarin and INR checked As directed by Anticoagulation Clinic.     Cellulitis of right lower extremity  Reddened raised area of edema on top of R foot consistent with cellulitis.  -cephalexin started      Orders written by provider at facility and transcribed by : Whitney Brock CMA  1.  Daily weights. Dx:  LE edema.  2.  Keflex (cephalexin) 500 mg po q 8 hours x 5 days (five days) Dx:  Cellulitis RLE.  3.  Discontinue Lasix 20 mg every day.  4.  Lasix 40 mg po every day.  Dx:  LE edema.  5.  Potassium 40 meq po every day. Dx:  Hypokalemia.  6.  Lab draw 7/31/19:  BMP.  DX:  CHF.    Electronically signed by:  PALOMO Rees CNP             Sincerely,        PALOMO Rees CNP

## 2019-07-29 NOTE — PROGRESS NOTES
[place] [facility]  [place] Medical Record Number:  [id number]  Place of Service where encounter took place:  [facility] [place] ([facility] [[id number]]  Chief Complaint   Patient presents with     Nursing Home Acute       HPI:    Bradley Liz  is a 78 year old (1940), who is being seen today for an episodic care visit.  HPI information obtained from: facility chart records, facility staff and patient report. Today's concern is:     Sepsis, due to unspecified organism (H)  Acute cholecystitis  S/P laparoscopic cholecystectomy  Acute on chronic systolic congestive heart failure (H)  Anasarca  Acute kidney injury (H)  Atrial fibrillation, unspecified type (H)  Long term current use of anticoagulant therapy  Cellulitis of right lower extremity   Bradley reports no new concerns.  Nursing reports reddened area on R top of foot needs evaluation today.       Past Medical and Surgical History reviewed in Epic today.    MEDICATIONS:  Current Outpatient Medications   Medication Sig Dispense Refill     acetaminophen (TYLENOL) 325 MG tablet Take 2 tablets (650 mg) by mouth every 4 hours as needed for mild pain       albuterol (PROVENTIL) (2.5 MG/3ML) 0.083% neb solution Take 2.5 mg by nebulization every 4 hours as needed for shortness of breath / dyspnea or wheezing       ASPIRIN NOT PRESCRIBED, INTENTIONAL, by Other route continuous prn.  0     bisacodyl (DULCOLAX) 10 MG suppository Place 10 mg rectally daily as needed for constipation       cephALEXin (KEFLEX) 500 MG capsule Take 500 mg by mouth every 8 hours       furosemide (LASIX) 40 MG tablet Take 40 mg by mouth daily       magnesium hydroxide (MILK OF MAGNESIA) 400 MG/5ML suspension Take 30 mLs by mouth daily as needed for constipation or heartburn       metoprolol succinate (TOPROL-XL) 200 MG 24 hr tablet Take 1 tablet (200 mg) by mouth daily 90 tablet 3     order for DME Please draw INR as ordered and as needed. Call results to [facility]  [facility] at (p) [phone] or fax them to (f) [phone] 3 Month 3     potassium chloride (KLOR-CON) 20 MEQ packet Take 40 mEq by mouth daily       rosuvastatin (CRESTOR) 5 MG tablet Take 1 tablet (5 mg) by mouth daily 90 tablet 3     warfarin (COUMADIN) 1 MG tablet Take by mouth See Admin Instructions Take as directed by the Anticoagulation Clinic based on INR result - re-establishing maintenance dose while at TCU       REVIEW OF SYSTEMS:  4 point ROS including Respiratory, CV, GI and , other than that noted in the HPI,  is negative    Objective:  /73   Pulse 92   Temp 97.8  F (36.6  C)   Resp 18   Ht 1.753 m (5' 9\")   Wt 82.6 kg (182 lb)   SpO2 96%   BMI 26.88 kg/m    Exam:  GENERAL APPEARANCE:  Alert, in no distress   HEAD:  Normal, normocephalic, atraumatic  EYE EXAM: normal external eye, conjunctiva, lids, KAROL  CHEST/RESP:  respiratory effort normal, lung sounds CTA , no respiratory distress  CV:  Rate reg, rhythm reg, no murmur, 2+ peripheral edema  M/S:   extremities normal, gait normal-walking with rolling walker short distances   SKIN:  Top of R foot is swollen, reddened but not warm, scab on the skin near the reddened area.  Posterior distal R calf with 1 cm superficial open area, likely popped blister without surrounding redness.   NEUROLOGIC EXAM: Normal gross motor movement, tone and coordination. No tremor. Cranial nerves 2-12 are normal tested and grossly at patient's baseline  PSYCH:  Alert and oriented to person-place-time with mild forgetfulness, affect pleasant      Labs:     Most Recent 3 CBC's:  Recent Labs   Lab Test 07/26/19  1159 07/16/19  1054 07/14/19  0901   WBC 9.4 12.8* 12.2*   HGB 15.1 17.0 17.9*   MCV 97 94 96    267 239     Most Recent 3 BMP's:  Recent Labs   Lab Test 07/26/19  1159 07/17/19  0625 07/16/19  1528 07/16/19  1054    145* 143 145*   POTASSIUM 3.3* 3.4  --  3.5   CHLORIDE 102 108  --  110*   CO2 37* 30  --  30   BUN 15 26  --  " 30   CR 1.25 1.45*  --  1.39*   ANIONGAP 5 7  --  5   DARON 8.1* 8.1*  --  8.3*   GLC 69* 120*  --  143*       ASSESSMENT/PLAN:  Sepsis, due to unspecified organism (H)  Acute cholecystitis  S/P laparoscopic cholecystectomy  Eating well, feeling well.  Stable.     Acute on chronic systolic congestive heart failure (H)  Anasarca  Patient with known CHF, LE edema which is improving dramatically.  Weight down 15 pounds with much less LE edema.  Will continue on lasix 40 mg daily, last K+ low so will add K+ supplement.   -recheck BMP this week     Acute kidney injury (H)  Baseline creat wnl.  Last creat 1.25 with eGFR 54  -Avoid nephrotoxic medications  -Renal dosing of medications  -monitor kidney function 7/31/19      Atrial fibrillation, unspecified type (H)  Long term current use of anticoagulant therapy  On warfarin and INR checked As directed by Anticoagulation Clinic.     Cellulitis of right lower extremity  Reddened raised area of edema on top of R foot consistent with cellulitis.  -cephalexin started      Orders written by provider at facility and transcribed by : Whitney Brock CMA  1.  Daily weights. Dx:  LE edema.  2.  Keflex (cephalexin) 500 mg po q 8 hours x 5 days (five days) Dx:  Cellulitis RLE.  3.  Discontinue Lasix 20 mg every day.  4.  Lasix 40 mg po every day.  Dx:  LE edema.  5.  Potassium 40 meq po every day. Dx:  Hypokalemia.  6.  Lab draw 7/31/19:  BMP.  DX:  CHF.    Electronically signed by:  PALOMO Rees CNP

## 2019-07-30 VITALS
DIASTOLIC BLOOD PRESSURE: 85 MMHG | OXYGEN SATURATION: 97 % | RESPIRATION RATE: 18 BRPM | WEIGHT: 177 LBS | SYSTOLIC BLOOD PRESSURE: 128 MMHG | HEIGHT: 69 IN | BODY MASS INDEX: 26.22 KG/M2 | TEMPERATURE: 98.6 F | HEART RATE: 94 BPM

## 2019-07-30 ASSESSMENT — MIFFLIN-ST. JEOR: SCORE: 1513.25

## 2019-07-31 ENCOUNTER — HOSPITAL LABORATORY (OUTPATIENT)
Facility: OTHER | Age: 79
End: 2019-07-31

## 2019-07-31 ENCOUNTER — ANTICOAGULATION THERAPY VISIT (OUTPATIENT)
Dept: ANTICOAGULATION | Facility: CLINIC | Age: 79
End: 2019-07-31

## 2019-07-31 ENCOUNTER — NURSING HOME VISIT (OUTPATIENT)
Dept: GERIATRICS | Facility: CLINIC | Age: 79
End: 2019-07-31
Payer: COMMERCIAL

## 2019-07-31 DIAGNOSIS — I63.9 COMPLETED STROKE (H): ICD-10-CM

## 2019-07-31 DIAGNOSIS — I50.23 ACUTE ON CHRONIC SYSTOLIC CONGESTIVE HEART FAILURE (H): Primary | ICD-10-CM

## 2019-07-31 DIAGNOSIS — L03.115 CELLULITIS OF RIGHT LOWER EXTREMITY: ICD-10-CM

## 2019-07-31 DIAGNOSIS — N17.9 ACUTE KIDNEY INJURY (H): ICD-10-CM

## 2019-07-31 DIAGNOSIS — Z79.01 LONG TERM CURRENT USE OF ANTICOAGULANT THERAPY: ICD-10-CM

## 2019-07-31 DIAGNOSIS — R60.1 ANASARCA: ICD-10-CM

## 2019-07-31 LAB
ANION GAP SERPL CALCULATED.3IONS-SCNC: 7 MMOL/L (ref 3–14)
BUN SERPL-MCNC: 20 MG/DL (ref 7–30)
CALCIUM SERPL-MCNC: 8.6 MG/DL (ref 8.5–10.1)
CHLORIDE SERPL-SCNC: 100 MMOL/L (ref 94–109)
CO2 SERPL-SCNC: 33 MMOL/L (ref 20–32)
CREAT SERPL-MCNC: 1.27 MG/DL (ref 0.66–1.25)
GFR SERPL CREATININE-BSD FRML MDRD: 53 ML/MIN/{1.73_M2}
GLUCOSE SERPL-MCNC: 63 MG/DL (ref 70–99)
INR PPP: 2 (ref 0.86–1.14)
POTASSIUM SERPL-SCNC: 3.4 MMOL/L (ref 3.4–5.3)
SODIUM SERPL-SCNC: 140 MMOL/L (ref 133–144)

## 2019-07-31 PROCEDURE — 99309 SBSQ NF CARE MODERATE MDM 30: CPT | Performed by: NURSE PRACTITIONER

## 2019-07-31 PROCEDURE — 99207 ZZC NO CHARGE NURSE ONLY: CPT

## 2019-07-31 NOTE — PROGRESS NOTES
[place] [facility]  [place] [facility] Record Number:  [id number]  Place of Service where encounter took place:  [facility] ([facility]) [[id number]]  Chief Complaint   Patient presents with     Nursing Home Acute       HPI:    Bradley Liz  is a 78 year old (1940), who is being seen today for an episodic care visit.  HPI information obtained from: facility chart records, facility staff and patient report. Today's concern is:     Acute on chronic systolic congestive heart failure (H)  Anasarca  Cellulitis of right lower extremity  Acute kidney injury (H)   Bradley reports no new concerns .  Nursing reports improved LE edema.  GABI reports planning for assisted living facility placement as spouse is not sure she can continue to provide appropriate care for him at home.       Past Medical and Surgical History reviewed in Epic today.    MEDICATIONS:  Current Outpatient Medications   Medication Sig Dispense Refill     acetaminophen (TYLENOL) 325 MG tablet Take 2 tablets (650 mg) by mouth every 4 hours as needed for mild pain       albuterol (PROVENTIL) (2.5 MG/3ML) 0.083% neb solution Take 2.5 mg by nebulization every 4 hours as needed for shortness of breath / dyspnea or wheezing       ASPIRIN NOT PRESCRIBED, INTENTIONAL, by Other route continuous prn.  0     bisacodyl (DULCOLAX) 10 MG suppository Place 10 mg rectally daily as needed for constipation       cephALEXin (KEFLEX) 500 MG capsule Take 500 mg by mouth every 8 hours       furosemide (LASIX) 40 MG tablet Take 40 mg by mouth daily       magnesium hydroxide (MILK OF MAGNESIA) 400 MG/5ML suspension Take 30 mLs by mouth daily as needed for constipation or heartburn       metoprolol succinate (TOPROL-XL) 200 MG 24 hr tablet Take 1 tablet (200 mg) by mouth daily 90 tablet 3     order for DME Please draw INR as ordered and as needed. Call results to [facility] at p) [phone] or fax them to (f) [phone] 3 Month 3      "potassium chloride (KLOR-CON) 20 MEQ packet Take 40 mEq by mouth daily       rosuvastatin (CRESTOR) 5 MG tablet Take 1 tablet (5 mg) by mouth daily 90 tablet 3     warfarin (COUMADIN) 1 MG tablet Take by mouth See Admin Instructions Take as directed by the Anticoagulation Clinic based on INR result - re-establishing maintenance dose while at TCU         REVIEW OF SYSTEMS:  4 point ROS including Respiratory, CV, GI and , other than that noted in the HPI,  is negative    Objective:  /85   Pulse 94   Temp 98.6  F (37  C)   Resp 18   Ht 1.753 m (5' 9\")   Wt 80.3 kg (177 lb)   SpO2 97%   BMI 26.14 kg/m    Exam:  GENERAL APPEARANCE:  Alert, in no distress  ENT:  Mouth and posterior oropharynx normal, moist mucous membranes, hearing acuity hard of hearing   EYES:  EOM, conjunctivae, lids, pupils and irises normal  RESP:  respiratory effort normal, no respiratory distress, Lung sounds inspiratory wheeze L lobe  CV:  Auscultation of heart done , rate and rhythm reg, no murmur, no rub or gallop, 1+ edema   M/S:   Gait and station within normal limits with rolling walker , Digits and nails within normal limits   SKIN:  R LE with significant decrease in edema, decreased redness of top of L foot, no warmth, no open areas  NEURO: Cranial nerves 2-12 in normal limits and at patient's baseline  PSYCH:  insight and judgement impaired , memory within normal limits  , affect and mood normal      Labs:     Most Recent 3 CBC's:  Recent Labs   Lab Test 07/26/19  1159 07/16/19  1054 07/14/19  0901   WBC 9.4 12.8* 12.2*   HGB 15.1 17.0 17.9*   MCV 97 94 96    267 239     Most Recent 3 BMP's:  Recent Labs   Lab Test 07/31/19  1109 07/26/19  1159 07/17/19  0625    144 145*   POTASSIUM 3.4 3.3* 3.4   CHLORIDE 100 102 108   CO2 33* 37* 30   BUN 20 15 26   CR 1.27* 1.25 1.45*   ANIONGAP 7 5 7   DARON 8.6 8.1* 8.1*   GLC 63* 69* 120*       ASSESSMENT/PLAN:  Acute on chronic systolic congestive heart failure " (H)  Anasarca  LE edema improving, weight down from high of 197 to 177.  Almost no LE edema except on top of R foot.  No dyspnea, but inspiratory wheeze noted to L upper lobe.    -The current medical regimen is effective;  continue present plan and medications.      Cellulitis of right lower extremity  Improving with cephalexin.  The current medical regimen is effective;  continue present plan and medications.     Acute kidney injury (H)  Creat normalizing, stable.  The current medical regimen is effective;  continue present plan and medications.       Orders written by provider at facility and transcribed by : Whitney Brock CMA  1.  Hypokalemia noted, increase K+ to 40 meq BID      Electronically signed by:  PALOMO Rees CNP

## 2019-07-31 NOTE — LETTER
7/31/2019        RE: Bradley Liz  [address] [place] [address]        [place] [facility]  [place] Medical Record Number:  [id number]  Place of Service where encounter took place:  [facility] ([facility] [[id number]]  Chief Complaint   Patient presents with     Nursing Home Acute       HPI:    Bradley Liz  is a 78 year old (1940), who is being seen today for an episodic care visit.  HPI information obtained from: facility chart records, facility staff and patient report. Today's concern is:     Acute on chronic systolic congestive heart failure (H)  Anasarca  Cellulitis of right lower extremity  Acute kidney injury (H)   Bradley reports no new concerns .  Nursing reports improved LE edema.  GABI reports planning for assisted living facility placement as spouse is not sure she can continue to provide appropriate care for him at home.       Past Medical and Surgical History reviewed in Epic today.    MEDICATIONS:  Current Outpatient Medications   Medication Sig Dispense Refill     acetaminophen (TYLENOL) 325 MG tablet Take 2 tablets (650 mg) by mouth every 4 hours as needed for mild pain       albuterol (PROVENTIL) (2.5 MG/3ML) 0.083% neb solution Take 2.5 mg by nebulization every 4 hours as needed for shortness of breath / dyspnea or wheezing       ASPIRIN NOT PRESCRIBED, INTENTIONAL, by Other route continuous prn.  0     bisacodyl (DULCOLAX) 10 MG suppository Place 10 mg rectally daily as needed for constipation       cephALEXin (KEFLEX) 500 MG capsule Take 500 mg by mouth every 8 hours       furosemide (LASIX) 40 MG tablet Take 40 mg by mouth daily       magnesium hydroxide (MILK OF MAGNESIA) 400 MG/5ML suspension Take 30 mLs by mouth daily as needed for constipation or heartburn       metoprolol succinate (TOPROL-XL) 200 MG 24 hr tablet Take 1 tablet (200 mg) by mouth daily 90 tablet 3     order for DME Please draw INR as ordered and as needed. Call results to  "[facility] at (p) [phone] or fax them to (f) [phone] 3 Month 3     potassium chloride (KLOR-CON) 20 MEQ packet Take 40 mEq by mouth daily       rosuvastatin (CRESTOR) 5 MG tablet Take 1 tablet (5 mg) by mouth daily 90 tablet 3     warfarin (COUMADIN) 1 MG tablet Take by mouth See Admin Instructions Take as directed by the Anticoagulation Clinic based on INR result - re-establishing maintenance dose while at TCU         REVIEW OF SYSTEMS:  4 point ROS including Respiratory, CV, GI and , other than that noted in the HPI,  is negative    Objective:  /85   Pulse 94   Temp 98.6  F (37  C)   Resp 18   Ht 1.753 m (5' 9\")   Wt 80.3 kg (177 lb)   SpO2 97%   BMI 26.14 kg/m     Exam:  GENERAL APPEARANCE:  Alert, in no distress  ENT:  Mouth and posterior oropharynx normal, moist mucous membranes, hearing acuity hard of hearing   EYES:  EOM, conjunctivae, lids, pupils and irises normal  RESP:  respiratory effort normal, no respiratory distress, Lung sounds inspiratory wheeze L lobe  CV:  Auscultation of heart done , rate and rhythm reg, no murmur, no rub or gallop, 1+ edema   M/S:   Gait and station within normal limits with rolling walker , Digits and nails within normal limits   SKIN:  R LE with significant decrease in edema, decreased redness of top of L foot, no warmth, no open areas  NEURO: Cranial nerves 2-12 in normal limits and at patient's baseline  PSYCH:  insight and judgement impaired , memory within normal limits  , affect and mood normal      Labs:     Most Recent 3 CBC's:  Recent Labs   Lab Test 07/26/19  1159 07/16/19  1054 07/14/19  0901   WBC 9.4 12.8* 12.2*   HGB 15.1 17.0 17.9*   MCV 97 94 96    267 239     Most Recent 3 BMP's:  Recent Labs   Lab Test 07/31/19  1109 07/26/19  1159 07/17/19  0625    144 145*   POTASSIUM 3.4 3.3* 3.4   CHLORIDE 100 102 108   CO2 33* 37* 30   BUN 20 15 26   CR 1.27* 1.25 1.45*   ANIONGAP 7 5 7   DARON 8.6 8.1* 8.1*   GLC " 63* 69* 120*       ASSESSMENT/PLAN:  Acute on chronic systolic congestive heart failure (H)  Anasarca  LE edema improving, weight down from high of 197 to 177.  Almost no LE edema except on top of R foot.  No dyspnea, but inspiratory wheeze noted to L upper lobe.    -The current medical regimen is effective;  continue present plan and medications.      Cellulitis of right lower extremity  Improving with cephalexin.  The current medical regimen is effective;  continue present plan and medications.     Acute kidney injury (H)  Creat normalizing, stable.  The current medical regimen is effective;  continue present plan and medications.       Orders written by provider at facility and transcribed by : Whitney Brock CMA  1.  Hypokalemia noted, increase K+ to 40 meq BID      Electronically signed by:  PALOMO Rees CNP             Sincerely,        PALOMO Rees CNP

## 2019-08-05 ENCOUNTER — ANTICOAGULATION THERAPY VISIT (OUTPATIENT)
Dept: ANTICOAGULATION | Facility: CLINIC | Age: 79
End: 2019-08-05

## 2019-08-05 ENCOUNTER — NURSING HOME VISIT (OUTPATIENT)
Dept: GERIATRICS | Facility: CLINIC | Age: 79
End: 2019-08-05
Payer: COMMERCIAL

## 2019-08-05 ENCOUNTER — HOSPITAL LABORATORY (OUTPATIENT)
Facility: OTHER | Age: 79
End: 2019-08-05

## 2019-08-05 VITALS
BODY MASS INDEX: 25.18 KG/M2 | HEIGHT: 69 IN | HEART RATE: 91 BPM | WEIGHT: 170 LBS | RESPIRATION RATE: 17 BRPM | TEMPERATURE: 98.1 F | SYSTOLIC BLOOD PRESSURE: 100 MMHG | DIASTOLIC BLOOD PRESSURE: 57 MMHG | OXYGEN SATURATION: 94 %

## 2019-08-05 DIAGNOSIS — K81.0 ACUTE CHOLECYSTITIS: Primary | ICD-10-CM

## 2019-08-05 DIAGNOSIS — I48.91 ATRIAL FIBRILLATION, UNSPECIFIED TYPE (H): ICD-10-CM

## 2019-08-05 DIAGNOSIS — Z79.01 LONG TERM CURRENT USE OF ANTICOAGULANT THERAPY: ICD-10-CM

## 2019-08-05 DIAGNOSIS — I63.9 COMPLETED STROKE (H): ICD-10-CM

## 2019-08-05 DIAGNOSIS — N17.9 ACUTE KIDNEY INJURY (H): ICD-10-CM

## 2019-08-05 DIAGNOSIS — Z86.73 HISTORY OF CVA (CEREBROVASCULAR ACCIDENT): ICD-10-CM

## 2019-08-05 DIAGNOSIS — I50.23 ACUTE ON CHRONIC SYSTOLIC CONGESTIVE HEART FAILURE (H): ICD-10-CM

## 2019-08-05 DIAGNOSIS — R60.1 ANASARCA: ICD-10-CM

## 2019-08-05 DIAGNOSIS — A41.9: ICD-10-CM

## 2019-08-05 LAB
CREAT SERPL-MCNC: 1.38 MG/DL (ref 0.66–1.25)
GFR SERPL CREATININE-BSD FRML MDRD: 48 ML/MIN/{1.73_M2}
INR PPP: 2.2 (ref 0.8–1.1)
POTASSIUM SERPL-SCNC: 4.5 MMOL/L (ref 3.4–5.3)

## 2019-08-05 PROCEDURE — 99207 ZZC NO CHARGE NURSE ONLY: CPT

## 2019-08-05 PROCEDURE — 99316 NF DSCHRG MGMT 30 MIN+: CPT | Performed by: NURSE PRACTITIONER

## 2019-08-05 RX ORDER — WARFARIN SODIUM 5 MG/1
TABLET ORAL
COMMUNITY
Start: 2019-08-05 | End: 2020-07-01

## 2019-08-05 ASSESSMENT — MIFFLIN-ST. JEOR: SCORE: 1481.49

## 2019-08-05 NOTE — PROGRESS NOTES
ANTICOAGULATION FOLLOW-UP CLINIC VISIT    Patient Name:  Bradley Liz  Date:  2019  Contact Type:  Telephone/ [facility]    SUBJECTIVE:  Patient Findings     Comments:   Patient is discharging home from the TCU tomorrow. He will have [facility] No changes noted by TCU nurse.        Clinical Outcomes     Negatives:   Major bleeding event, Thromboembolic event, Anticoagulation-related hospital admission, Anticoagulation-related ED visit, Anticoagulation-related fatality    Comments:   Patient is discharging home from the TCU tomorrow. He will have [facility] No changes noted by TCU nurse.           OBJECTIVE    INR   Date Value Ref Range Status   2019 2.2 (A) 0.8 - 1.1 Final       ASSESSMENT / PLAN  INR assessment THER    Recheck INR In: 1 WEEK    INR Location [facility]     Anticoagulation Summary  As of 2019    INR goal:   2.0-3.0   TTR:   57.6 % (4.2 y)   INR used for dosin.2 (2019)   Warfarin maintenance plan:   5 mg (5 mg x 1) every Sun, Tue, Thu; 2.5 mg (5 mg x 0.5) all other days   Full warfarin instructions:   5 mg every Sun, Tue, Thu; 2.5 mg all other days   Weekly warfarin total:   25 mg   No change documented:   Paulina Montesinos RN   Plan last modified:   Lela Lerner, RN (2018)   Next INR check:   2019   Priority:   INR   Target end date:   Indefinite    Indications    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 Takes warfarin in the AM. Leaves for [place] at the end of Dec (will need snowbird - Dr. Mustafa has to sign paperwork!) [phone] cell [address] [address]      Anticoagulation Care Providers     Provider Role Specialty Phone number    Alex Mustafa  MD GALDINO [facility] [phone]            See the Encounter Report to view Anticoagulation Flowsheet and Dosing Calendar (Go to Encounters tab in chart review, and find the Anticoagulation Therapy Visit)        Paulina Montesinos RN [facility]

## 2019-08-05 NOTE — LETTER
8/5/2019        RE: Bradley Liz  [address] [facility]   [place] [address]        [place] [facility] DISCHARGE SUMMARY  PATIENT'S NAME: Bradley Liz  YOB: 1940  MEDICAL RECORD NUMBER:  [id number]  Place of Service where encounter took place:  [facility] [[id number]]    PRIMARY CARE PROVIDER AND CLINIC RESPONSIBLE AFTER TRANSFER:   Alex Mustafa MD, [address] [facility] [place] [address]      Unknown     Transferring providers: Tam Bello CNP, Sandee Moreno MD  Recent Hospitalization/ED:  [facility] to 7/17/19.  Date of SNF Admission: July / 17 / 2019  Date of SNF (anticipated) Discharge: August / 06 / 2019  Discharged to: with family spouse  Cognitive Scores: NA  Physical Function: Walker with assist  DME: Walker    CODE STATUS/ADVANCE DIRECTIVES DISCUSSION:  Full Code   ALLERGIES: Patient has no known allergies.    DISCHARGE DIAGNOSIS/NURSING FACILITY COURSE:     Brief Summary of Hospital Course: Bradley Liz has a past medical history of CVA in 2008, afib on chronic warfarin, cardiomyopathy.  S/he was admitted to the hospital with acute back pain, abdominal distention, coffee-ground emesis and found to have sepsis due to acute gangrenous cholecystitis.  S/he underwent laparoscopic cholecystectomy . The hospital stay was complicated by acute kidney injury with creat peak at 3.01, anasarca.     Acute cholecystitis  Sepsis, due to unspecified organism (H)  Patient S/P lap valentin due to gangrenous cholecystitis, gradually improving every day.  Eating well, bowels moving well. Gradually feeling better, incisions CDI.  -making progress in therapy, goal to return to independent living  -follow up surgeon  PRN     Acute on chronic systolic congestive heart failure (H)  Anasarca  Patient with chronic known CHF with EF 50-55%.  On lasix at baseline, no K+ supplement  -check labs PRN  -increased K  to 40mEq daily      Acute kidney injury (H)  Baseline creat within normal limits .  Creat elevated to 3.01 and developed anasarca thought due to volume overload, critical illness.   Last creat decreased to 1.45, without known CKD. 3/6/19, creat 0.81.   -Avoid nephrotoxic medications  -Renal dosing of medications  -monitor kidney function  PRN      Atrial fibrillation, unspecified type (H)  Long term current use of anticoagulant therapy  Patient with known history of afib on warfarin As directed by Anticoagulation Clinic. INR today elevated at 4.5 with goal 2-3.  See [facility] notes for dosing.   -dosing via [facility]  -INR on 8/5 was 2.2  -warfarin 5mg Sun/Tues/Thurs, 2.5mg ROW  -recheck INR on Mon 8/12     History of CVA (cerebrovascular accident)  Patient with known history of CVA in 2008, no new symptoms. Stable.       Acute cholecystitis  Septicemia due to undetermined organism (H)  Acute on chronic systolic congestive heart failure (H)  Anasarca  Acute kidney injury (H)  Atrial fibrillation, unspecified type (H)  History of CVA (cerebrovascular accident)        Past Medical History:  has no past medical history on file.    Discharge Medications:  Current Outpatient Medications   Medication Sig Dispense Refill     acetaminophen (TYLENOL) 325 MG tablet Take 2 tablets (650 mg) by mouth every 4 hours as needed for mild pain       albuterol (PROVENTIL) (2.5 MG/3ML) 0.083% neb solution Take 2.5 mg by nebulization every 4 hours as needed for shortness of breath / dyspnea or wheezing       ASPIRIN NOT PRESCRIBED, INTENTIONAL, by Other route continuous prn.  0     bisacodyl (DULCOLAX) 10 MG suppository Place 10 mg rectally daily as needed for constipation       furosemide (LASIX) 40 MG tablet Take 40 mg by mouth daily       magnesium hydroxide (MILK OF MAGNESIA) 400 MG/5ML suspension Take 30 mLs by mouth daily as needed for constipation or heartburn       metoprolol succinate (TOPROL-XL) 200 MG 24 hr tablet Take 1 tablet (200  "mg) by mouth daily 90 tablet 3     order for DME Please draw INR as ordered and as needed. Call results to [facility] at (p) [phone] or fax them to (f) [phone] 3 Month 3     potassium chloride (KLOR-CON) 20 MEQ packet Take 40 mEq by mouth 2 times daily       rosuvastatin (CRESTOR) 5 MG tablet Take 1 tablet (5 mg) by mouth daily 90 tablet 3     warfarin (COUMADIN) 5 MG tablet Take 5 mg (5 mg x 1) every Sun, Tue, Thu; 2.5 mg (5 mg x 0.5) all other days or as directed by the Anticoagulation Clinic. INR recheck by homecare on 8/12/19.         Medication Changes/Rationale:     See notes    Controlled medications sent with patient:   not applicable/none     ROS:   Limited secondary to cognitive impairment but today pt reports feeling fine    Physical Exam:   Vitals: /57   Pulse 91   Temp 98.1  F (36.7  C)   Resp 17   Ht 1.753 m (5' 9\")   Wt 77.1 kg (170 lb)   SpO2 94%   BMI 25.10 kg/m     BMI= Body mass index is 25.1 kg/m .  GENERAL APPEARANCE:  in no distress, appears healthy  ENT:  Mouth and posterior oropharynx normal, moist mucous membranes  RESP:  lungs clear to auscultation   CV:  regular rate and rhythm, no murmur, rub, or gallop, no edema  ABDOMEN:  bowel sounds normal  M/S:   Gait and station abnormal requires assist  SKIN:  Inspection of skin and subcutaneous tissue baseline  NEURO:   Examination of sensation by touch normal  PSYCH:  oriented to self     SNF labs: Labs done in SNF are in [facility]. Please refer to them using EPIC/Care Everywhere.  INR Flow sheet at SNF:    DISCHARGE PLAN:    Follow up labs: INR due on 8/12    Medical Follow Up:      Follow up with primary care provider in 1 weeks    MTM referral needed and placed by this provider: No    Current [facility] scheduled appointments:   See EPIC    Discharge Services: Home Care:  Occupational Therapy, Physical Therapy, Speech Therapy  and Registered Nurse    Discharge Instructions Verbalized to Patient at " Discharge:     None      TOTAL DISCHARGE TIME:   Greater than 30 minutes  Electronically signed by:  PALOMO Haro CNP           Documentation of Face-to-Face and Certification for Home Health Services     Patient: Bradley Liz   YOB: 1940  MR Number: [id number]  Today's Date: 8/5/2019    I certify that patient: Bradley Liz is under my care and that I, or a nurse practitioner or physician's assistant working with me, had a face-to-face encounter that meets the physician face-to-face encounter requirements with this patient on: 8/5/2019.    This encounter with the patient was in whole, or in part, for the following medical condition, which is the primary reason for home health care:    Acute cholecystitis  Septicemia due to undetermined organism (H)  Acute on chronic systolic congestive heart failure (H)  Anasarca  Acute kidney injury (H)  Atrial fibrillation, unspecified type (H)  History of CVA (cerebrovascular accident)    I certify that, based on my findings, the following services are medically necessary home health services: Nursing, Occupational Therapy, Physical Therapy and Speech Language Therapy.    My clinical findings support the need for the above services because: Nurse is needed: To provide caregiver training to assist with: medication maagement.., Occupational Therapy Services are needed to assess and treat cognitive ability and address ADL safety due to impairment in fine motor/post CVA., Physical Therapy Services are needed to assess and treat the following functional impairments: weakness, falls risk. and Speech Therapy Services are needed to assess and treat impairments in language and/or swallow functions due to cognitive and speech limitations.    Further, I certify that my clinical findings support that this patient is homebound (i.e. absences from home require considerable and taxing effort and are for medical reasons or Rastaf[employer] services or  infrequently or of short duration when for other reasons) because: Requires assistance of another person or specialized equipment to access medical services because patient: Requires supervision of another for safe transfer...    Based on the above findings. I certify that this patient is confined to the home and needs intermittent skilled nursing care, physical therapy and/or speech therapy.  The patient is under my care, and I have initiated the establishment of the plan of care.  This patient will be followed by a physician who will periodically review the plan of care.  Physician/Provider to provide follow up care: Alex Mustafa    Responsible Medicare certified PECOS Physician: Dr.Yasser Moreno  Electronic Physician Signature  Date: 8/5/2019                Sincerely,        PALOMO Haro CNP

## 2019-08-05 NOTE — PROGRESS NOTES
[place] [facility] DISCHARGE SUMMARY  PATIENT'S NAME: Bradley Liz  YOB: 1940  MEDICAL RECORD NUMBER:  [id number]  Place of Service where encounter took place:  [facility] [[id number]]    PRIMARY CARE PROVIDER AND CLINIC RESPONSIBLE AFTER TRANSFER:   Alex Mustafa MD, [address] [facility] [address]      Unknown     Transferring providers: Tam Bello CNP, Sandee Moreno MD  Recent Hospitalization/ED:  [facility] stay 7/9/19 to 7/17/19.  Date of SNF Admission: July / 17 / 2019  Date of SNF (anticipated) Discharge: August / 06 / 2019  Discharged to: with family spouse  Cognitive Scores: NA  Physical Function: Walker with assist  DME: Walker    CODE STATUS/ADVANCE DIRECTIVES DISCUSSION:  Full Code   ALLERGIES: Patient has no known allergies.    DISCHARGE DIAGNOSIS/NURSING FACILITY COURSE:     Brief Summary of Hospital Course: Bradley Liz has a past medical history of CVA in 2008, afib on chronic warfarin, cardiomyopathy.  S/he was admitted to the hospital with acute back pain, abdominal distention, coffee-ground emesis and found to have sepsis due to acute gangrenous cholecystitis.  S/he underwent laparoscopic cholecystectomy . The hospital stay was complicated by acute kidney injury with creat peak at 3.01, anasarca.     Acute cholecystitis  Sepsis, due to unspecified organism (H)  Patient S/P lap valentin due to gangrenous cholecystitis, gradually improving every day.  Eating well, bowels moving well. Gradually feeling better, incisions CDI.  -making progress in therapy, goal to return to independent living  -follow up surgeon  PRN     Acute on chronic systolic congestive heart failure (H)  Anasarca  Patient with chronic known CHF with EF 50-55%.  On lasix at baseline, no K+ supplement  -check labs PRN  -increased K to 40mEq daily      Acute kidney injury (H)  Baseline creat within normal limits .  Creat  elevated to 3.01 and developed anasarca thought due to volume overload, critical illness.   Last creat decreased to 1.45, without known CKD. 3/6/19, creat 0.81.   -Avoid nephrotoxic medications  -Renal dosing of medications  -monitor kidney function PRN      Atrial fibrillation, unspecified type (H)  Long term current use of anticoagulant therapy  Patient with known history of afib on warfarin As directed by Anticoagulation Clinic. INR today elevated at 4.5 with goal 2-3.  See [facility] notes for dosing.   -dosing via [facility]  -INR on 8/5 was 2.2  -warfarin 5mg Sun/Tues/Thurs, 2.5mg ROW  -recheck INR on Mon 8/12     History of CVA (cerebrovascular accident)  Patient with known history of CVA in 2008, no new symptoms. Stable.       Acute cholecystitis  Septicemia due to undetermined organism (H)  Acute on chronic systolic congestive heart failure (H)  Anasarca  Acute kidney injury (H)  Atrial fibrillation, unspecified type (H)  History of CVA (cerebrovascular accident)        Past Medical History:  has no past medical history on file.    Discharge Medications:  Current Outpatient Medications   Medication Sig Dispense Refill     acetaminophen (TYLENOL) 325 MG tablet Take 2 tablets (650 mg) by mouth every 4 hours as needed for mild pain       albuterol (PROVENTIL) (2.5 MG/3ML) 0.083% neb solution Take 2.5 mg by nebulization every 4 hours as needed for shortness of breath / dyspnea or wheezing       ASPIRIN NOT PRESCRIBED, INTENTIONAL, by Other route continuous prn.  0     bisacodyl (DULCOLAX) 10 MG suppository Place 10 mg rectally daily as needed for constipation       furosemide (LASIX) 40 MG tablet Take 40 mg by mouth daily       magnesium hydroxide (MILK OF MAGNESIA) 400 MG/5ML suspension Take 30 mLs by mouth daily as needed for constipation or heartburn       metoprolol succinate (TOPROL-XL) 200 MG 24 hr tablet Take 1 tablet (200 mg) by mouth daily 90 tablet 3     order for DME Please draw INR as ordered and as needed.  "Call results to [facility] at (p) [phone] or fax them to (f) [phone] 3 Month 3     potassium chloride (KLOR-CON) 20 MEQ packet Take 40 mEq by mouth 2 times daily       rosuvastatin (CRESTOR) 5 MG tablet Take 1 tablet (5 mg) by mouth daily 90 tablet 3     warfarin (COUMADIN) 5 MG tablet Take 5 mg (5 mg x 1) every Sun, Tue, Thu; 2.5 mg (5 mg x 0.5) all other days or as directed by the Anticoagulation Clinic. INR recheck by homecare on 8/12/19.         Medication Changes/Rationale:     See notes    Controlled medications sent with patient:   not applicable/none     ROS:   Limited secondary to cognitive impairment but today pt reports feeling fine    Physical Exam:   Vitals: /57   Pulse 91   Temp 98.1  F (36.7  C)   Resp 17   Ht 1.753 m (5' 9\")   Wt 77.1 kg (170 lb)   SpO2 94%   BMI 25.10 kg/m    BMI= Body mass index is 25.1 kg/m .  GENERAL APPEARANCE:  in no distress, appears healthy  ENT:  Mouth and posterior oropharynx normal, moist mucous membranes  RESP:  lungs clear to auscultation   CV:  regular rate and rhythm, no murmur, rub, or gallop, no edema  ABDOMEN:  bowel sounds normal  M/S:   Gait and station abnormal requires assist  SKIN:  Inspection of skin and subcutaneous tissue baseline  NEURO:   Examination of sensation by touch normal  PSYCH:  oriented to self     SNF labs: Labs done in SNF are in [facility]. Please refer to them using [employer]/Care Everywhere.  INR Flow sheet at SNF:    DISCHARGE PLAN:    Follow up labs: INR due on 8/12    Medical Follow Up:      Follow up with primary care provider in 1 weeks    MT referral needed and placed by this provider: No    Current [place] scheduled appointments:   See EPIC    Discharge Services: Home Care:  Occupational Therapy, Physical Therapy, Speech Therapy  and Registered Nurse    Discharge Instructions Verbalized to Patient at Discharge:     None      TOTAL DISCHARGE TIME:   Greater than 30 minutes  Electronically " signed by:  PALOMO Haro CNP           Documentation of Face-to-Face and Certification for Home Health Services     Patient: Bradley Liz   YOB: 1940  MR Number: [id number]  Today's Date: 8/5/2019    I certify that patient: Bradley Liz is under my care and that I, or a nurse practitioner or physician's assistant working with me, had a face-to-face encounter that meets the physician face-to-face encounter requirements with this patient on: 8/5/2019.    This encounter with the patient was in whole, or in part, for the following medical condition, which is the primary reason for home health care:    Acute cholecystitis  Septicemia due to undetermined organism (H)  Acute on chronic systolic congestive heart failure (H)  Anasarca  Acute kidney injury (H)  Atrial fibrillation, unspecified type (H)  History of CVA (cerebrovascular accident)    I certify that, based on my findings, the following services are medically necessary home health services: Nursing, Occupational Therapy, Physical Therapy and Speech Language Therapy.    My clinical findings support the need for the above services because: Nurse is needed: To provide caregiver training to assist with: medication maagement.., Occupational Therapy Services are needed to assess and treat cognitive ability and address ADL safety due to impairment in fine motor/post CVA., Physical Therapy Services are needed to assess and treat the following functional impairments: weakness, falls risk. and Speech Therapy Services are needed to assess and treat impairments in language and/or swallow functions due to cognitive and speech limitations.    Further, I certify that my clinical findings support that this patient is homebound (i.e. absences from home require considerable and taxing effort and are for medical reasons or Druze services or infrequently or of short duration when for other reasons) because: Requires assistance of another  person or specialized equipment to access medical services because patient: Requires supervision of another for safe transfer...    Based on the above findings. I certify that this patient is confined to the home and needs intermittent skilled nursing care, physical therapy and/or speech therapy.  The patient is under my care, and I have initiated the establishment of the plan of care.  This patient will be followed by a physician who will periodically review the plan of care.  Physician/Provider to provide follow up care: Alex Mustafa    Responsible Medicare certified PECOS Physician: Dr.Yasser Moreno  Electronic Physician Signature  Date: 8/5/2019

## 2019-08-07 ENCOUNTER — TELEPHONE (OUTPATIENT)
Dept: FAMILY MEDICINE | Facility: CLINIC | Age: 79
End: 2019-08-07

## 2019-08-07 LAB
FUNGUS SPEC CULT: NORMAL
SPECIMEN SOURCE: NORMAL

## 2019-08-07 NOTE — TELEPHONE ENCOUNTER
Brock will wait for providers orders.  Patient has not been seen in clinic since 8/24/18.  Ok to follow patient for home care orders?    Thank you    Pamela STINSON RN

## 2019-08-07 NOTE — TELEPHONE ENCOUNTER
Reason for Call:  orders    Detailed comments: Brock from [facility]. Is calling and stating that he needs VO from Dr. Mustafa to follow this patient via Fax and Phone for Home Care. Please advise.    Phone Number Patient can be reached at: Other phone number: [phone]    Best Time: any    Can we leave a detailed message on this number? YES   Christine Madrigal  [facility] [place] [facility]      Call taken on 8/7/2019 at 10:56 AM by Christine Madrigal

## 2019-08-12 ENCOUNTER — HOSPITAL LABORATORY (OUTPATIENT)
Facility: OTHER | Age: 79
End: 2019-08-12

## 2019-08-12 LAB — INR PPP: 3.72 (ref 0.86–1.14)

## 2019-08-12 NOTE — TELEPHONE ENCOUNTER
Patient will be having INRs checked by homecare. Please advise if you are willing to follow patient or if he needs to schedule an office visit first.     Thanks!    Xavier AHMADI RN, CACP

## 2019-08-13 ENCOUNTER — ANTICOAGULATION THERAPY VISIT (OUTPATIENT)
Dept: ANTICOAGULATION | Facility: CLINIC | Age: 79
End: 2019-08-13
Payer: COMMERCIAL

## 2019-08-13 DIAGNOSIS — I63.9 COMPLETED STROKE (H): ICD-10-CM

## 2019-08-13 DIAGNOSIS — Z79.01 LONG TERM CURRENT USE OF ANTICOAGULANT THERAPY: ICD-10-CM

## 2019-08-13 PROCEDURE — 99207 ZZC NO CHARGE NURSE ONLY: CPT

## 2019-08-13 NOTE — PROGRESS NOTES
8/19/19 ADDENDUM: Writer called and left a voicemail for JESUS Resendiz and GEMA Torres at [facility] requesting a call back. GEMA Torres did call [facility] back. Patient was scheduled to have his INR today however patient refused. Writer will send dosing instructions through Wednesday when he would be able to have it drawn again.    Xavier AHMADI RN, CACP 8/19/2019 at 3:30 PM      ANTICOAGULATION FOLLOW-UP CLINIC VISIT    Patient Name:  Bradley Liz  Date:  8/13/2019  Contact Type:  Fax [facility]    SUBJECTIVE:  Patient Findings     Comments:   Writer left DVM for Melissa at [facility]  Patient will hold his Coumadin dose for today then to resume the maintenance dose.   Recheck INR in 1 week.         Clinical Outcomes     Negatives:   Major bleeding event, Thromboembolic event, Anticoagulation-related hospital admission, Anticoagulation-related ED visit, Anticoagulation-related fatality    Comments:   Writer left DVM for Melissa at [facility]  Patient will hold his Coumadin dose for today then to resume the maintenance dose.   Recheck INR in 1 week.            OBJECTIVE    INR   Date Value Ref Range Status   08/12/2019 3.72 (H) 0.86 - 1.14 Final       ASSESSMENT / PLAN  INR assessment SUPRA    Recheck INR In: 1 WEEK    INR Location TCU      Anticoagulation Summary  As of 8/13/2019    INR goal:   2.0-3.0   TTR:   57.6 % (4.2 y)   INR used for dosing:   3.72! (8/12/2019)   Warfarin maintenance plan:   5 mg (5 mg x 1) every Sun, Tue, Thu; 2.5 mg (5 mg x 0.5) all other days   Full warfarin instructions:   8/13: Hold; Otherwise 5 mg every Sun, Tue, Thu; 2.5 mg all other days   Weekly warfarin total:   25 mg   Plan last modified:   Lela Lerner RN (9/7/2018)   Next INR check:   8/19/2019   Priority:   INR   Target end date:   Indefinite    Indications    Completed stroke (H) [I63.9]  Long term current use of anticoagulant therapy [Z79.01]             Anticoagulation Episode Summary     INR check location:       Preferred lab:       Send INR  reminders to:   [place] PHONE ISABELA POOL    Comments:   * 8/5/19- [facility] homecare  Leave message on MOBILE only. Takes warfarin in the AM. Leaves for [place] at the end of Dec (will need snowbird - Dr. Mustafa has to sign paperwork!) [phone] cell [address] [facility] [address]      Anticoagulation Care Providers     Provider Role Specialty Phone number    Alex Mustafa MD [facility] [phone]            See the Encounter Report to view Anticoagulation Flowsheet and Dosing Calendar (Go to Encounters tab in chart review, and find the Anticoagulation Therapy Visit)        Noam Sands RN

## 2019-08-13 NOTE — TELEPHONE ENCOUNTER
Patient has scheduled an appt on 8/15/19 with Dr Verito Lima.   Sending FYI. Thanks,     Fadumo Maya RNC

## 2019-08-15 ENCOUNTER — OFFICE VISIT (OUTPATIENT)
Dept: FAMILY MEDICINE | Facility: CLINIC | Age: 79
End: 2019-08-15
Payer: COMMERCIAL

## 2019-08-15 ENCOUNTER — MEDICAL CORRESPONDENCE (OUTPATIENT)
Dept: HEALTH INFORMATION MANAGEMENT | Facility: CLINIC | Age: 79
End: 2019-08-15

## 2019-08-15 VITALS
RESPIRATION RATE: 18 BRPM | OXYGEN SATURATION: 96 % | DIASTOLIC BLOOD PRESSURE: 68 MMHG | SYSTOLIC BLOOD PRESSURE: 98 MMHG | TEMPERATURE: 95.7 F | BODY MASS INDEX: 24.73 KG/M2 | HEIGHT: 69 IN | HEART RATE: 105 BPM | WEIGHT: 167 LBS

## 2019-08-15 DIAGNOSIS — I50.23 ACUTE ON CHRONIC SYSTOLIC CONGESTIVE HEART FAILURE (H): ICD-10-CM

## 2019-08-15 DIAGNOSIS — K83.09 CHOLANGITIS (H): ICD-10-CM

## 2019-08-15 DIAGNOSIS — I48.91 ATRIAL FIBRILLATION, UNSPECIFIED TYPE (H): Primary | ICD-10-CM

## 2019-08-15 DIAGNOSIS — Z86.73 HISTORY OF CVA (CEREBROVASCULAR ACCIDENT): ICD-10-CM

## 2019-08-15 DIAGNOSIS — Z79.01 LONG TERM CURRENT USE OF ANTICOAGULANT THERAPY: ICD-10-CM

## 2019-08-15 DIAGNOSIS — N17.9 ACUTE KIDNEY INJURY (H): ICD-10-CM

## 2019-08-15 PROBLEM — R60.1 ANASARCA: Status: RESOLVED | Noted: 2019-08-05 | Resolved: 2019-08-15

## 2019-08-15 PROBLEM — K81.0 ACUTE CHOLECYSTITIS: Status: RESOLVED | Noted: 2019-08-05 | Resolved: 2019-08-15

## 2019-08-15 PROBLEM — A41.9: Status: RESOLVED | Noted: 2019-08-05 | Resolved: 2019-08-15

## 2019-08-15 LAB
ALBUMIN SERPL-MCNC: 3.2 G/DL (ref 3.4–5)
ALP SERPL-CCNC: 138 U/L (ref 40–150)
ALT SERPL W P-5'-P-CCNC: 37 U/L (ref 0–70)
ANION GAP SERPL CALCULATED.3IONS-SCNC: 10 MMOL/L (ref 3–14)
AST SERPL W P-5'-P-CCNC: 36 U/L (ref 0–45)
BILIRUB SERPL-MCNC: 1.3 MG/DL (ref 0.2–1.3)
BUN SERPL-MCNC: 29 MG/DL (ref 7–30)
CALCIUM SERPL-MCNC: 8.8 MG/DL (ref 8.5–10.1)
CHLORIDE SERPL-SCNC: 100 MMOL/L (ref 94–109)
CO2 SERPL-SCNC: 20 MMOL/L (ref 20–32)
CREAT SERPL-MCNC: 1.37 MG/DL (ref 0.66–1.25)
GFR SERPL CREATININE-BSD FRML MDRD: 49 ML/MIN/{1.73_M2}
GLUCOSE SERPL-MCNC: 90 MG/DL (ref 70–99)
POTASSIUM SERPL-SCNC: 5.7 MMOL/L (ref 3.4–5.3)
PROT SERPL-MCNC: 6.5 G/DL (ref 6.8–8.8)
SODIUM SERPL-SCNC: 130 MMOL/L (ref 133–144)

## 2019-08-15 PROCEDURE — 99214 OFFICE O/P EST MOD 30 MIN: CPT | Performed by: FAMILY MEDICINE

## 2019-08-15 PROCEDURE — 36415 COLL VENOUS BLD VENIPUNCTURE: CPT | Performed by: FAMILY MEDICINE

## 2019-08-15 PROCEDURE — 80053 COMPREHEN METABOLIC PANEL: CPT | Performed by: FAMILY MEDICINE

## 2019-08-15 RX ORDER — POTASSIUM CHLORIDE 1.5 G/1.58G
40 POWDER, FOR SOLUTION ORAL 2 TIMES DAILY
Status: CANCELLED | OUTPATIENT
Start: 2019-08-15

## 2019-08-15 RX ORDER — METOPROLOL SUCCINATE 200 MG/1
200 TABLET, EXTENDED RELEASE ORAL DAILY
Qty: 90 TABLET | Refills: 3 | Status: SHIPPED | OUTPATIENT
Start: 2019-08-15 | End: 2019-11-18

## 2019-08-15 RX ORDER — FUROSEMIDE 40 MG
40 TABLET ORAL DAILY
Status: CANCELLED | OUTPATIENT
Start: 2019-08-15

## 2019-08-15 RX ORDER — ROSUVASTATIN CALCIUM 5 MG/1
5 TABLET, COATED ORAL DAILY
Qty: 90 TABLET | Refills: 3 | Status: SHIPPED | OUTPATIENT
Start: 2019-08-15 | End: 2020-07-01

## 2019-08-15 ASSESSMENT — PAIN SCALES - GENERAL: PAINLEVEL: NO PAIN (0)

## 2019-08-15 ASSESSMENT — MIFFLIN-ST. JEOR: SCORE: 1467.89

## 2019-08-15 NOTE — PATIENT INSTRUCTIONS
Our Clinic hours are:  Mondays    7:20 am - 7 pm  Tues -  Fri  7:20 am - 5 pm    Clinic Phone: [phone]    The clinic lab opens at 7:30 am Mon - Fri and appointments are required.    [facility]. [phone]  Monday  8 am - 7pm  Tues - Fri 8 am - 5:30 pm

## 2019-08-15 NOTE — LETTER
August 16, 2019      Bradley Shannonjayashree  [address] [place] [address]        Dear ,    We are writing to inform you of your test results.    Sodium is low and potassium is slightly high.  Stopping the furosemide (as long as there is no increase in swelling) should help both of these.  Kidney function is stable.    Albumin/protein have improved from the hospital stay, but still slightly low.      Notify patient or daughter (maria del rosario).    Verito Lima M.D.    Resulted Orders   Comprehensive metabolic panel   Result Value Ref Range    Sodium 130 (L) 133 - 144 mmol/L    Potassium 5.7 (H) 3.4 - 5.3 mmol/L    Chloride 100 94 - 109 mmol/L    Carbon Dioxide 20 20 - 32 mmol/L    Anion Gap 10 3 - 14 mmol/L    Glucose 90 70 - 99 mg/dL      Comment:      Fasting specimen    Urea Nitrogen 29 7 - 30 mg/dL    Creatinine 1.37 (H) 0.66 - 1.25 mg/dL    GFR Estimate 49 (L) >60 mL/min/[1.73_m2]      Comment:      Non  GFR Calc  Starting 12/18/2018, serum creatinine based estimated GFR (eGFR) will be   calculated using the Chronic Kidney Disease Epidemiology Collaboration   (CKD-EPI) equation.      GFR Estimate If Black 56 (L) >60 mL/min/[1.73_m2]      Comment:       GFR Calc  Starting 12/18/2018, serum creatinine based estimated GFR (eGFR) will be   calculated using the Chronic Kidney Disease Epidemiology Collaboration   (CKD-EPI) equation.      Calcium 8.8 8.5 - 10.1 mg/dL    Bilirubin Total 1.3 0.2 - 1.3 mg/dL    Albumin 3.2 (L) 3.4 - 5.0 g/dL    Protein Total 6.5 (L) 6.8 - 8.8 g/dL    Alkaline Phosphatase 138 40 - 150 U/L    ALT 37 0 - 70 U/L    AST 36 0 - 45 U/L       If you have any questions or concerns, please call the clinic at the number listed above.       Sincerely,        Verito Lima MD

## 2019-08-15 NOTE — PROGRESS NOTES
"Subjective     Bradley Liz is a 78 year old male who presents to clinic today for the following health issues:    HPI   Chief Complaint   Patient presents with     Clinic Care Coordination - Face To Face     Patient is here from being admitted to [facility] 2 weeks ago and then was transferred to [facility] for dementia. Daughter is concerned that he should have another opinion regarding dementia verses his stroke like symptoms. Patient and family would like to discuss options.      Patient was recently admitted to [facility] with sepsis due to cholangitis.  He was discharged to TCU where he stayed for a few weeks.  On discharge he was to go home with homecare and his wife didn't feel that would be a good idea and didn't want people coming in and out all day.  He was ultimately discharged to [facility] (advanced memory care) at [facility] because they didn't have an assisted living apartment to offer him.     He did have some \"outbursts\" at staff during meals in TCU and he \"wouldn't allow staff to change his clothing\". Per daughter he wanted to wear the same sweatshirt for a few days in a row changing only the undershirt (which is what he does at home).  He doesn't feel that he's appropriate for memory care (which is agreed upon by RN at [facility], daughter and myself).  They are looking into AL facilities ([facility] in [place]).      He has some word finding difficulties since his stroke 10-11 years ago, right sided weakness (he's left handed) but had been driving until he was told he shouldn't at [facility].  He would like to get back to driving.     Patient also would like to stop his furosemide and potassium.  He had stopped them at home several months ago,  They were re-instated in the hospital.  He denies lower extremity edema, no shortness of breath.  Did have some edema after acute hospital stay with fluid resuscitation.      ROS: 5 point ROS negative except as noted above in HPI, including Gen., " "Resp., CV, GI &  system review.    Eating/drinking well.  Voiding well.  Bowel movements are \"variable\" after the cholecystectomy.               Review of Systems   ROS COMP: Constitutional, HEENT, cardiovascular, pulmonary, gi and gu systems are negative, except as otherwise noted.      Objective    BP 98/68   Pulse 105   Temp 95.7  F (35.4  C) (Tympanic)   Resp 18   Ht 1.753 m (5' 9\")   Wt 75.8 kg (167 lb)   SpO2 96%   BMI 24.66 kg/m    Body mass index is 24.66 kg/m .  Physical Exam   GENERAL: healthy, alert and no distress  NECK: no adenopathy, no asymmetry, masses, or scars and thyroid normal to palpation  RESP: lungs clear to auscultation - no rales, rhonchi or wheezes  CV: irregularly irregular rhythm, normal S1 S2, no S3 or S4, no murmur, click or rub, peripheral pulses strong and no peripheral edema  ABDOMEN: soft, nontender, no hepatosplenomegaly, no masses and bowel sounds normal  MS: no gross musculoskeletal defects noted, no edema  SKIN: venous stasis dermatitis of both lower extremities  NEURO: weakness of right side, sensory exam grossly normal, mentation intact and oriented times three  PSYCH: mentation appears normal and affect normal/bright    Diagnostic Test Results:  Labs reviewed in Epic        Assessment & Plan     1. Atrial fibrillation, unspecified type (H)   rate controlled, on anticoagulation  - metoprolol succinate ER (TOPROL-XL) 200 MG 24 hr tablet; Take 1 tablet (200 mg) by mouth daily  Dispense: 90 tablet; Refill: 3    2. Acute on chronic systolic congestive heart failure (H)     - rosuvastatin (CRESTOR) 5 MG tablet; Take 1 tablet (5 mg) by mouth daily  Dispense: 90 tablet; Refill: 3    3. Acute kidney injury (H)  Had resolved, will recheck.   Stop the furosemide and potassium   - Comprehensive metabolic panel    4. Cholangitis     - Comprehensive metabolic panel    5. Long term current use of anticoagulant therapy       6. History of CVA (cerebrovascular accident)     - " NEUROPSYCHOLOGY REFERRAL   not sure he wants to do neuropsych right now and I think that's okay.    I think he likely had some metabolic encephalopathy with the recent sepsis/ICU stay, etc and this seems to have in general resolved.    Appropriate for assisted living type care.            No follow-ups on file.    Verito Lima MD  [facility]

## 2019-08-15 NOTE — LETTER
[facility]  [address]  [place] [address]  Phone: [phone]      8/16/2019     Bradley Liz  [address]   [facility] [address]      Dear Bradley:    Thank you for allowing me to participate in your care. Your recent test results were reviewed and listed below.      Your results are provided below for your review  Results for orders placed or performed in visit on 08/15/19   Comprehensive metabolic panel   Result Value Ref Range    Sodium 130 (L) 133 - 144 mmol/L    Potassium 5.7 (H) 3.4 - 5.3 mmol/L    Chloride 100 94 - 109 mmol/L    Carbon Dioxide 20 20 - 32 mmol/L    Anion Gap 10 3 - 14 mmol/L    Glucose 90 70 - 99 mg/dL    Urea Nitrogen 29 7 - 30 mg/dL    Creatinine 1.37 (H) 0.66 - 1.25 mg/dL    GFR Estimate 49 (L) >60 mL/min/[1.73_m2]    GFR Estimate If Black 56 (L) >60 mL/min/[1.73_m2]    Calcium 8.8 8.5 - 10.1 mg/dL    Bilirubin Total 1.3 0.2 - 1.3 mg/dL    Albumin 3.2 (L) 3.4 - 5.0 g/dL    Protein Total 6.5 (L) 6.8 - 8.8 g/dL    Alkaline Phosphatase 138 40 - 150 U/L    ALT 37 0 - 70 U/L    AST 36 0 - 45 U/L   Thank you for choosing [facility]. As a result, please continue with the treatment plan discussed in the office. Return as discussed or sooner if symptoms worsen or fail to improve.     If you have any further questions or concerns, please do not hesitate to contact us.      Sincerely,      Dr. Verito Lima

## 2019-08-16 NOTE — RESULT ENCOUNTER NOTE
Sodium is low and potassium is slightly high.  Stopping the furosemide (as long as there is no increase in swelling) should help both of these.  Kidney function is stable.    Albumin/protein have improved from the hospital stay, but still slightly low.      Notify patient or daughter (maria del rosario).    Verito Lima M.D.

## 2019-08-20 ENCOUNTER — TELEPHONE (OUTPATIENT)
Dept: FAMILY MEDICINE | Facility: CLINIC | Age: 79
End: 2019-08-20

## 2019-08-20 NOTE — TELEPHONE ENCOUNTER
Form sent to PCP, Coumadin orders from [facility].    Faye Whitmore  [facility]

## 2019-08-20 NOTE — TELEPHONE ENCOUNTER
Form signed and faxed to [phone] and sent to scanning.    Faye Whitmore  [facility]

## 2019-08-21 ENCOUNTER — TELEPHONE (OUTPATIENT)
Dept: FAMILY MEDICINE | Facility: CLINIC | Age: 79
End: 2019-08-21

## 2019-08-21 ENCOUNTER — HOSPITAL LABORATORY (OUTPATIENT)
Facility: OTHER | Age: 79
End: 2019-08-21

## 2019-08-21 ENCOUNTER — ANTICOAGULATION THERAPY VISIT (OUTPATIENT)
Dept: ANTICOAGULATION | Facility: CLINIC | Age: 79
End: 2019-08-21
Payer: COMMERCIAL

## 2019-08-21 ENCOUNTER — MEDICAL CORRESPONDENCE (OUTPATIENT)
Dept: HEALTH INFORMATION MANAGEMENT | Facility: CLINIC | Age: 79
End: 2019-08-21

## 2019-08-21 DIAGNOSIS — Z79.01 LONG TERM CURRENT USE OF ANTICOAGULANT THERAPY: ICD-10-CM

## 2019-08-21 DIAGNOSIS — I63.9 COMPLETED STROKE (H): ICD-10-CM

## 2019-08-21 LAB — INR PPP: 2.82 (ref 0.86–1.14)

## 2019-08-21 PROCEDURE — 99207 ZZC NO CHARGE NURSE ONLY: CPT

## 2019-08-21 NOTE — TELEPHONE ENCOUNTER
Form needs completion/signature for  - [facility] Medication Verification Form for 1 year  Paperwork placed in forms box.    Christine Madrigal  Clinic Station  Flex

## 2019-08-21 NOTE — PROGRESS NOTES
ANTICOAGULATION FOLLOW-UP CLINIC VISIT    Patient Name:  Bradley Liz  Date:  2019  Contact Type:  Telephone/ [facility]    SUBJECTIVE:  Patient Findings     Comments:   No changes in medications, activity, health, or diet noted. No bleeding or increased bruising noted. Took warfarin as prescribed.  Patient will continue weekly maintenance dose. INR is therapeutic.   Recheck in 1 week.        Clinical Outcomes     Negatives:   Major bleeding event, Thromboembolic event, Anticoagulation-related hospital admission, Anticoagulation-related ED visit, Anticoagulation-related fatality    Comments:   No changes in medications, activity, health, or diet noted. No bleeding or increased bruising noted. Took warfarin as prescribed.  Patient will continue weekly maintenance dose. INR is therapeutic.   Recheck in 1 week.           OBJECTIVE    INR   Date Value Ref Range Status   2019 2.82 (H) 0.86 - 1.14 Final       ASSESSMENT / PLAN  INR assessment THER    Recheck INR In: 1 WEEK    INR Location Homecare INR      Anticoagulation Summary  As of 2019    INR goal:   2.0-3.0   TTR:   57.4 % (4.3 y)   INR used for dosin.82 (2019)   Warfarin maintenance plan:   5 mg (5 mg x 1) every Mon, Fri; 2.5 mg (5 mg x 0.5) all other days   Full warfarin instructions:   5 mg every Mon, Fri; 2.5 mg all other days   Weekly warfarin total:   22.5 mg   Plan last modified:   Noam Sands RN (2019)   Next INR check:   2019   Priority:   INR   Target end date:   Indefinite    Indications    Completed stroke (H) [I63.9]  Long term current use of anticoagulant therapy [Z79.01]             Anticoagulation Episode Summary     INR check location:       Preferred lab:       Send INR reminders to:   [facility] POOL    Comments:   * [facility] fax = [phone]  Leave message on MOBILE only. Takes warfarin in the AM. Leaves for [place] at the end of Dec (will need snowchund - Dr. Mustafa has to sign paperwork!)  [phone] cell [phone] [facility] [address]      Anticoagulation Care Providers     Provider Role Specialty Phone number    Alex Mustafa MD [facility] [phone]            See the Encounter Report to view Anticoagulation Flowsheet and Dosing Calendar (Go to Encounters tab in chart review, and find the Anticoagulation Therapy Visit)        Noam Sands RN

## 2019-08-21 NOTE — TELEPHONE ENCOUNTER
Filled out what I felt comfortable doing and put the rest on lele Cooper's desk desk to sign   Latha Card CMA

## 2019-08-23 ENCOUNTER — MEDICAL CORRESPONDENCE (OUTPATIENT)
Dept: HEALTH INFORMATION MANAGEMENT | Facility: CLINIC | Age: 79
End: 2019-08-23

## 2019-08-23 NOTE — TELEPHONE ENCOUNTER
Form signed and faxed to [facility] [phone].  Christine Madrigal  Clinic Station [place] [facility]

## 2019-08-28 ENCOUNTER — ANTICOAGULATION THERAPY VISIT (OUTPATIENT)
Dept: ANTICOAGULATION | Facility: CLINIC | Age: 79
End: 2019-08-28
Payer: COMMERCIAL

## 2019-08-28 ENCOUNTER — HOSPITAL LABORATORY (OUTPATIENT)
Facility: OTHER | Age: 79
End: 2019-08-28

## 2019-08-28 DIAGNOSIS — Z79.01 LONG TERM CURRENT USE OF ANTICOAGULANT THERAPY: ICD-10-CM

## 2019-08-28 DIAGNOSIS — I63.9 COMPLETED STROKE (H): ICD-10-CM

## 2019-08-28 LAB — INR PPP: 3.01 (ref 0.86–1.14)

## 2019-08-28 PROCEDURE — 99207 ZZC NO CHARGE NURSE ONLY: CPT

## 2019-08-28 NOTE — PROGRESS NOTES
ANTICOAGULATION FOLLOW-UP CLINIC VISIT    Patient Name:  Bradley Liz  Date:  8/28/2019  Contact Type:  Fax [place]    SUBJECTIVE:  Patient Findings     Comments:   Patient will continue weekly maintenance dose. INR is therapeutic.   Recheck in 1 week.        Clinical Outcomes     Negatives:   Major bleeding event, Thromboembolic event, Anticoagulation-related hospital admission, Anticoagulation-related ED visit, Anticoagulation-related fatality    Comments:   Patient will continue weekly maintenance dose. INR is therapeutic.   Recheck in 1 week.           OBJECTIVE    INR   Date Value Ref Range Status   08/28/2019 3.01 (H) 0.86 - 1.14 Final       ASSESSMENT / PLAN  INR assessment THER    Recheck INR In: 1 WEEK    INR Location TCU      Anticoagulation Summary  As of 8/28/2019    INR goal:   2.0-3.0   TTR:   57.6 % (4.3 y)   INR used for dosing:   3.01! (8/28/2019)   Warfarin maintenance plan:   5 mg (5 mg x 1) every Mon, Fri; 2.5 mg (5 mg x 0.5) all other days   Full warfarin instructions:   5 mg every Mon, Fri; 2.5 mg all other days   Weekly warfarin total:   22.5 mg   Plan last modified:   Noam Sands RN (8/21/2019)   Next INR check:   9/4/2019   Priority:   INR   Target end date:   Indefinite    Indications    Completed stroke (H) [I63.9]  Long term current use of anticoagulant therapy [Z79.01]             Anticoagulation Episode Summary     INR check location:       Preferred lab:       Send INR reminders to:   [place] PHONE [facility] POOL    Comments:   * [facility] fax = [phone]  Leave message on MOBILE only. Takes warfarin in the AM. Leaves for [place] at the end of Dec (will need snowchund - Dr. Mustafa has to sign paperwork!) [phone] cell [phone] [facility] [address]      Anticoagulation Care Providers     Provider Role Specialty Phone number    Alex Mustafa MD [facility] [phone]            See the Encounter Report to view Anticoagulation Flowsheet and  Dosing Calendar (Go to Encounters tab in chart review, and find the Anticoagulation Therapy Visit)        Noam Sands RN

## 2019-09-03 ENCOUNTER — TELEPHONE (OUTPATIENT)
Dept: FAMILY MEDICINE | Facility: CLINIC | Age: 79
End: 2019-09-03

## 2019-09-04 ENCOUNTER — MEDICAL CORRESPONDENCE (OUTPATIENT)
Dept: HEALTH INFORMATION MANAGEMENT | Facility: CLINIC | Age: 79
End: 2019-09-04

## 2019-09-04 ENCOUNTER — ANTICOAGULATION THERAPY VISIT (OUTPATIENT)
Dept: ANTICOAGULATION | Facility: CLINIC | Age: 79
End: 2019-09-04

## 2019-09-04 ENCOUNTER — HOSPITAL LABORATORY (OUTPATIENT)
Facility: OTHER | Age: 79
End: 2019-09-04

## 2019-09-04 DIAGNOSIS — Z79.01 LONG TERM CURRENT USE OF ANTICOAGULANT THERAPY: ICD-10-CM

## 2019-09-04 DIAGNOSIS — I63.9 COMPLETED STROKE (H): ICD-10-CM

## 2019-09-04 LAB — INR PPP: 2.02 (ref 0.86–1.14)

## 2019-09-04 NOTE — PROGRESS NOTES
Writer called [facility] ([facility]) at [phone]. Was transferred to nurse's station but it just rang and rang.     The patient's INR has been fluctuating a lot lately and I would  Like to verify his dosing there. Per patient's wife, Barbara, [facility]    Will try again tomorrow.    Theresa Baltazar RN, BSN, PHN  9-4-2019 at 4pm

## 2019-09-05 ENCOUNTER — ANTICOAGULATION THERAPY VISIT (OUTPATIENT)
Dept: ANTICOAGULATION | Facility: CLINIC | Age: 79
End: 2019-09-05
Payer: COMMERCIAL

## 2019-09-05 DIAGNOSIS — I63.9 COMPLETED STROKE (H): ICD-10-CM

## 2019-09-05 DIAGNOSIS — Z79.01 LONG TERM CURRENT USE OF ANTICOAGULANT THERAPY: ICD-10-CM

## 2019-09-05 PROCEDURE — 99207 ZZC NO CHARGE NURSE ONLY: CPT

## 2019-09-05 NOTE — LETTER
INR from 9/4/19:   2.02   Current Warfarin Dose:  5 mg every Mon and Fri; 2.5 mg all other days  INR Goal: 2-3    Please call the Coumadin/INR clinic if you have any of the following listed below:  Bleeding Signs/Symptoms  Signs/Symptoms of a blood clot  Medication Changes  Dietary Changes  Activity Changes  Bacterial/Viral Infection  Missed Warfarin Doses  Other Concerns    PLAN:  New Warfarin Dose: No Change    Next INR: 1 week    Thank you,  Karlene BETHEA, RN  [phone]

## 2019-09-05 NOTE — PROGRESS NOTES
Addendum: Astrid from [facility] called back to report this patient moved to [facility] yesterday. He refused an [facility]R at [facility] prior to the move. Per Astrid, [facility] was going to be rechecking the INR.    Theresa Baltazar RN, BSN, PHN  2019 at 8:30 am    Message left at nurses station at [facility] to see if they have checked INR or are planning to.  19  2:20pm    19 ADDENDUM: As of 4PM ACC has not heard from the [facility]. Writer called and left a voicemail with JESUS Resendiz at [facility] ([phone]) to see if patient had his INR checked today or not? Writer requested she call [facility] back to confirm.     Xavier AHMADI RN, CACP 2019 at 4:02 PM       ANTICOAGULATION FOLLOW-UP CLINIC VISIT    Patient Name:  Bradley Shannoncandidodanni  Date:  2019  Contact Type:  Faxed dosing instructions to [facility] on [facility] at [phone]    SUBJECTIVE:  Patient Findings     Comments:   [facility] with patient's dosing, confirms he is taking 5 mg on Mon, Fri and 2.5 mg all other days. She reports no other changes. Will plan to continue maintenance dose, recheck INR in one week. Fax sent to [facility] at [phone].        Clinical Outcomes     Comments:   Salma Lima from [facility] on [facility] left a VM for [facility] with patient's dosing, confirms he is taking 5 mg on Mon, Fri and 2.5 mg all other days. She reports no other changes. Will plan to continue maintenance dose, recheck INR in one week. Fax sent to [facility] at [phone].           OBJECTIVE    INR   Date Value Ref Range Status   2019 2.02 (H) 0.86 - 1.14 Final       ASSESSMENT / PLAN  INR assessment THER    Recheck INR In: 1 WEEK    INR Location Outside lab      Anticoagulation Summary  As of 2019    INR goal:   2.0-3.0   TTR:   57.7 % (4.3 y)   INR used for dosin.02 (2019)   Warfarin maintenance plan:   5 mg (5 mg x 1) every Mon, Fri; 2.5 mg (5 mg x 0.5)  all other days   Full warfarin instructions:   5 mg every Mon, Fri; 2.5 mg all other days   Weekly warfarin total:   22.5 mg   No change documented:   Karlene Napoles RN   Plan last modified:   Noam Sands RN (8/21/2019)   Next INR check:   9/11/2019   Priority:   INR   Target end date:   Indefinite    Indications    Completed stroke (H) [I63.9]  Long term current use of anticoagulant therapy [Z79.01]             Anticoagulation Episode Summary     INR check location:       Preferred lab:       Send INR reminders to:   [place] PHONE [facility]    Comments:   * [facility] ([facility]) [facility] fax = [phone]. Takes warfarin in the AM. Leaves for [place] at the end of Dec (will need snowbird - Dr. Mustafa has to sign paperwork!)       Anticoagulation Care Providers     Provider Role Specialty Phone number    Alex Mustafa MD [facility] [phone]            See the Encounter Report to view Anticoagulation Flowsheet and Dosing Calendar (Go to Encounters tab in chart review, and find the Anticoagulation Therapy Visit)      Karlene Napoles, RN

## 2019-09-06 ENCOUNTER — MEDICAL CORRESPONDENCE (OUTPATIENT)
Dept: HEALTH INFORMATION MANAGEMENT | Facility: CLINIC | Age: 79
End: 2019-09-06

## 2019-09-12 ENCOUNTER — RECORDS - HEALTHEAST (OUTPATIENT)
Dept: LAB | Facility: CLINIC | Age: 79
End: 2019-09-12

## 2019-09-12 LAB — INR PPP: 1.74 (ref 0.9–1.1)

## 2019-09-13 ENCOUNTER — ANTICOAGULATION THERAPY VISIT (OUTPATIENT)
Dept: ANTICOAGULATION | Facility: CLINIC | Age: 79
End: 2019-09-13

## 2019-09-13 DIAGNOSIS — I63.9 COMPLETED STROKE (H): ICD-10-CM

## 2019-09-13 DIAGNOSIS — Z79.01 LONG TERM CURRENT USE OF ANTICOAGULANT THERAPY: ICD-10-CM

## 2019-09-16 ENCOUNTER — DOCUMENTATION ONLY (OUTPATIENT)
Dept: OTHER | Facility: CLINIC | Age: 79
End: 2019-09-16

## 2019-09-16 ENCOUNTER — TELEPHONE (OUTPATIENT)
Dept: GERIATRICS | Facility: CLINIC | Age: 79
End: 2019-09-16

## 2019-09-16 ENCOUNTER — RECORDS - HEALTHEAST (OUTPATIENT)
Dept: LAB | Facility: CLINIC | Age: 79
End: 2019-09-16

## 2019-09-16 ENCOUNTER — TRANSFERRED RECORDS (OUTPATIENT)
Dept: HEALTH INFORMATION MANAGEMENT | Facility: CLINIC | Age: 79
End: 2019-09-16

## 2019-09-16 LAB
INR PPP: 1.51 (ref 0.9–1.1)
INR PPP: 1.51 (ref 0.9–1.1)

## 2019-09-16 NOTE — TELEPHONE ENCOUNTER
Spoke with pts daughter, Karon, and a new POLST was done by her last week @ [facility].   KPavleRN

## 2019-09-16 NOTE — TELEPHONE ENCOUNTER
----- Message from Alyssa Ashford sent at 9/16/2019  1:19 PM CDT -----  Regarding: Invalid POLST  Dr Lima  We received an Invalid POLST dated 9-4-19 for this patient. Section A was Attempt Resuscitation and Section B was Selective Tx. Per the  POLST if Section A is Attempt Resuscitation then Section B must be Full Tx.  Please complete a new POLST and send it to [facility] to be scanned into Epic  Thank you

## 2019-09-16 NOTE — TELEPHONE ENCOUNTER
Please notify patient/care facility that POLST was not valid and needs to be re-done.  See [facility] note regarding this.    Verito Lima M.D.

## 2019-09-17 ENCOUNTER — ASSISTED LIVING VISIT (OUTPATIENT)
Dept: GERIATRICS | Facility: CLINIC | Age: 79
End: 2019-09-17
Payer: COMMERCIAL

## 2019-09-17 VITALS
SYSTOLIC BLOOD PRESSURE: 106 MMHG | HEART RATE: 88 BPM | DIASTOLIC BLOOD PRESSURE: 58 MMHG | RESPIRATION RATE: 20 BRPM | BODY MASS INDEX: 26.55 KG/M2 | TEMPERATURE: 98.2 F | WEIGHT: 179.8 LBS

## 2019-09-17 DIAGNOSIS — Z79.01 LONG TERM (CURRENT) USE OF ANTICOAGULANTS: ICD-10-CM

## 2019-09-17 DIAGNOSIS — N18.30 CKD (CHRONIC KIDNEY DISEASE) STAGE 3, GFR 30-59 ML/MIN (H): ICD-10-CM

## 2019-09-17 DIAGNOSIS — I10 HYPERTENSION, UNSPECIFIED TYPE: ICD-10-CM

## 2019-09-17 DIAGNOSIS — I50.23 ACUTE ON CHRONIC SYSTOLIC HEART FAILURE (H): ICD-10-CM

## 2019-09-17 DIAGNOSIS — I48.91 ATRIAL FIBRILLATION, UNSPECIFIED TYPE (H): ICD-10-CM

## 2019-09-17 DIAGNOSIS — L85.3 DRY SKIN: ICD-10-CM

## 2019-09-17 DIAGNOSIS — I50.23 ACUTE ON CHRONIC SYSTOLIC CONGESTIVE HEART FAILURE (H): ICD-10-CM

## 2019-09-17 DIAGNOSIS — Z86.73 HISTORY OF CVA (CEREBROVASCULAR ACCIDENT): ICD-10-CM

## 2019-09-17 DIAGNOSIS — I63.9 COMPLETED STROKE (H): Primary | ICD-10-CM

## 2019-09-17 DIAGNOSIS — Z51.81 ENCOUNTER FOR THERAPEUTIC DRUG MONITORING: ICD-10-CM

## 2019-09-17 NOTE — PROGRESS NOTES
[facility]  PRIMARY CARE PROVIDER AND CLINIC:  Tam Bello, PALOMO CNP, [address] [place] [address] [address] / [facility] [address]  Chief Complaint   Patient presents with     Establish Care     [place] Medical Record Number:  [id number]  Place of Service where encounter took place:  [facility] [[id number]]    Bradley Liz  is a 79 year old  (1940), admitted to the above facility from [facility] on the [facility]..  Admitted to this facility for  medical management and nursing care.    HPI:    HPI information obtained from: facility chart records, facility staff, patient report and [facility] chart review.   Brief Summary of Hospital Course: 78yo male, CVA in 2008, afib, cardiomyopathy, [facility] stay 7/9-7/17/19 for gangrenous cholecystitis, underwent laparoscopic cholecystectomy complicated by ANGIE, transferred to [facility] TCU for rehab, discharged to home on 8/5/19, followed by [facility] for INR's, seen by PCP Dr. Lima at [facility] on 8/15 for follow up, now has moved to [facility] in [place] for follow up with [facility].   Updates on Status Since Skilled nursing Admission: Patient moved in to [facility], appears stable, has previous CVA and continues to have R sided weakness albeit L hand dominant, SLUMS 22/30, limited historian, has been to the [facility] next door without issue and reports only having 2 beers, family in support and have given funds for this, is displeased with [facility] and thinks he wants to adrian them for saying he has dementia and locking him up, overall a healthy male with moderate dementia, stable, no acute concerns.    CODE STATUS/ADVANCE DIRECTIVES DISCUSSION:   DNR / DNI  Patient's living condition: lives in an assisted living facility  ALLERGIES: Patient has no known allergies.  PAST MEDICAL HISTORY:  has no past medical history on file.  PAST SURGICAL HISTORY:   has a past surgical history that includes Laparoscopic cholecystectomy  (N/A, 7/9/2019).  FAMILY HISTORY: family history includes C.A.D. in his father; Prostate Cancer in his father; Unknown/Adopted in his mother.  SOCIAL HISTORY:   reports that he quit smoking about 10 years ago. He quit after 50.00 years of use. He has never used smokeless tobacco. He reports that he drinks alcohol. He reports that he does not use drugs.    Post Discharge Medication Reconciliation Status: discharge medications reconciled and changed, per note/orders (see AVS)    Current Outpatient Medications   Medication Sig Dispense Refill     acetaminophen (TYLENOL) 325 MG tablet Take 2 tablets (650 mg) by mouth every 4 hours as needed for mild pain       ASPIRIN NOT PRESCRIBED, INTENTIONAL, by Other route continuous prn.  0     metoprolol succinate ER (TOPROL-XL) 200 MG 24 hr tablet Take 1 tablet (200 mg) by mouth daily 90 tablet 3     order for DME Please draw INR as ordered and as needed. Call results to [facility] at (p) [phone] or fax them to (f) [phone] 3 Month 3     rosuvastatin (CRESTOR) 5 MG tablet Take 1 tablet (5 mg) by mouth daily 90 tablet 3     Skin Protectants, Misc. (EUCERIN) cream Apply topically daily       warfarin (COUMADIN) 5 MG tablet 5 mg (5 mg x 1) every Mon, Fri; 2.5 mg (5 mg x 0.5) all other days or as directed by the Anticoagulation Clinic. INR recheck by homecare on 8/12/19.       albuterol (PROVENTIL) (2.5 MG/3ML) 0.083% neb solution Take 2.5 mg by nebulization every 4 hours as needed for shortness of breath / dyspnea or wheezing       furosemide (LASIX) 40 MG tablet Take 40 mg by mouth daily       potassium chloride (KLOR-CON) 20 MEQ packet Take 40 mEq by mouth 2 times daily         ROS:  4 point ROS including Respiratory, CV, GI and , other than that noted in the HPI,  is negative    Vitals:  /58   Pulse 88   Temp 98.2  F (36.8  C)   Resp 20   Wt 81.6 kg (179 lb 12.8 oz)   BMI 26.55 kg/m    Exam:  GENERAL APPEARANCE:  in no distress, appears  healthy  ENT:  Mouth and posterior oropharynx normal, moist mucous membranes, [place]  RESP:  lungs clear to auscultation , no respiratory distress  CV:  peripheral edema 1+ in lower legs, irregular rhythm rate  ABDOMEN:  no guarding or rebound, bowel sounds normal  M/S:   Gait and station abnormal unsteady, independent  SKIN:  Inspection of skin and subcutaneous tissueWNL, misc bruising on extremities, legs sanna  NEURO:   Examination of sensation by touch normal  PSYCH:  oriented to self, memory impaired , affect and mood normal    Lab/Diagnostic data:  Recent labs in [facility] reviewed by me today.     ASSESSMENT/PLAN:  Completed stroke (H)  History of CVA (cerebrovascular accident)  -back in 2008, R side weakness, is L side dominant  -continue crestor 5mg Qpm, warfarin as below  -follow up neurology PRN  -consider home care as indicated; currently stable and fairly independent    Acute on chronic systolic congestive heart failure (H)  Acute on chronic systolic heart failure (H)  -EF approx 50-55%  -no overt s/s exacerbation  -check VS as scheduled  -discontinue albuterol neb, not using and does not have neb machine, pulmonary status stable  -staff to support as indicated  -has cards appt yearly, next in Oct 2019?    Atrial fibrillation, unspecified type (H)  Long term (current) use of anticoagulants  Encounter for therapeutic drug monitoring  -INR on 9/12 was 1.74; continue normal dose warfarin 5mg MWF, 2.5mg ROW  -INR on 9/16 was 1.51; change warfarin to 2.5mg MWF, 5mg ROW  -recheck INR on 9/30 with other labs    Hypertension, unspecified type  -SBP's in the 100-120 range  -continue metoprolol succinate 200mg for now  -staff to check serial VS x5 days, report to NP  -adjust metoprolol as indicated    Dry skin  -bilateral lower legs skin dry, edema 1+, sanna  -eucerin Qday to both legs, OK for patient to self apply    CKD (chronic kidney disease) stage 3, GFR 30-59 ml/min (H)  -recent creat 1.37, GFR 49  -dose  medications renally as possible  -BMP on 9/30        transcribed by : Radha Parada  1. INR Monday 9/30  2. VS Serial x 5 days  3. Alb neb discontinue  4. Labs next INR Monday 9/30, Hgb, BMP  5. Eucerin every day self administer     Electronically signed by:  PALOMO Haro CNP

## 2019-09-24 ENCOUNTER — ASSISTED LIVING VISIT (OUTPATIENT)
Dept: GERIATRICS | Facility: CLINIC | Age: 79
End: 2019-09-24
Payer: COMMERCIAL

## 2019-09-24 VITALS
TEMPERATURE: 98.6 F | RESPIRATION RATE: 19 BRPM | BODY MASS INDEX: 26.55 KG/M2 | WEIGHT: 179.8 LBS | DIASTOLIC BLOOD PRESSURE: 64 MMHG | HEART RATE: 92 BPM | SYSTOLIC BLOOD PRESSURE: 102 MMHG

## 2019-09-24 DIAGNOSIS — N17.9 ACUTE KIDNEY INJURY (H): ICD-10-CM

## 2019-09-24 DIAGNOSIS — I50.23 ACUTE ON CHRONIC SYSTOLIC CONGESTIVE HEART FAILURE (H): Primary | ICD-10-CM

## 2019-09-24 DIAGNOSIS — I10 HYPERTENSION, UNSPECIFIED TYPE: ICD-10-CM

## 2019-09-24 DIAGNOSIS — I48.91 ATRIAL FIBRILLATION, UNSPECIFIED TYPE (H): ICD-10-CM

## 2019-09-24 NOTE — PROGRESS NOTES
[place] [facility]  [place] Medical Record Number:  [id number]  Place of Service where encounter took place:  [facility] [[id number]]  Chief Complaint   Patient presents with     RECHECK       HPI:    Bradley Liz  is a 79 year old (1940), who is being seen today for an episodic care visit.  HPI information obtained from: facility chart records, facility staff, patient report, [facility] chart review and family/first contact spouse Barbara report. Today's concern is:     Acute on chronic systolic congestive heart failure (H)  Hypertension, unspecified type  Acute kidney injury (H)  Atrial fibrillation, unspecified type (H)     Patient living alone in AL facility seen for hypotension today, of note was last seen by cardiology Dr. Craft on 10/10/18, there are some differences with previous [place] hospitalization and [place] cardiology including EF 35-40 vs 50-55 and metoprolol 200mg vs 100mg, concern today is BP/Hr's:  102/64, 92  116/86, 88  104/50, 90  106/58, 88  113/43, 96    Elevated HR's may be due to afib, is ambulatory, denies CP/palpitations and dizziness, IRR today, moderate edema, status overall is stable.     Contacted spouse Barbara after visit, seems slightly confused with my phone call regarding low SBP's and potential issues regarding, does not want to change medication today but accepts offer to refer to cardiology for follow up.    Past Medical and Surgical History reviewed in Epic today.    MEDICATIONS:  Current Outpatient Medications   Medication Sig Dispense Refill     acetaminophen (TYLENOL) 325 MG tablet Take 2 tablets (650 mg) by mouth every 4 hours as needed for mild pain       ASPIRIN NOT PRESCRIBED, INTENTIONAL, by Other route continuous prn.  0     metoprolol succinate ER (TOPROL-XL) 200 MG 24 hr tablet Take 1 tablet (200 mg) by mouth daily 90 tablet 3     order for DME Please draw INR as ordered and as needed. Call results to  [facility] at (p) [phone] or fax them to (f) [phone] 3 Month 3     rosuvastatin (CRESTOR) 5 MG tablet Take 1 tablet (5 mg) by mouth daily 90 tablet 3     Skin Protectants, Misc. (EUCERIN) cream Apply topically daily       warfarin (COUMADIN) 5 MG tablet 5 mg (5 mg x 1) every Mon, Fri; 2.5 mg (5 mg x 0.5) all other days or as directed by the Anticoagulation Clinic. INR recheck by homecare on 8/12/19.       albuterol (PROVENTIL) (2.5 MG/3ML) 0.083% neb solution Take 2.5 mg by nebulization every 4 hours as needed for shortness of breath / dyspnea or wheezing       furosemide (LASIX) 40 MG tablet Take 40 mg by mouth daily       potassium chloride (KLOR-CON) 20 MEQ packet Take 40 mEq by mouth 2 times daily         REVIEW OF SYSTEMS:  4 point ROS including Respiratory, CV, GI and , other than that noted in the HPI,  is negative    Objective:  /64   Pulse 92   Temp 98.6  F (37  C)   Resp 19   Wt 81.6 kg (179 lb 12.8 oz)   BMI 26.55 kg/m    Exam:  GENERAL APPEARANCE:  in no distress, appears healthy  ENT:  Mouth and posterior oropharynx normal, moist mucous membranes  RESP:  lungs clear to auscultation   CV:  peripheral edema 1-2+ in lower legs, irregular rhythm rate  ABDOMEN:  bowel sounds normal  M/S:   Gait and station normal  SKIN:  Inspection of skin and subcutaneous tissue baseline  NEURO:   Examination of sensation by touch normal  PSYCH:  oriented to self    Labs:   Recent labs in [facility] reviewed by me today.     ASSESSMENT/PLAN:  Acute on chronic systolic congestive heart failure (H)  Hypertension, unspecified type  -SBP's in the 100-115 range  -HR's in the 88-92 range  -continues metoprolol succinate 200mg/day  -patient declined reducing metoprolol to 150mg daily, spouse would prefer to wait for cardiology appt  -referral for [facility]ology at [facility] sent  Preferred location:  [facility] [phone]    [email or web address]    Acute kidney injury (H)  -creat/GFR on 8/15/19 were 1.37/49  -dose medications renally as possible  -BMP on 9/30    Atrial fibrillation, unspecified type (H)  -INR on 9/16 1.51  -increase warfarin to 2.5mg MWF, 5mg ROW  -recheck INR on 9/30        Electronically signed by:  PALOMO Haro CNP

## 2019-10-01 ENCOUNTER — ASSISTED LIVING VISIT (OUTPATIENT)
Dept: GERIATRICS | Facility: CLINIC | Age: 79
End: 2019-10-01
Payer: COMMERCIAL

## 2019-10-01 ENCOUNTER — TRANSFERRED RECORDS (OUTPATIENT)
Dept: HEALTH INFORMATION MANAGEMENT | Facility: CLINIC | Age: 79
End: 2019-10-01

## 2019-10-01 ENCOUNTER — RECORDS - HEALTHEAST (OUTPATIENT)
Dept: LAB | Facility: CLINIC | Age: 79
End: 2019-10-01

## 2019-10-01 VITALS
DIASTOLIC BLOOD PRESSURE: 64 MMHG | BODY MASS INDEX: 26.43 KG/M2 | HEART RATE: 92 BPM | TEMPERATURE: 98.6 F | WEIGHT: 179 LBS | RESPIRATION RATE: 19 BRPM | SYSTOLIC BLOOD PRESSURE: 102 MMHG

## 2019-10-01 DIAGNOSIS — Z79.01 LONG TERM (CURRENT) USE OF ANTICOAGULANTS: ICD-10-CM

## 2019-10-01 DIAGNOSIS — Z51.81 ENCOUNTER FOR THERAPEUTIC DRUG MONITORING: ICD-10-CM

## 2019-10-01 DIAGNOSIS — I63.9 COMPLETED STROKE (H): Primary | ICD-10-CM

## 2019-10-01 DIAGNOSIS — I48.91 ATRIAL FIBRILLATION, UNSPECIFIED TYPE (H): ICD-10-CM

## 2019-10-01 LAB
ANION GAP SERPL CALCULATED.3IONS-SCNC: 6 MMOL/L (ref 5–18)
ANION GAP SERPL CALCULATED.3IONS-SCNC: 6 MMOL/L (ref 5–18)
BUN SERPL-MCNC: 11 MG/DL (ref 8–28)
BUN SERPL-MCNC: 11 MG/DL (ref 8–28)
CALCIUM SERPL-MCNC: 8.8 MG/DL (ref 8.5–10.5)
CALCIUM SERPL-MCNC: 8.8 MG/DL (ref 8.5–10.5)
CHLORIDE BLD-SCNC: 109 MMOL/L (ref 98–107)
CHLORIDE SERPLBLD-SCNC: 109 MMOL/L (ref 98–107)
CO2 SERPL-SCNC: 27 MMOL/L (ref 22–31)
CO2 SERPL-SCNC: 27 MMOL/L (ref 22–31)
CREAT SERPL-MCNC: 0.89 MG/DL (ref 0.7–1.3)
CREAT SERPL-MCNC: 0.89 MG/DL (ref 0.7–1.3)
GFR SERPL CREATININE-BSD FRML MDRD: >60 ML/MIN/1.73M2
GFR SERPL CREATININE-BSD FRML MDRD: >60 ML/MIN/1.73M2
GLUCOSE BLD-MCNC: 72 MG/DL (ref 70–125)
GLUCOSE SERPL-MCNC: 72 MG/DL (ref 70–125)
HEMOGLOBIN: 13.5 G/DL (ref 14–18)
HGB BLD-MCNC: 13.5 G/DL (ref 14–18)
INR PPP: 1.76 (ref 0.9–1.1)
INR PPP: 1.76 (ref 0.9–1.1)
POTASSIUM BLD-SCNC: 4 MMOL/L (ref 3.5–5)
POTASSIUM SERPL-SCNC: 4 MMOL/L (ref 3.5–5)
SODIUM SERPL-SCNC: 142 MMOL/L (ref 136–145)
SODIUM SERPL-SCNC: 142 MMOL/L (ref 136–145)

## 2019-10-01 NOTE — PROGRESS NOTES
[place] [facility]  [place] Medical Record Number:  [id number]  Place of Service where encounter took place:  [facility] [[id number]]  Chief Complaint   Patient presents with     RECHECK       HPI:    Bradley Liz  is a 79 year old (1940), who is being seen today for an episodic care visit.  HPI information obtained from: facility chart records, facility staff, patient report and [facility] chart review. Today's concern is:     Completed stroke (H)  Atrial fibrillation, unspecified type (H)  Encounter for therapeutic drug monitoring  Long term (current) use of anticoagulants     Patient seen thin morning, lab out yesterday to check INR and patient declined, at 0700 today declined again as he wanted to ot be disturbed, met with [facility] phlebotomist in office and he had declined again this morning, went up to room with phlebotomist and had labs draw albeit patient not pleased as previously had scheduled finger sticks in clinic, was able to obtain labs, patient is stable, still going to the [facility] door for [facility] app 1-2x/week, overall status is stable.    Past Medical and Surgical History reviewed in Epic today.    MEDICATIONS:  Current Outpatient Medications   Medication Sig Dispense Refill     acetaminophen (TYLENOL) 325 MG tablet Take 2 tablets (650 mg) by mouth every 4 hours as needed for mild pain       ASPIRIN NOT PRESCRIBED, INTENTIONAL, by Other route continuous prn.  0     metoprolol succinate ER (TOPROL-XL) 200 MG 24 hr tablet Take 1 tablet (200 mg) by mouth daily 90 tablet 3     order for DME Please draw INR as ordered and as needed. Call results to [facility] at (p) [phone] or fax them to (f) [phone] 3 Month 3     rosuvastatin (CRESTOR) 5 MG tablet Take 1 tablet (5 mg) by mouth daily 90 tablet 3     Skin Protectants, Misc. (EUCERIN) cream Apply topically daily       warfarin (COUMADIN) 5 MG tablet 5 mg (5 mg x 1)  every Mon, Fri; 2.5 mg (5 mg x 0.5) all other days or as directed by the Anticoagulation Clinic. INR recheck by homecare on 8/12/19.         REVIEW OF SYSTEMS:  4 point ROS including Respiratory, CV, GI and , other than that noted in the HPI,  is negative    Objective:  /64   Pulse 92   Temp 98.6  F (37  C)   Resp 19   Wt 81.2 kg (179 lb)   BMI 26.43 kg/m    Exam:  GENERAL APPEARANCE:  Alert, in no distress, appears healthy  ENT:  Mouth and posterior oropharynx normal, moist mucous membranes  RESP:  lungs clear to auscultation   CV:  peripheral edema 1-2+ in lower legs and pedal, irregular rhythm rate  ABDOMEN:  bowel sounds normal  M/S:   Gait and station normal  SKIN:  Inspection of skin and subcutaneous tissue baseline  NEURO:   Examination of sensation by touch normal  PSYCH:  oriented to self    Labs:   Recent labs in [facility] reviewed by me today.     ASSESSMENT/PLAN:  Completed stroke (H)  -was in 2008, R side weakness, no recent CVA activity  -follow up with neurology as indicated  -continue crestor 5mg and warfarin    Atrial fibrillation, unspecified type (H)  Encounter for therapeutic drug monitoring  Long term (current) use of anticoagulants  -referral to cardiology pending, sent on 9/24, appears to have appt on 10/22 and 10/25 with , low BP's and ambulatory, patient declined my request to reduce metoprolol without cardiology approval (9/24)   -INR 8/16 was 1.51, warfarin 2.5mg MWF, 5mg ROW  -INR 10/1 was 1.76, warfarin same dose, trending up  -recheck INR on Mon 10/14          Electronically signed by:  PALOMO Haro CNP

## 2019-10-14 ENCOUNTER — RECORDS - HEALTHEAST (OUTPATIENT)
Dept: LAB | Facility: CLINIC | Age: 79
End: 2019-10-14

## 2019-10-14 LAB — INR PPP: 1.63 (ref 0.9–1.1)

## 2019-10-15 ENCOUNTER — ASSISTED LIVING VISIT (OUTPATIENT)
Dept: GERIATRICS | Facility: CLINIC | Age: 79
End: 2019-10-15
Payer: COMMERCIAL

## 2019-10-15 VITALS
BODY MASS INDEX: 26.55 KG/M2 | TEMPERATURE: 97.9 F | HEART RATE: 90 BPM | SYSTOLIC BLOOD PRESSURE: 132 MMHG | RESPIRATION RATE: 23 BRPM | WEIGHT: 179.8 LBS | DIASTOLIC BLOOD PRESSURE: 69 MMHG

## 2019-10-15 DIAGNOSIS — Z51.81 ENCOUNTER FOR THERAPEUTIC DRUG MONITORING: ICD-10-CM

## 2019-10-15 DIAGNOSIS — Z79.01 LONG TERM (CURRENT) USE OF ANTICOAGULANTS: ICD-10-CM

## 2019-10-15 DIAGNOSIS — I48.91 ATRIAL FIBRILLATION, UNSPECIFIED TYPE (H): Primary | ICD-10-CM

## 2019-10-15 NOTE — PROGRESS NOTES
[place] [facility]  [place] Medical Record Number:  [id number]  Place of Service where encounter took place: [facility] [[id number]]    HPI:    Bradley Liz is a 79 year old  (1940), who is being seen today for an episodic care visit at the above location.   HPI information obtained from: facility chart records, facility staff, patient report and [facility] chart review. Today's concern is INR/Coumadin management for A. Fib    ROS/Subjective:  Bleeding Signs/Symptoms:  None  Thromboembolic Signs/Symptoms:  None  Medication Changes:  No  Dietary Changes:  No  Activity Changes: No  Bacterial/Viral Infection:  No  Missed Coumadin Doses:  None  On ASA: No  Other Concerns:  No    OBJECTIVE:  /69   Pulse 90   Temp 97.9  F (36.6  C)   Resp 23   Wt 81.6 kg (179 lb 12.8 oz)   BMI 26.55 kg/m    Last INR: 1.76 on 10/1  INR Today:  1.63  Current Dose:  2.5mg MWF, 5mg ROW  INR Flow sheet at [facility]:    ASSESSMENT:     Atrial fibrillation, unspecified type (H)  Long term (current) use of anticoagulants  Encounter for therapeutic drug monitoring  Subtherapeutic INR for goal of 2-3    PLAN:   Orders written by provider at facility  New Dose: 2.5mg T/Th, 5mg ROW    Next INR: 11/18      Electronically signed by:  PALOMO Haro CNP

## 2019-10-22 ENCOUNTER — HOSPITAL ENCOUNTER (OUTPATIENT)
Dept: CARDIOLOGY | Facility: CLINIC | Age: 79
Discharge: HOME | End: 2019-10-22
Attending: INTERNAL MEDICINE | Admitting: INTERNAL MEDICINE
Payer: COMMERCIAL

## 2019-10-22 DIAGNOSIS — I50.23 ACUTE ON CHRONIC SYSTOLIC CONGESTIVE HEART FAILURE (H): ICD-10-CM

## 2019-10-22 PROCEDURE — 93306 TTE W/DOPPLER COMPLETE: CPT | Mod: 26 | Performed by: INTERNAL MEDICINE

## 2019-10-22 PROCEDURE — 93306 TTE W/DOPPLER COMPLETE: CPT

## 2019-10-25 ENCOUNTER — OFFICE VISIT (OUTPATIENT)
Dept: CARDIOLOGY | Facility: CLINIC | Age: 79
End: 2019-10-25
Payer: COMMERCIAL

## 2019-10-25 VITALS
HEART RATE: 76 BPM | DIASTOLIC BLOOD PRESSURE: 73 MMHG | SYSTOLIC BLOOD PRESSURE: 118 MMHG | WEIGHT: 187 LBS | BODY MASS INDEX: 27.62 KG/M2 | OXYGEN SATURATION: 98 %

## 2019-10-25 DIAGNOSIS — I48.20 CHRONIC ATRIAL FIBRILLATION (H): ICD-10-CM

## 2019-10-25 PROCEDURE — 99214 OFFICE O/P EST MOD 30 MIN: CPT | Performed by: INTERNAL MEDICINE

## 2019-10-25 NOTE — PROGRESS NOTES
HPI and Plan:   See dictation [id number]    Orders Placed This Encounter   Procedures     Follow-Up with Cardiologist     No orders of the defined types were placed in this encounter.    There are no discontinued medications.      Encounter Diagnosis   Name Primary?     Chronic atrial fibrillation        CURRENT MEDICATIONS:  Current Outpatient Medications   Medication Sig Dispense Refill     acetaminophen (TYLENOL) 325 MG tablet Take 2 tablets (650 mg) by mouth every 4 hours as needed for mild pain       ASPIRIN NOT PRESCRIBED, INTENTIONAL, by Other route continuous prn.  0     metoprolol succinate ER (TOPROL-XL) 200 MG 24 hr tablet Take 1 tablet (200 mg) by mouth daily 90 tablet 3     order for DME Please draw INR as ordered and as needed. Call results to [facility] at (p) [phone] or fax them to (f) [phone] 3 Month 3     rosuvastatin (CRESTOR) 5 MG tablet Take 1 tablet (5 mg) by mouth daily 90 tablet 3     Skin Protectants, Misc. (EUCERIN) cream Apply topically daily       warfarin (COUMADIN) 5 MG tablet 5 mg (5 mg x 1) every Mon, Fri; 2.5 mg (5 mg x 0.5) all other days or as directed by the Anticoagulation Clinic. INR recheck by homecare on 8/12/19.         ALLERGIES   No Known Allergies    PAST MEDICAL HISTORY:  No past medical history on file.    PAST SURGICAL HISTORY:  Past Surgical History:   Procedure Laterality Date     LAPAROSCOPIC CHOLECYSTECTOMY N/A 7/9/2019    Procedure: Laparoscopic cholecystectomy;  Surgeon: Ad Peck MD;  Location:  OR       FAMILY HISTORY:  Family History   Problem Relation Age of Onset     Unknown/Adopted Mother      Prostate Cancer Father      C.A.D. Father         age 77       SOCIAL HISTORY:  Social History     Socioeconomic History     Marital status:      Spouse name: None     Number of children: None     Years of education: None     Highest education level: None   Occupational History     None   Social Needs     Financial resource  strain: None     Food insecurity:     Worry: None     Inability: None     Transportation needs:     Medical: None     Non-medical: None   Tobacco Use     Smoking status: Former Smoker     Years: 50.00     Last attempt to quit: 12/5/2008     Years since quitting: 10.8     Smokeless tobacco: Never Used   Substance and Sexual Activity     Alcohol use: Yes     Comment: couple beers occ     Drug use: No     Sexual activity: Yes     Partners: Female   Lifestyle     Physical activity:     Days per week: None     Minutes per session: None     Stress: None   Relationships     Social connections:     Talks on phone: None     Gets together: None     Attends Rastafarian service: None     Active member of club or organization: None     Attends meetings of clubs or organizations: None     Relationship status: None     Intimate partner violence:     Fear of current or ex partner: None     Emotionally abused: None     Physically abused: None     Forced sexual activity: None   Other Topics Concern     Parent/sibling w/ CABG, MI or angioplasty before 65F 55M? No   Social History Narrative     None       Review of Systems:  Skin:  Positive for bruising   Eyes:  Negative    ENT:  Negative    Respiratory:  Positive for shortness of breath  Cardiovascular:    Positive for;lower extremity symptoms;edema  Gastroenterology: Negative    Genitourinary:  Positive for urinary frequency;urgency  Musculoskeletal:  Negative    Neurologic:  Positive for stroke  Psychiatric:  Negative anxiety;depression  Heme/Lymph/Imm:  Negative bleeding disorder  Endocrine:  Negative thyroid disorder;diabetes    Physical Exam:  Vitals: /73   Pulse 76   Wt 84.8 kg (187 lb)   SpO2 98%   BMI 27.62 kg/m      Recent Lab Results:  LIPID RESULTS:  Lab Results   Component Value Date    CHOL 138 08/24/2018    HDL 42 08/24/2018    LDL 66 08/24/2018    TRIG 152 (H) 08/24/2018    CHOLHDLRATIO 3.1 08/25/2015       LIVER ENZYME RESULTS:  Lab Results   Component Value  Date    AST 36 08/15/2019    ALT 37 08/15/2019       CBC RESULTS:  Lab Results   Component Value Date    WBC 9.4 07/26/2019    RBC 5.06 07/26/2019    HGB 13.5 (A) 10/01/2019    HCT 49.3 07/26/2019    MCV 97 07/26/2019    MCH 29.8 07/26/2019    MCHC 30.6 (L) 07/26/2019    RDW 14.4 07/26/2019     07/26/2019       BMP RESULTS:  Lab Results   Component Value Date     10/01/2019    POTASSIUM 4.0 10/01/2019    CHLORIDE 109 (A) 10/01/2019    CO2 27 10/01/2019    ANIONGAP 6 10/01/2019    GLC 72 10/01/2019    BUN 11 10/01/2019    CR 0.89 10/01/2019    GFRESTIMATED >60 10/01/2019    GFRESTBLACK >60 10/01/2019    DARON 8.8 10/01/2019        A1C RESULTS:  Lab Results   Component Value Date    A1C 5.5 07/11/2019       INR RESULTS:  Lab Results   Component Value Date    INR 1.76 (A) 10/01/2019    INR 1.51 (A) 09/16/2019           CC  Lela Leos, APRN CNP  [address]  [place] [address]

## 2019-10-25 NOTE — PATIENT INSTRUCTIONS
"[facility]~[address]~[place], [place]~[address]  Thank you for your  Heart Care visit today. If you have questions regarding your visit, please contact your cardiology RN, Kay Ramirez, at [phone].    Please arrive 15 minutes early to all cardiology appointments.    To schedule a future appointment, we kindly ask that you call cardiology scheduling at [phone] three months prior to requested revisit date.  [facility] cardiology clinic is staffed with \"Advance Practice Providers\". These are our cardiology Physician Assistants and Nurse Practitioners.   Please call cardiology scheduling if you feel you need clinical evaluation with them at any time for any cardiac reason.     Reminder:  For your safety, we ask that you bring in your current medication(s) or an updated list of your medications with you to EACH office visit. Include the medication name, dose of pill on bottle and how you are taking it. Include over-the-counter medications or supplements. Your provider will review this at each visit and plan your care based on your current information.   ~~~~~~~~~~~~~~~~~~~~~~~~~~~~~~~~~~~~~~~  \"[facility]\" [facility] telephone numbers for reference:  Cardiology Scheduling~[phone]  Diagnostic Imaging Scheduling~[phone]  Lab Scheduling~[phone]  Anticoagulation Clinic~[phone]  Cardiac Rehabilitation~[phone]  CORE Clinic RN's~[phone] (at [facility])  Congential Team [phone] (at [facility])  Cardiology Clinic RN's~[phone] (Kay Ramirez, RN)  ~~~~~~~~~~~~~~~~~~~~~~~~~~~~~~~~~~~~~~~~        "

## 2019-10-25 NOTE — PROGRESS NOTES
Service Date: 10/25/2019      REASON FOR VISIT:  Atrial fibrillation, nonischemic cardiomyopathy.      HISTORY OF PRESENT ILLNESS:  This is a very delightful 79-year-old gentleman who is here with his daughter.  The patient used to follow up with Dr. Brooks since 2017, and since Dr. Brooks has left practice he has transferred to my care.  He has a history of stroke in 2008.  In 2017 he was diagnosed with atrial fibrillation which was associated with mild cardiomyopathy with an EF around 40%.  Nuclear stress test showed some fixed defects without ischemia, without any anginal symptoms.  With rate control strategy, EF improved to about 50%-55%.  He is asymptomatic from a cardiac standpoint.  No angina, heart failure or EP symptoms.  He was last seen by Dr. Brooks 10/2018 and is here for routine followup.  He says he is compliant with his statin, warfarin and metoprolol dosing.  He had 1 fall in April of this year without injuries that were significant.  Last LDL was 66 last year.  Creatinine and potassium are normal.  He gets INR checks biweekly.  Last ECG showed atrial fibrillation with a heart rate of 98 beats per minute.  Last echocardiogram was 3 days ago, which showed an EF of 50%-55% with normal RV size with borderline reduced function without major valve disease.  Last nuclear stress test was in 2017 as mentioned above.      PHYSICAL EXAMINATION:   VITAL SIGNS:  Blood pressure is 118/73 with a pulse of 76.   GENERAL:  Alert and oriented x3, in no acute distress.   NECK:  Supple.  JVP is normal.   LUNGS:  Clear.   CARDIAC:  Cardiac sounds are irregular.  Soft systolic murmur.  No rubs or gallops.   EXTREMITIES:  Warm, no edema.   PSYCHIATRIC:  Appropriate affect.   ABDOMEN:  Nontender.      ASSESSMENT AND PLAN:  Mr. Bradley Liz is doing well from a cardiac standpoint, no symptoms at this point.  I will not make any changes to his regimen.  Continue warfarin and metoprolol.  I did go over data on the newer  oral anticoagulant agents with him, safety, efficacy and rationale behind switching.  At this point, he says these are cost prohibitive and he wants to stick to warfarin.  I will see him back in a year for routine followup, sooner if anything changes clinically.  I have told the family, if he has further falls or anything like that, then Cardiology consultation would be required sooner to discuss risks and benefits of ongoing anticoagulation therapy versus potential Watchman consideration.  Please do not hesitate to contact me with any questions.  It was a pleasure seeing Mr. Liz.  [place]  This year he is going to stay in [place], he says.         ZELALEM SOSA MD             D: 10/25/2019   T: 10/25/2019   MT: GLORIA      Name:     YVONNE LIZ   MRN:      [id number]        Account:      [id number]   :      1940           Service Date: 10/25/2019      Document: [id number]

## 2019-10-25 NOTE — LETTER
10/25/2019      Tam Bello, APRN CNP  [address] [place] [address] [address]  [facility] [address]      RE: Bradley Liz       Dear Colleague,    I had the pleasure of seeing Bradley BO Liz in the [facility].    Service Date: 10/25/2019      REASON FOR VISIT:  Atrial fibrillation, nonischemic cardiomyopathy.      HISTORY OF PRESENT ILLNESS:  This is a very delightful 79-year-old gentleman who is here with his daughter.  The patient used to follow up with Dr. Brooks since 2017, and since Dr. Brooks has left practice he has transferred to my care.  He has a history of stroke in 2008.  In 2017 he was diagnosed with atrial fibrillation which was associated with mild cardiomyopathy with an EF around 40%.  Nuclear stress test showed some fixed defects without ischemia, without any anginal symptoms.  With rate control strategy, EF improved to about 50%-55%.  He is asymptomatic from a cardiac standpoint.  No angina, heart failure or EP symptoms.  He was last seen by Dr. Brooks 10/2018 and is here for routine followup.  He says he is compliant with his statin, warfarin and metoprolol dosing.  He had 1 fall in April of this year without injuries that were significant.  Last LDL was 66 last year.  Creatinine and potassium are normal.  He gets INR checks biweekly.  Last ECG showed atrial fibrillation with a heart rate of 98 beats per minute.  Last echocardiogram was 3 days ago, which showed an EF of 50%-55% with normal RV size with borderline reduced function without major valve disease.  Last nuclear stress test was in 2017 as mentioned above.      PHYSICAL EXAMINATION:   VITAL SIGNS:  Blood pressure is 118/73 with a pulse of 76.   GENERAL:  Alert and oriented x3, in no acute distress.   NECK:  Supple.  JVP is normal.   LUNGS:  Clear.   CARDIAC:  Cardiac sounds are irregular.  Soft systolic murmur.  No rubs or gallops.   EXTREMITIES:  Warm, no edema.   PSYCHIATRIC:  Appropriate affect.    ABDOMEN:  Nontender.      ASSESSMENT AND PLAN:  Mr. Bradley Liz is doing well from a cardiac standpoint, no symptoms at this point.  I will not make any changes to his regimen.  Continue warfarin and metoprolol.  I did go over data on the newer oral anticoagulant agents with him, safety, efficacy and rationale behind switching.  At this point, he says these are cost prohibitive and he wants to stick to warfarin.  I will see him back in a year for routine followup, sooner if anything changes clinically.  I have told the family, if he has further falls or anything like that, then Cardiology consultation would be required sooner to discuss risks and benefits of ongoing anticoagulation therapy versus potential Watchman consideration.  Please do not hesitate to contact me with any questions.  It was a pleasure seeing Mr. Liz.  [place]  This year he is going to stay in [place], he says.         ZELALEM SOSA MD             D: 10/25/2019   T: 10/25/2019   MT: GLORIA      Name:     BRADLEY LIZ   MRN:      [id number]        Account:      [id number]   :      1940           Service Date: 10/25/2019      Document: [id number]         Outpatient Encounter Medications as of 10/25/2019   Medication Sig Dispense Refill     acetaminophen (TYLENOL) 325 MG tablet Take 2 tablets (650 mg) by mouth every 4 hours as needed for mild pain       ASPIRIN NOT PRESCRIBED, INTENTIONAL, by Other route continuous prn.  0     metoprolol succinate ER (TOPROL-XL) 200 MG 24 hr tablet Take 1 tablet (200 mg) by mouth daily 90 tablet 3     order for DME Please draw INR as ordered and as needed. Call results to [facility] at p) [phone] or fax them to f) [phone] 3 Month 3     rosuvastatin (CRESTOR) 5 MG tablet Take 1 tablet (5 mg) by mouth daily 90 tablet 3     Skin Protectants, Misc. (EUCERIN) cream Apply topically daily       warfarin (COUMADIN) 5 MG tablet 5  mg (5 mg x 1) every Mon, Fri; 2.5 mg (5 mg x 0.5) all other days or as directed by the Anticoagulation Clinic. INR recheck by homecare on 8/12/19.       No facility-administered encounter medications on file as of 10/25/2019.        Again, thank you for allowing me to participate in the care of your patient.      Sincerely,    Juice Laguna MD     [facility]

## 2019-10-25 NOTE — LETTER
10/25/2019    Tam Bello, APRN CNP  [address] [place] [id number]    RE: Bradley Liz       Dear Colleague,    I had the pleasure of seeing Bradley Liz in the [facility].    HPI and Plan:   See dictation [id number]    Orders Placed This Encounter   Procedures     Follow-Up with Cardiologist     No orders of the defined types were placed in this encounter.    There are no discontinued medications.      Encounter Diagnosis   Name Primary?     Chronic atrial fibrillation        CURRENT MEDICATIONS:  Current Outpatient Medications   Medication Sig Dispense Refill     acetaminophen (TYLENOL) 325 MG tablet Take 2 tablets (650 mg) by mouth every 4 hours as needed for mild pain       ASPIRIN NOT PRESCRIBED, INTENTIONAL, by Other route continuous prn.  0     metoprolol succinate ER (TOPROL-XL) 200 MG 24 hr tablet Take 1 tablet (200 mg) by mouth daily 90 tablet 3     order for DME Please draw INR as ordered and as needed. Call results to [facility] at (p) [phone] or fax them to (f) [phone] 3 Month 3     rosuvastatin (CRESTOR) 5 MG tablet Take 1 tablet (5 mg) by mouth daily 90 tablet 3     Skin Protectants, Misc. (EUCERIN) cream Apply topically daily       warfarin (COUMADIN) 5 MG tablet 5 mg (5 mg x 1) every Mon, Fri; 2.5 mg (5 mg x 0.5) all other days or as directed by the Anticoagulation Clinic. INR recheck by homecare on 8/12/19.         ALLERGIES   No Known Allergies    PAST MEDICAL HISTORY:  No past medical history on file.    PAST SURGICAL HISTORY:  Past Surgical History:   Procedure Laterality Date     LAPAROSCOPIC CHOLECYSTECTOMY N/A 7/9/2019    Procedure: Laparoscopic cholecystectomy;  Surgeon: Ad Peck MD;  Location:  OR       FAMILY HISTORY:  Family History   Problem Relation Age of Onset     Unknown/Adopted Mother      Prostate Cancer Father      C.A.D. Father         age 77       SOCIAL HISTORY:  Social  History     Socioeconomic History     Marital status:      Spouse name: None     Number of children: None     Years of education: None     Highest education level: None   Occupational History     None   Social Needs     Financial resource strain: None     Food insecurity:     Worry: None     Inability: None     Transportation needs:     Medical: None     Non-medical: None   Tobacco Use     Smoking status: Former Smoker     Years: 50.00     Last attempt to quit: 12/5/2008     Years since quitting: 10.8     Smokeless tobacco: Never Used   Substance and Sexual Activity     Alcohol use: Yes     Comment: couple beers occ     Drug use: No     Sexual activity: Yes     Partners: Female   Lifestyle     Physical activity:     Days per week: None     Minutes per session: None     Stress: None   Relationships     Social connections:     Talks on phone: None     Gets together: None     Attends Islam service: None     Active member of club or organization: None     Attends meetings of clubs or organizations: None     Relationship status: None     Intimate partner violence:     Fear of current or ex partner: None     Emotionally abused: None     Physically abused: None     Forced sexual activity: None   Other Topics Concern     Parent/sibling w/ CABG, MI or angioplasty before 65F 55M? No   Social History Narrative     None       Review of Systems:  Skin:  Positive for bruising   Eyes:  Negative    ENT:  Negative    Respiratory:  Positive for shortness of breath  Cardiovascular:    Positive for;lower extremity symptoms;edema  Gastroenterology: Negative    Genitourinary:  Positive for urinary frequency;urgency  Musculoskeletal:  Negative    Neurologic:  Positive for stroke  Psychiatric:  Negative anxiety;depression  Heme/Lymph/Imm:  Negative bleeding disorder  Endocrine:  Negative thyroid disorder;diabetes    Physical Exam:  Vitals: /73   Pulse 76   Wt 84.8 kg (187 lb)   SpO2 98%   BMI 27.62 kg/m       Recent  Lab Results:  LIPID RESULTS:  Lab Results   Component Value Date    CHOL 138 08/24/2018    HDL 42 08/24/2018    LDL 66 08/24/2018    TRIG 152 (H) 08/24/2018    CHOLHDLRATIO 3.1 08/25/2015       LIVER ENZYME RESULTS:  Lab Results   Component Value Date    AST 36 08/15/2019    ALT 37 08/15/2019       CBC RESULTS:  Lab Results   Component Value Date    WBC 9.4 07/26/2019    RBC 5.06 07/26/2019    HGB 13.5 (A) 10/01/2019    HCT 49.3 07/26/2019    MCV 97 07/26/2019    MCH 29.8 07/26/2019    MCHC 30.6 (L) 07/26/2019    RDW 14.4 07/26/2019     07/26/2019       BMP RESULTS:  Lab Results   Component Value Date     10/01/2019    POTASSIUM 4.0 10/01/2019    CHLORIDE 109 (A) 10/01/2019    CO2 27 10/01/2019    ANIONGAP 6 10/01/2019    GLC 72 10/01/2019    BUN 11 10/01/2019    CR 0.89 10/01/2019    GFRESTIMATED >60 10/01/2019    GFRESTBLACK >60 10/01/2019    DARON 8.8 10/01/2019        A1C RESULTS:  Lab Results   Component Value Date    A1C 5.5 07/11/2019       INR RESULTS:  Lab Results   Component Value Date    INR 1.76 (A) 10/01/2019    INR 1.51 (A) 09/16/2019           CC  PALOMO Arteaga CNP  [address]  [place] [address]                    Thank you for allowing me to participate in the care of your patient.      Sincerely,     Juice Laguna MD     [facility]    cc:   PALOMO Arteaga CNP  [address]  [place] [address]

## 2019-11-12 ENCOUNTER — NURSE TRIAGE (OUTPATIENT)
Dept: FAMILY MEDICINE | Facility: CLINIC | Age: 79
End: 2019-11-12

## 2019-11-12 NOTE — TELEPHONE ENCOUNTER
Reason for call:  Patient's daughter reporting a symptom    Symptom or request: Diarrhea    Duration (how long have symptoms been present): 11/9/19    Have you been treated for this before? Yes    Additional comments: Please call daughter, Karon. She is not sure when he needs medical attention.    Phone Number patient can be reached at:  Other phone number:  [phone]    Best Time:  Any    Can we leave a detailed message on this number:  YES    Call taken on 11/12/2019 at 1:53 PM by Christine Reyes

## 2019-11-12 NOTE — TELEPHONE ENCOUNTER
"    Answer Assessment - Initial Assessment Questions  1. DIARRHEA SEVERITY: \"How bad is the diarrhea?\" \"How many extra stools have you had in the past 24 hours than normal?\"     - MILD: Few loose or mushy BMs; increase of 1-3 stools over normal daily number of stools; mild increase in ostomy output.    - MODERATE: Increase of 4-6 stools daily over normal; moderate increase in ostomy output.    - SEVERE (or Worst Possible): Increase of 7 or more stools daily over normal; moderate increase in ostomy output; incontinence.      \"wish I had more details, I know he is incontinent, not sure how often, it is day 4\"   2. ONSET: \"When did the diarrhea begin?\"       Day 4 per \"mom's report\"   3. BM CONSISTENCY: \"How loose or watery is the diarrhea?\"       Liquid   4. VOMITING: \"Are you also vomiting?\" If so, ask: \"How many times in the past 24 hours?\"       No vomiting per her mom's report  5. ABDOMINAL PAIN: \"Are you having any abdominal pain?\" If yes: \"What does it feel like?\" (e.g., crampy, dull, intermittent, constant)       Not sure, dont think so   6. ABDOMINAL PAIN SEVERITY: If present, ask: \"How bad is the pain?\"  (e.g., Scale 1-10; mild, moderate, or severe)     - MILD (1-3): doesn't interfere with normal activities, abdomen soft and not tender to touch      - MODERATE (4-7): interferes with normal activities or awakens from sleep, tender to touch      - SEVERE (8-10): excruciating pain, doubled over, unable to do any normal activities        As above   7. ORAL INTAKE: If vomiting, \"Have you been able to drink liquids?\" \"How much fluids have you had in the past 24 hours?\"      As above   8. HYDRATION: \"Any signs of dehydration?\" (e.g., dry mouth [not just dry lips], too weak to stand, dizziness, new weight loss) \"When did you last urinate?\"      Not sure  9. EXPOSURE: \"Have you traveled to a foreign country recently?\" \"Have you been exposed to anyone with diarrhea?\" \"Could you have eaten any food that was spoiled?\"    " "  No   10. ANTIBIOTIC USE: \"Are you taking antibiotics now or have you taken antibiotics in the past 2 months?\"        Don't think so   11. OTHER SYMPTOMS: \"Do you have any other symptoms?\" (e.g., fever, blood in stool)        Not sure  12. PREGNANCY: \"Is there any chance you are pregnant?\" \"When was your last menstrual period?\"        n/a    Protocols used: DIARRHEA-A-OH    Daughter calling. She really doesn' t have details. \"I just wanted to know what to watch for and when he needs to come in. Mom has him at home now and is taking care of him, I am going to get some 7 up and things he will drink and will go over, so I wanted to know what to ask and what to look for.   Encouraged an appt now that he is discharged to home from TCU and she agreed and will schedule.   Understands she can call back any time to speak with an RN if she has additional info or questions, and knows to bring him to the ED/ UC if necessary.     Fadumo Maya RNC      "

## 2019-11-18 ENCOUNTER — TELEPHONE (OUTPATIENT)
Dept: FAMILY MEDICINE | Facility: CLINIC | Age: 79
End: 2019-11-18

## 2019-11-18 DIAGNOSIS — I48.91 ATRIAL FIBRILLATION, UNSPECIFIED TYPE (H): ICD-10-CM

## 2019-11-18 RX ORDER — METOPROLOL SUCCINATE 200 MG/1
200 TABLET, EXTENDED RELEASE ORAL DAILY
Qty: 90 TABLET | Refills: 2 | Status: SHIPPED | OUTPATIENT
Start: 2019-11-18 | End: 2020-07-01

## 2019-11-18 NOTE — TELEPHONE ENCOUNTER
Please transfer metoprolol succinate ER (TOPROL-XL) 200 MG 24 hr tablet RX to [facility].

## 2019-11-18 NOTE — TELEPHONE ENCOUNTER
Prescription approved per [facility] Refill Protocol.  Remaining refills sent to new Pharmacy.  KPavleRN

## 2019-11-19 ENCOUNTER — ANTICOAGULATION THERAPY VISIT (OUTPATIENT)
Dept: ANTICOAGULATION | Facility: CLINIC | Age: 79
End: 2019-11-19
Payer: COMMERCIAL

## 2019-11-19 DIAGNOSIS — I63.9 COMPLETED STROKE (H): ICD-10-CM

## 2019-11-19 DIAGNOSIS — Z79.01 LONG TERM (CURRENT) USE OF ANTICOAGULANTS: ICD-10-CM

## 2019-11-19 LAB — INR POINT OF CARE: 2.5 (ref 0.86–1.14)

## 2019-11-19 PROCEDURE — 99207 ZZC NO CHARGE NURSE ONLY: CPT

## 2019-11-19 PROCEDURE — 36416 COLLJ CAPILLARY BLOOD SPEC: CPT

## 2019-11-19 PROCEDURE — 85610 PROTHROMBIN TIME: CPT | Mod: QW

## 2019-11-27 DIAGNOSIS — Z79.01 LONG TERM CURRENT USE OF ANTICOAGULANT THERAPY: ICD-10-CM

## 2019-11-27 NOTE — TELEPHONE ENCOUNTER
"Requested Prescriptions   Pending Prescriptions Disp Refills     warfarin ANTICOAGULANT (COUMADIN) 5 MG tablet [Pharmacy Med Name: WARFARIN SOD 5MG TABLETS (PEACH)] 80 tablet 0     Sig: TAKE ONE-HALF TABLET EVERY MONDAY AND FRIDAY AND TAKE 1 TABLET ON ALL OTHER DAYS OR AS DIRECTED BY CLINIC   Last Written Prescription Date:  historical  Last Fill Quantity: 0,  # refills: 0   Last office visit: 8/15/2019 with prescribing provider:  MAX Lima   Future Office Visit:   Next 5 appointments (look out 90 days)    Jan 02, 2020  3:20 PM CST  [facility] [address] [facility] [address]  [phone]             Vitamin K Antagonists Failed - 11/27/2019 10:20 AM        Failed - INR is within goal in the past 6 weeks     Confirm INR is within goal in the past 6 weeks.     Recent Labs   Lab Test 11/19/19   INR 2.5*                       Failed - Medication is active on med list        Passed - Recent (12 mo) or future (30 days) visit within the authorizing provider's specialty     Patient has had an office visit with the authorizing provider or a provider within the authorizing providers department within the previous 12 mos or has a future within next 30 days. See \"Patient Info\" tab in inbasket, or \"Choose Columns\" in Meds & Orders section of the refill encounter.              Passed - Patient is 18 years of age or older          "

## 2019-11-29 RX ORDER — WARFARIN SODIUM 5 MG/1
TABLET ORAL
Qty: 80 TABLET | Refills: 0 | Status: SHIPPED | OUTPATIENT
Start: 2019-11-29 | End: 2020-01-03

## 2019-12-10 ENCOUNTER — ANTICOAGULATION THERAPY VISIT (OUTPATIENT)
Dept: ANTICOAGULATION | Facility: CLINIC | Age: 79
End: 2019-12-10
Payer: COMMERCIAL

## 2019-12-10 DIAGNOSIS — I63.9 COMPLETED STROKE (H): ICD-10-CM

## 2019-12-10 DIAGNOSIS — Z79.01 LONG TERM (CURRENT) USE OF ANTICOAGULANTS: ICD-10-CM

## 2019-12-10 LAB — INR POINT OF CARE: 2.5 (ref 0.86–1.14)

## 2019-12-10 PROCEDURE — 36416 COLLJ CAPILLARY BLOOD SPEC: CPT

## 2019-12-10 PROCEDURE — 99207 ZZC NO CHARGE NURSE ONLY: CPT

## 2019-12-10 PROCEDURE — 85610 PROTHROMBIN TIME: CPT | Mod: QW

## 2019-12-10 NOTE — PROGRESS NOTES
ANTICOAGULATION FOLLOW-UP CLINIC VISIT    Patient Name:  Bradley Liz  Date:  12/10/2019  Contact Type:  Face to Face    SUBJECTIVE:  Patient Findings     Comments:   No changes in medications, activity, or diet noted. No concerns with clotting, bleeding, or increased bruising noted. Took warfarin as prescribed.  Pt will be seeing the provider on 2020. Pt will have INR drawn before he leaves for [place] and he will request a lab order from the provider since it has to be signed by her. Pt is leaving  for [place].  Patient is to continue maintenance warfarin plan, and check INR in about 3 weeks when he will be at the clinic. Pt is requesting to go to 6 week rechecks after.  Patient verbalizes understanding and agrees to plan. No further questions or concerns.        Clinical Outcomes     Negatives:   Major bleeding event, Thromboembolic event, Anticoagulation-related hospital admission, Anticoagulation-related ED visit, Anticoagulation-related fatality    Comments:   No changes in medications, activity, or diet noted. No concerns with clotting, bleeding, or increased bruising noted. Took warfarin as prescribed.  Pt will be seeing the provider on 2020. Pt will have INR drawn before he leaves for [place] and he will request a lab order from the provider since it has to be signed by her. Pt is leaving  for [place].  Patient is to continue maintenance warfarin plan, and check INR in about 3 weeks when he will be at the clinic. Pt is requesting to go to 6 week rechecks after.  Patient verbalizes understanding and agrees to plan. No further questions or concerns.           OBJECTIVE    INR Protime   Date Value Ref Range Status   12/10/2019 2.5 (A) 0.86 - 1.14 Final       ASSESSMENT / PLAN  INR assessment THER    Recheck INR In: 3 WEEKS    INR Location Clinic      Anticoagulation Summary  As of 12/10/2019    INR goal:   2.0-3.0   TTR:   80.0 % (10.8 mo)   INR used for dosin.5 (12/10/2019)   Warfarin  maintenance plan:   2.5 mg (5 mg x 0.5) every Tue, Fri; 5 mg (5 mg x 1) all other days   Full warfarin instructions:   2.5 mg every Tue, Fri; 5 mg all other days   Weekly warfarin total:   30 mg   Plan last modified:   Fanny Elizalde RN (11/19/2019)   Next INR check:   1/2/2020   Target end date:   Indefinite    Indications    Completed stroke (H) [I63.9]  Long term (current) use of anticoagulants [Z79.01]             Anticoagulation Episode Summary     INR check location:       Preferred lab:       Send INR reminders to:   [place] PHONE RE2    Comments:   * Takes warfarin in the AM. Leaves for [place] at the beginning of Jan to April (will need snowbird - Dr. Mustafa has to sign paperwork!)       Anticoagulation Care Providers     Provider Role Specialty Phone number    Alex Mustafa MD [facility] [phone]            See the Encounter Report to view Anticoagulation Flowsheet and Dosing Calendar (Go to Encounters tab in chart review, and find the Anticoagulation Therapy Visit)        Fanny Elizalde RN

## 2020-01-02 ENCOUNTER — OFFICE VISIT (OUTPATIENT)
Dept: FAMILY MEDICINE | Facility: CLINIC | Age: 80
End: 2020-01-02
Payer: COMMERCIAL

## 2020-01-02 VITALS
BODY MASS INDEX: 27.31 KG/M2 | HEART RATE: 83 BPM | SYSTOLIC BLOOD PRESSURE: 104 MMHG | OXYGEN SATURATION: 98 % | RESPIRATION RATE: 20 BRPM | DIASTOLIC BLOOD PRESSURE: 64 MMHG | TEMPERATURE: 97.9 F | WEIGHT: 184.4 LBS | HEIGHT: 69 IN

## 2020-01-02 DIAGNOSIS — Z79.01 LONG TERM (CURRENT) USE OF ANTICOAGULANTS: ICD-10-CM

## 2020-01-02 DIAGNOSIS — I63.9 COMPLETED STROKE (H): Primary | ICD-10-CM

## 2020-01-02 DIAGNOSIS — I48.91 ATRIAL FIBRILLATION, UNSPECIFIED TYPE (H): ICD-10-CM

## 2020-01-02 PROCEDURE — 99213 OFFICE O/P EST LOW 20 MIN: CPT | Performed by: FAMILY MEDICINE

## 2020-01-02 ASSESSMENT — MIFFLIN-ST. JEOR: SCORE: 1541.81

## 2020-01-02 NOTE — PROGRESS NOTES
"Subjective     Bradley Liz is a 79 year old male who presents to clinic today for the following health issues:    HPI   Chief Complaint   Patient presents with     Patient Request     pt. is here today questioning why he is here, \"not sure why I'm here\" ? transitional care follow up ? lab work. orders for INR when he goes down to [place].         Patient is home now from the assisted living/TCU he was staying in.  They leave for [place] in 13 days, they are flying this year.  Needs to have INR checked and needs to have INR done while in [place] so needs orders printed for that.     Overall he's feeling quite well.              Reviewed and updated as needed this visit by Provider         Review of Systems   ROS COMP: Constitutional, HEENT, cardiovascular, pulmonary, gi and gu systems are negative, except as otherwise noted.      Objective    /64   Pulse 83   Temp 97.9  F (36.6  C) (Tympanic)   Resp 20   Ht 1.753 m (5' 9\")   Wt 83.6 kg (184 lb 6.4 oz)   SpO2 98%   BMI 27.23 kg/m    Body mass index is 27.23 kg/m .  Physical Exam   GENERAL: healthy, alert and no distress  EYES: Eyes grossly normal to inspection, PERRL and conjunctivae and sclerae normal  NECK: no adenopathy, no asymmetry, masses, or scars and thyroid normal to palpation  RESP: lungs clear to auscultation - no rales, rhonchi or wheezes  CV: irregularly irregular rhythm, normal S1 S2, no S3 or S4, no murmur, click or rub, peripheral pulses strong and no peripheral edema  ABDOMEN: soft, nontender, no hepatosplenomegaly, no masses and bowel sounds normal  MS: 1+ edema to ankle  NEURO: weakness of right hand/arm, sensory exam grossly normal and mentation intact  PSYCH: mentation appears normal, affect normal/bright            Assessment & Plan       ICD-10-CM    1. Completed stroke (H) I63.9 INR     CANCELED: INR   2. Long term (current) use of anticoagulants Z79.01 INR     CANCELED: INR   3. Atrial fibrillation, unspecified type (H) I48.91 INR     " CANCELED: INR             Return in about 6 months (around 7/2/2020) for Physical Exam.    Verito Lima MD  [facility]

## 2020-01-03 ENCOUNTER — ANTICOAGULATION THERAPY VISIT (OUTPATIENT)
Dept: ANTICOAGULATION | Facility: CLINIC | Age: 80
End: 2020-01-03

## 2020-01-03 DIAGNOSIS — Z79.01 LONG TERM (CURRENT) USE OF ANTICOAGULANTS: ICD-10-CM

## 2020-01-03 DIAGNOSIS — I48.91 ATRIAL FIBRILLATION, UNSPECIFIED TYPE (H): ICD-10-CM

## 2020-01-03 DIAGNOSIS — I63.9 COMPLETED STROKE (H): ICD-10-CM

## 2020-01-03 DIAGNOSIS — Z79.01 LONG TERM CURRENT USE OF ANTICOAGULANT THERAPY: ICD-10-CM

## 2020-01-03 LAB — INR PPP: 2.23 (ref 0.86–1.14)

## 2020-01-03 PROCEDURE — 36415 COLL VENOUS BLD VENIPUNCTURE: CPT | Performed by: FAMILY MEDICINE

## 2020-01-03 PROCEDURE — 85610 PROTHROMBIN TIME: CPT | Performed by: FAMILY MEDICINE

## 2020-01-03 PROCEDURE — 99207 ZZC NO CHARGE NURSE ONLY: CPT

## 2020-01-03 RX ORDER — WARFARIN SODIUM 5 MG/1
TABLET ORAL
Qty: 80 TABLET | Refills: 0 | COMMUNITY
Start: 2020-01-03 | End: 2020-03-04

## 2020-01-03 NOTE — PROGRESS NOTES
Message left for pt to return call to [facility] - [phone]. Dosing instructions not given.  Tentative plan based upon pt findings: continue maintenance dose and check INR in about 6 weeks.  Fanny Elizalde RN on 1/3/2020 at 3:04 PM

## 2020-01-03 NOTE — PROGRESS NOTES
ANTICOAGULATION FOLLOW-UP CLINIC VISIT    Patient Name:  Bradley Liz  Date:  1/3/2020  Contact Type:  Telephone    SUBJECTIVE:  Patient Findings     Comments:   No changes in medications, activity, health, or diet noted. No bleeding or increased bruising noted. Took warfarin as prescribed.  Patient will continue weekly maintenance dose. INR is therapeutic.   Recheck in 6 weeks.   Patient verbalizes understanding and agrees to plan. No further questions or concerns.          Clinical Outcomes     Negatives:   Major bleeding event, Thromboembolic event, Anticoagulation-related hospital admission, Anticoagulation-related ED visit, Anticoagulation-related fatality    Comments:   No changes in medications, activity, health, or diet noted. No bleeding or increased bruising noted. Took warfarin as prescribed.  Patient will continue weekly maintenance dose. INR is therapeutic.   Recheck in 6 weeks.   Patient verbalizes understanding and agrees to plan. No further questions or concerns.             OBJECTIVE    INR   Date Value Ref Range Status   2020 2.23 (H) 0.86 - 1.14 Final       ASSESSMENT / PLAN  INR assessment THER    Recheck INR In: 6 WEEKS    INR Location Outside lab      Anticoagulation Summary  As of 1/3/2020    INR goal:   2.0-3.0   TTR:   80.0 % (10.8 mo)   INR used for dosin.23 (1/3/2020)   Warfarin maintenance plan:   2.5 mg (5 mg x 0.5) every Tue, Fri; 5 mg (5 mg x 1) all other days   Full warfarin instructions:   2.5 mg every Tue, Fri; 5 mg all other days   Weekly warfarin total:   30 mg   No change documented:   Noam Sands RN   Plan last modified:   Fanny Elizalde RN (2019)   Next INR check:   2020   Priority:   Maintenance   Target end date:       Indications    Completed stroke (H) [I63.9]  Long term (current) use of anticoagulants [Z79.01]             Anticoagulation Episode Summary     INR check location:       Preferred lab:       Send INR reminders to:   [place]  PHONE ISABELA WHEELER    Comments:   * Takes warfarin in the AM. Leaves for [place] at the beginning of Jan to April (will need snowbird - Dr. Mustafa has to sign paperwork!)       Anticoagulation Care Providers     Provider Role Specialty Phone number    Alex Mustafa MD [facility] [phone]            See the Encounter Report to view Anticoagulation Flowsheet and Dosing Calendar (Go to Encounters tab in chart review, and find the Anticoagulation Therapy Visit)        Noam Sands RN

## 2020-03-04 ENCOUNTER — TELEPHONE (OUTPATIENT)
Dept: FAMILY MEDICINE | Facility: CLINIC | Age: 80
End: 2020-03-04

## 2020-03-04 DIAGNOSIS — Z79.01 LONG TERM CURRENT USE OF ANTICOAGULANT THERAPY: ICD-10-CM

## 2020-03-04 RX ORDER — WARFARIN SODIUM 5 MG/1
TABLET ORAL
Qty: 80 TABLET | Refills: 0 | Status: SHIPPED | OUTPATIENT
Start: 2020-03-04 | End: 2020-07-01

## 2020-03-04 NOTE — TELEPHONE ENCOUNTER
Last Written Prescription Date:  Warfarin 1/3/20  Last Fill Quantity: 80,  # refills: 0   Last office visit: 1/2/2020 with prescribing provider:  Clarence   Future Office Visit:    Christine Giang  Cl[facility] [place]   Patient is in [place]

## 2020-03-06 ENCOUNTER — TRANSFERRED RECORDS (OUTPATIENT)
Dept: HEALTH INFORMATION MANAGEMENT | Facility: CLINIC | Age: 80
End: 2020-03-06

## 2020-03-11 ENCOUNTER — TELEPHONE (OUTPATIENT)
Dept: GERIATRICS | Facility: CLINIC | Age: 80
End: 2020-03-11

## 2020-03-11 DIAGNOSIS — I63.9 COMPLETED STROKE (H): ICD-10-CM

## 2020-03-11 DIAGNOSIS — Z79.01 LONG TERM (CURRENT) USE OF ANTICOAGULANTS: ICD-10-CM

## 2020-03-11 LAB — INR PPP: 2.8 (ref 0.9–1.1)

## 2020-03-11 NOTE — TELEPHONE ENCOUNTER
Anticoagulation Management    Unable to reach Bradley today.    Today's INR result of 2.8 is therapeutic (goal INR of 2.0-3.0).  Result received from: outside lab    Follow up required to confirm warfarin dose taken and assess for changes    Left message to call back.      Anticoagulation clinic to follow up    Kely Ayala RN

## 2020-03-11 NOTE — TELEPHONE ENCOUNTER
ANTICOAGULATION MANAGEMENT     Patient Name:  Bradley Liz  Date:  3/11/2020    ASSESSMENT /SUBJECTIVE:      Today's INR result of 2.8 is therapeutic. Goal INR of 2.0-3.0      Warfarin dose taken: Warfarin taken as previously instructed    Diet: No new diet changes affecting INR    Medication changes/ interactions: No new medications/supplements affecting INR    Previous INR: Therapeutic     S/S of bleeding or thromboembolism: No    New injury or illness:  No    Upcoming surgery, procedure or cardioversion:  No    Additional findings: no      PLAN:    Spoke with Bradley regarding INR result and instructed:     Warfarin Dosing Instructions: Continue your current warfarin dose    Instructed patient to follow up no later than: 6 weeks  Patient using outside facility for labs    Education provided: No      Gian verbalizes understanding and agrees to warfarin dosing plan.    Instructed to call the Anticoagulation Clinic for any changes, questions or concerns. (#[phone])        OBJECTIVE:  INR   Date Value Ref Range Status   03/10/2020 2.8 (A) 0.90 - 1.10 Final             Anticoagulation Summary  As of 3/11/2020    INR goal:   2.0-3.0   TTR:   79.9 % (10.8 mo)   INR used for dosin.8 (3/10/2020)   Warfarin maintenance plan:   2.5 mg (5 mg x 0.5) every Tue, Fri; 5 mg (5 mg x 1) all other days   Full warfarin instructions:   2.5 mg every Tue, Fri; 5 mg all other days   Weekly warfarin total:   30 mg   Plan last modified:   Fanyn Elizalde RN (2019)   Next INR check:   2020   Priority:   Maintenance   Target end date:   Indefinite    Indications    Completed stroke (H) [I63.9]  Long term (current) use of anticoagulants [Z79.01]             Anticoagulation Episode Summary     INR check location:       Preferred lab:       Send INR reminders to:   [place] PHONE ANTICOAG POOL    Comments:   * Takes warfarin in the AM. Leaves for [place] at the beginning of  to April (will need snowbird - Dr. Mustafa has to sign  paperwork!)       Anticoagulation Care Providers     Provider Role Specialty Phone number    Alex Mustafa MD [facility] [phone]

## 2020-04-07 DIAGNOSIS — Z79.01 LONG TERM CURRENT USE OF ANTICOAGULANT THERAPY: ICD-10-CM

## 2020-04-07 DIAGNOSIS — I63.9 COMPLETED STROKE (H): ICD-10-CM

## 2020-04-07 DIAGNOSIS — Z79.01 LONG TERM (CURRENT) USE OF ANTICOAGULANTS: Primary | ICD-10-CM

## 2020-04-17 ENCOUNTER — ANTICOAGULATION THERAPY VISIT (OUTPATIENT)
Dept: ANTICOAGULATION | Facility: CLINIC | Age: 80
End: 2020-04-17

## 2020-04-17 DIAGNOSIS — Z79.01 LONG TERM CURRENT USE OF ANTICOAGULANT THERAPY: ICD-10-CM

## 2020-04-17 DIAGNOSIS — Z79.01 LONG TERM (CURRENT) USE OF ANTICOAGULANTS: ICD-10-CM

## 2020-04-17 DIAGNOSIS — I63.9 COMPLETED STROKE (H): ICD-10-CM

## 2020-04-17 LAB
CAPILLARY BLOOD COLLECTION: NORMAL
INR PPP: 3.2 (ref 0.86–1.14)

## 2020-04-17 PROCEDURE — 85610 PROTHROMBIN TIME: CPT | Performed by: NURSE PRACTITIONER

## 2020-04-17 PROCEDURE — 99207 ZZC NO CHARGE NURSE ONLY: CPT

## 2020-04-17 PROCEDURE — 36416 COLLJ CAPILLARY BLOOD SPEC: CPT | Performed by: NURSE PRACTITIONER

## 2020-04-17 NOTE — PROGRESS NOTES
ANTICOAGULATION FOLLOW-UP CLINIC VISIT    Patient Name:  Bradley Liz  Date:  4/17/2020  Contact Type:  Telephone    SUBJECTIVE:  Patient Findings     Comments:   No changes in medications, activity, or diet noted. No concerns with clotting, bleeding, or increased bruising noted. Took warfarin as prescribed.  Patient is to continue maintenance warfarin plan, and check INR in 2 weeks. Pt understood but scheduled for 3 weeks out.  Patient educated on the increased risk for bleeding, precautions to take, and when to seek medical attention.  Patient verbalizes understanding and agrees to plan. No further questions or concerns.        Clinical Outcomes     Negatives:   Major bleeding event, Thromboembolic event, Anticoagulation-related hospital admission, Anticoagulation-related ED visit, Anticoagulation-related fatality    Comments:   No changes in medications, activity, or diet noted. No concerns with clotting, bleeding, or increased bruising noted. Took warfarin as prescribed.  Patient is to continue maintenance warfarin plan, and check INR in 2 weeks. Pt understood but scheduled for 3 weeks out.  Patient educated on the increased risk for bleeding, precautions to take, and when to seek medical attention.  Patient verbalizes understanding and agrees to plan. No further questions or concerns.           OBJECTIVE    INR   Date Value Ref Range Status   04/17/2020 3.20 (H) 0.86 - 1.14 Final     Comment:     This test is intended for monitoring Coumadin therapy.  Results are not   accurate in patients with prolonged INR due to factor deficiency.         ASSESSMENT / PLAN  INR assessment SUPRA    Recheck INR In: 2 WEEKS    INR Location Clinic      Anticoagulation Summary  As of 4/17/2020    INR goal:   2.0-3.0   TTR:   74.1 % (10.8 mo)   INR used for dosing:   3.20! (4/17/2020)   Warfarin maintenance plan:   2.5 mg (5 mg x 0.5) every Tue, Fri; 5 mg (5 mg x 1) all other days   Full warfarin instructions:   2.5 mg every  Tue, Fri; 5 mg all other days   Weekly warfarin total:   30 mg   No change documented:   Fanny Elizalde, RN   Plan last modified:   Fanny Elizalde RN (11/19/2019)   Next INR check:   5/1/2020   Priority:   High   Target end date:   Indefinite    Indications    Completed stroke (H) [I63.9]  Long term (current) use of anticoagulants [Z79.01]             Anticoagulation Episode Summary     INR check location:       Preferred lab:       Send INR reminders to:   ISABELA ABEL    Comments:   * Takes warfarin in the AM. Leaves for [place] at the beginning of Jan to April (will need snowbird - Dr. Mustafa has to sign paperwork!)       Anticoagulation Care Providers     Provider Role Specialty Phone number    Alex Mustafa MD [facility] [phone]            See the Encounter Report to view Anticoagulation Flowsheet and Dosing Calendar (Go to Encounters tab in chart review, and find the Anticoagulation Therapy Visit)        Fanny Elizalde RN

## 2020-05-08 ENCOUNTER — TELEPHONE (OUTPATIENT)
Dept: GERIATRICS | Facility: CLINIC | Age: 80
End: 2020-05-08

## 2020-05-08 DIAGNOSIS — Z79.01 LONG TERM (CURRENT) USE OF ANTICOAGULANTS: ICD-10-CM

## 2020-05-08 DIAGNOSIS — I63.9 COMPLETED STROKE (H): ICD-10-CM

## 2020-05-08 LAB
CAPILLARY BLOOD COLLECTION: NORMAL
INR PPP: 1.8 (ref 0.86–1.14)

## 2020-05-08 PROCEDURE — 85610 PROTHROMBIN TIME: CPT | Performed by: NURSE PRACTITIONER

## 2020-05-08 PROCEDURE — 36416 COLLJ CAPILLARY BLOOD SPEC: CPT | Performed by: NURSE PRACTITIONER

## 2020-05-08 NOTE — TELEPHONE ENCOUNTER
Anticoagulation Management    Unable to reach Bradley today.    Today's INR result of 1.80 is subtherapeutic (goal INR of 2.0-3.0).  Result received from: Clinic Lab    Follow up required to confirm warfarin dose taken and assess for changes    [facility]      Anticoagulation clinic to follow up    Kely Ayala RN

## 2020-05-08 NOTE — TELEPHONE ENCOUNTER
ANTICOAGULATION MANAGEMENT     Patient Name:  Bradley Liz  Date:  2020    ASSESSMENT /SUBJECTIVE:    Today's INR result of 1.80 is subtherapeutic. Goal INR of 2.0-3.0      Warfarin dose taken: Warfarin taken as previously instructed    Diet: Increased greens/vitamin K intake may be affecting INR    Medication changes/ interactions: No new medications/supplements affecting INR    Previous INR: Supratherapeutic     S/S of bleeding or thromboembolism: No    New injury or illness: No    Upcoming surgery, procedure or cardioversion: No    Additional findings: None      PLAN:    Spoke with Bradley regarding INR result and instructed:     Warfarin Dosing Instructions: Continue your current warfarin dose 2.5 mg on Tues/Fri and 5 mg all other days    Instructed patient to follow up no later than: 2 weeks, patient refused to come in 2 weeks, compromised at 3 weeks and   Lab visit scheduled    Education provided: None required      Bradley verbalizes understanding and agrees to warfarin dosing plan.    Instructed to call the Anticoagulation Clinic for any changes, questions or concerns. (#[phone])        OBJECTIVE:  INR   Date Value Ref Range Status   2020 1.80 (H) 0.86 - 1.14 Final     Comment:     This test is intended for monitoring Coumadin therapy.  Results are not   accurate in patients with prolonged INR due to factor deficiency.               Anticoagulation Summary  As of 2020    INR goal:   2.0-3.0   TTR:   72.2 % (10.8 mo)   INR used for dosin.80! (2020)   Warfarin maintenance plan:   2.5 mg (5 mg x 0.5) every Tue, Fri; 5 mg (5 mg x 1) all other days   Full warfarin instructions:   2.5 mg every Tue, Fri; 5 mg all other days   Weekly warfarin total:   30 mg   Plan last modified:   Fanny Elizalde RN (2019)   Next INR check:   2020   Priority:   High   Target end date:   Indefinite    Indications    Completed stroke (H) [I63.9]  Long term (current) use of anticoagulants  [Z79.01]             Anticoagulation Episode Summary     INR check location:       Preferred lab:       Send INR reminders to:   ISABELA ABEL    Comments:   * Takes warfarin in the AM. Leaves for [place] at the beginning of Jan to April (will need snowbird - Dr. Mustafa has to sign paperwork!)       Anticoagulation Care Providers     Provider Role Specialty Phone number    Alex Mustafa MD [facility] [phone]

## 2020-05-29 ENCOUNTER — ANTICOAGULATION THERAPY VISIT (OUTPATIENT)
Dept: GERIATRICS | Facility: CLINIC | Age: 80
End: 2020-05-29

## 2020-05-29 DIAGNOSIS — I63.9 COMPLETED STROKE (H): ICD-10-CM

## 2020-05-29 DIAGNOSIS — Z79.01 LONG TERM (CURRENT) USE OF ANTICOAGULANTS: ICD-10-CM

## 2020-05-29 LAB
CAPILLARY BLOOD COLLECTION: NORMAL
INR PPP: 2.1 (ref 0.86–1.14)

## 2020-05-29 PROCEDURE — 85610 PROTHROMBIN TIME: CPT | Performed by: NURSE PRACTITIONER

## 2020-05-29 PROCEDURE — 36416 COLLJ CAPILLARY BLOOD SPEC: CPT | Performed by: NURSE PRACTITIONER

## 2020-05-29 NOTE — PROGRESS NOTES
ANTICOAGULATION MANAGEMENT     Patient Name:  Bradley Liz  Date:  2020    ASSESSMENT /SUBJECTIVE:    Today's INR result of 2.1 is therapeutic. Goal INR of 2.0-3.0      Warfarin dose taken: Warfarin taken as previously instructed    Diet: No new diet changes affecting INR    Medication changes/ interactions: No new medications/supplements affecting INR    Previous INR: Subtherapeutic     S/S of bleeding or thromboembolism: No    New injury or illness: No    Upcoming surgery, procedure or cardioversion: No    Additional findings: None      PLAN:    Spoke with Bradley regarding INR result and instructed:     Warfarin Dosing Instructions: Continue your current warfarin dose 2.5 mg every Tue, Fri; 5 mg all other days    Instructed patient to follow up no later than: 3 weeks  Lab visit scheduled for 4 weeks, pt initially wanted to check in 6 weeks, convinced to check at 4    Education provided: Target INR goal and significance of current INR result, Importance of therapeutic range, Importance of following up for INR monitoring at instructed interval, Importance of notifying clinic for changes in medications and Importance of notifying clinic for diarrhea, nausea/vomiting, reduced intake and/or illness      Bradley verbalizes understanding and agrees to warfarin dosing plan.    Instructed to call the Anticoagulation Clinic for any changes, questions or concerns. (#[phone])        OBJECTIVE:  INR   Date Value Ref Range Status   2020 2.10 (H) 0.86 - 1.14 Final     Comment:     This test is intended for monitoring Coumadin therapy.  Results are not   accurate in patients with prolonged INR due to factor deficiency.           No question data found.  Anticoagulation Summary  As of 2020    INR goal:   2.0-3.0   TTR:   67.9 % (10.8 mo)   INR used for dosin.10 (2020)   Warfarin maintenance plan:   2.5 mg (5 mg x 0.5) every Tue, Fri; 5 mg (5 mg x 1) all other days   Full warfarin instructions:    2.5 mg every Tue, Fri; 5 mg all other days   Weekly warfarin total:   30 mg   Plan last modified:   Fanny Elizalde RN (11/19/2019)   Next INR check:   6/19/2020   Priority:   High   Target end date:   Indefinite    Indications    Completed stroke (H) [I63.9]  Long term (current) use of anticoagulants [Z79.01]             Anticoagulation Episode Summary     INR check location:       Preferred lab:       Send INR reminders to:   ISABELA ABEL    Comments:   * Takes warfarin in the AM. Leaves for [place] at the beginning of Jan to April (will need snowbird - Dr. Mustafa has to sign paperwork!)       Anticoagulation Care Providers     Provider Role Specialty Phone number    Alex Mustafa MD [facility] [phone]

## 2020-06-26 ENCOUNTER — ANTICOAGULATION THERAPY VISIT (OUTPATIENT)
Dept: GERIATRICS | Facility: CLINIC | Age: 80
End: 2020-06-26

## 2020-06-26 DIAGNOSIS — I63.9 COMPLETED STROKE (H): ICD-10-CM

## 2020-06-26 DIAGNOSIS — Z79.01 LONG TERM (CURRENT) USE OF ANTICOAGULANTS: ICD-10-CM

## 2020-06-26 LAB
CAPILLARY BLOOD COLLECTION: NORMAL
INR PPP: 2.1 (ref 0.86–1.14)

## 2020-06-26 PROCEDURE — 36416 COLLJ CAPILLARY BLOOD SPEC: CPT | Performed by: NURSE PRACTITIONER

## 2020-06-26 PROCEDURE — 85610 PROTHROMBIN TIME: CPT | Performed by: NURSE PRACTITIONER

## 2020-06-26 NOTE — PROGRESS NOTES
ANTICOAGULATION MANAGEMENT     Patient Name:  Bradley Liz  Date:  2020    ASSESSMENT /SUBJECTIVE:    Today's INR result of 2.1 is therapeutic. Goal INR of 2.0-3.0      Warfarin dose taken: Warfarin taken as previously instructed    Diet: No new diet changes affecting INR    Medication changes/ interactions: No new medications/supplements affecting INR    Previous INR: Therapeutic     S/S of bleeding or thromboembolism: No    New injury or illness: No    Upcoming surgery, procedure or cardioversion: No    Additional findings: None      PLAN:    Spoke with Bradley regarding INR result and instructed:     Warfarin Dosing Instructions: Continue your current warfarin dose 2.5mg Tues/Fri and 5mg rest of week    Instructed patient to follow up no later than: 4 weeks however patient refused and said he would come in 6 weeks  Lab visit scheduled    Education provided: Importance of therapeutic range and Importance of following up for INR monitoring at instructed interval      Bradley verbalizes understanding and agrees to warfarin dosing plan.    Instructed to call the Anticoagulation Clinic for any changes, questions or concerns. (#[phone])        OBJECTIVE:  INR   Date Value Ref Range Status   2020 2.10 (H) 0.86 - 1.14 Final     Comment:     This test is intended for monitoring Coumadin therapy.  Results are not   accurate in patients with prolonged INR due to factor deficiency.           No question data found.  Anticoagulation Summary  As of 2020    INR goal:   2.0-3.0   TTR:   69.8 % (11.5 mo)   INR used for dosin.10 (2020)   Warfarin maintenance plan:   2.5 mg (5 mg x 0.5) every Tue, Fri; 5 mg (5 mg x 1) all other days   Full warfarin instructions:   2.5 mg every Tue, Fri; 5 mg all other days   Weekly warfarin total:   30 mg   Plan last modified:   Fanny Elizalde RN (2019)   Next INR check:   2020   Priority:   High   Target end date:   Indefinite    Indications    Completed  stroke (H) [I63.9]  Long term (current) use of anticoagulants [Z79.01]             Anticoagulation Episode Summary     INR check location:       Preferred lab:       Send INR reminders to:   ISABELA ABEL    Comments:   * Takes warfarin in the AM. Leaves for [place] at the beginning of Jan to April (will need snowbird - Dr. Mustafa has to sign paperwork!)       Anticoagulation Care Providers     Provider Role Specialty Phone number    Alex Mustafa MD [facility] [phone]

## 2020-06-26 NOTE — PROGRESS NOTES
Anticoagulation Management    Unable to reach Peter today.    Today's INR result of 2.10 is therapeutic (goal INR of 2.0-3.0).  Result received from: Clinic Lab    Follow up required to confirm warfarin dose taken and assess for changes    Left message to continue current dose of warfarin 2.5 mg  mg tonight. and 5  Mg sat/sun, call back on Monday.    Tentative plan: if no changes, continue maintenance dose and recheck in 4 weeks.    Anticoagulation clinic to follow up    Kely Ayala RN

## 2020-07-01 ENCOUNTER — OFFICE VISIT (OUTPATIENT)
Dept: FAMILY MEDICINE | Facility: CLINIC | Age: 80
End: 2020-07-01
Payer: COMMERCIAL

## 2020-07-01 VITALS
WEIGHT: 186 LBS | SYSTOLIC BLOOD PRESSURE: 98 MMHG | HEIGHT: 69 IN | TEMPERATURE: 98.4 F | RESPIRATION RATE: 18 BRPM | OXYGEN SATURATION: 97 % | DIASTOLIC BLOOD PRESSURE: 60 MMHG | HEART RATE: 89 BPM | BODY MASS INDEX: 27.55 KG/M2

## 2020-07-01 DIAGNOSIS — N18.30 CKD (CHRONIC KIDNEY DISEASE) STAGE 3, GFR 30-59 ML/MIN (H): ICD-10-CM

## 2020-07-01 DIAGNOSIS — R60.0 EDEMA LEG: ICD-10-CM

## 2020-07-01 DIAGNOSIS — I50.22 CHRONIC SYSTOLIC HEART FAILURE (H): Primary | ICD-10-CM

## 2020-07-01 DIAGNOSIS — Z79.01 LONG TERM (CURRENT) USE OF ANTICOAGULANTS: ICD-10-CM

## 2020-07-01 DIAGNOSIS — I48.91 ATRIAL FIBRILLATION, UNSPECIFIED TYPE (H): ICD-10-CM

## 2020-07-01 DIAGNOSIS — I63.9 COMPLETED STROKE (H): ICD-10-CM

## 2020-07-01 PROBLEM — N17.9 ACUTE KIDNEY INJURY (H): Status: RESOLVED | Noted: 2019-08-05 | Resolved: 2020-07-01

## 2020-07-01 LAB
ALBUMIN SERPL-MCNC: 3.4 G/DL (ref 3.4–5)
ALP SERPL-CCNC: 78 U/L (ref 40–150)
ALT SERPL W P-5'-P-CCNC: 19 U/L (ref 0–70)
ANION GAP SERPL CALCULATED.3IONS-SCNC: 1 MMOL/L (ref 3–14)
AST SERPL W P-5'-P-CCNC: 16 U/L (ref 0–45)
BASOPHILS # BLD AUTO: 0.1 10E9/L (ref 0–0.2)
BASOPHILS NFR BLD AUTO: 0.5 %
BILIRUB SERPL-MCNC: 0.7 MG/DL (ref 0.2–1.3)
BUN SERPL-MCNC: 13 MG/DL (ref 7–30)
CALCIUM SERPL-MCNC: 8.4 MG/DL (ref 8.5–10.1)
CHLORIDE SERPL-SCNC: 107 MMOL/L (ref 94–109)
CHOLEST SERPL-MCNC: 162 MG/DL
CO2 SERPL-SCNC: 31 MMOL/L (ref 20–32)
CREAT SERPL-MCNC: 1.15 MG/DL (ref 0.66–1.25)
DIFFERENTIAL METHOD BLD: NORMAL
EOSINOPHIL # BLD AUTO: 0.3 10E9/L (ref 0–0.7)
EOSINOPHIL NFR BLD AUTO: 3 %
ERYTHROCYTE [DISTWIDTH] IN BLOOD BY AUTOMATED COUNT: 15 % (ref 10–15)
GFR SERPL CREATININE-BSD FRML MDRD: 60 ML/MIN/{1.73_M2}
GLUCOSE SERPL-MCNC: 73 MG/DL (ref 70–99)
HCT VFR BLD AUTO: 50.9 % (ref 40–53)
HDLC SERPL-MCNC: 46 MG/DL
HGB BLD-MCNC: 16.2 G/DL (ref 13.3–17.7)
LDLC SERPL CALC-MCNC: 73 MG/DL
LYMPHOCYTES # BLD AUTO: 3.2 10E9/L (ref 0.8–5.3)
LYMPHOCYTES NFR BLD AUTO: 33.9 %
MCH RBC QN AUTO: 31.8 PG (ref 26.5–33)
MCHC RBC AUTO-ENTMCNC: 31.8 G/DL (ref 31.5–36.5)
MCV RBC AUTO: 100 FL (ref 78–100)
MONOCYTES # BLD AUTO: 0.7 10E9/L (ref 0–1.3)
MONOCYTES NFR BLD AUTO: 7.2 %
NEUTROPHILS # BLD AUTO: 5.3 10E9/L (ref 1.6–8.3)
NEUTROPHILS NFR BLD AUTO: 55.4 %
NONHDLC SERPL-MCNC: 116 MG/DL
PLATELET # BLD AUTO: 361 10E9/L (ref 150–450)
POTASSIUM SERPL-SCNC: 4.1 MMOL/L (ref 3.4–5.3)
PROT SERPL-MCNC: 6.3 G/DL (ref 6.8–8.8)
RBC # BLD AUTO: 5.09 10E12/L (ref 4.4–5.9)
SODIUM SERPL-SCNC: 139 MMOL/L (ref 133–144)
TRIGL SERPL-MCNC: 216 MG/DL
WBC # BLD AUTO: 9.5 10E9/L (ref 4–11)

## 2020-07-01 PROCEDURE — 36415 COLL VENOUS BLD VENIPUNCTURE: CPT | Performed by: FAMILY MEDICINE

## 2020-07-01 PROCEDURE — 80061 LIPID PANEL: CPT | Performed by: FAMILY MEDICINE

## 2020-07-01 PROCEDURE — 85025 COMPLETE CBC W/AUTO DIFF WBC: CPT | Performed by: FAMILY MEDICINE

## 2020-07-01 PROCEDURE — 99214 OFFICE O/P EST MOD 30 MIN: CPT | Performed by: FAMILY MEDICINE

## 2020-07-01 PROCEDURE — 80053 COMPREHEN METABOLIC PANEL: CPT | Performed by: FAMILY MEDICINE

## 2020-07-01 RX ORDER — METOPROLOL SUCCINATE 200 MG/1
200 TABLET, EXTENDED RELEASE ORAL DAILY
Qty: 90 TABLET | Refills: 3 | Status: SHIPPED | OUTPATIENT
Start: 2020-07-01 | End: 2021-05-17

## 2020-07-01 RX ORDER — ROSUVASTATIN CALCIUM 5 MG/1
5 TABLET, COATED ORAL DAILY
Qty: 90 TABLET | Refills: 3 | Status: SHIPPED | OUTPATIENT
Start: 2020-07-01 | End: 2021-07-16

## 2020-07-01 ASSESSMENT — MIFFLIN-ST. JEOR: SCORE: 1549.07

## 2020-07-01 ASSESSMENT — PAIN SCALES - GENERAL: PAINLEVEL: NO PAIN (0)

## 2020-07-01 NOTE — PROGRESS NOTES
Subjective     Bradley Liz is a 79 year old male who presents to clinic today for the following health issues:    HPI   Hyperlipidemia Follow-Up      Are you regularly taking any medication or supplement to lower your cholesterol?   Yes- crestor    Are you having muscle aches or other side effects that you think could be caused by your cholesterol lowering medication?  No    Hypertension Follow-up      Do you check your blood pressure regularly outside of the clinic? NO    Are you following a low salt diet? Yes    Are your blood pressures ever more than 140 on the top number (systolic) OR more   than 90 on the bottom number (diastolic), for example 140/90? No      How many servings of fruits and vegetables do you eat daily?  2-3    On average, how many sweetened beverages do you drink each day (Examples: soda, juice, sweet tea, etc.  Do NOT count diet or artificially sweetened beverages)?   0    How many days per week do you exercise enough to make your heart beat faster? 3 or less    How many minutes a day do you exercise enough to make your heart beat faster? 9 or less    How many days per week do you miss taking your medication? 0    Heart Failure Follow-up  Are you experiencing any shortness of breath? Yes, with activity only   How would you describe your shortness of breath?  Same as usual        Are you experiencing any swelling in your legs or feet?  Stable    Are you using more pillows than usual? No    Do you cough at night?  No    Do you check your weight daily?  No    Have you had a weight change recently?  No    Are you having any of the following side effects from your medications? (Select all that apply)  The patient does not report symptoms of dizziness, fatigue, cough, swelling, or slow heart beat.    Since your last visit, how many times have you gone to the cardiologist, urgent care, emergency room, or hospital because of your heart failure?   None      Patient also smoked x 50 years.  He  "denies increased cough or shortness of breath. Not interested in having PFTs done at this time.     Lower extremity edema is stable per patient.  Has tried compression stockings and is not interested in doing that again, blood pressure wouldn't tolerate a diuretic.      Reviewed and updated as needed this visit by Provider         Review of Systems   Constitutional, HEENT, cardiovascular, pulmonary, gi and gu systems are negative, except as otherwise noted.      Objective    BP 98/60   Pulse 89   Temp 98.4  F (36.9  C) (Tympanic)   Resp 18   Ht 1.753 m (5' 9\")   Wt 84.4 kg (186 lb)   SpO2 97%   BMI 27.47 kg/m    Body mass index is 27.47 kg/m .  Physical Exam   GENERAL: healthy, alert and no distress  NECK: no adenopathy, no asymmetry, masses, or scars and thyroid normal to palpation  RESP: lungs clear to auscultation - no rales, rhonchi or wheezes  CV: irregularly irregular rhythm, normal S1 S2, no S3 or S4 and no murmur, click or rub  ABDOMEN: soft, nontender, no hepatosplenomegaly, no masses and bowel sounds normal  MS: 2+ edema to mid tibia  SKIN: venous stasis dermatitis of bilateral lower extremities  NEURO: Normal strength and tone, mentation intact and speech normal  PSYCH: mentation appears normal, affect normal/bright    Diagnostic Test Results:  Labs reviewed in Epic        Assessment & Plan     1. Chronic systolic heart failure (H)  stable    2. Atrial fibrillation, unspecified type (H)  On warfin, rate controlled  - metoprolol succinate ER (TOPROL-XL) 200 MG 24 hr tablet; Take 1 tablet (200 mg) by mouth daily  Dispense: 90 tablet; Refill: 3    3. Long term (current) use of anticoagulants       4. Edema leg  Patient declined lymphedema therapy  I don't think his blood pressure will tolerate a diuretic at this time.     5. Completed stroke (H)  Stable, mild right leg weakness as a residual from the stroke  - Lipid panel reflex to direct LDL Non-fasting  - Comprehensive metabolic panel  - " "rosuvastatin (CRESTOR) 5 MG tablet; Take 1 tablet (5 mg) by mouth daily  Dispense: 90 tablet; Refill: 3    6. CKD (chronic kidney disease) stage 3, GFR 30-59 ml/min (H)     - CBC with platelets and differential  - Comprehensive metabolic panel     BMI:   Estimated body mass index is 27.47 kg/m  as calculated from the following:    Height as of this encounter: 1.753 m (5' 9\").    Weight as of this encounter: 84.4 kg (186 lb).               No follow-ups on file.    Verito Lima MD  [facility]      "

## 2020-07-01 NOTE — LETTER
July 1, 2020      Bradley Liz  [address]   [place] [address]        Dear ,    We are writing to inform you of your test results.    Your test results fall within the expected range(s) or remain unchanged from previous results.  Please continue with current treatment plan.    Resulted Orders   Lipid panel reflex to direct LDL Non-fasting   Result Value Ref Range    Cholesterol 162 <200 mg/dL    Triglycerides 216 (H) <150 mg/dL      Comment:      Borderline high:  150-199 mg/dl  High:             200-499 mg/dl  Very high:       >499 mg/dl  Non Fasting      HDL Cholesterol 46 >39 mg/dL    LDL Cholesterol Calculated 73 <100 mg/dL      Comment:      Desirable:       <100 mg/dl    Non HDL Cholesterol 116 <130 mg/dL   CBC with platelets and differential   Result Value Ref Range    WBC 9.5 4.0 - 11.0 10e9/L    RBC Count 5.09 4.4 - 5.9 10e12/L    Hemoglobin 16.2 13.3 - 17.7 g/dL    Hematocrit 50.9 40.0 - 53.0 %     78 - 100 fl    MCH 31.8 26.5 - 33.0 pg    MCHC 31.8 31.5 - 36.5 g/dL    RDW 15.0 10.0 - 15.0 %    Platelet Count 361 150 - 450 10e9/L    % Neutrophils 55.4 %    % Lymphocytes 33.9 %    % Monocytes 7.2 %    % Eosinophils 3.0 %    % Basophils 0.5 %    Absolute Neutrophil 5.3 1.6 - 8.3 10e9/L    Absolute Lymphocytes 3.2 0.8 - 5.3 10e9/L    Absolute Monocytes 0.7 0.0 - 1.3 10e9/L    Absolute Eosinophils 0.3 0.0 - 0.7 10e9/L    Absolute Basophils 0.1 0.0 - 0.2 10e9/L    Diff Method Automated Method    Comprehensive metabolic panel   Result Value Ref Range    Sodium 139 133 - 144 mmol/L    Potassium 4.1 3.4 - 5.3 mmol/L    Chloride 107 94 - 109 mmol/L    Carbon Dioxide 31 20 - 32 mmol/L    Anion Gap 1 (L) 3 - 14 mmol/L    Glucose 73 70 - 99 mg/dL      Comment:      Non Fasting    Urea Nitrogen 13 7 - 30 mg/dL    Creatinine 1.15 0.66 - 1.25 mg/dL    GFR Estimate 60 (L) >60 mL/min/[1.73_m2]      Comment:      Non  GFR Calc  Starting 12/18/2018, serum creatinine  based estimated GFR (eGFR) will be   calculated using the Chronic Kidney Disease Epidemiology Collaboration   (CKD-EPI) equation.      GFR Estimate If Black 69 >60 mL/min/[1.73_m2]      Comment:       GFR Calc  Starting 12/18/2018, serum creatinine based estimated GFR (eGFR) will be   calculated using the Chronic Kidney Disease Epidemiology Collaboration   (CKD-EPI) equation.      Calcium 8.4 (L) 8.5 - 10.1 mg/dL    Bilirubin Total 0.7 0.2 - 1.3 mg/dL    Albumin 3.4 3.4 - 5.0 g/dL    Protein Total 6.3 (L) 6.8 - 8.8 g/dL    Alkaline Phosphatase 78 40 - 150 U/L    ALT 19 0 - 70 U/L    AST 16 0 - 45 U/L       If you have any questions or concerns, please call the clinic at the number listed above.       Sincerely,        Verito Lima MD/[facility]

## 2020-07-27 ENCOUNTER — DOCUMENTATION ONLY (OUTPATIENT)
Dept: FAMILY MEDICINE | Facility: CLINIC | Age: 80
End: 2020-07-27

## 2020-07-27 DIAGNOSIS — I48.91 ATRIAL FIBRILLATION, UNSPECIFIED TYPE (H): Primary | ICD-10-CM

## 2020-07-27 NOTE — PROGRESS NOTES
ANTICOAGULATION MANAGEMENT      Bradley Liz due for annual renewal of referral to anticoagulation monitoring. Order pended for your review and signature.      ANTICOAGULATION SUMMARY      Warfarin indication(s)     completed stroke    Heart valve present?  NO       Current goal range   INR: 2.0-3.0     Goal appropriate for indication? Yes, INR 2-3 appropriate for hx of DVT, PE, hypercoagulable state, Afib, LVAD, or bileaflet AVR without risk factors     Current duration of therapy Indefinite/long term therapy   Time in Therapeutic Range (TTR)  (Goal > 60%) 72 %       Office visit with referring provider's group within last year yes on 7/1/20       Bobbi Lo RN

## 2020-08-07 ENCOUNTER — ANTICOAGULATION THERAPY VISIT (OUTPATIENT)
Dept: FAMILY MEDICINE | Facility: CLINIC | Age: 80
End: 2020-08-07

## 2020-08-07 DIAGNOSIS — Z79.01 LONG TERM (CURRENT) USE OF ANTICOAGULANTS: ICD-10-CM

## 2020-08-07 DIAGNOSIS — I48.91 ATRIAL FIBRILLATION, UNSPECIFIED TYPE (H): ICD-10-CM

## 2020-08-07 DIAGNOSIS — I63.9 COMPLETED STROKE (H): ICD-10-CM

## 2020-08-07 LAB
CAPILLARY BLOOD COLLECTION: NORMAL
INR PPP: 2.3 (ref 0.86–1.14)

## 2020-08-07 PROCEDURE — 36416 COLLJ CAPILLARY BLOOD SPEC: CPT | Performed by: FAMILY MEDICINE

## 2020-08-07 PROCEDURE — 85610 PROTHROMBIN TIME: CPT | Performed by: FAMILY MEDICINE

## 2020-08-07 NOTE — PROGRESS NOTES
ANTICOAGULATION MANAGEMENT     Patient Name:  Bradley Liz  Date:  2020    ASSESSMENT /SUBJECTIVE:    Today's INR result of 2.30 is therapeutic. Goal INR of 2.0-3.0      Warfarin dose taken: Warfarin taken as previously instructed    Diet: No new diet changes affecting INR    Medication changes/ interactions: No new medications/supplements affecting INR    Previous INR: Therapeutic     S/S of bleeding or thromboembolism: No    New injury or illness: No    Upcoming surgery, procedure or cardioversion: No    Additional findings: None      PLAN:    Spoke with Bradley regarding INR result and instructed:     Warfarin Dosing Instructions: Continue your current warfarin dose 2.5 mg tues/fri and 5 mg all other days    Instructed patient to follow up no later than: 6 weeks  Patient offered & declined to schedule next visit    Education provided: None required      Bradley verbalizes understanding and agrees to warfarin dosing plan.    Instructed to call the Anticoagulation Clinic for any changes, questions or concerns. (#[phone])        Kely Ayala RN      OBJECTIVE:  Recent labs: (last 7 days)     20  1142   INR 2.30*         No question data found.  Anticoagulation Summary  As of 2020    INR goal:   2.0-3.0   TTR:   74.7 % (1 y)   INR used for dosin.30 (2020)   Warfarin maintenance plan:   2.5 mg (5 mg x 0.5) every Tue, Fri; 5 mg (5 mg x 1) all other days   Full warfarin instructions:   2.5 mg every Tue, Fri; 5 mg all other days   Weekly warfarin total:   30 mg   Plan last modified:   Fanny Elizalde RN (2019)   Next INR check:   2020   Priority:   High   Target end date:   Indefinite    Indications    Completed stroke (H) [I63.9]  Long term (current) use of anticoagulants [Z79.01]  Atrial fibrillation  unspecified type (H) [I48.91]             Anticoagulation Episode Summary     INR check location:       Preferred lab:       Send INR reminders to:   ISABELA ABEL    Comments:    * Takes warfarin in the AM. [place]aves for [place] at the beginning of Jan to April (will need snowbird - Dr. Mustafa has to sign paperwork!)   patient requests [phone] be called      Anticoagulation Care Providers     Provider Role Specialty Phone number    Verito Lima MD [facility] [phone]    Alex Mustafa MD Responsible Family Practice [phone]

## 2020-08-12 ENCOUNTER — VIRTUAL VISIT (OUTPATIENT)
Dept: FAMILY MEDICINE | Facility: CLINIC | Age: 80
End: 2020-08-12
Payer: COMMERCIAL

## 2020-08-12 DIAGNOSIS — R05.3 CHRONIC COUGH: Primary | ICD-10-CM

## 2020-08-12 DIAGNOSIS — R05.3 CHRONIC COUGH: ICD-10-CM

## 2020-08-12 PROCEDURE — 99213 OFFICE O/P EST LOW 20 MIN: CPT | Mod: 95 | Performed by: FAMILY MEDICINE

## 2020-08-12 RX ORDER — ALBUTEROL SULFATE 90 UG/1
2 AEROSOL, METERED RESPIRATORY (INHALATION) 4 TIMES DAILY
Qty: 1 INHALER | Refills: 1 | Status: SHIPPED | OUTPATIENT
Start: 2020-08-12 | End: 2020-08-12

## 2020-08-12 RX ORDER — ALBUTEROL SULFATE 90 UG/1
AEROSOL, METERED RESPIRATORY (INHALATION)
Qty: 25.5 G | Refills: 0 | Status: SHIPPED | OUTPATIENT
Start: 2020-08-12 | End: 2021-07-16

## 2020-08-12 NOTE — PROGRESS NOTES
"Bradley Liz is a 79 year old male who is being evaluated via a billable telephone visit.      The patient has been notified of following:     \"This telephone visit will be conducted via a call between you and your physician/provider. We have found that certain health care needs can be provided without the need for a physical exam.  This service lets us provide the care you need with a short phone conversation.  If a prescription is necessary we can send it directly to your pharmacy.  If lab work is needed we can place an order for that and you can then stop by our lab to have the test done at a later time.    Telephone visits are billed at different rates depending on your insurance coverage. During this emergency period, for some insurers they may be billed the same as an in-person visit.  Please reach out to your insurance provider with any questions.    If during the course of the call the physician/provider feels a telephone visit is not appropriate, you will not be charged for this service.\"    Patient has given verbal consent for Telephone visit?  Yes    What phone number would you like to be contacted at? [phone]    How would you like to obtain your AVS? Mail a copy    Subjective     Bradley Liz is a 79 year old male who presents via phone visit today for the following health issues:    HPI     Chief Complaint   Patient presents with     Cough     Wife thinks patient has raspy cough. Patient declines symptoms.      Medication Reconciliation     taking crestor  occasionally         Acute Illness   Acute illness concerns: raspy cough  Onset: unknown    Fever: no    Chills/Sweats: no    Headache (location?): no    Sinus Pressure:no    Conjunctivitis:  no    Ear Pain: no    Rhinorrhea: YES    Congestion: no    Sore Throat: no     Cough: YES-TBD    Wheeze: YES    Decreased Appetite: no    Nausea: no    Vomiting: no    Diarrhea:  no    Dysuria/Freq.: no    Fatigue/Achiness: no    Sick/Strep Exposure: " "no     Therapies Tried and outcome: n/a    Patient unable to really give me much history, wife, who seems to be more bothered by the cough is not available to answer any questions.  He specifically denies increased RIOS, no orthopnea or PND.  He tells me \"it's probably because I smoked for more than 50 years\".     He has not had PFTs done that I can see.     Reviewed and updated as needed this visit by Provider         Review of Systems          Objective          Vitals:  No vitals were obtained today due to virtual visit.    healthy, alert and no distress  PSYCH: Alert and oriented times 3; coherent speech, normal   rate and volume, able to articulate logical thoughts, able   to abstract reason, no tangential thoughts, no hallucinations   or delusions  His affect is normal  RESP: No cough, no audible wheezing, able to talk in full sentences  Remainder of exam unable to be completed due to telephone visits            Assessment & Plan       ICD-10-CM    1. Chronic cough  R05 albuterol (PROAIR HFA/PROVENTIL HFA/VENTOLIN HFA) 108 (90 Base) MCG/ACT inhaler   if no improvement with albuterol, may need formal PFTs, an exam and chest x ray done for further evaluation.    Telephone visit is suboptimal for evaluating this concern.    This patient was seen virtually during the COVID-19 outbreak in attempts to keep healthy patients out of the clinic per CDC guidelines (practicing social distancing).  I evaluated them by phone/evisit and the note reflects our conversation/visit.                 No follow-ups on file.    Verito Lima MD  [facility]    No follow-ups on file.         "

## 2020-08-12 NOTE — PATIENT INSTRUCTIONS
Our Clinic hours are:  Mondays    7:20 am - 7 pm  Tues -  Fri  7:20 am - 5 pm    Clinic Phone: [phone]    The clinic lab opens at 7:30 am Mon - Fri and appointments are required.    [facility]. [phone]  Monday  8 am - 7pm  Tues - Fri 8 am - 5:30 pm

## 2020-09-18 ENCOUNTER — ANTICOAGULATION THERAPY VISIT (OUTPATIENT)
Dept: FAMILY MEDICINE | Facility: CLINIC | Age: 80
End: 2020-09-18

## 2020-09-18 DIAGNOSIS — I48.91 ATRIAL FIBRILLATION, UNSPECIFIED TYPE (H): ICD-10-CM

## 2020-09-18 DIAGNOSIS — I63.9 COMPLETED STROKE (H): ICD-10-CM

## 2020-09-18 DIAGNOSIS — Z79.01 LONG TERM (CURRENT) USE OF ANTICOAGULANTS: ICD-10-CM

## 2020-09-18 LAB
CAPILLARY BLOOD COLLECTION: NORMAL
INR PPP: 2.4 (ref 0.86–1.14)

## 2020-09-18 PROCEDURE — 36416 COLLJ CAPILLARY BLOOD SPEC: CPT | Performed by: FAMILY MEDICINE

## 2020-09-18 PROCEDURE — 85610 PROTHROMBIN TIME: CPT | Performed by: FAMILY MEDICINE

## 2020-09-18 NOTE — PROGRESS NOTES
ANTICOAGULATION MANAGEMENT     Patient Name:  Bradley Liz  Date:  2020    ASSESSMENT /SUBJECTIVE:    Today's INR result of 2.4 is therapeutic. Goal INR of 2.0-3.0      Warfarin dose taken: Warfarin taken as previously instructed    Diet: No new diet changes affecting INR    Medication changes/ interactions: No new medications/supplements affecting INR    Previous INR: Therapeutic     S/S of bleeding or thromboembolism: No    New injury or illness: No    Upcoming surgery, procedure or cardioversion: No    Additional findings: None      PLAN:    Spoke with Bradley regarding INR result and instructed:     Warfarin Dosing Instructions: Continue your current warfarin dose      2.5 mg every Tue, Fri; 5 mg all other days         Instructed patient to follow up no later than: 6 weeks  Lab visit scheduled    Education provided: Target INR goal and significance of current INR result      Bradley verbalizes understanding and agrees to warfarin dosing plan.    Instructed to call the Anticoagulation Clinic for any changes, questions or concerns. (#[phone])        Bobbi Lo RN      OBJECTIVE:  Recent labs: (last 7 days)     20  1143   INR 2.40*         No question data found.  Anticoagulation Summary  As of 2020    INR goal:   2.0-3.0   TTR:   81.6 % (1 y)   INR used for dosin.40 (2020)   Warfarin maintenance plan:   2.5 mg (5 mg x 0.5) every Tue, Fri; 5 mg (5 mg x 1) all other days   Full warfarin instructions:   2.5 mg every Tue, Fri; 5 mg all other days   Weekly warfarin total:   30 mg   Plan last modified:   Fanny Elizalde RN (2019)   Next INR check:   10/30/2020   Priority:   High   Target end date:   Indefinite    Indications    Completed stroke (H) [I63.9]  Long term (current) use of anticoagulants [Z79.01]  Atrial fibrillation  unspecified type (H) [I48.91]             Anticoagulation Episode Summary     INR check location:       Preferred lab:       Send INR reminders to:    ISABELA ABEL    Comments:   * Takes warfarin in the AM. Leaves for [place] at the beginning of Jan to April (will need snowbird - Dr. Mustafa has to sign paperwork!)   patient requests [phone] be called      Anticoagulation Care Providers     Provider Role Specialty Phone number    Verito Lima MD Referring Family Practice [phone]    Alex Mustafa MD Responsible Family Practice [phone]

## 2020-09-18 NOTE — PROGRESS NOTES
Anticoagulation Management    Unable to reach Bradley today.    Today's INR result of 2.4 is therapeutic (goal INR of 2.0-3.0).  Result received from: Clinic Lab    Follow up required to confirm warfarin dose taken and assess for changes    LMTCNOMI Lo RN

## 2020-10-30 ENCOUNTER — ANTICOAGULATION THERAPY VISIT (OUTPATIENT)
Dept: FAMILY MEDICINE | Facility: CLINIC | Age: 80
End: 2020-10-30

## 2020-10-30 DIAGNOSIS — Z79.01 LONG TERM (CURRENT) USE OF ANTICOAGULANTS: ICD-10-CM

## 2020-10-30 DIAGNOSIS — I48.91 ATRIAL FIBRILLATION, UNSPECIFIED TYPE (H): ICD-10-CM

## 2020-10-30 DIAGNOSIS — I63.9 COMPLETED STROKE (H): ICD-10-CM

## 2020-10-30 LAB
CAPILLARY BLOOD COLLECTION: NORMAL
INR PPP: 2.3 (ref 0.86–1.14)

## 2020-10-30 PROCEDURE — 36416 COLLJ CAPILLARY BLOOD SPEC: CPT | Performed by: FAMILY MEDICINE

## 2020-10-30 PROCEDURE — 85610 PROTHROMBIN TIME: CPT | Performed by: FAMILY MEDICINE

## 2020-10-30 NOTE — PROGRESS NOTES
Anticoagulation Management    Unable to reach Bradley today.    Today's INR result of 2.3 is therapeutic (goal INR of 2.0-3.0).  Result received from: Clinic Lab    Follow up required to confirm warfarin dose taken and assess for changes    Left msg to call back      Anticoagulation clinic to follow up    Tea Goss RN

## 2020-10-30 NOTE — PROGRESS NOTES
ANTICOAGULATION MANAGEMENT     Patient Name:  Bradley Liz  Date:  10/30/2020    ASSESSMENT /SUBJECTIVE:    Today's INR result of 2.3 is therapeutic. Goal INR of 2.0-3.0      Warfarin dose taken: Warfarin taken as instructed    Diet: No new diet changes affecting INR    Medication changes/ interactions: No new medications/supplements affecting INR    Previous INR: Therapeutic     S/S of bleeding or thromboembolism: No    New injury or illness: No    Upcoming surgery, procedure or cardioversion: No    Additional findings: None      PLAN:    Telephone call with Bradley regarding INR result and instructed:     Warfarin Dosing Instructions: Continue your current warfarin dose 2.5 mg every Tue, Fri; 5 mg AOD    Instructed patient to follow up no later than: 6 weeks  Lab visit scheduled    Education provided: Target INR goal and significance of current INR result, Importance of notifying clinic for changes in medications and Importance of notifying clinic for diarrhea, nausea/vomiting, reduced intake and/or illness      Bradley verbalizes understanding and agrees to warfarin dosing plan.    Instructed to call the Anticoagulation Clinic for any changes, questions or concerns. (#[phone])        Tea Goss RN      OBJECTIVE:  Recent labs: (last 7 days)     10/30/20  1037   INR 2.30*         No question data found.  Anticoagulation Summary  As of 10/30/2020    INR goal:  2.0-3.0   TTR:  89.3 % (1 y)   INR used for dosin.30 (10/30/2020)   Warfarin maintenance plan:  2.5 mg (5 mg x 0.5) every Tue, Fri; 5 mg (5 mg x 1) all other days   Full warfarin instructions:  2.5 mg every Tue, Fri; 5 mg all other days   Weekly warfarin total:  30 mg   Plan last modified:  Fanny Elizalde RN (2019)   Next INR check:  2020   Priority:  High   Target end date:  Indefinite    Indications    Completed stroke (H) [I63.9]  Long term (current) use of anticoagulants [Z79.01]  Atrial fibrillation  unspecified type (H)  [I48.91]             Anticoagulation Episode Summary     INR check location:      Preferred lab:      Send INR reminders to:  ISABELA ABEL    Comments:  * Takes warfarin in the AM. Leaves for [place] at the beginning of Jan to April (will need snowbird - Dr. Mustafa has to sign paperwork!)   patient requests [phone] be called      Anticoagulation Care Providers     Provider Role Specialty Phone number    Verito Lima MD Referring Family Medicine [phone]    Alex Mustafa MD Responsible Family Medicine [phone]

## 2020-12-11 ENCOUNTER — ANTICOAGULATION THERAPY VISIT (OUTPATIENT)
Dept: FAMILY MEDICINE | Facility: CLINIC | Age: 80
End: 2020-12-11

## 2020-12-11 DIAGNOSIS — I63.9 COMPLETED STROKE (H): ICD-10-CM

## 2020-12-11 DIAGNOSIS — I48.91 ATRIAL FIBRILLATION, UNSPECIFIED TYPE (H): ICD-10-CM

## 2020-12-11 DIAGNOSIS — Z79.01 LONG TERM (CURRENT) USE OF ANTICOAGULANTS: ICD-10-CM

## 2020-12-11 LAB
CAPILLARY BLOOD COLLECTION: NORMAL
INR PPP: 1.8 (ref 0.86–1.14)

## 2020-12-11 PROCEDURE — 36416 COLLJ CAPILLARY BLOOD SPEC: CPT | Performed by: FAMILY MEDICINE

## 2020-12-11 PROCEDURE — 85610 PROTHROMBIN TIME: CPT | Performed by: FAMILY MEDICINE

## 2020-12-11 NOTE — PROGRESS NOTES
Anticoagulation Management    Unable to reach Bradley today.    Today's INR result of 1.80 is subtherapeutic (goal INR of 2.0-3.0).  Result received from: Clinic Lab    Follow up required to confirm warfarin dose taken and assess for changes    Left message to 2.5mg tonight (12/11), 5mg on Sat (12/12) & 5mg on Sun (12/13). this weekend.      Anticoagulation clinic to follow up    Arvin Mota RN

## 2020-12-14 ENCOUNTER — TELEPHONE (OUTPATIENT)
Dept: FAMILY MEDICINE | Facility: CLINIC | Age: 80
End: 2020-12-14

## 2020-12-14 DIAGNOSIS — Z86.73 HISTORY OF CVA (CEREBROVASCULAR ACCIDENT): ICD-10-CM

## 2020-12-14 DIAGNOSIS — I48.91 ATRIAL FIBRILLATION, UNSPECIFIED TYPE (H): Primary | ICD-10-CM

## 2020-12-14 DIAGNOSIS — Z79.01 LONG TERM (CURRENT) USE OF ANTICOAGULANTS: ICD-10-CM

## 2020-12-14 NOTE — PROGRESS NOTES
ANTICOAGULATION MANAGEMENT     Patient Name:  Bradley Liz  Date:  2020    ASSESSMENT /SUBJECTIVE:    Friday's INR result of 1.80 is subtherapeutic. Goal INR of 2.0-3.0      Warfarin dose taken: Warfarin taken as instructed    Diet: Increased greens/vitamin K in diet which may be affecting INR; plans to resume previous intake. Had spinach on Thursday, does not routinely eat but has it occasionally.     Medication changes/ interactions: No new medications/supplements affecting INR    Previous INR: Therapeutic 2.30    S/S of bleeding or thromboembolism: No    New injury or illness: No    Upcoming surgery, procedure or cardioversion: No    Additional findings: Will be leaving for [place] on  (for 3-4 months)      PLAN:    Telephone call with Bradley regarding INR result and instructed:     Warfarin Dosing Instructions: Continue your current warfarin dose 2.5mg Tues/Fri; 5mg all other days    Instructed patient to follow up no later than: 2 weeks (2.5 weeks due to holidays and lab availability)  Lab visit scheduled    Education provided: Monitoring for clotting signs and symptoms and Contact the anticoagulation clinic with any changes, questions or concerns at #[phone]       Bradley verbalizes understanding and agrees to warfarin dosing plan.    Instructed to call the Anticoagulation Clinic for any changes, questions or concerns. (#[phone])        Paulina Montesinos RN CACP       OBJECTIVE:  Recent labs: (last 7 days)     20  1149   INR 1.80*         INR assessment SUB    Recheck INR In:  2.5 weeks   INR Location Clinic      Anticoagulation Summary  As of 2020    INR goal:  2.0-3.0   TTR:  84.7 % (1 y)   INR used for dosin.80 (2020)   Warfarin maintenance plan:  2.5 mg (5 mg x 0.5) every Tue, Fri; 5 mg (5 mg x 1) all other days   Full warfarin instructions:  2.5 mg every Tue, Fri; 5 mg all other days   Weekly warfarin total:  30 mg   No change documented:  Yamel  Paulina ZAMAN RN   Plan last modified:  Fanny Elizalde RN (11/19/2019)   Next INR check:  12/30/2020   Priority:  High   Target end date:  Indefinite    Indications    Completed stroke (H) [I63.9]  Long term (current) use of anticoagulants [Z79.01]  Atrial fibrillation  unspecified type (H) [I48.91]             Anticoagulation Episode Summary     INR check location:      Preferred lab:      Send INR reminders to:  ISABELA ABEL    Comments:  * Takes warfarin in the AM. Leaves for [place] at the beginning of Jan to April (will need snowbird - Dr. Mustafa has to sign paperwork!)   patient requests [phone] be called      Anticoagulation Care Providers     Provider Role Specialty Phone number    Verito Lima MD Referring Family Medicine [phone]    Alex Mustafa MD Responsible Family Medicine [phone]

## 2020-12-14 NOTE — TELEPHONE ENCOUNTER
Please sign the pending INR order for patient to use for out of state INR checks.     Xavier AHMADI RN, [facility]  [facility]tico[facility] Clinic

## 2020-12-17 DIAGNOSIS — Z79.01 LONG TERM CURRENT USE OF ANTICOAGULANT THERAPY: ICD-10-CM

## 2020-12-17 RX ORDER — WARFARIN SODIUM 5 MG/1
TABLET ORAL
Qty: 80 TABLET | Refills: 0 | Status: SHIPPED | OUTPATIENT
Start: 2020-12-17 | End: 2021-03-18

## 2020-12-17 NOTE — TELEPHONE ENCOUNTER
Prescription approved per [facility] Refill Protocol.  INR   Date Value Ref Range Status   12/11/2020 1.80 (H) 0.86 - 1.14 Final     Comment:     This test is intended for monitoring Coumadin therapy.  Results are not   accurate in patients with prolonged INR due to factor deficiency.        Elizbaeth Foster, RN, BSN, PHN

## 2020-12-17 NOTE — TELEPHONE ENCOUNTER
"Requested Prescriptions   Pending Prescriptions Disp Refills     warfarin ANTICOAGULANT (COUMADIN) 5 MG tablet [Pharmacy Med Name: WARFARIN SOD 5MG TABLETS (PEACH)] 80 tablet 0     Sig: TAKE ONE-HALF TABLET BY MOUTH EVERY TUESDAY AND FRIDAY; TAKE ONE TABLET ALL OTHER DAYS OR AS DIRECTED.       Vitamin K Antagonists Failed - 12/17/2020  3:40 AM        Failed - INR is within goal in the past 6 weeks     Confirm INR is within goal in the past 6 weeks.     Recent Labs   Lab Test 12/11/20  1149   INR 1.80*                       Passed - Recent (12 mo) or future (30 days) visit within the authorizing provider's specialty     Patient has had an office visit with the authorizing provider or a provider within the authorizing providers department within the previous 12 mos or has a future within next 30 days. See \"Patient Info\" tab in inbasket, or \"Choose Columns\" in Meds & Orders section of the refill encounter.              Passed - Medication is active on med list        Passed - Patient is 18 years of age or older             "

## 2020-12-30 NOTE — TELEPHONE ENCOUNTER
12/30/20    Patient was a NS for his 11:45 am INR today at .    Noam Sands RN, BSN, PHN  Anticoagulation Clinic   [phone]

## 2020-12-31 NOTE — TELEPHONE ENCOUNTER
12/31/20    Writer sent the patient a missed appointment letter. Is the patient already out of state?    Noam Sands RN, BSN, PHN  Anticoagulation Clinic   [phone]

## 2021-01-04 NOTE — TELEPHONE ENCOUNTER
Patient does not leave town until 1-6-21 but is aware we wanted an INR on 12-30 and that he no showed for it.    He will only go to [facility] lab for the INR and they are full tomorrow.     Patient coming to [facility] to  standing INR lab order that Dr. Lima signed tomorrow at 10 am. Our [facility] will continue to manage his out of state INRs. He has not set up any lab appointments in [place] yet but states it will be at [facility]. Please print and have that order ready. He might request an INR tomorrow too but that will be up to lab if they are able to fit him in.    Theresa Baltazar, RN, BSN, PHN  Anticoagulation Clinic

## 2021-01-05 ENCOUNTER — ANTICOAGULATION THERAPY VISIT (OUTPATIENT)
Dept: ANTICOAGULATION | Facility: CLINIC | Age: 81
End: 2021-01-05
Payer: COMMERCIAL

## 2021-01-05 DIAGNOSIS — Z79.01 LONG TERM (CURRENT) USE OF ANTICOAGULANTS: ICD-10-CM

## 2021-01-05 DIAGNOSIS — Z86.73 HISTORY OF CVA (CEREBROVASCULAR ACCIDENT): ICD-10-CM

## 2021-01-05 DIAGNOSIS — I63.9 COMPLETED STROKE (H): ICD-10-CM

## 2021-01-05 DIAGNOSIS — I48.91 ATRIAL FIBRILLATION, UNSPECIFIED TYPE (H): ICD-10-CM

## 2021-01-05 LAB
CAPILLARY BLOOD COLLECTION: NORMAL
INR PPP: 3.1 (ref 0.86–1.14)

## 2021-01-05 PROCEDURE — 36416 COLLJ CAPILLARY BLOOD SPEC: CPT | Performed by: FAMILY MEDICINE

## 2021-01-05 PROCEDURE — 85610 PROTHROMBIN TIME: CPT | Performed by: FAMILY MEDICINE

## 2021-01-05 PROCEDURE — 99207 PR NO CHARGE NURSE ONLY: CPT

## 2021-01-05 NOTE — TELEPHONE ENCOUNTER
Dr. Lima-    This patient did not get his standing lab order today. I am not sure if maybe the  was not aware that he needed it or what.     Can you please sign it (I think his lab in [place] requires a MD signature on it) and have your care team mail it to the patient? His [place] address is [address] [place], [place] [id number]. That is where it needs to go.    Thank you,  Theresa Baltazar, RN, BSN, PHN

## 2021-01-05 NOTE — PROGRESS NOTES
Anticoagulation Management    Unable to reach Bradley today.    Today's INR result of 3.1 is supratherapeutic (goal INR of 2.0-3.0).  Result received from: Clinic Lab    Follow up required to confirm warfarin dose taken and assess for changes    non detailed VM left to call back. Patient is leaving for [place] tomorrow. Did he  standing lab order for INR today?       Anticoagulation clinic to follow up    Theresa Baltazar RN  ANTICOAGULATION FOLLOW-UP CLINIC VISIT    Patient Name:  Bradley Liz  Date:  1/5/2021  Contact Type:  Telephone    SUBJECTIVE:  Patient Findings     Positives:  Change in diet/appetite    Comments:  Patient went to [facility] today and had INR drawn but he did not  the standing lab order for him to take to [place]. Writer sent TE to provider to sign and have care team mail out to patient in [place]. Address is [address] [place] [address]. Patient states he will be going to [facility] in [place], [place] for the INRs but writer cannot find this location or phone/fax number on the Internet. Patient states he had dark greens before last INR and he was low. Now he has had no greens and is slightly elevated. Writer reviewed to be consistent with greens. No other changes. Recheck in [place] in 2 weeks.        Clinical Outcomes     Comments:  Patient went to [facility] today and had INR drawn but he did not  the standing lab order for him to take to [place]. Writer sent TE to provider to sign and have care team mail out to patient in [place]. Address is [address] [place] [address]. Patient states he will be going to [facility] in [place], [place] for the INRs but writer cannot find this location or phone/fax number on the Internet. Patient states he had dark greens before last INR and he was low. Now he has had no greens and is slightly elevated. Writer reviewed to be consistent with greens. No other changes. Recheck in [place] in 2 weeks.            OBJECTIVE    Recent labs: (last 7 days)     01/05/21  1003   INR 3.10*       ASSESSMENT / PLAN  INR assessment SUPRA    Recheck INR In: 2 WEEKS    INR Location Clinic      Anticoagulation Summary  As of 1/5/2021    INR goal:  2.0-3.0   TTR:  83.1 % (1 y)   INR used for dosing:  3.10 (1/5/2021)   Warfarin maintenance plan:  2.5 mg (5 mg x 0.5) every Tue, Fri; 5 mg (5 mg x 1) all other days   Full warfarin instructions:  2.5 mg every Tue, Fri; 5 mg all other days   Weekly warfarin total:  30 mg   No change documented:  Theresa Baltazar, RN   Plan last modified:  Fanny Elizadle RN (11/19/2019)   Next INR check:  1/19/2021   Priority:  High   Target end date:      Indications    Completed stroke (H) [I63.9]  Long term (current) use of anticoagulants [Z79.01]  Atrial fibrillation  unspecified type (H) [I48.91]             Anticoagulation Episode Summary     INR check location:      Preferred lab:      Send INR reminders to:  [facility]    Comments:  * Takes warfarin in the AM. Leaves for [place] at the beginning of Jan to April (will need snowbird - Dr. Mustafa has to sign paperwork!)   patient requests [phone] be called      Anticoagulation Care Providers     Provider Role Specialty Phone number    Verito Lima MD Referring Family Medicine [phone]    Alex Mustafa MD Responsible Family Medicine [phone]            See the Encounter Report to view Anticoagulation Flowsheet and Dosing Calendar (Go to Encounters tab in chart review, and find the Anticoagulation Therapy Visit)        Theresa Baltazar, RN

## 2021-01-18 ENCOUNTER — TELEPHONE (OUTPATIENT)
Dept: FAMILY MEDICINE | Facility: CLINIC | Age: 81
End: 2021-01-18

## 2021-01-19 NOTE — TELEPHONE ENCOUNTER
Call from the patient who reports that he is in [place] for the winter and will be managed by a local provider. Will check in with [facility] when he returns. Mary Heller RN January 19, 2021 2:39 PM

## 2021-03-26 DIAGNOSIS — I48.91 ATRIAL FIBRILLATION, UNSPECIFIED TYPE (H): ICD-10-CM

## 2021-03-26 DIAGNOSIS — I63.9 COMPLETED STROKE (H): Primary | ICD-10-CM

## 2021-03-26 DIAGNOSIS — Z79.01 LONG TERM (CURRENT) USE OF ANTICOAGULANTS: ICD-10-CM

## 2021-04-02 ENCOUNTER — TELEPHONE (OUTPATIENT)
Dept: ANTICOAGULATION | Facility: CLINIC | Age: 81
End: 2021-04-02

## 2021-04-02 NOTE — TELEPHONE ENCOUNTER
According to 1/18/21 notes, patient is in [place] and will be managed by a provider there. Writer attempted to call patient to see if he is still currently in [place]. Left non-detailed VM for patient to call ACC.     Xavier AHMADI RN, CACP

## 2021-04-03 ENCOUNTER — NURSE TRIAGE (OUTPATIENT)
Dept: NURSING | Facility: CLINIC | Age: 81
End: 2021-04-03

## 2021-04-03 NOTE — TELEPHONE ENCOUNTER
"Need a prescription refilled. Warfarin.   Pharmacy:   [place], [place]:  [phone].     warfarin ANTICOAGULANT (COUMADIN) 5 MG tablet 80 tablet 0 3/18/2021  No   Sig: TAKE ONE-HALF TABLET BY MOUTH EVERY TUESDAY AND FRIDAY; TAKE ONE TABLET ALL OTHER DAYS OR AS DIRECTED.   Sent to pharmacy as: Warfarin Sodium 5 MG Oral Tablet (COUMADIN)   Class: E-Prescribe   Order: [id number]   E-Prescribing Status: Receipt confirmed by pharmacy (3/18/2021  8:52 AM CDT     Prescribing Provider's NPI: [id number]  Verito Lima I called [place] and gave the pharmacist the above prescription information which was sent to a [place] pharmacy, not [place] where he has been for three months.   Sangeeta Baca RN  [facility]    Additional Information    Negative: Drug overdose and nurse unable to answer question    Negative: Caller requesting information not related to medicine    Negative: Caller requesting a prescription for Strep throat and has a positive culture result    Negative: Rash while taking a medication or within 3 days of stopping it    Negative: Immunization reaction suspected    Negative: [1] Asthma AND [2] having symptoms of asthma (cough, wheezing, etc)    Negative: MORE THAN A DOUBLE DOSE of a prescription or over-the-counter (OTC) drug    Negative: [1] DOUBLE DOSE (an extra dose or lesser amount) of over-the-counter (OTC) drug AND [2] any symptoms (e.g., dizziness, nausea, pain, sleepiness)    Negative: [1] DOUBLE DOSE (an extra dose or lesser amount) of prescription drug AND [2] any symptoms (e.g., dizziness, nausea, pain, sleepiness)    Negative: Took another person's prescription drug    Negative: [1] DOUBLE DOSE (an extra dose or lesser amount) of prescription drug AND [2] NO symptoms (Exception: a double dose of antibiotics)    Negative: Diabetes drug error or overdose (e.g., insulin or extra dose)    Negative: [1] Request for URGENT new prescription or refill of \"essential\" medication " (i.e., likelihood of harm to patient if not taken) AND [2] triager unable to fill per unit policy    Negative: [1] Prescription not at pharmacy AND [2] was prescribed today by PCP    Negative: Pharmacy calling with prescription questions and triager unable to answer question    Negative: Caller has urgent medication question about med that PCP prescribed and triager unable to answer question    Negative: Caller has NON-URGENT medication question about med that PCP prescribed and triager unable to answer question    Negative: Caller requesting a NON-URGENT new prescription or refill and triager unable to refill per unit policy    Negative: Caller has medication question about med not prescribed by PCP and triager unable to answer question (e.g., compatibility with other med, storage)    Negative: [1] DOUBLE DOSE (an extra dose or lesser amount) of over-the-counter (OTC) drug AND [2] NO symptoms    Negative: [1] DOUBLE DOSE (an extra dose or lesser amount) of antibiotic drug AND [2] NO symptoms    Caller has medication question only, adult not sick, and triager answers question    Protocols used: MEDICATION QUESTION CALL-A-

## 2021-04-16 ENCOUNTER — TELEPHONE (OUTPATIENT)
Dept: ANTICOAGULATION | Facility: CLINIC | Age: 81
End: 2021-04-16

## 2021-04-16 NOTE — TELEPHONE ENCOUNTER
ANTICOAGULATION     Patient is currently in [place]. He will be returning to [place] next Thursday. He states he will be due for an INR check when he gets back. He is still taking his same warfarin dose. An appointment has been scheduled for Friday, 4/23.

## 2021-04-26 ENCOUNTER — ANTICOAGULATION THERAPY VISIT (OUTPATIENT)
Dept: ANTICOAGULATION | Facility: CLINIC | Age: 81
End: 2021-04-26

## 2021-04-26 DIAGNOSIS — Z79.01 LONG TERM (CURRENT) USE OF ANTICOAGULANTS: ICD-10-CM

## 2021-04-26 DIAGNOSIS — I48.91 ATRIAL FIBRILLATION, UNSPECIFIED TYPE (H): ICD-10-CM

## 2021-04-26 DIAGNOSIS — I63.9 COMPLETED STROKE (H): ICD-10-CM

## 2021-04-26 DIAGNOSIS — Z86.73 HISTORY OF CVA (CEREBROVASCULAR ACCIDENT): ICD-10-CM

## 2021-04-26 LAB
CAPILLARY BLOOD COLLECTION: NORMAL
INR PPP: 4.3 (ref 0.86–1.14)

## 2021-04-26 PROCEDURE — 36416 COLLJ CAPILLARY BLOOD SPEC: CPT | Performed by: FAMILY MEDICINE

## 2021-04-26 PROCEDURE — 85610 PROTHROMBIN TIME: CPT | Performed by: FAMILY MEDICINE

## 2021-04-26 NOTE — PROGRESS NOTES
ANTICOAGULATION MANAGEMENT     Patient Name:  Bradley Liz  Date:  2021    ASSESSMENT /SUBJECTIVE:    Today's INR result of 4.30 is supratherapeutic. Goal INR of 2.0-3.0      Warfarin dose taken: Warfarin taken as instructed    Diet: No new diet changes affecting INR    Medication changes/ interactions: No new medications/supplements affecting INR    Previous INR: Patient's last INR was in [place], he does not know what the result was. He has been taking the same warfarin dose while he was out of state    S/S of bleeding or thromboembolism: No    New injury or illness: No    Upcoming surgery, procedure or cardioversion: No    Additional findings: Patient is wondering if the warfarin script he received in [place] is different?      PLAN:    Telephone call with Bradley regarding INR result and instructed:     Warfarin Dosing Instructions: Hold dose tomorrow, take 2.5mg Wed (already took warfarin dose today) then continue your current warfarin dose of 2.5mg Tues/Fri; 5mg all other days    Instructed patient to follow up no later than: 10 days  Lab visit scheduled    Education provided: Importance of notifying clinic for changes in medications or health; a sooner lab recheck maybe needed, Monitoring for bleeding signs and symptoms and When to seek medical attention/emergency care      Bradley verbalizes understanding and agrees to warfarin dosing plan.    Instructed to call the Anticoagulation Clinic for any changes, questions or concerns. (#[phone])        Paulina Montesinos RN CACP       OBJECTIVE:  Recent labs: (last 7 days)     21  1154   INR 4.30*         INR assessment SUPRA    Recheck INR In: 10 DAYS    INR Location Clinic      Anticoagulation Summary  As of 2021    INR goal:  2.0-3.0   TTR:  84.4 % (8.5 mo)   INR used for dosin.30 (2021)   Warfarin maintenance plan:  2.5 mg (5 mg x 0.5) every Tue, Fri; 5 mg (5 mg x 1) all other days   Full warfarin instructions:  : Hold;  4/28: 2.5 mg; Otherwise 2.5 mg every Tue, Fri; 5 mg all other days   Weekly warfarin total:  30 mg   Plan last modified:  Fanny Elizalde RN (11/19/2019)   Next INR check:  5/6/2021   Priority:  High   Target end date:  Indefinite    Indications    Completed stroke (H) [I63.9]  Long term (current) use of anticoagulants [Z79.01]  Atrial fibrillation  unspecified type (H) [I48.91]             Anticoagulation Episode Summary     INR check location:      Preferred lab:      Send INR reminders to:  [facility]    Comments:  * Takes warfarin in the AM. Leaves for [place] at the beginning of Jan to April (will need snowbird - Dr. Mustafa has to sign paperwork!)   patient requests [phone] be called      Anticoagulation Care Providers     Provider Role Specialty Phone number    Verito Lima MD Referring Family Medicine [phone]    Alex Mustafa MD Responsible Family Medicine [phone]

## 2021-05-06 ENCOUNTER — ANTICOAGULATION THERAPY VISIT (OUTPATIENT)
Dept: ANTICOAGULATION | Facility: CLINIC | Age: 81
End: 2021-05-06

## 2021-05-06 DIAGNOSIS — I63.9 COMPLETED STROKE (H): ICD-10-CM

## 2021-05-06 DIAGNOSIS — Z79.01 LONG TERM (CURRENT) USE OF ANTICOAGULANTS: ICD-10-CM

## 2021-05-06 DIAGNOSIS — I48.91 ATRIAL FIBRILLATION, UNSPECIFIED TYPE (H): ICD-10-CM

## 2021-05-06 LAB
CAPILLARY BLOOD COLLECTION: NORMAL
INR PPP: 2.8 (ref 0.86–1.14)

## 2021-05-06 PROCEDURE — 85610 PROTHROMBIN TIME: CPT | Performed by: FAMILY MEDICINE

## 2021-05-06 PROCEDURE — 36416 COLLJ CAPILLARY BLOOD SPEC: CPT | Performed by: FAMILY MEDICINE

## 2021-05-06 NOTE — PROGRESS NOTES
ANTICOAGULATION MANAGEMENT     Patient Name:  Bradley Liz  Date:  2021    ASSESSMENT /SUBJECTIVE:    Today's INR result of 2.80 is therapeutic. Goal INR of 2.0-3.0      Warfarin dose taken: Warfarin taken as instructed    Diet: No new diet changes affecting INR    Medication changes/ interactions: No new medications/supplements affecting INR    Previous INR: Supratherapeutic 4.30    S/S of bleeding or thromboembolism: No    New injury or illness: No    Upcoming surgery, procedure or cardioversion: No    Additional findings: Unknown reason for the elevated INR last week.      PLAN:    Telephone call with  Gian regarding INR result and instructed:     Warfarin Dosing Instructions: Continue your current warfarin dose 2.5mg Tues/Fri; 5mg all other days    Instructed patient to follow up no later than: 1 week  Patient elected to schedule next visit in 2 weeks    Education provided: Importance of notifying clinic for changes in medications or health; a sooner lab recheck maybe needed, important to recheck the INR soon due to the unknown reason for the elevated INR last week.       Gian verbalizes understanding and agrees to warfarin dosing plan.    Instructed to call the Anticoagulation Clinic for any changes, questions or concerns. (#[phone])        Paulina Montesinos RN CACP       OBJECTIVE:  Recent labs: (last 7 days)     21  1131   INR 2.80*         INR assessment THER    Recheck INR In: 1 WEEK    INR Location Clinic      Anticoagulation Summary  As of 2021    INR goal:  2.0-3.0   TTR:  81.2 % (8.5 mo)   INR used for dosin.80 (2021)   Warfarin maintenance plan:  2.5 mg (5 mg x 0.5) every Tue, Fri; 5 mg (5 mg x 1) all other days   Full warfarin instructions:  2.5 mg every Tue, Fri; 5 mg all other days   Weekly warfarin total:  30 mg   No change documented:  Paulina Montesinos RN   Plan last modified:  Fanny Elizalde RN (2019)   Next INR check:  2021   Priority:  High    Target end date:  Indefinite    Indications    Completed stroke (H) [I63.9]  Long term (current) use of anticoagulants [Z79.01]  Atrial fibrillation  unspecified type (H) [I48.91]             Anticoagulation Episode Summary     INR check location:      Preferred lab:      Send INR reminders to:  [facility]    Comments:  * Takes warfarin in the AM. Leaves for [place] at the beginning of Jan to April (will need snowbird - Dr. Mustafa has to sign paperwork!)   patient requests [phone] be called      Anticoagulation Care Providers     Provider Role Specialty Phone number    Verito Lima MD Referring Family Medicine [phone]    Alex Mustafa MD Responsible Family Medicine [phone]

## 2021-05-12 DIAGNOSIS — I48.91 ATRIAL FIBRILLATION, UNSPECIFIED TYPE (H): ICD-10-CM

## 2021-05-13 RX ORDER — METOPROLOL SUCCINATE 200 MG/1
TABLET, EXTENDED RELEASE ORAL
Qty: 90 TABLET | Refills: 3 | OUTPATIENT
Start: 2021-05-13

## 2021-05-17 ENCOUNTER — TELEPHONE (OUTPATIENT)
Dept: FAMILY MEDICINE | Facility: CLINIC | Age: 81
End: 2021-05-17

## 2021-05-17 DIAGNOSIS — I48.91 ATRIAL FIBRILLATION, UNSPECIFIED TYPE (H): ICD-10-CM

## 2021-05-17 RX ORDER — METOPROLOL SUCCINATE 200 MG/1
200 TABLET, EXTENDED RELEASE ORAL DAILY
Qty: 90 TABLET | Refills: 0 | Status: SHIPPED | OUTPATIENT
Start: 2021-05-17 | End: 2021-07-16

## 2021-05-17 NOTE — TELEPHONE ENCOUNTER
Vanessa says their computer deleted the rx even though he had a refill left; Dr. Lima had denied refill, that is why they requested it.Christine Reyes on 5/17/2021 at 11:54 AM

## 2021-05-17 NOTE — TELEPHONE ENCOUNTER
Remaining Metoprolol 90 day refill sent to Pharmacy.  Pharmacist states the remaining refill was deleted from their program and needed a new order.  Kpavelrn

## 2021-05-20 ENCOUNTER — ANTICOAGULATION THERAPY VISIT (OUTPATIENT)
Dept: ANTICOAGULATION | Facility: CLINIC | Age: 81
End: 2021-05-20

## 2021-05-20 DIAGNOSIS — Z79.01 LONG TERM (CURRENT) USE OF ANTICOAGULANTS: ICD-10-CM

## 2021-05-20 DIAGNOSIS — I63.9 COMPLETED STROKE (H): ICD-10-CM

## 2021-05-20 DIAGNOSIS — I48.91 ATRIAL FIBRILLATION, UNSPECIFIED TYPE (H): ICD-10-CM

## 2021-05-20 LAB
CAPILLARY BLOOD COLLECTION: NORMAL
INR PPP: 1.9 (ref 0.86–1.14)

## 2021-05-20 PROCEDURE — 36416 COLLJ CAPILLARY BLOOD SPEC: CPT | Performed by: FAMILY MEDICINE

## 2021-05-20 PROCEDURE — 85610 PROTHROMBIN TIME: CPT | Performed by: FAMILY MEDICINE

## 2021-05-20 NOTE — PROGRESS NOTES
Anticoagulation Management    Unable to reach Bradley today.    Today's INR result of 1.9 is subtherapeutic (goal INR of 2.0-3.0).  Result received from: Clinic Lab    Follow up required to confirm warfarin dose taken and assess for changes    No instructions provided. Unable to leave voicemail. No signed consent to leave DVM. Patient to call [facility] back. He was supra therapeutic on 4-26 for unknown reasons so hesitate to increase maintenance dose at this time. Needs full assessment.      Anticoagulation clinic to follow up    Theresa Baltazar RN

## 2021-05-21 NOTE — PROGRESS NOTES
ANTICOAGULATION MANAGEMENT     Patient Name:  Bradley Liz  Date:  2021    ASSESSMENT /SUBJECTIVE:    Today's INR result of 1.9 is subtherapeutic. Goal INR of 2.0-3.0      Warfarin dose taken: Warfarin taken as instructed    Diet: Increased greens/vitamin K in diet; plans to resume previous intake    Medication changes/ interactions: No new medications/supplements affecting INR    Previous INR: Therapeutic     S/S of bleeding or thromboembolism: No    New injury or illness: No    Upcoming surgery, procedure or cardioversion: No    Additional findings: Patient drinks 2 v8 drinks twice a week. We discussed importance of consistency and spreading it out even throughout the week      PLAN:    Telephone call with Bradley regarding INR result and instructed:     Warfarin Dosing Instructions: Continue your current warfarin dose 2.5 mg Tues/Fri; 5 mg ROW    Instructed patient to follow up no later than: 2 weeks  Lab visit scheduled    Education provided: Please call back if any changes to your diet, medications or how you've been taking warfarin, Importance of consistent vitamin K intake, Impact of vitamin K foods on INR, Vitamin K content of foods, Monitoring for clotting signs and symptoms and Contact [facility] Anticoagulation: [phone]  with any changes, questions or concerns.       Bradley verbalizes understanding and agrees to warfarin dosing plan.    Instructed to call the Anticoagulation Clinic for any changes, questions or concerns. (#[phone])        Theresa Baltazar RN      OBJECTIVE:  Recent labs: (last 7 days)     21  1135   INR 1.90*         No question data found.  Anticoagulation Summary  As of 2021    INR goal:  2.0-3.0   TTR:  86.5 % (8.4 mo)   INR used for dosin.90 (2021)   Warfarin maintenance plan:  2.5 mg (5 mg x 0.5) every Tue, Fri; 5 mg (5 mg x 1) all other days   Full warfarin instructions:  2.5 mg every Tue, Fri; 5 mg all other days   Weekly warfarin  total:  30 mg   No change documented:  Theresa Baltazar, RN   Plan last modified:  Fanny Elizalde, RN (11/19/2019)   Next INR check:  6/3/2021   Priority:  High   Target end date:      Indications    Completed stroke (H) [I63.9]  Long term (current) use of anticoagulants [Z79.01]  Atrial fibrillation  unspecified type (H) [I48.91]             Anticoagulation Episode Summary     INR check location:      Preferred lab:      Send INR reminders to:  [facility]    Comments:  * Takes warfarin in the AM. [place] for [place] at the beginning of Jan to April (will need snowbird - Dr. Mustafa has to sign paperwork!)   patient requests [phone] be called      Anticoagulation Care Providers     Provider Role Specialty Phone number    Verito Lima MD Referring Family Medicine [phone]    Alex Mustafa MD Responsible Family Medicine [phone]

## 2021-06-03 ENCOUNTER — ANTICOAGULATION THERAPY VISIT (OUTPATIENT)
Dept: ANTICOAGULATION | Facility: CLINIC | Age: 81
End: 2021-06-03

## 2021-06-03 DIAGNOSIS — I63.9 COMPLETED STROKE (H): ICD-10-CM

## 2021-06-03 DIAGNOSIS — I48.91 ATRIAL FIBRILLATION, UNSPECIFIED TYPE (H): ICD-10-CM

## 2021-06-03 DIAGNOSIS — Z79.01 LONG TERM (CURRENT) USE OF ANTICOAGULANTS: ICD-10-CM

## 2021-06-03 LAB
CAPILLARY BLOOD COLLECTION: NORMAL
INR PPP: 2.3 (ref 0.86–1.14)

## 2021-06-03 PROCEDURE — 36416 COLLJ CAPILLARY BLOOD SPEC: CPT | Performed by: FAMILY MEDICINE

## 2021-06-03 PROCEDURE — 85610 PROTHROMBIN TIME: CPT | Performed by: FAMILY MEDICINE

## 2021-06-03 NOTE — PROGRESS NOTES
ANTICOAGULATION MANAGEMENT     Patient Name:  Bradley Liz  Date:  6/3/2021    ASSESSMENT /SUBJECTIVE:    Today's INR result of 2.3 is therapeutic. Goal INR of 2.0-3.0      Warfarin dose taken: Warfarin taken as instructed    Diet: No new diet changes affecting INR    Medication changes/ interactions: No new medications/supplements affecting INR    Previous INR: Subtherapeutic     S/S of bleeding or thromboembolism: No    New injury or illness: No    Upcoming surgery, procedure or cardioversion: No    Additional findings: None      PLAN:    Warfarin Dosing Instructions: Continue your current warfarin dose 2.5 mg Tues/Fri; 5 mg ROW    Instructed patient to follow up no later than: 3 weeks  Lab visit scheduled    Education provided: Contact [facility] Anticoagulation: [phone]  with any changes, questions or concerns.     Telephone call with  Barbara whom verbalizes understanding and agrees to plan    Instructed to call the Anticoagulation Clinic for any changes, questions or concerns. (#[phone])        Theresa Baltazar RN      OBJECTIVE:  Recent labs: (last 7 days)     21  1153   INR 2.30*         No question data found.  Anticoagulation Summary  As of 6/3/2021    INR goal:  2.0-3.0   TTR:  85.7 % (8.5 mo)   INR used for dosin.30 (6/3/2021)   Warfarin maintenance plan:  2.5 mg (5 mg x 0.5) every Tue, Fri; 5 mg (5 mg x 1) all other days   Full warfarin instructions:  2.5 mg every Tue, Fri; 5 mg all other days   Weekly warfarin total:  30 mg   No change documented:  Theresa Baltazar RN   Plan last modified:  Fanny Elizalde RN (2019)   Next INR check:  2021   Priority:  Maintenance   Target end date:  Indefinite    Indications    Completed stroke (H) [I63.9]  Long term (current) use of anticoagulants [Z79.01]  Atrial fibrillation  unspecified type (H) [I48.91]             Anticoagulation Episode Summary     INR check location:      Preferred lab:      Send INR reminders  to:  [facility]    Comments:  * Takes warfarin in the AM. [place] for [place] at the beginning of Jan to April (will need snowbird - Dr. Mustafa has to sign paperwork!)   patient requests [phone] be called      Anticoagulation Care Providers     Provider Role Specialty Phone number    Verito Lima MD Referring Family Medicine [phone]    Alex Mustafa MD Responsible Family Medicine [phone]

## 2021-06-29 ENCOUNTER — TELEPHONE (OUTPATIENT)
Dept: FAMILY MEDICINE | Facility: CLINIC | Age: 81
End: 2021-06-29

## 2021-06-29 ENCOUNTER — ANTICOAGULATION THERAPY VISIT (OUTPATIENT)
Dept: ANTICOAGULATION | Facility: CLINIC | Age: 81
End: 2021-06-29

## 2021-06-29 DIAGNOSIS — Z79.01 LONG TERM (CURRENT) USE OF ANTICOAGULANTS: ICD-10-CM

## 2021-06-29 DIAGNOSIS — I48.91 ATRIAL FIBRILLATION, UNSPECIFIED TYPE (H): ICD-10-CM

## 2021-06-29 DIAGNOSIS — I48.91 ATRIAL FIBRILLATION, UNSPECIFIED TYPE (H): Primary | ICD-10-CM

## 2021-06-29 DIAGNOSIS — I63.9 COMPLETED STROKE (H): ICD-10-CM

## 2021-06-29 DIAGNOSIS — Z86.73 HISTORY OF CVA (CEREBROVASCULAR ACCIDENT): ICD-10-CM

## 2021-06-29 LAB
CAPILLARY BLOOD COLLECTION: NORMAL
INR PPP: 3 (ref 0.86–1.14)

## 2021-06-29 PROCEDURE — 85610 PROTHROMBIN TIME: CPT | Performed by: FAMILY MEDICINE

## 2021-06-29 PROCEDURE — 36416 COLLJ CAPILLARY BLOOD SPEC: CPT | Performed by: FAMILY MEDICINE

## 2021-06-29 NOTE — PROGRESS NOTES
VM left for pt to return call to [facility] [phone]. Dosing instructions not given to pt.  Tentative plan: recheck INR in 4 weeks.  Fanny Elizalde RN on 6/29/2021 at 2:11 PM

## 2021-06-29 NOTE — TELEPHONE ENCOUNTER
ANTICOAGULATION MANAGEMENT      Bradley Liz due for annual renewal of referral to anticoagulation monitoring. Order pended for your review and signature.      ANTICOAGULATION SUMMARY      Warfarin indication(s)     Atrial fibrillation  Completed stroke    Heart valve present?  NO       Current goal range   INR: 2.0-3.0     Goal appropriate for indication? Yes, INR 2-3 appropriate for hx of DVT, PE, hypercoagulable state, Afib, LVAD, or bileaflet AVR without risk factors     Current duration of therapy Indefinite/long term therapy   Time in Therapeutic Range (TTR)  (Goal > 60%) 85.7%       Office visit with referring provider's group within last year yes on 8/12/2020       Fanny Elizalde RN

## 2021-06-29 NOTE — PROGRESS NOTES
Patient left  returning call.    Alla Hein RN - [facility] Anticoagulation Clinic

## 2021-06-29 NOTE — PROGRESS NOTES
ANTICOAGULATION MANAGEMENT     Bradley Liz 80 year old male is on warfarin with therapeutic INR result. (Goal INR 2.0-3.0)    Recent labs: (last 7 days)     06/29/21  1138   INR 3.00*       ASSESSMENT     Source(s): Patient/Caregiver Call       Warfarin doses taken: Warfarin taken as instructed    Diet: No new diet changes identified    New illness, injury, or hospitalization: No    Medication/supplement changes: None noted    Signs or symptoms of bleeding or clotting: No    Previous INR: Therapeutic last visit; previously outside of goal range    Additional findings: None     PLAN     Recommended plan for no diet, medication or health factor changes affecting INR     Dosing Instructions: Continue your current warfarin dose with next INR in 4 weeks       Summary  As of 6/29/2021    Full warfarin instructions:  2.5 mg every Tue, Fri; 5 mg all other days   Next INR check:  7/27/2021             Telephone call with Bradley whom verbalizes understanding and agrees to plan    Lab visit scheduled    Education provided: Please call back if any changes to your diet, medications or how you've been taking warfarin    Plan made per [facility] anticoagulation protocol    Fanny Elizalde RN  Anticoagulation Clinic  6/29/2021    _______________________________________________________________________     Anticoagulation Episode Summary     Current INR goal:  2.0-3.0   TTR:  85.7 % (8.5 mo)   Target end date:  Indefinite   Send INR reminders to:  [facility]    Indications    Completed stroke (H) [I63.9]  Long term (current) use of anticoagulants [Z79.01]  Atrial fibrillation  unspecified type (H) [I48.91]  History of CVA (cerebrovascular accident) [Z86.73]           Comments:  * Takes warfarin in the AM. [place] for [place] at the beginning of Jan to April (will need alexia - Dr. Mustafa has to sign paperwork!)   patient requests [phone] be called         Anticoagulation Care Providers     Provider Role Specialty Phone number     Verito Lima MD Referring Family Medicine [phone]    Alex Mustafa MD Responsible Family Medicine [phone]

## 2021-07-12 DIAGNOSIS — Z79.01 LONG TERM (CURRENT) USE OF ANTICOAGULANTS: ICD-10-CM

## 2021-07-12 DIAGNOSIS — I63.9 COMPLETED STROKE (H): Primary | ICD-10-CM

## 2021-07-12 DIAGNOSIS — I48.91 ATRIAL FIBRILLATION, UNSPECIFIED TYPE (H): ICD-10-CM

## 2021-07-16 ENCOUNTER — OFFICE VISIT (OUTPATIENT)
Dept: FAMILY MEDICINE | Facility: CLINIC | Age: 81
End: 2021-07-16
Payer: COMMERCIAL

## 2021-07-16 VITALS
SYSTOLIC BLOOD PRESSURE: 98 MMHG | BODY MASS INDEX: 28.88 KG/M2 | OXYGEN SATURATION: 96 % | RESPIRATION RATE: 18 BRPM | DIASTOLIC BLOOD PRESSURE: 78 MMHG | TEMPERATURE: 97.9 F | WEIGHT: 195 LBS | HEIGHT: 69 IN | HEART RATE: 86 BPM

## 2021-07-16 DIAGNOSIS — R60.0 EDEMA LEG: ICD-10-CM

## 2021-07-16 DIAGNOSIS — Z00.00 ENCOUNTER FOR MEDICARE ANNUAL WELLNESS EXAM: Primary | ICD-10-CM

## 2021-07-16 DIAGNOSIS — I48.91 ATRIAL FIBRILLATION, UNSPECIFIED TYPE (H): ICD-10-CM

## 2021-07-16 DIAGNOSIS — I10 HYPERTENSION, UNSPECIFIED TYPE: ICD-10-CM

## 2021-07-16 DIAGNOSIS — N18.31 STAGE 3A CHRONIC KIDNEY DISEASE (H): ICD-10-CM

## 2021-07-16 DIAGNOSIS — I50.22 CHRONIC SYSTOLIC HEART FAILURE (H): ICD-10-CM

## 2021-07-16 DIAGNOSIS — I63.9 COMPLETED STROKE (H): ICD-10-CM

## 2021-07-16 LAB
ALT SERPL W P-5'-P-CCNC: 18 U/L (ref 0–70)
ANION GAP SERPL CALCULATED.3IONS-SCNC: 6 MMOL/L (ref 3–14)
BASOPHILS # BLD AUTO: 0.1 10E3/UL (ref 0–0.2)
BASOPHILS NFR BLD AUTO: 1 %
BUN SERPL-MCNC: 20 MG/DL (ref 7–30)
CALCIUM SERPL-MCNC: 8.8 MG/DL (ref 8.5–10.1)
CHLORIDE BLD-SCNC: 110 MMOL/L (ref 94–109)
CHOLEST SERPL-MCNC: 149 MG/DL
CO2 SERPL-SCNC: 28 MMOL/L (ref 20–32)
CREAT SERPL-MCNC: 1.14 MG/DL (ref 0.66–1.25)
EOSINOPHIL # BLD AUTO: 0.2 10E3/UL (ref 0–0.7)
EOSINOPHIL NFR BLD AUTO: 2 %
ERYTHROCYTE [DISTWIDTH] IN BLOOD BY AUTOMATED COUNT: 14.9 % (ref 10–15)
FASTING STATUS PATIENT QL REPORTED: NO
GFR SERPL CREATININE-BSD FRML MDRD: 60 ML/MIN/1.73M2
GLUCOSE BLD-MCNC: 129 MG/DL (ref 70–99)
HCT VFR BLD AUTO: 51.2 % (ref 40–53)
HDLC SERPL-MCNC: 46 MG/DL
HGB BLD-MCNC: 16.3 G/DL (ref 13.3–17.7)
LDLC SERPL CALC-MCNC: 77 MG/DL
LYMPHOCYTES # BLD AUTO: 2.2 10E3/UL (ref 0.8–5.3)
LYMPHOCYTES NFR BLD AUTO: 26 %
MCH RBC QN AUTO: 31 PG (ref 26.5–33)
MCHC RBC AUTO-ENTMCNC: 31.8 G/DL (ref 31.5–36.5)
MCV RBC AUTO: 97 FL (ref 78–100)
MONOCYTES # BLD AUTO: 0.5 10E3/UL (ref 0–1.3)
MONOCYTES NFR BLD AUTO: 6 %
NEUTROPHILS # BLD AUTO: 5.6 10E3/UL (ref 1.6–8.3)
NEUTROPHILS NFR BLD AUTO: 66 %
NONHDLC SERPL-MCNC: 103 MG/DL
PLATELET # BLD AUTO: 288 10E3/UL (ref 150–450)
POTASSIUM BLD-SCNC: 4.3 MMOL/L (ref 3.4–5.3)
RBC # BLD AUTO: 5.26 10E6/UL (ref 4.4–5.9)
SODIUM SERPL-SCNC: 144 MMOL/L (ref 133–144)
TRIGL SERPL-MCNC: 129 MG/DL
TSH SERPL DL<=0.005 MIU/L-ACNC: 2.13 MU/L (ref 0.4–4)
WBC # BLD AUTO: 8.5 10E3/UL (ref 4–11)

## 2021-07-16 PROCEDURE — 99397 PER PM REEVAL EST PAT 65+ YR: CPT | Mod: 25 | Performed by: FAMILY MEDICINE

## 2021-07-16 PROCEDURE — 84443 ASSAY THYROID STIM HORMONE: CPT | Performed by: FAMILY MEDICINE

## 2021-07-16 PROCEDURE — 99214 OFFICE O/P EST MOD 30 MIN: CPT | Mod: 25 | Performed by: FAMILY MEDICINE

## 2021-07-16 PROCEDURE — 85025 COMPLETE CBC W/AUTO DIFF WBC: CPT | Performed by: FAMILY MEDICINE

## 2021-07-16 PROCEDURE — 36415 COLL VENOUS BLD VENIPUNCTURE: CPT | Performed by: FAMILY MEDICINE

## 2021-07-16 PROCEDURE — 80061 LIPID PANEL: CPT | Performed by: FAMILY MEDICINE

## 2021-07-16 PROCEDURE — 84460 ALANINE AMINO (ALT) (SGPT): CPT | Performed by: FAMILY MEDICINE

## 2021-07-16 PROCEDURE — 80048 BASIC METABOLIC PNL TOTAL CA: CPT | Performed by: FAMILY MEDICINE

## 2021-07-16 RX ORDER — ROSUVASTATIN CALCIUM 5 MG/1
5 TABLET, COATED ORAL DAILY
Qty: 90 TABLET | Refills: 3 | Status: SHIPPED | OUTPATIENT
Start: 2021-07-16 | End: 2022-01-01

## 2021-07-16 RX ORDER — METOPROLOL SUCCINATE 200 MG/1
200 TABLET, EXTENDED RELEASE ORAL DAILY
Qty: 90 TABLET | Refills: 3 | Status: SHIPPED | OUTPATIENT
Start: 2021-07-16 | End: 2022-01-01

## 2021-07-16 ASSESSMENT — MIFFLIN-ST. JEOR: SCORE: 1584.89

## 2021-07-16 ASSESSMENT — PAIN SCALES - GENERAL: PAINLEVEL: NO PAIN (0)

## 2021-07-16 ASSESSMENT — ACTIVITIES OF DAILY LIVING (ADL): CURRENT_FUNCTION: NO ASSISTANCE NEEDED

## 2021-07-16 NOTE — LETTER
July 19, 2021      Bradley Liz  [address]   [place] [address]        Dear ThaliaLeedanni,    We are writing to inform you of your test results.    Your test results fall within the expected range(s) or remain unchanged from previous results.  Please continue with current treatment plan.    Resulted Orders   Lipid panel reflex to direct LDL Non-fasting   Result Value Ref Range    Cholesterol 149 <200 mg/dL      Comment:      Age 0-19 years  Desirable: <170 mg/dL  Borderline high:  170-199 mg/dl  High:            >199 mg/dl    Age 20 years and older  Desirable: <200 mg/dL    Triglycerides 129 <150 mg/dL      Comment:      0-9 years:  Normal:    Less than 75 mg/dL  Borderline high:  75-99 mg/dL  High:             Greater than or equal to 100 mg/dL    0-19 years:  Normal:    Less than 90 mg/dL  Borderline high:   mg/dL  High:             Greater than or equal to 130 mg/dL    20 years and older:  Normal:    Less than 150 mg/dL  Borderline high:  150-199 mg/dL  High:             200-499 mg/dL  Very high:   Greater than or equal to 500 mg/dL    Direct Measure HDL 46 >=40 mg/dL      Comment:      0-19 years:       Greater than or equal to 45 mg/dL   Low: Less than 40 mg/dL   Borderline low: 40-44 mg/dL     20 years and older:   Female: Greater than or equal to 50 mg/dL   Male:   Greater than or equal to 40 mg/dL         LDL Cholesterol Calculated 77 <=100 mg/dL      Comment:      Age 0-19 years:  Desirable: 0-110 mg/dL   Borderline high: 110-129 mg/dL   High: >= 130 mg/dL    Age 20 years and older:  Desirable: <100mg/dL  Above desirable: 100-129 mg/dL   Borderline high: 130-159 mg/dL   High: 160-189 mg/dL   Very high: >= 190 mg/dL    Non HDL Cholesterol 103 <130 mg/dL      Comment:      0-19 years:  Desirable:          Less than 120 mg/dL  Borderline high:   120-144 mg/dL  High:                   Greater than or equal to 145 mg/dL    20 years and older:  Desirable:          130 mg/dL  Above  Desirable: 130-159 mg/dL  Borderline high:   160-189 mg/dL  High:               190-219 mg/dL  Very high:     Greater than or equal to 220 mg/dL    Patient Fasting > 8hrs? No    ALT   Result Value Ref Range    ALT 18 0 - 70 U/L   Basic metabolic panel  (Ca, Cl, CO2, Creat, Gluc, K, Na, BUN)   Result Value Ref Range    Sodium 144 133 - 144 mmol/L    Potassium 4.3 3.4 - 5.3 mmol/L    Chloride 110 (H) 94 - 109 mmol/L    Carbon Dioxide (CO2) 28 20 - 32 mmol/L    Anion Gap 6 3 - 14 mmol/L    Urea Nitrogen 20 7 - 30 mg/dL    Creatinine 1.14 0.66 - 1.25 mg/dL    Calcium 8.8 8.5 - 10.1 mg/dL    Glucose 129 (H) 70 - 99 mg/dL    GFR Estimate 60 (L) >60 mL/min/1.73m2      Comment:      As of July 11, 2021, eGFR is calculated by the CKD-EPI creatinine equation, without race adjustment. eGFR can be influenced by muscle mass, exercise, and diet. The reported eGFR is an estimation only and is only applicable if the renal function is stable.   TSH with free T4 reflex   Result Value Ref Range    TSH 2.13 0.40 - 4.00 mU/L   CBC with platelets and differential   Result Value Ref Range    WBC Count 8.5 4.0 - 11.0 10e3/uL    RBC Count 5.26 4.40 - 5.90 10e6/uL    Hemoglobin 16.3 13.3 - 17.7 g/dL    Hematocrit 51.2 40.0 - 53.0 %    MCV 97 78 - 100 fL    MCH 31.0 26.5 - 33.0 pg    MCHC 31.8 31.5 - 36.5 g/dL    RDW 14.9 10.0 - 15.0 %    Platelet Count 288 150 - 450 10e3/uL    % Neutrophils 66 %    % Lymphocytes 26 %    % Monocytes 6 %    % Eosinophils 2 %    % Basophils 1 %    Absolute Neutrophils 5.6 1.6 - 8.3 10e3/uL    Absolute Lymphocytes 2.2 0.8 - 5.3 10e3/uL    Absolute Monocytes 0.5 0.0 - 1.3 10e3/uL    Absolute Eosinophils 0.2 0.0 - 0.7 10e3/uL    Absolute Basophils 0.1 0.0 - 0.2 10e3/uL   Please notify pt of normal/acceptable results.         If you have any questions or concerns, please call the clinic at the number listed above.       Sincerely,      Verito Lima MD

## 2021-07-16 NOTE — PROGRESS NOTES
"SUBJECTIVE:   Bradley Liz is a 80 year old male who presents for Preventive Visit.      Patient has been advised of split billing requirements and indicates understanding: No   Are you in the first 12 months of your Medicare coverage?  No    Healthy Habits:     In general, how would you rate your overall health?  Very good    Frequency of exercise:  None    Duration of exercise:  Less than 15 minutes    Do you usually eat at least 4 servings of fruit and vegetables a day, include whole grains    & fiber and avoid regularly eating high fat or \"junk\" foods?  Yes    Taking medications regularly:  No    Barriers to taking medications:  None    Medication side effects:  None    Ability to successfully perform activities of daily living:  No assistance needed    Home Safety:  No safety concerns identified    Hearing Impairment:  No hearing concerns    In the past 6 months, have you been bothered by leaking of urine?  No    In general, how would you rate your overall mental or emotional health?  Excellent      PHQ-2 Total Score: 0    Additional concerns today:  No     Chief Complaint   Patient presents with     Physical     Medication Reconciliation     taking crestor every other day       Do you feel safe in your environment? Yes    Have you ever done Advance Care Planning? (For example, a Health Directive, POLST, or a discussion with a medical provider or your loved ones about your wishes): Yes, advance care planning is on file.       Fall risk  Fallen 2 or more times in the past year?: No  Any fall with injury in the past year?: No    Cognitive Screening   1) Repeat 3 items (Leader, Season, Table)    2) Clock draw: NORMAL  3) 3 item recall: Recalls 2 objects   Results: NORMAL clock, 1-2 items recalled: COGNITIVE IMPAIRMENT LESS LIKELY    Mini-CogTM Copyright [employer]. Licensed by the author for use in [facility]; reprinted with permission (scott@.[facility]). All rights reserved.      Do you have sleep " apnea, excessive snoring or daytime drowsiness?: no    Reviewed and updated as needed this visit by clinical staff  Tobacco  Allergies  Meds  Problems  Med Hx  Surg Hx  Fam Hx  Soc Hx        Patient also smoked x 50 years.  He denies increased cough or shortness of breath. Not interested in having PFTs done at this time.  Reports breathing is better since he stopped smoking     Lower extremity edema is stable per patient.  Has tried compression stockings and is not interested in doing that again, blood pressure wouldn't tolerate a diuretic.      Reviewed and updated as needed this visit by Provider                Social History     Tobacco Use     Smoking status: Former Smoker     Years: 50.00     Quit date: 2008     Years since quittin.6     Smokeless tobacco: Never Used   Substance Use Topics     Alcohol use: Yes     Comment: couple beers occ     If you drink alcohol do you typically have >3 drinks per day or >7 drinks per week? No    Alcohol Use 10/2/2017   Prescreen: >3 drinks/day or >7 drinks/week? The patient does not drink >3 drinks per day nor >7 drinks per week.   No flowsheet data found.        Hyperlipidemia Follow-Up      Are you regularly taking any medication or supplement to lower your cholesterol?   Yes- crestor taking every other day    Are you having muscle aches or other side effects that you think could be caused by your cholesterol lowering medication?  No    Hypertension Follow-up      Do you check your blood pressure regularly outside of the clinic? No     Are you following a low salt diet? Yes    Are your blood pressures ever more than 140 on the top number (systolic) OR more   than 90 on the bottom number (diastolic), for example 140/90? No      Current providers sharing in care for this patient include:   Patient Care Team:  Verito Lima MD as PCP - General (Family Practice)  Radha Parada as Nursing Assistant (Gerontology)  Sandee Moreno MD as MD ([facility]  "Practice)  Verito Lima MD as Assigned PCP    The following health maintenance items are reviewed in Epic and correct as of today:  Health Maintenance Due   Topic Date Due     HF ACTION PLAN  Never done     ANNUAL REVIEW OF HM ORDERS  Never done     COVID-19 Vaccine (1) Never done     ZOSTER IMMUNIZATION (1 of 2) Never done     MEDICARE ANNUAL WELLNESS VISIT  08/24/2019     DTAP/TDAP/TD IMMUNIZATION (2 - Td or Tdap) 12/02/2019     BMP  01/01/2021     ALT  07/01/2021     LIPID  07/01/2021     CBC  07/01/2021     FALL RISK ASSESSMENT  08/12/2021     Lab work is in process        Pertinent mammograms are reviewed under the imaging tab.    Review of Systems  Constitutional, HEENT, cardiovascular, pulmonary, gi and gu systems are negative, except as otherwise noted.    OBJECTIVE:   BP 98/78   Pulse 86   Temp 97.9  F (36.6  C) (Tympanic)   Resp 18   Ht 1.753 m (5' 9\")   Wt 88.5 kg (195 lb)   SpO2 96%   BMI 28.80 kg/m   Estimated body mass index is 28.8 kg/m  as calculated from the following:    Height as of this encounter: 1.753 m (5' 9\").    Weight as of this encounter: 88.5 kg (195 lb).  Physical Exam  GENERAL: healthy, alert and no distress  NECK: no adenopathy, no asymmetry, masses, or scars and thyroid normal to palpation  RESP: lungs clear to auscultation - no rales, rhonchi or wheezes  CV: regular rate and rhythm, normal S1 S2, no S3 or S4, no murmur, click or rub, no peripheral edema and peripheral pulses strong  ABDOMEN: soft, nontender, no hepatosplenomegaly, no masses and bowel sounds normal  MS: bilateral lower extremity edema 3+, no open areas or weeping    Diagnostic Test Results:  Labs reviewed in Epic    ASSESSMENT / PLAN:   1. Encounter for Medicare annual wellness exam       2. Atrial fibrillation, unspecified type (H)  Rate controlled  On anticoagulant  - metoprolol succinate ER (TOPROL-XL) 200 MG 24 hr tablet; Take 1 tablet (200 mg) by mouth daily  Dispense: 90 tablet; Refill: 3    3. " "Completed stroke (H)  stable  - Lipid panel reflex to direct LDL Non-fasting; Future  - ALT; Future  - rosuvastatin (CRESTOR) 5 MG tablet; Take 1 tablet (5 mg) by mouth daily  Dispense: 90 tablet; Refill: 3    4. Stage 3a chronic kidney disease   check renal function  - Basic metabolic panel  (Ca, Cl, CO2, Creat, Gluc, K, Na, BUN); Future    5. Chronic systolic heart failure (H)  Patient declined going back to cardiology at this time  - Lipid panel reflex to direct LDL Non-fasting; Future  - CBC with platelets and differential; Future  - ALT; Future    6. Hypertension, unspecified type   well controlled  - Basic metabolic panel  (Ca, Cl, CO2, Creat, Gluc, K, Na, BUN); Future    7. Edema leg  Patient declined going to see lymphedema therapy again  Continue to elevated as much as possible, decrease salt intake  - TSH with free T4 reflex; Future    Patient has been advised of split billing requirements and indicates understanding: Yes  COUNSELING:  Reviewed preventive health counseling, as reflected in patient instructions       Regular exercise       Healthy diet/nutrition    Estimated body mass index is 28.8 kg/m  as calculated from the following:    Height as of this encounter: 1.753 m (5' 9\").    Weight as of this encounter: 88.5 kg (195 lb).        He reports that he quit smoking about 12 years ago. He quit after 50.00 years of use. He has never used smokeless tobacco.      Appropriate preventive services were discussed with this patient, including applicable screening as appropriate for cardiovascular disease, diabetes, osteopenia/osteoporosis, and glaucoma.  As appropriate for age/gender, discussed screening for colorectal cancer, prostate cancer, breast cancer, and cervical cancer. Checklist reviewing preventive services available has been given to the patient.    Reviewed patients plan of care and provided an AVS. The Basic Care Plan (routine screening as documented in Health Maintenance) for Bradley meets the " Care Plan requirement. This Care Plan has been established and reviewed with the Patient.    Counseling Resources:  ATP IV Guidelines  Pooled Cohorts Equation Calculator  Breast Cancer Risk Calculator  Breast Cancer: Medication to Reduce Risk  FRAX Risk Assessment  ICSI Preventive Guidelines  Dietary Guidelines for Americans, 2010  USDA's MyPlate  ASA Prophylaxis  Lung CA Screening    Verito Lima MD  [facility]    Identified Health Risks:

## 2021-07-29 ENCOUNTER — LAB (OUTPATIENT)
Dept: LAB | Facility: CLINIC | Age: 81
End: 2021-07-29
Payer: COMMERCIAL

## 2021-07-29 ENCOUNTER — ANTICOAGULATION THERAPY VISIT (OUTPATIENT)
Dept: ANTICOAGULATION | Facility: CLINIC | Age: 81
End: 2021-07-29

## 2021-07-29 DIAGNOSIS — I63.9 COMPLETED STROKE (H): Primary | ICD-10-CM

## 2021-07-29 DIAGNOSIS — I48.91 ATRIAL FIBRILLATION, UNSPECIFIED TYPE (H): ICD-10-CM

## 2021-07-29 DIAGNOSIS — Z86.73 HISTORY OF CVA (CEREBROVASCULAR ACCIDENT): ICD-10-CM

## 2021-07-29 DIAGNOSIS — Z79.01 LONG TERM (CURRENT) USE OF ANTICOAGULANTS: ICD-10-CM

## 2021-07-29 DIAGNOSIS — I63.9 COMPLETED STROKE (H): ICD-10-CM

## 2021-07-29 LAB — INR BLD: 1.8 (ref 0.9–1.1)

## 2021-07-29 PROCEDURE — 85610 PROTHROMBIN TIME: CPT

## 2021-07-29 PROCEDURE — 36416 COLLJ CAPILLARY BLOOD SPEC: CPT

## 2021-07-29 NOTE — PROGRESS NOTES
Anticoagulation Management    Unable to reach Gian today.    Today's INR result of 1.8 is subtherapeutic (goal INR of 2.0-3.0).  Result received from: Clinic Lab    Follow up required to discuss out of range INR     Left message to continue current dose of warfarin 5 mg tonight.      Anticoagulation clinic to follow up    Karlene Napoles RN

## 2021-07-29 NOTE — PROGRESS NOTES
ANTICOAGULATION MANAGEMENT     Bradley Liz 80 year old male is on warfarin with subtherapeutic INR result. (Goal INR 2.0-3.0)    Recent labs: (last 7 days)     07/29/21  1327   INR 1.8*       ASSESSMENT     Source(s): Patient/Caregiver Call       Warfarin doses taken: Warfarin taken as instructed    Diet: No new diet changes identified    New illness, injury, or hospitalization: No    Medication/supplement changes: None noted    Signs or symptoms of bleeding or clotting: No    Previous INR: Therapeutic last 2(+) visits    Additional findings: None     PLAN     Recommended plan for no diet, medication or health factor changes affecting INR     Dosing Instructions: Continue your current warfarin dose with next INR in 2 weeks       Summary  As of 7/29/2021    Full warfarin instructions:  2.5 mg every Tue, Fri; 5 mg all other days   Next INR check:  8/24/2021             Telephone call with Bradley who verbalizes understanding and agrees to plan    Patient elected to schedule next visit in 4 weeks, lab visit scheduled    Education provided: Contact [phone]  with any changes, questions or concerns.     Plan made per [facility] anticoagulation protocol    Karlene Napoles RN  Anticoagulation Clinic  7/29/2021    _______________________________________________________________________     Anticoagulation Episode Summary     Current INR goal:  2.0-3.0   TTR:  83.8 % (8.5 mo)   Target end date:  Indefinite   Send INR reminders to:  [facility]    Indications    Completed stroke (H) [I63.9]  Long term (current) use of anticoagulants [Z79.01]  Atrial fibrillation  unspecified type (H) [I48.91]  History of CVA (cerebrovascular accident) [Z86.73]           Comments:  * Takes warfarin in the AM. Leaves for [place] at the beginning of Jan to April (will need alexia - Dr. Mustafa has to sign paperwork!)   patient requests [phone] be called         Anticoagulation Care Providers     Provider Role Specialty Phone number     Verito Lima MD Referring Family Medicine [phone]    Alex Mustafa MD Responsible Family Medicine [phone]

## 2021-08-24 ENCOUNTER — ANTICOAGULATION THERAPY VISIT (OUTPATIENT)
Dept: ANTICOAGULATION | Facility: CLINIC | Age: 81
End: 2021-08-24

## 2021-08-24 ENCOUNTER — LAB (OUTPATIENT)
Dept: LAB | Facility: CLINIC | Age: 81
End: 2021-08-24
Payer: COMMERCIAL

## 2021-08-24 DIAGNOSIS — I63.9 COMPLETED STROKE (H): ICD-10-CM

## 2021-08-24 DIAGNOSIS — Z79.01 LONG TERM (CURRENT) USE OF ANTICOAGULANTS: ICD-10-CM

## 2021-08-24 DIAGNOSIS — I63.9 COMPLETED STROKE (H): Primary | ICD-10-CM

## 2021-08-24 DIAGNOSIS — Z86.73 HISTORY OF CVA (CEREBROVASCULAR ACCIDENT): ICD-10-CM

## 2021-08-24 DIAGNOSIS — I48.91 ATRIAL FIBRILLATION, UNSPECIFIED TYPE (H): ICD-10-CM

## 2021-08-24 LAB — INR BLD: 2.6 (ref 0.9–1.1)

## 2021-08-24 PROCEDURE — 85610 PROTHROMBIN TIME: CPT

## 2021-08-24 PROCEDURE — 36416 COLLJ CAPILLARY BLOOD SPEC: CPT

## 2021-08-24 NOTE — PROGRESS NOTES
Anticoagulation Management    Unable to reach Gian today.    Today's INR result of 2.6 is therapeutic (goal INR of 2.0-3.0).  Result received from: Clinic Lab    Follow up required to confirm warfarin dose taken and assess for changes    Left message to continue current dose of warfarin 2.5 mg tonight.      Anticoagulation clinic to follow up    Karlene Napoles RN

## 2021-08-25 NOTE — PROGRESS NOTES
Patient left another VM on the HC Line returning a call at 4:07 pm.  Please call back.  Thank you.  Rita Rodgers, RN  Anticoagulation Nurse - [facility]

## 2021-08-25 NOTE — PROGRESS NOTES
Patient left a VM message on HC Line at 1:55 pm returning a call.  Rita Rodgers, RN  Anticoagulation Nurse - [facility] [place]

## 2021-08-25 NOTE — PROGRESS NOTES
ANTICOAGULATION MANAGEMENT     Bradley SORIANO Agusto 81 year old male is on warfarin with therapeutic INR result. (Goal INR 2.0-3.0)    Recent labs: (last 7 days)     08/24/21  1157   INR 2.6*       ASSESSMENT     Source(s): Patient/Caregiver Call       Warfarin doses taken: Warfarin taken as instructed    Diet: No new diet changes identified    New illness, injury, or hospitalization: No    Medication/supplement changes: None noted    Signs or symptoms of bleeding or clotting: No    Previous INR: Therapeutic last 2(+) visits    Additional findings: None     PLAN     Recommended plan for no diet, medication or health factor changes affecting INR     Dosing Instructions: Continue your current warfarin dose with next INR in 4 weeks       Summary  As of 8/24/2021    Full warfarin instructions:  2.5 mg every Tue, Fri; 5 mg all other days   Next INR check:  9/21/2021             Telephone call with Bradley who verbalizes understanding and agrees to plan    Lab visit scheduled    Education provided: Please call back if any changes to your diet, medications or how you've been taking warfarin and Contact [phone]  with any changes, questions or concerns.     Plan made per [facility] anticoagulation protocol    Noam Sands RN  Anticoagulation Clinic  8/25/2021    _______________________________________________________________________     Anticoagulation Episode Summary     Current INR goal:  2.0-3.0   TTR:  81.1 % (8.4 mo)   Target end date:  Indefinite   Send INR reminders to:  [facility]    Indications    Completed stroke (H) [I63.9]  Long term (current) use of anticoagulants [Z79.01]  Atrial fibrillation  unspecified type (H) [I48.91]  History of CVA (cerebrovascular accident) [Z86.73]           Comments:  * Takes warfarin in the AM. Leaves for [place] at the beginning of Jan to April (will need snowchund - Dr. Mustafa has to sign paperwork!)   patient requests [phone] be called         Anticoagulation Care  Providers     Provider Role Specialty Phone number    Verito Lima MD Referring Family Medicine [phone]    Alex Mustafa MD Responsible Family Medicine [phone]        12:48 PM    Outreach 2. No answer. Left VM requesting a call back.    Noam Sands RN, BSN, PHN  Anticoagulation Clinic   [place] # [id number]  [phone]

## 2021-09-13 NOTE — MR AVS SNAPSHOT
After Visit Summary   7/17/2017    Bradley Liz    MRN: [id number]           Patient Information     Date Of Birth          1940        Visit Information        Provider Department      7/17/2017 10:20 AM Lela Leos APRN CNP [facility] PHYSICIAN HEART AT [facility]        Today's Diagnoses     Persistent atrial fibrillation (H)    -  1    Nonischemic cardiomyopathy (H)          Care Instructions    Thank you for your [facility] visit today. Your provider has recommended the following:  Medication Changes:  INCREASE metoprolol to 37.5 mg (1.5 tablets) twice a day   Recommendations:  1. Have holter monitor done prior to seeing Dr. Brooks to reassess heart rate after increasing metoprolol  2. Call with any lightheadedness/dizziness as metoprolol may make your blood pressure low  Follow-up:  See Dr. Brooks for cardiology follow up in September with echocardiogram and heart monitor prior.  Call [phone] two months prior to request date to schedule any future appointments.  Reminder:  1. Please bring in all current medications, over the counter supplements and vitamin bottles to your next appointment.               [facility] HEART CARE  [facility]~[address]~[place], [place]~[address]  Questions about your visit today?   Call your Cardiology Clinic RN's-Queenie iLn and/or Kay Echeverria at [phone].          Follow-ups after your visit        Your next 10 appointments already scheduled     Jul 17, 2017  2:45 PM CDT   Anticoagulation Visit with [place] ANTI COAG   [facility]    [address] [id number] [phone]            Sep 14, 2017  1:00 PM CDT   Ech Limited with [id number]   [facility] Echocardiography ([facility])    [address] [id number] [phone]           1.  Please bring or wear a comfortable  Diagnostic catheter removed over the wire. "two-piece outfit. 2.  You may eat, drink and take your normal medicines. 3.  For any questions that cannot be answered, please contact the ordering physician            Sep 19, 2017 10:30 AM CDT   Return Visit with Lai Brooks MD   [facility] PHYSICIAN HEART AT [facility] ([facility])    [address] [id number]   [phone]              Future tests that were ordered for you today     Open Future Orders        Priority Expected Expires Ordered    Holter Monitor 24 hour - Adult Routine 7/24/2017 7/17/2018 7/17/2017            Who to contact     If you have questions or need follow up information about today's clinic visit or your schedule please contact [facility] PHYSICIAN HEART AT [facility] directly at [phone].  Normal or non-critical lab and imaging results will be communicated to you by [employer], letter or phone within 4 business days after the clinic has received the results. If you do not hear from us within 7 days, please contact the clinic through [employer] or phone. If you have a critical or abnormal lab result, we will notify you by phone as soon as possible.  Submit refill requests through [employer] or call your pharmacy and they will forward the refill request to us. Please allow 3 business days for your refill to be completed.          Additional Information About Your Visit        [employer] Information     [employer] lets you send messages to your doctor, view your test results, renew your prescriptions, schedule appointments and more. To sign up, go to [email or web address] . Click on \"Log in\" on the left side of the screen, which will take you to the Welcome page. Then click on \"Sign up Now\" on the right side of the page.     You will be asked to enter the access code listed below, as well as some personal information. Please follow the directions to create your username and password.     Your access code is: [email or web address]  Expires: " 2017  4:21 PM     Your access code will  in 90 days. If you need help or a new code, please call your [place] clinic or [phone].        Care EveryWhere ID     This is your Care EveryWhere ID. This could be used by other organizations to access your [place] medical records  [id number]        Your Vitals Were     Pulse Pulse Oximetry BMI (Body Mass Index)             87 95% 29.83 kg/m2          Blood Pressure from Last 3 Encounters:   17 111/81   06/15/17 117/75   17 124/69    Weight from Last 3 Encounters:   17 91.6 kg (202 lb)   06/15/17 90.3 kg (199 lb)   17 90.3 kg (199 lb)                 Today's Medication Changes          These changes are accurate as of: 17 10:45 AM.  If you have any questions, ask your nurse or doctor.               These medicines have changed or have updated prescriptions.        Dose/Directions    metoprolol 25 MG tablet   Commonly known as:  LOPRESSOR   This may have changed:  how much to take   Changed by:  Lela Leos APRN CNP        Dose:  37.5 mg   Take 1.5 tablets (37.5 mg) by mouth every 12 hours   Quantity:  90 tablet   Refills:  11            Where to get your medicines      Some of these will need a paper prescription and others can be bought over the counter.  Ask your nurse if you have questions.     You don't need a prescription for these medications     metoprolol 25 MG tablet                Primary Care Provider Office Phone # Fax #    Alex Mustafa [phone]       [facility] [address] [address]        Equal Access to Services     [facility] Hospital[facility]LEN AH: Hadii harmeet Sanchez, waaxda luqadaha, qaybta kaalmaval hoyt. So [facility] [phone].    ATENCIÓN: Si habla español, tiene a katz disposición servicios gratuitos de asistencia lingüística. Llame al [phone].    We comply with applicable federal civil rights laws and  [place] laws. We do not discriminate on the basis of race, color, national origin, age, disability sex, sexual orientation or gender identity.            Thank you!     Thank you for choosing [facility] PHYSICIAN HEART AT [facility]  for your care. Our goal is always to provide you with excellent care. Hearing back from our patients is one way we can continue to improve our services. Please take a few minutes to complete the written survey that you may receive in the mail after your visit with us. Thank you!             Your Updated Medication List - Protect others around you: Learn how to safely use, store and throw away your medicines at www.disposemymeds.org.          This list is accurate as of: 7/17/17 10:45 AM.  Always use your most recent med list.                   Brand Name Dispense Instructions for use Diagnosis    ASPIRIN NOT PRESCRIBED    INTENTIONAL     by Other route continuous prn.        COUMADIN 5 MG tablet   Generic drug:  warfarin     120 tablet    As directed by Anticoagulation Clinic.  (Current dose 5 mg daily)    Long term current use of anticoagulant therapy       metoprolol 25 MG tablet    LOPRESSOR    90 tablet    Take 1.5 tablets (37.5 mg) by mouth every 12 hours        simvastatin 10 MG tablet    ZOCOR    90 tablet    Take 1 tablet (10 mg) by mouth At Bedtime    CARDIOVASCULAR SCREENING; LDL GOAL LESS THAN 100

## 2021-09-21 ENCOUNTER — LAB (OUTPATIENT)
Dept: LAB | Facility: CLINIC | Age: 81
End: 2021-09-21
Payer: COMMERCIAL

## 2021-09-21 ENCOUNTER — ANTICOAGULATION THERAPY VISIT (OUTPATIENT)
Dept: ANTICOAGULATION | Facility: CLINIC | Age: 81
End: 2021-09-21

## 2021-09-21 ENCOUNTER — ALLIED HEALTH/NURSE VISIT (OUTPATIENT)
Dept: FAMILY MEDICINE | Facility: CLINIC | Age: 81
End: 2021-09-21
Payer: COMMERCIAL

## 2021-09-21 ENCOUNTER — TELEPHONE (OUTPATIENT)
Dept: FAMILY MEDICINE | Facility: CLINIC | Age: 81
End: 2021-09-21

## 2021-09-21 DIAGNOSIS — Z79.01 LONG TERM (CURRENT) USE OF ANTICOAGULANTS: ICD-10-CM

## 2021-09-21 DIAGNOSIS — Z86.73 HISTORY OF CVA (CEREBROVASCULAR ACCIDENT): ICD-10-CM

## 2021-09-21 DIAGNOSIS — Z00.00 ENCOUNTER FOR MEDICARE ANNUAL WELLNESS EXAM: Primary | ICD-10-CM

## 2021-09-21 DIAGNOSIS — I63.9 COMPLETED STROKE (H): ICD-10-CM

## 2021-09-21 DIAGNOSIS — I48.91 ATRIAL FIBRILLATION, UNSPECIFIED TYPE (H): ICD-10-CM

## 2021-09-21 DIAGNOSIS — I63.9 COMPLETED STROKE (H): Primary | ICD-10-CM

## 2021-09-21 DIAGNOSIS — I48.91 ATRIAL FIBRILLATION, UNSPECIFIED TYPE (H): Primary | ICD-10-CM

## 2021-09-21 LAB — INR BLD: 2.6 (ref 0.9–1.1)

## 2021-09-21 PROCEDURE — 36416 COLLJ CAPILLARY BLOOD SPEC: CPT

## 2021-09-21 PROCEDURE — 99207 PR NO CHARGE NURSE ONLY: CPT

## 2021-09-21 PROCEDURE — 85610 PROTHROMBIN TIME: CPT

## 2021-09-21 NOTE — TELEPHONE ENCOUNTER
Yes, he would be a candidate for Xarelto. he can switch from Warfarin to Xarelto directly as his INR from today is <3.      He would stop the Warfarin completely when he starts the Xarelto.    Prescription sent to [facility] as this is where his other medications are sent.      Verito Lima M.D.

## 2021-09-21 NOTE — PROGRESS NOTES
Pt asking if he could change from Coumadin to a med that doesn't require testing ie:Xeralto.  Is he a candidate?  Pt goes to [place] for 5 months of the yr and is unable to make the INR testing/results work for him.  Pt is aware if his med is changed he would not go back on Coumadin.  Pt was last seen on 7/16/21 for Medicare Well check.  Advise.  Routed to  for review.  KPaNikita

## 2021-09-21 NOTE — TELEPHONE ENCOUNTER
Pt asking if he could change from Coumadin to a med that doesn't require testing ie:Xeralto.  Is he a candidate?  Pt goes to [place] for 5 months of the yr and is unable to make the INR testing/results work for him.  Pt is aware if his med is changed he would not go back on Coumadin.  Pt was last seen on 7/16/21 for Medicare Well check.  Advise.  Jack

## 2021-09-21 NOTE — TELEPHONE ENCOUNTER
.Reason for Call:  Prescription/Xarelto    Detailed comments: Patient states his new pharmacy is the [facility] pharmacy, not [facility]'s any longer;     Phone Number Patient can be reached at: Home number on file [phone] (home)    Best Time: any    Can we leave a detailed message on this number? YES    Call taken on 9/21/2021 at 2:16 PM by Viviane Mccartney

## 2021-09-21 NOTE — PROGRESS NOTES
ANTICOAGULATION MANAGEMENT     Bradley Liz 81 year old male is on warfarin with therapeutic INR result. (Goal INR 2.0-3.0)    Recent labs: (last 7 days)     09/21/21  1214   INR 2.6*       ASSESSMENT     Source(s): Patient/Caregiver Call       Warfarin doses taken: Warfarin taken as instructed    Diet: No new diet changes identified    New illness, injury, or hospitalization: No    Medication/supplement changes: Patient is hoping to transition to Xarelto.    Signs or symptoms of bleeding or clotting: No    Previous INR: Therapeutic last visit; previously outside of goal range    Additional findings: Patient is hoping to transition to Xarelto. Prescription sent to [facility]. Patient has not reviewed the cost with his insurance. Writer advised patient to stay on Warfarin until the Xarelto is in his hands just in case he cannot afford it.     PLAN     Recommended plan for no diet, medication or health factor changes affecting INR     Dosing Instructions: Continue your current warfarin dose with next INR in 5 weeks       Summary  As of 9/21/2021    Full warfarin instructions:  2.5 mg every Tue, Fri; 5 mg all other days   Next INR check:               Telephone call with Bradley who verbalizes understanding and agrees to plan    Needs to be scheuduled if patient continue on Warfarin.    Education provided: Please call back if any changes to your diet, medications or how you've been taking warfarin and Contact [phone]  with any changes, questions or concerns.     Plan made per [facility] anticoagulation protocol    Noam Sands RN  Anticoagulation Clinic  9/21/2021    _______________________________________________________________________     Anticoagulation Episode Summary     Current INR goal:  2.0-3.0   TTR:  81.2 % (8.5 mo)   Target end date:  Indefinite   Send INR reminders to:  [facility]    Indications    Completed stroke (H) [I63.9]  Long term (current) use of anticoagulants  [Z79.01]  Atrial fibrillation  unspecified type (H) [I48.91]  History of CVA (cerebrovascular accident) [Z86.73]           Comments:  * Takes warfarin in the AM. Leaves for [place] at the beginning of Jan to April (will need snowbird - Dr. Mustafa has to sign paperwork!)   patient requests [phone] be called         Anticoagulation Care Providers     Provider Role Specialty Phone number    Verito Lima MD Referring Family Medicine [phone]    Alex Mustafa MD Responsible Family Medicine [phone]

## 2021-09-22 ENCOUNTER — TELEPHONE (OUTPATIENT)
Dept: SCHEDULING | Facility: CLINIC | Age: 81
End: 2021-09-22

## 2021-09-22 NOTE — PROGRESS NOTES
4:21 PM    Patient called back. He has not picked up the Xarelto at this time. [facility] to continue to follow.    Noam Sands RN, BSN, N  Anticoagulation Clinic   Pool # [id number]  [phone]        3:29 PM    Writer reached out to the patient to verify if he had picked up the Xarelto. Spoke with the patient's wife. She is unaware if the patient has picked up the Xarelto. [facility] will need to f/u.    Noam Sands RN, BSN, N  Anticoagulation Clinic   Pool # [id number]  [phone]

## 2021-09-22 NOTE — TELEPHONE ENCOUNTER
Reason for Call:  Call back    Detailed comments: Gian Shannonjayashree returned Noam Sands's INR call. He would like to be called back.    Phone Number Patient can be reached at: [phone]    Best Time: Wednesday    Can we leave a detailed message on this number? YES    Call taken on 9/22/2021 at 4:09 PM by Speedy Garza

## 2021-09-23 DIAGNOSIS — Z79.01 LONG TERM CURRENT USE OF ANTICOAGULANT THERAPY: ICD-10-CM

## 2021-09-23 RX ORDER — WARFARIN SODIUM 5 MG/1
TABLET ORAL
Qty: 80 TABLET | Refills: 0 | Status: SHIPPED | OUTPATIENT
Start: 2021-09-23 | End: 2021-09-23

## 2021-09-23 NOTE — TELEPHONE ENCOUNTER
Current warfarin dose:  Warfarin maintenance plan:  2.5 mg (5 mg x 0.5) every Tue, Fri; 5 mg (5 mg x 1) all other days   Weekly warfarin total:  30 mg   Next INR check:  10/26/2021     Last INR result:  INR   Date Value Ref Range Status   09/21/2021 2.6 (H) 0.9 - 1.1 Final     Last office visit: 7/16/2021     Refill authorized per [facility] protocol.    Xavier AHMADI RN

## 2021-09-23 NOTE — PROGRESS NOTES
09/23/21  Writer called patient again today. He is not going to continue taking the warfarin. States he did  the Xarelto.     Writer verified he has about a week left of the warfarin that he plans to complete before switching. Writer asked to schedule an INR check to verify it is at a safe level before starting Xarelto, patient declined.     Patient states he will not be coming back in for any INR checks. Will route to PCP as FYI.      Patient will be discontinued from [facility] services. All warfarin prescriptions have been cancelled.       Bradley SORIANO Agusto is being discharged from the [facility] Anticoagulation Management Program ([facility]).    Reason for discharge: warfarin replaced by alternate therapy, Xarelto    Anticoagulation episode resolved, [facility] referral closed and INR Standing order discontinued    If patient needs warfarin management in the future, please send a new referral    Xavier AHMADI RN

## 2021-09-23 NOTE — TELEPHONE ENCOUNTER
Warfarin script cancelled, patient will not be taking the medication after his current script runs out. Called [facility] pharmacy with the update. All warfarin refills have been cancelled.     Xavier AHMADI RN   Anticoagulation Clinic

## 2022-01-01 ENCOUNTER — APPOINTMENT (OUTPATIENT)
Dept: CARDIOLOGY | Facility: CLINIC | Age: 82
DRG: 308 | End: 2022-01-01
Attending: INTERNAL MEDICINE
Payer: COMMERCIAL

## 2022-01-01 ENCOUNTER — PATIENT OUTREACH (OUTPATIENT)
Dept: CARE COORDINATION | Facility: CLINIC | Age: 82
End: 2022-01-01

## 2022-01-01 ENCOUNTER — TRANSITIONAL CARE UNIT VISIT (OUTPATIENT)
Dept: GERIATRICS | Facility: CLINIC | Age: 82
End: 2022-01-01
Payer: COMMERCIAL

## 2022-01-01 ENCOUNTER — APPOINTMENT (OUTPATIENT)
Dept: RADIOLOGY | Facility: CLINIC | Age: 82
DRG: 308 | End: 2022-01-01
Attending: INTERNAL MEDICINE
Payer: COMMERCIAL

## 2022-01-01 ENCOUNTER — ANCILLARY PROCEDURE (OUTPATIENT)
Dept: VASCULAR ULTRASOUND | Facility: CLINIC | Age: 82
End: 2022-01-01
Attending: FAMILY MEDICINE
Payer: COMMERCIAL

## 2022-01-01 ENCOUNTER — HEALTH MAINTENANCE LETTER (OUTPATIENT)
Age: 82
End: 2022-01-01

## 2022-01-01 ENCOUNTER — APPOINTMENT (OUTPATIENT)
Dept: RADIOLOGY | Facility: CLINIC | Age: 82
DRG: 166 | End: 2022-01-01
Attending: EMERGENCY MEDICINE
Payer: COMMERCIAL

## 2022-01-01 ENCOUNTER — ANCILLARY PROCEDURE (OUTPATIENT)
Dept: VASCULAR ULTRASOUND | Facility: CLINIC | Age: 82
End: 2022-01-01
Attending: RADIOLOGY
Payer: COMMERCIAL

## 2022-01-01 ENCOUNTER — HOSPITAL ENCOUNTER (INPATIENT)
Facility: CLINIC | Age: 82
LOS: 18 days | Discharge: NURSING, REHAB OR LONG-TERM CARE | DRG: 166 | End: 2022-08-08
Attending: EMERGENCY MEDICINE | Admitting: HOSPITALIST
Payer: COMMERCIAL

## 2022-01-01 ENCOUNTER — TELEPHONE (OUTPATIENT)
Dept: VASCULAR SURGERY | Facility: CLINIC | Age: 82
End: 2022-01-01

## 2022-01-01 ENCOUNTER — APPOINTMENT (OUTPATIENT)
Dept: OCCUPATIONAL THERAPY | Facility: CLINIC | Age: 82
DRG: 166 | End: 2022-01-01
Attending: INTERNAL MEDICINE
Payer: COMMERCIAL

## 2022-01-01 ENCOUNTER — MEDICAL CORRESPONDENCE (OUTPATIENT)
Dept: FAMILY MEDICINE | Facility: CLINIC | Age: 82
End: 2022-01-01

## 2022-01-01 ENCOUNTER — OFFICE VISIT (OUTPATIENT)
Dept: VASCULAR SURGERY | Facility: CLINIC | Age: 82
End: 2022-01-01
Attending: REGISTERED NURSE
Payer: COMMERCIAL

## 2022-01-01 ENCOUNTER — APPOINTMENT (OUTPATIENT)
Dept: CT IMAGING | Facility: CLINIC | Age: 82
DRG: 166 | End: 2022-01-01
Attending: EMERGENCY MEDICINE
Payer: COMMERCIAL

## 2022-01-01 ENCOUNTER — APPOINTMENT (OUTPATIENT)
Dept: INTERVENTIONAL RADIOLOGY/VASCULAR | Facility: CLINIC | Age: 82
DRG: 166 | End: 2022-01-01
Attending: RADIOLOGY
Payer: COMMERCIAL

## 2022-01-01 ENCOUNTER — APPOINTMENT (OUTPATIENT)
Dept: OCCUPATIONAL THERAPY | Facility: CLINIC | Age: 82
DRG: 166 | End: 2022-01-01
Payer: COMMERCIAL

## 2022-01-01 ENCOUNTER — TELEPHONE (OUTPATIENT)
Dept: FAMILY MEDICINE | Facility: CLINIC | Age: 82
End: 2022-01-01

## 2022-01-01 ENCOUNTER — MYC MEDICAL ADVICE (OUTPATIENT)
Dept: VASCULAR SURGERY | Facility: CLINIC | Age: 82
End: 2022-01-01

## 2022-01-01 ENCOUNTER — APPOINTMENT (OUTPATIENT)
Dept: RADIOLOGY | Facility: CLINIC | Age: 82
DRG: 308 | End: 2022-01-01
Attending: EMERGENCY MEDICINE
Payer: COMMERCIAL

## 2022-01-01 ENCOUNTER — APPOINTMENT (OUTPATIENT)
Dept: PHYSICAL THERAPY | Facility: CLINIC | Age: 82
DRG: 308 | End: 2022-01-01
Payer: COMMERCIAL

## 2022-01-01 ENCOUNTER — HOSPITAL ENCOUNTER (INPATIENT)
Facility: CLINIC | Age: 82
LOS: 6 days | Discharge: NURSING, REHAB OR LONG-TERM CARE | DRG: 308 | End: 2022-09-01
Attending: EMERGENCY MEDICINE | Admitting: HOSPITALIST
Payer: COMMERCIAL

## 2022-01-01 ENCOUNTER — APPOINTMENT (OUTPATIENT)
Dept: PHYSICAL THERAPY | Facility: CLINIC | Age: 82
DRG: 166 | End: 2022-01-01
Payer: COMMERCIAL

## 2022-01-01 ENCOUNTER — LAB REQUISITION (OUTPATIENT)
Dept: LAB | Facility: CLINIC | Age: 82
End: 2022-01-01
Payer: COMMERCIAL

## 2022-01-01 ENCOUNTER — TELEPHONE (OUTPATIENT)
Dept: GERIATRICS | Facility: CLINIC | Age: 82
End: 2022-01-01

## 2022-01-01 ENCOUNTER — APPOINTMENT (OUTPATIENT)
Dept: OCCUPATIONAL THERAPY | Facility: CLINIC | Age: 82
DRG: 308 | End: 2022-01-01
Payer: COMMERCIAL

## 2022-01-01 ENCOUNTER — TRANSITIONAL CARE UNIT VISIT (OUTPATIENT)
Dept: GERIATRICS | Facility: CLINIC | Age: 82
End: 2022-01-01

## 2022-01-01 ENCOUNTER — OFFICE VISIT (OUTPATIENT)
Dept: VASCULAR SURGERY | Facility: CLINIC | Age: 82
End: 2022-01-01
Attending: RADIOLOGY
Payer: COMMERCIAL

## 2022-01-01 ENCOUNTER — APPOINTMENT (OUTPATIENT)
Dept: OCCUPATIONAL THERAPY | Facility: CLINIC | Age: 82
DRG: 308 | End: 2022-01-01
Attending: INTERNAL MEDICINE
Payer: COMMERCIAL

## 2022-01-01 ENCOUNTER — APPOINTMENT (OUTPATIENT)
Dept: PHYSICAL THERAPY | Facility: CLINIC | Age: 82
DRG: 308 | End: 2022-01-01
Attending: INTERNAL MEDICINE
Payer: COMMERCIAL

## 2022-01-01 ENCOUNTER — APPOINTMENT (OUTPATIENT)
Dept: ULTRASOUND IMAGING | Facility: CLINIC | Age: 82
DRG: 166 | End: 2022-01-01
Attending: RADIOLOGY
Payer: COMMERCIAL

## 2022-01-01 ENCOUNTER — MEDICAL CORRESPONDENCE (OUTPATIENT)
Dept: HEALTH INFORMATION MANAGEMENT | Facility: CLINIC | Age: 82
End: 2022-01-01

## 2022-01-01 ENCOUNTER — APPOINTMENT (OUTPATIENT)
Dept: PHYSICAL THERAPY | Facility: CLINIC | Age: 82
DRG: 166 | End: 2022-01-01
Attending: INTERNAL MEDICINE
Payer: COMMERCIAL

## 2022-01-01 ENCOUNTER — APPOINTMENT (OUTPATIENT)
Dept: RADIOLOGY | Facility: CLINIC | Age: 82
DRG: 166 | End: 2022-01-01
Attending: INTERNAL MEDICINE
Payer: COMMERCIAL

## 2022-01-01 VITALS
RESPIRATION RATE: 16 BRPM | WEIGHT: 156 LBS | SYSTOLIC BLOOD PRESSURE: 84 MMHG | TEMPERATURE: 99 F | HEART RATE: 156 BPM | HEIGHT: 69 IN | BODY MASS INDEX: 23.11 KG/M2 | OXYGEN SATURATION: 80 % | DIASTOLIC BLOOD PRESSURE: 54 MMHG

## 2022-01-01 VITALS
TEMPERATURE: 97.7 F | DIASTOLIC BLOOD PRESSURE: 63 MMHG | OXYGEN SATURATION: 97 % | SYSTOLIC BLOOD PRESSURE: 120 MMHG | BODY MASS INDEX: 23.71 KG/M2 | HEART RATE: 94 BPM | HEIGHT: 69 IN | RESPIRATION RATE: 18 BRPM | WEIGHT: 160.1 LBS

## 2022-01-01 VITALS
OXYGEN SATURATION: 98 % | BODY MASS INDEX: 24.29 KG/M2 | WEIGHT: 164 LBS | DIASTOLIC BLOOD PRESSURE: 65 MMHG | RESPIRATION RATE: 18 BRPM | HEIGHT: 69 IN | SYSTOLIC BLOOD PRESSURE: 115 MMHG | TEMPERATURE: 97.5 F | HEART RATE: 77 BPM

## 2022-01-01 VITALS
BODY MASS INDEX: 23.11 KG/M2 | TEMPERATURE: 97.9 F | SYSTOLIC BLOOD PRESSURE: 139 MMHG | HEART RATE: 81 BPM | DIASTOLIC BLOOD PRESSURE: 80 MMHG | OXYGEN SATURATION: 94 % | RESPIRATION RATE: 16 BRPM | HEIGHT: 69 IN | WEIGHT: 156 LBS

## 2022-01-01 VITALS
HEIGHT: 69 IN | TEMPERATURE: 96.4 F | WEIGHT: 156 LBS | HEART RATE: 94 BPM | DIASTOLIC BLOOD PRESSURE: 67 MMHG | RESPIRATION RATE: 16 BRPM | OXYGEN SATURATION: 97 % | BODY MASS INDEX: 23.11 KG/M2 | SYSTOLIC BLOOD PRESSURE: 104 MMHG

## 2022-01-01 VITALS
TEMPERATURE: 97.9 F | DIASTOLIC BLOOD PRESSURE: 63 MMHG | BODY MASS INDEX: 23.25 KG/M2 | OXYGEN SATURATION: 91 % | HEART RATE: 113 BPM | RESPIRATION RATE: 16 BRPM | SYSTOLIC BLOOD PRESSURE: 97 MMHG | HEIGHT: 69 IN | WEIGHT: 157 LBS

## 2022-01-01 VITALS
DIASTOLIC BLOOD PRESSURE: 64 MMHG | WEIGHT: 160 LBS | HEART RATE: 72 BPM | BODY MASS INDEX: 23.7 KG/M2 | SYSTOLIC BLOOD PRESSURE: 97 MMHG | RESPIRATION RATE: 18 BRPM | HEIGHT: 69 IN | OXYGEN SATURATION: 98 % | TEMPERATURE: 97.7 F

## 2022-01-01 VITALS
TEMPERATURE: 98.1 F | SYSTOLIC BLOOD PRESSURE: 100 MMHG | RESPIRATION RATE: 20 BRPM | DIASTOLIC BLOOD PRESSURE: 50 MMHG | HEART RATE: 64 BPM

## 2022-01-01 VITALS
TEMPERATURE: 97.4 F | HEART RATE: 72 BPM | DIASTOLIC BLOOD PRESSURE: 68 MMHG | SYSTOLIC BLOOD PRESSURE: 98 MMHG | RESPIRATION RATE: 16 BRPM

## 2022-01-01 VITALS — DIASTOLIC BLOOD PRESSURE: 64 MMHG | HEART RATE: 116 BPM | TEMPERATURE: 98.3 F | SYSTOLIC BLOOD PRESSURE: 122 MMHG

## 2022-01-01 VITALS
OXYGEN SATURATION: 97 % | WEIGHT: 156 LBS | TEMPERATURE: 97 F | HEIGHT: 69 IN | HEART RATE: 76 BPM | BODY MASS INDEX: 23.11 KG/M2

## 2022-01-01 VITALS
SYSTOLIC BLOOD PRESSURE: 119 MMHG | TEMPERATURE: 96.9 F | HEIGHT: 69 IN | WEIGHT: 175.3 LBS | RESPIRATION RATE: 18 BRPM | OXYGEN SATURATION: 96 % | DIASTOLIC BLOOD PRESSURE: 59 MMHG | BODY MASS INDEX: 25.96 KG/M2 | HEART RATE: 78 BPM

## 2022-01-01 VITALS
HEART RATE: 88 BPM | TEMPERATURE: 98.1 F | DIASTOLIC BLOOD PRESSURE: 82 MMHG | SYSTOLIC BLOOD PRESSURE: 130 MMHG | RESPIRATION RATE: 24 BRPM

## 2022-01-01 DIAGNOSIS — I73.9 PAD (PERIPHERAL ARTERY DISEASE) (H): Primary | ICD-10-CM

## 2022-01-01 DIAGNOSIS — I50.22 CHRONIC SYSTOLIC HEART FAILURE (H): Primary | ICD-10-CM

## 2022-01-01 DIAGNOSIS — I73.9 PAD (PERIPHERAL ARTERY DISEASE) (H): ICD-10-CM

## 2022-01-01 DIAGNOSIS — T14.8XXA WOUND INFECTION: Primary | ICD-10-CM

## 2022-01-01 DIAGNOSIS — R09.89 CHEST CONGESTION: Primary | ICD-10-CM

## 2022-01-01 DIAGNOSIS — L08.9 LOCAL INFECTION OF WOUND: ICD-10-CM

## 2022-01-01 DIAGNOSIS — J44.1 COPD EXACERBATION (H): ICD-10-CM

## 2022-01-01 DIAGNOSIS — N17.9 AKI (ACUTE KIDNEY INJURY) (H): ICD-10-CM

## 2022-01-01 DIAGNOSIS — T14.8XXA WOUND INFECTION: ICD-10-CM

## 2022-01-01 DIAGNOSIS — I50.22 CHRONIC SYSTOLIC HEART FAILURE (H): ICD-10-CM

## 2022-01-01 DIAGNOSIS — T14.8XXA NONHEALING NONSURGICAL WOUND: ICD-10-CM

## 2022-01-01 DIAGNOSIS — L08.9 WOUND INFECTION: ICD-10-CM

## 2022-01-01 DIAGNOSIS — I48.91 ATRIAL FIBRILLATION, UNSPECIFIED TYPE (H): ICD-10-CM

## 2022-01-01 DIAGNOSIS — R09.02 HYPOXIA: ICD-10-CM

## 2022-01-01 DIAGNOSIS — S61.411A SKIN TEAR OF RIGHT HAND WITHOUT COMPLICATION, INITIAL ENCOUNTER: ICD-10-CM

## 2022-01-01 DIAGNOSIS — L03.116 CELLULITIS OF LEFT LOWER EXTREMITY: Primary | ICD-10-CM

## 2022-01-01 DIAGNOSIS — N18.30 STAGE 3 CHRONIC KIDNEY DISEASE, UNSPECIFIED WHETHER STAGE 3A OR 3B CKD (H): ICD-10-CM

## 2022-01-01 DIAGNOSIS — S30.810A EXCORIATION OF BUTTOCK, INITIAL ENCOUNTER: ICD-10-CM

## 2022-01-01 DIAGNOSIS — Z13.6 CARDIOVASCULAR SCREENING; LDL GOAL LESS THAN 100: ICD-10-CM

## 2022-01-01 DIAGNOSIS — L89.610 PRESSURE INJURY OF RIGHT HEEL, UNSTAGEABLE (H): ICD-10-CM

## 2022-01-01 DIAGNOSIS — Z86.73 HISTORY OF CVA (CEREBROVASCULAR ACCIDENT): ICD-10-CM

## 2022-01-01 DIAGNOSIS — N40.1 BENIGN PROSTATIC HYPERPLASIA WITH INCOMPLETE BLADDER EMPTYING: ICD-10-CM

## 2022-01-01 DIAGNOSIS — I50.22 CHRONIC SYSTOLIC (CONGESTIVE) HEART FAILURE (H): ICD-10-CM

## 2022-01-01 DIAGNOSIS — S81.802D OPEN WOUND OF LEFT LOWER EXTREMITY, SUBSEQUENT ENCOUNTER: Primary | ICD-10-CM

## 2022-01-01 DIAGNOSIS — L03.116 CELLULITIS OF LEFT LOWER EXTREMITY: ICD-10-CM

## 2022-01-01 DIAGNOSIS — S80.211A ABRASION, RIGHT KNEE, INITIAL ENCOUNTER: ICD-10-CM

## 2022-01-01 DIAGNOSIS — L08.9 WOUND INFECTION: Primary | ICD-10-CM

## 2022-01-01 DIAGNOSIS — T14.8XXA LOCAL INFECTION OF WOUND: ICD-10-CM

## 2022-01-01 DIAGNOSIS — L89.622 STAGE II PRESSURE ULCER OF LEFT HEEL (H): ICD-10-CM

## 2022-01-01 DIAGNOSIS — I63.9 COMPLETED STROKE (H): ICD-10-CM

## 2022-01-01 DIAGNOSIS — J18.9 PNEUMONIA OF RIGHT UPPER LOBE DUE TO INFECTIOUS ORGANISM: Chronic | ICD-10-CM

## 2022-01-01 DIAGNOSIS — S81.802A OPEN WOUND OF LEFT LOWER EXTREMITY, INITIAL ENCOUNTER: Primary | ICD-10-CM

## 2022-01-01 DIAGNOSIS — I95.1 ORTHOSTATIC HYPOTENSION: ICD-10-CM

## 2022-01-01 DIAGNOSIS — R22.43 LOCALIZED SWELLING OF BOTH LOWER LEGS: ICD-10-CM

## 2022-01-01 DIAGNOSIS — M79.89 LEG SWELLING: ICD-10-CM

## 2022-01-01 DIAGNOSIS — S81.802D OPEN WOUND OF LEFT LOWER EXTREMITY, SUBSEQUENT ENCOUNTER: ICD-10-CM

## 2022-01-01 DIAGNOSIS — S81.802A OPEN WOUND OF LEFT LOWER EXTREMITY, INITIAL ENCOUNTER: ICD-10-CM

## 2022-01-01 DIAGNOSIS — J44.1 COPD WITH ACUTE EXACERBATION (H): ICD-10-CM

## 2022-01-01 DIAGNOSIS — I48.91 ATRIAL FIBRILLATION WITH RVR (H): Primary | ICD-10-CM

## 2022-01-01 DIAGNOSIS — J96.01 ACUTE RESPIRATORY FAILURE WITH HYPOXIA (H): Chronic | ICD-10-CM

## 2022-01-01 DIAGNOSIS — U07.1 INFECTION DUE TO 2019 NOVEL CORONAVIRUS: ICD-10-CM

## 2022-01-01 DIAGNOSIS — M79.89 LEG SWELLING: Primary | ICD-10-CM

## 2022-01-01 DIAGNOSIS — S61.412A SKIN TEAR OF LEFT HAND WITHOUT COMPLICATION, INITIAL ENCOUNTER: ICD-10-CM

## 2022-01-01 DIAGNOSIS — R55 SYNCOPE, UNSPECIFIED SYNCOPE TYPE: ICD-10-CM

## 2022-01-01 DIAGNOSIS — N18.31 STAGE 3A CHRONIC KIDNEY DISEASE (H): ICD-10-CM

## 2022-01-01 DIAGNOSIS — R39.14 BENIGN PROSTATIC HYPERPLASIA WITH INCOMPLETE BLADDER EMPTYING: ICD-10-CM

## 2022-01-01 DIAGNOSIS — L89.614 PRESSURE INJURY OF RIGHT HEEL, STAGE 4 (H): ICD-10-CM

## 2022-01-01 LAB
ACT BLD: 165 SECONDS (ref 74–150)
ACT BLD: 165 SECONDS (ref 74–150)
ACT BLD: 246 SECONDS (ref 74–150)
ACT BLD: 361 SECONDS (ref 74–150)
ALBUMIN SERPL-MCNC: 2 G/DL (ref 3.5–5)
ALBUMIN SERPL-MCNC: 2.3 G/DL (ref 3.5–5)
ALBUMIN SERPL-MCNC: 2.3 G/DL (ref 3.5–5)
ALBUMIN SERPL-MCNC: 2.7 G/DL (ref 3.5–5)
ALBUMIN SERPL-MCNC: 2.8 G/DL (ref 3.5–5)
ALBUMIN UR-MCNC: 10 MG/DL
ALBUMIN UR-MCNC: 30 MG/DL
ALP SERPL-CCNC: 52 U/L (ref 45–120)
ALP SERPL-CCNC: 63 U/L (ref 45–120)
ALP SERPL-CCNC: 64 U/L (ref 45–120)
ALP SERPL-CCNC: 72 U/L (ref 45–120)
ALP SERPL-CCNC: 76 U/L (ref 45–120)
ALT SERPL W P-5'-P-CCNC: 11 U/L (ref 0–45)
ALT SERPL W P-5'-P-CCNC: 12 U/L (ref 0–45)
ALT SERPL W P-5'-P-CCNC: 15 U/L (ref 0–45)
ALT SERPL W P-5'-P-CCNC: 24 U/L (ref 0–45)
ALT SERPL W P-5'-P-CCNC: 25 U/L (ref 0–45)
ANION GAP SERPL CALCULATED.3IONS-SCNC: 10 MMOL/L (ref 5–18)
ANION GAP SERPL CALCULATED.3IONS-SCNC: 10 MMOL/L (ref 5–18)
ANION GAP SERPL CALCULATED.3IONS-SCNC: 11 MMOL/L (ref 5–18)
ANION GAP SERPL CALCULATED.3IONS-SCNC: 14 MMOL/L (ref 5–18)
ANION GAP SERPL CALCULATED.3IONS-SCNC: 5 MMOL/L (ref 5–18)
ANION GAP SERPL CALCULATED.3IONS-SCNC: 6 MMOL/L (ref 5–18)
ANION GAP SERPL CALCULATED.3IONS-SCNC: 6 MMOL/L (ref 5–18)
ANION GAP SERPL CALCULATED.3IONS-SCNC: 7 MMOL/L (ref 5–18)
ANION GAP SERPL CALCULATED.3IONS-SCNC: 8 MMOL/L (ref 5–18)
ANION GAP SERPL CALCULATED.3IONS-SCNC: 9 MMOL/L (ref 5–18)
ANION GAP SERPL CALCULATED.3IONS-SCNC: 9 MMOL/L (ref 7–15)
APPEARANCE UR: ABNORMAL
APPEARANCE UR: ABNORMAL
AST SERPL W P-5'-P-CCNC: 15 U/L (ref 0–40)
AST SERPL W P-5'-P-CCNC: 17 U/L (ref 0–40)
AST SERPL W P-5'-P-CCNC: 19 U/L (ref 0–40)
AST SERPL W P-5'-P-CCNC: 21 U/L (ref 0–40)
AST SERPL W P-5'-P-CCNC: 26 U/L (ref 0–40)
ATRIAL RATE - MUSE: 101 BPM
ATRIAL RATE - MUSE: 136 BPM
ATRIAL RATE - MUSE: 138 BPM
ATRIAL RATE - MUSE: 258 BPM
BACTERIA #/AREA URNS HPF: ABNORMAL /HPF
BACTERIA BLD CULT: NO GROWTH
BACTERIA BLD CULT: NO GROWTH
BACTERIA WND CULT: ABNORMAL
BASOPHILS # BLD AUTO: 0 10E3/UL (ref 0–0.2)
BASOPHILS # BLD AUTO: 0 10E3/UL (ref 0–0.2)
BASOPHILS # BLD AUTO: 0.1 10E3/UL (ref 0–0.2)
BASOPHILS # BLD AUTO: 0.1 10E3/UL (ref 0–0.2)
BASOPHILS NFR BLD AUTO: 0 %
BILIRUB DIRECT SERPL-MCNC: 0.2 MG/DL
BILIRUB DIRECT SERPL-MCNC: 0.2 MG/DL
BILIRUB SERPL-MCNC: 0.4 MG/DL (ref 0–1)
BILIRUB SERPL-MCNC: 0.5 MG/DL (ref 0–1)
BILIRUB SERPL-MCNC: 0.7 MG/DL (ref 0–1)
BILIRUB SERPL-MCNC: 1 MG/DL (ref 0–1)
BILIRUB SERPL-MCNC: 1.4 MG/DL (ref 0–1)
BILIRUB UR QL STRIP: NEGATIVE
BILIRUB UR QL STRIP: NEGATIVE
BNP SERPL-MCNC: 169 PG/ML (ref 0–88)
BNP SERPL-MCNC: 374 PG/ML (ref 0–91)
BNP SERPL-MCNC: 378 PG/ML (ref 0–91)
BNP SERPL-MCNC: 75 PG/ML (ref 0–88)
BUN SERPL-MCNC: 13 MG/DL (ref 8–28)
BUN SERPL-MCNC: 14 MG/DL (ref 8–28)
BUN SERPL-MCNC: 16 MG/DL (ref 8–28)
BUN SERPL-MCNC: 17 MG/DL (ref 8–28)
BUN SERPL-MCNC: 19 MG/DL (ref 8–28)
BUN SERPL-MCNC: 20 MG/DL (ref 8–28)
BUN SERPL-MCNC: 21.2 MG/DL (ref 8–23)
BUN SERPL-MCNC: 22 MG/DL (ref 8–28)
BUN SERPL-MCNC: 24 MG/DL (ref 8–28)
BUN SERPL-MCNC: 25 MG/DL (ref 8–28)
BUN SERPL-MCNC: 26 MG/DL (ref 8–28)
BUN SERPL-MCNC: 30 MG/DL (ref 8–28)
C REACTIVE PROTEIN LHE: 10.6 MG/DL (ref 0–?)
C REACTIVE PROTEIN LHE: 3.4 MG/DL (ref 0–?)
C REACTIVE PROTEIN LHE: 7.2 MG/DL (ref 0–?)
C REACTIVE PROTEIN LHE: 8.3 MG/DL (ref 0–?)
C REACTIVE PROTEIN LHE: 9.6 MG/DL (ref 0–?)
CALCIUM SERPL-MCNC: 7.6 MG/DL (ref 8.5–10.5)
CALCIUM SERPL-MCNC: 7.7 MG/DL (ref 8.5–10.5)
CALCIUM SERPL-MCNC: 7.7 MG/DL (ref 8.5–10.5)
CALCIUM SERPL-MCNC: 8 MG/DL (ref 8.5–10.5)
CALCIUM SERPL-MCNC: 8 MG/DL (ref 8.5–10.5)
CALCIUM SERPL-MCNC: 8.1 MG/DL (ref 8.5–10.5)
CALCIUM SERPL-MCNC: 8.2 MG/DL (ref 8.8–10.2)
CALCIUM SERPL-MCNC: 8.3 MG/DL (ref 8.5–10.5)
CALCIUM SERPL-MCNC: 8.5 MG/DL (ref 8.5–10.5)
CALCIUM SERPL-MCNC: 8.6 MG/DL (ref 8.5–10.5)
CALCIUM SERPL-MCNC: 8.8 MG/DL (ref 8.5–10.5)
CALCIUM SERPL-MCNC: 8.9 MG/DL (ref 8.5–10.5)
CHLORIDE BLD-SCNC: 100 MMOL/L (ref 98–107)
CHLORIDE BLD-SCNC: 101 MMOL/L (ref 98–107)
CHLORIDE BLD-SCNC: 102 MMOL/L (ref 98–107)
CHLORIDE BLD-SCNC: 103 MMOL/L (ref 98–107)
CHLORIDE BLD-SCNC: 103 MMOL/L (ref 98–107)
CHLORIDE BLD-SCNC: 104 MMOL/L (ref 98–107)
CHLORIDE BLD-SCNC: 105 MMOL/L (ref 98–107)
CHLORIDE BLD-SCNC: 107 MMOL/L (ref 98–107)
CHLORIDE BLD-SCNC: 108 MMOL/L (ref 98–107)
CHLORIDE BLD-SCNC: 109 MMOL/L (ref 98–107)
CHLORIDE BLD-SCNC: 112 MMOL/L (ref 98–107)
CHLORIDE SERPL-SCNC: 103 MMOL/L (ref 98–107)
CO2 SERPL-SCNC: 16 MMOL/L (ref 22–31)
CO2 SERPL-SCNC: 24 MMOL/L (ref 22–31)
CO2 SERPL-SCNC: 25 MMOL/L (ref 22–31)
CO2 SERPL-SCNC: 25 MMOL/L (ref 22–31)
CO2 SERPL-SCNC: 26 MMOL/L (ref 22–31)
CO2 SERPL-SCNC: 26 MMOL/L (ref 22–31)
CO2 SERPL-SCNC: 27 MMOL/L (ref 22–31)
CO2 SERPL-SCNC: 28 MMOL/L (ref 22–31)
COLOR UR AUTO: ABNORMAL
COLOR UR AUTO: YELLOW
CREAT SERPL-MCNC: 0.79 MG/DL (ref 0.7–1.3)
CREAT SERPL-MCNC: 0.87 MG/DL (ref 0.7–1.3)
CREAT SERPL-MCNC: 0.88 MG/DL (ref 0.7–1.3)
CREAT SERPL-MCNC: 0.92 MG/DL (ref 0.7–1.3)
CREAT SERPL-MCNC: 0.98 MG/DL (ref 0.7–1.3)
CREAT SERPL-MCNC: 0.98 MG/DL (ref 0.7–1.3)
CREAT SERPL-MCNC: 0.99 MG/DL (ref 0.7–1.3)
CREAT SERPL-MCNC: 1.08 MG/DL (ref 0.7–1.3)
CREAT SERPL-MCNC: 1.14 MG/DL (ref 0.7–1.3)
CREAT SERPL-MCNC: 1.23 MG/DL (ref 0.67–1.17)
CREAT SERPL-MCNC: 1.25 MG/DL (ref 0.7–1.3)
CREAT SERPL-MCNC: 1.41 MG/DL (ref 0.7–1.3)
CREAT SERPL-MCNC: 1.71 MG/DL (ref 0.7–1.3)
CREAT SERPL-MCNC: 2.02 MG/DL (ref 0.7–1.3)
D DIMER PPP FEU-MCNC: 0.98 UG/ML FEU (ref 0–0.5)
D DIMER PPP FEU-MCNC: 1.05 UG/ML FEU (ref 0–0.5)
D DIMER PPP FEU-MCNC: 1.46 UG/ML FEU (ref 0–0.5)
D DIMER PPP FEU-MCNC: 1.58 UG/ML FEU (ref 0–0.5)
D DIMER PPP FEU-MCNC: 1.61 UG/ML FEU (ref 0–0.5)
DEPRECATED HCO3 PLAS-SCNC: 30 MMOL/L (ref 22–29)
DIASTOLIC BLOOD PRESSURE - MUSE: NORMAL MMHG
DIGOXIN SERPL-MCNC: 0.5 UG/L
EOSINOPHIL # BLD AUTO: 0.1 10E3/UL (ref 0–0.7)
EOSINOPHIL # BLD AUTO: 0.3 10E3/UL (ref 0–0.7)
EOSINOPHIL NFR BLD AUTO: 1 %
EOSINOPHIL NFR BLD AUTO: 2 %
ERYTHROCYTE [DISTWIDTH] IN BLOOD BY AUTOMATED COUNT: 14.5 % (ref 10–15)
ERYTHROCYTE [DISTWIDTH] IN BLOOD BY AUTOMATED COUNT: 14.6 % (ref 10–15)
ERYTHROCYTE [DISTWIDTH] IN BLOOD BY AUTOMATED COUNT: 14.6 % (ref 10–15)
ERYTHROCYTE [DISTWIDTH] IN BLOOD BY AUTOMATED COUNT: 14.7 % (ref 10–15)
ERYTHROCYTE [DISTWIDTH] IN BLOOD BY AUTOMATED COUNT: 14.8 % (ref 10–15)
ERYTHROCYTE [DISTWIDTH] IN BLOOD BY AUTOMATED COUNT: 14.8 % (ref 10–15)
ERYTHROCYTE [DISTWIDTH] IN BLOOD BY AUTOMATED COUNT: 14.9 % (ref 10–15)
ERYTHROCYTE [DISTWIDTH] IN BLOOD BY AUTOMATED COUNT: 15 % (ref 10–15)
ERYTHROCYTE [DISTWIDTH] IN BLOOD BY AUTOMATED COUNT: 15.9 % (ref 10–15)
ERYTHROCYTE [DISTWIDTH] IN BLOOD BY AUTOMATED COUNT: 16 % (ref 10–15)
ERYTHROCYTE [DISTWIDTH] IN BLOOD BY AUTOMATED COUNT: 16.1 % (ref 10–15)
ERYTHROCYTE [DISTWIDTH] IN BLOOD BY AUTOMATED COUNT: 16.1 % (ref 10–15)
ERYTHROCYTE [DISTWIDTH] IN BLOOD BY AUTOMATED COUNT: 16.2 % (ref 10–15)
GFR SERPL CREATININE-BSD FRML MDRD: 32 ML/MIN/1.73M2
GFR SERPL CREATININE-BSD FRML MDRD: 39 ML/MIN/1.73M2
GFR SERPL CREATININE-BSD FRML MDRD: 50 ML/MIN/1.73M2
GFR SERPL CREATININE-BSD FRML MDRD: 57 ML/MIN/1.73M2
GFR SERPL CREATININE-BSD FRML MDRD: 59 ML/MIN/1.73M2
GFR SERPL CREATININE-BSD FRML MDRD: 65 ML/MIN/1.73M2
GFR SERPL CREATININE-BSD FRML MDRD: 69 ML/MIN/1.73M2
GFR SERPL CREATININE-BSD FRML MDRD: 77 ML/MIN/1.73M2
GFR SERPL CREATININE-BSD FRML MDRD: 84 ML/MIN/1.73M2
GFR SERPL CREATININE-BSD FRML MDRD: 86 ML/MIN/1.73M2
GFR SERPL CREATININE-BSD FRML MDRD: 87 ML/MIN/1.73M2
GFR SERPL CREATININE-BSD FRML MDRD: 89 ML/MIN/1.73M2
GLUCOSE BLD-MCNC: 104 MG/DL (ref 70–125)
GLUCOSE BLD-MCNC: 104 MG/DL (ref 70–125)
GLUCOSE BLD-MCNC: 107 MG/DL (ref 70–125)
GLUCOSE BLD-MCNC: 107 MG/DL (ref 70–125)
GLUCOSE BLD-MCNC: 120 MG/DL (ref 70–125)
GLUCOSE BLD-MCNC: 140 MG/DL (ref 70–125)
GLUCOSE BLD-MCNC: 144 MG/DL (ref 70–125)
GLUCOSE BLD-MCNC: 79 MG/DL (ref 70–125)
GLUCOSE BLD-MCNC: 79 MG/DL (ref 70–125)
GLUCOSE BLD-MCNC: 85 MG/DL (ref 70–125)
GLUCOSE BLD-MCNC: 89 MG/DL (ref 70–125)
GLUCOSE BLD-MCNC: 95 MG/DL (ref 70–125)
GLUCOSE BLD-MCNC: 95 MG/DL (ref 70–125)
GLUCOSE SERPL-MCNC: 75 MG/DL (ref 70–99)
GLUCOSE UR STRIP-MCNC: NEGATIVE MG/DL
GLUCOSE UR STRIP-MCNC: NEGATIVE MG/DL
HCT VFR BLD AUTO: 35.8 % (ref 40–53)
HCT VFR BLD AUTO: 36.2 % (ref 40–53)
HCT VFR BLD AUTO: 37.9 % (ref 40–53)
HCT VFR BLD AUTO: 41.4 % (ref 40–53)
HCT VFR BLD AUTO: 42.6 % (ref 40–53)
HCT VFR BLD AUTO: 46.4 % (ref 40–53)
HCT VFR BLD AUTO: 47.7 % (ref 40–53)
HCT VFR BLD AUTO: 48.8 % (ref 40–53)
HCT VFR BLD AUTO: 48.9 % (ref 40–53)
HCT VFR BLD AUTO: 49.6 % (ref 40–53)
HCT VFR BLD AUTO: 50.8 % (ref 40–53)
HCT VFR BLD AUTO: 51.4 % (ref 40–53)
HCT VFR BLD AUTO: 51.4 % (ref 40–53)
HGB BLD-MCNC: 11.7 G/DL (ref 13.3–17.7)
HGB BLD-MCNC: 11.9 G/DL (ref 13.3–17.7)
HGB BLD-MCNC: 12.3 G/DL (ref 13.3–17.7)
HGB BLD-MCNC: 12.3 G/DL (ref 13.3–17.7)
HGB BLD-MCNC: 13.3 G/DL (ref 13.3–17.7)
HGB BLD-MCNC: 13.6 G/DL (ref 13.3–17.7)
HGB BLD-MCNC: 14.6 G/DL (ref 13.3–17.7)
HGB BLD-MCNC: 15.1 G/DL (ref 13.3–17.7)
HGB BLD-MCNC: 15.2 G/DL (ref 13.3–17.7)
HGB BLD-MCNC: 15.8 G/DL (ref 13.3–17.7)
HGB BLD-MCNC: 15.9 G/DL (ref 13.3–17.7)
HGB BLD-MCNC: 16 G/DL (ref 13.3–17.7)
HGB BLD-MCNC: 16.2 G/DL (ref 13.3–17.7)
HGB BLD-MCNC: 16.4 G/DL (ref 13.3–17.7)
HGB UR QL STRIP: ABNORMAL
HGB UR QL STRIP: NEGATIVE
HOLD SPECIMEN: NORMAL
IMM GRANULOCYTES # BLD: 0.2 10E3/UL
IMM GRANULOCYTES # BLD: 0.3 10E3/UL
IMM GRANULOCYTES # BLD: 0.3 10E3/UL
IMM GRANULOCYTES # BLD: 0.4 10E3/UL
IMM GRANULOCYTES NFR BLD: 2 %
IMM GRANULOCYTES NFR BLD: 3 %
INR PPP: 1.07 (ref 0.85–1.15)
INR PPP: 1.12 (ref 0.85–1.15)
INTERPRETATION ECG - MUSE: NORMAL
KETONES UR STRIP-MCNC: NEGATIVE MG/DL
KETONES UR STRIP-MCNC: NEGATIVE MG/DL
LACTATE SERPL-SCNC: 1.6 MMOL/L (ref 0.7–2)
LEUKOCYTE ESTERASE UR QL STRIP: NEGATIVE
LEUKOCYTE ESTERASE UR QL STRIP: NEGATIVE
LVEF ECHO: NORMAL
LYMPHOCYTES # BLD AUTO: 1.5 10E3/UL (ref 0.8–5.3)
LYMPHOCYTES # BLD AUTO: 1.8 10E3/UL (ref 0.8–5.3)
LYMPHOCYTES # BLD AUTO: 2.3 10E3/UL (ref 0.8–5.3)
LYMPHOCYTES # BLD AUTO: 2.6 10E3/UL (ref 0.8–5.3)
LYMPHOCYTES NFR BLD AUTO: 10 %
LYMPHOCYTES NFR BLD AUTO: 15 %
LYMPHOCYTES NFR BLD AUTO: 17 %
LYMPHOCYTES NFR BLD AUTO: 18 %
MAGNESIUM SERPL-MCNC: 1.4 MG/DL (ref 1.8–2.6)
MAGNESIUM SERPL-MCNC: 1.5 MG/DL (ref 1.8–2.6)
MAGNESIUM SERPL-MCNC: 1.7 MG/DL (ref 1.7–2.3)
MAGNESIUM SERPL-MCNC: 1.8 MG/DL (ref 1.8–2.6)
MAGNESIUM SERPL-MCNC: 1.8 MG/DL (ref 1.8–2.6)
MAGNESIUM SERPL-MCNC: 1.9 MG/DL (ref 1.8–2.6)
MAGNESIUM SERPL-MCNC: 2 MG/DL (ref 1.8–2.6)
MAGNESIUM SERPL-MCNC: 2.1 MG/DL (ref 1.8–2.6)
MAGNESIUM SERPL-MCNC: 2.2 MG/DL (ref 1.8–2.6)
MAGNESIUM SERPL-MCNC: 2.3 MG/DL (ref 1.8–2.6)
MAGNESIUM SERPL-MCNC: 2.7 MG/DL (ref 1.8–2.6)
MCH RBC QN AUTO: 29.1 PG (ref 26.5–33)
MCH RBC QN AUTO: 29.4 PG (ref 26.5–33)
MCH RBC QN AUTO: 29.6 PG (ref 26.5–33)
MCH RBC QN AUTO: 29.7 PG (ref 26.5–33)
MCH RBC QN AUTO: 29.9 PG (ref 26.5–33)
MCH RBC QN AUTO: 30.1 PG (ref 26.5–33)
MCH RBC QN AUTO: 30.3 PG (ref 26.5–33)
MCH RBC QN AUTO: 30.3 PG (ref 26.5–33)
MCH RBC QN AUTO: 30.4 PG (ref 26.5–33)
MCH RBC QN AUTO: 30.5 PG (ref 26.5–33)
MCH RBC QN AUTO: 30.6 PG (ref 26.5–33)
MCHC RBC AUTO-ENTMCNC: 31.1 G/DL (ref 31.5–36.5)
MCHC RBC AUTO-ENTMCNC: 31.2 G/DL (ref 31.5–36.5)
MCHC RBC AUTO-ENTMCNC: 31.5 G/DL (ref 31.5–36.5)
MCHC RBC AUTO-ENTMCNC: 31.7 G/DL (ref 31.5–36.5)
MCHC RBC AUTO-ENTMCNC: 31.9 G/DL (ref 31.5–36.5)
MCHC RBC AUTO-ENTMCNC: 32.1 G/DL (ref 31.5–36.5)
MCHC RBC AUTO-ENTMCNC: 32.3 G/DL (ref 31.5–36.5)
MCHC RBC AUTO-ENTMCNC: 32.5 G/DL (ref 31.5–36.5)
MCHC RBC AUTO-ENTMCNC: 32.7 G/DL (ref 31.5–36.5)
MCHC RBC AUTO-ENTMCNC: 32.9 G/DL (ref 31.5–36.5)
MCHC RBC AUTO-ENTMCNC: 32.9 G/DL (ref 31.5–36.5)
MCV RBC AUTO: 93 FL (ref 78–100)
MCV RBC AUTO: 94 FL (ref 78–100)
MCV RBC AUTO: 95 FL (ref 78–100)
MCV RBC AUTO: 95 FL (ref 78–100)
MCV RBC AUTO: 98 FL (ref 78–100)
MONOCYTES # BLD AUTO: 0.6 10E3/UL (ref 0–1.3)
MONOCYTES # BLD AUTO: 0.6 10E3/UL (ref 0–1.3)
MONOCYTES # BLD AUTO: 0.7 10E3/UL (ref 0–1.3)
MONOCYTES # BLD AUTO: 0.9 10E3/UL (ref 0–1.3)
MONOCYTES NFR BLD AUTO: 4 %
MONOCYTES NFR BLD AUTO: 5 %
MONOCYTES NFR BLD AUTO: 6 %
MONOCYTES NFR BLD AUTO: 6 %
MRSA DNA SPEC QL NAA+PROBE: NEGATIVE
MUCOUS THREADS #/AREA URNS LPF: PRESENT /LPF
NEUTROPHILS # BLD AUTO: 10.1 10E3/UL (ref 1.6–8.3)
NEUTROPHILS # BLD AUTO: 12 10E3/UL (ref 1.6–8.3)
NEUTROPHILS # BLD AUTO: 9.2 10E3/UL (ref 1.6–8.3)
NEUTROPHILS # BLD AUTO: 9.8 10E3/UL (ref 1.6–8.3)
NEUTROPHILS NFR BLD AUTO: 73 %
NEUTROPHILS NFR BLD AUTO: 73 %
NEUTROPHILS NFR BLD AUTO: 76 %
NEUTROPHILS NFR BLD AUTO: 83 %
NITRATE UR QL: NEGATIVE
NITRATE UR QL: NEGATIVE
NRBC # BLD AUTO: 0 10E3/UL
NRBC BLD AUTO-RTO: 0 /100
NT-PROBNP SERPL-MCNC: 1848 PG/ML (ref 0–450)
P AXIS - MUSE: NORMAL DEGREES
PH UR STRIP: 5 [PH] (ref 5–7)
PH UR STRIP: 5 [PH] (ref 5–7)
PLATELET # BLD AUTO: 239 10E3/UL (ref 150–450)
PLATELET # BLD AUTO: 248 10E3/UL (ref 150–450)
PLATELET # BLD AUTO: 264 10E3/UL (ref 150–450)
PLATELET # BLD AUTO: 286 10E3/UL (ref 150–450)
PLATELET # BLD AUTO: 294 10E3/UL (ref 150–450)
PLATELET # BLD AUTO: 310 10E3/UL (ref 150–450)
PLATELET # BLD AUTO: 313 10E3/UL (ref 150–450)
PLATELET # BLD AUTO: 322 10E3/UL (ref 150–450)
PLATELET # BLD AUTO: 323 10E3/UL (ref 150–450)
PLATELET # BLD AUTO: 341 10E3/UL (ref 150–450)
PLATELET # BLD AUTO: 355 10E3/UL (ref 150–450)
PLATELET # BLD AUTO: 371 10E3/UL (ref 150–450)
PLATELET # BLD AUTO: 389 10E3/UL (ref 150–450)
POTASSIUM BLD-SCNC: 3.1 MMOL/L (ref 3.5–5)
POTASSIUM BLD-SCNC: 3.4 MMOL/L (ref 3.5–5)
POTASSIUM BLD-SCNC: 3.5 MMOL/L (ref 3.5–5)
POTASSIUM BLD-SCNC: 3.5 MMOL/L (ref 3.5–5)
POTASSIUM BLD-SCNC: 3.6 MMOL/L (ref 3.5–5)
POTASSIUM BLD-SCNC: 3.7 MMOL/L (ref 3.5–5)
POTASSIUM BLD-SCNC: 3.8 MMOL/L (ref 3.5–5)
POTASSIUM BLD-SCNC: 3.9 MMOL/L (ref 3.5–5)
POTASSIUM BLD-SCNC: 4 MMOL/L (ref 3.5–5)
POTASSIUM BLD-SCNC: 4.1 MMOL/L (ref 3.5–5)
POTASSIUM BLD-SCNC: 4.2 MMOL/L (ref 3.5–5)
POTASSIUM BLD-SCNC: 4.3 MMOL/L (ref 3.5–5)
POTASSIUM BLD-SCNC: 4.4 MMOL/L (ref 3.5–5)
POTASSIUM BLD-SCNC: 4.4 MMOL/L (ref 3.5–5)
POTASSIUM BLD-SCNC: 4.8 MMOL/L (ref 3.5–5)
POTASSIUM SERPL-SCNC: 3.2 MMOL/L (ref 3.4–5.3)
PR INTERVAL - MUSE: NORMAL MS
PROCALCITONIN SERPL-MCNC: 0.41 NG/ML (ref 0–0.49)
PROT SERPL-MCNC: 4 G/DL (ref 6–8)
PROT SERPL-MCNC: 4.6 G/DL (ref 6–8)
PROT SERPL-MCNC: 4.9 G/DL (ref 6–8)
PROT SERPL-MCNC: 5.7 G/DL (ref 6–8)
PROT SERPL-MCNC: 6.1 G/DL (ref 6–8)
QRS DURATION - MUSE: 66 MS
QRS DURATION - MUSE: 88 MS
QRS DURATION - MUSE: 88 MS
QRS DURATION - MUSE: 92 MS
QT - MUSE: 308 MS
QT - MUSE: 330 MS
QT - MUSE: 340 MS
QT - MUSE: 342 MS
QTC - MUSE: 436 MS
QTC - MUSE: 445 MS
QTC - MUSE: 453 MS
QTC - MUSE: 487 MS
R AXIS - MUSE: 54 DEGREES
R AXIS - MUSE: 57 DEGREES
R AXIS - MUSE: 63 DEGREES
R AXIS - MUSE: 64 DEGREES
RBC # BLD AUTO: 3.86 10E6/UL (ref 4.4–5.9)
RBC # BLD AUTO: 3.89 10E6/UL (ref 4.4–5.9)
RBC # BLD AUTO: 4.05 10E6/UL (ref 4.4–5.9)
RBC # BLD AUTO: 4.36 10E6/UL (ref 4.4–5.9)
RBC # BLD AUTO: 4.46 10E6/UL (ref 4.4–5.9)
RBC # BLD AUTO: 4.92 10E6/UL (ref 4.4–5.9)
RBC # BLD AUTO: 5.02 10E6/UL (ref 4.4–5.9)
RBC # BLD AUTO: 5.22 10E6/UL (ref 4.4–5.9)
RBC # BLD AUTO: 5.22 10E6/UL (ref 4.4–5.9)
RBC # BLD AUTO: 5.23 10E6/UL (ref 4.4–5.9)
RBC # BLD AUTO: 5.45 10E6/UL (ref 4.4–5.9)
RBC # BLD AUTO: 5.47 10E6/UL (ref 4.4–5.9)
RBC # BLD AUTO: 5.49 10E6/UL (ref 4.4–5.9)
RBC URINE: 0 /HPF
RBC URINE: 37 /HPF
SA TARGET DNA: POSITIVE
SARS-COV-2 RNA RESP QL NAA+PROBE: POSITIVE
SARS-COV-2 RNA RESP QL NAA+PROBE: POSITIVE
SODIUM SERPL-SCNC: 132 MMOL/L (ref 136–145)
SODIUM SERPL-SCNC: 135 MMOL/L (ref 136–145)
SODIUM SERPL-SCNC: 135 MMOL/L (ref 136–145)
SODIUM SERPL-SCNC: 136 MMOL/L (ref 136–145)
SODIUM SERPL-SCNC: 137 MMOL/L (ref 136–145)
SODIUM SERPL-SCNC: 138 MMOL/L (ref 136–145)
SODIUM SERPL-SCNC: 139 MMOL/L (ref 136–145)
SODIUM SERPL-SCNC: 141 MMOL/L (ref 136–145)
SODIUM SERPL-SCNC: 142 MMOL/L (ref 136–145)
SODIUM SERPL-SCNC: 143 MMOL/L (ref 136–145)
SP GR UR STRIP: 1.01 (ref 1–1.03)
SP GR UR STRIP: 1.02 (ref 1–1.03)
SQUAMOUS EPITHELIAL: 1 /HPF
SQUAMOUS EPITHELIAL: 1 /HPF
SYSTOLIC BLOOD PRESSURE - MUSE: NORMAL MMHG
T AXIS - MUSE: -8 DEGREES
T AXIS - MUSE: 257 DEGREES
T AXIS - MUSE: 32 DEGREES
T AXIS - MUSE: 52 DEGREES
TROPONIN I SERPL-MCNC: 0.04 NG/ML (ref 0–0.29)
TROPONIN I SERPL-MCNC: <0.01 NG/ML (ref 0–0.29)
TROPONIN I SERPL-MCNC: <0.01 NG/ML (ref 0–0.29)
URATE CRY #/AREA URNS HPF: ABNORMAL /HPF
UROBILINOGEN UR STRIP-MCNC: <2 MG/DL
UROBILINOGEN UR STRIP-MCNC: <2 MG/DL
VANCOMYCIN SERPL-MCNC: 11 MG/L
VENTRICULAR RATE- MUSE: 103 BPM
VENTRICULAR RATE- MUSE: 130 BPM
VENTRICULAR RATE- MUSE: 131 BPM
VENTRICULAR RATE- MUSE: 98 BPM
WBC # BLD AUTO: 10.4 10E3/UL (ref 4–11)
WBC # BLD AUTO: 10.5 10E3/UL (ref 4–11)
WBC # BLD AUTO: 10.9 10E3/UL (ref 4–11)
WBC # BLD AUTO: 12 10E3/UL (ref 4–11)
WBC # BLD AUTO: 12.1 10E3/UL (ref 4–11)
WBC # BLD AUTO: 12.2 10E3/UL (ref 4–11)
WBC # BLD AUTO: 13.3 10E3/UL (ref 4–11)
WBC # BLD AUTO: 13.5 10E3/UL (ref 4–11)
WBC # BLD AUTO: 13.9 10E3/UL (ref 4–11)
WBC # BLD AUTO: 14.7 10E3/UL (ref 4–11)
WBC # BLD AUTO: 8.4 10E3/UL (ref 4–11)
WBC # BLD AUTO: 8.8 10E3/UL (ref 4–11)
WBC # BLD AUTO: 9.1 10E3/UL (ref 4–11)
WBC URINE: 0 /HPF
WBC URINE: 1 /HPF

## 2022-01-01 PROCEDURE — C2623 CATH, TRANSLUMIN, DRUG-COAT: HCPCS

## 2022-01-01 PROCEDURE — 999N000157 HC STATISTIC RCP TIME EA 10 MIN

## 2022-01-01 PROCEDURE — 71045 X-RAY EXAM CHEST 1 VIEW: CPT

## 2022-01-01 PROCEDURE — 210N000002 HC R&B HEART CARE

## 2022-01-01 PROCEDURE — 11042 DBRDMT SUBQ TIS 1ST 20SQCM/<: CPT | Performed by: REGISTERED NURSE

## 2022-01-01 PROCEDURE — 250N000013 HC RX MED GY IP 250 OP 250 PS 637: Performed by: RADIOLOGY

## 2022-01-01 PROCEDURE — 250N000013 HC RX MED GY IP 250 OP 250 PS 637: Performed by: INTERNAL MEDICINE

## 2022-01-01 PROCEDURE — 250N000013 HC RX MED GY IP 250 OP 250 PS 637: Performed by: HOSPITALIST

## 2022-01-01 PROCEDURE — 80051 ELECTROLYTE PANEL: CPT | Performed by: INTERNAL MEDICINE

## 2022-01-01 PROCEDURE — 258N000003 HC RX IP 258 OP 636: Performed by: HOSPITALIST

## 2022-01-01 PROCEDURE — 87040 BLOOD CULTURE FOR BACTERIA: CPT | Performed by: INTERNAL MEDICINE

## 2022-01-01 PROCEDURE — 87077 CULTURE AEROBIC IDENTIFY: CPT | Performed by: REGISTERED NURSE

## 2022-01-01 PROCEDURE — 83735 ASSAY OF MAGNESIUM: CPT | Performed by: INTERNAL MEDICINE

## 2022-01-01 PROCEDURE — 84132 ASSAY OF SERUM POTASSIUM: CPT | Performed by: EMERGENCY MEDICINE

## 2022-01-01 PROCEDURE — 83735 ASSAY OF MAGNESIUM: CPT | Performed by: HOSPITALIST

## 2022-01-01 PROCEDURE — 97166 OT EVAL MOD COMPLEX 45 MIN: CPT | Mod: GO

## 2022-01-01 PROCEDURE — 94640 AIRWAY INHALATION TREATMENT: CPT | Mod: 76

## 2022-01-01 PROCEDURE — 250N000011 HC RX IP 250 OP 636: Performed by: HOSPITALIST

## 2022-01-01 PROCEDURE — 36415 COLL VENOUS BLD VENIPUNCTURE: CPT | Mod: ORL | Performed by: FAMILY MEDICINE

## 2022-01-01 PROCEDURE — 11045 DBRDMT SUBQ TISS EACH ADDL: CPT | Performed by: REGISTERED NURSE

## 2022-01-01 PROCEDURE — 85025 COMPLETE CBC W/AUTO DIFF WBC: CPT | Performed by: EMERGENCY MEDICINE

## 2022-01-01 PROCEDURE — 250N000013 HC RX MED GY IP 250 OP 250 PS 637: Performed by: EMERGENCY MEDICINE

## 2022-01-01 PROCEDURE — 99232 SBSQ HOSP IP/OBS MODERATE 35: CPT | Performed by: INTERNAL MEDICINE

## 2022-01-01 PROCEDURE — 258N000003 HC RX IP 258 OP 636: Performed by: INTERNAL MEDICINE

## 2022-01-01 PROCEDURE — 272N000570 HC SHEATH CR7

## 2022-01-01 PROCEDURE — 85025 COMPLETE CBC W/AUTO DIFF WBC: CPT | Performed by: HOSPITALIST

## 2022-01-01 PROCEDURE — 82248 BILIRUBIN DIRECT: CPT | Performed by: HOSPITALIST

## 2022-01-01 PROCEDURE — 84132 ASSAY OF SERUM POTASSIUM: CPT | Performed by: INTERNAL MEDICINE

## 2022-01-01 PROCEDURE — 84450 TRANSFERASE (AST) (SGOT): CPT | Performed by: HOSPITALIST

## 2022-01-01 PROCEDURE — 99291 CRITICAL CARE FIRST HOUR: CPT | Performed by: INTERNAL MEDICINE

## 2022-01-01 PROCEDURE — 85027 COMPLETE CBC AUTOMATED: CPT | Performed by: INTERNAL MEDICINE

## 2022-01-01 PROCEDURE — G0463 HOSPITAL OUTPT CLINIC VISIT: HCPCS

## 2022-01-01 PROCEDURE — 96376 TX/PRO/DX INJ SAME DRUG ADON: CPT

## 2022-01-01 PROCEDURE — 93970 EXTREMITY STUDY: CPT | Mod: 26 | Performed by: SURGERY

## 2022-01-01 PROCEDURE — 97116 GAIT TRAINING THERAPY: CPT | Mod: GP

## 2022-01-01 PROCEDURE — 99305 1ST NF CARE MODERATE MDM 35: CPT | Performed by: FAMILY MEDICINE

## 2022-01-01 PROCEDURE — 36415 COLL VENOUS BLD VENIPUNCTURE: CPT | Performed by: INTERNAL MEDICINE

## 2022-01-01 PROCEDURE — 97530 THERAPEUTIC ACTIVITIES: CPT | Mod: GP

## 2022-01-01 PROCEDURE — 250N000011 HC RX IP 250 OP 636: Performed by: INTERNAL MEDICINE

## 2022-01-01 PROCEDURE — 250N000012 HC RX MED GY IP 250 OP 636 PS 637: Performed by: EMERGENCY MEDICINE

## 2022-01-01 PROCEDURE — 93923 UPR/LXTR ART STDY 3+ LVLS: CPT

## 2022-01-01 PROCEDURE — 120N000004 HC R&B MS OVERFLOW

## 2022-01-01 PROCEDURE — 80053 COMPREHEN METABOLIC PANEL: CPT | Performed by: EMERGENCY MEDICINE

## 2022-01-01 PROCEDURE — 250N000012 HC RX MED GY IP 250 OP 636 PS 637: Performed by: INTERNAL MEDICINE

## 2022-01-01 PROCEDURE — 047K34Z DILATION OF RIGHT FEMORAL ARTERY WITH DRUG-ELUTING INTRALUMINAL DEVICE, PERCUTANEOUS APPROACH: ICD-10-PCS | Performed by: RADIOLOGY

## 2022-01-01 PROCEDURE — 75710 ARTERY X-RAYS ARM/LEG: CPT | Mod: RT

## 2022-01-01 PROCEDURE — 97162 PT EVAL MOD COMPLEX 30 MIN: CPT | Mod: GP

## 2022-01-01 PROCEDURE — 272N000566 HC SHEATH CR3

## 2022-01-01 PROCEDURE — 94799 UNLISTED PULMONARY SVC/PX: CPT

## 2022-01-01 PROCEDURE — 36415 COLL VENOUS BLD VENIPUNCTURE: CPT | Performed by: HOSPITALIST

## 2022-01-01 PROCEDURE — 047Y3ZZ DILATION OF LOWER ARTERY, PERCUTANEOUS APPROACH: ICD-10-PCS | Performed by: RADIOLOGY

## 2022-01-01 PROCEDURE — 83735 ASSAY OF MAGNESIUM: CPT | Performed by: EMERGENCY MEDICINE

## 2022-01-01 PROCEDURE — 96365 THER/PROPH/DIAG IV INF INIT: CPT

## 2022-01-01 PROCEDURE — 93970 EXTREMITY STUDY: CPT

## 2022-01-01 PROCEDURE — G0463 HOSPITAL OUTPT CLINIC VISIT: HCPCS | Performed by: REGISTERED NURSE

## 2022-01-01 PROCEDURE — 80053 COMPREHEN METABOLIC PANEL: CPT | Performed by: HOSPITALIST

## 2022-01-01 PROCEDURE — 80048 BASIC METABOLIC PNL TOTAL CA: CPT | Performed by: HOSPITALIST

## 2022-01-01 PROCEDURE — 75625 CONTRAST EXAM ABDOMINL AORTA: CPT

## 2022-01-01 PROCEDURE — 93925 LOWER EXTREMITY STUDY: CPT | Mod: 26 | Performed by: SURGERY

## 2022-01-01 PROCEDURE — 99207 PR NO CHARGE LOS: CPT | Performed by: INTERNAL MEDICINE

## 2022-01-01 PROCEDURE — 272N000302 HC DEVICE INFLATION CR5

## 2022-01-01 PROCEDURE — 250N000009 HC RX 250: Performed by: HOSPITALIST

## 2022-01-01 PROCEDURE — 99309 SBSQ NF CARE MODERATE MDM 30: CPT | Performed by: FAMILY MEDICINE

## 2022-01-01 PROCEDURE — 99233 SBSQ HOSP IP/OBS HIGH 50: CPT | Performed by: INTERNAL MEDICINE

## 2022-01-01 PROCEDURE — 93321 DOPPLER ECHO F-UP/LMTD STD: CPT | Mod: 26 | Performed by: INTERNAL MEDICINE

## 2022-01-01 PROCEDURE — C1769 GUIDE WIRE: HCPCS

## 2022-01-01 PROCEDURE — 80162 ASSAY OF DIGOXIN TOTAL: CPT | Performed by: INTERNAL MEDICINE

## 2022-01-01 PROCEDURE — 047P3ZZ DILATION OF RIGHT ANTERIOR TIBIAL ARTERY, PERCUTANEOUS APPROACH: ICD-10-PCS | Performed by: RADIOLOGY

## 2022-01-01 PROCEDURE — 272N000452 HC KIT SHRLOCK 5FR POWER PICC TRIPLE LUMEN

## 2022-01-01 PROCEDURE — 99213 OFFICE O/P EST LOW 20 MIN: CPT | Performed by: REGISTERED NURSE

## 2022-01-01 PROCEDURE — 93005 ELECTROCARDIOGRAM TRACING: CPT

## 2022-01-01 PROCEDURE — 85018 HEMOGLOBIN: CPT | Performed by: INTERNAL MEDICINE

## 2022-01-01 PROCEDURE — 93922 UPR/L XTREMITY ART 2 LEVELS: CPT

## 2022-01-01 PROCEDURE — 250N000009 HC RX 250: Performed by: INTERNAL MEDICINE

## 2022-01-01 PROCEDURE — 80048 BASIC METABOLIC PNL TOTAL CA: CPT | Performed by: INTERNAL MEDICINE

## 2022-01-01 PROCEDURE — 255N000002 HC RX 255 OP 636: Performed by: EMERGENCY MEDICINE

## 2022-01-01 PROCEDURE — C1874 STENT, COATED/COV W/DEL SYS: HCPCS

## 2022-01-01 PROCEDURE — 047M34Z DILATION OF RIGHT POPLITEAL ARTERY WITH DRUG-ELUTING INTRALUMINAL DEVICE, PERCUTANEOUS APPROACH: ICD-10-PCS | Performed by: RADIOLOGY

## 2022-01-01 PROCEDURE — 85347 COAGULATION TIME ACTIVATED: CPT

## 2022-01-01 PROCEDURE — 80048 BASIC METABOLIC PNL TOTAL CA: CPT | Mod: ORL | Performed by: FAMILY MEDICINE

## 2022-01-01 PROCEDURE — 047L3ZZ DILATION OF LEFT FEMORAL ARTERY, PERCUTANEOUS APPROACH: ICD-10-PCS | Performed by: RADIOLOGY

## 2022-01-01 PROCEDURE — 85379 FIBRIN DEGRADATION QUANT: CPT | Performed by: HOSPITALIST

## 2022-01-01 PROCEDURE — 99223 1ST HOSP IP/OBS HIGH 75: CPT | Mod: AI | Performed by: HOSPITALIST

## 2022-01-01 PROCEDURE — 37226 HC REVASCULARIZE FEM-POP ARTERY ANGIOPLASTY-STENT: CPT

## 2022-01-01 PROCEDURE — 37228 HC REVASC TIB-PERON ART ANGIOPLASTY INIT VESSEL: CPT | Mod: LT

## 2022-01-01 PROCEDURE — C1887 CATHETER, GUIDING: HCPCS

## 2022-01-01 PROCEDURE — 93925 LOWER EXTREMITY STUDY: CPT

## 2022-01-01 PROCEDURE — 93308 TTE F-UP OR LMTD: CPT | Mod: 26 | Performed by: INTERNAL MEDICINE

## 2022-01-01 PROCEDURE — 250N000009 HC RX 250: Performed by: RADIOLOGY

## 2022-01-01 PROCEDURE — 99310 SBSQ NF CARE HIGH MDM 45: CPT | Performed by: NURSE PRACTITIONER

## 2022-01-01 PROCEDURE — 83605 ASSAY OF LACTIC ACID: CPT | Performed by: INTERNAL MEDICINE

## 2022-01-01 PROCEDURE — 93010 ELECTROCARDIOGRAM REPORT: CPT | Performed by: INTERNAL MEDICINE

## 2022-01-01 PROCEDURE — U0005 INFEC AGEN DETEC AMPLI PROBE: HCPCS | Performed by: EMERGENCY MEDICINE

## 2022-01-01 PROCEDURE — 250N000009 HC RX 250: Performed by: EMERGENCY MEDICINE

## 2022-01-01 PROCEDURE — 83880 ASSAY OF NATRIURETIC PEPTIDE: CPT | Performed by: INTERNAL MEDICINE

## 2022-01-01 PROCEDURE — 37224 HC REVASCULARIZE FEM-POP ARTERY ANGIOPLASTY: CPT

## 2022-01-01 PROCEDURE — C1725 CATH, TRANSLUMIN NON-LASER: HCPCS

## 2022-01-01 PROCEDURE — 97535 SELF CARE MNGMENT TRAINING: CPT | Mod: GO

## 2022-01-01 PROCEDURE — 96375 TX/PRO/DX INJ NEW DRUG ADDON: CPT

## 2022-01-01 PROCEDURE — 99152 MOD SED SAME PHYS/QHP 5/>YRS: CPT

## 2022-01-01 PROCEDURE — 87070 CULTURE OTHR SPECIMN AEROBIC: CPT | Performed by: REGISTERED NURSE

## 2022-01-01 PROCEDURE — 250N000011 HC RX IP 250 OP 636: Performed by: EMERGENCY MEDICINE

## 2022-01-01 PROCEDURE — 85610 PROTHROMBIN TIME: CPT | Performed by: EMERGENCY MEDICINE

## 2022-01-01 PROCEDURE — 258N000003 HC RX IP 258 OP 636: Performed by: EMERGENCY MEDICINE

## 2022-01-01 PROCEDURE — 97110 THERAPEUTIC EXERCISES: CPT | Mod: GO

## 2022-01-01 PROCEDURE — 85049 AUTOMATED PLATELET COUNT: CPT | Performed by: INTERNAL MEDICINE

## 2022-01-01 PROCEDURE — 3E043XZ INTRODUCTION OF VASOPRESSOR INTO CENTRAL VEIN, PERCUTANEOUS APPROACH: ICD-10-PCS | Performed by: FAMILY MEDICINE

## 2022-01-01 PROCEDURE — 99232 SBSQ HOSP IP/OBS MODERATE 35: CPT | Performed by: EMERGENCY MEDICINE

## 2022-01-01 PROCEDURE — 97129 THER IVNTJ 1ST 15 MIN: CPT | Mod: GO

## 2022-01-01 PROCEDURE — 83880 ASSAY OF NATRIURETIC PEPTIDE: CPT | Performed by: EMERGENCY MEDICINE

## 2022-01-01 PROCEDURE — 94640 AIRWAY INHALATION TREATMENT: CPT

## 2022-01-01 PROCEDURE — 120N000013 HC R&B IMCU

## 2022-01-01 PROCEDURE — 36415 COLL VENOUS BLD VENIPUNCTURE: CPT | Performed by: EMERGENCY MEDICINE

## 2022-01-01 PROCEDURE — 11044 DBRDMT BONE 1ST 20 SQ CM/<: CPT | Performed by: REGISTERED NURSE

## 2022-01-01 PROCEDURE — 99222 1ST HOSP IP/OBS MODERATE 55: CPT | Performed by: INTERNAL MEDICINE

## 2022-01-01 PROCEDURE — 82374 ASSAY BLOOD CARBON DIOXIDE: CPT | Performed by: EMERGENCY MEDICINE

## 2022-01-01 PROCEDURE — 86140 C-REACTIVE PROTEIN: CPT | Performed by: HOSPITALIST

## 2022-01-01 PROCEDURE — 81001 URINALYSIS AUTO W/SCOPE: CPT | Performed by: INTERNAL MEDICINE

## 2022-01-01 PROCEDURE — 93005 ELECTROCARDIOGRAM TRACING: CPT | Performed by: INTERNAL MEDICINE

## 2022-01-01 PROCEDURE — 93923 UPR/LXTR ART STDY 3+ LVLS: CPT | Mod: 26 | Performed by: SURGERY

## 2022-01-01 PROCEDURE — 200N000001 HC R&B ICU

## 2022-01-01 PROCEDURE — 250N000011 HC RX IP 250 OP 636: Performed by: RADIOLOGY

## 2022-01-01 PROCEDURE — 76937 US GUIDE VASCULAR ACCESS: CPT

## 2022-01-01 PROCEDURE — 80202 ASSAY OF VANCOMYCIN: CPT | Performed by: HOSPITALIST

## 2022-01-01 PROCEDURE — 255N000002 HC RX 255 OP 636: Performed by: HOSPITALIST

## 2022-01-01 PROCEDURE — 99239 HOSP IP/OBS DSCHRG MGMT >30: CPT | Performed by: FAMILY MEDICINE

## 2022-01-01 PROCEDURE — 99285 EMERGENCY DEPT VISIT HI MDM: CPT | Mod: 25

## 2022-01-01 PROCEDURE — 71046 X-RAY EXAM CHEST 2 VIEWS: CPT

## 2022-01-01 PROCEDURE — 85610 PROTHROMBIN TIME: CPT | Performed by: HOSPITALIST

## 2022-01-01 PROCEDURE — 80053 COMPREHEN METABOLIC PANEL: CPT | Performed by: INTERNAL MEDICINE

## 2022-01-01 PROCEDURE — P9604 ONE-WAY ALLOW PRORATED TRIP: HCPCS | Mod: ORL | Performed by: FAMILY MEDICINE

## 2022-01-01 PROCEDURE — 999N000198 HC STATISTIC WOC PT EDUCATION, 16-30 MIN

## 2022-01-01 PROCEDURE — 999N000065 XR CHEST PORT 1 VIEW

## 2022-01-01 PROCEDURE — 37232 HC REVASC TIB-PERON ARTANGIOPLASTY EA ADL VESSEL: CPT | Mod: LT

## 2022-01-01 PROCEDURE — 99207 PR NOT IN PERSON INPATIENT CONSULT STATISTICAL MARKER: CPT | Performed by: INTERNAL MEDICINE

## 2022-01-01 PROCEDURE — 99239 HOSP IP/OBS DSCHRG MGMT >30: CPT | Performed by: HOSPITALIST

## 2022-01-01 PROCEDURE — 37232 IR LOWER EXTREMITY ANGIOGRAM RIGHT: CPT | Mod: RT

## 2022-01-01 PROCEDURE — 272N000569 HC SHEATH CR6

## 2022-01-01 PROCEDURE — 87641 MR-STAPH DNA AMP PROBE: CPT | Performed by: INTERNAL MEDICINE

## 2022-01-01 PROCEDURE — 70450 CT HEAD/BRAIN W/O DYE: CPT

## 2022-01-01 PROCEDURE — 99233 SBSQ HOSP IP/OBS HIGH 50: CPT | Performed by: HOSPITALIST

## 2022-01-01 PROCEDURE — 99207 PR NO CHARGE LOS: CPT | Performed by: SURGERY

## 2022-01-01 PROCEDURE — 047Q3ZZ DILATION OF LEFT ANTERIOR TIBIAL ARTERY, PERCUTANEOUS APPROACH: ICD-10-PCS | Performed by: RADIOLOGY

## 2022-01-01 PROCEDURE — 272N000500 HC NEEDLE CR2

## 2022-01-01 PROCEDURE — 96366 THER/PROPH/DIAG IV INF ADDON: CPT

## 2022-01-01 PROCEDURE — C9803 HOPD COVID-19 SPEC COLLECT: HCPCS

## 2022-01-01 PROCEDURE — 84145 PROCALCITONIN (PCT): CPT | Performed by: INTERNAL MEDICINE

## 2022-01-01 PROCEDURE — 999N000206 HC STATISTICAL VASC ACCESS NURSE TIME, 61+ MINUTES

## 2022-01-01 PROCEDURE — 85027 COMPLETE CBC AUTOMATED: CPT | Mod: ORL | Performed by: FAMILY MEDICINE

## 2022-01-01 PROCEDURE — 99214 OFFICE O/P EST MOD 30 MIN: CPT | Performed by: REGISTERED NURSE

## 2022-01-01 PROCEDURE — 82040 ASSAY OF SERUM ALBUMIN: CPT | Performed by: INTERNAL MEDICINE

## 2022-01-01 PROCEDURE — C8924 2D TTE W OR W/O FOL W/CON,FU: HCPCS

## 2022-01-01 PROCEDURE — 85027 COMPLETE CBC AUTOMATED: CPT | Performed by: HOSPITALIST

## 2022-01-01 PROCEDURE — 99207 PR CDG-CUT & PASTE-POTENTIAL IMPACT ON LEVEL: CPT | Performed by: INTERNAL MEDICINE

## 2022-01-01 PROCEDURE — 93005 ELECTROCARDIOGRAM TRACING: CPT | Performed by: EMERGENCY MEDICINE

## 2022-01-01 PROCEDURE — 93325 DOPPLER ECHO COLOR FLOW MAPG: CPT | Mod: 26 | Performed by: INTERNAL MEDICINE

## 2022-01-01 PROCEDURE — 81001 URINALYSIS AUTO W/SCOPE: CPT | Performed by: HOSPITALIST

## 2022-01-01 PROCEDURE — 999N000127 HC STATISTIC PERIPHERAL IV START W US GUIDANCE

## 2022-01-01 PROCEDURE — 99285 EMERGENCY DEPT VISIT HI MDM: CPT | Mod: CS

## 2022-01-01 PROCEDURE — 85014 HEMATOCRIT: CPT | Performed by: HOSPITALIST

## 2022-01-01 PROCEDURE — 99233 SBSQ HOSP IP/OBS HIGH 50: CPT | Performed by: EMERGENCY MEDICINE

## 2022-01-01 PROCEDURE — 84484 ASSAY OF TROPONIN QUANT: CPT | Performed by: EMERGENCY MEDICINE

## 2022-01-01 PROCEDURE — 36569 INSJ PICC 5 YR+ W/O IMAGING: CPT

## 2022-01-01 PROCEDURE — 999N000105 HC STATISTIC NO DOCUMENTATION TO SUPPORT CHARGE

## 2022-01-01 PROCEDURE — XW033E5 INTRODUCTION OF REMDESIVIR ANTI-INFECTIVE INTO PERIPHERAL VEIN, PERCUTANEOUS APPROACH, NEW TECHNOLOGY GROUP 5: ICD-10-PCS | Performed by: HOSPITALIST

## 2022-01-01 PROCEDURE — 84484 ASSAY OF TROPONIN QUANT: CPT | Performed by: INTERNAL MEDICINE

## 2022-01-01 PROCEDURE — 75710 ARTERY X-RAYS ARM/LEG: CPT | Mod: LT

## 2022-01-01 PROCEDURE — 37228 HC REVASC TIB-PERON ART ANGIOPLASTY INIT VESSEL: CPT | Mod: RT

## 2022-01-01 PROCEDURE — XW03396 INTRODUCTION OF CEFTOLOZANE/TAZOBACTAM ANTI-INFECTIVE INTO PERIPHERAL VEIN, PERCUTANEOUS APPROACH, NEW TECHNOLOGY GROUP 6: ICD-10-PCS | Performed by: HOSPITALIST

## 2022-01-01 PROCEDURE — 255N000002 HC RX 255 OP 636: Performed by: INTERNAL MEDICINE

## 2022-01-01 PROCEDURE — 99153 MOD SED SAME PHYS/QHP EA: CPT

## 2022-01-01 PROCEDURE — 83735 ASSAY OF MAGNESIUM: CPT | Mod: ORL | Performed by: FAMILY MEDICINE

## 2022-01-01 PROCEDURE — 83880 ASSAY OF NATRIURETIC PEPTIDE: CPT | Mod: ORL | Performed by: FAMILY MEDICINE

## 2022-01-01 RX ORDER — DIGOXIN 0.25 MG/ML
250 INJECTION INTRAMUSCULAR; INTRAVENOUS EVERY 4 HOURS
Status: DISCONTINUED | OUTPATIENT
Start: 2022-01-01 | End: 2022-01-01

## 2022-01-01 RX ORDER — METOPROLOL SUCCINATE 100 MG/1
200 TABLET, EXTENDED RELEASE ORAL DAILY
Status: DISCONTINUED | OUTPATIENT
Start: 2022-01-01 | End: 2022-01-01 | Stop reason: HOSPADM

## 2022-01-01 RX ORDER — POTASSIUM CHLORIDE 1500 MG/1
20 TABLET, EXTENDED RELEASE ORAL ONCE
Status: DISCONTINUED | OUTPATIENT
Start: 2022-01-01 | End: 2022-01-01

## 2022-01-01 RX ORDER — METOPROLOL SUCCINATE 25 MG/1
50 TABLET, EXTENDED RELEASE ORAL 2 TIMES DAILY
DISCHARGE
Start: 2022-01-01

## 2022-01-01 RX ORDER — HEPARIN SODIUM 200 [USP'U]/100ML
1 INJECTION, SOLUTION INTRAVENOUS CONTINUOUS PRN
Status: DISCONTINUED | OUTPATIENT
Start: 2022-01-01 | End: 2022-01-01 | Stop reason: HOSPADM

## 2022-01-01 RX ORDER — SULFAMETHOXAZOLE/TRIMETHOPRIM 800-160 MG
1 TABLET ORAL 2 TIMES DAILY
Qty: 20 TABLET | Refills: 0 | Status: SHIPPED | OUTPATIENT
Start: 2022-01-01

## 2022-01-01 RX ORDER — METOPROLOL TARTRATE 100 MG
TABLET ORAL
COMMUNITY
End: 2022-01-01

## 2022-01-01 RX ORDER — FENTANYL CITRATE 50 UG/ML
25-50 INJECTION, SOLUTION INTRAMUSCULAR; INTRAVENOUS EVERY 5 MIN PRN
Status: DISCONTINUED | OUTPATIENT
Start: 2022-01-01 | End: 2022-01-01 | Stop reason: HOSPADM

## 2022-01-01 RX ORDER — POTASSIUM CHLORIDE 1500 MG/1
20 TABLET, EXTENDED RELEASE ORAL ONCE
Status: COMPLETED | OUTPATIENT
Start: 2022-01-01 | End: 2022-01-01

## 2022-01-01 RX ORDER — BUDESONIDE 1 MG/2ML
1 INHALANT ORAL 2 TIMES DAILY
COMMUNITY
Start: 2022-01-01

## 2022-01-01 RX ORDER — TAMSULOSIN HYDROCHLORIDE 0.4 MG/1
0.4 CAPSULE ORAL DAILY
Status: DISCONTINUED | OUTPATIENT
Start: 2022-01-01 | End: 2022-01-01 | Stop reason: HOSPADM

## 2022-01-01 RX ORDER — PIPERACILLIN SODIUM, TAZOBACTAM SODIUM 3; .375 G/15ML; G/15ML
3.38 INJECTION, POWDER, LYOPHILIZED, FOR SOLUTION INTRAVENOUS ONCE
Status: COMPLETED | OUTPATIENT
Start: 2022-01-01 | End: 2022-01-01

## 2022-01-01 RX ORDER — FUROSEMIDE 20 MG
TABLET ORAL EVERY 24 HOURS
COMMUNITY
Start: 2022-01-01 | End: 2022-01-01

## 2022-01-01 RX ORDER — DIGOXIN 125 MCG
125 TABLET ORAL DAILY
Status: DISCONTINUED | OUTPATIENT
Start: 2022-01-01 | End: 2022-01-01 | Stop reason: HOSPADM

## 2022-01-01 RX ORDER — RIVAROXABAN 20 MG/1
TABLET, FILM COATED ORAL
Qty: 90 TABLET | Refills: 2 | OUTPATIENT
Start: 2022-01-01

## 2022-01-01 RX ORDER — MULTIVITAMIN,THERAPEUTIC
1 TABLET ORAL DAILY
COMMUNITY
Start: 2022-01-01 | End: 2022-01-01

## 2022-01-01 RX ORDER — NALOXONE HYDROCHLORIDE 0.4 MG/ML
0.2 INJECTION, SOLUTION INTRAMUSCULAR; INTRAVENOUS; SUBCUTANEOUS
Status: DISCONTINUED | OUTPATIENT
Start: 2022-01-01 | End: 2022-01-01 | Stop reason: HOSPADM

## 2022-01-01 RX ORDER — CIPROFLOXACIN 500 MG/1
500 TABLET, FILM COATED ORAL 2 TIMES DAILY
Qty: 20 TABLET | Refills: 0 | Status: ON HOLD | OUTPATIENT
Start: 2022-01-01 | End: 2022-01-01

## 2022-01-01 RX ORDER — DILTIAZEM HCL IN NACL,ISO-OSM 125 MG/125
5-15 PLASTIC BAG, INJECTION (ML) INTRAVENOUS CONTINUOUS
Status: DISCONTINUED | OUTPATIENT
Start: 2022-01-01 | End: 2022-01-01

## 2022-01-01 RX ORDER — HEPARIN SODIUM 1000 [USP'U]/ML
12000 INJECTION, SOLUTION INTRAVENOUS; SUBCUTANEOUS ONCE
Status: COMPLETED | OUTPATIENT
Start: 2022-01-01 | End: 2022-01-01

## 2022-01-01 RX ORDER — FUROSEMIDE 10 MG/ML
60 INJECTION INTRAMUSCULAR; INTRAVENOUS ONCE
Status: COMPLETED | OUTPATIENT
Start: 2022-01-01 | End: 2022-01-01

## 2022-01-01 RX ORDER — MAGNESIUM SULFATE 4 G/50ML
4 INJECTION INTRAVENOUS ONCE
Status: COMPLETED | OUTPATIENT
Start: 2022-01-01 | End: 2022-01-01

## 2022-01-01 RX ORDER — ROSUVASTATIN CALCIUM 20 MG/1
20 TABLET, COATED ORAL EVERY EVENING
Qty: 30 TABLET | Refills: 1 | Status: SHIPPED | OUTPATIENT
Start: 2022-01-01

## 2022-01-01 RX ORDER — NALOXONE HYDROCHLORIDE 0.4 MG/ML
0.2 INJECTION, SOLUTION INTRAMUSCULAR; INTRAVENOUS; SUBCUTANEOUS
Status: DISCONTINUED | OUTPATIENT
Start: 2022-01-01 | End: 2022-01-01

## 2022-01-01 RX ORDER — SODIUM CHLORIDE, SODIUM LACTATE, POTASSIUM CHLORIDE, CALCIUM CHLORIDE 600; 310; 30; 20 MG/100ML; MG/100ML; MG/100ML; MG/100ML
INJECTION, SOLUTION INTRAVENOUS CONTINUOUS
Status: DISCONTINUED | OUTPATIENT
Start: 2022-01-01 | End: 2022-01-01

## 2022-01-01 RX ORDER — ACETAMINOPHEN 325 MG/1
650 TABLET ORAL EVERY 6 HOURS PRN
Status: DISCONTINUED | OUTPATIENT
Start: 2022-01-01 | End: 2022-01-01 | Stop reason: HOSPADM

## 2022-01-01 RX ORDER — LIDOCAINE 40 MG/G
CREAM TOPICAL
Status: DISPENSED | OUTPATIENT
Start: 2022-01-01 | End: 2022-01-01

## 2022-01-01 RX ORDER — BUDESONIDE 1 MG/2ML
1 INHALANT ORAL 2 TIMES DAILY
Status: CANCELLED | OUTPATIENT
Start: 2022-01-01

## 2022-01-01 RX ORDER — CLOPIDOGREL BISULFATE 75 MG/1
75 TABLET ORAL DAILY
Status: DISCONTINUED | OUTPATIENT
Start: 2022-01-01 | End: 2022-01-01 | Stop reason: HOSPADM

## 2022-01-01 RX ORDER — MAGNESIUM OXIDE 400 MG/1
400 TABLET ORAL EVERY 4 HOURS
Status: DISCONTINUED | OUTPATIENT
Start: 2022-01-01 | End: 2022-01-01

## 2022-01-01 RX ORDER — DIGOXIN 125 MCG
125 TABLET ORAL DAILY
Status: DISCONTINUED | OUTPATIENT
Start: 2022-01-01 | End: 2022-01-01

## 2022-01-01 RX ORDER — PREDNISONE 20 MG/1
60 TABLET ORAL DAILY
Status: DISCONTINUED | OUTPATIENT
Start: 2022-01-01 | End: 2022-01-01 | Stop reason: HOSPADM

## 2022-01-01 RX ORDER — MAGNESIUM OXIDE 400 MG/1
400 TABLET ORAL EVERY 4 HOURS
Status: DISPENSED | OUTPATIENT
Start: 2022-01-01 | End: 2022-01-01

## 2022-01-01 RX ORDER — POTASSIUM CHLORIDE 750 MG/1
TABLET, EXTENDED RELEASE ORAL EVERY 24 HOURS
COMMUNITY
End: 2022-01-01

## 2022-01-01 RX ORDER — ASCORBIC ACID 500 MG
500 TABLET ORAL DAILY
Qty: 4 TABLET | COMMUNITY
Start: 2022-01-01

## 2022-01-01 RX ORDER — DIGOXIN 125 MCG
125 TABLET ORAL DAILY
Qty: 30 TABLET | Refills: 0 | COMMUNITY
Start: 2022-01-01

## 2022-01-01 RX ORDER — BUDESONIDE 0.5 MG/2ML
1 INHALANT ORAL 2 TIMES DAILY
Status: DISCONTINUED | OUTPATIENT
Start: 2022-01-01 | End: 2022-01-01

## 2022-01-01 RX ORDER — ASCORBIC ACID 500 MG
500 TABLET ORAL DAILY
Status: DISCONTINUED | OUTPATIENT
Start: 2022-01-01 | End: 2022-01-01 | Stop reason: HOSPADM

## 2022-01-01 RX ORDER — PREDNISONE 20 MG/1
40 TABLET ORAL DAILY
Status: COMPLETED | OUTPATIENT
Start: 2022-01-01 | End: 2022-01-01

## 2022-01-01 RX ORDER — CLOPIDOGREL BISULFATE 75 MG/1
75 TABLET ORAL DAILY
Qty: 95 TABLET | Refills: 0 | Status: SHIPPED | OUTPATIENT
Start: 2022-01-01 | End: 2022-01-01

## 2022-01-01 RX ORDER — METOPROLOL SUCCINATE 200 MG/1
TABLET, EXTENDED RELEASE ORAL
Qty: 90 TABLET | Refills: 0 | Status: ON HOLD | OUTPATIENT
Start: 2022-01-01 | End: 2022-01-01

## 2022-01-01 RX ORDER — MAGNESIUM OXIDE 400 MG/1
400 TABLET ORAL EVERY 4 HOURS
Status: COMPLETED | OUTPATIENT
Start: 2022-01-01 | End: 2022-01-01

## 2022-01-01 RX ORDER — POTASSIUM CHLORIDE 1500 MG/1
20 TABLET, EXTENDED RELEASE ORAL DAILY
Status: DISCONTINUED | OUTPATIENT
Start: 2022-01-01 | End: 2022-01-01 | Stop reason: HOSPADM

## 2022-01-01 RX ORDER — ONDANSETRON 4 MG/1
4 TABLET, ORALLY DISINTEGRATING ORAL EVERY 6 HOURS PRN
Status: DISCONTINUED | OUTPATIENT
Start: 2022-01-01 | End: 2022-01-01 | Stop reason: HOSPADM

## 2022-01-01 RX ORDER — DIGOXIN 0.25 MG/ML
125 INJECTION INTRAMUSCULAR; INTRAVENOUS DAILY
Status: DISCONTINUED | OUTPATIENT
Start: 2022-01-01 | End: 2022-01-01

## 2022-01-01 RX ORDER — PROTAMINE SULFATE 10 MG/ML
50 INJECTION, SOLUTION INTRAVENOUS ONCE
Status: COMPLETED | OUTPATIENT
Start: 2022-01-01 | End: 2022-01-01

## 2022-01-01 RX ORDER — METOPROLOL SUCCINATE 100 MG/1
100 TABLET, EXTENDED RELEASE ORAL 2 TIMES DAILY
Status: DISCONTINUED | OUTPATIENT
Start: 2022-01-01 | End: 2022-01-01

## 2022-01-01 RX ORDER — METOPROLOL TARTRATE 1 MG/ML
5 INJECTION, SOLUTION INTRAVENOUS EVERY 5 MIN PRN
Status: DISCONTINUED | OUTPATIENT
Start: 2022-01-01 | End: 2022-01-01 | Stop reason: HOSPADM

## 2022-01-01 RX ORDER — METOPROLOL SUCCINATE 25 MG/1
25 TABLET, EXTENDED RELEASE ORAL 2 TIMES DAILY
Status: DISCONTINUED | OUTPATIENT
Start: 2022-01-01 | End: 2022-01-01 | Stop reason: HOSPADM

## 2022-01-01 RX ORDER — PIPERACILLIN SODIUM, TAZOBACTAM SODIUM 3; .375 G/15ML; G/15ML
3.38 INJECTION, POWDER, LYOPHILIZED, FOR SOLUTION INTRAVENOUS EVERY 6 HOURS
Status: DISCONTINUED | OUTPATIENT
Start: 2022-01-01 | End: 2022-01-01

## 2022-01-01 RX ORDER — LIDOCAINE 40 MG/G
CREAM TOPICAL
Status: DISCONTINUED | OUTPATIENT
Start: 2022-01-01 | End: 2022-01-01 | Stop reason: HOSPADM

## 2022-01-01 RX ORDER — IPRATROPIUM BROMIDE AND ALBUTEROL SULFATE 2.5; .5 MG/3ML; MG/3ML
3 SOLUTION RESPIRATORY (INHALATION) 4 TIMES DAILY PRN
Status: DISCONTINUED | OUTPATIENT
Start: 2022-01-01 | End: 2022-01-01 | Stop reason: HOSPADM

## 2022-01-01 RX ORDER — IPRATROPIUM BROMIDE AND ALBUTEROL SULFATE 2.5; .5 MG/3ML; MG/3ML
1 SOLUTION RESPIRATORY (INHALATION) 4 TIMES DAILY
Status: DISCONTINUED | OUTPATIENT
Start: 2022-01-01 | End: 2022-01-01

## 2022-01-01 RX ORDER — ACETAMINOPHEN 650 MG/1
650 SUPPOSITORY RECTAL EVERY 6 HOURS PRN
Status: DISCONTINUED | OUTPATIENT
Start: 2022-01-01 | End: 2022-01-01 | Stop reason: HOSPADM

## 2022-01-01 RX ORDER — DIGOXIN 125 MCG
0.25 TABLET ORAL EVERY 4 HOURS
Status: DISCONTINUED | OUTPATIENT
Start: 2022-01-01 | End: 2022-01-01

## 2022-01-01 RX ORDER — METOPROLOL SUCCINATE 50 MG/1
50 TABLET, EXTENDED RELEASE ORAL 2 TIMES DAILY
Status: DISCONTINUED | OUTPATIENT
Start: 2022-01-01 | End: 2022-01-01

## 2022-01-01 RX ORDER — CHOLECALCIFEROL (VITAMIN D3) 125 MCG
20000 CAPSULE ORAL DAILY
Status: DISCONTINUED | OUTPATIENT
Start: 2022-01-01 | End: 2022-01-01 | Stop reason: HOSPADM

## 2022-01-01 RX ORDER — LIDOCAINE 40 MG/G
CREAM TOPICAL
Status: DISCONTINUED | OUTPATIENT
Start: 2022-01-01 | End: 2022-01-01

## 2022-01-01 RX ORDER — LEVALBUTEROL INHALATION SOLUTION 0.63 MG/3ML
0.63 SOLUTION RESPIRATORY (INHALATION) EVERY 4 HOURS PRN
Status: DISCONTINUED | OUTPATIENT
Start: 2022-01-01 | End: 2022-01-01 | Stop reason: HOSPADM

## 2022-01-01 RX ORDER — HEPARIN SODIUM 1000 [USP'U]/ML
4000 INJECTION, SOLUTION INTRAVENOUS; SUBCUTANEOUS ONCE
Status: COMPLETED | OUTPATIENT
Start: 2022-01-01 | End: 2022-01-01

## 2022-01-01 RX ORDER — SULFAMETHOXAZOLE/TRIMETHOPRIM 800-160 MG
1 TABLET ORAL 2 TIMES DAILY
Qty: 20 TABLET | Refills: 0 | Status: SHIPPED | OUTPATIENT
Start: 2022-01-01 | End: 2022-01-01

## 2022-01-01 RX ORDER — CLOPIDOGREL BISULFATE 75 MG/1
75 TABLET ORAL DAILY
Qty: 95 TABLET | Refills: 0 | Status: CANCELLED | OUTPATIENT
Start: 2022-01-01

## 2022-01-01 RX ORDER — CLOPIDOGREL BISULFATE 75 MG/1
75 TABLET ORAL DAILY
Qty: 30 TABLET | Refills: 1 | Status: SHIPPED | OUTPATIENT
Start: 2022-01-01 | End: 2022-01-01

## 2022-01-01 RX ORDER — ROSUVASTATIN CALCIUM 20 MG/1
20 TABLET, COATED ORAL EVERY EVENING
Qty: 30 TABLET | Refills: 1 | Status: CANCELLED | OUTPATIENT
Start: 2022-01-01

## 2022-01-01 RX ORDER — TAMSULOSIN HYDROCHLORIDE 0.4 MG/1
0.4 CAPSULE ORAL DAILY
Qty: 30 CAPSULE | Refills: 0 | Status: SHIPPED | OUTPATIENT
Start: 2022-01-01

## 2022-01-01 RX ORDER — FUROSEMIDE 20 MG
20 TABLET ORAL DAILY
Status: DISCONTINUED | OUTPATIENT
Start: 2022-01-01 | End: 2022-01-01

## 2022-01-01 RX ORDER — MAGNESIUM SULFATE HEPTAHYDRATE 40 MG/ML
2 INJECTION, SOLUTION INTRAVENOUS ONCE
Status: COMPLETED | OUTPATIENT
Start: 2022-01-01 | End: 2022-01-01

## 2022-01-01 RX ORDER — POTASSIUM CHLORIDE 1500 MG/1
40 TABLET, EXTENDED RELEASE ORAL ONCE
Status: COMPLETED | OUTPATIENT
Start: 2022-01-01 | End: 2022-01-01

## 2022-01-01 RX ORDER — DEXTROSE MONOHYDRATE 25 G/50ML
25-50 INJECTION, SOLUTION INTRAVENOUS
Status: DISCONTINUED | OUTPATIENT
Start: 2022-01-01 | End: 2022-01-01 | Stop reason: HOSPADM

## 2022-01-01 RX ORDER — PREDNISONE 20 MG/1
TABLET ORAL
Qty: 18 TABLET | Refills: 0 | Status: SHIPPED | OUTPATIENT
Start: 2022-01-01 | End: 2022-01-01

## 2022-01-01 RX ORDER — METOPROLOL SUCCINATE 25 MG/1
25 TABLET, EXTENDED RELEASE ORAL ONCE
Status: COMPLETED | OUTPATIENT
Start: 2022-01-01 | End: 2022-01-01

## 2022-01-01 RX ORDER — PREDNISONE 20 MG/1
40 TABLET ORAL DAILY
Status: DISCONTINUED | OUTPATIENT
Start: 2022-01-01 | End: 2022-01-01 | Stop reason: HOSPADM

## 2022-01-01 RX ORDER — NALOXONE HYDROCHLORIDE 0.4 MG/ML
0.4 INJECTION, SOLUTION INTRAMUSCULAR; INTRAVENOUS; SUBCUTANEOUS
Status: DISCONTINUED | OUTPATIENT
Start: 2022-01-01 | End: 2022-01-01 | Stop reason: HOSPADM

## 2022-01-01 RX ORDER — FUROSEMIDE 20 MG
20 TABLET ORAL DAILY
Qty: 30 TABLET | Refills: 0 | Status: SHIPPED | OUTPATIENT
Start: 2022-01-01 | End: 2022-01-01 | Stop reason: DRUGHIGH

## 2022-01-01 RX ORDER — HEPARIN SODIUM 1000 [USP'U]/ML
10000 INJECTION, SOLUTION INTRAVENOUS; SUBCUTANEOUS ONCE
Status: COMPLETED | OUTPATIENT
Start: 2022-01-01 | End: 2022-01-01

## 2022-01-01 RX ORDER — DIGOXIN 125 MCG
125 TABLET ORAL DAILY
Qty: 30 TABLET | Refills: 0 | Status: CANCELLED | OUTPATIENT
Start: 2022-01-01

## 2022-01-01 RX ORDER — POTASSIUM CHLORIDE 1500 MG/1
20 TABLET, EXTENDED RELEASE ORAL DAILY
Status: CANCELLED | OUTPATIENT
Start: 2022-01-01

## 2022-01-01 RX ORDER — METOPROLOL SUCCINATE 25 MG/1
50 TABLET, EXTENDED RELEASE ORAL 2 TIMES DAILY
Status: CANCELLED | OUTPATIENT
Start: 2022-01-01

## 2022-01-01 RX ORDER — DILTIAZEM HYDROCHLORIDE 30 MG/1
30 TABLET, FILM COATED ORAL EVERY 6 HOURS SCHEDULED
Status: DISCONTINUED | OUTPATIENT
Start: 2022-01-01 | End: 2022-01-01

## 2022-01-01 RX ORDER — IODIXANOL 320 MG/ML
200 INJECTION, SOLUTION INTRAVASCULAR ONCE
Status: COMPLETED | OUTPATIENT
Start: 2022-01-01 | End: 2022-01-01

## 2022-01-01 RX ORDER — DEXTROSE MONOHYDRATE 25 G/50ML
25-50 INJECTION, SOLUTION INTRAVENOUS
Status: DISCONTINUED | OUTPATIENT
Start: 2022-01-01 | End: 2022-01-01

## 2022-01-01 RX ORDER — FUROSEMIDE 20 MG
20 TABLET ORAL DAILY
Status: DISCONTINUED | OUTPATIENT
Start: 2022-01-01 | End: 2022-01-01 | Stop reason: HOSPADM

## 2022-01-01 RX ORDER — NICOTINE POLACRILEX 4 MG
15-30 LOZENGE BUCCAL
Status: DISCONTINUED | OUTPATIENT
Start: 2022-01-01 | End: 2022-01-01

## 2022-01-01 RX ORDER — MULTIVITAMIN,THERAPEUTIC
1 TABLET ORAL DAILY
Status: DISCONTINUED | OUTPATIENT
Start: 2022-01-01 | End: 2022-01-01 | Stop reason: HOSPADM

## 2022-01-01 RX ORDER — PIPERACILLIN SODIUM, TAZOBACTAM SODIUM 3; .375 G/15ML; G/15ML
3.38 INJECTION, POWDER, LYOPHILIZED, FOR SOLUTION INTRAVENOUS EVERY 8 HOURS
Status: DISCONTINUED | OUTPATIENT
Start: 2022-01-01 | End: 2022-01-01

## 2022-01-01 RX ORDER — DIGOXIN 125 MCG
TABLET ORAL EVERY 24 HOURS
COMMUNITY
End: 2022-01-01

## 2022-01-01 RX ORDER — PREDNISONE 20 MG/1
20 TABLET ORAL DAILY
Status: DISCONTINUED | OUTPATIENT
Start: 2022-01-01 | End: 2022-01-01 | Stop reason: HOSPADM

## 2022-01-01 RX ORDER — FUROSEMIDE 40 MG
40 TABLET ORAL DAILY
COMMUNITY
Start: 2022-01-01

## 2022-01-01 RX ORDER — ONDANSETRON 2 MG/ML
4 INJECTION INTRAMUSCULAR; INTRAVENOUS EVERY 6 HOURS PRN
Status: DISCONTINUED | OUTPATIENT
Start: 2022-01-01 | End: 2022-01-01 | Stop reason: HOSPADM

## 2022-01-01 RX ORDER — IODIXANOL 320 MG/ML
300 INJECTION, SOLUTION INTRAVASCULAR ONCE
Status: COMPLETED | OUTPATIENT
Start: 2022-01-01 | End: 2022-01-01

## 2022-01-01 RX ORDER — NALOXONE HYDROCHLORIDE 0.4 MG/ML
0.4 INJECTION, SOLUTION INTRAMUSCULAR; INTRAVENOUS; SUBCUTANEOUS
Status: DISCONTINUED | OUTPATIENT
Start: 2022-01-01 | End: 2022-01-01

## 2022-01-01 RX ORDER — DILTIAZEM HYDROCHLORIDE 5 MG/ML
10 INJECTION INTRAVENOUS ONCE
Status: COMPLETED | OUTPATIENT
Start: 2022-01-01 | End: 2022-01-01

## 2022-01-01 RX ORDER — DILTIAZEM HCL IN NACL,ISO-OSM 125 MG/125
3-15 PLASTIC BAG, INJECTION (ML) INTRAVENOUS CONTINUOUS
Status: DISCONTINUED | OUTPATIENT
Start: 2022-01-01 | End: 2022-01-01

## 2022-01-01 RX ORDER — PHENYLEPHRINE HCL IN 0.9% NACL 50MG/250ML
.5-1.25 PLASTIC BAG, INJECTION (ML) INTRAVENOUS CONTINUOUS
Status: DISCONTINUED | OUTPATIENT
Start: 2022-01-01 | End: 2022-01-01

## 2022-01-01 RX ORDER — TAMSULOSIN HYDROCHLORIDE 0.4 MG/1
0.4 CAPSULE ORAL DAILY
Qty: 30 CAPSULE | Refills: 0 | Status: CANCELLED | OUTPATIENT
Start: 2022-01-01

## 2022-01-01 RX ORDER — FLUMAZENIL 0.1 MG/ML
0.2 INJECTION, SOLUTION INTRAVENOUS
Status: DISCONTINUED | OUTPATIENT
Start: 2022-01-01 | End: 2022-01-01 | Stop reason: HOSPADM

## 2022-01-01 RX ORDER — ROSUVASTATIN CALCIUM 10 MG/1
20 TABLET, COATED ORAL EVERY EVENING
Status: DISCONTINUED | OUTPATIENT
Start: 2022-01-01 | End: 2022-01-01 | Stop reason: HOSPADM

## 2022-01-01 RX ORDER — DIGOXIN 0.25 MG/ML
125 INJECTION INTRAMUSCULAR; INTRAVENOUS ONCE
Status: COMPLETED | OUTPATIENT
Start: 2022-01-01 | End: 2022-01-01

## 2022-01-01 RX ORDER — IPRATROPIUM BROMIDE AND ALBUTEROL SULFATE 2.5; .5 MG/3ML; MG/3ML
3 SOLUTION RESPIRATORY (INHALATION)
Status: DISCONTINUED | OUTPATIENT
Start: 2022-01-01 | End: 2022-01-01 | Stop reason: CLARIF

## 2022-01-01 RX ORDER — METOPROLOL SUCCINATE 25 MG/1
25 TABLET, EXTENDED RELEASE ORAL 2 TIMES DAILY
COMMUNITY
Start: 2022-01-01 | End: 2022-01-01

## 2022-01-01 RX ORDER — SODIUM CHLORIDE 9 MG/ML
INJECTION, SOLUTION INTRAVENOUS CONTINUOUS
Status: ACTIVE | OUTPATIENT
Start: 2022-01-01 | End: 2022-01-01

## 2022-01-01 RX ORDER — DEXAMETHASONE SODIUM PHOSPHATE 10 MG/ML
6 INJECTION, SOLUTION INTRAMUSCULAR; INTRAVENOUS EVERY 24 HOURS
Status: DISCONTINUED | OUTPATIENT
Start: 2022-01-01 | End: 2022-01-01

## 2022-01-01 RX ORDER — POTASSIUM CHLORIDE 1500 MG/1
20 TABLET, EXTENDED RELEASE ORAL DAILY
COMMUNITY
Start: 2022-01-01

## 2022-01-01 RX ORDER — PIPERACILLIN SODIUM, TAZOBACTAM SODIUM 3; .375 G/15ML; G/15ML
3.38 INJECTION, POWDER, LYOPHILIZED, FOR SOLUTION INTRAVENOUS EVERY 8 HOURS
Status: COMPLETED | OUTPATIENT
Start: 2022-01-01 | End: 2022-01-01

## 2022-01-01 RX ORDER — FUROSEMIDE 40 MG
40 TABLET ORAL DAILY
Status: CANCELLED | OUTPATIENT
Start: 2022-01-01

## 2022-01-01 RX ORDER — SODIUM CHLORIDE 9 MG/ML
INJECTION, SOLUTION INTRAVENOUS ONCE
Status: COMPLETED | OUTPATIENT
Start: 2022-01-01 | End: 2022-01-01

## 2022-01-01 RX ORDER — LIDOCAINE 40 MG/G
CREAM TOPICAL
Status: ACTIVE | OUTPATIENT
Start: 2022-01-01 | End: 2022-01-01

## 2022-01-01 RX ORDER — DIGOXIN 0.25 MG/ML
250 INJECTION INTRAMUSCULAR; INTRAVENOUS ONCE
Status: COMPLETED | OUTPATIENT
Start: 2022-01-01 | End: 2022-01-01

## 2022-01-01 RX ORDER — CHOLECALCIFEROL (VITAMIN D3) 125 MCG
20000 CAPSULE ORAL DAILY
Qty: 4 CAPSULE | DISCHARGE
Start: 2022-01-01 | End: 2022-01-01

## 2022-01-01 RX ORDER — NICOTINE POLACRILEX 4 MG
15-30 LOZENGE BUCCAL
Status: DISCONTINUED | OUTPATIENT
Start: 2022-01-01 | End: 2022-01-01 | Stop reason: HOSPADM

## 2022-01-01 RX ORDER — ALBUTEROL SULFATE 0.83 MG/ML
2.5 SOLUTION RESPIRATORY (INHALATION)
Status: DISCONTINUED | OUTPATIENT
Start: 2022-01-01 | End: 2022-01-01 | Stop reason: HOSPADM

## 2022-01-01 RX ORDER — IPRATROPIUM BROMIDE AND ALBUTEROL SULFATE 2.5; .5 MG/3ML; MG/3ML
1 SOLUTION RESPIRATORY (INHALATION) 4 TIMES DAILY
Status: ON HOLD | COMMUNITY
Start: 2022-01-01 | End: 2022-01-01

## 2022-01-01 RX ORDER — CLOPIDOGREL BISULFATE 75 MG/1
300 TABLET ORAL ONCE
Status: COMPLETED | OUTPATIENT
Start: 2022-01-01 | End: 2022-01-01

## 2022-01-01 RX ORDER — TAMSULOSIN HYDROCHLORIDE 0.4 MG/1
0.4 CAPSULE ORAL DAILY
Status: DISCONTINUED | OUTPATIENT
Start: 2022-01-01 | End: 2022-01-01

## 2022-01-01 RX ORDER — MAGNESIUM SULFATE HEPTAHYDRATE 40 MG/ML
2 INJECTION, SOLUTION INTRAVENOUS ONCE
Status: DISCONTINUED | OUTPATIENT
Start: 2022-01-01 | End: 2022-01-01

## 2022-01-01 RX ORDER — ASCORBIC ACID 500 MG
500 TABLET ORAL DAILY
Qty: 4 TABLET | Status: CANCELLED | OUTPATIENT
Start: 2022-01-01

## 2022-01-01 RX ADMIN — TAMSULOSIN HYDROCHLORIDE 0.4 MG: 0.4 CAPSULE ORAL at 10:44

## 2022-01-01 RX ADMIN — HEPARIN SODIUM 1 BAG: 200 INJECTION, SOLUTION INTRAVENOUS at 11:52

## 2022-01-01 RX ADMIN — METOPROLOL SUCCINATE 25 MG: 25 TABLET, EXTENDED RELEASE ORAL at 19:55

## 2022-01-01 RX ADMIN — CLOPIDOGREL BISULFATE 75 MG: 75 TABLET ORAL at 09:15

## 2022-01-01 RX ADMIN — METOPROLOL SUCCINATE 200 MG: 100 TABLET, EXTENDED RELEASE ORAL at 10:24

## 2022-01-01 RX ADMIN — SODIUM CHLORIDE, POTASSIUM CHLORIDE, SODIUM LACTATE AND CALCIUM CHLORIDE 500 ML: 600; 310; 30; 20 INJECTION, SOLUTION INTRAVENOUS at 23:14

## 2022-01-01 RX ADMIN — CLOPIDOGREL BISULFATE 75 MG: 75 TABLET ORAL at 09:28

## 2022-01-01 RX ADMIN — LIDOCAINE HYDROCHLORIDE 10 ML: 10 INJECTION, SOLUTION EPIDURAL; INFILTRATION; INTRACAUDAL; PERINEURAL at 11:50

## 2022-01-01 RX ADMIN — CLOPIDOGREL BISULFATE 75 MG: 75 TABLET ORAL at 08:46

## 2022-01-01 RX ADMIN — METOPROLOL SUCCINATE 200 MG: 100 TABLET, EXTENDED RELEASE ORAL at 10:10

## 2022-01-01 RX ADMIN — VANCOMYCIN HYDROCHLORIDE 1250 MG: 5 INJECTION, POWDER, LYOPHILIZED, FOR SOLUTION INTRAVENOUS at 15:11

## 2022-01-01 RX ADMIN — METOPROLOL SUCCINATE 25 MG: 25 TABLET, EXTENDED RELEASE ORAL at 01:03

## 2022-01-01 RX ADMIN — RIVAROXABAN 20 MG: 10 TABLET, FILM COATED ORAL at 18:09

## 2022-01-01 RX ADMIN — PIPERACILLIN AND TAZOBACTAM 3.38 G: 3; .375 INJECTION, POWDER, FOR SOLUTION INTRAVENOUS at 16:48

## 2022-01-01 RX ADMIN — CLOPIDOGREL BISULFATE 75 MG: 75 TABLET ORAL at 09:57

## 2022-01-01 RX ADMIN — Medication 400 MG: at 09:15

## 2022-01-01 RX ADMIN — SODIUM CHLORIDE 500 ML: 9 INJECTION, SOLUTION INTRAVENOUS at 18:51

## 2022-01-01 RX ADMIN — POTASSIUM CHLORIDE 40 MEQ: 1500 TABLET, EXTENDED RELEASE ORAL at 16:16

## 2022-01-01 RX ADMIN — PROTAMINE SULFATE 50 MG: 10 INJECTION, SOLUTION INTRAVENOUS at 13:50

## 2022-01-01 RX ADMIN — OXYCODONE HYDROCHLORIDE AND ACETAMINOPHEN 500 MG: 500 TABLET ORAL at 09:08

## 2022-01-01 RX ADMIN — PIPERACILLIN AND TAZOBACTAM 3.38 G: 3; .375 INJECTION, POWDER, LYOPHILIZED, FOR SOLUTION INTRAVENOUS at 21:02

## 2022-01-01 RX ADMIN — DIGOXIN 125 MCG: 0.25 INJECTION INTRAMUSCULAR; INTRAVENOUS at 20:49

## 2022-01-01 RX ADMIN — FLUTICASONE FUROATE AND VILANTEROL TRIFENATATE 1 PUFF: 100; 25 POWDER RESPIRATORY (INHALATION) at 10:00

## 2022-01-01 RX ADMIN — CLOPIDOGREL BISULFATE 75 MG: 75 TABLET ORAL at 09:18

## 2022-01-01 RX ADMIN — POTASSIUM CHLORIDE 20 MEQ: 1500 TABLET, EXTENDED RELEASE ORAL at 10:37

## 2022-01-01 RX ADMIN — FUROSEMIDE 20 MG: 20 TABLET ORAL at 10:44

## 2022-01-01 RX ADMIN — RIVAROXABAN 20 MG: 10 TABLET, FILM COATED ORAL at 16:31

## 2022-01-01 RX ADMIN — PREDNISONE 60 MG: 20 TABLET ORAL at 08:44

## 2022-01-01 RX ADMIN — FUROSEMIDE 20 MG: 20 TABLET ORAL at 09:57

## 2022-01-01 RX ADMIN — CLOPIDOGREL BISULFATE 75 MG: 75 TABLET ORAL at 08:37

## 2022-01-01 RX ADMIN — FUROSEMIDE 20 MG: 20 TABLET ORAL at 09:18

## 2022-01-01 RX ADMIN — METOPROLOL SUCCINATE 200 MG: 100 TABLET, EXTENDED RELEASE ORAL at 08:45

## 2022-01-01 RX ADMIN — RIVAROXABAN 20 MG: 10 TABLET, FILM COATED ORAL at 17:25

## 2022-01-01 RX ADMIN — FLUTICASONE FUROATE AND VILANTEROL TRIFENATATE 1 PUFF: 100; 25 POWDER RESPIRATORY (INHALATION) at 08:37

## 2022-01-01 RX ADMIN — METOPROLOL SUCCINATE 200 MG: 100 TABLET, EXTENDED RELEASE ORAL at 11:03

## 2022-01-01 RX ADMIN — ANORECTAL OINTMENT: 15.7; .44; 24; 20.6 OINTMENT TOPICAL at 09:08

## 2022-01-01 RX ADMIN — IPRATROPIUM BROMIDE AND ALBUTEROL SULFATE 3 ML: 2.5; .5 SOLUTION RESPIRATORY (INHALATION) at 15:54

## 2022-01-01 RX ADMIN — FENTANYL CITRATE 50 MCG: 50 INJECTION, SOLUTION INTRAMUSCULAR; INTRAVENOUS at 12:08

## 2022-01-01 RX ADMIN — OXYCODONE HYDROCHLORIDE AND ACETAMINOPHEN 500 MG: 500 TABLET ORAL at 08:54

## 2022-01-01 RX ADMIN — OXYCODONE HYDROCHLORIDE AND ACETAMINOPHEN 500 MG: 500 TABLET ORAL at 09:50

## 2022-01-01 RX ADMIN — PREDNISONE 40 MG: 20 TABLET ORAL at 10:59

## 2022-01-01 RX ADMIN — TAMSULOSIN HYDROCHLORIDE 0.4 MG: 0.4 CAPSULE ORAL at 08:46

## 2022-01-01 RX ADMIN — PIPERACILLIN AND TAZOBACTAM 3.38 G: 3; .375 INJECTION, POWDER, LYOPHILIZED, FOR SOLUTION INTRAVENOUS at 22:07

## 2022-01-01 RX ADMIN — FENTANYL CITRATE 50 MCG: 50 INJECTION, SOLUTION INTRAMUSCULAR; INTRAVENOUS at 11:50

## 2022-01-01 RX ADMIN — TAMSULOSIN HYDROCHLORIDE 0.4 MG: 0.4 CAPSULE ORAL at 09:56

## 2022-01-01 RX ADMIN — VASOPRESSIN 2.4 UNITS/HR: 20 INJECTION INTRAVENOUS at 06:36

## 2022-01-01 RX ADMIN — POTASSIUM CHLORIDE 20 MEQ: 1500 TABLET, EXTENDED RELEASE ORAL at 09:50

## 2022-01-01 RX ADMIN — METOPROLOL SUCCINATE 200 MG: 100 TABLET, EXTENDED RELEASE ORAL at 09:15

## 2022-01-01 RX ADMIN — OXYCODONE HYDROCHLORIDE AND ACETAMINOPHEN 500 MG: 500 TABLET ORAL at 08:45

## 2022-01-01 RX ADMIN — RIVAROXABAN 15 MG: 15 TABLET, FILM COATED ORAL at 21:25

## 2022-01-01 RX ADMIN — POTASSIUM CHLORIDE 20 MEQ: 1500 TABLET, EXTENDED RELEASE ORAL at 08:22

## 2022-01-01 RX ADMIN — VANCOMYCIN HYDROCHLORIDE 1000 MG: 5 INJECTION, POWDER, LYOPHILIZED, FOR SOLUTION INTRAVENOUS at 20:28

## 2022-01-01 RX ADMIN — TAMSULOSIN HYDROCHLORIDE 0.4 MG: 0.4 CAPSULE ORAL at 08:54

## 2022-01-01 RX ADMIN — METOPROLOL SUCCINATE 200 MG: 100 TABLET, EXTENDED RELEASE ORAL at 10:23

## 2022-01-01 RX ADMIN — SODIUM CHLORIDE, POTASSIUM CHLORIDE, SODIUM LACTATE AND CALCIUM CHLORIDE: 600; 310; 30; 20 INJECTION, SOLUTION INTRAVENOUS at 14:02

## 2022-01-01 RX ADMIN — AMOXICILLIN AND CLAVULANATE POTASSIUM 1 TABLET: 875; 125 TABLET, FILM COATED ORAL at 16:46

## 2022-01-01 RX ADMIN — FUROSEMIDE 20 MG: 20 TABLET ORAL at 08:48

## 2022-01-01 RX ADMIN — DEXAMETHASONE SODIUM PHOSPHATE 6 MG: 10 INJECTION, SOLUTION INTRAMUSCULAR; INTRAVENOUS at 15:15

## 2022-01-01 RX ADMIN — REMDESIVIR 100 MG: 100 INJECTION, POWDER, LYOPHILIZED, FOR SOLUTION INTRAVENOUS at 00:28

## 2022-01-01 RX ADMIN — Medication 5 MG/HR: at 14:27

## 2022-01-01 RX ADMIN — TAMSULOSIN HYDROCHLORIDE 0.4 MG: 0.4 CAPSULE ORAL at 10:24

## 2022-01-01 RX ADMIN — Medication 400 MG: at 08:22

## 2022-01-01 RX ADMIN — METOPROLOL SUCCINATE 50 MG: 50 TABLET, EXTENDED RELEASE ORAL at 09:50

## 2022-01-01 RX ADMIN — OXYCODONE HYDROCHLORIDE AND ACETAMINOPHEN 500 MG: 500 TABLET ORAL at 10:22

## 2022-01-01 RX ADMIN — METOPROLOL TARTRATE 5 MG: 1 INJECTION, SOLUTION INTRAVENOUS at 17:18

## 2022-01-01 RX ADMIN — METOPROLOL SUCCINATE 200 MG: 100 TABLET, EXTENDED RELEASE ORAL at 08:32

## 2022-01-01 RX ADMIN — PIPERACILLIN AND TAZOBACTAM 3.38 G: 3; .375 INJECTION, POWDER, FOR SOLUTION INTRAVENOUS at 00:04

## 2022-01-01 RX ADMIN — CLOPIDOGREL BISULFATE 75 MG: 75 TABLET ORAL at 09:50

## 2022-01-01 RX ADMIN — OXYCODONE HYDROCHLORIDE AND ACETAMINOPHEN 500 MG: 500 TABLET ORAL at 09:28

## 2022-01-01 RX ADMIN — SODIUM CHLORIDE 50 ML: 9 INJECTION, SOLUTION INTRAVENOUS at 21:56

## 2022-01-01 RX ADMIN — PIPERACILLIN AND TAZOBACTAM 3.38 G: 3; .375 INJECTION, POWDER, FOR SOLUTION INTRAVENOUS at 16:26

## 2022-01-01 RX ADMIN — ANORECTAL OINTMENT: 15.7; .44; 24; 20.6 OINTMENT TOPICAL at 20:08

## 2022-01-01 RX ADMIN — PIPERACILLIN AND TAZOBACTAM 3.38 G: 3; .375 INJECTION, POWDER, FOR SOLUTION INTRAVENOUS at 08:37

## 2022-01-01 RX ADMIN — FENTANYL CITRATE 50 MCG: 50 INJECTION, SOLUTION INTRAMUSCULAR; INTRAVENOUS at 12:39

## 2022-01-01 RX ADMIN — Medication 400 MG: at 22:00

## 2022-01-01 RX ADMIN — PREDNISONE 40 MG: 20 TABLET ORAL at 09:35

## 2022-01-01 RX ADMIN — ROSUVASTATIN CALCIUM 20 MG: 10 TABLET, FILM COATED ORAL at 22:08

## 2022-01-01 RX ADMIN — OXYCODONE HYDROCHLORIDE AND ACETAMINOPHEN 500 MG: 500 TABLET ORAL at 10:43

## 2022-01-01 RX ADMIN — POTASSIUM CHLORIDE 20 MEQ: 1500 TABLET, EXTENDED RELEASE ORAL at 09:01

## 2022-01-01 RX ADMIN — PREDNISONE 40 MG: 20 TABLET ORAL at 10:22

## 2022-01-01 RX ADMIN — AMOXICILLIN AND CLAVULANATE POTASSIUM 1 TABLET: 875; 125 TABLET, FILM COATED ORAL at 19:32

## 2022-01-01 RX ADMIN — CLOPIDOGREL BISULFATE 75 MG: 75 TABLET ORAL at 09:08

## 2022-01-01 RX ADMIN — PIPERACILLIN AND TAZOBACTAM 3.38 G: 3; .375 INJECTION, POWDER, LYOPHILIZED, FOR SOLUTION INTRAVENOUS at 08:21

## 2022-01-01 RX ADMIN — METOPROLOL SUCCINATE 25 MG: 25 TABLET, EXTENDED RELEASE ORAL at 21:25

## 2022-01-01 RX ADMIN — Medication 20000 UNITS: at 09:35

## 2022-01-01 RX ADMIN — RIVAROXABAN 20 MG: 10 TABLET, FILM COATED ORAL at 16:48

## 2022-01-01 RX ADMIN — METOPROLOL TARTRATE 5 MG: 1 INJECTION, SOLUTION INTRAVENOUS at 18:13

## 2022-01-01 RX ADMIN — RIVAROXABAN 20 MG: 10 TABLET, FILM COATED ORAL at 18:23

## 2022-01-01 RX ADMIN — RIVAROXABAN 15 MG: 15 TABLET, FILM COATED ORAL at 18:58

## 2022-01-01 RX ADMIN — MIDAZOLAM HYDROCHLORIDE 1 MG: 1 INJECTION, SOLUTION INTRAMUSCULAR; INTRAVENOUS at 12:07

## 2022-01-01 RX ADMIN — CLOPIDOGREL BISULFATE 75 MG: 75 TABLET ORAL at 11:00

## 2022-01-01 RX ADMIN — RIVAROXABAN 20 MG: 10 TABLET, FILM COATED ORAL at 16:16

## 2022-01-01 RX ADMIN — CLOPIDOGREL BISULFATE 75 MG: 75 TABLET ORAL at 10:11

## 2022-01-01 RX ADMIN — SODIUM CHLORIDE 500 ML: 9 INJECTION, SOLUTION INTRAVENOUS at 10:53

## 2022-01-01 RX ADMIN — PIPERACILLIN AND TAZOBACTAM 3.38 G: 3; .375 INJECTION, POWDER, LYOPHILIZED, FOR SOLUTION INTRAVENOUS at 23:17

## 2022-01-01 RX ADMIN — IODIXANOL 90 ML: 320 INJECTION, SOLUTION INTRAVASCULAR at 13:36

## 2022-01-01 RX ADMIN — RIVAROXABAN 20 MG: 10 TABLET, FILM COATED ORAL at 18:12

## 2022-01-01 RX ADMIN — LIDOCAINE HYDROCHLORIDE 10 ML: 10 INJECTION, SOLUTION EPIDURAL; INFILTRATION; INTRACAUDAL; PERINEURAL at 12:18

## 2022-01-01 RX ADMIN — CLOPIDOGREL BISULFATE 75 MG: 75 TABLET ORAL at 08:48

## 2022-01-01 RX ADMIN — THERA TABS 1 TABLET: TAB at 09:35

## 2022-01-01 RX ADMIN — OXYCODONE HYDROCHLORIDE AND ACETAMINOPHEN 500 MG: 500 TABLET ORAL at 09:36

## 2022-01-01 RX ADMIN — ROSUVASTATIN CALCIUM 20 MG: 10 TABLET, FILM COATED ORAL at 20:04

## 2022-01-01 RX ADMIN — CLOPIDOGREL BISULFATE 300 MG: 75 TABLET ORAL at 16:48

## 2022-01-01 RX ADMIN — RIVAROXABAN 20 MG: 10 TABLET, FILM COATED ORAL at 17:39

## 2022-01-01 RX ADMIN — CLOPIDOGREL BISULFATE 75 MG: 75 TABLET ORAL at 08:57

## 2022-01-01 RX ADMIN — OXYCODONE HYDROCHLORIDE AND ACETAMINOPHEN 500 MG: 500 TABLET ORAL at 08:46

## 2022-01-01 RX ADMIN — METOPROLOL SUCCINATE 200 MG: 100 TABLET, EXTENDED RELEASE ORAL at 10:49

## 2022-01-01 RX ADMIN — BUDESONIDE 1 MG: 0.5 INHALANT ORAL at 08:49

## 2022-01-01 RX ADMIN — ROSUVASTATIN CALCIUM 20 MG: 10 TABLET, FILM COATED ORAL at 21:00

## 2022-01-01 RX ADMIN — Medication 400 MG: at 08:44

## 2022-01-01 RX ADMIN — FUROSEMIDE 20 MG: 20 TABLET ORAL at 08:25

## 2022-01-01 RX ADMIN — ANORECTAL OINTMENT: 15.7; .44; 24; 20.6 OINTMENT TOPICAL at 19:54

## 2022-01-01 RX ADMIN — METOPROLOL SUCCINATE 200 MG: 100 TABLET, EXTENDED RELEASE ORAL at 08:57

## 2022-01-01 RX ADMIN — RIVAROXABAN 20 MG: 10 TABLET, FILM COATED ORAL at 17:09

## 2022-01-01 RX ADMIN — MIDAZOLAM HYDROCHLORIDE 1 MG: 1 INJECTION, SOLUTION INTRAMUSCULAR; INTRAVENOUS at 11:49

## 2022-01-01 RX ADMIN — IPRATROPIUM BROMIDE AND ALBUTEROL SULFATE 3 ML: 2.5; .5 SOLUTION RESPIRATORY (INHALATION) at 19:56

## 2022-01-01 RX ADMIN — MAGNESIUM SULFATE HEPTAHYDRATE 2 G: 40 INJECTION, SOLUTION INTRAVENOUS at 10:39

## 2022-01-01 RX ADMIN — THERA TABS 1 TABLET: TAB at 11:00

## 2022-01-01 RX ADMIN — ROSUVASTATIN CALCIUM 20 MG: 10 TABLET, FILM COATED ORAL at 20:31

## 2022-01-01 RX ADMIN — METOPROLOL SUCCINATE 200 MG: 100 TABLET, EXTENDED RELEASE ORAL at 08:22

## 2022-01-01 RX ADMIN — PIPERACILLIN AND TAZOBACTAM 3.38 G: 3; .375 INJECTION, POWDER, FOR SOLUTION INTRAVENOUS at 22:48

## 2022-01-01 RX ADMIN — AMOXICILLIN AND CLAVULANATE POTASSIUM 1 TABLET: 875; 125 TABLET, FILM COATED ORAL at 10:37

## 2022-01-01 RX ADMIN — Medication 20000 UNITS: at 10:43

## 2022-01-01 RX ADMIN — PIPERACILLIN AND TAZOBACTAM 3.38 G: 3; .375 INJECTION, POWDER, LYOPHILIZED, FOR SOLUTION INTRAVENOUS at 15:49

## 2022-01-01 RX ADMIN — FUROSEMIDE 20 MG: 20 TABLET ORAL at 09:04

## 2022-01-01 RX ADMIN — FUROSEMIDE 20 MG: 20 TABLET ORAL at 10:23

## 2022-01-01 RX ADMIN — RIVAROXABAN 15 MG: 15 TABLET, FILM COATED ORAL at 16:56

## 2022-01-01 RX ADMIN — MIDAZOLAM HYDROCHLORIDE 1 MG: 1 INJECTION, SOLUTION INTRAMUSCULAR; INTRAVENOUS at 12:37

## 2022-01-01 RX ADMIN — POTASSIUM CHLORIDE 20 MEQ: 1500 TABLET, EXTENDED RELEASE ORAL at 08:37

## 2022-01-01 RX ADMIN — DIGOXIN 250 MCG: 0.25 INJECTION INTRAMUSCULAR; INTRAVENOUS at 16:13

## 2022-01-01 RX ADMIN — OXYCODONE HYDROCHLORIDE AND ACETAMINOPHEN 500 MG: 500 TABLET ORAL at 09:18

## 2022-01-01 RX ADMIN — METOPROLOL SUCCINATE 25 MG: 25 TABLET, EXTENDED RELEASE ORAL at 20:31

## 2022-01-01 RX ADMIN — MAGNESIUM SULFATE HEPTAHYDRATE 2 G: 40 INJECTION, SOLUTION INTRAVENOUS at 10:24

## 2022-01-01 RX ADMIN — METOPROLOL TARTRATE 5 MG: 1 INJECTION, SOLUTION INTRAVENOUS at 17:02

## 2022-01-01 RX ADMIN — TAMSULOSIN HYDROCHLORIDE 0.4 MG: 0.4 CAPSULE ORAL at 10:22

## 2022-01-01 RX ADMIN — OXYCODONE HYDROCHLORIDE AND ACETAMINOPHEN 500 MG: 500 TABLET ORAL at 08:37

## 2022-01-01 RX ADMIN — RIVAROXABAN 20 MG: 10 TABLET, FILM COATED ORAL at 16:40

## 2022-01-01 RX ADMIN — ROSUVASTATIN CALCIUM 20 MG: 10 TABLET, FILM COATED ORAL at 20:42

## 2022-01-01 RX ADMIN — ROSUVASTATIN CALCIUM 20 MG: 10 TABLET, FILM COATED ORAL at 20:46

## 2022-01-01 RX ADMIN — Medication 400 MG: at 17:26

## 2022-01-01 RX ADMIN — FLUTICASONE FUROATE AND VILANTEROL TRIFENATATE 1 PUFF: 100; 25 POWDER RESPIRATORY (INHALATION) at 08:50

## 2022-01-01 RX ADMIN — DIGOXIN 125 MCG: 0.12 TABLET ORAL at 09:06

## 2022-01-01 RX ADMIN — FLUTICASONE FUROATE AND VILANTEROL TRIFENATATE 1 PUFF: 100; 25 POWDER RESPIRATORY (INHALATION) at 08:55

## 2022-01-01 RX ADMIN — PIPERACILLIN AND TAZOBACTAM 3.38 G: 3; .375 INJECTION, POWDER, FOR SOLUTION INTRAVENOUS at 06:19

## 2022-01-01 RX ADMIN — PIPERACILLIN AND TAZOBACTAM 3.38 G: 3; .375 INJECTION, POWDER, FOR SOLUTION INTRAVENOUS at 08:11

## 2022-01-01 RX ADMIN — VANCOMYCIN HYDROCHLORIDE 1000 MG: 5 INJECTION, POWDER, LYOPHILIZED, FOR SOLUTION INTRAVENOUS at 20:08

## 2022-01-01 RX ADMIN — TAMSULOSIN HYDROCHLORIDE 0.4 MG: 0.4 CAPSULE ORAL at 08:44

## 2022-01-01 RX ADMIN — Medication 400 MG: at 11:43

## 2022-01-01 RX ADMIN — PIPERACILLIN AND TAZOBACTAM 3.38 G: 3; .375 INJECTION, POWDER, FOR SOLUTION INTRAVENOUS at 14:28

## 2022-01-01 RX ADMIN — DIGOXIN 125 MCG: 0.25 INJECTION INTRAMUSCULAR; INTRAVENOUS at 10:01

## 2022-01-01 RX ADMIN — ANORECTAL OINTMENT: 15.7; .44; 24; 20.6 OINTMENT TOPICAL at 21:25

## 2022-01-01 RX ADMIN — ROSUVASTATIN CALCIUM 20 MG: 10 TABLET, FILM COATED ORAL at 22:31

## 2022-01-01 RX ADMIN — Medication 5 MG/HR: at 14:58

## 2022-01-01 RX ADMIN — METOPROLOL TARTRATE 5 MG: 1 INJECTION, SOLUTION INTRAVENOUS at 20:45

## 2022-01-01 RX ADMIN — PIPERACILLIN AND TAZOBACTAM 3.38 G: 3; .375 INJECTION, POWDER, LYOPHILIZED, FOR SOLUTION INTRAVENOUS at 05:34

## 2022-01-01 RX ADMIN — FUROSEMIDE 20 MG: 20 TABLET ORAL at 09:35

## 2022-01-01 RX ADMIN — METOPROLOL SUCCINATE 100 MG: 100 TABLET, EXTENDED RELEASE ORAL at 10:20

## 2022-01-01 RX ADMIN — CLOPIDOGREL BISULFATE 75 MG: 75 TABLET ORAL at 08:44

## 2022-01-01 RX ADMIN — PIPERACILLIN AND TAZOBACTAM 3.38 G: 3; .375 INJECTION, POWDER, LYOPHILIZED, FOR SOLUTION INTRAVENOUS at 12:42

## 2022-01-01 RX ADMIN — POTASSIUM CHLORIDE 20 MEQ: 1500 TABLET, EXTENDED RELEASE ORAL at 09:15

## 2022-01-01 RX ADMIN — Medication 1.2 MCG/KG/MIN: at 02:57

## 2022-01-01 RX ADMIN — Medication 400 MG: at 14:33

## 2022-01-01 RX ADMIN — RIVAROXABAN 15 MG: 15 TABLET, FILM COATED ORAL at 20:04

## 2022-01-01 RX ADMIN — METOPROLOL SUCCINATE 25 MG: 25 TABLET, EXTENDED RELEASE ORAL at 09:01

## 2022-01-01 RX ADMIN — FUROSEMIDE 20 MG: 20 TABLET ORAL at 08:22

## 2022-01-01 RX ADMIN — PIPERACILLIN AND TAZOBACTAM 3.38 G: 3; .375 INJECTION, POWDER, FOR SOLUTION INTRAVENOUS at 15:08

## 2022-01-01 RX ADMIN — RIVAROXABAN 15 MG: 15 TABLET, FILM COATED ORAL at 20:55

## 2022-01-01 RX ADMIN — RIVAROXABAN 20 MG: 10 TABLET, FILM COATED ORAL at 18:22

## 2022-01-01 RX ADMIN — DIGOXIN 125 MCG: 0.12 TABLET ORAL at 08:37

## 2022-01-01 RX ADMIN — FLUTICASONE FUROATE AND VILANTEROL TRIFENATATE 1 PUFF: 100; 25 POWDER RESPIRATORY (INHALATION) at 15:08

## 2022-01-01 RX ADMIN — IPRATROPIUM BROMIDE AND ALBUTEROL SULFATE 3 ML: 2.5; .5 SOLUTION RESPIRATORY (INHALATION) at 08:00

## 2022-01-01 RX ADMIN — IPRATROPIUM BROMIDE AND ALBUTEROL SULFATE 3 ML: 2.5; .5 SOLUTION RESPIRATORY (INHALATION) at 19:51

## 2022-01-01 RX ADMIN — DILTIAZEM HYDROCHLORIDE 30 MG: 30 TABLET, FILM COATED ORAL at 15:36

## 2022-01-01 RX ADMIN — AMIODARONE HYDROCHLORIDE 150 MG: 1.5 INJECTION, SOLUTION INTRAVENOUS at 16:14

## 2022-01-01 RX ADMIN — ROSUVASTATIN CALCIUM 20 MG: 10 TABLET, FILM COATED ORAL at 20:55

## 2022-01-01 RX ADMIN — SODIUM CHLORIDE 250 ML: 9 INJECTION, SOLUTION INTRAVENOUS at 15:14

## 2022-01-01 RX ADMIN — TAMSULOSIN HYDROCHLORIDE 0.4 MG: 0.4 CAPSULE ORAL at 09:36

## 2022-01-01 RX ADMIN — MAGNESIUM SULFATE HEPTAHYDRATE 2 G: 40 INJECTION, SOLUTION INTRAVENOUS at 18:50

## 2022-01-01 RX ADMIN — SODIUM CHLORIDE 50 ML: 9 INJECTION, SOLUTION INTRAVENOUS at 20:50

## 2022-01-01 RX ADMIN — THERA TABS 1 TABLET: TAB at 10:23

## 2022-01-01 RX ADMIN — Medication 400 MG: at 10:10

## 2022-01-01 RX ADMIN — FLUTICASONE FUROATE AND VILANTEROL TRIFENATATE 1 PUFF: 100; 25 POWDER RESPIRATORY (INHALATION) at 14:43

## 2022-01-01 RX ADMIN — THERA TABS 1 TABLET: TAB at 08:46

## 2022-01-01 RX ADMIN — CLOPIDOGREL BISULFATE 75 MG: 75 TABLET ORAL at 10:23

## 2022-01-01 RX ADMIN — POTASSIUM CHLORIDE 20 MEQ: 1500 TABLET, EXTENDED RELEASE ORAL at 08:54

## 2022-01-01 RX ADMIN — IPRATROPIUM BROMIDE AND ALBUTEROL SULFATE 3 ML: 2.5; .5 SOLUTION RESPIRATORY (INHALATION) at 16:06

## 2022-01-01 RX ADMIN — Medication 5 MG/HR: at 11:08

## 2022-01-01 RX ADMIN — PIPERACILLIN AND TAZOBACTAM 3.38 G: 3; .375 INJECTION, POWDER, LYOPHILIZED, FOR SOLUTION INTRAVENOUS at 06:44

## 2022-01-01 RX ADMIN — FENTANYL CITRATE 50 MCG: 50 INJECTION, SOLUTION INTRAMUSCULAR; INTRAVENOUS at 12:35

## 2022-01-01 RX ADMIN — Medication 20000 UNITS: at 08:44

## 2022-01-01 RX ADMIN — DIGOXIN 125 MCG: 0.12 TABLET ORAL at 09:01

## 2022-01-01 RX ADMIN — METOPROLOL SUCCINATE 200 MG: 100 TABLET, EXTENDED RELEASE ORAL at 10:38

## 2022-01-01 RX ADMIN — METOPROLOL SUCCINATE 25 MG: 25 TABLET, EXTENDED RELEASE ORAL at 09:08

## 2022-01-01 RX ADMIN — METOPROLOL TARTRATE 12.5 MG: 25 TABLET, FILM COATED ORAL at 23:01

## 2022-01-01 RX ADMIN — Medication 20000 UNITS: at 10:22

## 2022-01-01 RX ADMIN — VANCOMYCIN HYDROCHLORIDE 1000 MG: 5 INJECTION, POWDER, LYOPHILIZED, FOR SOLUTION INTRAVENOUS at 21:01

## 2022-01-01 RX ADMIN — PIPERACILLIN AND TAZOBACTAM 3.38 G: 3; .375 INJECTION, POWDER, FOR SOLUTION INTRAVENOUS at 00:27

## 2022-01-01 RX ADMIN — METOPROLOL SUCCINATE 200 MG: 100 TABLET, EXTENDED RELEASE ORAL at 09:04

## 2022-01-01 RX ADMIN — MAGNESIUM SULFATE HEPTAHYDRATE 2 G: 40 INJECTION, SOLUTION INTRAVENOUS at 10:23

## 2022-01-01 RX ADMIN — PERFLUTREN 2 ML: 6.52 INJECTION, SUSPENSION INTRAVENOUS at 08:25

## 2022-01-01 RX ADMIN — PIPERACILLIN AND TAZOBACTAM 3.38 G: 3; .375 INJECTION, POWDER, LYOPHILIZED, FOR SOLUTION INTRAVENOUS at 14:55

## 2022-01-01 RX ADMIN — PIPERACILLIN AND TAZOBACTAM 3.38 G: 3; .375 INJECTION, POWDER, FOR SOLUTION INTRAVENOUS at 00:26

## 2022-01-01 RX ADMIN — DEXAMETHASONE 6 MG: 2 TABLET ORAL at 10:36

## 2022-01-01 RX ADMIN — PREDNISONE 40 MG: 20 TABLET ORAL at 08:57

## 2022-01-01 RX ADMIN — PREDNISONE 60 MG: 20 TABLET ORAL at 14:43

## 2022-01-01 RX ADMIN — OXYCODONE HYDROCHLORIDE AND ACETAMINOPHEN 500 MG: 500 TABLET ORAL at 11:00

## 2022-01-01 RX ADMIN — FUROSEMIDE 20 MG: 20 TABLET ORAL at 09:14

## 2022-01-01 RX ADMIN — METOPROLOL SUCCINATE 25 MG: 25 TABLET, EXTENDED RELEASE ORAL at 08:37

## 2022-01-01 RX ADMIN — HEPARIN SODIUM 4000 UNITS: 1000 INJECTION INTRAVENOUS; SUBCUTANEOUS at 12:48

## 2022-01-01 RX ADMIN — BUDESONIDE 1 MG: 0.5 INHALANT ORAL at 08:04

## 2022-01-01 RX ADMIN — FUROSEMIDE 20 MG: 20 TABLET ORAL at 11:44

## 2022-01-01 RX ADMIN — AMOXICILLIN AND CLAVULANATE POTASSIUM 1 TABLET: 875; 125 TABLET, FILM COATED ORAL at 08:57

## 2022-01-01 RX ADMIN — MIDAZOLAM HYDROCHLORIDE 1 MG: 1 INJECTION, SOLUTION INTRAMUSCULAR; INTRAVENOUS at 12:15

## 2022-01-01 RX ADMIN — FLUTICASONE FUROATE AND VILANTEROL TRIFENATATE 1 PUFF: 100; 25 POWDER RESPIRATORY (INHALATION) at 09:09

## 2022-01-01 RX ADMIN — PIPERACILLIN AND TAZOBACTAM 3.38 G: 3; .375 INJECTION, POWDER, FOR SOLUTION INTRAVENOUS at 09:08

## 2022-01-01 RX ADMIN — THERA TABS 1 TABLET: TAB at 09:18

## 2022-01-01 RX ADMIN — FUROSEMIDE 20 MG: 20 TABLET ORAL at 08:44

## 2022-01-01 RX ADMIN — FUROSEMIDE 20 MG: 20 TABLET ORAL at 08:32

## 2022-01-01 RX ADMIN — VANCOMYCIN HYDROCHLORIDE 1250 MG: 5 INJECTION, POWDER, LYOPHILIZED, FOR SOLUTION INTRAVENOUS at 17:14

## 2022-01-01 RX ADMIN — LIDOCAINE HYDROCHLORIDE 2.5 ML: 10 INJECTION, SOLUTION EPIDURAL; INFILTRATION; INTRACAUDAL; PERINEURAL at 14:00

## 2022-01-01 RX ADMIN — TAMSULOSIN HYDROCHLORIDE 0.4 MG: 0.4 CAPSULE ORAL at 08:57

## 2022-01-01 RX ADMIN — RIVAROXABAN 20 MG: 10 TABLET, FILM COATED ORAL at 17:45

## 2022-01-01 RX ADMIN — PIPERACILLIN AND TAZOBACTAM 3.38 G: 3; .375 INJECTION, POWDER, LYOPHILIZED, FOR SOLUTION INTRAVENOUS at 20:47

## 2022-01-01 RX ADMIN — AMOXICILLIN AND CLAVULANATE POTASSIUM 1 TABLET: 875; 125 TABLET, FILM COATED ORAL at 20:22

## 2022-01-01 RX ADMIN — AMOXICILLIN AND CLAVULANATE POTASSIUM 1 TABLET: 875; 125 TABLET, FILM COATED ORAL at 09:14

## 2022-01-01 RX ADMIN — METOPROLOL SUCCINATE 200 MG: 100 TABLET, EXTENDED RELEASE ORAL at 08:25

## 2022-01-01 RX ADMIN — TAMSULOSIN HYDROCHLORIDE 0.4 MG: 0.4 CAPSULE ORAL at 09:15

## 2022-01-01 RX ADMIN — VASOPRESSIN 2.4 UNITS/HR: 20 INJECTION INTRAVENOUS at 22:50

## 2022-01-01 RX ADMIN — SODIUM CHLORIDE: 9 INJECTION, SOLUTION INTRAVENOUS at 15:36

## 2022-01-01 RX ADMIN — Medication 20000 UNITS: at 09:18

## 2022-01-01 RX ADMIN — ACETAMINOPHEN 650 MG: 325 TABLET ORAL at 20:49

## 2022-01-01 RX ADMIN — AMOXICILLIN AND CLAVULANATE POTASSIUM 1 TABLET: 875; 125 TABLET, FILM COATED ORAL at 09:56

## 2022-01-01 RX ADMIN — VANCOMYCIN HYDROCHLORIDE 1500 MG: 5 INJECTION, POWDER, LYOPHILIZED, FOR SOLUTION INTRAVENOUS at 13:16

## 2022-01-01 RX ADMIN — SODIUM CHLORIDE 50 ML: 9 INJECTION, SOLUTION INTRAVENOUS at 17:53

## 2022-01-01 RX ADMIN — HEPARIN SODIUM 10000 UNITS: 1000 INJECTION INTRAVENOUS; SUBCUTANEOUS at 11:48

## 2022-01-01 RX ADMIN — HEPARIN SODIUM 1 BAG: 1000 INJECTION, SOLUTION INTRAVENOUS at 12:22

## 2022-01-01 RX ADMIN — POTASSIUM CHLORIDE 20 MEQ: 1500 TABLET, EXTENDED RELEASE ORAL at 10:22

## 2022-01-01 RX ADMIN — PREDNISONE 40 MG: 20 TABLET ORAL at 10:42

## 2022-01-01 RX ADMIN — PIPERACILLIN AND TAZOBACTAM 3.38 G: 3; .375 INJECTION, POWDER, FOR SOLUTION INTRAVENOUS at 06:13

## 2022-01-01 RX ADMIN — DEXAMETHASONE SODIUM PHOSPHATE 6 MG: 10 INJECTION, SOLUTION INTRAMUSCULAR; INTRAVENOUS at 11:25

## 2022-01-01 RX ADMIN — METOPROLOL SUCCINATE 200 MG: 100 TABLET, EXTENDED RELEASE ORAL at 08:47

## 2022-01-01 RX ADMIN — TAMSULOSIN HYDROCHLORIDE 0.4 MG: 0.4 CAPSULE ORAL at 11:00

## 2022-01-01 RX ADMIN — CLOPIDOGREL BISULFATE 75 MG: 75 TABLET ORAL at 08:54

## 2022-01-01 RX ADMIN — IPRATROPIUM BROMIDE AND ALBUTEROL SULFATE 3 ML: 2.5; .5 SOLUTION RESPIRATORY (INHALATION) at 09:10

## 2022-01-01 RX ADMIN — Medication 20000 UNITS: at 11:04

## 2022-01-01 RX ADMIN — LIDOCAINE HYDROCHLORIDE 10 ML: 10 INJECTION, SOLUTION EPIDURAL; INFILTRATION; INTRACAUDAL; PERINEURAL at 12:07

## 2022-01-01 RX ADMIN — Medication 400 MG: at 14:03

## 2022-01-01 RX ADMIN — THERA TABS 1 TABLET: TAB at 08:44

## 2022-01-01 RX ADMIN — PREDNISONE 40 MG: 20 TABLET ORAL at 14:15

## 2022-01-01 RX ADMIN — Medication 400 MG: at 14:44

## 2022-01-01 RX ADMIN — SODIUM CHLORIDE: 9 INJECTION, SOLUTION INTRAVENOUS at 15:40

## 2022-01-01 RX ADMIN — HEPARIN SODIUM 12000 UNITS: 1000 INJECTION INTRAVENOUS; SUBCUTANEOUS at 12:19

## 2022-01-01 RX ADMIN — FUROSEMIDE 20 MG: 20 TABLET ORAL at 08:46

## 2022-01-01 RX ADMIN — RIVAROXABAN 20 MG: 10 TABLET, FILM COATED ORAL at 17:38

## 2022-01-01 RX ADMIN — Medication 400 MG: at 08:46

## 2022-01-01 RX ADMIN — IPRATROPIUM BROMIDE AND ALBUTEROL SULFATE 3 ML: 2.5; .5 SOLUTION RESPIRATORY (INHALATION) at 13:14

## 2022-01-01 RX ADMIN — ANORECTAL OINTMENT: 15.7; .44; 24; 20.6 OINTMENT TOPICAL at 09:01

## 2022-01-01 RX ADMIN — AMOXICILLIN AND CLAVULANATE POTASSIUM 1 TABLET: 875; 125 TABLET, FILM COATED ORAL at 20:25

## 2022-01-01 RX ADMIN — BUDESONIDE 1 MG: 0.5 INHALANT ORAL at 19:51

## 2022-01-01 RX ADMIN — METOPROLOL SUCCINATE 200 MG: 100 TABLET, EXTENDED RELEASE ORAL at 09:35

## 2022-01-01 RX ADMIN — OXYCODONE HYDROCHLORIDE AND ACETAMINOPHEN 500 MG: 500 TABLET ORAL at 09:01

## 2022-01-01 RX ADMIN — BUDESONIDE 1 MG: 0.5 INHALANT ORAL at 19:55

## 2022-01-01 RX ADMIN — SODIUM CHLORIDE 500 ML: 9 INJECTION, SOLUTION INTRAVENOUS at 21:49

## 2022-01-01 RX ADMIN — FUROSEMIDE 20 MG: 20 TABLET ORAL at 11:00

## 2022-01-01 RX ADMIN — METOPROLOL TARTRATE 5 MG: 1 INJECTION, SOLUTION INTRAVENOUS at 00:14

## 2022-01-01 RX ADMIN — ANORECTAL OINTMENT: 15.7; .44; 24; 20.6 OINTMENT TOPICAL at 10:49

## 2022-01-01 RX ADMIN — ROSUVASTATIN CALCIUM 20 MG: 10 TABLET, FILM COATED ORAL at 19:55

## 2022-01-01 RX ADMIN — ANORECTAL OINTMENT: 15.7; .44; 24; 20.6 OINTMENT TOPICAL at 08:38

## 2022-01-01 RX ADMIN — AMOXICILLIN AND CLAVULANATE POTASSIUM 1 TABLET: 875; 125 TABLET, FILM COATED ORAL at 10:10

## 2022-01-01 RX ADMIN — CLOPIDOGREL BISULFATE 75 MG: 75 TABLET ORAL at 09:01

## 2022-01-01 RX ADMIN — REMDESIVIR 200 MG: 100 INJECTION, POWDER, LYOPHILIZED, FOR SOLUTION INTRAVENOUS at 17:52

## 2022-01-01 RX ADMIN — PIPERACILLIN AND TAZOBACTAM 3.38 G: 3; .375 INJECTION, POWDER, LYOPHILIZED, FOR SOLUTION INTRAVENOUS at 15:11

## 2022-01-01 RX ADMIN — ROSUVASTATIN CALCIUM 20 MG: 10 TABLET, FILM COATED ORAL at 22:05

## 2022-01-01 RX ADMIN — PIPERACILLIN AND TAZOBACTAM 3.38 G: 3; .375 INJECTION, POWDER, FOR SOLUTION INTRAVENOUS at 00:37

## 2022-01-01 RX ADMIN — POTASSIUM CHLORIDE 20 MEQ: 1500 TABLET, EXTENDED RELEASE ORAL at 09:28

## 2022-01-01 RX ADMIN — METOPROLOL SUCCINATE 100 MG: 100 TABLET, EXTENDED RELEASE ORAL at 20:49

## 2022-01-01 RX ADMIN — METOPROLOL SUCCINATE 200 MG: 100 TABLET, EXTENDED RELEASE ORAL at 09:18

## 2022-01-01 RX ADMIN — POTASSIUM CHLORIDE 40 MEQ: 1500 TABLET, EXTENDED RELEASE ORAL at 06:13

## 2022-01-01 RX ADMIN — PROTAMINE SULFATE 50 MG: 10 INJECTION, SOLUTION INTRAVENOUS at 13:15

## 2022-01-01 RX ADMIN — CLOPIDOGREL BISULFATE 75 MG: 75 TABLET ORAL at 09:37

## 2022-01-01 RX ADMIN — CLOPIDOGREL BISULFATE 75 MG: 75 TABLET ORAL at 10:37

## 2022-01-01 RX ADMIN — ANORECTAL OINTMENT: 15.7; .44; 24; 20.6 OINTMENT TOPICAL at 14:15

## 2022-01-01 RX ADMIN — IODIXANOL 200 ML: 320 INJECTION, SOLUTION INTRAVASCULAR at 13:31

## 2022-01-01 RX ADMIN — ROSUVASTATIN CALCIUM 20 MG: 10 TABLET, FILM COATED ORAL at 21:25

## 2022-01-01 RX ADMIN — VANCOMYCIN HYDROCHLORIDE 1250 MG: 5 INJECTION, POWDER, LYOPHILIZED, FOR SOLUTION INTRAVENOUS at 12:55

## 2022-01-01 RX ADMIN — POTASSIUM CHLORIDE 20 MEQ: 1500 TABLET, EXTENDED RELEASE ORAL at 08:45

## 2022-01-01 RX ADMIN — FUROSEMIDE 60 MG: 10 INJECTION, SOLUTION INTRAVENOUS at 16:13

## 2022-01-01 RX ADMIN — RIVAROXABAN 20 MG: 10 TABLET, FILM COATED ORAL at 16:47

## 2022-01-01 RX ADMIN — REMDESIVIR 100 MG: 100 INJECTION, POWDER, LYOPHILIZED, FOR SOLUTION INTRAVENOUS at 20:47

## 2022-01-01 RX ADMIN — PREDNISONE 40 MG: 20 TABLET ORAL at 10:24

## 2022-01-01 RX ADMIN — AMOXICILLIN AND CLAVULANATE POTASSIUM 1 TABLET: 875; 125 TABLET, FILM COATED ORAL at 08:47

## 2022-01-01 RX ADMIN — FLUTICASONE FUROATE AND VILANTEROL TRIFENATATE 1 PUFF: 100; 25 POWDER RESPIRATORY (INHALATION) at 09:01

## 2022-01-01 RX ADMIN — DILTIAZEM HYDROCHLORIDE 10 MG: 5 INJECTION, SOLUTION INTRAVENOUS at 10:36

## 2022-01-01 RX ADMIN — FUROSEMIDE 20 MG: 20 TABLET ORAL at 10:10

## 2022-01-01 RX ADMIN — DIGOXIN 250 MCG: 0.25 INJECTION INTRAMUSCULAR; INTRAVENOUS at 14:36

## 2022-01-01 RX ADMIN — RIVAROXABAN 15 MG: 15 TABLET, FILM COATED ORAL at 20:33

## 2022-01-01 RX ADMIN — TAMSULOSIN HYDROCHLORIDE 0.4 MG: 0.4 CAPSULE ORAL at 17:37

## 2022-01-01 RX ADMIN — CLOPIDOGREL BISULFATE 75 MG: 75 TABLET ORAL at 10:43

## 2022-01-01 RX ADMIN — METOPROLOL SUCCINATE 200 MG: 100 TABLET, EXTENDED RELEASE ORAL at 08:46

## 2022-01-01 RX ADMIN — ANORECTAL OINTMENT: 15.7; .44; 24; 20.6 OINTMENT TOPICAL at 14:29

## 2022-01-01 RX ADMIN — AMIODARONE HYDROCHLORIDE 1 MG/MIN: 1.8 INJECTION, SOLUTION INTRAVENOUS at 16:45

## 2022-01-01 RX ADMIN — Medication 0.5 MCG/KG/MIN: at 19:13

## 2022-01-01 RX ADMIN — POTASSIUM CHLORIDE 20 MEQ: 1500 TABLET, EXTENDED RELEASE ORAL at 09:08

## 2022-01-01 RX ADMIN — TAMSULOSIN HYDROCHLORIDE 0.4 MG: 0.4 CAPSULE ORAL at 09:18

## 2022-01-01 RX ADMIN — MAGNESIUM SULFATE HEPTAHYDRATE 4 G: 4 INJECTION, SOLUTION INTRAVENOUS at 06:38

## 2022-01-01 RX ADMIN — THERA TABS 1 TABLET: TAB at 10:44

## 2022-01-01 RX ADMIN — RIVAROXABAN 20 MG: 10 TABLET, FILM COATED ORAL at 17:37

## 2022-01-01 RX ADMIN — AMOXICILLIN AND CLAVULANATE POTASSIUM 1 TABLET: 875; 125 TABLET, FILM COATED ORAL at 19:54

## 2022-01-01 RX ADMIN — CLOPIDOGREL BISULFATE 75 MG: 75 TABLET ORAL at 10:24

## 2022-01-01 RX ADMIN — ROSUVASTATIN CALCIUM 20 MG: 10 TABLET, FILM COATED ORAL at 21:15

## 2022-01-01 RX ADMIN — AMOXICILLIN AND CLAVULANATE POTASSIUM 1 TABLET: 875; 125 TABLET, FILM COATED ORAL at 21:12

## 2022-01-01 RX ADMIN — SODIUM CHLORIDE 50 ML: 9 INJECTION, SOLUTION INTRAVENOUS at 00:29

## 2022-01-01 ASSESSMENT — PAIN SCALES - GENERAL
PAINLEVEL: NO PAIN (0)

## 2022-01-01 ASSESSMENT — ACTIVITIES OF DAILY LIVING (ADL)
ADLS_ACUITY_SCORE: 43
ADLS_ACUITY_SCORE: 49
ADLS_ACUITY_SCORE: 49
ADLS_ACUITY_SCORE: 54
ADLS_ACUITY_SCORE: 53
ADLS_ACUITY_SCORE: 50
ADLS_ACUITY_SCORE: 50
ADLS_ACUITY_SCORE: 45
ADLS_ACUITY_SCORE: 47
ADLS_ACUITY_SCORE: 51
ADLS_ACUITY_SCORE: 50
ADLS_ACUITY_SCORE: 49
ADLS_ACUITY_SCORE: 45
ADLS_ACUITY_SCORE: 53
ADLS_ACUITY_SCORE: 49
ADLS_ACUITY_SCORE: 53
TOILETING_ASSISTANCE: TOILETING DIFFICULTY, ASSISTANCE 1 PERSON
ADLS_ACUITY_SCORE: 49
DEPENDENT_IADLS:: INDEPENDENT
DEPENDENT_IADLS:: INDEPENDENT
DIFFICULTY_EATING/SWALLOWING: NO
ADLS_ACUITY_SCORE: 43
ADLS_ACUITY_SCORE: 53
DOING_ERRANDS_INDEPENDENTLY_DIFFICULTY: YES
ADLS_ACUITY_SCORE: 47
ADLS_ACUITY_SCORE: 45
ADLS_ACUITY_SCORE: 45
ADLS_ACUITY_SCORE: 49
ADLS_ACUITY_SCORE: 54
ADLS_ACUITY_SCORE: 51
ADLS_ACUITY_SCORE: 37
ADLS_ACUITY_SCORE: 41
ADLS_ACUITY_SCORE: 51
ADLS_ACUITY_SCORE: 50
ADLS_ACUITY_SCORE: 35
ADLS_ACUITY_SCORE: 53
ADLS_ACUITY_SCORE: 52
ADLS_ACUITY_SCORE: 51
ADLS_ACUITY_SCORE: 47
ADLS_ACUITY_SCORE: 50
ADLS_ACUITY_SCORE: 51
ADLS_ACUITY_SCORE: 54
ADLS_ACUITY_SCORE: 49
ADLS_ACUITY_SCORE: 54
ADLS_ACUITY_SCORE: 45
ADLS_ACUITY_SCORE: 35
FALL_HISTORY_WITHIN_LAST_SIX_MONTHS: NO
ADLS_ACUITY_SCORE: 50
ADLS_ACUITY_SCORE: 47
ADLS_ACUITY_SCORE: 45
ADLS_ACUITY_SCORE: 51
ADLS_ACUITY_SCORE: 47
ADLS_ACUITY_SCORE: 41
ADLS_ACUITY_SCORE: 49
ADLS_ACUITY_SCORE: 41
ADLS_ACUITY_SCORE: 49
ADLS_ACUITY_SCORE: 47
ADLS_ACUITY_SCORE: 49
ADLS_ACUITY_SCORE: 47
ADLS_ACUITY_SCORE: 51
ADLS_ACUITY_SCORE: 51
ADLS_ACUITY_SCORE: 45
ADLS_ACUITY_SCORE: 47
ADLS_ACUITY_SCORE: 45
CHANGE_IN_FUNCTIONAL_STATUS_SINCE_ONSET_OF_CURRENT_ILLNESS/INJURY: YES
ADLS_ACUITY_SCORE: 53
ADLS_ACUITY_SCORE: 49
DEPENDENT_IADLS:: INDEPENDENT
ADLS_ACUITY_SCORE: 49
ADLS_ACUITY_SCORE: 45
ADLS_ACUITY_SCORE: 41
ADLS_ACUITY_SCORE: 41
ADLS_ACUITY_SCORE: 49
ADLS_ACUITY_SCORE: 45
ADLS_ACUITY_SCORE: 53
ADLS_ACUITY_SCORE: 47
ADLS_ACUITY_SCORE: 49
ADLS_ACUITY_SCORE: 51
ADLS_ACUITY_SCORE: 47
ADLS_ACUITY_SCORE: 47
ADLS_ACUITY_SCORE: 50
ADLS_ACUITY_SCORE: 50
ADLS_ACUITY_SCORE: 45
ADLS_ACUITY_SCORE: 47
ADLS_ACUITY_SCORE: 47
ADLS_ACUITY_SCORE: 54
WEAR_GLASSES_OR_BLIND: NO
ADLS_ACUITY_SCORE: 49
ADLS_ACUITY_SCORE: 49
ADLS_ACUITY_SCORE: 50
ADLS_ACUITY_SCORE: 45
ADLS_ACUITY_SCORE: 47
ADLS_ACUITY_SCORE: 53
ADLS_ACUITY_SCORE: 47
ADLS_ACUITY_SCORE: 45
ADLS_ACUITY_SCORE: 51
ADLS_ACUITY_SCORE: 53
ADLS_ACUITY_SCORE: 51
ADLS_ACUITY_SCORE: 43
ADLS_ACUITY_SCORE: 47
ADLS_ACUITY_SCORE: 45
ADLS_ACUITY_SCORE: 53
ADLS_ACUITY_SCORE: 49
DEPENDENT_IADLS:: INDEPENDENT
ADLS_ACUITY_SCORE: 53
TOILETING: 0-->NOT TOILET TRAINED OR ASSISTANCE NEEDED (DEVELOPMENTALLY APPROPRIATE)
ADLS_ACUITY_SCORE: 45
ADLS_ACUITY_SCORE: 50
ADLS_ACUITY_SCORE: 47
ADLS_ACUITY_SCORE: 49
ADLS_ACUITY_SCORE: 50
ADLS_ACUITY_SCORE: 49
ADLS_ACUITY_SCORE: 49
TOILETING: 1-->ASSISTANCE (EQUIPMENT/PERSON) NEEDED
ADLS_ACUITY_SCORE: 50
ADLS_ACUITY_SCORE: 53
ADLS_ACUITY_SCORE: 50
ADLS_ACUITY_SCORE: 45
ADLS_ACUITY_SCORE: 41
ADLS_ACUITY_SCORE: 47
ADLS_ACUITY_SCORE: 51
ADLS_ACUITY_SCORE: 54
ADLS_ACUITY_SCORE: 50
ADLS_ACUITY_SCORE: 51
ADLS_ACUITY_SCORE: 47
ADLS_ACUITY_SCORE: 49
ADLS_ACUITY_SCORE: 50
ADLS_ACUITY_SCORE: 47
ADLS_ACUITY_SCORE: 47
ADLS_ACUITY_SCORE: 49
ADLS_ACUITY_SCORE: 53
ADLS_ACUITY_SCORE: 54
ADLS_ACUITY_SCORE: 49
ADLS_ACUITY_SCORE: 41
ADLS_ACUITY_SCORE: 51
ADLS_ACUITY_SCORE: 51
ADLS_ACUITY_SCORE: 50
ADLS_ACUITY_SCORE: 51
ADLS_ACUITY_SCORE: 47
ADLS_ACUITY_SCORE: 49
ADLS_ACUITY_SCORE: 50
ADLS_ACUITY_SCORE: 49
ADLS_ACUITY_SCORE: 53
ADLS_ACUITY_SCORE: 51
ADLS_ACUITY_SCORE: 47
ADLS_ACUITY_SCORE: 47
ADLS_ACUITY_SCORE: 53
ADLS_ACUITY_SCORE: 47
ADLS_ACUITY_SCORE: 53
ADLS_ACUITY_SCORE: 49
ADLS_ACUITY_SCORE: 47
ADLS_ACUITY_SCORE: 49
ADLS_ACUITY_SCORE: 50
ADLS_ACUITY_SCORE: 35
ADLS_ACUITY_SCORE: 47
DRESSING/BATHING_DIFFICULTY: YES
ADLS_ACUITY_SCORE: 50
ADLS_ACUITY_SCORE: 45
ADLS_ACUITY_SCORE: 53
ADLS_ACUITY_SCORE: 50
TOILETING_ISSUES: YES
ADLS_ACUITY_SCORE: 49
ADLS_ACUITY_SCORE: 50
ADLS_ACUITY_SCORE: 50
ADLS_ACUITY_SCORE: 52
ADLS_ACUITY_SCORE: 51
ADLS_ACUITY_SCORE: 54
ADLS_ACUITY_SCORE: 49
ADLS_ACUITY_SCORE: 49
ADLS_ACUITY_SCORE: 51
BATHING: 1-->ASSISTANCE NEEDED
ADLS_ACUITY_SCORE: 41
ADLS_ACUITY_SCORE: 47
ADLS_ACUITY_SCORE: 35
ADLS_ACUITY_SCORE: 53
ADLS_ACUITY_SCORE: 41
ADLS_ACUITY_SCORE: 51
ADLS_ACUITY_SCORE: 47
ADLS_ACUITY_SCORE: 45
ADLS_ACUITY_SCORE: 49
ADLS_ACUITY_SCORE: 54
ADLS_ACUITY_SCORE: 47
ADLS_ACUITY_SCORE: 49
ADLS_ACUITY_SCORE: 54
ADLS_ACUITY_SCORE: 54
DRESS: 1-->ASSISTANCE (EQUIPMENT/PERSON) NEEDED
ADLS_ACUITY_SCORE: 41
ADLS_ACUITY_SCORE: 43
ADLS_ACUITY_SCORE: 47
ADLS_ACUITY_SCORE: 51
ADLS_ACUITY_SCORE: 51
ADLS_ACUITY_SCORE: 43
ADLS_ACUITY_SCORE: 50
ADLS_ACUITY_SCORE: 49
ADLS_ACUITY_SCORE: 51
ADLS_ACUITY_SCORE: 51
ADLS_ACUITY_SCORE: 47
ADLS_ACUITY_SCORE: 54
TRANSFERRING: 1-->ASSISTANCE (EQUIPMENT/PERSON) NEEDED
ADLS_ACUITY_SCORE: 47
ADLS_ACUITY_SCORE: 51
ADLS_ACUITY_SCORE: 49
ADLS_ACUITY_SCORE: 50
ADLS_ACUITY_SCORE: 47
CONCENTRATING,_REMEMBERING_OR_MAKING_DECISIONS_DIFFICULTY: YES
ADLS_ACUITY_SCORE: 53
ADLS_ACUITY_SCORE: 47
ADLS_ACUITY_SCORE: 53
ADLS_ACUITY_SCORE: 49
ADLS_ACUITY_SCORE: 47
ADLS_ACUITY_SCORE: 49
ADLS_ACUITY_SCORE: 47
ADLS_ACUITY_SCORE: 52
ADLS_ACUITY_SCORE: 51
ADLS_ACUITY_SCORE: 41
ADLS_ACUITY_SCORE: 49
ADLS_ACUITY_SCORE: 53
ADLS_ACUITY_SCORE: 49
TRANSFERRING: 0-->ASSISTANCE NEEDED (DEVELOPMENTALLY APPROPRIATE)
ADLS_ACUITY_SCORE: 53
ADLS_ACUITY_SCORE: 41
ADLS_ACUITY_SCORE: 52
ADLS_ACUITY_SCORE: 49
ADLS_ACUITY_SCORE: 52
ADLS_ACUITY_SCORE: 51
ADLS_ACUITY_SCORE: 49
ADLS_ACUITY_SCORE: 49
ADLS_ACUITY_SCORE: 45
ADLS_ACUITY_SCORE: 49
ADLS_ACUITY_SCORE: 49
ADLS_ACUITY_SCORE: 54
ADLS_ACUITY_SCORE: 49
ADLS_ACUITY_SCORE: 51
ADLS_ACUITY_SCORE: 43
ADLS_ACUITY_SCORE: 49
ADLS_ACUITY_SCORE: 45
ADLS_ACUITY_SCORE: 47
ADLS_ACUITY_SCORE: 53
ADLS_ACUITY_SCORE: 51
ADLS_ACUITY_SCORE: 50
ADLS_ACUITY_SCORE: 47
ADLS_ACUITY_SCORE: 52
ADLS_ACUITY_SCORE: 49
ADLS_ACUITY_SCORE: 53
ADLS_ACUITY_SCORE: 47
ADLS_ACUITY_SCORE: 49
ADLS_ACUITY_SCORE: 45
ADLS_ACUITY_SCORE: 51
ADLS_ACUITY_SCORE: 51
ADLS_ACUITY_SCORE: 45
ADLS_ACUITY_SCORE: 45
ADLS_ACUITY_SCORE: 51
ADLS_ACUITY_SCORE: 54
ADLS_ACUITY_SCORE: 45
ADLS_ACUITY_SCORE: 45
WALKING_OR_CLIMBING_STAIRS: AMBULATION DIFFICULTY, ASSISTANCE 1 PERSON
ADLS_ACUITY_SCORE: 51
ADLS_ACUITY_SCORE: 53
ADLS_ACUITY_SCORE: 47
ADLS_ACUITY_SCORE: 52
ADLS_ACUITY_SCORE: 50
ADLS_ACUITY_SCORE: 49
ADLS_ACUITY_SCORE: 49
ADLS_ACUITY_SCORE: 47
ADLS_ACUITY_SCORE: 53
ADLS_ACUITY_SCORE: 51
ADLS_ACUITY_SCORE: 54
ADLS_ACUITY_SCORE: 54
ADLS_ACUITY_SCORE: 49
WALKING_OR_CLIMBING_STAIRS_DIFFICULTY: YES
ADLS_ACUITY_SCORE: 49
DRESSING/BATHING: BATHING DIFFICULTY, ASSISTANCE 1 PERSON
ADLS_ACUITY_SCORE: 53
ADLS_ACUITY_SCORE: 50
ADLS_ACUITY_SCORE: 50
ADLS_ACUITY_SCORE: 47
ADLS_ACUITY_SCORE: 49
ADLS_ACUITY_SCORE: 49
ADLS_ACUITY_SCORE: 52
ADLS_ACUITY_SCORE: 51
ADLS_ACUITY_SCORE: 47
DRESS: 1-->ASSISTANCE (EQUIPMENT/PERSON) NEEDED (NOT DEVELOPMENTALLY APPROPRIATE)
ADLS_ACUITY_SCORE: 51
ADLS_ACUITY_SCORE: 50
ADLS_ACUITY_SCORE: 51
ADLS_ACUITY_SCORE: 54
DEPENDENT_IADLS:: INDEPENDENT
ADLS_ACUITY_SCORE: 49
ADLS_ACUITY_SCORE: 53
ADLS_ACUITY_SCORE: 51
ADLS_ACUITY_SCORE: 45
ADLS_ACUITY_SCORE: 49
ADLS_ACUITY_SCORE: 54
ADLS_ACUITY_SCORE: 50
ADLS_ACUITY_SCORE: 47
ADLS_ACUITY_SCORE: 53
ADLS_ACUITY_SCORE: 51
ADLS_ACUITY_SCORE: 35
ADLS_ACUITY_SCORE: 43
ADLS_ACUITY_SCORE: 47
ADLS_ACUITY_SCORE: 54
ADLS_ACUITY_SCORE: 53
ADLS_ACUITY_SCORE: 53
ADLS_ACUITY_SCORE: 49
ADLS_ACUITY_SCORE: 35
ADLS_ACUITY_SCORE: 45
ADLS_ACUITY_SCORE: 49

## 2022-01-01 ASSESSMENT — ENCOUNTER SYMPTOMS
WEAKNESS: 0
FATIGUE: 0
COUGH: 0
HEADACHES: 0
VOMITING: 0
FEVER: 0
SHORTNESS OF BREATH: 0
DYSURIA: 0
CHILLS: 0
DIARRHEA: 1
ABDOMINAL PAIN: 0

## 2022-07-05 NOTE — TELEPHONE ENCOUNTER
The patient's daughter in law, Kaitlynn, called asking to schedule with one of our wound care providers.  She states this patient has a lot of weaping from his legs and had gone to the ED on 6/21/2022 and they recommended he see a wound care specialist.  Please review and advise the schedulers on if this pt is appropriate to schedule and if any imaging is recommended.  Thank you!

## 2022-07-12 NOTE — PATIENT INSTRUCTIONS
"Wound care supplies were ordered today through [employer] and if you are not receiving your supplies or have a question on your bill please contact Kary Shook at [phone]. Please allow 2-5 business days for delivery of supplies.         Wound Care Instructions    3 TIMES PER WEEK and as needed, Cleanse your left leg wound(s) with Dilute hibiclens 30cc in 500cc NS.    Pat Dry with non-sterile gauze    Apply Lotion to the intact skin surrounding your wound and other dry skin locations. Some good lotions include: Remedy Skin Repair Cream, Sarna, Vanicream or Cetaphil    Primary Dressing: Apply medihoney into/onto the left leg wounds, only gauze or abd to right leg open blister    Secondary dressing: Cover with abd pad    Secure with non-sterile roll gauze (4\" x 75\" roll) and tape (1\" roll tape) as needed; avoid adhesive directly on the skin    Elevate legs twice a day above the heart for at least 20 minutes    It is not ok to get your wound wet in the bath or shower    It is recommended that you do not get your ulcer wet when showering.  Listed below are several ways of keeping it dry when you shower.     1. Wrap it with Press and Seal plastic wrap.  It can be found in the stores where the plastic wraps or tin foil is kept.               2.  Some people take a bath and hang their leg/foot out of the tub.                        3  Put your leg in a plastic bag and tape it on.           4. You can purchase a shower cover for casts at some pharmacies and through the Internet.            5. Take a Bed Bath or wash up at the sink            If for some reason you are not able to get your dressing(s) changed as outlined above (due to illness, lack of supplies, lack of help) please do the following: remove old, soiled dressings; wash the wounds with saline; pat dry; apply ABD pad or other absorbant pad and secure with rolled gauze; avoid tape directly on your skin; Call the clinic as soon as possible to let us know what " the current issues are in receiving wound care [phone].      SEEK MEDICAL CARE IF:  You have an increase in swelling, pain, or redness around the wound.  You have an increase in the amount of pus coming from the wound.  There is a bad smell coming from the wound.  The wound appears to be worsening/enlarging  You have a fever greater than 101.5 F      It is ok to continue current wound care treatment/products for the next 2-3 days until new wound care supplies are ordered and arrive. If longer than this please contact our office at [phone].    If you have a 2 layer or 4 layer compression wrap on these are safe to have on for ONLY 7 days. If for some reason you are not able to get the wrap(s) changed (due to illness; lack of supplies, lack of help, lack of transportation) please do the following: unwrap the old 2 or 4 layer compression wrap; avoid using scissors as you could cut your skin and cause wounds; use tubular compression when available. Call to reschedule your home care or clinic visit appointment as soon as possible.    Please NOTE: if you are 15 minutes late to your clinic appointment you will have to be rescheduled. Please call our clinic as soon as possible if you know you will not be able to get to your appointment at [phone].    If you fail to show up to 3 scheduled clinic appointments you will be dismissed from our clinic.              We want to hear from you!  In the next few weeks, you should receive a call or email to complete a survey about your visit at [facility]. Please help us improve your appointment experience by letting us know how we did today. We strive to make your experience good and value any ways in which we could do better.      We value your input and suggestions.    Thank you for choosing the [facility]!

## 2022-07-12 NOTE — LETTER
[facility]  [address] [place] [address] [address]  [place], [place] [address]  [phone]      Fax [phone]    [facility]           Fax: [phone]            Customer Service: [phone]    Wound Dressing Rx and Order Form  Order Status: new  Verbal: Melissa  Date: 2022     Bradley Liz  Gender: male  : 1940  [address] [facility] [address]  [phone] (home)     Medical Record: [id number]  Primary Care Provider: Verito Lima      ICD-10-CM    1. Open wound of left lower extremity, initial encounter  S81.802A amoxicillin-clavulanate (AUGMENTIN) 875-125 MG tablet     Wound Aerobic Bacterial Culture Routine     Wound Aerobic Bacterial Culture Routine     Wound Aerobic Bacterial Culture Routine     Wound Aerobic Bacterial Culture Routine   2. PAD (peripheral artery disease) (H)  I73.9    3. Localized swelling of both lower legs  R22.43 furosemide (LASIX) 20 MG tablet     amoxicillin-clavulanate (AUGMENTIN) 875-125 MG tablet     Wound Aerobic Bacterial Culture Routine     Wound Aerobic Bacterial Culture Routine     Wound Aerobic Bacterial Culture Routine     Wound Aerobic Bacterial Culture Routine         Insurance Info:  INSURER: Payor: [facility] / Plan: [facility] MEDICARE ADVANTAGE / Product Type: Medicare /   Policy ID#:  [id number]  SECONDARY INSURANCE:    Secondary Policy ID#:  N/A        Physician Info:   Name:  JERRI BRICE     Dept Address/Phones:   [address] [place] [address], [facility] [address]  [facility] [id number] [phone]  Fax: [phone]    Lymphedema circumferential measurements (in cm):  No flowsheet data found.      Wound info:  Closed/Suction Drain 1 Right Abdomen Bulb 15 [place] (Active)   Number of days: 1098       VASC Wound left leg (scattered) (Active)   Pre Size Length 9 22 1300   Pre Size Width 13 22 1300   Pre Size Depth 0.2 22 1300   Pre Total Sq cm 117 22 1300   Number of days: 0       VASC Wound right leg  (Active)   Pre Size Length 1 07/12/22 1300   Pre Size Width 1 07/12/22 1300   Pre Size Depth 0.1 07/12/22 1300   Number of days: 0       Incision/Surgical Site 07/10/19 Bilateral Abdomen (Active)   Number of days: 1098        Drainage: copious  Thickness:  full  Duration of Need: 30 DAYS  Days Supply: 30 DAYS  Start Date: 7/12/2022  Starter Kit: yes  Qualifying wound/Debridement: Yes      Dressing Type Brand Size Number of pieces Frequency of change    Primary manuka honey hd supra lite   4x4 12 3 TIMES PER WEEK and as needed                                           Secondary         Square gauze  4x4 2 loafs  3 TIMES PER WEEK and as needed    Soft formed rolled gauze   4x75 24(1 per leg) 3 TIMES PER WEEK and as needed    ABD pad   5x9 24(1 per leg) 3 TIMES PER WEEK and as needed           Tape         Paper tape   1in 4 rolls  3 TIMES PER WEEK and as needed             No substitutions preferred. Call [phone].         OK to forward to covered supplier.    Electronically Signed Physician:  JERRI BRICE             Date: July 12, 2022

## 2022-07-18 NOTE — TELEPHONE ENCOUNTER
Spoke to the dtr- provider her the [employer] tracking number [id number]- it was delayed with [employer] ex on Friday. It is out for delivery today.    DTR said pt is not taking abx- said took one day and had diarrhea. I said it was impt for her to let us know that so we can come up with other options      Lakisha- do you want a different abx or probiotic or what would you like pt to do. Could you update Melissa so she can update pt/dtr.

## 2022-07-18 NOTE — TELEPHONE ENCOUNTER
Caller: Karon, daughter    Provider: TRACE Ma    Detailed reason for call: He has not received any supplies. He will be out today. Please advise if supplies can be shipped by tomorrow.    Best phone number to contact: [phone]    Best time to contact: today    Ok to leave a detailed message: Yes

## 2022-07-18 NOTE — TELEPHONE ENCOUNTER
Daughter called again. She is extremely anxious to speak to someone about her father's supplies right away. She was also given the number for amadou. She asked for nurse manager to call her back before 12:30. He did not received antibiotic and will be out of supplies by this afternoon and is concerned he receive medihoney.

## 2022-07-18 NOTE — TELEPHONE ENCOUNTER
Called and spoke to patients daughter Karon and explained to her the importance of her father taking the antibiotic as well as a new antibiotic that Lakisha sent to the pharmacy. It was suggested that he take a probiotic as well to help with the GI upset. Karon stated he was very against taking them but she would try to change his mind.

## 2022-07-21 PROBLEM — I10 HYPERTENSION, UNSPECIFIED TYPE: Status: ACTIVE | Noted: 2019-09-17

## 2022-07-21 PROBLEM — T14.8XXA WOUND INFECTION: Status: ACTIVE | Noted: 2022-01-01

## 2022-07-21 PROBLEM — L08.9 WOUND INFECTION: Status: ACTIVE | Noted: 2022-01-01

## 2022-07-21 PROBLEM — N18.30 CKD (CHRONIC KIDNEY DISEASE) STAGE 3, GFR 30-59 ML/MIN (H): Status: ACTIVE | Noted: 2019-09-17

## 2022-07-21 PROBLEM — L03.116 CELLULITIS OF LEFT LOWER EXTREMITY: Status: ACTIVE | Noted: 2022-01-01

## 2022-07-21 PROBLEM — R55 SYNCOPE, UNSPECIFIED SYNCOPE TYPE: Status: ACTIVE | Noted: 2022-01-01

## 2022-07-21 PROBLEM — U07.1 INFECTION DUE TO 2019 NOVEL CORONAVIRUS: Status: ACTIVE | Noted: 2022-01-01

## 2022-07-21 NOTE — H&P
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 Order IV piperacillin, tazobactam, and vancomycin broad coverage, consult Vascular Surgery and wound care team (WOC), and discussed/offered remdesivir x3-day course given high-risk factors and Gian has agreed to this. Checking LFTs before ordering remdesivir. Continue home lasix; BNP minimally elevated and noted without pulmonary edema, effusion, or cardiomegaly on CXR and not hypoxic, so will continue home cardiac meds and home diuretic. If he becomes hypoxic, would start dexamethasone and then remdesivir course could be lengthened to 5-days.     Left LE Open Wound / Wound Infection  Left LE Cellulitis   -Started on Augmentin/Cipro outpatient  -Continue Zosyn and Vancomycin now  -Presented from Vascular Surgery Clinic directly to ED  -Consult to Vascular Surgery   -WOC consult and eval  -Pseudomonas, Klebsiella, and enterrococus     COVID19 Viral Infection  COVID19 PNA  -Special Precautions  -Remdesivir 3-day course if LFTs are wnl.  -Monitor for hypoxia; if develops, then start dexamethasone.  -RT support, hygiene.  -Given comorbidities, patient is at high-risk for rapid/sudden decompensation.   -on DOAC already    Atrial Fibrillation   -Med/surg telemetry.  -Optimize potassium and magnesium. Keep K > 4.0, Mag > 2.0. RN potassium and magnesium replacement protocols utilized/ordered.   -Order home medications including AV-london rate control medication.  -Recommend resuming home anticoagulation with warfarin/DOAC.  -If any acute sustained rapid rates develop, obtain  "EKG to evaluate for RVR and initiate cardiac telemetry and update [facility] team.     HTN  Non-ischemic cardiomyopathy  -Order home medications and as needed apply specified hold parameters.     HLD  -Order home statin.    Clinically Significant Risk Factors Present on Admission               # Coagulation Defect: home medication list includes an anticoagulant medication      # Overweight: Estimated body mass index is 26.29 kg/m  as calculated from the following:    Height as of this encounter: 1.753 m (5' 9\").    Weight as of this encounter: 80.7 kg (178 lb).          DVTP: Direct Oral Anticagulants   Code Status: Prior  Disposition: Inpatient   Expected LOS: 2 days   Goals for the hospitalization: IV antibiotics, vascular surgery inpatient consult, wound care consult, treat COVID19  Disposition Plan         The patient's care was discussed with the Patient and Patient's Family.  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     This is a gentleman who had been in his usual state of health until he developed fatigue and malaise and erythema over his left LE wound over the past week or so and presented to vascular clinic today for further evaluation of this left lower extremity wound.  He had been started on outpatient Augmentin and ciprofloxacin which he did not tolerate well as he developed diarrhea after starting those oral antibiotics, and then he stopped the antibiotics.  Upon presentation to vascular surgery clinic today, he was found to have increased lower extremity edema and erythema of the left lower extremity wound.  An attempt was made for direct " admission to the hospital; however, there were no available hospital beds, and the patient was then instructed to directly present to the [facility] emergency room.  Upon presentation to [facility] emergency room, he is not hypoxic but was noted to have positive COVID-19 viral infection.  BNP is mildly elevated to 160s.  No hypoxia.  Chest x-ray is clear.  No infiltrate seen on chest x-ray.  Chest x-ray also notes no cardiomegaly and no effusion or pulmonary edema noted.  Otherwise, there is no endorsement of any fevers, rigors, chest pain or shortness of breath, nausea or vomiting or abdominal pain, changes in bowel movements/pattern, urinary symptoms, focal weakness, or any other new and associated symptoms at this time.  The patient has been compliant with home medications as prescribed. 14 point review of systems performed with the patient without any other pertinent positives at this time. His daughter-in-law is at bedside.     The social history, family history, and past medical history were directly reviewed with the patient and corroborated to be accurate as documented below. All questions the patient had at this time were answered to verbalized and stated satisfaction and understanding. Code status was discussed and the patient confirmed wishes for DNR/DNI for this hospitalization.      Past Medical History     Past Medical History:  2019: Acute kidney injury (H)     Surgical History     Past Surgical History:   Procedure Laterality Date     LAPAROSCOPIC CHOLECYSTECTOMY N/A 2019    Procedure: Laparoscopic cholecystectomy;  Surgeon: Ad Peck MD;  Location: [facility] OR     Family History    Reviewed, and   Family History   Problem Relation Age of Onset     Unknown/Adopted Mother      Prostate Cancer Father      C.A.D. Father         age 77      Social History      Social History     Tobacco Use     Smoking status: Former Smoker     Years: 50.00     Quit date: 2008     Years since quittin.6      Smokeless tobacco: Never Used   Vaping Use     Vaping Use: Never used   Substance Use Topics     Alcohol use: Yes     Comment: couple beers occ     Drug use: No      Allergies     Allergies   Allergen Reactions     Augmentin Diarrhea     Prior to Admission Medications      Prior to Admission Medications   Prescriptions Last Dose Informant Patient Reported? Taking?   amoxicillin-clavulanate (AUGMENTIN) 875-125 MG tablet 7/16/2022  No Yes   Sig: Take 1 tablet by mouth 2 times daily for 10 days   ciprofloxacin (CIPRO) 500 MG tablet 7/20/2022 at Unknown time  No Yes   Sig: Take 1 tablet (500 mg) by mouth 2 times daily   furosemide (LASIX) 20 MG tablet 7/21/2022 at Unknown time  No Yes   Sig: Take 1 tablet (20 mg) by mouth daily for 30 days   metoprolol succinate ER (TOPROL-XL) 200 MG 24 hr tablet 7/21/2022 at Unknown time  No Yes   Sig: Take 1 tablet (200 mg) by mouth daily   order for DME  Self No No   Sig: Please draw INR as ordered and as needed. Call results to [facility] at (p) [phone] or fax them to (f) [phone]   rivaroxaban ANTICOAGULANT (XARELTO ANTICOAGULANT) 20 MG TABS tablet 7/20/2022 at Unknown time  No Yes   Sig: Take 1 tablet (20 mg) by mouth daily (with dinner)      Facility-Administered Medications: None      Review of Systems     A 12 point comprehensive review of systems was negative except as noted above in HPI.    PHYSICAL EXAMINATION       Vitals      Temp:  [97  F (36.1  C)-98.3  F (36.8  C)] 97  F (36.1  C)  Pulse:  [101-116] 101  Resp:  [22-26] 26  BP: (122-136)/(63-69) 124/64  SpO2:  [94 %] 94 %    Examination     GENERAL:  Alert, appears comfortable, in no acute distress, appears stated age   HEAD:  Normocephalic, without obvious abnormality, atraumatic   EYES:  Conjunctiva/corneas clear, no scleral icterus, EOM's appears intact grossly   NOSE: Nares normal, septum midline, mucosa normal, no drainage   THROAT: Lips, mucosa, and tongue appear normal   NECK:  Symmetrical, trachea appears midline   BACK:   Symmetric, no curvature noted   LUNGS:   Symmetric chest rise on inhalation, respirations unlabored   CHEST WALL:  No apparent deformity   HEART:  No thrills or heaves visualized   ABDOMEN:   No masses or organomegaly apparent; non-scaphoid   EXTREMITIES: Extremities appear normal, atraumatic, no cyanosis   SKIN: Left LE well-wrapped and covered with fresh wrap, with erythema surrounding wrapped areas; see vascular surgery APRN clinic note with pictures of the covered wound   NEURO: Alert and oriented to baseline as corroborated by DIL, moves all four extremities freely and spontaneously   PSYCH: Cooperative, behavior is appropriate     Pertinent Lab     Most Recent 3 CBC's:Recent Labs   Lab Test 07/21/22  1227 07/16/21  1126 07/01/20  1338   WBC 13.9* 8.5 9.5   HGB 15.9 16.3 16.2   MCV 95 97 100    288 361     Most Recent 3 BMP's:Recent Labs   Lab Test 07/21/22  1227 07/16/21  1126 07/01/20  1338    144 139   POTASSIUM 4.8 4.3 4.1   CHLORIDE 104 110* 107   CO2 25 28 31   BUN 30* 20 13   CR 1.14 1.14 1.15   ANIONGAP 9 6 1*   DARON 8.8 8.8 8.4*   GLC 95 129* 73     Most Recent 2 LFT's:Recent Labs   Lab Test 07/16/21  1126 07/01/20  1338 08/15/19  0907   AST  --  16 36   ALT 18 19 37   ALKPHOS  --  78 138   BILITOTAL  --  0.7 1.3     Most Recent 3 INR's:Recent Labs   Lab Test 07/21/22  1227 09/21/21  1214 08/24/21  1157   INR 1.12 2.6* 2.6*         Pertinent Radiology     Radiology Results:   Recent Results (from the past 24 hour(s))   Chest XR,  PA & LAT    Narrative    EXAM: XR CHEST 2 VIEWS  LOCATION: [facility]  DATE/TIME: 7/21/2022 1:31 PM    INDICATION: Cough  COMPARISON: Chest x-ray 07/16/2019      Impression    IMPRESSION: Negative chest.  The lungs are clear and there are no pleural effusions. Normal heart size.   Head CT w/o contrast    Narrative    EXAM: CT HEAD W/O CONTRAST  LOCATION: [facility]  HOSPITAL  DATE/TIME: 7/21/2022 1:31 PM    INDICATION: Fall, trauma, pain.  COMPARISON: None available.  TECHNIQUE: Routine CT Head without IV contrast. Multiplanar reformats. Dose reduction techniques were used.    FINDINGS:  INTRACRANIAL CONTENTS: No intracranial hemorrhage, extraaxial collection, or mass effect.  No CT evidence of acute infarct. Chronic left basal ganglia/corona radiata infarction. Moderate presumed chronic small vessel ischemic change. Moderate generalized   volume loss. No hydrocephalus. The sella is unremarkable for technique. The cerebellar tonsils are appropriately positioned.     VISUALIZED ORBITS/SINUSES/MASTOIDS: No intraorbital abnormality. Mild to moderate mucosal thickening scattered about the paranasal sinuses. This is most pronounced in the left maxillary sinus. No middle ear or mastoid effusion.    BONES/SOFT TISSUES: No scalp hematoma. No skull fracture.      Impression    IMPRESSION:  1.  No evidence of acute intracranial hemorrhage.  2.  No evidence of acute calvarial fracture.  3.  Chronic left basal ganglia/white matter infarction.  4.  Moderate presumed chronic small vessel ischemic change.  5.  Moderate atrophy.     EKG Results: personally reviewed.     Advance Care Planning        Eduard Arredondo MD  Internal Medicine  [facility]  Phone: #[phone]

## 2022-07-21 NOTE — ED PROVIDER NOTES
EMERGENCY DEPARTMENT ENCOUNTER      NAME: Bradley Liz  AGE: 81 year old male  YOB: 1940  MRN: [id number]  EVALUATION DATE & TIME: 7/21/2022 11:23 AM    PCP: Verito Lima    ED PROVIDER: Isabel Reza DO      Chief Complaint   Patient presents with     Fluid Overload         FINAL IMPRESSION:  1. Infection due to 2019 novel coronavirus    2. Syncope, unspecified syncope type    3. Wound infection          ED COURSE & MEDICAL DECISION MAKING:    Pertinent Labs & Imaging studies reviewed. (See chart for details)  11:34 AM I met the patient and performed my initial interview and exam.  2:17 PM I rechecked and updated the patient about his COVID result. The patient's daughter noted that she had COIVD about a week ago and may have given it to the patient.  2:31 PM I spoke with Dr. Arredondo, Hospitalist.     81 year old male presents to the Emergency Department for evaluation of concern for fluid overload, wounds to bilateral lower extremities, concern for syncope.  Patient was in the vascular clinic just prior to arrival and they attempted to direct admit him.  Patient with history of heart failure, recently started on Lasix 1 week ago.  Atrial fibrillation on anticoagulation, peripheral artery disease.  He has had open wounds to bilateral lower extremities.  Cultures grew Pseudomonas, Klebsiella and Enterococcus.  Patient was started on Augmentin and ciprofloxacin.  Only took 2 days of Augmentin and stopped due to diarrhea.  Just started his ciprofloxacin yesterday.  Patient notes increased congestion over the past 3 days.  No fever.  Reports 2 falls in the past week.  He reports he is unclear of why he fell and requires assistance from police to get up.  No chest pain.  Patient is chronically ill-appearing.  He is in atrial fibrillation, no signs of arrhythmia or ischemia.  He is afebrile.  His wounds were dressed.  I reviewed his chart from earlier today.  He has a scattered wound to his left leg.   Full-thickness, heavy drainage.  Wound was dressed in the clinic and I did not take this down as pictures were placed in the chart.  Given his worsening dyspnea and syncope, attempted to direct admit however no available beds.  Labs show mild elevation in BNP, no elevation in troponin.  CBC mild leukocytosis.  Basic metabolic panel unremarkable.  CT of the head obtained given history of falls on anticoagulation.  This shows no acute abnormality.  Chest x-ray without acute abnormality.  Given patient's dyspnea, concerns for 2 episodes of syncope and worsening infection, will admit.  He is COVID-positive which certainly could explain his symptoms.  He was given vancomycin and Zosyn for his infection based on susceptibilities.    At the conclusion of the encounter I discussed the results of all of the tests and the disposition. The questions were answered. The patient or family acknowledged understanding and was agreeable with the care plan.       MEDICATIONS GIVEN IN THE EMERGENCY:  Medications   piperacillin-tazobactam (ZOSYN) 3.375 g vial to attach to  mL bag (3.375 g Intravenous Given 7/21/22 1242)   vancomycin 1500 mg in 0.9% NaCl 250 ml intermittent infusion 1,500 mg (0 mg Intravenous Stopped 7/21/22 1530)       NEW PRESCRIPTIONS STARTED AT TODAY'S ER VISIT  New Prescriptions    No medications on file          =================================================================    HPI    Patient information was obtained from: Patient    Use of : N/A       Bradley Liz is a 81 year old male with a pertinent history of CVA, chronic systolic heart failure, CKD stage 3, atrial fibrillation, and cardiomyopathy who presents to this ED by walk in for evaluation of fluid overload.    Per Chart Review,  7/21/22 The patient presented to [facility] for evaluation of leg swelling and leg wound. Wound was treated with irrigation and dressings. Patient is set to have an angiogram done  with Dr. Denton. Patient complained of increased work of breathing, audible rhonchi, syncope x2, and teleangiectasis on his face and because there were no beds available for direct admit the patient was sent to the ED.     Patient reports that he has had audible rhonchi for about 3-4 days that is non-productive. Patient notes that he also fell twice in the past week with the last fall happening 3 days ago. Patient denies loss of consciousness but is unsure why he fell. Patient notes that he started a new antibiotic yesterday because his previous antibiotic gave him diarrhea. Patient also began taking a diuretic about a week ago that has helped with his leg swelling. Patient denies fever, injuries, head trauma, vomiting, dysuria, or any other concerns at this time.     REVIEW OF SYSTEMS   Review of Systems   Constitutional: Negative for chills, fatigue and fever.   Respiratory: Negative for cough and shortness of breath.         Positive for rhonchi   Cardiovascular: Positive for leg swelling. Negative for chest pain.   Gastrointestinal: Positive for diarrhea. Negative for abdominal pain and vomiting.   Genitourinary: Negative for dysuria.   Skin: Negative.    Neurological: Negative for syncope, weakness and headaches.   All other systems reviewed and are negative.      PAST MEDICAL HISTORY:  Past Medical History:   Diagnosis Date     Acute kidney injury (H) 8/5/2019       PAST SURGICAL HISTORY:  Past Surgical History:   Procedure Laterality Date     LAPAROSCOPIC CHOLECYSTECTOMY N/A 7/9/2019    Procedure: Laparoscopic cholecystectomy;  Surgeon: Ad Peck MD;  Location:  OR           CURRENT MEDICATIONS:    amoxicillin-clavulanate (AUGMENTIN) 875-125 MG tablet  ciprofloxacin (CIPRO) 500 MG tablet  furosemide (LASIX) 20 MG tablet  metoprolol succinate ER (TOPROL-XL) 200 MG 24 hr tablet  rivaroxaban ANTICOAGULANT (XARELTO ANTICOAGULANT) 20 MG TABS tablet  order for DME         ALLERGIES:  Allergies  "  Allergen Reactions     Augmentin Diarrhea       FAMILY HISTORY:  Family History   Problem Relation Age of Onset     Unknown/Adopted Mother      Prostate Cancer Father      C.A.D. Father         age 77       SOCIAL HISTORY:   Social History     Socioeconomic History     Marital status:    Tobacco Use     Smoking status: Former Smoker     Years: 50.00     Quit date: 2008     Years since quittin.6     Smokeless tobacco: Never Used   Vaping Use     Vaping Use: Never used   Substance and Sexual Activity     Alcohol use: Yes     Comment: couple beers occ     Drug use: No     Sexual activity: Yes     Partners: Female   Other Topics Concern     Parent/sibling w/ CABG, MI or angioplasty before 65F 55M? No       VITALS:  /64   Pulse 98   Temp 97  F (36.1  C) (Temporal)   Resp 27   Ht 1.753 m (5' 9\")   Wt 80.7 kg (178 lb)   SpO2 95%   BMI 26.29 kg/m      PHYSICAL EXAM    Physical Exam  Constitutional:       General: He is not in acute distress.  HENT:      Head: Normocephalic and atraumatic.      Mouth/Throat:      Pharynx: Oropharynx is clear.   Eyes:      Pupils: Pupils are equal, round, and reactive to light.   Cardiovascular:      Rate and Rhythm: Normal rate. Rhythm irregularly irregular.      Pulses: Normal pulses.      Heart sounds: Normal heart sounds.   Pulmonary:      Effort: Pulmonary effort is normal.      Breath sounds: Rhonchi present.   Abdominal:      General: Abdomen is flat. Bowel sounds are normal.      Palpations: Abdomen is soft.      Tenderness: There is no abdominal tenderness.   Musculoskeletal:         General: Normal range of motion.      Comments: Erythema and edema to bilateral lower extremities. Dressing in place on bilateral lower extremities.   Skin:     General: Skin is warm and dry.      Capillary Refill: Capillary refill takes less than 2 seconds.   Neurological:      General: No focal deficit present.      Mental Status: He is alert and oriented to person, " place, and time.      Gait: Gait is intact.         LAB:  All pertinent labs reviewed and interpreted.  Labs Ordered and Resulted from Time of ED Arrival to Time of ED Departure   BASIC METABOLIC PANEL - Abnormal       Result Value    Sodium 138      Potassium 4.8      Chloride 104      Carbon Dioxide (CO2) 25      Anion Gap 9      Urea Nitrogen 30 (*)     Creatinine 1.14      Calcium 8.8      Glucose 95      GFR Estimate 65     B-TYPE NATRIURETIC PEPTIDE ( EAST ONLY) - Abnormal     (*)    COVID-19 VIRUS (CORONAVIRUS) BY PCR - Abnormal    SARS CoV2 PCR Positive (*)    CBC WITH PLATELETS AND DIFFERENTIAL - Abnormal    WBC Count 13.9 (*)     RBC Count 5.22      Hemoglobin 15.9      Hematocrit 49.6      MCV 95      MCH 30.5      MCHC 32.1      RDW 14.8      Platelet Count 313      % Neutrophils 73      % Lymphocytes 18      % Monocytes 6      % Eosinophils 1      % Basophils 0      % Immature Granulocytes 2      NRBCs per 100 WBC 0      Absolute Neutrophils 10.1 (*)     Absolute Lymphocytes 2.6      Absolute Monocytes 0.9      Absolute Eosinophils 0.1      Absolute Basophils 0.1      Absolute Immature Granulocytes 0.3      Absolute NRBCs 0.0     INR - Normal    INR 1.12     TROPONIN I - Normal    Troponin I <0.01         RADIOLOGY:  Reviewed all pertinent imaging. Please see official radiology report.  Head CT w/o contrast   Final Result   IMPRESSION:   1.  No evidence of acute intracranial hemorrhage.   2.  No evidence of acute calvarial fracture.   3.  Chronic left basal ganglia/white matter infarction.   4.  Moderate presumed chronic small vessel ischemic change.   5.  Moderate atrophy.      Chest XR,  PA & LAT   Final Result   IMPRESSION: Negative chest.  The lungs are clear and there are no pleural effusions. Normal heart size.          EKG:    Performed at: 21-Jul-2022 11:50:00    Impression: Abnormal ECG    Rate: 98 bpm  Rhythm: Atrial fibrillation  Axis: 64  NJ Interval: *  QRS Interval: 92 ms  QTc  Interval: 436 ms  ST Changes: None  Comparison: When compared with ECG of 10-Jul-2019 05:41,   Nonspecific T wave abnormality no longer evident in Inferior leads  Nonspecific T wave abnormality no longer evident in Lateral leads    I have independently reviewed and interpreted the EKG(s) documented above.      I, Roel Jerez, am serving as a scribe to document services personally performed by Dr. Isabel Reza based on my observation and the provider's statements to me. I, Isabel Reza, DO attest that Roel Jerez is acting in a scribe capacity, has observed my performance of the services and has documented them in accordance with my direction.    Isabel Reza DO  Emergency Medicine  [facility] EMERGENCY ROOM  [address] [address]  [phone]  Dept: [phone]      Isabel Reza DO  07/21/22 1552

## 2022-07-21 NOTE — ED TRIAGE NOTES
"Arrives to ED from wound clinic with c/o \"fluid overload\". Reports \"weeping\" from BLE x2 weeks. Saw wound clinic first last week. Started on lasix 20mg daily. Pt has audible crackles and coarse cough. Denies SOB.      Triage Assessment     Row Name 07/21/22 1120       Triage Assessment (Adult)    Airway WDL WDL       Respiratory WDL    Respiratory WDL X;cough  audible crackles       Skin Circulation/Temperature WDL    Skin Circulation/Temperature WDL X  wounds to BLE       Cardiac WDL    Cardiac WDL X;rhythm  hx of a-fib    Cardiac Rhythm tachycardic       Peripheral/Neurovascular WDL    Peripheral Neurovascular WDL X;neurovascular assessment lower       LLE Neurovascular Assessment    Color LLE red       RLE Neurovascular Assessment    Color RLE red       Cognitive/Neuro/Behavioral WDL    Cognitive/Neuro/Behavioral WDL WDL              "

## 2022-07-21 NOTE — TELEPHONE ENCOUNTER
Consult received from Jessica from [facility] ED called in routine inpt consult requested.      Urgency: routine, within 24 hours    Response Received:Yes, Dr. Denton saw on 7/21/2022

## 2022-07-21 NOTE — PHARMACY-ADMISSION MEDICATION HISTORY
Pharmacy Note - Admission Medication History    Pertinent Provider Information:   Patient stopped Augmentin around 7/16/22 d/t sever diarrhea.     ______________________________________________________________________    Prior To Admission (PTA) med list completed and updated in EMR.       PTA Med List   Medication Sig Last Dose     amoxicillin-clavulanate (AUGMENTIN) 875-125 MG tablet Take 1 tablet by mouth 2 times daily for 10 days 7/16/2022     ciprofloxacin (CIPRO) 500 MG tablet Take 1 tablet (500 mg) by mouth 2 times daily 7/20/2022 at Unknown time     furosemide (LASIX) 20 MG tablet Take 1 tablet (20 mg) by mouth daily for 30 days 7/21/2022 at Unknown time     metoprolol succinate ER (TOPROL-XL) 200 MG 24 hr tablet Take 1 tablet (200 mg) by mouth daily 7/21/2022 at Unknown time     rivaroxaban ANTICOAGULANT (XARELTO ANTICOAGULANT) 20 MG TABS tablet Take 1 tablet (20 mg) by mouth daily (with dinner) 7/20/2022 at Unknown time       Information source(s): Patient, Family member and [facility]  Method of interview communication: in-person    Summary of Changes to PTA Med List  New: none  Discontinued: digoxin, duplicate furosemide and metoprolol entries, KCl, rosuvastatin  Changed: none    Patient was asked about OTC/herbal products specifically.  PTA med list reflects this.    In the past week, patient estimated taking medication this percent of the time:  greater than 90%.    Allergies were reviewed, assessed, and updated with the patient.      Patient does not use any multi-dose medications prior to admission.    The information provided in this note is only as accurate as the sources available at the time of the update(s).    Thank you for the opportunity to participate in the care of this patient.    Mikael Louis [facility]  7/21/2022 12:34 PM

## 2022-07-21 NOTE — CONSULTS
Dr. Coello from interventional radiology will see this patient. Due to shortage of staff he will see this patient and proceed with angiogram tomorrow. Please keep this patient NPO      Demi Denton MD

## 2022-07-21 NOTE — PHARMACY-VANCOMYCIN DOSING SERVICE
"Pharmacy Vancomycin Initial Note  Date of Service 2022  Patient's  1940  81 year old, male    Indication: Skin and Soft Tissue Infection    Current estimated CrCl = 58ml/min  Creatinine for last 3 days  2022 = 1.14      Recent Vancomycin Level(s) for last 3 days  No results found for requested labs within last 72 hours.      Vancomycin IV Administrations (past 72 hours)      No vancomycin orders with administrations in past 72 hours.                Nephrotoxins and other renal medications (From now, onward)    Start     Dose/Rate Route Frequency Ordered Stop    22 1230  piperacillin-tazobactam (ZOSYN) 3.375 g vial to attach to  mL bag        Note to Pharmacy: For [facility], [facility] and [facility]: For Zosyn-naive patients, use the \"Zosyn initial dose + extended infusion\" order panel.    3.375 g  over 240 Minutes Intravenous ONCE 22 1210      22 1230  vancomycin 1500 mg in 0.9% NaCl 250 ml intermittent infusion 1,500 mg         1,500 mg  over 90 Minutes Intravenous ONCE 22 1217            Contrast Orders - past 72 hours (72h ago, onward)    None          Insight[employer] Prediction of Planned Initial Vancomycin Regimen          Plan:  1. ED loading dose of  vancomycin  1500 mg IV x1  (~20mg/kg)  2. Please reconsult pharmacy if vancomycin therapy is to continue     Mikael Louis [facility]    "

## 2022-07-21 NOTE — PHARMACY-VANCOMYCIN DOSING SERVICE
"Pharmacy Vancomycin Initial Note  Date of Service 2022  Patient's  1940  81 year old, male    Indication: Skin and Soft Tissue Infection    Current estimated CrCl = Estimated Creatinine Clearance: 58 mL/min (based on SCr of 1.14 mg/dL).    Creatinine for last 3 days  2022: 12:27 PM Creatinine 1.14 mg/dL    Recent Vancomycin Level(s) for last 3 days  No results found for requested labs within last 72 hours.      Vancomycin IV Administrations (past 72 hours)                   vancomycin 1500 mg in 0.9% NaCl 250 ml intermittent infusion 1,500 mg (mg) 1,500 mg New Bag 22 1316                Nephrotoxins and other renal medications (From now, onward)    Start     Dose/Rate Route Frequency Ordered Stop    22 1300  vancomycin 1250 mg in 0.9% NaCl 250 mL intermittent infusion 1,250 mg         1,250 mg  over 90 Minutes Intravenous EVERY 24 HOURS 22 1602      22 0800  furosemide (LASIX) tablet 20 mg        Note to Pharmacy: PTA Sig:Take 1 tablet (20 mg) by mouth daily for 30 days      20 mg Oral DAILY 22 1552      22 1600  piperacillin-tazobactam (ZOSYN) 3.375 g vial to attach to  mL bag        Note to Pharmacy: For [facility] and [facility]: For Zosyn-naive patients, use the \"Zosyn initial dose + extended infusion\" order panel.    3.375 g  over 240 Minutes Intravenous EVERY 8 HOURS 22 1552      22 1230  piperacillin-tazobactam (ZOSYN) 3.375 g vial to attach to  mL bag        Note to Pharmacy: For [facility], [facility] and [facility]: For Zosyn-naive patients, use the \"Zosyn initial dose + extended infusion\" order panel.    3.375 g  over 240 Minutes Intravenous ONCE 22 1210            Contrast Orders - past 72 hours (72h ago, onward)    None          InsightRX Prediction of Planned Initial Vancomycin Regimen  Regimen: 1250 mg IV every 24 hours.  Start time: 17:01 on 2022  Exposure target: AUC24 (range)400-600 mg/L.hr   AUC24,ss: 491 mg/L.hr  Probability of AUC24 " > 400: 73 %  Ctrough,ss: 14.8 mg/L  Probability of Ctrough,ss > 20: 22 %  Probability of nephrotoxicity (Lodise [place] 2009): 10 %        Plan:  1. Start vancomycin  1250 mg IV q24h.   2. Vancomycin monitoring method: AUC  3. Vancomycin therapeutic monitoring goal: 400-600 mg*h/L  4. Pharmacy will check vancomycin levels as appropriate in 1-3 Days.    5. Serum creatinine levels will be ordered daily for the first week of therapy and at least twice weekly for subsequent weeks.      Stephan Mi [facility]

## 2022-07-21 NOTE — PROGRESS NOTES
Vascular and Interventional Radiology    -Case discussed with Dr. Denton, non healing LLE wound. Non-invasive imaging demonstrates left toe pressure of 15 mmHg which is inadequate for wound healing. US suggests tibial disease    -Will plan for LLE angiogram tomorrow at 11AM, keep NPO after midnight, hold Xarelto until post angiogram.    ОЛЕГ Coello MD

## 2022-07-21 NOTE — PROGRESS NOTES
Follow up Vascular Visit       Date of Service:07/21/22      Chief Complaint: BLE leg swelling and L leg wound      Pt returns to [facility] with regards to their BLE leg swelling and L leg wound.  They arrive today with daughter in law. They are currently using calcium alginate, abd pads and gauze to the wounds. This is being done by daughter. They are using nothing for compression. They are feeling well today. Denies fevers, chills. No shortness of breath.     Allergies: No Known Allergies    Medications:   Current Outpatient Medications:      ASPIRIN NOT PRESCRIBED, INTENTIONAL,, by Other route continuous prn., Disp: , Rfl: 0     ciprofloxacin (CIPRO) 500 MG tablet, Take 1 tablet (500 mg) by mouth 2 times daily, Disp: 20 tablet, Rfl: 0     digoxin (LANOXIN) 125 MCG tablet, every 24 hours, Disp: , Rfl:      furosemide (LASIX) 20 MG tablet, every 24 hours, Disp: , Rfl:      furosemide (LASIX) 20 MG tablet, Take 1 tablet (20 mg) by mouth daily for 30 days, Disp: 30 tablet, Rfl: 0     metoprolol succinate ER (TOPROL-XL) 200 MG 24 hr tablet, Take 1 tablet (200 mg) by mouth daily, Disp: 90 tablet, Rfl: 3     metoprolol tartrate (LOPRESSOR) 100 MG tablet, metoprolol tartrate 100 mg tablet  Take 1 tablet twice a day by oral route., Disp: , Rfl:      order for DME, Please draw INR as ordered and as needed. Call results to [facility] at (p) [phone] or fax them to (f) [phone], Disp: 3 Month, Rfl: 3     potassium chloride ER (K-TAB/KLOR-CON) 10 MEQ CR tablet, every 24 hours, Disp: , Rfl:      rivaroxaban ANTICOAGULANT (XARELTO ANTICOAGULANT) 20 MG TABS tablet, Take 1 tablet (20 mg) by mouth daily (with dinner), Disp: 90 tablet, Rfl: 3     rosuvastatin (CRESTOR) 5 MG tablet, Take 1 tablet (5 mg) by mouth daily, Disp: 90 tablet, Rfl: 3     amoxicillin-clavulanate (AUGMENTIN) 875-125 MG tablet, Take 1 tablet by mouth 2 times daily for 10 days (Patient not taking:  Reported on 7/21/2022), Disp: 20 tablet, Rfl: 0    History:   Past Medical History:   Diagnosis Date     Acute kidney injury (H) 8/5/2019       Physical Exam:    /64   Pulse 116   Temp 98.3  F (36.8  C)     General:  Patient presents to clinic in no apparent distress.  Head: normocephalic atraumatic  Psychiatric:  Alert and oriented x3.   Respiratory: unlabored breathing; no cough  Integumentary:  Skin is uniformly warm, dry and pink.    Wound #1 Location: Left leg scattered  Size: 10L x 13W x 0.2depth.  No sinus tract present, Wound base: slough covered  No undermining present. Wound is full thickness. There is heavy drainage. Periwound:  + denudement, + erythema, no induration, maceration or +warmth.      Closed/Suction Drain 1 Right Abdomen Bulb 15 Luxembourgish (Active)   Number of days: 1107       VASC Wound left leg (scattered) (Active)   Pre Size Length 10 07/21/22 0900   Pre Size Width 13 07/21/22 0900   Pre Size Depth 0.2 07/21/22 0900   Pre Total Sq cm 130 07/21/22 0900   Number of days: 9       Incision/Surgical Site 07/10/19 Bilateral Abdomen (Active)   Number of days: 1107            Circumferential volume measures:      No flowsheet data found.    Labs:    I personally reviewed the following lab results today and those on care everywhere    No results found for: CRP   Sed Rate   Date Value Ref Range Status   01/07/2009 10 0 - 20 mm/h Final      Last Renal Panel:  Sodium   Date Value Ref Range Status   07/16/2021 144 133 - 144 mmol/L Final   07/01/2020 139 133 - 144 mmol/L Final     Potassium   Date Value Ref Range Status   07/16/2021 4.3 3.4 - 5.3 mmol/L Final   07/01/2020 4.1 3.4 - 5.3 mmol/L Final     Chloride   Date Value Ref Range Status   07/16/2021 110 (H) 94 - 109 mmol/L Final   07/01/2020 107 94 - 109 mmol/L Final     Carbon Dioxide   Date Value Ref Range Status   07/01/2020 31 20 - 32 mmol/L Final     Carbon Dioxide (CO2)   Date Value Ref Range Status   07/16/2021 28 20 - 32 mmol/L Final      Anion Gap   Date Value Ref Range Status   07/16/2021 6 3 - 14 mmol/L Final   07/01/2020 1 (L) 3 - 14 mmol/L Final     Glucose   Date Value Ref Range Status   07/16/2021 129 (H) 70 - 99 mg/dL Final   07/01/2020 73 70 - 99 mg/dL Final     Comment:     Non Fasting     Urea Nitrogen   Date Value Ref Range Status   07/16/2021 20 7 - 30 mg/dL Final   07/01/2020 13 7 - 30 mg/dL Final     Creatinine   Date Value Ref Range Status   07/16/2021 1.14 0.66 - 1.25 mg/dL Final   07/01/2020 1.15 0.66 - 1.25 mg/dL Final     GFR Estimate   Date Value Ref Range Status   07/16/2021 60 (L) >60 mL/min/1.73m2 Final     Comment:     As of July 11, 2021, eGFR is calculated by the CKD-EPI creatinine equation, without race adjustment. eGFR can be influenced by muscle mass, exercise, and diet. The reported eGFR is an estimation only and is only applicable if the renal function is stable.   07/01/2020 60 (L) >60 mL/min/[1.73_m2] Final     Comment:     Non  GFR Calc  Starting 12/18/2018, serum creatinine based estimated GFR (eGFR) will be   calculated using the Chronic Kidney Disease Epidemiology Collaboration   (CKD-EPI) equation.       Calcium   Date Value Ref Range Status   07/16/2021 8.8 8.5 - 10.1 mg/dL Final   07/01/2020 8.4 (L) 8.5 - 10.1 mg/dL Final     Albumin   Date Value Ref Range Status   07/01/2020 3.4 3.4 - 5.0 g/dL Final      Lab Results   Component Value Date    WBC 8.5 07/16/2021    WBC 9.5 07/01/2020     Lab Results   Component Value Date    RBC 5.26 07/16/2021    RBC 5.09 07/01/2020     Lab Results   Component Value Date    HGB 16.3 07/16/2021    HGB 16.2 07/01/2020     Lab Results   Component Value Date    HCT 51.2 07/16/2021    HCT 50.9 07/01/2020     No components found for: MCT  Lab Results   Component Value Date    MCV 97 07/16/2021     07/01/2020     Lab Results   Component Value Date    MCH 31.0 07/16/2021    [facility] 31.8 07/01/2020     Lab Results   Component Value Date    [facility] 31.8 07/16/2021     [facility] 31.8 07/01/2020     Lab Results   Component Value Date    RDW 14.9 07/16/2021    RDW 15.0 07/01/2020     Lab Results   Component Value Date     07/16/2021     07/01/2020      Lab Results   Component Value Date    A1C 5.5 07/11/2019    A1C 5.7 01/29/2018    A1C 6.0 01/07/2009      TSH   Date Value Ref Range Status   07/16/2021 2.13 0.40 - 4.00 mU/L Final   01/27/2018 1.334 0.340 - 5.600 uIU/ml Final      No results found for: VITDT                Impression:  Encounter Diagnoses   Name Primary?     Open wound of left lower extremity, initial encounter Yes     PAD (peripheral artery disease) (H)      Local infection of wound      Acute on chronic congestive heart failure, unspecified heart failure type (H)           7/21/22 L leg       7/21/22 R leg            Are any of these wounds new today: No; Location: na      Assessment/Plan:          1. Debridement: na     2.  Wound treatment: wound treatment will include irrigation and dressings to promote autolytic debridement which will include: Cleanse with dilute hibiclens 30cc in 500cc NS. Apply medihoney to wound bed, cover with abd pad and gauze roll to be changed every other day. If for some reason the patient is not able to get their dressing(s) changed as outlined above (due to illness, lack of supplies, lack of help) please do the following: remove old, soiled dressings; wash the wounds with saline; pat dry; apply ABD pad or other absorbant pad and secure with rolled gauze; avoid tape directly on your skin; patient instructed to call the clinic as soon as possible to let us know what the current issues are in receiving wound care. Worsened. Pt did not continue Augmentin because after one dose he had diarrhea. Pt just started Ciprofloxacin yesterday. The periwound is starting to deteriorate. New blister present on right leg but no open wounds. Educated pt on the importance of taking the full antibiotic regimen in order to prevent resistance, and  sepsis. Pt's vital signs are concerning for developing sepsis. Unsure if related to infection or heart failure. Will send to Will consider triad paste around the wound bed next visit.            3. Edema: +2 pitting edema to BLE. Dr. Denton would like to perform angiogram on pt. Pt to be put on Dr. Denton's schedule once out of the hospital           4. Nutrition: Low na diet           5. Offloading: keep pressure off the area           6.  Chronic systolic heart failure: Pt comes into clinic with increased work of breathing, /64, RR 26, , audible rhonchi, dusky color, teleangiectasis in face. Pt reports still taking Lasix. Family reports pt passing out twice this week. No beds available for direct admit. Instructed pt to go to ER right away. Family and pt understands.     Patient will follow up with me once out of the hospital for reevaluation. Will have staff call to make follow up appointment. They were instructed to call the clinic sooner with any signs or symptoms of infection or any further questions/concerns. Answered all questions.      40 minutes spent on the date of the encounter doing chart review, history and exam, documentation and further activities per the note    Lakisha Ma MS, APRN, AGNP-C, CWCN  [facility]   [phone]        This note was electronically signed by PALOMO Chand CNP

## 2022-07-22 NOTE — PROGRESS NOTES
1337 - , Dr. Coello approved for arterial sheath removal after Protamine 50 mg IV was given, and ACT < 180.  1345 - RN removed right femoral arterial sheath and held pressure at venotomy site for 10 mins. Hemostasis achieved.  2 x 2 dressing and gauze applied to groin site.

## 2022-07-22 NOTE — DISCHARGE INSTRUCTIONS
Angiogram Discharge Instructions:  You had an angiogram procedure.  An angiogram is a procedure that uses x-rays to take pictures of your blood vessels. A long, flexible tube or catheter is inserted through the blood stream (through the procedure site) to help deliver contrast (dye) into the arteries so they can be visible on the x-ray. Angiograms are used to evaluate possible blockages in the arterial system. Please follow the below instructions after your angiogram, including monitoring of your procedure site.    Care instructions after angiogram procedure:  -  If you received sedation for your procedure, do not drive or operate heavy machinery for the rest of the day.  -  Do not lift objects greater than 10 pounds for 2 days following angiogram procedure.  -  Avoid excessive exercise and straining for 2 days.   -  Avoid tub baths, pools, hot tubs and Jacuzzis for 3 days or until procedure site is well healed.   -  You may shower beginning tomorrow. Do not scrub procedure site until well healed; pat dry.  -  Return to your normal activities as you tolerate after the 2 day restriction.  -  You can expect to return to work 1-2 days after your procedure - depending on the nature of your profession.  -  It is normal to have some tenderness and minimal swelling at procedure puncture site. A small area of discoloration may be present. Tenderness typically subsides in 1-2 days. A small knot may also be present at puncture site for 6-8 weeks. This can be a normal part of the healing process.     Follow up:  - Follow up with your vascular surgeon or the ordering provider. [facility] may contact you to help arrange for additional follow up. [phone].    Please seek medical evaluation for:  - If you develop fevers (greater than 101 F (38.3C)).  - If you develop increasing pain, redness, purulent drainage, tenderness, or swelling at procedure site.   - If you experience any bleeding from procedure/puncture site:  lie down, firmly apply pressure to puncture site and call 911.  - Seek emergent evaluation if you experience any new leg/arm pain, discoloration or numbness.    Call [facility] at [phone] with questions/concerns or if you have any of the above symptoms.      Home care services have been arranged for you:  Home Care Agency: [facility]  Home Care Services: OT and RN for Wound Care  Home Care Phone: [phone]    Instructions: Home care agency will call within 48 hours of discharge to schedule appointments.    Wound Care: Continue with OP plan of care and follow up with provider per routine schedule.  Left lower extremity  Wound treatment: wound treatment will include irrigation and dressings to promote autolytic debridement which will include: Cleanse with dilute hibiclens 30cc in 500cc NS. Apply medihoney to wound bed, cover with abd pad and gauze roll to be changed every other day. If for some reason the patient is not able to get their dressing(s) changed as outlined above (due to illness, lack of supplies, lack of help) please do the following: remove old, soiled dressings; wash the wounds with saline; pat dry; apply ABD pad or other absorbant pad and secure with rolled gauze; avoid tape directly on your skin; patient instructed to call the clinic as soon as possible to let us know what the current issues are in receiving wound care.

## 2022-07-22 NOTE — PRE-PROCEDURE
GENERAL PRE-PROCEDURE:   Procedure:  LLE angio  Date/Time:  7/22/2022 10:52 AM    Verbal consent obtained?: Yes    Written consent obtained?: Yes    Risks and benefits: Risks, benefits and alternatives were discussed    Consent given by:  Patient  Patient states understanding of procedure being performed: Yes    Patient's understanding of procedure matches consent: Yes    Procedure consent matches procedure scheduled: Yes    Expected level of sedation:  Moderate  Appropriately NPO:  Yes  ASA Class:  3  Mallampati  :  Grade 3- soft palate visible, posterior pharyngeal wall not visible  Lungs:  Lungs clear with good breath sounds bilaterally  Heart:  Normal heart sounds and rate  History & Physical reviewed:  History and physical reviewed and no updates needed  Statement of review:  I have reviewed the lab findings, diagnostic data, medications, and the plan for sedation

## 2022-07-22 NOTE — PLAN OF CARE
Problem: Plan of Care - These are the overarching goals to be used throughout the patient stay.    Goal: Plan of Care Review/Shift Note  Description: The Plan of Care Review/Shift note should be completed every shift.  The Outcome Evaluation is a brief statement about your assessment that the patient is improving, declining, or no change.  This information will be displayed automatically on your shift note.  Outcome: Ongoing, Progressing  Flowsheets (Taken 7/21/2022 2200)  Plan of Care Reviewed With: patient  Outcome Evaluation: Pt arrived to unit at 1930.  Pleasant and cooperative with cares at this time.  A&Ox4, needs occasional reminders.  Pt can be irritable with awakening.  Pt educated on unit and POC, verbalized understanding.  Tele-afib, has loose non-productive cough, oxygenating appropriately without need for supplemental oxygen.  Uses call light appropriately, denies pain, and resting comfortably at this time.  Overall Patient Progress: no change     Problem: Plan of Care - These are the overarching goals to be used throughout the patient stay.    Goal: Optimal Comfort and Wellbeing  Intervention: Monitor Pain and Promote Comfort  Recent Flowsheet Documentation  Taken 7/21/2022 2000 by Rafia John, RN  Pain Management Interventions:   repositioned   rest     Problem: Risk for Delirium  Goal: Improved Attention and Thought Clarity  Intervention: Maximize Cognitive Function  Recent Flowsheet Documentation  Taken 7/21/2022 2000 by Rafia John, RN  Sensory Stimulation Regulation:   care clustered   lighting decreased   television on  Reorientation Measures:   clock in view   reorientation provided     Problem: Gas Exchange Impaired  Goal: Optimal Gas Exchange  Intervention: Optimize Oxygenation and Ventilation  Recent Flowsheet Documentation  Taken 7/21/2022 2000 by Rafia John RN  Head of Bed (HOB) Positioning: HOB at 15 degrees     Problem: Dysrhythmia  Goal: Normalized Cardiac  Rhythm  Intervention: Monitor and Manage Cardiac Rhythm Effect  Recent Flowsheet Documentation  Taken 7/21/2022 2000 by Rafia John, RN  VTE Prevention/Management: SCDs (sequential compression devices) off     Problem: Skin or Soft Tissue Infection  Goal: Absence of Infection Signs and Symptoms  Outcome: Ongoing, Progressing   Goal Outcome Evaluation:    Plan of Care Reviewed With: patient     Overall Patient Progress: no change    Outcome Evaluation: Pt arrived to unit at 1930.  Pleasant and cooperative with cares at this time.  A&Ox4, needs occasional reminders.  Pt can be irritable with awakening.  Pt educated on unit and POC, verbalized understanding.  Tele-afib, has loose non-productive cough, oxygenating appropriately without need for supplemental oxygen.  Uses call light appropriately, denies pain, and resting comfortably at this time.

## 2022-07-22 NOTE — PLAN OF CARE
Vss other than BP's which have been elevated in the 140's. Denies pain. Tele running Afib. IV abx r/t COVID and otherwise asymptomatic. Remains on K (4.8) and Mg (2.1) protocols which are re-checks in the AM. Plan for pt to go NPO at midnight in preparation for IR to do a LLE angio tomorrow at 11am. Pt has been resistant to certain cares and has been re-directable but anxious and agitated about being here. Pt transferring to 3N. Nurse to nurse hand off completed.

## 2022-07-22 NOTE — PROGRESS NOTES
Care Management Initial Consult    General Information  Assessment completed with:  ,    Type of CM/SW Visit: Chart Assessment    Primary Care Provider verified and updated as needed:     Readmission within the last 30 days:           Advance Care Planning:            Communication Assessment  Patient's communication style: spoken language (English or Bilingual)    Hearing Difficulty or Deaf: yes   Wear Glasses or Blind: no    Cognitive  Cognitive/Neuro/Behavioral: .WDL except, mood/behavior, orientation  Level of Consciousness: alert  Arousal Level: opens eyes spontaneously  Orientation: disoriented to, situation, time  Mood/Behavior: labile, uncooperative  Best Language: 0 - No aphasia  Speech: clear    Living Environment:   People in home:       Current living Arrangements:        Able to return to prior arrangements:         Family/Social Support:  Care provided by:    Provides care for:                  Description of Support System:           Current Resources:   Patient receiving home care services:       Community Resources:    Equipment currently used at home: none  Supplies currently used at home:      Employment/Financial:  Employment Status:          Financial Concerns:             Lifestyle & Psychosocial Needs:  Social Determinants of Health     Tobacco Use: Medium Risk     Smoking Tobacco Use: Former Smoker     Smokeless Tobacco Use: Never Used   Alcohol Use: Not on file   Financial Resource Strain: Not on file   Food Insecurity: Not on file   Transportation Needs: Not on file   Physical Activity: Not on file   Stress: Not on file   Social Connections: Not on file   Intimate Partner Violence: Not on file   Depression: Not at risk     PHQ-2 Score: 0   Housing Stability: Not on file       Functional Status:  Prior to admission patient needed assistance:              Mental Health Status:          Chemical Dependency Status:                Values/Beliefs:  Spiritual, Cultural Beliefs, Buddhism  Practices, Values that affect care:                 Additional Information:  8:38 AM  Chart reviewed.  Anticipate discharge home, no needs.  Anticipate family transport.      EVANGELISTA Mcdaniels

## 2022-07-22 NOTE — PROGRESS NOTES
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 Order IV piperacillin, tazobactam, and vancomycin broad coverage, consult Vascular Surgery and wound care team (WOC), and discussed/offered remdesivir x3-day course given high-risk factors and Gian has agreed to this. Checked LFTs before starting remdesivir. Continue home lasix; BNP minimally elevated and noted without pulmonary edema, effusion, or cardiomegaly on CXR. IR to perform angiogram today; NPO and holding home Rivaroxaban/DOAC. Minimal 1 L requirement still c/w hypoxia; dexamethasone to be started and remdesivir can now be considered for up to 5 -days.     Assessment and Plan:  Left LE Open Wound / Wound Infection  Left LE Cellulitis   -Started on Augmentin/Cipro outpatient  -Continue Zosyn and Vancomycin now  -Presented from Vascular Surgery Clinic directly to ED  -Consult to Vascular Surgery / IR  -WOC consult and eval  -Pseudomonas, Klebsiella, and enterrococus    -IR to perform angiogram today; NPO and holding home Rivaroxaban/DOAC.     COVID19 Viral Infection  COVID19 PNA  Acute Hypoxic Respiratory Failure  -Special Precautions  -Remdesivir course - now can be up to 5 days or until no longer hypoxic.  -As he developed a 1 L requirement for hypoxia; start dexamethasone.   -RT support, hygiene.  -Given comorbidities, patient is at high-risk for rapid/sudden decompensation.   -Holding home Rivaroxaban/DOAC for procedure    Pre-Operative General Physical Exam  Pre-Operative Cardiac Risk Stratification   -History and chart reviewed.   -HPI directly reviewed with patient.  "  -No further cardiac risk stratification recommended at this time.   -Proceed with IR angiography/procedure as per surgery team.      Atrial Fibrillation   -Med/surg telemetry.  -Optimize potassium and magnesium. Keep K > 4.0, Mag > 2.0. RN potassium and magnesium replacement protocols utilized/ordered.   -Order home medications including AV-london rate control medication.  -Recommend resuming home anticoagulation with warfarin/DOAC.  -If any acute sustained rapid rates develop, obtain EKG to evaluate for RVR and initiate cardiac telemetry and update [facility] team.      HTN  Non-ischemic cardiomyopathy  -Order home medications and as needed apply specified hold parameters.      HLD  -Order home statin.    Clinically Significant Risk Factors Present on Admission             # Hypoalbuminemia: Albumin = 2.3 g/dL (Ref range: 3.5 - 5.0 g/dL) on admission, will monitor as appropriate   # Coagulation Defect: home medication list includes an anticoagulant medication      # Overweight: Estimated body mass index is 25.9 kg/m  as calculated from the following:    Height as of this encounter: 1.753 m (5' 9\").    Weight as of this encounter: 79.6 kg (175 lb 6.4 oz).        Diet: NPO per Anesthesia Guidelines for Procedure/Surgery Except for: Meds  DVT Prophylaxis:  DOAC (holding for IR procedure)  Code Status: No CPR- Do NOT Intubate    Anticipated possible discharge in 1-2 days to likely back home once IR procedure, vascular surgery consult, transition IV antibiotic, COVID19 treatment milestones are met.  Disposition Plan      Expected Discharge Date: 07/24/2022      Destination: home with family  Discharge Comments: pending clinical progression        The patient's care was discussed with the Patient.    Eduard Arredondo MD  Internal Medicine  [facility] Medicine  [facility]  Phone: #[phone]    Interval History/Subjective:  Developed 1 L requirement overnight. No new reports of chest pain or tightness. " Discussed POC with RN.     Physical Exam/Objective:  Temp:  [97  F (36.1  C)-98.3  F (36.8  C)] 97.8  F (36.6  C)  Pulse:  [] 88  Resp:  [20-27] 20  BP: (117-140)/(60-72) 117/65  SpO2:  [87 %-98 %] 95 %  Body mass index is 25.9 kg/m .  GENERAL:  Alert, appears comfortable, in no acute distress, appears stated age   HEAD:  Normocephalic, without obvious abnormality, atraumatic   EYES:  Conjunctiva/corneas clear, no scleral icterus, EOM's appears intact grossly   NOSE: Nares normal, septum midline, mucosa normal, no drainage   THROAT: Lips, mucosa, and tongue appear normal   NECK: Symmetrical, trachea appears midline   BACK:   Symmetric, no curvature noted   LUNGS:   Symmetric chest rise on inhalation, respirations unlabored   CHEST WALL:  No apparent deformity   HEART:  No thrills or heaves visualized   ABDOMEN:   No masses or organomegaly apparent; non-scaphoid   EXTREMITIES: Extremities appear normal, atraumatic, no cyanosis   SKIN: No exanthems in the visualized areas   NEURO: Alert, oriented, moves all four extremities freely and spontaneously   PSYCH: Cooperative, behavior is appropriate     Data reviewed today: I personally reviewed all new medications, labs, imaging/diagnostics reports over the past 24 hours. Pertinent findings include:    Imaging:   Recent Results (from the past 24 hour(s))   Chest XR,  PA & LAT    Narrative    EXAM: XR CHEST 2 VIEWS  LOCATION: [facility]  DATE/TIME: 7/21/2022 1:31 PM    INDICATION: Cough  COMPARISON: Chest x-ray 07/16/2019      Impression    IMPRESSION: Negative chest.  The lungs are clear and there are no pleural effusions. Normal heart size.   Head CT w/o contrast    Narrative    EXAM: CT HEAD W/O CONTRAST  LOCATION: [facility]  DATE/TIME: 7/21/2022 1:31 PM    INDICATION: Fall, trauma, pain.  COMPARISON: None available.  TECHNIQUE: Routine CT Head without IV contrast. Multiplanar reformats. Dose reduction techniques  were used.    FINDINGS:  INTRACRANIAL CONTENTS: No intracranial hemorrhage, extraaxial collection, or mass effect.  No CT evidence of acute infarct. Chronic left basal ganglia/corona radiata infarction. Moderate presumed chronic small vessel ischemic change. Moderate generalized   volume loss. No hydrocephalus. The sella is unremarkable for technique. The cerebellar tonsils are appropriately positioned.     VISUALIZED ORBITS/SINUSES/MASTOIDS: No intraorbital abnormality. Mild to moderate mucosal thickening scattered about the paranasal sinuses. This is most pronounced in the left maxillary sinus. No middle ear or mastoid effusion.    BONES/SOFT TISSUES: No scalp hematoma. No skull fracture.      Impression    IMPRESSION:  1.  No evidence of acute intracranial hemorrhage.  2.  No evidence of acute calvarial fracture.  3.  Chronic left basal ganglia/white matter infarction.  4.  Moderate presumed chronic small vessel ischemic change.  5.  Moderate atrophy.       Labs:  Most Recent 3 CBC's:Recent Labs   Lab Test 07/22/22  0516 07/21/22  1719 07/21/22  1227   WBC 8.8 12.2* 13.9*   HGB 15.1 16.2 15.9   MCV 95 94 95    341 313     Most Recent 3 BMP's:Recent Labs   Lab Test 07/22/22  0516 07/21/22  1719 07/21/22  1227    139 138   POTASSIUM 4.2 4.4 4.8   CHLORIDE 108* 103 104   CO2 26 25 25   BUN 20 26 30*   CR 0.88 1.08 1.14   ANIONGAP 8 11 9   DARON 8.5 8.9 8.8   GLC 79 85 95     Most Recent 2 LFT's:Recent Labs   Lab Test 07/22/22  0516 07/21/22  1719   AST 15 19   ALT 11 15   ALKPHOS 52 64   BILITOTAL 0.4 0.7     Most Recent 3 INR's:Recent Labs   Lab Test 07/21/22  1719 07/21/22  1227 09/21/21  1214   INR 1.07 1.12 2.6*       Medications:   Personally Reviewed.  Medications     - MEDICATION INSTRUCTIONS -         remdesivir  100 mg Intravenous Q24H    And     sodium chloride 0.9%  50 mL Intravenous Q24H     furosemide  20 mg Oral Daily     metoprolol succinate ER  200 mg Oral Daily     piperacillin-tazobactam   3.375 g Intravenous Q8H     [Held by provider] rivaroxaban ANTICOAGULANT  20 mg Oral Daily with supper     sodium chloride (PF)  3 mL Intracatheter Q8H     sodium chloride (PF)  3 mL Intracatheter Q8H     vancomycin  1,250 mg Intravenous Q24H

## 2022-07-22 NOTE — PROGRESS NOTES
Patient Name: Bradley Liz  Medical Record Number: [id number]  Today's Date: 7/22/2022    Procedure: Image Guided Left Lower Extremity Angiogram with Intervention and moderate sedation  Proceduralist: Dr. Vilma Coello  Pathology present: n/a    Procedure Start: 1115  Procedure end: 1320  Sedation medications administered: Fentanyl 100 mcg, Versed 2 mg    Report given to: GEMA Feng IR  : n/a    Other Notes: Pt arrived to IR room #1 from IR Pre / Post 1. Consent reviewed. Pt denies any questions or concerns regarding procedure. Pt positioned supine and monitored per protocol. Left Lower Extremity angiogram done, plasty x 3. Pt tolerated procedure without any noted complications. Pt transferred back to IR Pre / Post 1.

## 2022-07-22 NOTE — PLAN OF CARE
Problem: Plan of Care - These are the overarching goals to be used throughout the patient stay.    Goal: Plan of Care Review/Shift Note  Description: The Plan of Care Review/Shift note should be completed every shift.  The Outcome Evaluation is a brief statement about your assessment that the patient is improving, declining, or no change.  This information will be displayed automatically on your shift note.  Outcome: Ongoing, Progressing  Flowsheets (Taken 7/22/2022 0343)  Outcome Evaluation: Pt still resistent to certain cares and upset with staff in general for disturbing his sleep (during our Q4 hour assessments/vitals).  Refusing to let staff remove the dressings that the [facility] clinic placed on his legs 7/21/22.  He had sats in the mid 80's % range while sleeping and was upset about O2 via NC being started.  on 3L NC his sats are 93% or greater.  He continues in A fib and rates can be above 100 depending on activity or how upset he is.       Goal Outcome Evaluation:      Problem: Gas Exchange Impaired  Goal: Optimal Gas Exchange  Outcome: Ongoing, Not Progressing  Intervention: Optimize Oxygenation and Ventilation  Recent Flowsheet Documentation  Taken 7/22/2022 0015 by Kyle Ramirez, RN  Head of Bed (HOB) Positioning: HOB at 15 degrees     Problem: Skin or Soft Tissue Infection  Goal: Absence of Infection Signs and Symptoms  Outcome: Ongoing, Not Progressing       Pt with wound to left lower extremity that he is refusing to allow staff to take currently clean/dry/intact dressing off to evaluate/assess.  There are pictures in his chart from the Vascular Clinic appointment he had yesterday (7/21/22).  He has, according to the pictures, a fluid filled blister on his right shin as well which is covered with a bandage that he refuses staff to remove.  He as well has a bandage on his right elbow that he refuses staff to remove.  Pt upset with staff for waking him up to do assessments and measure vital signs.  Pt is  currently NPO for procedure (LLE Angio) later today.  His oxygen sats did drop while he was sleeping to the mid 80's range so 3L O2 vian NC was applied and his sats back up to 93% or greater.  Continue to monitor and encourage pt to allow staff to assess wounds and perform tasks while also allowing pt longer periods of sleep when able.

## 2022-07-22 NOTE — CONSULTS
[facility]  WOC Nurse Inpatient Assessment     Consulted for: left lower leg wounds    Patient History (according to provider note(s):      Patient is followed by the vacsular clinic and sent to hospital directly from clinic 7/21. Today he had an angiogram of LLE.    Areas Assessed:      Areas visualized during today's visit: bilateral lower legs    Wound location: LLE    Last photo: 7/21 taken at clinic, see media tab - photo not taken by WOC today  Wound due to: Mixed Etiology: venous/arterial  Wound history/plan of care: Patient seen at clinic and has plan in place including calcium alginate, can return to these orders after discharge  Wound base: Sloughy where can be seen     Palpation of the wound bed: boggy   Patient had calcium alginate stuck to wounds beds, even after cleansing with sopa and water and then saline soak, could not fully remove. Placed xeroform over alginate to assist release.       Drainage: scant     Description of drainage: serous     Measurements (length x width x depth, in cm): Unable to clearly measure - approximately 3 x 9 and a distal area approximately 2 x 2cm     Tunneling: N/A     Undermining: N/A  Periwound skin: Edematous and discolored      Color: red      Temperature: normal   Odor: none  Pain: denies   Treatment goal: Heal  and Infection control/prevention  STATUS: initial assessment  Supplies ordered: at bedside       Treatment Plan:     LLE:  1. Cleanse legs with bath wipes or warm soap and water, gently pat dry  2. Apply Xeroform to open wounds on left leg, cover with abd pad, wrap with kerlix toes to knee.  Right leg can be open to air, no open areas at this time  Change daily  If supplies needed from stores, PS# [id number]      Orders: Written    RECOMMEND PRIMARY TEAM ORDER: None, at this time  Education provided: plan of care  Discussed plan of care with: Patient and Nurse  WOC nurse follow-up plan: weekly  Notify WOC if wound(s)  deteriorate.  Nursing to notify the Provider(s) and re-consult the [facility] Nurse if new skin concern.    DATA:     Current support surface: Standard  Low air loss mattress  BMI: Body mass index is 25.9 kg/m .   Active diet order: Orders Placed This Encounter      Advance Diet as Tolerated: Clear Liquid Diet     Output: I/O last 3 completed shifts:  In: 570 [P.O.:120; I.V.:200; IV Piggyback:250]  Out: 350 [Urine:350]     Labs: Recent Labs   Lab 07/22/22  0516 07/21/22  1719   ALBUMIN 2.3* 2.8*   HGB 15.1 16.2   INR  --  1.07   WBC 8.8 12.2*   CRP 8.3*  --      Pressure injury risk assessment:   Sensory Perception: 3-->slightly limited  Moisture: 3-->occasionally moist  Activity: 3-->walks occasionally  Mobility: 3-->slightly limited  Nutrition: 3-->adequate  Friction and Shear: 2-->potential problem  Christiano Score: 17    Kathy Meade, BELINDAN, RN, PHN, HNB-BC, CWOCN

## 2022-07-22 NOTE — H&P
"  Interventional Radiology - Pre Procedure Chart Review  7/22/2022    Procedure Requested: LEFT LOWER extremity angiogram  Requesting Provider: Mode FRANCO    HPI: Bradley Liz is a 81 year old male PMH HTN, Afib, CHF, CVA, CKDIII.     Presented to ER 7/21/2022 2/2 concerns of BILAT LE wounds, edema, syncope, and falls. He was seen just prior to this at vascular clinic, and a direct admission was attempted.  Noted to be COVID +.  Started on Remdisivir.      Started piperacillin, tazobactam, and vancomycin broad coverage, consult Vascular Surgery and wound care team (WOC), and discussed/offered remdesivir x3-day course given high-risk factors.  Admitted for ongoing evaluation and management.     Evaluated by vascular surgery and Dr. Coello (IR MD).  Noted to have LLE open wound with cellulitis.  Decision made to move forward with LLE angiogram.    Overnight, per chart review \"He had sats in the mid 80's % range while sleeping and was upset about O2 via NC being started.  On 3L NC his sats are 93% or greater.  He continues in A fib and rates can be above 100 depending on activity or how upset he is.\"    Imaging:   Narrative & Impression   Arterial Duplex Ultrasound (Date: 07/08/22)    Lower Extremity Artery Evaluation          Indication: SURVEILLANCE : BILATERAL LEG SWELLING. WEEPING WOUNDS.      Previous: NONE     History: Previous Smoker, Skin Color Change and Vascular Ulcers  Technique: Duplex imaging is performed utilizing gray-scale, two-dimensional images, and color-flow imaging. Doppler waveform analysis and spectral Doppler imaging is also performed.     LOWER EXTREMITY ARTERIAL DUPLEX EXAM WITH WAVEFORMS     Right Leg:(cm/s)  Location: [facility] Waveforms   EIA:   120  T   CFA:   117  T   PFA:   72  B   SFA Proximal:   31  B   SFA Mid:   33  B   SFA Distal:   61  M   Popliteal Artery:   30  M   PTA:   25   M   VICTORIA:   NPD  N/A   DPA:   NPD  N/A   Waveforms: T=Triphasic, M=Monophasic, B=Biphasic        Left " Leg:(cm/s)  Location: [facility] Waveforms   EIA:   172  B   CFA:   114  B   PFA:   113  B   SFA Proximal:   111  B   SFA Mid:   120  B   SFA Distal:   120  B   Popliteal Artery:   42  B   PTA:   62   M   VICTORIA:   35  M   DPA:   24  M   Waveforms: T=Triphasic, M=Monophasic, B=Biphasic                            Impression:      Right Lower Extremity: Triphasic flow in common femoral artery suggesting no inflow disease.  Transition to monophasic flow at the level of the distal SFA which could represent underlying peripheral arterial disease but without focal hemodynamically significant stenosis.  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     Left Lower Extremity: Similarly biphasic flow in common femoral artery suggesting no inflow disease.  Transition to monophasic flow in tibial vessels suggesting underlying peripheral arterial disease without obvious focal hemodynamically significant stenosis.     Reference:  Category Normal 1-19% 20-49% 50-99% Occluded   PSV <160 cm/sec without spectral broadening <160 cm/sec with spectral broadening Increased Increased Absent Flow   Ratio N/A N/A < 2.0 >2.0 N/A   Post-Stenotic Turbulence No No No Yes N/A         NPO Status: MN  Anticoagulation/Antiplatelets/Bleeding tendencies: Xarelto 20mg PO every day- HELD, last dose prior to admission yesterday   Antibiotics: Zosyn 3.375g IV Q8, Vancomycin 1250mg IV Q24.  Antibiotics not clinically indicated for IR procedure, per review of current clinical practice guidelines    Review of Systems: Unable to assess    PMH:  Past Medical History:   Diagnosis Date     Acute kidney injury (H) 8/5/2019     Patient Active Problem List   Diagnosis     Completed stroke (H)     CARDIOVASCULAR SCREENING; LDL GOAL LESS THAN 100     Long term (current) use of anticoagulants     Atrial fibrillation, unspecified type (H)     Chronic systolic heart failure (H)     History of CVA (cerebrovascular accident)     Hypertension,  unspecified type     Dry skin     CKD (chronic kidney disease) stage 3, GFR 30-59 ml/min (H)     Wound infection     Syncope, unspecified syncope type     Infection due to 2019 novel coronavirus     Cellulitis of left lower extremity         PSH:  Past Surgical History:   Procedure Laterality Date     LAPAROSCOPIC CHOLECYSTECTOMY N/A 7/9/2019    Procedure: Laparoscopic cholecystectomy;  Surgeon: Ad Peck MD;  Location:  OR       ALLERGIES:  Allergies   Allergen Reactions     Augmentin Diarrhea       MEDICATIONS:  Current Facility-Administered Medications   Medication     remdesivir 100 mg in sodium chloride 0.9 % 250 mL intermittent infusion    And     0.9% sodium chloride BOLUS     acetaminophen (TYLENOL) tablet 650 mg    Or     acetaminophen (TYLENOL) Suppository 650 mg     furosemide (LASIX) tablet 20 mg     lidocaine (LMX4) cream     lidocaine (LMX4) cream     lidocaine 1 % 0.1-1 mL     lidocaine 1 % 0.1-1 mL     Medication instructions: Do NOT use nebulized medications     melatonin tablet 1 mg     metoprolol succinate ER (TOPROL XL) 24 hr tablet 200 mg     ondansetron (ZOFRAN ODT) ODT tab 4 mg    Or     ondansetron (ZOFRAN) injection 4 mg     piperacillin-tazobactam (ZOSYN) 3.375 g vial to attach to  mL bag     [Held by provider] rivaroxaban ANTICOAGULANT (XARELTO) tablet 20 mg     sodium chloride (PF) 0.9% PF flush 3 mL     sodium chloride (PF) 0.9% PF flush 3 mL     sodium chloride (PF) 0.9% PF flush 3 mL     sodium chloride (PF) 0.9% PF flush 3 mL     vancomycin 1250 mg in 0.9% NaCl 250 mL intermittent infusion 1,250 mg         LABS:  INR   Date Value Ref Range Status   07/21/2022 1.07 0.85 - 1.15 Final   06/29/2021 3.00 (H) 0.86 - 1.14 Final     Comment:     This test is intended for monitoring Coumadin therapy.  Results are not   accurate in patients with prolonged INR due to factor deficiency.        Hemoglobin   Date Value Ref Range Status   07/22/2022 15.1 13.3 - 17.7 g/dL Final  "  07/01/2020 16.2 13.3 - 17.7 g/dL Final   ]  Platelet Count   Date Value Ref Range Status   07/22/2022 294 150 - 450 10e3/uL Final   07/01/2020 361 150 - 450 10e9/L Final     Creatinine   Date Value Ref Range Status   07/22/2022 0.88 0.70 - 1.30 mg/dL Final   07/01/2020 1.15 0.66 - 1.25 mg/dL Final     Potassium   Date Value Ref Range Status   07/22/2022 4.2 3.5 - 5.0 mmol/L Final   07/01/2020 4.1 3.4 - 5.3 mmol/L Final         EXAM:  /65 (BP Location: Left arm)   Pulse 97   Temp 97.7  F (36.5  C) (Oral)   Resp 20   Ht 1.753 m (5' 9\")   Wt 79.6 kg (175 lb 6.4 oz)   SpO2 98%   BMI 25.90 kg/m        Pre-Sedation Assessment:  Unable to assess    Code Status: DNR/DNI per chart review.  Please confirm status with patient prior to procedure      ASSESSMENT:  82 yo M  - PMH per above, including non-healing, infected LE wounds  - COVID +.  Remdesivir per MAR.  Sats in mid 80's to low 90's on NC.  In Afib.  - Abx per above  - Anticoagulation held, per above  - Imaging per above, indicating left toe pressure of 15 mmHg which is inadequate for wound healing. US suggests tibial disease  - Labs reviewed, acceptable to proceed with IR procedure, per clinical practice guidelines       Planning LEFT LOWER extremity angiogram    PLAN:    Proceed with LEFT LOWER extremity angiogram, with sedation    - PENDING physical examination, airway assessment, and consent   - Careful monitoring of respiratory status with sedation administration given underlying COVID, low saturations, Afib  - No abx for IR procedure, continue broad spectrum antibiotics per MAR.          ROSALVA FINE NP  Interventional Radiology  [phone]    "

## 2022-07-22 NOTE — PROCEDURES
[facility]    Procedure: Imaging Procedure Note    Date/Time: 7/22/2022 1:50 PM  Performed by: Vilma Coello MD  Authorized by: Vilma Coello MD       UNIVERSAL PROTOCOL   Site Marked: Yes  Prior Images Obtained and Reviewed:  Yes  Required items: Required blood products, implants, devices and special equipment available    Patient identity confirmed:  Verbally with patient  Patient was reevaluated immediately before administering moderate or deep sedation or anesthesia  Confirmation Checklist:  Patient's identity using two indicators, relevant allergies, procedure was appropriate and matched the consent or emergent situation and correct equipment/implants were available  Time out: Immediately prior to the procedure a time out was called    Universal Protocol: the Joint Commission Universal Protocol was followed    Preparation: Patient was prepped and draped in usual sterile fashion      SEDATION  Patient Sedated: Yes    Vital signs: Vital signs monitored during sedation    See dictated procedure note for full details.    PROCEDURE  Describe Procedure: LLE angiogram demonstrates moderate disease in the SFA (treated with angioplasty), occlusion of the TP trunk, occlusion of the TP trunk, no in line flow to the foot.     Successful recanalization/angioplasty of the VICTORIA to the foot to provide one in line vessel    Unsuccessful recanalization of the TP trunk    Able to Doppler PT post procedure, It is hopeful the VICTORIA will allow adequate flow for wound healing, will need repeat left toe pressures to evaluate response  Patient Tolerance:  Patient tolerated the procedure well with no immediate complications  Length of time physician/provider present for 1:1 monitoring during sedation: 120

## 2022-07-22 NOTE — PLAN OF CARE
Problem: Risk for Delirium  Goal: Optimal Coping  Outcome: Ongoing, Progressing  Goal: Improved Behavioral Control  Outcome: Ongoing, Progressing  Goal: Improved Attention and Thought Clarity  Outcome: Ongoing, Progressing  Goal: Improved Sleep  Outcome: Ongoing, Progressing     Problem: Gas Exchange Impaired  Goal: Optimal Gas Exchange  Outcome: Ongoing, Progressing  Intervention: Optimize Oxygenation and Ventilation  Recent Flowsheet Documentation  Taken 7/22/2022 1730 by Maria Constantino RN  Head of Bed (HOB) Positioning: HOB flat  Taken 7/22/2022 1630 by Maria Constantino RN  Head of Bed (HOB) Positioning: HOB flat  Taken 7/22/2022 1530 by Maria Constantino RN  Head of Bed (HOB) Positioning: HOB flat  Taken 7/22/2022 1430 by Maria Constantino RN  Head of Bed (HOB) Positioning: HOB flat  Taken 7/22/2022 1330 by Maria Constantino RN  Head of Bed (HOB) Positioning: HOB flat  Taken 7/22/2022 0900 by Maria Constantino RN  Head of Bed (HOB) Positioning: HOB at 20-30 degrees   Goal Outcome Evaluation:  No c/o pain.  Pt had an angiogram today and got back to the floor around 1330.  He was on flat bedrest for 4 hours.  Unable to find pedal pulses with the doppler.  This is unchanged from admission.  Pt is quite irritable.  His HR has been bouncing around with high being in the 150s.  He was given 3 doses of 5mg of IV metoprolol which has helped some, he is still >100.  He is maintaining his O2 on room air.  Pt uses his call light appropriately for assistance.

## 2022-07-23 NOTE — PROVIDER NOTIFICATION
Paged IR vascular and spoke with Dr. Lorenz. Notified provider that pt's right groin site has about a quarter sized bruise around the site (soft to touch), and has some increased serosanguinous drainage on the dressing (dressing not saturated). Feet cool intermittently. Able to hear a faint dorsalis pedal pulses with doppler intermittently. Pt has sensation of feet and toes and denies numbness/tingling. Per provider, continue to assess groin site and notify again if hematoma becomes firm or major change in bruising/drainage.

## 2022-07-23 NOTE — PHARMACY-VANCOMYCIN DOSING SERVICE
Pharmacy Vancomycin Note  Date of Service 2022  Patient's  1940   81 year old, male    Indication: Skin and Soft Tissue Infection  Day of Therapy: 3  Current vancomycin regimen:  1250 mg IV q24h  Current vancomycin monitoring method: AUC  Current vancomycin therapeutic monitoring goal: 400-600 mg*h/L    Insight[employer] Prediction of Current Vancomycin Regimen  Loading dose: N/A  Regimen: 1250 mg IV every 24 hours.  Start time: 11:58 on 2022  Exposure target: AUC24 (range)400-600 mg/L.hr   AUC24,ss: 436 mg/L.hr  Probability of AUC24 > 400: 67 %  Ctrough,ss: 12.6 mg/L  Probability of Ctrough,ss > 20: 4 %  Probability of nephrotoxicity (Lodise [place] ): 8 %    Current estimated CrCl = Estimated Creatinine Clearance: 66.8 mL/min (based on SCr of 0.99 mg/dL).    Creatinine for last 3 days  2022: 12:27 PM Creatinine 1.14 mg/dL;  5:19 PM Creatinine 1.08 mg/dL  2022:  5:16 AM Creatinine 0.88 mg/dL  2022: 10:09 AM Creatinine 0.99 mg/dL    Recent Vancomycin Levels (past 3 days)  2022: 10:09 AM Vancomycin 11.0 mg/L    Vancomycin IV Administrations (past 72 hours)                   vancomycin 1250 mg in 0.9% NaCl 250 mL intermittent infusion 1,250 mg (mg) 1,250 mg New Bag 22 1714    vancomycin 1500 mg in 0.9% NaCl 250 ml intermittent infusion 1,500 mg (mg) 1,500 mg New Bag 22 1316                Nephrotoxins and other renal medications (From now, onward)    Start     Dose/Rate Route Frequency Ordered Stop    22 1300  vancomycin 1250 mg in 0.9% NaCl 250 mL intermittent infusion 1,250 mg         1,250 mg  over 90 Minutes Intravenous EVERY 24 HOURS 22 1602      22 0800  furosemide (LASIX) tablet 20 mg        Note to Pharmacy: PTA Sig:Take 1 tablet (20 mg) by mouth daily for 30 days      20 mg Oral DAILY 22 1552      22 1900  piperacillin-tazobactam (ZOSYN) 3.375 g vial to attach to  mL bag        Note to Pharmacy: For NOREEN, ROBERTH and TONO: For  "Zosyn-naive patients, use the \"Zosyn initial dose + extended infusion\" order panel.    3.375 g  over 240 Minutes Intravenous EVERY 8 HOURS 07/21/22 1552               Contrast Orders - past 72 hours (72h ago, onward)    Start     Dose/Rate Route Frequency Stop    07/22/22 1400  iodixanol (VISIPAQUE 320) injection 300 mL         300 mL INTRA-ARTERIAL ONCE 07/22/22 1331          Interpretation of levels and current regimen:  Vancomycin level is reflective of -600    Has serum creatinine changed greater than 50% in last 72 hours: No    Urine output:  unable to determine    Renal Function: Stable    InsightRX Prediction of Planned New Vancomycin Regimen  Loading dose: N/A  Regimen: 1250 mg IV every 24 hours.  Start time: 11:58 on 07/23/2022  Exposure target: AUC24 (range)400-600 mg/L.hr   AUC24,ss: 436 mg/L.hr  Probability of AUC24 > 400: 67 %  Ctrough,ss: 12.6 mg/L  Probability of Ctrough,ss > 20: 4 %  Probability of nephrotoxicity (Lodise [place] 2009): 8 %    Plan:  1. Continue Current Dose  2. Vancomycin monitoring method: AUC  3. Vancomycin therapeutic monitoring goal: 400-600 mg*h/L  4. Pharmacy will check vancomycin levels as appropriate in 3-5 Days.  5. Serum creatinine levels will be ordered daily for the first week of therapy and at least twice weekly for subsequent weeks.    Nael Heller, [facility]    "

## 2022-07-23 NOTE — PROGRESS NOTES
Vascular and Interventional Progress Note    -Patient seen and examined post angiogram in his room today.  Results of his left lower extremity angiogram were reviewed in detail.  He now has one inline vessel providing flow to his lower leg and foot which is via the anterior tibial artery.  It is hopeful this will allow for healing of his lower leg wound.    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  We will plan for right lower extremity revascularization on 7/25/2022 at 11:30 AM.    -Start high intensity statin therapy with Crestor 20 mg once daily for secondary prevention of major adverse cardiovascular events in the setting of severe bilateral peripheral arterial disease.  We will hold off on initiating antiplatelet therapy for now given he is on full dose anticoagulation.    -Okay to administer Xarelto on 7/22 and 7/23.  Please hold the 7/24 dose to allow for angiography on 7/25.    -Keep n.p.o. after midnight on 7/24.      Vilma Coello MD, VI  VASCULAR AND INTERVENTIONAL PHYSICIAN  VASCULAR MEDICINE  INTERNAL MEDICINE  PAGER: [phone]  CALL SERVICE: [phone]

## 2022-07-23 NOTE — PLAN OF CARE
"Goal Outcome Evaluation:          Problem: Plan of Care - These are the overarching goals to be used throughout the patient stay.    Goal: Plan of Care Review/Shift Note  Description: The Plan of Care Review/Shift note should be completed every shift.  The Outcome Evaluation is a brief statement about your assessment that the patient is improving, declining, or no change.  This information will be displayed automatically on your shift note.  Outcome: Ongoing, Progressing  Flowsheets (Taken 7/22/2022 2245)  Outcome Evaluation: Pt still resisten to certain cares.  States \"don't wake me up for the next 4 hours\".  Refusing to let staff remove bandage on his right elbow (i did manage to remove 1/2 the bandage before he stopped me).  Currently RA with sats in the low 90's range.  HR has been labile in the low 100's- to 140's.  I did give 1 dose of metoprolol IV and his HR was below 110 for a short time.  Pushing out Remdesivir dose because patient refusing another IV and he currently has Zosyn running.  Goal: Patient-Specific Goal (Individualized)  Description: You can add care plan individualizations to a care plan. Examples of Individualization might be:  \"Parent requests to be called daily at 9am for status\", \"I have a hard time hearing out of my right ear\", or \"Do not touch me to wake me up as it startles me\".  Outcome: Ongoing, Progressing  Goal: Absence of Hospital-Acquired Illness or Injury  Outcome: Ongoing, Progressing  Intervention: Identify and Manage Fall Risk  Recent Flowsheet Documentation  Taken 7/22/2022 1935 by Kyle Ramirez, GEMA  Safety Promotion/Fall Prevention:   activity supervised   assistive device/personal items within reach  Intervention: Prevent Skin Injury  Recent Flowsheet Documentation  Taken 7/22/2022 2124 by Kyle Ramirez, RN  Body Position: position changed independently  Taken 7/22/2022 2040 by Kyle Ramirez, RN  Body Position: position changed independently  Taken 7/22/2022 1935 by Ashley, " "GEMA Wright  Body Position:   position changed independently   refuses positioning  Taken 7/22/2022 1930 by Kyle Ramirez RN  Body Position:   position changed independently   position maintained  Intervention: Prevent and Manage VTE (Venous Thromboembolism) Risk  Recent Flowsheet Documentation  Taken 7/22/2022 2124 by Kyle Ramirez RN  Activity Management:   activity adjusted per tolerance   activity minimized  Taken 7/22/2022 2040 by Kyle Ramirez RN  Activity Management: activity minimized  Taken 7/22/2022 1935 by Kyle Ramirez RN  VTE Prevention/Management: SCDs (sequential compression devices) off  Activity Management:   activity adjusted per tolerance   activity encouraged  Taken 7/22/2022 1930 by Kyle Ramirez RN  Activity Management: activity minimized  Goal: Optimal Comfort and Wellbeing  Outcome: Ongoing, Progressing  Intervention: Monitor Pain and Promote Comfort  Recent Flowsheet Documentation  Taken 7/22/2022 2040 by Kyle Ramirez RN  Pain Management Interventions: rest  Taken 7/22/2022 1930 by Kyle Ramirez RN  Pain Management Interventions: rest  Goal: Readiness for Transition of Care  Outcome: Ongoing, Progressing          Outcome Evaluation: Pt still resisten to certain cares.  States \"don't wake me up for the next 4 hours\".  Refusing to let staff remove bandage on his right elbow (i did manage to remove 1/2 the bandage before he stopped me).  Currently RA with sats in the low 90's range.  HR has been labile in the low 100's- to 140's.  I did give 1 dose of metoprolol IV and his HR was below 110 for a short time.  Pushing out Remdesivir dose because patient refusing another IV and he currently has Zosyn running.          "

## 2022-07-23 NOTE — PLAN OF CARE
"  Problem: Risk for Delirium  Goal: Improved Behavioral Control  Outcome: Ongoing, Not Progressing     Problem: Risk for Delirium  Goal: Improved Attention and Thought Clarity  Outcome: Ongoing, Not Progressing     Problem: Dysrhythmia  Goal: Normalized Cardiac Rhythm  Outcome: Ongoing, Not Progressing          Pt appeared confused intermittently during night- not using call light appropriately, setting off bed alarm by getting up out of bed without calling/waiting for staff, pulling off monitor leads at times. Pt oriented to person and place but intermittently disoriented to time, situation; reoriented as needed. Educated pt to use call light and regarding fall prevention, continued reinforcement needed. Pt intermittently irritable, uncooperative, and refusing some cares and medications at times. Pt continued to refuse morning labs after education regarding importance, pt stated \"no, I'm 81 and given enough blood\". Special covid precautions maintained. Tele afib with RVR. Prn IV metoprolol administered x1 per orders for HR consistently 120s-150s; after prn metoprolol heart rate noted to be bouncing between , but did not meet order parameters for another dose of prn metoprolol. O2 sats 92-93% on room air. Please see previous provider notification note regarding groin site, no change since provider notification thus far. Call light within reach and bed alarm on for safety.   "

## 2022-07-23 NOTE — PROGRESS NOTES
Covid positive. Pt aaox4. Confused at times. New piv placed today due to being pulled out. On room air. A-fib on tele. Up w/ 1. Did bladder scanned and cathed for 600ml. Cardiac diet. Right groin soft. Hernia noted. Doppler dorsalis pedis pulses. Strong pulses. Left wrist PIV. Plan: Re-vasculzation of Right leg on Monday 7/25.

## 2022-07-23 NOTE — PROGRESS NOTES
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 Order IV piperacillin, tazobactam, and vancomycin broad coverage, consult Vascular Surgery and wound care team (WOC), and discussed/offered remdesivir x3-day course given high-risk factors and Gian has agreed to this. Checked LFTs before starting remdesivir. Continue home lasix; BNP minimally elevated and noted without pulmonary edema, effusion, or cardiomegaly on CXR. IR angiogram on 7/22/22 demonstrated occlusion of the distal superficial femoral and proximal above-knee popliteal arteries and vascular surgery planning for right lower extremity revascularization on 7/25/2022. No further hypoxia so dexamethasone order discontinued.     Assessment and Plan:  Left LE Open Wound / Wound Infection  Left LE Cellulitis   S/p IR Angio on 7/22  -Started on Augmentin/Cipro outpatient  -Continue Zosyn and Vancomycin now  -Presented from Vascular Surgery Clinic directly to ED  -Consult to Vascular Surgery / IR  -WOC consult and eval  -Pseudomonas, Klebsiella, and enterro species  -IR angiogram on 7/22/22 demonstrated occlusion of the distal superficial femoral and proximal above-knee popliteal arteries  -Vascular surgery is now planning for right lower extremity revascularization on 7/25/2022  -NPO after midnight on 7/24 and hold Xarelto on 7/24, order placed     COVID19 Viral Infection  COVID19 PNA  Acute Hypoxic Respiratory Failure - transient on 7/22 and resolved before dexamethasone was administered  -Special Precautions  -Remdesivir course - hypoxia was transient; would only aim for 3-day  "course unless hypoxia recurs beyond a transient, self-limited episode  -RT support, hygiene.  -Given comorbidities, patient is at high-risk for rapid/sudden decompensation.   -Resume Rivaroxaban/DOAC post-procedure and will again hold for 2nd procedure scheduled/planned for 7/25; order placed for 7/24 holding DOAC.    Pre-Operative General Physical Exam  Pre-Operative Cardiac Risk Stratification   -History and chart reviewed.   -HPI directly reviewed with patient.   -No further cardiac risk stratification recommended at this time.   -Proceed with IR angiography/procedure as per surgery team.      Atrial Fibrillation   -Med/surg telemetry.  -Optimize potassium and magnesium. Keep K > 4.0, Mag > 2.0. RN potassium and magnesium replacement protocols utilized/ordered.   -Order home medications including AV-london rate control medication.  -Recommend resuming home anticoagulation with warfarin/DOAC.  -If any acute sustained rapid rates develop, obtain EKG to evaluate for RVR and initiate cardiac telemetry and update [facility].      HTN  Non-ischemic cardiomyopathy  -Order home medications and as needed apply specified hold parameters.      HLD  -Order home statin.    Clinically Significant Risk Factors Present on Admission                # Overweight: Estimated body mass index is 26.29 kg/m  as calculated from the following:    Height as of this encounter: 1.753 m (5' 9\").    Weight as of this encounter: 80.7 kg (178 lb).        Diet: Low Saturated Fat Na <2400 mg  DVT Prophylaxis:  DOAC (holding for IR procedure)  Code Status: No CPR- Do NOT Intubate    Anticipated possible discharge in 2 or so days to likely back home once 2nd procedure on 7/25 with vascular surgery, post-op monitoring and cares, transition IV antibiotics, COVID19 treatment milestones are met.  Disposition Plan      Expected Discharge Date: 07/26/2022      Destination: home with family  Discharge Comments: RLE revascularization on 7/25 @ 11:30am      "   The patient's care was discussed with the Patient.    Eduard Arredondo MD  Internal Medicine  [facility] Medicine  [employer]  Phone: #[phone]    Interval History/Subjective:  No acute events overnight.    Called by RN that his transient hypoxia resolved before first dexamethasone dose administered; no further hypoxia. Post-angio doing well. Discussed with patient plan for another procedure on Monday 7/25 as per vascular surgery. Still denies any chest pain, no shortness of breath. No other new complaints otherwise. Discussed plan of care with patient. Answered all questions to patient's verbalized and stated understanding and satisfaction.    Physical Exam/Objective:  Temp:  [97.5  F (36.4  C)-98.3  F (36.8  C)] 98.1  F (36.7  C)  Pulse:  [] 106  Resp:  [16-24] 20  BP: ()/(51-99) 136/66  SpO2:  [88 %-100 %] 92 %  Body mass index is 26.29 kg/m .  GENERAL:  Alert, appears comfortable, in no acute distress, appears stated age   HEAD:  Normocephalic, without obvious abnormality, atraumatic   EYES:  Conjunctiva/corneas clear, no scleral icterus, EOM's appears intact grossly   NOSE: Nares normal, septum midline, mucosa normal, no drainage   THROAT: Lips, mucosa, and tongue appear normal   NECK: Symmetrical, trachea appears midline   BACK:   Symmetric, no curvature noted   LUNGS:   Symmetric chest rise on inhalation, respirations unlabored   CHEST WALL:  No apparent deformity   HEART:  No thrills or heaves visualized   ABDOMEN:   No masses or organomegaly apparent; non-scaphoid   EXTREMITIES: Extremities appear normal, atraumatic, no cyanosis   SKIN: LE remain well-bandaged/wrapped without active purulence or drainage seen   NEURO: Alert, oriented, moves all four extremities freely and spontaneously   PSYCH: Cooperative, behavior is appropriate     Data reviewed today: I personally reviewed all new medications, labs, imaging/diagnostics reports over the past 24 hours. Pertinent  findings include:    Imaging:   Recent Results (from the past 24 hour(s))   IR Lower Extremity Angiogram Left    Narrative    LOCATION: [facility]  DATE: 7/22/2022    PROCEDURE: ABDOMINAL AORTIC, PELVIC AND BILATERAL LOWER EXTREMITY DIAGNOSTIC ARTERIOGRAPHY, BALLOON ANGIOPLASTY OF THE LEFT SUPERFICIAL FEMORAL ARTERY, BALLOON ANGIOPLASTY OF THE LEFT ANTERIOR TIBIAL AND DORSALIS PEDIS ARTERIES, ULTRASOUND GUIDANCE FOR   VASCULAR ACCESS, MODERATE SEDATION    INTERVENTIONAL RADIOLOGIST: Vilma Coello MD    INDICATION: 81-year-old with nonhealing bilateral lower leg wounds/critical limb ischemia. The patient's noninvasive imaging demonstrates inadequate toe pressures bilaterally to allow for wound healing. Plan for left lower extremity angiography with   possible intervention.    INDICATION FOR DIAGNOSTIC ANGIOGRAPHY: Diagnostic angiography was required to precisely identify plaque morphology and areas of occlusion to assist in management.    CONSENT: The risks, benefits and alternatives of the procedure were discussed with the patient  in detail. All questions were answered. Informed consent was given to proceed with the procedure.    MODERATE SEDATION: Versed 2 mg IV; Fentanyl 100 mcg IV. During the time out, immediately prior to the administration of medications, the patient was reassessed for adequacy to receive conscious sedation.  Under physician supervision, Versed and fentanyl   were administered for moderate sedation. Pulse oximetry, heart rate and blood pressure were continuously monitored by an independent trained observer. The physician spent 125 minutes of face-to-face sedation time with the patient.    CONTRAST: 200 cc Visipaque 320  ANTIBIOTICS: None.  ADDITIONAL MEDICATIONS: Heparin, protamine    FLUOROSCOPIC TIME: 45.9 minutes.  RADIATION DOSE: Air Kerma: 336 mGy.    COMPLICATIONS: No immediate complications.    UNIVERSAL PROTOCOL: The operative site was marked and any prior  imaging was reviewed. Required items including blood products, implants, devices and special equipment was made available. Patient identity was confirmed either verbally, with demographic   information, hospital assigned identification or other identification markers. A timeout was performed immediately prior to the procedure.    STERILE BARRIER TECHNIQUE: Maximum sterile barrier technique was used. Cutaneous antisepsis was performed at the operative site with application of 2% chlorhexidine and large sterile drape. Prior to the procedure, the  and assistant performed   hand hygiene and wore hat, mask, sterile gown, and sterile gloves during the entire procedure.    PROCEDURE:    Access was achieved into the right common femoral artery utilizing real-time ultrasound guidance and a micropuncture access kit. Imaging demonstrates a patent and compressible artery. Permanent images were stored to the patient's medical record.   Conversion was made for a 5 Japanese vascular sheath, which was attached to a continuous heparinized saline infusion.     A flush catheter was manipulated over a wire the mid abdominal aorta and digital subtraction aortic arteriography was performed. The catheter was repositioned over a wire into the left external iliac artery. From this location, digital subtraction left   lower extremity arteriography was obtained.     Systemic heparinization was performed and monitored with serial activated clotting times. Over exchange is made for a 5 Japanese x 70 cm vascular sheath which was positioned in the mid left superficial femoral artery. An 018 crossing catheter and guidewire   were advanced through the sheath and selective catheterization of the anterior tibial artery was performed. The guidewire and catheter were advanced to the dorsalis pedis artery. Over-the-wire exchange is made for a 2 mm x 20 cm angioplasty balloon.   Angioplasty of the entire anterior tibial artery artery and proximal  dorsalis pedis artery was performed. Over-the-wire exchange is made for a 3 mm x 20 cm angioplasty balloon. Angioplasty of the entire anterior tibial and dorsalis pedis arteries was   performed. Post angioplasty angiography demonstrates excellent result with in-line flow to the lower leg and foot.    The guidewire and catheter were retracted to the below-knee popliteal artery. Attempts to cross the occluded proximal/mid tibioperoneal trunk were unsuccessful. Over-the-wire exchange is made for an 035 crossing catheter and Glidewire. Attempts to cross   the occluded proximal/mid tibioperoneal trunk were unsuccessful. Over the wire exchange is made for a 6 [place] x 70 cm vascular sheath which was positioned in the distal superficial femoral artery. An 014 guidewire was then advanced through the catheter   in the false lumen to the distal tibioperoneal trunk. Over-the-wire exchange is made for a Outback Rentry device. Attempts to gain access into the true lumen of the distal tibioperoneal trunk were unsuccessful. The guidewire and reentry device were   removed.    An 035 wire and catheter were advanced through the sheath positioned in the left superficial femoral artery to the popliteal artery. Over-the-wire exchange is made for a 5 mm x 8 cm angioplasty balloon. Angioplasty of the mid and distal superficial   femoral artery was performed. Post angioplasty angiography demonstrates excellent result with no significant residual stenosis.    The catheter and guidewire were removed. The sheath was retracted to the right external iliac artery. Digital subtraction right lower extremity arteriography was performed through the right common femoral arterial sheath. Protamine was administered. The   sheath was removed and manual pressure applied until hemostasis.    ABDOMEN FINDINGS:  Patent and normal caliber abdominal aorta. Patent bilateral renal arteries and inferior mesenteric artery. Patent bilateral common iliac, internal  iliac and external iliac arteries.    RIGHT LOWER EXTREMITY FINDINGS:   Patent common femoral, profunda femoral and superficial femoral arteries. Moderate stenosis involving the mid/distal superficial femoral artery. Patent above and below knee popliteal arteries. Occluded proximal/mid tibioperoneal trunk. There is occlusion   of all infrapopliteal vessels. Reconstituted peroneal artery to the lower leg.    LEFT LOWER EXTREMITY FINDINGS:   Patent common femoral and profunda femoral arteries. Occlusion of the distal superficial femoral and proximal above-knee popliteal arteries. Reconstituted above and below knee popliteal artery. Patent posterior tibial peroneal artery to the lower leg.   Occluded anterior tibial and posterior tibial arteries.      Impression    IMPRESSION:    1. Left lower extremity angiography demonstrates moderate stenosis of the mid/distal superficial femoral artery. There is occlusion of the tibioperoneal trunk. Occluded anterior tibial and posterior tibial arteries. Reconstituted peroneal artery to the   lower leg.  2. Successful crossing and angioplasty of the occluded anterior tibial artery to the dorsalis pedis artery. Unsuccessful crossing of the occluded tibioperoneal trunk. Successful balloon angioplasty of stenosis in the mid/distal superficial femoral   artery. Post intervention angiography demonstrates a one vessel runoff to the foot via the anterior tibial artery.    PLAN: Four hours of bedrest post sheath removal. It is hopeful in-line flow to the left lower leg will allow for wound healing. We will plan for right lower extremity angiography on 7/25/2022.   US AIDAN Doppler No Anabelrsgraeme Portable    Narrative    LOCATION: [facility]  DATE: 7/22/2022    EXAM: RESTING ANKLE-BRACHIAL INDICES (ABIs)    INDICATION: Nonhealing bilateral lower extremity wound, decreased lower extremity pulses, status post recanalization of the left anterior tibial  artery    COMPARISON: Prior study dated 07/08/2022    AIDAN FINDINGS:     Absent digit waveforms bilaterally.      Impression    IMPRESSION:  1. LEFT LOWER EXTREMITY: Ankle brachial index of 0.69 reflecting moderate peripheral arterial disease. Unable to obtain toe pressures or waveforms bilaterally.       Labs:  Most Recent 3 CBC's:  Recent Labs   Lab Test 07/22/22  0516 07/21/22  1719 07/21/22  1227   WBC 8.8 12.2* 13.9*   HGB 15.1 16.2 15.9   MCV 95 94 95    341 313     Most Recent 3 BMP's:  Recent Labs   Lab Test 07/22/22  0516 07/21/22  1719 07/21/22  1227    139 138   POTASSIUM 4.2 4.4 4.8   CHLORIDE 108* 103 104   CO2 26 25 25   BUN 20 26 30*   CR 0.88 1.08 1.14   ANIONGAP 8 11 9   DARON 8.5 8.9 8.8   GLC 79 85 95     Most Recent 2 LFT's:  Recent Labs   Lab Test 07/22/22  0516 07/21/22  1719   AST 15 19   ALT 11 15   ALKPHOS 52 64   BILITOTAL 0.4 0.7     Most Recent 3 INR's:  Recent Labs   Lab Test 07/21/22  1719 07/21/22  1227 09/21/21  1214   INR 1.07 1.12 2.6*       Medications:   Personally Reviewed.  Medications     heparin 2 units/mL in 0.9% NaCl TABLE SOLN       - MEDICATION INSTRUCTIONS -         remdesivir  100 mg Intravenous Q24H    And     sodium chloride 0.9%  50 mL Intravenous Q24H     dexamethasone  6 mg Intravenous Q24H     furosemide  20 mg Oral Daily     metoprolol succinate ER  200 mg Oral Daily     piperacillin-tazobactam  3.375 g Intravenous Q8H     rivaroxaban ANTICOAGULANT  20 mg Oral Daily with supper     sodium chloride (PF)  3 mL Intracatheter Q8H     sodium chloride (PF)  3 mL Intracatheter Q8H     vancomycin  1,250 mg Intravenous Q24H

## 2022-07-24 NOTE — PROGRESS NOTES
[facility] MEDICINE PROGRESS NOTE      SUMMARY:    81 year old male on hospital day number 3 after being admitted from the vascular clinic for PVD.  Pt had an IR/angioplasty of the LLE on 7/22.      Problem list:    1.  PVD, bilateral legs: s/p IR/angioplasty on 7/22 LLE   Pending tomorrow RLE angio +/- interventions  2.  LLE celllulitis: on vancomycin, zosyn; will consider changing to orals tomorrow post   procedure  2.  COVID +: 7/21: negative xray chest 7/21; on dexamethasone and remdesivir due to transient   Hypoxia; on room air; no increased work of breathing; will follow  3.  HTN metoprolol as needed   4.  Nonischemic cardiomyopathy  5.  Atrial fibrillation:  On xarelto though on hold for procedure tomorrow  6.  Dyslipidemia: continue statin  7.  History of CKD: creatinine is .92 with GFR of 84  8.  Vascular access:  Pending PICC today             Expected Discharge Date: 07/26/2022    Discharge Delays: Procedure Pending (enter procedure & time in comments)  Destination: home with family  Discharge Comments: Family wants home wound care arranged at discharge    RLE revascularization on 7/25 @ 11:30am        The patient's care was discussed with the  nurse    Carlene Jiménez MD  [facility] Medicine  [facility]  Phone: #[phone]    Interval History/Subjective: afebrile; not on oxygen; no complaints    Physical Exam/Objective:  Temp:  [97.3  F (36.3  C)-98.4  F (36.9  C)] 98.1  F (36.7  C)  Pulse:  [] 99  Resp:  [18-25] 19  BP: (115-133)/(61-76) 133/76  SpO2:  [92 %-95 %] 95 %  Body mass index is 27.23 kg/m .    GENERAL:  Alert, hard of hearing;    HEAD:  Normocephalic, without obvious abnormality, atraumatic   EYES:  PERRL, conjunctiva/corneas clear, no scleral icterus, EOM's intact   NOSE: Nares normal, septum midline, mucosa normal, no drainage   THROAT: Lips, mucosa, and tongue normal; teeth and gums normal, mouth moist   NECK: Supple,  symmetrical, trachea midline   BACK:   Symmetric, no curvature, ROM normal   LUNGS:   Clear to auscultation bilaterally, no rales, rhonchi, or wheezing, symmetric chest rise on inhalation, respirations unlabored   CHEST WALL:  No tenderness or deformity   HEART:  Regular rate and rhythm, S1 and S2 normal, no murmur, rub, or gallop    ABDOMEN:   Soft, non-tender, bowel sounds active all four quadrants, no masses, no organomegaly, no rebound or guarding   EXTREMITIES: LLE with improved redness; no edema; dorsalis pulse is faint   SKIN: Dry to touch, no exanthems in the visualized areas   NEURO: Alert, oriented x3, moves all four extremities freely   PSYCH: Cooperative, behavior is appropriate      Data reviewed today: I personally reviewed all new medications, labs, imaging/diagnostics reports over the past 24 hours. Pertinent findings include:    Imaging:   No results found for this or any previous visit (from the past 24 hour(s)).    Labs:  Most Recent 3 BMP's:Recent Labs   Lab Test 07/24/22  1022 07/23/22  1009 07/22/22  0516    141 142   POTASSIUM 4.1 3.8 4.2   CHLORIDE 112* 107 108*   CO2 16* 24 26   BUN 14 16 20   CR 0.92 0.99 0.88   ANIONGAP 14 10 8   DARON 8.6 8.3* 8.5   GLC 95 107 79       Medications:   Personally Reviewed.  Medications     heparin 2 units/mL in 0.9% NaCl TABLE SOLN       - MEDICATION INSTRUCTIONS -         sodium chloride 0.9%  50 mL Intravenous Q24H     dexamethasone  6 mg Intravenous Q24H     furosemide  20 mg Oral Daily     metoprolol succinate ER  200 mg Oral Daily     piperacillin-tazobactam  3.375 g Intravenous Q8H     rivaroxaban ANTICOAGULANT  20 mg Oral Daily with supper     sodium chloride (PF)  3 mL Intracatheter Q8H     sodium chloride (PF)  3 mL Intracatheter Q8H     vancomycin  1,250 mg Intravenous Q24H

## 2022-07-24 NOTE — PLAN OF CARE
Patient remained vitally stable during shift. Allen was placed for urinary retention. On room air and cough has slightly increased but is not productive. Good output during shift. Patient refused lunch. PICC line placed due to poor access and infiltration of peripheral IV. Plans for surgical intervention to revascularize tomorrow.     Problem: Plan of Care - These are the overarching goals to be used throughout the patient stay.    Goal: Optimal Comfort and Wellbeing  Outcome: Ongoing, Progressing  Intervention: Monitor Pain and Promote Comfort  Recent Flowsheet Documentation  Taken 7/24/2022 1600 by Esther Godinez RN  Pain Management Interventions: rest  Taken 7/24/2022 1200 by Esther Godinez, RN  Pain Management Interventions: rest  Taken 7/24/2022 0819 by Esther Godinez RN  Pain Management Interventions: rest

## 2022-07-24 NOTE — PROCEDURES
"PICC Line Insertion Procedure Note    Pt. Name:   Bradley Liz  MRN:          [id number]    Procedure: Insertion of a  DUAL Lumen  5 fr  Bard SOLO (valved) Power PICC, Lot number [id number]    Indications: Vascular Access; Medication Drips    Contraindications : None    Procedure Details   Patient identified with 2 identifiers and \"Time Out\" conducted.    Central line insertion bundle followed: hand hygiene performed prior to procedure, site cleansed with cholraprep, hat, mask, sterile gloves, sterile gown worn, patient draped with maximum barrier head to toe drape, sterile field maintained.    The vein was assessed and found to be compressible and of adequate size. 2.5 ml 1% Lidocaine administered SQ to the insertion site. A 5 Fr PICC was inserted into the BRACHIAL-M vein of the right arm with ultrasound guidance. ONE attempt(s) required to access vein.   Catheter threaded without difficulty. Good blood return noted.    Modified Seldinger Technique used for insertion.    The 8 sharps that are included in the PICC insertion kit were accounted for and disposed of in the sharps container prior to breakdown of the sterile field.    Catheter secured with Statlock, biopatch and Tegaderm dressing applied.    Findings:  Total catheter length  41 cm, with 0 cm exposed. Mid upper arm circumference is 29 cm. Catheter was flushed with 30 cc NS. Patient  tolerated procedure well.    Tip placement verified by xray due to AFIB. Xray read by Dr. Fields . Tip placement: \"low SVC.\"          CLABSI prevention brochure left at bedside.    Patient's primary RN notified PICC is ready for use.    Comments:          Stiven Workman, RN, MSN, [facility]  Vascular Access - [facility]      "

## 2022-07-24 NOTE — PLAN OF CARE
Problem: Risk for Delirium  Goal: Improved Sleep  Outcome: Ongoing, Progressing     Problem: Dysrhythmia  Goal: Normalized Cardiac Rhythm  Outcome: Ongoing, Progressing     Pt A&Ox4, able to make needs known. Denies pain this shift. Pt was irritable this shift and intermittently refusing some cares, including bladder scans. Writer explained the importance of interventions and assessments with pt. Tele afib, HR controlled most of shift. Pt shifting self independently in bed. IV abx given per MAR. Pedal pulses assessed with doppler. Bed alarm on for safety.     Lori Levin, RN  [phone]

## 2022-07-25 NOTE — PROGRESS NOTES
[facility] MEDICINE PROGRESS NOTE      SUMMARY:    81 year old male on hospital day number 3 after being admitted from the vascular clinic for PVD.  Pt had an IR/angioplasty of the LLE on 7/22.      Problem list:    1.  PVD, bilateral legs: s/p IR/angioplasty on 7/22 LLE   Pending angiogram today on RLE:   2.  LLE celllulitis: change to orals; augmentin 875 mg BID  2.  COVID +: 7/21: negative xray chest 7/21; on room air; day number 4 dexamethasone  3.  HTN metoprolol as needed   4.  Nonischemic cardiomyopathy  5.  Atrial fibrillation:  On xarelto though on hold for procedure tomorrow  6.  Dyslipidemia: continue statin  7.  History of CKD: creatinine is .92 with GFR of 84  8.  Vascular access:  Pending PICC today  9.  Disposition:  Possible home tomorrow with wound care                  The patient's care was discussed with the  nurse    Carlene Jiménez MD  [facility]  [facility]  Phone: #[phone]    Interval History/Subjective: afebrile; not on oxygen; no complaints    Physical Exam/Objective:  Temp:  [97.4  F (36.3  C)-98.2  F (36.8  C)] 97.4  F (36.3  C)  Pulse:  [] 101  Resp:  [13-26] 13  BP: (121-157)/(60-78) 121/63  SpO2:  [91 %-96 %] 96 %  Body mass index is 27.2 kg/m .    GENERAL:  Alert, hard of hearing;    HEAD:  Normocephalic, without obvious abnormality, atraumatic   EYES:  PERRL, conjunctiva/corneas clear, no scleral icterus, EOM's intact   NOSE: Nares normal, septum midline, mucosa normal, no drainage   THROAT: Lips, mucosa, and tongue normal; teeth and gums normal, mouth moist   NECK: Supple, symmetrical, trachea midline   BACK:   Symmetric, no curvature, ROM normal   LUNGS:   Clear to auscultation bilaterally, no rales, rhonchi, or wheezing, symmetric chest rise on inhalation, respirations unlabored   CHEST WALL:  No tenderness or deformity   HEART:  Regular rate and rhythm, S1 and S2 normal, no murmur, rub, or gallop     ABDOMEN:   Soft, non-tender, bowel sounds active all four quadrants, no masses, no organomegaly, no rebound or guarding   EXTREMITIES: LLE with improved redness; no edema; dorsalis pulse is faint   SKIN: Dry to touch, no exanthems in the visualized areas   NEURO: Alert, oriented x3, moves all four extremities freely   PSYCH: Cooperative, behavior is appropriate      Data reviewed today: I personally reviewed all new medications, labs, imaging/diagnostics reports over the past 24 hours. Pertinent findings include:    Imaging:   Recent Results (from the past 24 hour(s))   XR Chest Port 1 View    Narrative    EXAM: XR CHEST PORTABLE 1 VIEW  LOCATION: [facility]  DATE/TIME: 07/24/2022, 2:09 PM    INDICATION: RN placed PICC. Verify tip placement.  COMPARISON: 07/21/2022.      Impression    IMPRESSION: Right PICC has been placed and extends to the mid SVC and directed posteriorly into the azygos vein on the first radiograph but on the second radiograph is in excellent position in the low SVC. Increased opacity posterior to the heart has   developed and could indicate some degree of consolidation and/or volume loss in the posteromedial basilar segments left lower lobe. Chest otherwise negative.         Labs:  Most Recent 3 BMP's:  Recent Labs   Lab Test 07/25/22  0440 07/24/22  1022 07/23/22  1009    142 141   POTASSIUM 3.5 4.1 3.8   CHLORIDE 109* 112* 107   CO2 28 16* 24   BUN 13 14 16   CR 0.87 0.92 0.99   ANIONGAP 6 14 10   DARON 8.0* 8.6 8.3*    95 107       Medications:   Personally Reviewed.  Medications     heparin 2 units/mL in 0.9% NaCl TABLE SOLN       - MEDICATION INSTRUCTIONS -         sodium chloride 0.9%  50 mL Intravenous Q24H     dexamethasone  6 mg Intravenous Q24H     furosemide  20 mg Oral Daily     magnesium oxide  400 mg Oral Q4H     metoprolol succinate ER  200 mg Oral Daily     piperacillin-tazobactam  3.375 g Intravenous Q8H     rivaroxaban ANTICOAGULANT  20  mg Oral Daily with supper     sodium chloride (PF)  3 mL Intracatheter Q8H     sodium chloride (PF)  3 mL Intracatheter Q8H     vancomycin  1,250 mg Intravenous Q24H

## 2022-07-25 NOTE — PROCEDURES
[facility]    Procedure: Imaging Procedure Note    Date/Time: 7/25/2022 1:42 PM  Performed by: Vilma Coello MD  Authorized by: Vilma Coello MD       UNIVERSAL PROTOCOL   Site Marked: Yes  Prior Images Obtained and Reviewed:  Yes  Required items: Required blood products, implants, devices and special equipment available    Patient identity confirmed:  Verbally with patient  Patient was reevaluated immediately before administering moderate or deep sedation or anesthesia  Confirmation Checklist:  Patient's identity using two indicators, relevant allergies, procedure was appropriate and matched the consent or emergent situation and correct equipment/implants were available  Time out: Immediately prior to the procedure a time out was called    Universal Protocol: the Joint Commission Universal Protocol was followed    Preparation: Patient was prepped and draped in usual sterile fashion      SEDATION  Patient Sedated: Yes    Vital signs: Vital signs monitored during sedation    See dictated procedure note for full details.    PROCEDURE  Describe Procedure: RLE angiogram demonstrates occlusion of the distal SFA/above knee popliteal artery - successfully crossed and treated with stent placement with excellent result. Baseline one vessel runoff to the foot via the peroneal. Successful crossing and recanalization of the anterior tibial artery.     4 hours bedrest post sheath removal. OK to resume anticoagulation tonight if no bleeding post sheath removal. Will start plavix given drug eluting stent placement for 3 months.   Patient Tolerance:  Patient tolerated the procedure well with no immediate complications  Length of time physician/provider present for 1:1 monitoring during sedation: 75

## 2022-07-25 NOTE — PROGRESS NOTES
Patient Name: Bradley Liz  Medical Record Number: [id number]  Today's Date: 7/25/2022    Procedure: Right Lower extremity angiogram with stent interventions, left groin procedural access.  Proceduralist: Dr. ОЛЕГ Coello.    Patient in preop room: 1125  Patient in I.R. room: 1142  Procedure Start: 1205  Procedure end: 1325  Sedation medications administered: 2 mg Versed, 100 mcg Fentanyl.   Patient out of room:1335    12,000 units heparin given per Dr. MAX Coello. ACT measured per protocol    Report given to: KYLIE 328-01     Other Notes: Pt arrived to IR room 1 from  pre post room 1. Consent reviewed. Pt denies any questions or concerns regarding procedure. Pt positioned supine and monitored per protocol. Stent to Right above knee popliteal artery, plasty x 3 to Right superior femoral artery and Right anterior tibial artery. Pt tolerated procedure without any noted complications. Pt transferred back to  328-01.

## 2022-07-25 NOTE — PRE-PROCEDURE
GENERAL PRE-PROCEDURE:   Procedure:  RLE angiogram  Date/Time:  7/25/2022 11:42 AM    Verbal consent obtained?: Yes    Written consent obtained?: Yes    Risks and benefits: Risks, benefits and alternatives were discussed    Consent given by:  Patient  Patient states understanding of procedure being performed: Yes    Patient's understanding of procedure matches consent: Yes    Procedure consent matches procedure scheduled: Yes    Expected level of sedation:  Moderate  Appropriately NPO:  Yes  ASA Class:  3  Mallampati  :  Grade 2- soft palate, base of uvula, tonsillar pillars, and portion of posterior pharyngeal wall visible  Lungs:  Lungs clear with good breath sounds bilaterally  Heart:  Normal heart sounds and rate  History & Physical reviewed:  History and physical reviewed and no updates needed  Statement of review:  I have reviewed the lab findings, diagnostic data, medications, and the plan for sedation

## 2022-07-25 NOTE — PLAN OF CARE
Pt A&Ox4. NPO at midnight. VSS, with tele showing a-fib. Zosyn given during the night. Doppler on the dorsalis pedis BLE. Call light within reach.    Problem: Plan of Care - These are the overarching goals to be used throughout the patient stay.    Goal: Optimal Comfort and Wellbeing  Outcome: Ongoing, Progressing     Problem: Risk for Delirium  Goal: Improved Sleep  Outcome: Ongoing, Progressing   Goal Outcome Evaluation:

## 2022-07-26 NOTE — PLAN OF CARE
Problem: Skin or Soft Tissue Infection  Goal: Absence of Infection Signs and Symptoms  Outcome: Ongoing, Progressing    A&Ox4, very angry/crabby on shift. Sometimes patient will not allow staff to do cares. VSS, on RA sating in low-mid 90's. Denied any pain. Congested dry good non-productive cough. Did not get oob overnight. Up with A1. PICC x2  SL. Mag and K protocol; both need replacement this AM. Groin sites WNL. CMS intact. No c/o numbness or tingling. Dorsi and plantar flexion WNL. Mild bruising noted around site of right groin. No hematoma felt. Pedal and post-tibial pulses palpated with dopplar. Tele Afib, sometimes RVR in low 100's. Allen with place with adequate UOP overnight. LLE wrapped per orders. Discharge possibly today home with wound care.

## 2022-07-26 NOTE — PLAN OF CARE
Goal Outcome Evaluation:    Plan of Care Reviewed With: patient     Patient has been resting in bed all day. Angiogram sites in groin are clean and dry. Bandage removed form left side. Patient deines  any c/o pain at this time. Dopplers used to to check pedal pulses. Faint pulse in right foot and slightly stronger pulse in left. Wound Care completed on left shin and foot. Awaiting Physical therapy to see patient. Will continue to monitor.

## 2022-07-26 NOTE — PROGRESS NOTES
Care Management Follow Up    Length of Stay (days): 5    Expected Discharge Date: 07/27/2022     Concerns to be Addressed:     Medical progression, discharge plan   Patient plan of care discussed at interdisciplinary rounds: Yes    Anticipated Discharge Disposition:  Home with home care     Anticipated Discharge Services:  Not applicable  Anticipated Discharge DME:  Per treatment team    Patient/family educated on Medicare website which has current facility and service quality ratings:  Not applicable   Education Provided on the Discharge Plan:  yes  Patient/Family in Agreement with the Plan:  yes    Referrals Placed by CM/SW:  Skilled home care  Private pay costs discussed: Not applicable    Additional Information:    Wound care recommended upon discharge.  Home Care referrals pending.   [facility] is unable to accept due to out of network insurance.  [facility] would be unable to see Pt until end of week/weekend.  [facility] is reviewing referral.  [facility] referral pending.  [facility] referral pending.     12:03 PM  [facility] has accepted Pt for home RN services for wound care.     2:46 PM  [facility] attempted to reach Pt's spouse to provide update regarding home care and was unable to reach her and unable to leave voice mail.     ANGELINE Teague

## 2022-07-26 NOTE — PLAN OF CARE
Angiogram on RLE today. CMS checks intact post procedure but pulses not audible w/ doppler; MD aware. VSS on RA. No complaints of pain. AFIB w/ controlled rate on tele. Will continue to monitor closely.

## 2022-07-26 NOTE — PROGRESS NOTES
07/26/22 1530   Quick Adds   Type of Visit Initial Occupational Therapy Evaluation   Living Environment   People in Home spouse   Current Living Arrangements condominium   Home Accessibility no concerns   Transportation Anticipated family or friend will provide   Living Environment Comments Pt lives on 1st floor of condo. NoADs. WIS w/ grab bars, RTS   Self-Care   Usual Activity Tolerance good   Current Activity Tolerance moderate   Equipment Currently Used at Home raised toilet seat;grab bar, tub/shower   Fall history within last six months no   Activity/Exercise/Self-Care Comment Pt IND w/ ADLs and IADLs a t baseline   General Information   Onset of Illness/Injury or Date of Surgery 07/21/22   Referring Physician Vladimir Enriquez MD   Additional Occupational Profile Info/Pertinent History of Current Problem wound infection, covid,   Existing Precautions/Restrictions no known precautions/restrictions   Cognitive Status Examination   Orientation Status orientation to person, place and time   Visual Perception   Visual Impairment/Limitations WNL   Sensory   Sensory Quick Adds No deficits were identified   Pain Assessment   Patient Currently in Pain No   Integumentary/Edema   Integumentary/Edema no deficits were identifed   Posture   Posture not impaired   Range of Motion Comprehensive   General Range of Motion no range of motion deficits identified   Strength Comprehensive (MMT)   General Manual Muscle Testing (MMT) Assessment no strength deficits identified   Muscle Tone Assessment   Muscle Tone Quick Adds No deficits were identified   Coordination   Upper Extremity Coordination No deficits were identified   Bed Mobility   Bed Mobility scooting/bridging;supine-sit;sit-supine   Comment (Bed Mobility) Min A for bed mobility   Transfers   Transfers sit-stand transfer   Transfer Comments SBA-CGA for transfers   Activities of Daily Living   BADL Assessment/Intervention lower body dressing;toileting   Lower Body Dressing  Assessment/Training   [place] Level (Lower Body Dressing) supervision   Toileting   [place] Level (Toileting) supervision   Clinical Impression   Criteria for Skilled Therapeutic Interventions Met (OT) Yes, treatment indicated   OT Diagnosis decreased ADLS   Influenced by the following impairments RLE wound infection, covid   OT Problem List-Impairments impacting ADL activity tolerance impaired;mobility;strength   Assessment of Occupational Performance 3-5 Performance Deficits   Identified Performance Deficits LE dressing, transfers, bed mobility   Planned Therapy Interventions (OT) ADL retraining;bed mobility training;strengthening;transfer training   Clinical Decision Making Complexity (OT) moderate complexity   Risk & Benefits of therapy have been explained evaluation/treatment results reviewed;patient   OT Discharge Planning   OT Discharge Recommendation (DC Rec) (S)  home with home care occupational therapy;home with assist;Transitional Care Facility   OT Rationale for DC Rec Difficult to assess due to lack of participation. Pts O2 SATS dropped to 70% while standing. Pt's wife is home to assist as needed for ADLs. Pt stated he does not want any home care   Total Evaluation Time (Minutes)   Total Evaluation Time (Minutes) 10   OT Goals   Therapy Frequency (OT) Daily   OT Predicted Duration/Target Date for Goal Attainment 07/31/22   OT Goals Lower Body Dressing;Bed Mobility;Toilet Transfer/Toileting   OT: Lower Body Dressing Modified independent   OT: Bed Mobility Modified independent;supine to/from sitting;rolling;bridging   OT: Toilet Transfer/Toileting Modified independent;toilet transfer;cleaning and garment management

## 2022-07-27 NOTE — PROGRESS NOTES
Care Management Discharge Note    Discharge Date: 07/27/2022       Discharge Disposition: Home, Home Care    Discharge Services:  (OT, RN)    Discharge DME:  (per treatment team)    Discharge Transportation: family or friend will provide    Private pay costs discussed: Not applicable    PAS Confirmation Code:  (not applicable)  Patient/family educated on Medicare website which has current facility and service quality ratings:  (not applicable)    Education Provided on the Discharge Plan:  yes  Persons Notified of Discharge Plans: family   Patient/Family in Agreement with the Plan:  yes    Handoff Referral Completed: [facility] faxing orders to home care     Additional Information:      11:32 AM  [facility] received request to contact Pt's son to discuss discharge planning.   [facility]ABI called and left voice mail for John ([phone]) requesting call back to discuss discharge plan.     GABI spoke with John regarding discharge plan and home care instructions.  John reports that his wife is an RN and will be able to assist with wound care on the days home care does not see Pt.  Family is in agreement with adding home OT.  Family to transport at time of discharge.  [facility]I spoke with [facility] liaison who confirms ability to add home OT.     ANGELINE Teague

## 2022-07-27 NOTE — PLAN OF CARE
Problem: Plan of Care - These are the overarching goals to be used throughout the patient stay.    Goal: Optimal Comfort and Wellbeing  Outcome: Ongoing, Progressing   Pt denies pain or discomfort. Pt refusing repositioning and only allowed when he wanted to be helped. Advised pt to shift weight while in bed and not move on side.   Problem: Dysrhythmia  Goal: Normalized Cardiac Rhythm  Outcome: Ongoing, Progressing  Pt A-fib on tele with HR 80's to 105 during the shift.   LLE drsg C/D/I and changed on day shift. Pedal pulses audible with doppler and pt initially refused assessment but then agreed. Pt very withdrawn and irritable. Calls for assistance.

## 2022-07-27 NOTE — PLAN OF CARE
Physical Therapy: Orders received. Chart reviewed and discussed with care team.? Physical Therapy not indicated due to patient declining mobility and need for physical therapy at this time.? Will complete orders. Thank you

## 2022-07-27 NOTE — PROGRESS NOTES
[facility] MEDICINE PROGRESS NOTE      SUMMARY:    81 year old male on hospital day number 3 after being admitted from the vascular clinic for PVD.  Pt had an IR/angioplasty of the LLE on 7/22.        7/26 :     No pain  Right Lower extremity angiogram with stent interventions, left groin procedural access.  Urine culture : pseudomonas, klebsiella, enterococcus  Continue augmentin, plavix, xarelto      Discharge in am        A/p  :       1.  PVD, bilateral legs: s/p IR/angioplasty on 7/22 LLE   Pending angiogram today on RLE:   2.  LLE celllulitis: change to orals; augmentin 875 mg BID  2.  COVID +: 7/21: negative xray chest 7/21; on room air; day number 4 dexamethasone  3.  HTN metoprolol as needed   4.  Nonischemic cardiomyopathy  5.  Atrial fibrillation:  On xarelto though on hold for procedure tomorrow  6.  Dyslipidemia: continue statin  7.  History of CKD: creatinine is .92 with GFR of 84  8.  Vascular access:  Pending PICC today  9.  Disposition:  Possible home tomorrow with wound care           Physical Exam/Objective:  Temp:  [97.3  F (36.3  C)-97.8  F (36.6  C)] 97.6  F (36.4  C)  Pulse:  [] 96  Resp:  [15-27] 22  BP: (142-176)/(65-86) 143/65  SpO2:  [92 %-96 %] 94 %  Body mass index is 27.2 kg/m .    GENERAL:  Alert, hard of hearing;    HEAD:  Normocephalic, without obvious abnormality, atraumatic   EYES:  PERRL, conjunctiva/corneas clear, no scleral icterus, EOM's intact   NOSE: Nares normal, septum midline, mucosa normal, no drainage   THROAT: Lips, mucosa, and tongue normal; teeth and gums normal, mouth moist   NECK: Supple, symmetrical, trachea midline   BACK:   Symmetric, no curvature, ROM normal   LUNGS:   Clear to auscultation bilaterally, no rales, rhonchi, or wheezing, symmetric chest rise on inhalation, respirations unlabored   CHEST WALL:  No tenderness or deformity   HEART:  Regular rate and rhythm, S1 and S2 normal, no murmur, rub, or gallop    ABDOMEN:    Soft, non-tender, bowel sounds active all four quadrants, no masses, no organomegaly, no rebound or guarding   EXTREMITIES: LLE with improved redness; no edema; dorsalis pulse is faint   SKIN: Dry to touch, no exanthems in the visualized areas   NEURO: Alert, oriented x3, moves all four extremities freely   PSYCH: Cooperative, behavior is appropriate      Data reviewed today: I personally reviewed all new medications, labs, imaging/diagnostics reports over the past 24 hours. Pertinent findings include:    Imaging:   No results found for this or any previous visit (from the past 24 hour(s)).    Labs:  Most Recent 3 BMP's:  Recent Labs   Lab Test 07/26/22  0544 07/25/22  0440 07/24/22  1022 07/23/22  1009   NA  --  143 142 141   POTASSIUM 3.8 3.5 4.1 3.8   CHLORIDE  --  109* 112* 107   CO2  --  28 16* 24   BUN  --  13 14 16   CR  --  0.87 0.92 0.99   ANIONGAP  --  6 14 10   DARON  --  8.0* 8.6 8.3*   GLC  --  120 95 107       Medications:   Personally Reviewed.  Medications     heparin 2 units/mL in 0.9% NaCl TABLE SOLN       heparin 2 units/mL in 0.9% NaCl TABLE SOLN       - MEDICATION INSTRUCTIONS -         amoxicillin-clavulanate  1 tablet Oral Q12H     clopidogrel  75 mg Oral Daily     dexamethasone  6 mg Oral Daily     furosemide  20 mg Oral Daily     metoprolol succinate ER  200 mg Oral Daily     rivaroxaban ANTICOAGULANT  20 mg Oral Daily with supper     sodium chloride (PF)  3 mL Intracatheter Q8H     sodium chloride (PF)  3 mL Intracatheter Q8H

## 2022-07-27 NOTE — PROGRESS NOTES
Occupational Therapy Discharge Summary    Reason for therapy discharge:    Discharged to home with home therapy.    Progress towards therapy goal(s). See goals on Care Plan in [facility] electronic health record for goal details.  Goals not met.  Barriers to achieving goals:   discharge from facility.    Therapy recommendation(s):    Continued therapy is recommended.  Rationale/Recommendations:  pt has decreased activity tolerance.

## 2022-07-27 NOTE — PLAN OF CARE
Goal Outcome Evaluation: ongoing    Plan of Care Reviewed With: patient     Overall Patient Progress: improving     Pt alert and oriented. Pt gets frustrated/irritable. Pt refuses to reposition, encouraged to shift weight. Pt denies pain. Pulses heard with doppler- pedal pulses. Pt is still waiting to be seen by PT. Tiffany patent. Pt does have small bruise around R dressing. Denies pain. Tele reads afib. Will continue to monitor.

## 2022-07-28 NOTE — PROGRESS NOTES
[facility] MEDICINE PROGRESS NOTE      SUMMARY:    81 year old male on hospital day number 3 after being admitted from the vascular clinic for PVD.  Pt had an IR/angioplasty of the LLE on 7/22.        7/28 :       Medically stable  Discharge cancelled yesterday due to urinary retention issues  S/p eben, outpatient urology f/u, started flomax  Pt/ot now recommending tcu  Discharge once tcu found  Updated wife and family        A/p  :       1.  PVD, bilateral legs: s/p IR/angioplasty on 7/22 LLE   Pending angiogram today on RLE:   2.  LLE celllulitis: change to orals; augmentin 875 mg BID  2.  COVID +: 7/21: negative xray chest 7/21; on room air; day number 4 dexamethasone  3.  HTN metoprolol as needed   4.  Nonischemic cardiomyopathy  5.  Atrial fibrillation:  On xarelto though on hold for procedure tomorrow  6.  Dyslipidemia: continue statin  7.  History of CKD: creatinine is .92 with GFR of 84  8.  Vascular access:  Pending PICC today  9.  Disposition:  Possible home tomorrow with wound care           Physical Exam/Objective:  Temp:  [97.3  F (36.3  C)-97.8  F (36.6  C)] 97.5  F (36.4  C)  Pulse:  [] 100  Resp:  [16-22] 20  BP: (128-161)/(67-87) 128/67  SpO2:  [90 %-95 %] 95 %  Body mass index is 26.03 kg/m .    GENERAL:  Alert, hard of hearing;    HEAD:  Normocephalic, without obvious abnormality, atraumatic   EYES:  PERRL, conjunctiva/corneas clear, no scleral icterus, EOM's intact   NOSE: Nares normal, septum midline, mucosa normal, no drainage   THROAT: Lips, mucosa, and tongue normal; teeth and gums normal, mouth moist   NECK: Supple, symmetrical, trachea midline   BACK:   Symmetric, no curvature, ROM normal   LUNGS:   Clear to auscultation bilaterally, no rales, rhonchi, or wheezing, symmetric chest rise on inhalation, respirations unlabored   CHEST WALL:  No tenderness or deformity   HEART:  Regular rate and rhythm, S1 and S2 normal, no murmur, rub, or gallop    ABDOMEN:    Soft, non-tender, bowel sounds active all four quadrants, no masses, no organomegaly, no rebound or guarding   EXTREMITIES: LLE with improved redness; no edema; dorsalis pulse is faint   SKIN: Dry to touch, no exanthems in the visualized areas   NEURO: Alert, oriented x3, moves all four extremities freely   PSYCH: Cooperative, behavior is appropriate      Data reviewed today: I personally reviewed all new medications, labs, imaging/diagnostics reports over the past 24 hours. Pertinent findings include:    Imaging:   No results found for this or any previous visit (from the past 24 hour(s)).    Labs:  Most Recent 3 BMP's:  Recent Labs   Lab Test 07/28/22  0445 07/27/22  0523 07/26/22  0544 07/25/22  0440 07/24/22  1022 07/23/22  1009   NA  --   --   --  143 142 141   POTASSIUM 4.3 4.1 3.8 3.5 4.1 3.8   CHLORIDE  --   --   --  109* 112* 107   CO2  --   --   --  28 16* 24   BUN  --   --   --  13 14 16   CR  --   --   --  0.87 0.92 0.99   ANIONGAP  --   --   --  6 14 10   DARON  --   --   --  8.0* 8.6 8.3*   GLC  --   --   --  120 95 107       Medications:   Personally Reviewed.  Medications     heparin 2 units/mL in 0.9% NaCl TABLE SOLN Stopped (07/27/22 1517)     heparin 2 units/mL in 0.9% NaCl TABLE SOLN Stopped (07/27/22 1518)     - MEDICATION INSTRUCTIONS -         amoxicillin-clavulanate  1 tablet Oral Q12H     clopidogrel  75 mg Oral Daily     furosemide  20 mg Oral Daily     metoprolol succinate ER  200 mg Oral Daily     rivaroxaban ANTICOAGULANT  20 mg Oral Daily with supper     sodium chloride (PF)  3 mL Intracatheter Q8H     sodium chloride (PF)  3 mL Intracatheter Q8H

## 2022-07-28 NOTE — PROGRESS NOTES
"CLINICAL NUTRITION SERVICES - ASSESSMENT NOTE     Nutrition Prescription    RECOMMENDATIONS FOR MDs/PROVIDERS TO ORDER:  None    Malnutrition Status:    Unable to determine due to lack of weight history and physical assessment    Recommendations already ordered by Registered Dietitian (RD):  None    Future/Additional Recommendations:  Monitor oral intake and need for supplement     REASON FOR ASSESSMENT  Bradley Liz is a/an 81 year old male assessed by the dietitian for LOS    NUTRITION HISTORY  Patient is covid+ (unable to see in person), patient may be confused. He has been refusing to order meals. He has lower left leg wounds.   When speaking to the patient over the phone he declined getting any nutrition supplements. He said he would order a meal for dinner tonight. When RD asked why he was not ordering meals he did not give a reason.     CURRENT NUTRITION ORDERS  Diet: Regular  Intake/Tolerance: 0%, refusing meals    LABS  Labs reviewed    MEDICATIONS  Medications reviewed    ANTHROPOMETRICS  Height: 175.3 cm (5' 9\")  Most Recent Weight: 80 kg (176 lb 4.8 oz)    IBW: 73 kg  BMI: Overweight BMI 25-29.9  Weight History:   Wt Readings from Last 10 Encounters:   07/28/22 80 kg (176 lb 4.8 oz)   07/16/21 88.5 kg (195 lb)   07/01/20 84.4 kg (186 lb)   01/02/20 83.6 kg (184 lb 6.4 oz)   10/25/19 84.8 kg (187 lb)   10/15/19 81.6 kg (179 lb 12.8 oz)   10/01/19 81.2 kg (179 lb)   09/24/19 81.6 kg (179 lb 12.8 oz)   09/17/19 81.6 kg (179 lb 12.8 oz)   08/15/19 75.8 kg (167 lb)   Dosing Weight: 73 kg IBW    ASSESSED NUTRITION NEEDS  Estimated Energy Needs: 4630-2931 kcals/day (25 - 30 kcals/kg)  Justification: Maintenance  Estimated Protein Needs: 73-88 grams protein/day (1 - 1.2 grams of pro/kg)  Justification: Wound healing  Estimated Fluid Needs: (1 mL/kcal)   Justification: Maintenance    MALNUTRITION  % Intake: </= 50% for >/= 5 days (severe)  % Weight Loss: Unable to assess d/t no current weight history on " file  Subcutaneous Fat Loss: Unable to assess  Muscle Loss: Unable to assess  Fluid Accumulation/Edema: Mild  Malnutrition Diagnosis: Unable to determine due to lack of weight history and physical assessment    NUTRITION DIAGNOSIS  Inadequate oral intake related to acute illness as evidenced by patient refusing meals and 0% intake recorded    INTERVENTIONS  Implementation  None    Goals  Patient to consume % of nutritionally adequate meal trays TID, or the equivalent with supplements/snacks.     Monitoring/Evaluation  Progress toward goals will be monitored and evaluated per protocol.  Genet Tian RDN

## 2022-07-28 NOTE — PROVIDER NOTIFICATION
"Writer had long conversation with patient and son John via phone. At this point in time patient is unable to tell nursing when he has to have a bowel movement or urinate and therefore is incontinent. Allen was placed for urine retention and incontinence. Patients perineum is very red and sore. When RN asked who was going to take care of patient at home patient responded \"I don't know my wife I guess\". Nursing is currently providing perineal cares, turns, food set up assistance, food ordering, encouragement to eat and drink, and encouragement to ambulate/move. Patient has bee continuously refusing to ambulate, work with PT/OT, and work with staff. Writer informed patient and family that if he continues to refuse to participate he will not be accepted at a rehab facility but more likely a nursing home/assisted living. Patient has at times been refusing nursing cares/assessments as well as important medications.     Son John reports understanding and states that family has been trying to get him to understand his level of needs and expresses that he too believes his mother/family cannot provide the care that he has been needing. John states \"my mom has enjoyed the break\". Son John is appreciative of any assistance with finding placement for patient and verbalizes understanding of patients needs to participate with therapy. John will communicate with siblings and mother Barbara regarding patients current state and discharge changes. John states he will also communicate with the patient to encourage him to participate with therapy.     Writer notified and asked Dr. Enriquez via page for PT/OT to be consulted again and to have a slums assessment completed for the patient. Writer will communicate with case management and social work regarding discharge changes.  "

## 2022-07-28 NOTE — PROGRESS NOTES
[facility] MEDICINE PROGRESS NOTE      SUMMARY:    81 year old male on hospital day number 3 after being admitted from the vascular clinic for PVD.  Pt had an IR/angioplasty of the LLE on 7/22.        7/27 :       Medically stable  Discharge orders done for home        A/p  :       1.  PVD, bilateral legs: s/p IR/angioplasty on 7/22 LLE   Pending angiogram today on RLE:   2.  LLE celllulitis: change to orals; augmentin 875 mg BID  2.  COVID +: 7/21: negative xray chest 7/21; on room air; day number 4 dexamethasone  3.  HTN metoprolol as needed   4.  Nonischemic cardiomyopathy  5.  Atrial fibrillation:  On xarelto though on hold for procedure tomorrow  6.  Dyslipidemia: continue statin  7.  History of CKD: creatinine is .92 with GFR of 84  8.  Vascular access:  Pending PICC today  9.  Disposition:  Possible home tomorrow with wound care           Physical Exam/Objective:  Temp:  [97.4  F (36.3  C)-97.8  F (36.6  C)] 97.5  F (36.4  C)  Pulse:  [87-94] 92  Resp:  [16-22] 22  BP: (139-162)/(76-87) 161/87  SpO2:  [92 %-95 %] 95 %  Body mass index is 26.4 kg/m .    GENERAL:  Alert, hard of hearing;    HEAD:  Normocephalic, without obvious abnormality, atraumatic   EYES:  PERRL, conjunctiva/corneas clear, no scleral icterus, EOM's intact   NOSE: Nares normal, septum midline, mucosa normal, no drainage   THROAT: Lips, mucosa, and tongue normal; teeth and gums normal, mouth moist   NECK: Supple, symmetrical, trachea midline   BACK:   Symmetric, no curvature, ROM normal   LUNGS:   Clear to auscultation bilaterally, no rales, rhonchi, or wheezing, symmetric chest rise on inhalation, respirations unlabored   CHEST WALL:  No tenderness or deformity   HEART:  Regular rate and rhythm, S1 and S2 normal, no murmur, rub, or gallop    ABDOMEN:   Soft, non-tender, bowel sounds active all four quadrants, no masses, no organomegaly, no rebound or guarding   EXTREMITIES: LLE with improved redness; no edema;  dorsalis pulse is faint   SKIN: Dry to touch, no exanthems in the visualized areas   NEURO: Alert, oriented x3, moves all four extremities freely   PSYCH: Cooperative, behavior is appropriate      Data reviewed today: I personally reviewed all new medications, labs, imaging/diagnostics reports over the past 24 hours. Pertinent findings include:    Imaging:   No results found for this or any previous visit (from the past 24 hour(s)).    Labs:  Most Recent 3 BMP's:  Recent Labs   Lab Test 07/27/22  0523 07/26/22  0544 07/25/22  0440 07/24/22  1022 07/23/22  1009   NA  --   --  143 142 141   POTASSIUM 4.1 3.8 3.5 4.1 3.8   CHLORIDE  --   --  109* 112* 107   CO2  --   --  28 16* 24   BUN  --   --  13 14 16   CR  --   --  0.87 0.92 0.99   ANIONGAP  --   --  6 14 10   DARON  --   --  8.0* 8.6 8.3*   GLC  --   --  120 95 107       Medications:   Personally Reviewed.  Medications     heparin 2 units/mL in 0.9% NaCl TABLE SOLN Stopped (07/27/22 1517)     heparin 2 units/mL in 0.9% NaCl TABLE SOLN Stopped (07/27/22 1518)     - MEDICATION INSTRUCTIONS -         amoxicillin-clavulanate  1 tablet Oral Q12H     clopidogrel  75 mg Oral Daily     furosemide  20 mg Oral Daily     metoprolol succinate ER  200 mg Oral Daily     rivaroxaban ANTICOAGULANT  20 mg Oral Daily with supper     sodium chloride (PF)  3 mL Intracatheter Q8H     sodium chloride (PF)  3 mL Intracatheter Q8H

## 2022-07-28 NOTE — PROGRESS NOTES
Allen was placed for patient due to urinary retention. Good urine output since. Patient very resistant to nursing cares refusing many things such as turns, food, certain medications, and ambulating. Education patient on the importance of all above listed things. Plan was for patient to discharge home with family assist and home care but writer advocated for PT/OT to reevaluate and potentially discharge to a transitional care unit.     Writer had a long phone conversation with daughter Camilla and wife Barbara. Daughter agreeable to transitional care unit but wife was hesitant. Writer emphasized and explained all nursing cares that are currently being provided for the patient that would need to continue once discharged home. At end of discussion wife Barbara was agreeable to a transitional care unit and is awaiting phone call from social work tomorrow to start sending out referrals to facilities. Family would like to have iPad set up tomorrow to call in and talk with the patient.

## 2022-07-28 NOTE — PLAN OF CARE
Patient is A&Ox4.  Patient bladder scanned for >600.  Patient was asked to use the urinal, or get up to use the bathroom, but he refused.  This writer informed the patient that if he was unable to void on his own, he wouldn't be able to discharge again today, but he continued to refuse and wanted to be straight cath'd instead.  Patient was straight cath'd for 550.  Upper lung sounds are course.  Lung sounds in the bases are diminished with fine crackles.  Patient has a congested cough with small sputum.  Left leg remains wrapped, clean, dry, and intact, and elevated on a pillow. 1+BLE edema, and 1+ pedal pulses bilat.  Patient denies pain and afebrile. Patient was repositioned as would allow.  Patient very agitated at times and wants to be left alone.  Patient's akin area, scrotum, and anus are very red.  Patient was cleaned up and barrier cream applied. VSS    @445 bladder scanned for 294    Patient stated to this writer that he had his gall bladder removed at one time at [facility].  He couldn't urinate there either, and went home with a treadwell and had to have it for a week.

## 2022-07-28 NOTE — PROGRESS NOTES
"Pt was alert and oriented. Pt irritated easily. Pt had 3 BM since 1900- incontinent. No IV access. Pt encouraged to increase PO intake d/t two bladder scans less than 200. Went to give pt Augmentin and pt refused- stated \"no I do not want to take that because its probably causing me to poop\". Writer educated the pt on how very important it is to take this medication since pt came in with a infection and needs antibiotics, informed pt that he did get some magnesium oxide and that medication would be more likely the cause since pt has had Augmentin the last couple day and did not have this issue- after education re-asked if I can give pt medication and he still refused. Dr. Enriquez called around 2130/2200 and asked if the patient was still here, and why the patient was still here- informed Dr. Enriquez that treadwell was pulled later in day per report and that since treadwell was removed pt unable to void and bladder scan was only 140/145- stated that writer has encouraged pt to increase intake, asked if  Wanted anything more and stated just to encourage intake. Pt supposed to go home in AM.   "

## 2022-07-28 NOTE — PROGRESS NOTES
Care Management Follow Up    Length of Stay (days): 7    Expected Discharge Date: 07/30/2022     Concerns to be Addressed:     Medical progression  Patient plan of care discussed at interdisciplinary rounds: Yes    Anticipated Discharge Disposition: Home, Home Care     Anticipated Discharge Services:  (OT, RN)  Anticipated Discharge DME:  (per treatment team)    Patient/family educated on Medicare website which has current facility and service quality ratings:  (not applicable)  Education Provided on the Discharge Plan:  yes  Patient/Family in Agreement with the Plan:  yes    Referrals Placed by CM/SW:  Skilled home care  Private pay costs discussed: Not applicable    Additional Information:  Plan was for Pt to return home to prior living environment with support from family as well as home RN and OT through [facility].   SW received update from RN regarding concerns from family with Pt returning home due to Pt's inability to care for himself and Pt's wife's limited ability to provide care.   SW placed call to Pt's daughter, Camilla to discuss discharge planning.  Camilla was unable to chat at time of call and will call back to discuss with SW further.     3:35 PM  SW had extensive conversation with Pt's daughter, Camilla and wife Barbara.  SW discussed Pt's current level of care based on information from RN notes.  Barbara feels as through she is able to care for Pt at home with home care services, however Pt's daughter is very concerned about Barbara's ability to provide this level of care.  Pt's children are involved and willing to help, however are unable to provide care all throughout the day and night.  Barbara reports that Pt will need to be able to get up on his own.  SW indicated that while Pt is refusing to work with therapy, this is not something that we are able to assess. Pt voices that it is very difficult to know how Pt is doing while they are unable to see him due to Covid precautions.  SW validated these feelings.     Family has many medical questions that SW is unable to answer.  Family reports that they have not heard from MD during hospitalization.  SW paged MD requesting update to be provided to family.  SW gave the phone to RN to answer more medical questions than writer is able to address.     Luzma request call back from  tomorrow 7/29 to discuss how Pt is doing in the morning and any changes regarding Pt's willingness to work with therapy and RNs.  Pt's wife is adamant that she does not want Pt to have to go to care facility.       ANGELINE Teague

## 2022-07-29 NOTE — PROGRESS NOTES
[facility]  WO Nurse Inpatient Assessment     Consulted for: left lower leg wounds    Patient in testing when WOC RN attempted to see earlier today. Will attempt to reassess later today or early next week.  Discharge recommendations entered as patient may discharge over the weekend.  See below for last WOC RN visit with assessment.    Patient History (according to provider note(s):      Patient is followed by the vacsular clinic and sent to hospital directly from clinic 7/21. Today he had an angiogram of LLE.    Areas Assessed:      Areas visualized during today's visit: bilateral lower legs    Wound location: LLE    Last photo: 7/21 taken at clinic, see media tab - photo not taken by [facility] today  Wound due to: Mixed Etiology: venous/arterial  Wound history/plan of care: Patient seen at clinic and has plan in place including calcium alginate, can return to these orders after discharge  Wound base: Sloughy where can be seen     Palpation of the wound bed: boggy   Patient had calcium alginate stuck to wounds beds, even after cleansing with sopa and water and then saline soak, could not fully remove. Placed xeroform over alginate to assist release.       Drainage: scant     Description of drainage: serous     Measurements (length x width x depth, in cm): Unable to clearly measure - approximately 3 x 9 and a distal area approximately 2 x 2cm     Tunneling: N/A     Undermining: N/A  Periwound skin: Edematous and discolored      Color: red      Temperature: normal   Odor: none  Pain: denies   Treatment goal: Heal  and Infection control/prevention  STATUS: initial assessment  Supplies ordered: at bedside       Treatment Plan:     LLE:  1. Cleanse legs with bath wipes or warm soap and water, gently pat dry  2. Apply Xeroform to open wounds on left leg, cover with abd pad, wrap with kerlix toes to knee.  Right leg can be open to air, no open areas at this time  Change daily  If supplies needed from  [facility]amsay# [id number]      Orders: Written    RECOMMEND PRIMARY TEAM ORDER: None, at this time  Education provided: plan of care  Discussed plan of care with: Patient and Nurse  WOC nurse follow-up plan: weekly  Notify WOC if wound(s) deteriorate.  Nursing to notify the Provider(s) and re-consult the WOC Nurse if new skin concern.    DATA:     Current support surface: Standard  Low air loss mattress  BMI: Body mass index is 25.39 kg/m .   Active diet order: Orders Placed This Encounter      Regular Diet Adult      Diet     Output: I/O last 3 completed shifts:  In: 880 [P.O.:880]  Out: 2145 [Urine:2145]     Labs:   Recent Labs   Lab 07/25/22  0440   HGB 14.6   WBC 10.4   CRP 3.4*     Pressure injury risk assessment:   Sensory Perception: 4-->no impairment  Moisture: 3-->occasionally moist  Activity: 1-->bedfast  Mobility: 3-->slightly limited  Nutrition: 3-->adequate  Friction and Shear: 2-->potential problem  Christiano Score: 16    Kathy Meade, BELINDAN, RN, PHN, HNB-BC, CWOCN

## 2022-07-29 NOTE — PROGRESS NOTES
07/29/22 1600   Quick Adds   Type of Visit Initial PT Evaluation   Living Environment   People in Home spouse   Current Living Arrangements condominium   Home Accessibility no concerns   Self-Care   Activity/Exercise/Self-Care Comment Pt reports he does not use AD's at home   General Information   Onset of Illness/Injury or Date of Surgery 07/21/22   Referring Physician Vladimir Enriquez MD   Pertinent History of Current Problem (include personal factors and/or comorbidities that impact the POC) Wound infection   Existing Precautions/Restrictions fall   Weight-Bearing Status - LLE full weight-bearing   Weight-Bearing Status - RLE full weight-bearing   Bed Mobility   Bed Mobility supine-sit;sit-supine   Supine-Sit [place] (Bed Mobility) set up;supervision   Sit-Supine [place] (Bed Mobility) set up;supervision   Comment, (Bed Mobility) SBA with supine<>sit and bed mobility. Set up for safety with lines and tubes during and after transfers.   Transfers   Transfers sit-stand transfer   Comment, (Transfers) SBA with FWW for sit<>stand transfers. Verbal cues for safety. Setup for safety with lines and tubes   Sit-Stand Transfer   Sit-Stand [place] (Transfers) set up;supervision;verbal cues   Assistive Device (Sit-Stand Transfers) walker, front-wheeled   Comment, (Sit-Stand Transfer) SBA with FWW for sit<>stand transfers. Verbal cues for safety. Setup for safety before, during and after transfers. Pt impulsive when performing sit<>stand transfers.   Gait/Stairs (Locomotion)   [place] Level (Gait) supervision;verbal cues   Assistive Device (Gait) walker, front-wheeled   Distance in Feet (Required for LE Total Joints) 10'   Pattern (Gait) swing-through   Comment, (Gait/Stairs) SBA with FWW when ambulating in room. Verbal cues for safety.   Clinical Impression   Criteria for Skilled Therapeutic Intervention Yes, treatment indicated   PT Diagnosis (PT) impaired functional mobility   Influenced by the  following impairments weakness, impulsive   Functional limitations due to impairments transfers, ambulation   Clinical Presentation (PT Evaluation Complexity) Evolving/Changing   Clinical Presentation Rationale Pt presents as medically diagnosed   Clinical Decision Making (Complexity) moderate complexity   Planned Therapy Interventions (PT) balance training;bed mobility training;gait training;home exercise program;patient/family education;ROM (range of motion);stair training;strengthening;transfer training   Anticipated Equipment Needs at Discharge (PT) walker, rolling   Risk & Benefits of therapy have been explained patient   PT Discharge Planning   PT Discharge Recommendation (DC Rec) (S)  home with assist  (24 hour assist/supervision)   PT Rationale for DC Rec Pt requires 24 hour assist/supervision for safety as pt is impulsive. Pt has been declining PT and would only be appropriate for more therapy after discharge if pt is more cooperative and works with therapy.   Plan of Care Review   Plan of Care Reviewed With patient   Total Evaluation Time   Total Evaluation Time (Minutes) 10   Physical Therapy Goals   PT Frequency Daily   PT Predicted Duration/Target Date for Goal Attainment 08/01/22   PT Goals Transfers;Gait   PT: Transfers Modified independent;Sit to/from stand;Assistive device  (FWW)   PT: Gait Modified independent;Rolling walker;10 feet;Assistive device  (FWW)     Letitia Redman, PT, DPT

## 2022-07-29 NOTE — PLAN OF CARE
Goal Outcome Evaluation: no change.     Plan of Care Reviewed With: patient     Overall Patient Progress: no change     Pt A&Ox4. Allen in place and patent. Lung sounds have some crackles. Encouraged coughing and movement, pt refusing to get up. L leg dressing clean and intact. Pt continues to be somewhat rude to staff and doesn't want to participate. Awaiting possible TCU, will continue to monitor.

## 2022-07-29 NOTE — PROGRESS NOTES
Care Management Follow Up    Length of Stay (days): 8    Expected Discharge Date: 07/30/2022     Concerns to be Addressed:       Patient plan of care discussed at interdisciplinary rounds: Yes    Anticipated Discharge Disposition: Home, Home Care     Anticipated Discharge Services:  (OT, RN)  Anticipated Discharge DME:  (per treatment team)    Patient/family educated on Medicare website which has current facility and service quality ratings:  (not applicable)    Additional Information:  CM had a call from Dtr Camilla. CM clarified that PT rec is still pending and it would be determined if the Pt needs TCU before TCUs and insurance can review for prior auth request. CM stated that pending how the Pt does with Therapy the pt may be able to go home with home care as [facility] has accepted pending discharge goals.  CM had insurance auth pending for TCU, however PT therapy recs are needed. Dtr felt that TCUs were pending as OT had the rec for TCU until cm clarified that PT needs to have rec for TCU pending the Pts needs.  Dtr requested to speak with provider.       2:48 PM  CM and the provider had a care conference with the dtr. Dtr requests PT to see again and feels that the Pt needs placement. Provider and CM stated what the pt would need if TCU is needed for discharge. Provider and CM also spoke about going home with home care at discharge. PT Roseanne Order.     CM spoke with PT and learned that the Pt continues to refuse to work with them and feels that the Pt would need to return home with 24hr support and home care. [facility] updated.     [employer] Auth pending.    ANGELINE Kingston

## 2022-07-29 NOTE — PROGRESS NOTES
[facility] MEDICINE PROGRESS NOTE      SUMMARY:    81 year old male on hospital day number 3 after being admitted from the vascular clinic for PVD.  Pt had an IR/angioplasty of the LLE on 7/22.        7/28 :       Medically stable  S/p treadwell for urinary retention, outpatient urology f/u, started flomax    OT recommending TCU but patient not participating in PT with PT team   If patient doesn't participate in PT then PT can't recommend TCU and patient will have to go home with home health  Discussed with patient and family - wife and daughter and explained about the situation    To be discharged once disposition issue solved          A/p  :       1.  PVD, bilateral legs: s/p IR/angioplasty on 7/22 LLE, on plavix, xarelto.    2.  LLE celllulitis: change to orals; augmentin 875 mg BID    2.  COVID +: 7/21: negative xray chest 7/21; on room air; completed 3 days of remdesivir    3.  HTN, Essential :  on metoprolol 200 mg daily    4.  Nonischemic cardiomyopathy : on lasix 20 mg daily    5.  Atrial fibrillation:  On xarelto     6.  Dyslipidemia: continue statin    7.  History of CKD: stable    8. Urinary retention : s/p treadwell, started flomax, outpatient urology f/u.           Physical Exam/Objective:  Temp:  [97  F (36.1  C)-97.6  F (36.4  C)] 97  F (36.1  C)  Pulse:  [] 105  Resp:  [20] 20  BP: (104-124)/(57-73) 119/57  SpO2:  [90 %-98 %] 90 %  Body mass index is 25.39 kg/m .    GENERAL:  Alert, hard of hearing;    HEAD:  Normocephalic, without obvious abnormality, atraumatic   EYES:  PERRL, conjunctiva/corneas clear, no scleral icterus, EOM's intact   NOSE: Nares normal, septum midline, mucosa normal, no drainage   THROAT: Lips, mucosa, and tongue normal; teeth and gums normal, mouth moist   NECK: Supple, symmetrical, trachea midline   BACK:   Symmetric, no curvature, ROM normal   LUNGS:   Clear to auscultation bilaterally, no rales, rhonchi, or wheezing, symmetric chest rise on  inhalation, respirations unlabored   CHEST WALL:  No tenderness or deformity   HEART:  Regular rate and rhythm, S1 and S2 normal, no murmur, rub, or gallop    ABDOMEN:   Soft, non-tender, bowel sounds active all four quadrants, no masses, no organomegaly, no rebound or guarding   EXTREMITIES: LLE with improved redness; no edema; dorsalis pulse is faint   SKIN: Dry to touch, no exanthems in the visualized areas   NEURO: Alert, oriented x3, moves all four extremities freely   PSYCH: Cooperative, behavior is appropriate      Data reviewed today: I personally reviewed all new medications, labs, imaging/diagnostics reports over the past 24 hours. Pertinent findings include:    Imaging:   No results found for this or any previous visit (from the past 24 hour(s)).    Labs:  Most Recent 3 BMP's:  Recent Labs   Lab Test 07/29/22  0457 07/28/22  0445 07/27/22  0523 07/26/22  0544 07/25/22  0440 07/24/22  1022 07/23/22  1009   NA  --   --   --   --  143 142 141   POTASSIUM 3.7 4.3 4.1   < > 3.5 4.1 3.8   CHLORIDE  --   --   --   --  109* 112* 107   CO2  --   --   --   --  28 16* 24   BUN  --   --   --   --  13 14 16   CR  --   --   --   --  0.87 0.92 0.99   ANIONGAP  --   --   --   --  6 14 10   DARON  --   --   --   --  8.0* 8.6 8.3*   GLC  --   --   --   --  120 95 107    < > = values in this interval not displayed.       Medications:   Personally Reviewed.  Medications     heparin 2 units/mL in 0.9% NaCl TABLE SOLN Stopped (07/27/22 1517)     heparin 2 units/mL in 0.9% NaCl TABLE SOLN Stopped (07/27/22 1518)     - MEDICATION INSTRUCTIONS -         amoxicillin-clavulanate  1 tablet Oral Q12H     clopidogrel  75 mg Oral Daily     furosemide  20 mg Oral Daily     metoprolol succinate ER  200 mg Oral Daily     rivaroxaban ANTICOAGULANT  20 mg Oral Daily with supper     sodium chloride (PF)  3 mL Intracatheter Q8H     sodium chloride (PF)  3 mL Intracatheter Q8H     tamsulosin  0.4 mg Oral Daily

## 2022-07-30 NOTE — PROGRESS NOTES
[facility] MEDICINE PROGRESS NOTE      SUMMARY:     81 year old male on hospital day number 3 after being admitted from the vascular clinic for PVD.  Pt had an IR/angioplasty of the LLE on 7/22.  On 7/29/2022 patient in the evening time had worsening respiratory failure and had up titration of his oxygen from 2 L up to 10 L/min.        OT recommending TCU but patient not participating in PT with PT team   If patient doesn't participate in PT then PT can't recommend TCU and patient will have to go home with home health. Patient can be discharged to home health once stabilized medically.  Still has ongoing oxygen requirements at this time.  Discussed with patient and family         A/p  :         1.  Acute hypoxic respiratory failure in the setting of COVID-19 on 7/21/2022 with now likely COPD exacerbation  -Expiratory wheeze appreciated on exam.  Likely findings are concerning for COPD exacerbation. Patient does not appear grossly volume overloaded.  We will obtain some basic lab work and obtain a chest x-ray.  It would be unlikely for the patient to have a pulmonary embolism in the setting of already being on Xarelto/anticoagulation.  -Chest x-ray on 7/30/2022 overall insignificant  -Initiated prednisone 40 mg a day on 7/30/2022 with a plan of 5 to 7-day treatment for COPD exacerbation  -DuoNebs as needed    2. PVD, bilateral legs: s/p IR/angioplasty on 7/22 LLE, on plavix, xarelto.     2.  LLE celllulitis: change to orals; augmentin 875 mg BID     2.  COVID +: 7/21: negative xray chest 7/21; on room air; completed 3 days of remdesivir     3.  HTN, Essential :  on metoprolol 200 mg daily     4.  Nonischemic cardiomyopathy : on lasix 20 mg daily at home but being held here. He has diuresed quite a lot and appears euvolemic on exam. Will need to assess fluid status every day and likely can be reinitiated closer to discharge.      5.  Atrial fibrillation:  On xarelto      6.  Dyslipidemia:  continue statin     7.  History of CKD: stable     8. Urinary retention : s/p treadwell, started flomax, outpatient urology f/u.    Karl Freitas MD  [facility]  Phone: #[phone]    Interval History/Subjective:    And acute events overnight patient did have worsening respiratory status.  He desatted down to 85% on 2 L oxygen at 10:15 PM and had to have his oxygen requirements increased to 10 L/min oxime mask since overnight.  Also noted to have softer pressures this morning with systolic blood pressures in the 80s to 90s over 40s to 50s.  Patient states that he does feel more short of breath.  At this time patient was -580 cc yesterday and cumulatively is -5.6 L.  He presented with a weight of 80.7 kg and most recently was down to 78 kg.  There has been no major lab draws for the past few days.  He did have a potassium of 3.7 and magnesium of 2.0 yesterday.  At this time patient has already completed remdesivir, he is on Augmentin 1 tablet p.o. twice daily, Plavix 75 mg a day, Lasix 20 mg a day, metoprolol succinate 200 mg a day, rivaroxaban/Xarelto 20 mg a day.    Physical Exam/Objective:  Temp:  [97  F (36.1  C)-98.4  F (36.9  C)] 98.3  F (36.8  C)  Pulse:  [] 107  Resp:  [16-20] 16  BP: ()/(49-57) 86/49  SpO2:  [85 %-99 %] 99 %  Body mass index is 25.39 kg/m .    GENERAL:  Alert, appears comfortable, in no acute distress, appears stated age, currently on oxygen   HEAD:  Normocephalic, without obvious abnormality, atraumatic   EYES:  PERRL, conjunctiva/corneas clear, no scleral icterus, EOM's intact   NOSE: Nares normal, septum midline, mucosa normal, no drainage   THROAT: Lips, mucosa, and tongue normal; teeth and gums normal, mouth moist   NECK: Supple, symmetrical, trachea midline   BACK:   Symmetric, no curvature, ROM normal   LUNGS:    Decreased breath sounds diffusely, exp wheezing through out lungs   CHEST WALL:  No tenderness or deformity    HEART:  Regular rate and rhythm, S1 and S2 normal, no murmur, rub, or gallop    ABDOMEN:   Soft, non-tender, bowel sounds active all four quadrants, no masses, no organomegaly, no rebound or guarding   EXTREMITIES:  Left lower extremity with erythema   SKIN: Dry to touch, no exanthems in the visualized areas   NEURO: Alert, oriented x3, moves all four extremities freely   PSYCH: Cooperative, behavior is appropriate      Data reviewed today: I personally reviewed all new medications, labs, imaging/diagnostics reports over the past 24 hours. Pertinent findings include:    Imaging:   No results found for this or any previous visit (from the past 24 hour(s)).    Labs:  Most Recent 3 CBC's:Recent Labs   Lab Test 07/25/22  0440 07/24/22  1509 07/23/22  1009   WBC 10.4 12.0* 10.9   HGB 14.6 15.2 16.4   MCV 94 93 94    389 355     Most Recent 3 BMP's:Recent Labs   Lab Test 07/30/22  0838 07/29/22  0457 07/28/22  0445 07/26/22  0544 07/25/22  0440 07/24/22  1022     --   --   --  143 142   POTASSIUM 4.2 3.7 4.3   < > 3.5 4.1   CHLORIDE 104  --   --   --  109* 112*   CO2 27  --   --   --  28 16*   BUN 19  --   --   --  13 14   CR 0.98  --   --   --  0.87 0.92   ANIONGAP 8  --   --   --  6 14   DARON 8.3*  --   --   --  8.0* 8.6     --   --   --  120 95    < > = values in this interval not displayed.     Most Recent 2 LFT's:Recent Labs   Lab Test 07/22/22  0516 07/21/22  1719   AST 15 19   ALT 11 15   ALKPHOS 52 64   BILITOTAL 0.4 0.7     Most Recent 3 INR's:Recent Labs   Lab Test 07/21/22  1719 07/21/22  1227 09/21/21  1214   INR 1.07 1.12 2.6*       Medications:   Personally Reviewed.  Medications     heparin 2 units/mL in 0.9% NaCl TABLE SOLN Stopped (07/27/22 1517)     heparin 2 units/mL in 0.9% NaCl TABLE SOLN Stopped (07/27/22 3382)     - MEDICATION INSTRUCTIONS -         amoxicillin-clavulanate  1 tablet Oral Q12H     clopidogrel  75 mg Oral Daily     furosemide  20 mg Oral Daily     metoprolol  succinate ER  200 mg Oral Daily     rivaroxaban ANTICOAGULANT  20 mg Oral Daily with supper     sodium chloride (PF)  3 mL Intracatheter Q8H     sodium chloride (PF)  3 mL Intracatheter Q8H     tamsulosin  0.4 mg Oral Daily

## 2022-07-30 NOTE — PLAN OF CARE
Problem: Gas Exchange Impaired  Goal: Optimal Gas Exchange  Outcome: Ongoing, Not Progressing    Goal: Optimal Comfort and Wellbeing  Outcome: Ongoing, Progressing     Problem: Skin or Soft Tissue Infection  Goal: Absence of Infection Signs and Symptoms  Outcome: Ongoing, Progressing     Pt denies pain. Pt is still having stool incontinence. Pt not very pleasant. Pt de-sated to the mid 80's, now on 10L oxymask. Tried to get pt to sit up and reposition to get O2 sats up, pt refused to move at all. Thinking O2 needs are due to pt laying flat and on his side. Pt resting, will continue to monitor.

## 2022-07-30 NOTE — PLAN OF CARE
Goal Outcome Evaluation:    Plan of Care Reviewed With: patient     Patient hs been resting in bed the entire shift. He has declined to get up in the chair. Angioplasty sites in bilateral groin areas are clean and dry and both have purplish hematomas. Patient denies any c/o pain. BP's was. 86/49 . A new IV was inserted into his left AC and IV fluids at 100 ml/hr were started.Indwelling treadwell Catheter put out 450 ml of yellow urine. Appetite is poor, yet fluids have been encouraged. Wound Care completed on left lower leg. Will continue to monitor.

## 2022-07-31 NOTE — PROGRESS NOTES
Care Management Follow Up    Length of Stay (days): 10    Expected Discharge Date: 08/01/2022     Concerns to be Addressed:       Patient plan of care discussed at interdisciplinary rounds: Yes    Anticipated Discharge Disposition: Home, Home Care vs TCU     Anticipated Discharge Services:  (OT, RN)  Anticipated Discharge DME:  (per treatment team)    Patient/family educated on Medicare website which has current facility and service quality ratings:  (not applicable)    Additional Information:  CM had gotten a copy of a fax from [employer] stating that since the Pt was closed to baseline and limited therapy notes this would be a decline of auth unless the pt has agreed to work with PT.     3:45PM  CM learned that the Pt worked with PT today and the rec is TCU. CM sent an updated fax request to [employer] with notes as requested.     ANGELINE Kingston

## 2022-07-31 NOTE — PLAN OF CARE
Denies complaints and pain.  Dressing changed.  Declined breakfast and lunch, eating own cookies.  Affect pleasant.  Worked with therapy.    Problem: Plan of Care - These are the overarching goals to be used throughout the patient stay.    Goal: Plan of Care Review/Shift Note  Description: The Plan of Care Review/Shift note should be completed every shift.  The Outcome Evaluation is a brief statement about your assessment that the patient is improving, declining, or no change.  This information will be displayed automatically on your shift note.  Outcome: Ongoing, Progressing  Flowsheets (Taken 7/31/2022 1504)  Plan of Care Reviewed With: patient  Outcome Evaluation: Denies complaints. Worked with therapy.  No behavioral issues  Overall Patient Progress: improving  Goal: Absence of Hospital-Acquired Illness or Injury  Intervention: Identify and Manage Fall Risk  Recent Flowsheet Documentation  Taken 7/31/2022 1200 by Letitia Sands RN  Safety Promotion/Fall Prevention:   activity supervised   assistive device/personal items within reach   clutter free environment maintained  Taken 7/31/2022 0827 by Letitia Sands RN  Safety Promotion/Fall Prevention:   activity supervised   assistive device/personal items within reach   clutter free environment maintained  Intervention: Prevent Skin Injury  Recent Flowsheet Documentation  Taken 7/31/2022 1200 by Letitia Sands RN  Body Position: position changed independently  Taken 7/31/2022 0827 by Letitia Sands RN  Body Position: position changed independently  Intervention: Prevent and Manage VTE (Venous Thromboembolism) Risk  Recent Flowsheet Documentation  Taken 7/31/2022 1346 by Letitia Sands, RN  Activity Management: activity adjusted per tolerance  Taken 7/31/2022 1200 by Letitia Sands RN  Activity Management: activity adjusted per tolerance  Taken 7/31/2022 0827 by Letitia Sands RN  Activity Management: activity adjusted per tolerance  Intervention:  Prevent Infection  Recent Flowsheet Documentation  Taken 7/31/2022 1200 by Letitia Sands RN  Infection Prevention:   single patient room provided   personal protective equipment utilized  Taken 7/31/2022 0827 by Letitia Sands RN  Infection Prevention:   single patient room provided   personal protective equipment utilized  Goal: Optimal Comfort and Wellbeing  Intervention: Provide Person-Centered Care  Recent Flowsheet Documentation  Taken 7/31/2022 1200 by Letitia Sands RN  Trust Relationship/Rapport:   care explained   choices provided  Taken 7/31/2022 0827 by Letitia Sands RN  Trust Relationship/Rapport:   care explained   choices provided     Problem: Risk for Delirium  Goal: Optimal Coping  Outcome: Met  Intervention: Optimize Psychosocial Adjustment to Delirium  Recent Flowsheet Documentation  Taken 7/31/2022 1200 by Letitia Sands RN  Supportive Measures:   active listening utilized   goal-setting facilitated   positive reinforcement provided  Taken 7/31/2022 0827 by Letitia Sands RN  Supportive Measures:   active listening utilized   goal-setting facilitated   positive reinforcement provided  Goal: Improved Behavioral Control  Outcome: Met  Goal: Improved Attention and Thought Clarity  Outcome: Met  Goal: Improved Sleep  Outcome: Met     Problem: Gas Exchange Impaired  Goal: Optimal Gas Exchange  Outcome: Ongoing, Progressing  Intervention: Optimize Oxygenation and Ventilation  Recent Flowsheet Documentation  Taken 7/31/2022 1200 by Letitia Sands RN  Head of Bed (HOB) Positioning: HOB at 20-30 degrees  Taken 7/31/2022 0827 by Letitia Sands RN  Head of Bed (HOB) Positioning: HOB at 20-30 degrees     Problem: Oral Intake Inadequate  Goal: Improved Oral Intake  Outcome: Ongoing, Progressing   Goal Outcome Evaluation:    Plan of Care Reviewed With: patient     Overall Patient Progress: improving    Outcome Evaluation: Denies complaints. Worked with therapy.  No behavioral  issues

## 2022-07-31 NOTE — PLAN OF CARE
Problem: Risk for Delirium  Goal: Improved Behavioral Control  Outcome: Ongoing, Progressing    Alert and oriented x 4. Flat affect. Irritated with nursing staff for frequent assessments and cares. Re-educated and cooperative.     Problem: Gas Exchange Impaired  Goal: Optimal Gas Exchange  Outcome: Ongoing, Progressing    Lung sounds coarse crackles. Has congestive cough, non productive. Dyspnea on exertion. On 2 L/min NC; sat in low 90s. Bedrest d/t refusal to get OOB. Tele- A Kasia Allen patent. Covid precaution maintained.

## 2022-07-31 NOTE — PLAN OF CARE
Problem: Dysrhythmia  Goal: Normalized Cardiac Rhythm  Outcome: Ongoing, Not Progressing    Pt denies pain. Pt's Hr sustained above 110, gave PRN metoprolol, no change. Pt's HR was varying from 90s-150s. Paged hospitalist and put pt back on tele, found out he is in A-fib RVR, gave one time dose of 25 mg metoprolol ER, effective, HR has been in the 80s-90s. Pt resting, will continue to monitor.

## 2022-07-31 NOTE — PROGRESS NOTES
[facility] MEDICINE PROGRESS NOTE      SUMMARY:     81 year old male on hospital day number 3 after being admitted from the vascular clinic for PVD.  Pt had an IR/angioplasty of the LLE on 7/22.  On 7/29/2022 patient in the evening time had worsening respiratory failure and had up titration of his oxygen from 2 L up to 10 L/min.         OT recommending TCU but patient not participating in PT with PT team   If patient doesn't participate in PT then PT can't recommend TCU and patient will have to go home with home health. Patient can be discharged to home health once stabilized medically.  Still has ongoing oxygen requirements at this time.  Discussed with patient and family         A/p  :         1.  Acute hypoxic respiratory failure in the setting of COVID-19 on 7/21/2022 with now likely COPD exacerbation  -Expiratory wheeze appreciated on exam.  Likely findings are concerning for COPD exacerbation. Patient does not appear grossly volume overloaded.  We will obtain some basic lab work and obtain a chest x-ray.  It would be unlikely for the patient to have a pulmonary embolism in the setting of already being on Xarelto/anticoagulation.  -Chest x-ray on 7/30/2022 overall insignificant  -Initiated prednisone 40 mg a day on 7/30/2022 with a plan of 5 to 7-day treatment for COPD exacerbation  -DuoNebs as needed     2. PVD, bilateral legs: s/p IR/angioplasty on 7/22 LLE, on plavix, xarelto.     2.  LLE celllulitis: change to orals; augmentin 875 mg BID with last dose being 7/31/2022 to be a 7-day antibiotic treatment.     2.  COVID +: 7/21: negative xray chest 7/21; on room air; completed 3 days of remdesivir     3.  HTN, Essential :  on metoprolol 200 mg daily     4.  Nonischemic cardiomyopathy : on lasix 20 mg daily at home but being held here. He has diuresed quite a lot and appears euvolemic on exam. Will need to assess fluid status every day and likely can be reinitiated closer to  discharge.      5.  Atrial fibrillation:  On xarelto      6.  Dyslipidemia: continue statin     7.  History of CKD: stable     8. Urinary retention : s/p treadwell, started flomax, outpatient urology f/u.       Karl Freitas MD  [facility]  Phone: #[phone]    Interval History/Subjective:    No acute events overnight.  Patient drenched in sweat this morning.    Physical Exam/Objective:  Temp:  [97.3  F (36.3  C)-98.2  F (36.8  C)] 97.8  F (36.6  C)  Pulse:  [] 109  Resp:  [18-20] 20  BP: (122-134)/(57-72) 133/61  SpO2:  [85 %-97 %] 92 %  Body mass index is 25.19 kg/m .    GENERAL:  Alert, appears comfortable, in no acute distress, appears stated age   HEAD:  Normocephalic, without obvious abnormality, atraumatic   EYES:  PERRL, conjunctiva/corneas clear, no scleral icterus, EOM's intact   NOSE: Nares normal, septum midline, mucosa normal, no drainage   THROAT: Lips, mucosa, and tongue normal; teeth and gums normal, mouth moist   NECK: Supple, symmetrical, trachea midline   BACK:   Symmetric, no curvature, ROM normal   LUNGS:    Expiratory wheezing throughout the lung fields   CHEST WALL:  No tenderness or deformity   HEART:  Regular rate and rhythm, S1 and S2 normal, no murmur, rub, or gallop    ABDOMEN:   Soft, non-tender, bowel sounds active all four quadrants, no masses, no organomegaly, no rebound or guarding   EXTREMITIES: Extremities normal, atraumatic, no cyanosis or edema    SKIN: Dry to touch, no exanthems in the visualized areas   NEURO: Alert, oriented x3, moves all four extremities freely   PSYCH: Cooperative, behavior is appropriate      Data reviewed today: I personally reviewed all new medications, labs, imaging/diagnostics reports over the past 24 hours. Pertinent findings include:    Imaging:   No results found for this or any previous visit (from the past 24 hour(s)).    Labs:  Most Recent 3 CBC's:Recent Labs   Lab Test 07/30/22  0838  07/25/22  0440 07/24/22  1509   WBC 13.5* 10.4 12.0*   HGB 16.0 14.6 15.2   MCV 93 94 93    371 389     Most Recent 3 BMP's:Recent Labs   Lab Test 07/31/22  0357 07/30/22  0838 07/29/22  0457 07/26/22  0544 07/25/22  0440 07/24/22  1022   NA  --  139  139  --   --  143 142   POTASSIUM 4.2 4.2  4.2 3.7   < > 3.5 4.1   CHLORIDE  --  104  104  --   --  109* 112*   CO2  --  27  27  --   --  28 16*   BUN  --  19  19  --   --  13 14   CR  --  0.98  0.98  --   --  0.87 0.92   ANIONGAP  --  8  8  --   --  6 14   DARON  --  8.3*  8.3*  --   --  8.0* 8.6   GLC  --  104  104  --   --  120 95    < > = values in this interval not displayed.     Most Recent 2 LFT's:Recent Labs   Lab Test 07/30/22  0838 07/22/22  0516   AST 17 15   ALT 25 11   ALKPHOS 72 52   BILITOTAL 1.0 0.4     Most Recent 3 INR's:Recent Labs   Lab Test 07/21/22  1719 07/21/22  1227 09/21/21  1214   INR 1.07 1.12 2.6*       Medications:   Personally Reviewed.  Medications     heparin 2 units/mL in 0.9% NaCl TABLE SOLN Stopped (07/27/22 1517)     heparin 2 units/mL in 0.9% NaCl TABLE SOLN Stopped (07/27/22 1518)     - MEDICATION INSTRUCTIONS -         amoxicillin-clavulanate  1 tablet Oral Q12H     clopidogrel  75 mg Oral Daily     metoprolol succinate ER  200 mg Oral Daily     predniSONE  40 mg Oral Daily     rivaroxaban ANTICOAGULANT  20 mg Oral Daily with supper     sodium chloride (PF)  3 mL Intracatheter Q8H     sodium chloride (PF)  3 mL Intracatheter Q8H     tamsulosin  0.4 mg Oral Daily

## 2022-08-01 NOTE — PLAN OF CARE
Problem: Plan of Care - These are the overarching goals to be used throughout the patient stay.    Goal: Absence of Hospital-Acquired Illness or Injury  Intervention: Identify and Manage Fall Risk  Recent Flowsheet Documentation  Taken 8/1/2022 1000 by Mandi Mccain RN  Safety Promotion/Fall Prevention:    patient and family education    nonskid shoes/slippers when out of bed    mobility aid in reach    lighting adjusted    increase visualization of patient    increased rounding and observation    fall prevention program maintained    clutter free environment maintained    activity supervised     Problem: Skin or Soft Tissue Infection  Goal: Absence of Infection Signs and Symptoms  Outcome: Ongoing, Progressing     Problem: Gas Exchange Impaired  Goal: Optimal Gas Exchange  Outcome: Ongoing, Progressing   Pt alert and oriented. Initially on 10 Oxymask this morning. Pt now on 2L NC O2 Sat in low 90's. VSS. LS crackles coarse diminished. Intermittent congested cough. Magnesium replaced per protocol recheck AM. Tolerating diet. Incontinent of bowel. Allen secure patent urine draining. PT/OT worked with patient. Plan discharge likely tomorrow pending weaning off oxygen and and medical progression. Will monitor and continue plan of care.

## 2022-08-01 NOTE — PROGRESS NOTES
CLINICAL NUTRITION SERVICES - REASSESSMENT NOTE     Nutrition Prescription    RECOMMENDATIONS FOR MDs/PROVIDERS TO ORDER:  For wounds rec   Daily MVI  08205 international unit(s) Vitamin A x 10 days  500mg Vit C daily x 10 days   50mg zinc sulfate x 10 days     Malnutrition Status:    Moderate malnutrition r/t chronic AEB 15% wt loss in a year, 7% in the past 11 days (although also on lasix), skin breakdown, reduced po (now improving)     Recommendations already ordered by Registered Dietitian (RD):  Pt refusing interventions at this time     Future/Additional Recommendations:  If pt changes his mind about supplements recommend Ensure enlive or Magic cup      EVALUATION OF THE PROGRESS TOWARD GOALS   Diet: Regular  Intake: 100%      Reminded pt of supplements, he said he will think about it. Encouraged twice and he said again he will think about it.          ANTHROPOMETRICS  Admission wt: 80kg  Most Recent Weight: 75kg  Pt down 12# this admission (7%)  Has also had some lasix   Pt down 15% in the past year   Wt Readings from Last 5 Encounters:   08/01/22 75.4 kg (166 lb 4.8 oz)   07/16/21 88.5 kg (195 lb)   07/01/20 84.4 kg (186 lb)   01/02/20 83.6 kg (184 lb 6.4 oz)   10/25/19 84.8 kg (187 lb)         PHYSICAL FINDINGS  Edema-+1+2 bilateral LE edema  GI-fecal incontinence, last BM on 8/1  Skin- leg ulcer stage 3     LABS  Reviewed    MEDICATIONS  Reviewed, lasix, prednisone      NUTRITION DIAGNOSIS  Inadequate oral intake related to acute illness as evidenced by patient refusing meals and 0% intake recorded  --evaluation: progressing        Moderate malnutrition r/t chronic AEB 15% wt loss in a year, 7% in the past 11 days (although also on lasix), skin breakdown, reduced po   (new)    Goals:  Patient to consume % of nutritionally adequate meal trays TID, or the equivalent with supplements/snacks.-progressing  Promote healing-new     INTERVENTIONS  Implementation  Pt refusing interventions at this time      For wounds rec   Daily MVI  53679 international unit(s) Vitamin A x 10 days  500mg Vit C daily x 10 days   50mg zinc sulfate x 10 day    Monitoring/Evaluation  Progress toward goals will be monitored and evaluated per protocol.

## 2022-08-01 NOTE — PROGRESS NOTES
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  Patient was directly admitted from vascular surgery clinic.  Diagnosed with COVID-19 viral infection.  Developed worsening respiratory failure on 7/29/2022 with up titration of oxygen from 2 L to 10 L/min.  Not treated for possible COPD exacerbation.  Patient is agreeable to work with PT and OT.  He is requesting TCU placement does not feel he can return to home due to generalized weakness.     Acute respiratory failure with hypoxia  COVID-19 infection with positive PCR on 7/21/2022.  COPD exacerbation  Continue prednisone 40 mg daily anticipate for 5 days pending clinical improvement  Continue supplemental oxygen, taper as able, goal SpO2 90-94%.  RCAT  Duo nebs 4 times daily as needed  Encourage incentive spirometry    Peripheral vascular disease bilateral lower extremities.  Status post IR angioplasty on 7/22 of left lower extremities.  Continue Plavix Xarelto per vascular surgery recommendations.  Outpatient vascular surgery follow-up.    Left lower extremity cellulitis  Augmentin with course was completed.  Discontinued Augmentin on 7/31/2022.    COVID-19 infection  Positive PCR on 7/21.  Negative chest x-ray on 7/21.  Completed 3 days of remdesivir during hospitalization.  Infection prevention consultation for recovery day.    Nonischemic cardiomyopathy  Resume Lasix 20 mg daily for hypoxia.    Repeat BMP in a.m.    Atrial fibrillation.  Continue Xarelto.    Dyslipidemia  Continue statin.    History of chronic kidney disease  Kidney failure stable.    Urinary retention  Status post Allen placement.  Started on Flomax.  Outpatient urology follow-up.  Diet: Regular Diet  Adult  Diet  DVT Prophylaxis:  DOAC  Code Status: No CPR- Do NOT Intubate    Principal Problem:    Wound infection  Active Problems:    Atrial fibrillation, unspecified type (H)    Hypertension, unspecified type    CKD (chronic kidney disease) stage 3, GFR 30-59 ml/min (H)    Syncope, unspecified syncope type    Infection due to 2019 novel coronavirus    Cellulitis of left lower extremity     LOS: 11 days     Barriers to discharge: Hypoxia, TCU placement  Discharge Disposition: TCU    Subjective:  Gian is laying in bed.  He is currently on 10 L/min via mask.  Replaced oxygen mask with 2 L/min via nasal cannula saturating around 92 to 93%.  He denies any chest pain or shortness of breath at rest.  No nausea or vomiting.  Tolerating diet.  Had diarrhea associated with Augmentin use but this has since resolved.      clopidogrel  75 mg Oral Daily     magnesium sulfate  2 g Intravenous Once     metoprolol succinate ER  200 mg Oral Daily     predniSONE  40 mg Oral Daily     rivaroxaban ANTICOAGULANT  20 mg Oral Daily with supper     sodium chloride (PF)  3 mL Intracatheter Q8H     sodium chloride (PF)  3 mL Intracatheter Q8H     tamsulosin  0.4 mg Oral Daily       Objective:  Vital signs in last 24 hours:  Temp:  [97.5  F (36.4  C)-98.4  F (36.9  C)] 97.5  F (36.4  C)  Pulse:  [] 96  Resp:  [18-20] 20  BP: (108-127)/(56-72) 125/64  SpO2:  [87 %-95 %] 91 %  Weight:   Weight:   @THISENCWEIGHTS(1)@  Weight change: -1.95 kg (-4 lb 4.8 oz)  Body mass index is 24.56 kg/m .    Intake/Output last 3 shifts:  I/O last 3 completed shifts:  In: 480 [P.O.:480]  Out: 600 [Urine:600]  Intake/Output this shift:  No intake/output data recorded.    Review of Systems:   As per subjective, all others negative.    Physical Exam:    GENERAL:  Alert, appears comfortable, in no acute distress, appears stated age   HEAD:  Normocephalic, without obvious abnormality, atraumatic   THROAT: Lips, mucosa, and tongue normal; teeth and gums normal,  mouth moist   NECK: Supple, symmetrical, trachea midline   BACK:   Symmetric, no curvature, ROM normal   LUNGS:   Clear to auscultation bilaterally, no rales, rhonchi, or wheezing, symmetric chest rise on inhalation, respirations unlabored   CHEST WALL:  No tenderness or deformity   HEART:  Regular rate and rhythm, S1 and S2 normal, no murmur, rub, or gallop    ABDOMEN:   Soft, non-tender, bowel sounds active all four quadrants, no masses, no organomegaly, no rebound or guarding   EXTREMITIES: Extremities normal, atraumatic, no cyanosis or edema, left leg wound is bandaged.  Serous fluid on bandage.  No significant erythema.   SKIN: Dry to touch, no exanthems in the visualized areas   NEURO: Alert, oriented x3, moves all four extremities freely, non-focal   PSYCH: Cooperative, behavior is appropriate      Imaging:  Personally Reviewed.  Results for orders placed or performed during the hospital encounter of 07/21/22   Head CT w/o contrast    Impression    IMPRESSION:  1.  No evidence of acute intracranial hemorrhage.  2.  No evidence of acute calvarial fracture.  3.  Chronic left basal ganglia/white matter infarction.  4.  Moderate presumed chronic small vessel ischemic change.  5.  Moderate atrophy.   Chest XR,  PA & LAT    Impression    IMPRESSION: Negative chest.  The lungs are clear and there are no pleural effusions. Normal heart size.   IR Lower Extremity Angiogram Left    Impression    IMPRESSION:    1. Left lower extremity angiography demonstrates moderate stenosis of the mid/distal superficial femoral artery. There is occlusion of the tibioperoneal trunk. Occluded anterior tibial and posterior tibial arteries. Reconstituted peroneal artery to the   lower leg.  2. Successful crossing and angioplasty of the occluded anterior tibial artery to the dorsalis pedis artery. Unsuccessful crossing of the occluded tibioperoneal trunk. Successful balloon angioplasty of stenosis in the mid/distal superficial femoral    artery. Post intervention angiography demonstrates a one vessel runoff to the foot via the anterior tibial artery.    PLAN: Four hours of bedrest post sheath removal. It is hopeful in-line flow to the left lower leg will allow for wound healing. We will plan for right lower extremity angiography on 7/25/2022.   US AIDAN Doppler No Excersie Portable    Impression    IMPRESSION:  1. LEFT LOWER EXTREMITY: Ankle brachial index of 0.69 reflecting moderate peripheral arterial disease. Unable to obtain toe pressures or waveforms bilaterally.   IR Lower Extremity Angiogram Right    Impression    IMPRESSION:    1. Right lower extremity angiography demonstrates occlusion of the distal superficial femoral and above-knee popliteal arteries. There is a one vessel runoff to the foot via the peroneal artery. Occluded posterior tibial and anterior tibial arteries.  2. Successful crossing and recanalization of the occluded distal superficial femoral/above-knee popliteal arteries utilizing a drug-eluting stent. Successful crossing and angioplasty of the occluded anterior tibial artery. Post intervention angiography   demonstrates excellent result with in-line flow to the knee with two-vessel runoff via the anterior tibial and peroneal arteries.    PLAN: Four hours of bedrest post sheath removal. Start Plavix 75 mg once daily status post drug-eluting stent placement x 3 months. Okay to resume anticoagulation tonight. Plan for a clinic follow-up in 2-3 weeks to evaluate response.       XR Chest Port 1 View    Impression    IMPRESSION: Right PICC has been placed and extends to the mid SVC and directed posteriorly into the azygos vein on the first radiograph but on the second radiograph is in excellent position in the low SVC. Increased opacity posterior to the heart has   developed and could indicate some degree of consolidation and/or volume loss in the posteromedial basilar segments left lower lobe. Chest otherwise negative.     XR  Chest Port 1 View    Impression    IMPRESSION: No acute cardiopulmonary disease. Nonenlarged cardiac silhouette. Prior right PICC has been removed.       Lab Results:  Personally Reviewed.   Recent Labs   Lab 07/30/22  0838   WBC 13.5*   HGB 16.0   HCT 50.8        Recent Labs   Lab 07/30/22  0838     139   CO2 27  27   BUN 19  19   ALBUMIN 2.3*   ALKPHOS 72   ALT 25   AST 17     No results for input(s): INR in the last 168 hours.    I reviewed all labs and imaging studies as of this date and I reviewed all current inpatient medications and updated them    Paulino Agrawal DO, MS  [facility]  Internal Medicine

## 2022-08-01 NOTE — PLAN OF CARE
Problem: Risk for Delirium  Goal: Improved Behavioral Control  Outcome: Ongoing, Progressing     Problem: Dysrhythmia  Goal: Normalized Cardiac Rhythm  Outcome: Ongoing, Progressing    Pt denies pain. More pleasant tonight, apologized for being rude to staff. A-fib on tele, occasionally HR goes up to low 100's. Pt requiring 5L to 10L of O2 overnight, encouraged coughing and deep breathing. Pt resting, will continue to monitor.

## 2022-08-02 NOTE — PLAN OF CARE
Goal Outcome Evaluation: ongoing.      Pt A&Ox4. Denies any pain, SOB or chest pain. Pt on 2L via NC sating around 92%.. LS coarse. Tiffany patent. Possibly discharge today depending on oxygen needs. Will continue to monitor.

## 2022-08-02 NOTE — PROGRESS NOTES
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  Patient was directly admitted from vascular surgery clinic.  Diagnosed with COVID-19 viral infection.  Developed worsening respiratory failure on 7/29/2022 with up titration of oxygen from 2 L to 10 L/min now back to 2L/min and overall patient appears stable after treatment for possible COPD exacerbation.  He is requesting TCU placement does not feel he can return to home due to generalized weakness.      Acute respiratory failure with hypoxia  COVID-19 infection with positive PCR on 7/21/2022.  COPD exacerbation  Continue prednisone 40 mg daily anticipate for 5 days pending clinical improvement  Continue supplemental oxygen, taper as able, goal SpO2 90-94%.  RCAT  Duo nebs 4 times daily as needed  Encourage incentive spirometry     Peripheral vascular disease bilateral lower extremities.  Left leg wound  Status post IR angioplasty on 7/22 of left lower extremities.  Continue Plavix Xarelto per vascular surgery recommendations.  Outpatient vascular surgery follow-up.   Wound care recommendations appreciated.     Left lower extremity cellulitis  Augmentin with course was completed.  Discontinued Augmentin on 7/31/2022.     Moderate malnutrition  Order daily MVI, Vitamin A 25,000 U daily for 10 days.   VitC 500 mg daily for 10 days.   Zinc sulfate 50 mg daily for 10 days.     COVID-19 infection  Positive PCR on 7/21.  Negative chest x-ray on 7/21.  Completed 3 days of remdesivir during hospitalization.  Infection prevention consultation for recovery day.     Nonischemic cardiomyopathy  Resume Lasix 20 mg daily for hypoxia.    Repeat BMP in a.m.  Atrial fibrillation.  Continue  Xarelto.  Dyslipidemia  Continue statin.  History of chronic kidney disease  Kidney failure stable.  Urinary retention  Status post Allen placement.  Started on Flomax.  Outpatient urology follow-up.    Diet: Regular Diet Adult  Diet  DVT Prophylaxis:  DOAC  Code Status: No CPR- Do NOT Intubate    Principal Problem:    Wound infection  Active Problems:    Atrial fibrillation, unspecified type (H)    Hypertension, unspecified type    CKD (chronic kidney disease) stage 3, GFR 30-59 ml/min (H)    Syncope, unspecified syncope type    Infection due to 2019 novel coronavirus    Cellulitis of left lower extremity     LOS: 12 days     Barriers to discharge: TCU placement.  Discharge Disposition: TCU    Subjective:  Bradley is laying in bed.  He has nasal cannula around neck.  He is satting between 88 and 85 %.  He denies chest pain or shortness of breath.  Intermittent cough.  No abdominal pain, nausea, vomiting or diarrhea.  Still feels significant weakness and still feels he will need TCU for rehab.  Hopeful for place near [place] to be near his wife.      clopidogrel  75 mg Oral Daily     furosemide  20 mg Oral Daily     metoprolol succinate ER  200 mg Oral Daily     predniSONE  40 mg Oral Daily     rivaroxaban ANTICOAGULANT  20 mg Oral Daily with supper     rosuvastatin  20 mg Oral QPM     sodium chloride (PF)  3 mL Intracatheter Q8H     sodium chloride (PF)  3 mL Intracatheter Q8H     tamsulosin  0.4 mg Oral Daily       Objective:  Vital signs in last 24 hours:  Temp:  [97.2  F (36.2  C)-98.1  F (36.7  C)] 97.4  F (36.3  C)  Pulse:  [] 103  Resp:  [18-20] 18  BP: (114-134)/(63-75) 134/75  SpO2:  [94 %-96 %] 96 %  Weight:   Weight:   @THISENCWEIGHTS(1)@  Weight change: 1.86 kg (4 lb 1.6 oz)  Body mass index is 25.16 kg/m .    Intake/Output last 3 shifts:  I/O last 3 completed shifts:  In: 240 [P.O.:240]  Out: 2200 [Urine:2200]  Intake/Output this shift:  I/O this shift:  In: -   Out: 200 [Urine:200]    Review of  Systems:   As per subjective, all others negative.    Physical Exam:    GENERAL:  Alert, appears comfortable, in no acute distress, appears stated age   HEAD:  Normocephalic, without obvious abnormality, atraumatic   NECK: Supple, symmetrical, trachea midline   BACK:   Symmetric, no curvature, ROM normal   LUNGS:   Diminished breath sounds, no wheezing, symmetric chest rise on inhalation, respirations unlabored   CHEST WALL:  No tenderness or deformity   HEART:  Regular rate and rhythm, S1 and S2 normal, no murmur, rub, or gallop    ABDOMEN:   Soft, non-tender, bowel sounds active all four quadrants, no masses, no organomegaly, no rebound or guarding   EXTREMITIES: Extremities normal, atraumatic, left lower leg is bandaged no new discharge noted. Trace pedal edema on left.  No calf tenderness.    SKIN: Dry to touch, no exanthems in the visualized areas   NEURO: Alert, oriented x3, moves all four extremities freely, non-focal   PSYCH: Cooperative, behavior is appropriate      Imaging:  Personally Reviewed.  Results for orders placed or performed during the hospital encounter of 07/21/22   Head CT w/o contrast    Impression    IMPRESSION:  1.  No evidence of acute intracranial hemorrhage.  2.  No evidence of acute calvarial fracture.  3.  Chronic left basal ganglia/white matter infarction.  4.  Moderate presumed chronic small vessel ischemic change.  5.  Moderate atrophy.   Chest XR,  PA & LAT    Impression    IMPRESSION: Negative chest.  The lungs are clear and there are no pleural effusions. Normal heart size.   IR Lower Extremity Angiogram Left    Impression    IMPRESSION:    1. Left lower extremity angiography demonstrates moderate stenosis of the mid/distal superficial femoral artery. There is occlusion of the tibioperoneal trunk. Occluded anterior tibial and posterior tibial arteries. Reconstituted peroneal artery to the   lower leg.  2. Successful crossing and angioplasty of the occluded anterior tibial artery  to the dorsalis pedis artery. Unsuccessful crossing of the occluded tibioperoneal trunk. Successful balloon angioplasty of stenosis in the mid/distal superficial femoral   artery. Post intervention angiography demonstrates a one vessel runoff to the foot via the anterior tibial artery.    PLAN: Four hours of bedrest post sheath removal. It is hopeful in-line flow to the left lower leg will allow for wound healing. We will plan for right lower extremity angiography on 7/25/2022.   US AIDAN Doppler No Excersie Portable    Impression    IMPRESSION:  1. LEFT LOWER EXTREMITY: Ankle brachial index of 0.69 reflecting moderate peripheral arterial disease. Unable to obtain toe pressures or waveforms bilaterally.   IR Lower Extremity Angiogram Right    Impression    IMPRESSION:    1. Right lower extremity angiography demonstrates occlusion of the distal superficial femoral and above-knee popliteal arteries. There is a one vessel runoff to the foot via the peroneal artery. Occluded posterior tibial and anterior tibial arteries.  2. Successful crossing and recanalization of the occluded distal superficial femoral/above-knee popliteal arteries utilizing a drug-eluting stent. Successful crossing and angioplasty of the occluded anterior tibial artery. Post intervention angiography   demonstrates excellent result with in-line flow to the knee with two-vessel runoff via the anterior tibial and peroneal arteries.    PLAN: Four hours of bedrest post sheath removal. Start Plavix 75 mg once daily status post drug-eluting stent placement x 3 months. Okay to resume anticoagulation tonight. Plan for a clinic follow-up in 2-3 weeks to evaluate response.       XR Chest Port 1 View    Impression    IMPRESSION: Right PICC has been placed and extends to the mid SVC and directed posteriorly into the azygos vein on the first radiograph but on the second radiograph is in excellent position in the low SVC. Increased opacity posterior to the heart has    developed and could indicate some degree of consolidation and/or volume loss in the posteromedial basilar segments left lower lobe. Chest otherwise negative.     XR Chest Port 1 View    Impression    IMPRESSION: No acute cardiopulmonary disease. Nonenlarged cardiac silhouette. Prior right PICC has been removed.       Lab Results:  Personally Reviewed.   Recent Labs   Lab 08/02/22  0431 07/30/22  0838   WBC 13.3* 13.5*   HGB 15.8 16.0   HCT 48.9 50.8    323     Recent Labs   Lab 08/02/22  0431 07/30/22  0838    139  139   CO2 27 27  27   BUN 17 19  19   ALBUMIN  --  2.3*   ALKPHOS  --  72   ALT  --  25   AST  --  17     No results for input(s): INR in the last 168 hours.    I reviewed all labs and imaging studies as of this date and I reviewed all current inpatient medications and updated them    Paulino Agrawal DO, MS  [facility]  Internal Medicine

## 2022-08-02 NOTE — PROGRESS NOTES
Care Management Follow Up    Length of Stay (days): 12    Expected Discharge Date: 08/04/2022     Concerns to be Addressed:       Patient plan of care discussed at interdisciplinary rounds: Yes    Anticipated Discharge Disposition: Home, Home Care     Anticipated Discharge Services:  (OT, RN)  Anticipated Discharge DME:  (per treatment team)    Patient/family educated on Medicare website which has current facility and service quality ratings:  (not applicable)  Education Provided on the Discharge Plan:    Patient/Family in Agreement with the Plan:      Referrals Placed by CM/SW:    Private pay costs discussed: Not applicable    Additional Information:  Late Documentation: Writer talked with pt's dtr  Camilla yesterday 08-  but did not get a chance to put notes in. Camilla and  did identify a few locations for TCU referrals as pt is covid19 posititve.  Writer follow up with Camilla today at [phone] to reconfirm that we will work the TCU referrals for [place], [place], [place], [place], [place]. Last choices are [facility], [place], [place] and [place].  See Destination tab of Case Management for the referrals.    5:39pm  Reached out to Hospitalist LAZARO Agrawal thrdonn [place]avera about the covid19 precaution. Hospitalist reports that Infectious Disease saw pt today and recommends that pt wait for 20 days from the date of diagnosis to determine if pt can be off precaution status based on pt's history with lymphoma which may compromise pt's immunity.         Zac Avelar

## 2022-08-02 NOTE — CONSULTS
Spoke to Dr. Agrawal to verify current respiratory symptoms noted as worsened on 7/29/22 with onset on 7/21/22. Given worsened and current respiratory symptoms, the recommendation is to keep this patient in special precautions isolation until symptoms significantly improve, and the patient is fever free for 24 hours without fever reducing medication. The criteria are outlined below, and provider assessment is necessary to determine recovered COVID status or number of days in isolation based on clinical symptoms.     .Patient Name: Bradley Liz   MRN: [id number]   Admit Date: 7/21/2022    Current Infection Status: COVID-19   Current Isolation Status: Special Precautions     Review of COVID-19 status and required isolation precautions    Refer to Special Precautions Duration and Period of Transmissibility Guideline, in Policy Tech.        Involved parties: Estefanía Waters Infection Prevention and Other Dr. Agrawal on 8/2/22. .    Criteria used to make decision: Chart Note Dates: 8/2/2022 - worsened respiratory symptoms onset 7/29/22.    The involved parties have reviewed this patient's chart per the Special Precautions Duration and Period of Transmissibility guideline and have taken the following action:     DECISION - OPTION 2: The patient does not qualify for Special Precautions isolation discontinuation due to: Immunocompetent Symptomatic: Mild or Moderate: 10 days since symptoms began AND greater than or equal to 24hrs since last fever (without fever-reducing meds) AND COVID-19 symptoms have substantially improved. . The patient's infection and isolation status can be reviewed by date of: provider assessment required to determine substantial improvement of respiratory symptoms. .       Estefanía Waters Infection Prevention

## 2022-08-02 NOTE — PLAN OF CARE
Problem: Plan of Care - These are the overarching goals to be used throughout the patient stay.    Goal: Absence of Hospital-Acquired Illness or Injury  Intervention: Identify and Manage Fall Risk  Recent Flowsheet Documentation  Taken 8/2/2022 0900 by Mandi Mccain RN  Safety Promotion/Fall Prevention:    patient and family education    nonskid shoes/slippers when out of bed    mobility aid in reach    lighting adjusted    increase visualization of patient    increased rounding and observation    fall prevention program maintained    clutter free environment maintained     Problem: Dysrhythmia  Goal: Normalized Cardiac Rhythm  Outcome: Ongoing, Progressing     Problem: Gas Exchange Impaired  Goal: Optimal Gas Exchange  Outcome: Ongoing, Progressing     Pt alert and oriented. Initially on 2 L of oxygen NC this morning. Pt weaned off O2 now RA O2 Sat in low 90's. VSS. LS crackles coarse diminished. Intermittent congested cough. Reported poor appetite not eating. Pt has been drinking fluid. Allen secure patent urine draining. PT/OT worked with patient. Plan discharge TCU pending medical progression. Will monitor and continue plan of care.

## 2022-08-03 NOTE — PROGRESS NOTES
Care Management Follow Up    Length of Stay (days): 13    Expected Discharge Date: 08/04/2022     Concerns to be Addressed:       Patient plan of care discussed at interdisciplinary rounds: Yes    Anticipated Discharge Disposition: TCU    Anticipated Discharge Services:  TCU   Anticipated Discharge DME:  (per treatment team)    Patient/family educated on Medicare website which has current facility and service quality ratings:  (not applicable)  Education Provided on the Discharge Plan: yes    Patient/Family in Agreement with the Plan: yes     Referrals Placed by CM/SW:  TCU   Private pay costs discussed: no  Additional Information:  Roxie from [facility] called to report that they have no male beds for the [facility]. Roxie says that due to pt's covid19 precautions they may have availability when pt comes off of precaution.      Zac Avelar

## 2022-08-03 NOTE — PLAN OF CARE
Goal Outcome Evaluation: ongoing     Plan of Care Reviewed With: patient     Overall Patient Progress: no change     Pt A&Ox4- able to make needs known. Had incontinent BM overnight. Pt continues to lie flat in bed or on R side and O2 sats drop, appears to be semi positional based on how pt sleeps. Did only have hald a L on then put up to 4L d/t pt not getting above 90%- encouraged pt to cough, put HOB as high as pt would allow, changed pulse ox. Will wean down again today hopefully. Appears maybe pt drops sats when sleeping, when awake pt was on room air. Daughter Camilla called wondering about updates and appears she would like more recent updates if possible. Allen remains in place. Will continue to monitor.

## 2022-08-03 NOTE — PROGRESS NOTES
[facility]  WOC Nurse Inpatient Assessment     Consulted for: left lower leg wounds    Patient History (according to provider note(s):      Patient is followed by the vacsular clinic and sent to hospital directly from clinic 7/21. Today he had an angiogram of LLE.    Areas Assessed:      Areas visualized during today's visit: bilateral lower legs    Wound location: LLE    Last photo: 7/21 taken at clinic, see media tab - photo not taken by WOC today  Wound due to: Mixed Etiology: venous/arterial  Wound history/plan of care: Patient seen at clinic and has plan in place including calcium alginate, can return to these orders after discharge  Wound base: Sloughy where can be seen     Palpation of the wound bed: normal   Wound beds pink for majority. Largest continues with black, firm area which appears to be dried drainage.     Drainage: scant     Description of drainage: serosanguinous     Measurements (length x width x depth, in cm): Unable to clearly measure - approximately 3 x 9 and a distal area approximately 2 x 2cm     Tunneling: N/A     Undermining: N/A  Periwound skin: Edematous and discolored      Color: red      Temperature: normal   Odor: none  Pain: denies   Treatment goal: Heal  and Infection control/prevention  STATUS: improving  Supplies ordered: at bedside       Treatment Plan:     LLE:  1. Cleanse legs with bath wipes or warm soap and water, gently pat dry  2. Apply Xeroform to open wounds on left leg, cover with abd pad, wrap with kerlix toes to knee.  Right leg can be open to air, no open areas at this time  Change daily  If supplies needed from stores, PS# [id number]      Orders: Reviewed    RECOMMEND PRIMARY TEAM ORDER: None, at this time  Education provided: plan of care  Discussed plan of care with: Patient and Nurse  WOC nurse follow-up plan: weekly  Notify WOC if wound(s) deteriorate.  Nursing to notify the Provider(s) and re-consult the WOC Nurse if new skin  concern.    DATA:     Current support surface: Standard  Low air loss mattress  BMI: Body mass index is 23.69 kg/m .   Active diet order: Orders Placed This Encounter      Regular Diet Adult      Diet     Output: I/O last 3 completed shifts:  In: 1560 [P.O.:1560]  Out: 3200 [Urine:3200]     Labs:   Recent Labs   Lab 08/02/22  0431 07/30/22  0838   ALBUMIN  --  2.3*   HGB 15.8 16.0   WBC 13.3* 13.5*     Pressure injury risk assessment:   Sensory Perception: 3-->slightly limited  Moisture: 4-->rarely moist  Activity: 3-->walks occasionally  Mobility: 3-->slightly limited  Nutrition: 3-->adequate  Friction and Shear: 2-->potential problem  Christiano Score: 18    Cesia Skaggs RN CWOCN

## 2022-08-03 NOTE — PROGRESS NOTES
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  Patient was directly admitted from vascular surgery clinic.  Diagnosed with COVID-19 viral infection.  Developed worsening respiratory failure on 7/29/2022 with up titration of oxygen from 2 L to 10 L/min now back to 2L/min and overall patient appears stable after treatment for possible COPD exacerbation.  He is requesting TCU placement does not feel he can return to home due to generalized weakness.      Acute respiratory failure with hypoxia  COVID-19 infection with positive PCR on 7/21/2022.  COPD exacerbation  Continue prednisone 40 mg daily anticipate for  Total of 5 days pending clinical improvement  Continue supplemental oxygen, taper as able, goal SpO2 90-94%.  RCAT  Duo nebs 4 times daily as needed  Encourage incentive spirometry   He was also reviewed by the infection control team in view of his COVID status   Per Infection prevention,   The patient does not qualify for Special Precautions isolation discontinuation due to: Immunocompetent Symptomatic: Mild or Moderate: 10 days since symptoms began AND greater than or equal to 24hrs since last fever (without fever-reducing meds) AND COVID-19 symptoms have substantially improved. . The patient's infection and isolation status can be reviewed when there is substantial improvement in his symptoms        Peripheral vascular disease bilateral lower extremities.  Left leg wound  Status post IR angioplasty on 7/22 of left lower extremities.  Continue Plavix Xarelto per vascular surgery recommendations.  Outpatient vascular surgery follow-up.   Wound care recommendations appreciated.     Left lower extremity cellulitis  Augmentin with  "course was completed.  Discontinued Augmentin on 7/31/2022.     Moderate malnutrition  Order daily MVI, Vitamin A 25,000 U daily for 10 days.   VitC 500 mg daily for 10 days.   Zinc sulfate 50 mg daily for 10 days.     COVID-19 infection  Positive PCR on 7/21.  Negative chest x-ray on 7/21.  Completed 3 days of remdesivir during hospitalization.  Infection prevention consultation for recovery day.     Nonischemic cardiomyopathy  Resume Lasix 20 mg daily for hypoxia.    Repeat BMP in a.m.  Atrial fibrillation.  Continue Xarelto.  Dyslipidemia  Continue statin.  History of chronic kidney disease  Kidney failure stable.  Urinary retention  Status post Allen placement.  Started on Flomax.  Outpatient urology follow-up.    Diet: Regular Diet Adult  Diet  DVT Prophylaxis:  DOAC  Code Status: No CPR- Do NOT Intubate    Principal Problem:    Wound infection  Active Problems:    Atrial fibrillation, unspecified type (H)    Hypertension, unspecified type    CKD (chronic kidney disease) stage 3, GFR 30-59 ml/min (H)    Syncope, unspecified syncope type    Infection due to 2019 novel coronavirus    Cellulitis of left lower extremity     LOS: 12 days     Barriers to discharge: TCU placement.  Discharge Disposition: TCU    Subjective:  Charts reviewed and examined patient. Comfortable in bed  Denies chest pain or SOB  No fever or chills  Discussed current COVID status with patient         Objective:  /56 (BP Location: Left arm)   Pulse 83   Temp 97.9  F (36.6  C) (Oral)   Resp 20   Ht 1.753 m (5' 9\")   Wt 72.8 kg (160 lb 6.4 oz)   SpO2 91%   BMI 23.69 kg/m      Review of Systems:   As per subjective, all others negative.    Physical Exam:    GENERAL:  Alert, appears comfortable, in no acute distress, appears stated age   HEAD:  Normocephalic, without obvious abnormality, atraumatic   LUNGS:   Diminished breath sounds, no wheezing, symmetric chest rise on inhalation, respirations unlabored   CHEST WALL:  No tenderness " or deformity   HEART:  Regular rate and rhythm, S1 and S2 normal, no murmur, rub, or gallop    ABDOMEN:   Soft, non-tender, bowel sounds active all four quadrants, no masses, no organomegaly, no rebound or guarding   EXTREMITIES: Extremities normal, atraumatic, left lower leg is bandaged no new discharge noted. Trace pedal edema on left.  No calf tenderness.    SKIN: Dry to touch, no exanthems in the visualized areas   NEURO: Alert, oriented x3, moves all four extremities freely, non-focal   PSYCH: Cooperative, behavior is appropriate      Imaging:  Personally Reviewed.  Results for orders placed or performed during the hospital encounter of 07/21/22   Head CT w/o contrast    Impression    IMPRESSION:  1.  No evidence of acute intracranial hemorrhage.  2.  No evidence of acute calvarial fracture.  3.  Chronic left basal ganglia/white matter infarction.  4.  Moderate presumed chronic small vessel ischemic change.  5.  Moderate atrophy.   Chest XR,  PA & LAT    Impression    IMPRESSION: Negative chest.  The lungs are clear and there are no pleural effusions. Normal heart size.   IR Lower Extremity Angiogram Left    Impression    IMPRESSION:    1. Left lower extremity angiography demonstrates moderate stenosis of the mid/distal superficial femoral artery. There is occlusion of the tibioperoneal trunk. Occluded anterior tibial and posterior tibial arteries. Reconstituted peroneal artery to the   lower leg.  2. Successful crossing and angioplasty of the occluded anterior tibial artery to the dorsalis pedis artery. Unsuccessful crossing of the occluded tibioperoneal trunk. Successful balloon angioplasty of stenosis in the mid/distal superficial femoral   artery. Post intervention angiography demonstrates a one vessel runoff to the foot via the anterior tibial artery.    PLAN: Four hours of bedrest post sheath removal. It is hopeful in-line flow to the left lower leg will allow for wound healing. We will plan for right  lower extremity angiography on 7/25/2022.   US AIDAN Doppler No Excersie Portable    Impression    IMPRESSION:  1. LEFT LOWER EXTREMITY: Ankle brachial index of 0.69 reflecting moderate peripheral arterial disease. Unable to obtain toe pressures or waveforms bilaterally.   IR Lower Extremity Angiogram Right    Impression    IMPRESSION:    1. Right lower extremity angiography demonstrates occlusion of the distal superficial femoral and above-knee popliteal arteries. There is a one vessel runoff to the foot via the peroneal artery. Occluded posterior tibial and anterior tibial arteries.  2. Successful crossing and recanalization of the occluded distal superficial femoral/above-knee popliteal arteries utilizing a drug-eluting stent. Successful crossing and angioplasty of the occluded anterior tibial artery. Post intervention angiography   demonstrates excellent result with in-line flow to the knee with two-vessel runoff via the anterior tibial and peroneal arteries.    PLAN: Four hours of bedrest post sheath removal. Start Plavix 75 mg once daily status post drug-eluting stent placement x 3 months. Okay to resume anticoagulation tonight. Plan for a clinic follow-up in 2-3 weeks to evaluate response.       XR Chest Port 1 View    Impression    IMPRESSION: Right PICC has been placed and extends to the mid SVC and directed posteriorly into the azygos vein on the first radiograph but on the second radiograph is in excellent position in the low SVC. Increased opacity posterior to the heart has   developed and could indicate some degree of consolidation and/or volume loss in the posteromedial basilar segments left lower lobe. Chest otherwise negative.     XR Chest Port 1 View    Impression    IMPRESSION: No acute cardiopulmonary disease. Nonenlarged cardiac silhouette. Prior right PICC has been removed.       Lab Results:  Personally Reviewed.   Recent Labs   Lab 08/02/22  0431 07/30/22  0838   WBC 13.3* 13.5*   HGB 15.8 16.0    HCT 48.9 50.8    323     Recent Labs   Lab 08/02/22  0431 07/30/22  0838    139  139   CO2 27 27  27   BUN 17 19  19   ALBUMIN  --  2.3*   ALKPHOS  --  72   ALT  --  25   AST  --  17     No results for input(s): INR in the last 168 hours.    I reviewed all labs and imaging studies as of this date and I reviewed all current inpatient medications and updated them    Dr ANGELA Jamison MD, [facility]  Hospitalist ( Internal medicine)  Pager: [phone]

## 2022-08-03 NOTE — PLAN OF CARE
Problem: Gas Exchange Impaired  Goal: Optimal Gas Exchange  8/3/2022 1425 by Cecy Garcia, RN  Outcome: Ongoing, Progressing  8/3/2022 1304 by Cecy Garcia RN  Outcome: Ongoing, Progressing  Intervention: Optimize Oxygenation and Ventilation  Recent Flowsheet Documentation  Taken 8/3/2022 1200 by Cecy Garcia RN  Head of Bed (HOB) Positioning: HOB at 20-30 degrees   Goal Outcome Evaluation:    Patient is alert and oriented. Pt is on room air, O2 96%, however Pt requiring 3 L nasal cannula. O2 sats dropped to 86% when resting. Pt is A.fib on tele. Pt continues to be incontinent of stool. Pt also declined breakfast and lunch today.    Cecy Garcia, GEMA

## 2022-08-04 NOTE — PROGRESS NOTES
Reviewed and examined patient. Does not appear to be in distress. Saturating well on 3 lts of O2, which remained at 94% even taking oxygen cannula off. Therefore, advised to reduce this to 1 lts  Suspect baseline lung disease based on 50 pack years of smoking  Currently being treated with prednisone burst  For COPD exacerbation  Clear lung sounds   CXR on 7/30 with no acute pulmonary abnormalities  Fever free for > 48 hrs    I think these point at recovery from COVID  Discussed with IP team      Dr ANGELA Jamison MD, [facility]  Hospitalist ( Internal medicine)  Pager: [phone]

## 2022-08-04 NOTE — PLAN OF CARE
Patient alert and oriented. VSS on 3L of NC to maintain sats above 92 %. Denies sob/chest pain/nausea/vomiting. Refused to get out of bed even after providing education about benefits of ambulation. Patient also refused to have his left leg wound assessed and dressing changed. Provided with akin cares. Allen patent and draining. Reminded to shift weight in bed frequently to prevent pressure sores. Oral intake encouraged. Special COVID precaution maintained.     Problem: Skin or Soft Tissue Infection  Goal: Absence of Infection Signs and Symptoms  Outcome: Ongoing, Progressing  Intervention: Minimize and Manage Infection Progression  Recent Flowsheet Documentation  Taken 8/3/2022 1600 by Viki Granger RN  Infection Prevention: rest/sleep promoted     Problem: Plan of Care - These are the overarching goals to be used throughout the patient stay.    Goal: Absence of Hospital-Acquired Illness or Injury  Intervention: Prevent Skin Injury  Recent Flowsheet Documentation  Taken 8/3/2022 1600 by Viki Granger RN  Body Position: position changed independently   Goal Outcome Evaluation:

## 2022-08-04 NOTE — PLAN OF CARE
Pt A&O. VSS with 3L NC on. Denies pain or SOB. Pt refused repositioning. Reminded to turn and shift weight in bed to reduce chances of sores. Pt slept through the shift. Dressing is clean, dry, and intact. Will continue plan of care.    Problem: Risk for Delirium  Goal: Improved Sleep  Outcome: Ongoing, Progressing     Problem: Gas Exchange Impaired  Goal: Optimal Gas Exchange  Outcome: Ongoing, Progressing

## 2022-08-04 NOTE — PROGRESS NOTES
RN called to request IP speak to Camilla, family member in regards to the covid isolation status. I agreed to do that after speaking to Dr. Mary Jamison. After provider review, this patient meets the criteria below and will be marked Recovered COVID.     .Patient Name: Bradley Liz   MRN: [id number]   Admit Date: 7/21/2022    Current Infection Status: COVID-19   Current Isolation Status: Special Precautions     Review of COVID-19 status and required isolation precautions    Refer to Special Precautions Duration and Period of Transmissibility Guideline, in Policy Tech.        Involved parties: Estefanía Waters, Infection Prevention and Other Dr. Mary Jamison.    Criteria used to make decision: Chart Note Dates: 8/4/22 notes with review of systems and current condition based on underlying conditions assessed by provider.. Considered 7/21/22 COVID+ result date and immune competent patient per provider. .    The involved parties have reviewed this patient's chart per the Special Precautions Duration and Period of Transmissibility guideline and have taken the following action:     DECISION - OPTION 1: Patient meets all the criteria for Special Precautions isolation discontinuation and this writer will resolve the COVID-19 infection flag and isolation status, due to: Immunocompetent Symptomatic: Mild or Moderate: 10 days since symptoms began AND greater than or equal to 24hrs since last fever (without fever-reducing meds) AND COVID-19 symptoms have substantially improved. .       Estefanía Waters, Infection Prevention

## 2022-08-04 NOTE — PROGRESS NOTES
DEE DEE called and spoke with Irma with [facility] who will review the clinicals sent in by this writer for PT as requested. Irma stated that CM should know in the next 1-2 days final decision on Auth for TCU services.    Calin Ramos, [facility]

## 2022-08-04 NOTE — PROGRESS NOTES
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  Patient was directly admitted from vascular surgery clinic.  Diagnosed with COVID-19 viral infection.  Developed worsening respiratory failure on 7/29/2022 with up titration of oxygen from 2 L to 10 L/min now back to 2L/min and overall patient appears stable after treatment for possible COPD exacerbation.  He is requesting TCU placement does not feel he can return to home due to generalized weakness.      Acute respiratory failure with hypoxia  COVID-19 infection with positive PCR on 7/21/2022.  COPD exacerbation  Continue prednisone 40 mg daily anticipate for  Total of 5 days pending clinical improvement  Continue supplemental oxygen, taper as able, goal SpO2 90-94%.  Duo nebs 4 times daily as needed  Encourage incentive spirometry  He was also reviewed by the infection control team in view of his COVID status   Extensive discussion with Infection prevention team:  Saturating well on 3 lts of O2, which remained at 94% even taking oxygen cannula off. Therefore, advised to reduce this to 1 lts. OK to maintain saturation > 90%  Suspect baseline lung disease based on 50 pack years of smoking  Currently being treated with prednisone burst for COPD exacerbation  Clear lung sounds   CXR on 7/30 with no acute pulmonary abnormalities  Fever free for > 48 hrs  I think these point at recovery from COVID.       Peripheral vascular disease bilateral lower extremities.  Left leg wound  Status post IR angioplasty on 7/22 of left lower extremities.  Continue Plavix and  Xarelto per vascular surgery recommendations.  Outpatient vascular surgery follow-up.   Wound care recommendations appreciated.  "    Left lower extremity cellulitis  Augmentin with course was completed.  Discontinued Augmentin on 7/31/2022.     Moderate malnutrition  Order daily MVI, Vitamin A 25,000 U daily for 10 days.   VitC 500 mg daily for 10 days.   Zinc sulfate 50 mg daily for 10 days.     COVID-19 infection  Positive PCR on 7/21.  Negative chest x-ray on 7/21.  Completed 3 days of remdesivir during hospitalization.  Infection prevention consultation for recovery day.     Nonischemic cardiomyopathy  Resume Lasix 20 mg daily for hypoxia.    Repeat BMP in a.m.  Atrial fibrillation.  Continue Xarelto.  Dyslipidemia  Continue statin.  History of chronic kidney disease  Kidney failure stable.  Urinary retention  Status post Allen placement.  Started on Flomax.  Outpatient urology follow-up.    Diet: Regular Diet Adult  Diet  DVT Prophylaxis:  DOAC  Code Status: No CPR- Do NOT Intubate    Principal Problem:    Wound infection  Active Problems:    Atrial fibrillation, unspecified type (H)    Hypertension, unspecified type    CKD (chronic kidney disease) stage 3, GFR 30-59 ml/min (H)    Syncope, unspecified syncope type    Infection due to 2019 novel coronavirus    Cellulitis of left lower extremity      Barriers to discharge: TCU placement.  Discharge Disposition: TCU  Discussed with wife Camilla on phone    Subjective:  Charts reviewed and examined patient.   No new complaints over night.  Feels well this morning  Eating and drinking well        Objective:  /59 (BP Location: Left arm)   Pulse 103   Temp 97.1  F (36.2  C) (Oral)   Resp 18   Ht 1.753 m (5' 9\")   Wt 74 kg (163 lb 1.6 oz)   SpO2 90%   BMI 24.09 kg/m      Review of Systems:   As per subjective, all others negative.    Physical Exam:    GENERAL:  Alert, appears comfortable, in no acute distress, appears stated age   HEAD:  Normocephalic, without obvious abnormality, atraumatic   LUNGS:   Diminished breath sounds, no wheezing, symmetric chest rise on inhalation, " respirations unlabored   CHEST WALL:  No tenderness or deformity   HEART:  Regular rate and rhythm, S1 and S2 normal, no murmur, rub, or gallop    ABDOMEN:   Soft, non-tender, bowel sounds active all four quadrants, no masses, no organomegaly, no rebound or guarding   EXTREMITIES: Extremities normal, atraumatic, left lower leg is bandaged no new discharge noted. Trace pedal edema on left.  No calf tenderness.    SKIN: Dry to touch, no exanthems in the visualized areas   NEURO: Alert, oriented x3, moves all four extremities freely, non-focal   PSYCH: Cooperative, behavior is appropriate      Imaging:  Personally Reviewed.  Results for orders placed or performed during the hospital encounter of 07/21/22   Head CT w/o contrast    Impression    IMPRESSION:  1.  No evidence of acute intracranial hemorrhage.  2.  No evidence of acute calvarial fracture.  3.  Chronic left basal ganglia/white matter infarction.  4.  Moderate presumed chronic small vessel ischemic change.  5.  Moderate atrophy.   Chest XR,  PA & LAT    Impression    IMPRESSION: Negative chest.  The lungs are clear and there are no pleural effusions. Normal heart size.   IR Lower Extremity Angiogram Left    Impression    IMPRESSION:    1. Left lower extremity angiography demonstrates moderate stenosis of the mid/distal superficial femoral artery. There is occlusion of the tibioperoneal trunk. Occluded anterior tibial and posterior tibial arteries. Reconstituted peroneal artery to the   lower leg.  2. Successful crossing and angioplasty of the occluded anterior tibial artery to the dorsalis pedis artery. Unsuccessful crossing of the occluded tibioperoneal trunk. Successful balloon angioplasty of stenosis in the mid/distal superficial femoral   artery. Post intervention angiography demonstrates a one vessel runoff to the foot via the anterior tibial artery.    PLAN: Four hours of bedrest post sheath removal. It is hopeful in-line flow to the left lower leg will  allow for wound healing. We will plan for right lower extremity angiography on 7/25/2022.   US AIDAN Doppler No Excersie Portable    Impression    IMPRESSION:  1. LEFT LOWER EXTREMITY: Ankle brachial index of 0.69 reflecting moderate peripheral arterial disease. Unable to obtain toe pressures or waveforms bilaterally.   IR Lower Extremity Angiogram Right    Impression    IMPRESSION:    1. Right lower extremity angiography demonstrates occlusion of the distal superficial femoral and above-knee popliteal arteries. There is a one vessel runoff to the foot via the peroneal artery. Occluded posterior tibial and anterior tibial arteries.  2. Successful crossing and recanalization of the occluded distal superficial femoral/above-knee popliteal arteries utilizing a drug-eluting stent. Successful crossing and angioplasty of the occluded anterior tibial artery. Post intervention angiography   demonstrates excellent result with in-line flow to the knee with two-vessel runoff via the anterior tibial and peroneal arteries.    PLAN: Four hours of bedrest post sheath removal. Start Plavix 75 mg once daily status post drug-eluting stent placement x 3 months. Okay to resume anticoagulation tonight. Plan for a clinic follow-up in 2-3 weeks to evaluate response.       XR Chest Port 1 View    Impression    IMPRESSION: Right PICC has been placed and extends to the mid SVC and directed posteriorly into the azygos vein on the first radiograph but on the second radiograph is in excellent position in the low SVC. Increased opacity posterior to the heart has   developed and could indicate some degree of consolidation and/or volume loss in the posteromedial basilar segments left lower lobe. Chest otherwise negative.     XR Chest Port 1 View    Impression    IMPRESSION: No acute cardiopulmonary disease. Nonenlarged cardiac silhouette. Prior right PICC has been removed.       Lab Results:  Personally Reviewed.   Recent Labs   Lab 08/02/22  3736  07/30/22  0838   WBC 13.3* 13.5*   HGB 15.8 16.0   HCT 48.9 50.8    323     Recent Labs   Lab 08/02/22  0431 07/30/22  0838    139  139   CO2 27 27  27   BUN 17 19  19   ALBUMIN  --  2.3*   ALKPHOS  --  72   ALT  --  25   AST  --  17     No results for input(s): INR in the last 168 hours.    I reviewed all labs and imaging studies as of this date and I reviewed all current inpatient medications and updated them    Dr ANGELA Jamison MD, [facility]  Hospitalist ( Internal medicine)  Pager: [phone]

## 2022-08-05 NOTE — PLAN OF CARE
Goal Outcome Evaluation:    Plan of Care Reviewed With: patient     Patient is alert and oriented and VSS. He continues to be in   A fib. Indwelling Allen catheter is patent with yellow urine. Patient decline to get out of bed when asked by Physical Therapy.Declined to eat breakfast,however patient did eat some saltine crackers with jelly and some lemon flavored ice. Wound Care completed on left lower leg. Leg is slowly healing ,but there is still some eschar present on the upper part of the wound. Patient needs strong encouragement to turn and reposition. Patient was seen by hospitalist  and after discussion with Estefanía Orozco ( Infection Prevention) it was decided that patient is in COVID Recovery and Isolation precautions were discontinued. Patient was reminded that he needs to participate in Physical Therapy in order to qualify for going to a TCU. Patient was shaved and refused to brush his teeth. Will continue to monitor.

## 2022-08-05 NOTE — PROGRESS NOTES
[facility] MEDICINE PROGRESS NOTE      Identification/Summary:  81 year old male admitted on 7/21 from the vascular clinic for a   LLE wound infection, cellulitis and covid.  The patients clinical status worsened with acute  Hypoxemic respiratory failure.  He has since improved though still requires 3 liters oxygen.        1.  Acute hypoxemic respiratory failure due to COVID, (original diagnosis on 7/21);   In backround setting of COPD; pt is on a prednisone course 40 mg daily for   5 days; on 3 liters nasal cannula  2.  COPD exacerbation:  Prednisone taper; titrate oxygen as allowed  3.   PAD: s/p angioplasty, on plavix and xarelto  4.  LLE cellulitis: finished augmentin on 7/31  5.  Nonischemic cardiomyopathy  6.  Atrial fibrillation: on xarelto  7.  Urinary retention: flomax was started; pt needed treadwell; will discharge with and follow up urology  8.  dvt prevention: on xarelto  9.  Code status: dnr  10.  Disposition: pending TCU        Carlene Jiménez MD  [facility]  [facility]  Phone: #[phone]    Interval History/Subjective:      Physical Exam/Objective:  Temp:  [97  F (36.1  C)-97.4  F (36.3  C)] 97.4  F (36.3  C)  Pulse:  [] 97  Resp:  [18-22] 18  BP: (105-127)/(59-70) 127/67  SpO2:  [82 %-99 %] 96 %  Body mass index is 24.09 kg/m .    GENERAL:  Alert, appears comfortable, in no acute distress, appears stated age   HEAD:  Normocephalic, without obvious abnormality, atraumatic   EYES:  PERRL, conjunctiva/corneas clear, no scleral icterus, EOM's intact   NOSE: Nares normal, septum midline, mucosa normal, no drainage   THROAT: Lips, mucosa, and tongue normal; teeth and gums normal, mouth moist   NECK: Supple, symmetrical, trachea midline   BACK:   Symmetric, no curvature, ROM normal   LUNGS:   Clear to auscultation bilaterally, no rales, rhonchi, or wheezing, symmetric chest rise on inhalation, respirations unlabored   CHEST  WALL:  No tenderness or deformity   HEART:  Regular rate and rhythm, S1 and S2 normal, no murmur, rub, or gallop    ABDOMEN:   Soft, non-tender, bowel sounds active all four quadrants, no masses, no organomegaly, no rebound or guarding   EXTREMITIES: No edema or calf tenderness   SKIN: Dry to touch, no exanthems in the visualized areas   NEURO: Alert, oriented x3, moves all four extremities freely   PSYCH: Cooperative, behavior is appropriate      Data reviewed today: I personally reviewed all new medications, labs, imaging/diagnostics reports over the past 24 hours. Pertinent findings include:    Imaging:   No results found for this or any previous visit (from the past 24 hour(s)).    Labs:  Most Recent 3 BMP's:Recent Labs   Lab Test 08/05/22  0441 08/04/22  0436 08/03/22  0441 08/02/22  0431 07/31/22  0357 07/30/22  0838 07/26/22  0544 07/25/22  0440   NA  --   --   --  137  --  139  139  --  143   POTASSIUM 3.8 3.9 4.1 4.0   < > 4.2  4.2   < > 3.5   CHLORIDE  --   --   --  105  --  104  104  --  109*   CO2  --   --   --  27  --  27  27  --  28   BUN  --   --   --  17  --  19  19  --  13   CR  --   --   --  0.79  --  0.98  0.98  --  0.87   ANIONGAP  --   --   --  5  --  8  8  --  6   DARON  --   --   --  8.1*  --  8.3*  8.3*  --  8.0*   GLC  --   --   --  107  --  104  104  --  120    < > = values in this interval not displayed.       Medications:   Personally Reviewed.  Medications     heparin 2 units/mL in 0.9% NaCl TABLE SOLN Stopped (07/27/22 1517)     heparin 2 units/mL in 0.9% NaCl TABLE SOLN Stopped (07/27/22 1518)     - MEDICATION INSTRUCTIONS -         clopidogrel  75 mg Oral Daily     furosemide  20 mg Oral Daily     magnesium sulfate  2 g Intravenous Once     metoprolol succinate ER  200 mg Oral Daily     multivitamin, therapeutic  1 tablet Oral Daily     potassium chloride  20 mEq Oral Once     predniSONE  40 mg Oral Daily     rivaroxaban ANTICOAGULANT  20 mg Oral Daily with supper      rosuvastatin  20 mg Oral QPM     sodium chloride (PF)  3 mL Intracatheter Q8H     sodium chloride (PF)  3 mL Intracatheter Q8H     tamsulosin  0.4 mg Oral Daily     vitamin A  20,000 Units Oral Daily     vitamin C  500 mg Oral Daily

## 2022-08-05 NOTE — PROGRESS NOTES
Care Management Follow Up    Length of Stay (days): 15    Expected Discharge Date:       Concerns to be Addressed:       Patient plan of care discussed at interdisciplinary rounds: Yes    Anticipated Discharge Disposition: Home, Home Care vs TCU     Anticipated Discharge Services:  (OT, RN)  Anticipated Discharge DME:  (per treatment team)    Patient/family educated on Medicare website which has current facility and service quality ratings:  (not applicable)      Additional Information:  CM called and checked in with [facility] and learned that they have the updated clinicals as needed and is waiting for the RN team to review.    CM, DEE DEE Bradshaw, Dtr, and Pt met and spoke about discharge planning. At this time the Pt currently needs to work with PT for insurance to possibly cover. Pt and dtr state understanding that the Pt was declined TCU coverage, however more information has been sent and is currently under review with insurance. CM to continue to monitor [facility] for Auth status. Dtr and Pt understanding that if insurance declines coverage for TCU the Pt may need to return home with home Care. CM staff spoke about other future placements for PT and his spouse with Assisted Living and family stated that is something that they would look at. CM to call at end of day with any updates.     Roxie is currently reviewing for the [place] buildings as the [place] building are full until next week and to try back on Monday.     [place] with [facility] to review.     CM left messages with [facility], [facility].      CM spoke with Astrid who states that they can take medically at [facility] pending Auth, beds, and how the Pt continues in therapy.     4:22 PM  CM called and left a message as needed to dtr with updates.     ANGELINE Kingston

## 2022-08-05 NOTE — PLAN OF CARE
Goal: Optimal Comfort and Wellbeing  Outcome: Ongoing, Progressing     Pt denies pain. Pt A-fib on tele. Agreed to use bedside commode. Pt resting, will continue to monitor.

## 2022-08-05 NOTE — PLAN OF CARE
Problem: Oral Intake Inadequate  Goal: Improved Oral Intake  Outcome: Ongoing, Not Progressing   Goal Outcome Evaluation:    Patient is refusing meals and does not want supplements at this time. Having 1 small meal/day per [facility]. Check to see if patient wants supplements at next follow up.     Genet Tian RDN, LD

## 2022-08-06 NOTE — PROGRESS NOTES
[facility] MEDICINE PROGRESS NOTE      Identification/Summary:  81 year old male admitted on 7/21 from the vascular clinic for a  LLE wound infection, cellulitis and covid.  The patients clinical status worsened with acute hypoxemic respiratory failure.  He has since improved though still requires 3 liters oxygen.        1.  Acute hypoxemic respiratory failure due to COVID, (original diagnosis on 7/21);   In backround setting of COPD; pt is on a prednisone course 40 mg daily for   5 days; on 3 liters nasal cannula  2.  COPD exacerbation:  Prednisone taper; he is on room air with some rhonchi   Add duonebs;   3.   PAD: s/p angioplasty, on plavix and xarelto  4.  LLE cellulitis: finished augmentin on 7/31  5.  Nonischemic cardiomyopathy: no changes  6.  Atrial fibrillation: on xarelto, rate controlled  7.  Urinary retention: flomax was started; pt needed treadwell; will discharge with and follow up urology  8.  dvt prevention: on xarelto  9.  Code status: dnr  10.  Disposition: pending TCU        Carlene Jiménez MD  [facility]  [facility]  Phone: #[phone]    Interval History/Subjective:  I want to go to PT      Physical Exam/Objective:  Temp:  [97.6  F (36.4  C)-98.1  F (36.7  C)] 98.1  F (36.7  C)  Pulse:  [] 90  Resp:  [18-20] 18  BP: ()/(50-60) 105/57  SpO2:  [88 %-99 %] 99 %  Body mass index is 24.09 kg/m .    GENERAL:  Alert, appears comfortable, in no acute distress, appears stated age   HEAD:  Normocephalic, without obvious abnormality, atraumatic   EYES:  PERRL, conjunctiva/corneas clear, no scleral icterus, EOM's intact   NOSE: Nares normal, septum midline, mucosa normal, no drainage   THROAT: Lips, mucosa, and tongue normal; teeth and gums normal, mouth moist   NECK: Supple, symmetrical, trachea midline   BACK:   Symmetric, no curvature, ROM normal   LUNGS:   Clear to auscultation bilaterally, no rales, rhonchi, or  wheezing, symmetric chest rise on inhalation, respirations unlabored   CHEST WALL:  No tenderness or deformity   HEART:  Regular rate and rhythm, S1 and S2 normal, no murmur, rub, or gallop    ABDOMEN:   Soft, non-tender, bowel sounds active all four quadrants, no masses, no organomegaly, no rebound or guarding   EXTREMITIES: No edema or calf tenderness   SKIN: Dry to touch, no exanthems in the visualized areas   NEURO: Alert, oriented x3, moves all four extremities freely   PSYCH: Cooperative, behavior is appropriate      Data reviewed today: I personally reviewed all new medications, labs, imaging/diagnostics reports over the past 24 hours. Pertinent findings include:    Imaging:   No results found for this or any previous visit (from the past 24 hour(s)).    Labs:  Most Recent 3 BMP's:  Recent Labs   Lab Test 08/06/22  0456 08/05/22  0441 08/04/22  0436 08/03/22  0441 08/02/22  0431 07/31/22  0357 07/30/22  0838 07/26/22  0544 07/25/22  0440   NA  --   --   --   --  137  --  139  139  --  143   POTASSIUM 4.0 3.8 3.9   < > 4.0   < > 4.2  4.2   < > 3.5   CHLORIDE  --   --   --   --  105  --  104  104  --  109*   CO2  --   --   --   --  27  --  27  27  --  28   BUN  --   --   --   --  17  --  19  19  --  13   CR  --   --   --   --  0.79  --  0.98  0.98  --  0.87   ANIONGAP  --   --   --   --  5  --  8  8  --  6   DARON  --   --   --   --  8.1*  --  8.3*  8.3*  --  8.0*   GLC  --   --   --   --  107  --  104  104  --  120    < > = values in this interval not displayed.       Medications:   Personally Reviewed.  Medications     heparin 2 units/mL in 0.9% NaCl TABLE SOLN Stopped (07/27/22 1517)     heparin 2 units/mL in 0.9% NaCl TABLE SOLN Stopped (07/27/22 1518)     - MEDICATION INSTRUCTIONS -         clopidogrel  75 mg Oral Daily     furosemide  20 mg Oral Daily     metoprolol succinate ER  200 mg Oral Daily     multivitamin, therapeutic  1 tablet Oral Daily     rivaroxaban ANTICOAGULANT  20 mg Oral Daily  with supper     rosuvastatin  20 mg Oral QPM     sodium chloride (PF)  3 mL Intracatheter Q8H     sodium chloride (PF)  3 mL Intracatheter Q8H     tamsulosin  0.4 mg Oral Daily     vitamin A  20,000 Units Oral Daily     vitamin C  500 mg Oral Daily

## 2022-08-06 NOTE — PLAN OF CARE
Goal Outcome Evaluation:    Plan of Care Reviewed With: patient     Overall Patient Progress: improving    Outcome Evaluation: Patient denies pain/discomfort.  Slept well through the noc.  Does not want to be awoken for cares/assessment during the noc.  VSS, tele afib.  Wound dressing CDI.  No evidence of bleeding from puncture sites.  Using O2 via oxymask during the noc for sats <90%.    Problem: Risk for Delirium  Goal: Improved Sleep  Outcome: Ongoing, Progressing    Problem: Plan of Care - These are the overarching goals to be used throughout the patient stay.    Goal: Plan of Care Review/Shift Note  Description: The Plan of Care Review/Shift note should be completed every shift.  The Outcome Evaluation is a brief statement about your assessment that the patient is improving, declining, or no change.  This information will be displayed automatically on your shift note.  Outcome: Ongoing, Progressing  Flowsheets (Taken 8/6/2022 0621)  Plan of Care Reviewed With: patient  Outcome Evaluation: Patient denies pain/discomfort.  Slept well through the noc.  Does not want to be awoken for cares/assessment during the noc.  Overall Patient Progress: improving

## 2022-08-06 NOTE — PROVIDER NOTIFICATION
"   08/06/22 1606   RCAT Assessment   Reason for Assessment Other (see comments)   Pulmonary Status 2   Surgical Status 1   Chest X-ray 2   Respiratory Pattern 0   Mental Status 0   Breath Sounds 4   Cough Effectiveness 0   Level of Activity 1   O2 Required for SpO2>=92% 1   Acuity Level (points) 11   Acuity Level  3   Re-eval Interval Guideline Every 3 days   Re-evaluation Date 08/09/22   Clinical Indications/Symptoms   Aerosol Therapy RCAT protocol   Broncho-pulmonary Hygiene Rhonchi on auscultation   Volume Expansion Atelectasis   Aerosol Therapy (SVN)   Respiratory Treatment Status (SVN) given   Patient Position HOB elevated   Posttreatment Assessment (SVN) breath sounds unchanged   Signs of Intolerance (SVN) none     RCAT Treatment Plan    Patient Score: 11    Patient Acuity: 3    Clinical Indication for Therapy: Patient with congested cough    Therapy Ordered: Duonebs QID, Flutter QID    Assessment Summary: Patient with congested cough, no wheeze, no shortness of breath. Duoneb given and attempted flutter valve. Patient would only perform 3 breaths without good technique, and handed writer back his flutter valve and stated, \"Give me back my god damn mask\". Patient placed back on his oxymask at 2 L. Patient coughed again at end of session and cleared the secretions. BS eliazar Spence, BS, RRT, CTTS  8/6/2022         "

## 2022-08-07 NOTE — PROVIDER NOTIFICATION
"FYI, pt repeatedly asking for oxygen and then saying \"it's not working\" after it's on. Currently on 3L, O2 sats 96-98% w/ pt stating no relief. Complains of tightness in chest.  Thanks  Blanca  [id number]  "

## 2022-08-07 NOTE — PROGRESS NOTES
[facility] MEDICINE PROGRESS NOTE      Identification/Summary:  81 year old male admitted on 7/21 from the vascular clinic for a  LLE wound infection, cellulitis and covid.  The patients clinical status worsened with acute hypoxemic respiratory failure.  He has been treated for covid and copd exacerbation. Nebulizers were added yesterday due to sparse coughing.  This morning  He had a short interval of chest pressure.  XR chest pending.   He is on room  Air, afebrile with normal RR.       1.  Acute hypoxemic respiratory failure due to COVID, (original diagnosis on 7/21);   With backround of copd; he is recovered covid  2.  COPD exacerbation:  Finished a prednisone taper; 50 year tobacco history    Without regular LABA; add advair; xr chest today is negative; he was off   Oxygen now is on; I feel he needs the copd optimized; add LABA today   Add additional albuterol nebs; restart prednisone  3.   PAD: s/p angioplasty LLE 7/25; continue plavix and xarelto  4.  LLE cellulitis: finished augmentin on 7/31  5.  Nonischemic cardiomyopathy: by history; last echo 2019  6.  Atrial fibrillation: on xarelto, rate controlled  7.  Urinary retention: flomax and treadwell; dishcarge with treadwell for outpatient   Follow up  8.  dvt prevention: on xarelto  9.  Code status: dnr  10.  Disposition: pending TCU        Carlene Jiménez MD  Hospitalist  [facility]  [facility]  Phone: #[phone]    Interval History/Subjective:  Pt likes the nebulizers; they helped his breathing.  Denies chest symptoms on interview though the nurse reported chest pressure.      Physical Exam/Objective:  Temp:  [97.6  F (36.4  C)-98.2  F (36.8  C)] 98.2  F (36.8  C)  Pulse:  [88-94] 88  Resp:  [18-20] 20  BP: (100-113)/(55-61) 113/61  SpO2:  [87 %-99 %] 98 %  Body mass index is 23.64 kg/m .    GENERAL:  Sleeping when I came for interview; denies chest pain, no increased  Work of breathing   HEENT;  Normocephalic, without obvious abnormality, atraumatic   EYES:  PERRL, conjunctiva/corneas clear, no scleral icterus, EOM's intact   NOSE: Nares normal, septum midline, mucosa normal, no drainage   THROAT: Lips, mucosa, and tongue normal; teeth and gums normal, mouth moist   NECK: Supple, symmetrical, trachea midline   BACK:   Symmetric, no curvature, ROM normal   LUNGS:   Diffuse rhonchi; no wheezing, RR 16; non labored   CHEST WALL:  No tenderness or deformity   HEART:  Regular rate and rhythm, S1 and S2 normal, no murmur, rub, or gallop    ABDOMEN:   Soft, non-tender, bowel sounds active all four quadrants, no masses, no organomegaly, no rebound or guarding   EXTREMITIES: No edema or calf tenderness   SKIN: Dry to touch, no exanthems in the visualized areas   NEURO: Alert, oriented x3, moves all four extremities freely   PSYCH: Cooperative, behavior is appropriate      Data reviewed today: I personally reviewed all new medications, labs, imaging/diagnostics reports over the past 24 hours. Pertinent findings include:    Imaging:   No results found for this or any previous visit (from the past 24 hour(s)).    Labs:  Most Recent 3 BMP's:  Recent Labs   Lab Test 08/07/22  0413 08/06/22  0456 08/05/22  0441 08/03/22  0441 08/02/22  0431 07/31/22  0357 07/30/22  0838 07/26/22  0544 07/25/22  0440   NA  --   --   --   --  137  --  139  139  --  143   POTASSIUM 4.0 4.0 3.8   < > 4.0   < > 4.2  4.2   < > 3.5   CHLORIDE  --   --   --   --  105  --  104  104  --  109*   CO2  --   --   --   --  27  --  27  27  --  28   BUN  --   --   --   --  17  --  19  19  --  13   CR  --   --   --   --  0.79  --  0.98  0.98  --  0.87   ANIONGAP  --   --   --   --  5  --  8  8  --  6   DRAON  --   --   --   --  8.1*  --  8.3*  8.3*  --  8.0*   GLC  --   --   --   --  107  --  104  104  --  120    < > = values in this interval not displayed.       Medications:   Personally Reviewed.  Medications     heparin 2 units/mL in 0.9% NaCl  TABLE SOLN Stopped (07/27/22 1517)     heparin 2 units/mL in 0.9% NaCl TABLE SOLN Stopped (07/27/22 1518)     - MEDICATION INSTRUCTIONS -         clopidogrel  75 mg Oral Daily     furosemide  20 mg Oral Daily     ipratropium - albuterol 0.5 mg/2.5 mg/3 mL  3 mL Nebulization 4x daily     magnesium oxide  400 mg Oral Q4H     metoprolol succinate ER  200 mg Oral Daily     multivitamin, therapeutic  1 tablet Oral Daily     rivaroxaban ANTICOAGULANT  20 mg Oral Daily with supper     rosuvastatin  20 mg Oral QPM     sodium chloride (PF)  3 mL Intracatheter Q8H     sodium chloride (PF)  3 mL Intracatheter Q8H     tamsulosin  0.4 mg Oral Daily     vitamin A  20,000 Units Oral Daily     vitamin C  500 mg Oral Daily

## 2022-08-08 NOTE — PLAN OF CARE
Goal Outcome Evaluation:    Patient is alert and oriented. Pt is on room air O2 sats are low to mid 90's. Pt has no complaints of pain or SOB. Pt worked with therapies today and moved to chair. Plan is for Pt to discharge to TCU with sonPrabhjot to transport at noon.     Cecy Garcia RN

## 2022-08-08 NOTE — PROGRESS NOTES
Physical Therapy Discharge Summary    Reason for therapy discharge:    Discharged to transitional care facility.    Progress towards therapy goal(s). See goals on Care Plan in [facility] electronic health record for goal details.  Goals not met.  Barriers to achieving goals:   discharge from facility.    Therapy recommendation(s):    Recommend continued PT at TCU to enable pt to reach max level of functional mobility.

## 2022-08-08 NOTE — PROGRESS NOTES
Care Management Follow Up    Length of Stay (days): 18    Expected Discharge Date: 08/08/2022         Additional Information:  CM had a call from the Pt's dtr. Pt's dtr was upset that she was not notified of discharge today. Pt's Dtr Mojgan stated that she does not want to speak with any other CM's other then this writer and CM Manager Ratna.  CM reviewed the Pt's chart and with CM assigned today and confirmed that the Pt is able to discharge to [facility] with a bed for the early afternoon. Pt currently has an Auth that is good through 8/12. CM called and updated the dtr with this information and explained how insurance will need to continue to be updated at the TCU. Dtr stated that she will be at the hospital at 2PM for discharge to TCU. CM updated HUC as RN was away. CM called and left message with TCU on discharge time.      ANGELINE Kingston

## 2022-08-08 NOTE — DISCHARGE SUMMARY
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  Patient was directly admitted from vascular surgery clinic.     PRINCIPAL & ACTIVE DISCHARGE DIAGNOSES     Principal Problem:    Wound infection  Active Problems:    Atrial fibrillation, unspecified type (H)    Hypertension, unspecified type    CKD (chronic kidney disease) stage 3, GFR 30-59 ml/min (H)    Syncope, unspecified syncope type    Infection due to 2019 novel coronavirus    Cellulitis of left lower extremity    COVID19 Viral Pneumonia (ruled in, treated)    PENDING LABS     Unresulted Labs Ordered in the Past 30 Days of this Admission     No orders found from 6/21/2022 to 7/22/2022.            PROCEDURES ( this hospitalization only)          RECOMMENDATIONS TO OUTPATIENT PROVIDER FOR F/U VISIT     Follow-up Appointments     Follow Up and recommended labs and tests      Follow up with long-term physician.  The following labs/tests are   recommended: Follow-up with Vascular Surgery as recommended and scheduled.    Recommend following up with your own primary care physician within 1-2   weeks for post-hospital visit as well.         Follow-up and recommended labs and tests       Follow up with  primary care provider, Verito Lima, within 7 days for   hospital follow- up.  No follow up labs or test are needed.    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  Patient was directly admitted from vascular surgery clinic.  Diagnosed with COVID-19 viral infection.      Treated with IV antibiotics for cellulitis and COVID19 with dexamethasone and redmesivir to treat COVID19 Viral Pneumonia (ruled in, treated). Developed worsening respiratory failure on 7/29/2022 with up titration of oxygen from 2 L to 10 L/min and then back to 2L/min and overall patient appeared stable and improved after treatment for possible COPD exacerbation as well. IR angioplasty on 7/22 of left lower extremities. Left lower extremity cellulitis course eventually fully completed with Augmentin. Will complete a prednisone taper with continued O2 weaning at TCU. PCP and vascular surgery follow-up.      Discharge Medications with Med changes:     Current Discharge Medication List      START taking these medications    Details   clopidogrel (PLAVIX) 75 MG tablet Take 1 tablet (75 mg) by mouth daily for 93 days  Qty: 95 tablet, Refills: 0    Associated Diagnoses: Cellulitis of left lower extremity      fluticasone-vilanterol (BREO ELLIPTA) 100-25 MCG/INH inhaler Inhale 1 puff into the lungs daily  Qty: 30 each, Refills: 0    Associated Diagnoses: COPD with acute exacerbation (H)      ipratropium-albuterol (COMBIVENT RESPIMAT)  MCG/ACT inhaler Inhale 1 puff into the lungs 4 times daily as needed for shortness of  breath / dyspnea or wheezing  Qty: 1 each, Refills: 0    Associated Diagnoses: COPD with acute exacerbation (H)      multivitamin, therapeutic (THERA-VIT) TABS tablet Take 1 tablet by mouth daily    Associated Diagnoses: Infection due to 2019 novel coronavirus      predniSONE (DELTASONE) 20 MG tablet Take 3 tablets (60 mg) by mouth daily for 3 days, THEN 2 tablets (40 mg) daily for 3 days, THEN 1 tablet (20 mg) daily for 3 days.  Qty: 18 tablet, Refills: 0    Associated Diagnoses: COPD with acute exacerbation (H)      rosuvastatin (CRESTOR) 20 MG tablet Take 1 tablet (20 mg) by mouth every evening  Qty: 30 tablet, Refills: 1    Associated Diagnoses: History of CVA (cerebrovascular accident); CARDIOVASCULAR SCREENING; LDL GOAL LESS THAN 100; Completed stroke (H)      tamsulosin (FLOMAX) 0.4 MG capsule Take 1 capsule (0.4 mg) by mouth daily  Qty: 30 capsule, Refills: 0    Associated Diagnoses: Benign prostatic hyperplasia with incomplete bladder emptying      vitamin A 3 MG (18607 UNITS) capsule Take 2 capsules (20,000 Units) by mouth daily  Qty: 4 capsule    Associated Diagnoses: Infection due to 2019 novel coronavirus      vitamin C (ASCORBIC ACID) 500 MG tablet Take 1 tablet (500 mg) by mouth daily  Qty: 4 tablet    Associated Diagnoses: Infection due to 2019 novel coronavirus         CONTINUE these medications which have NOT CHANGED    Details   furosemide (LASIX) 20 MG tablet Take 1 tablet (20 mg) by mouth daily for 30 days  Qty: 30 tablet, Refills: 0    Associated Diagnoses: Localized swelling of both lower legs      metoprolol succinate ER (TOPROL-XL) 200 MG 24 hr tablet Take 1 tablet (200 mg) by mouth daily  Qty: 90 tablet, Refills: 3    Associated Diagnoses: Atrial fibrillation, unspecified type (H)      rivaroxaban ANTICOAGULANT (XARELTO ANTICOAGULANT) 20 MG TABS tablet Take 1 tablet (20 mg) by mouth daily (with dinner)  Qty: 90 tablet, Refills: 3    Associated Diagnoses: Atrial fibrillation, unspecified  type (H)      order for DME Please draw INR as ordered and as needed. Call results to [facility] at (p) [phone] or fax them to (f) [phone]  Qty: 3 Month, Refills: 3    Associated Diagnoses: Long term current use of anticoagulant therapy; Completed stroke (H); Atrial fibrillation, unspecified type (H)         STOP taking these medications       amoxicillin-clavulanate (AUGMENTIN) 875-125 MG tablet Comments:   Reason for Stopping:         ciprofloxacin (CIPRO) 500 MG tablet Comments:   Reason for Stopping:                     Rationale for medication changes:      Plavix following vascular angioplasty per vascular surgery team.     Prednisone taper and inhalers for COPD acute exacerbation.        Consults     PHARMACY TO DOSE VANCO  PHARMACY TO DOSE VANCO  WOUND OSTOMY CONTINENCE NURSE  IP CONSULT  VASCULAR SURGERY IP CONSULT  VASCULAR ACCESS ADULT IP CONSULT  PHYSICAL THERAPY ADULT IP CONSULT  OCCUPATIONAL THERAPY ADULT IP CONSULT  PHYSICAL THERAPY ADULT IP CONSULT  INFECTION PREVENTION IP CONSULT  PHYSICAL THERAPY ADULT IP CONSULT  OCCUPATIONAL THERAPY ADULT IP CONSULT    Immunizations given this encounter     Most Recent Immunizations   Administered Date(s) Administered     Influenza (High Dose) 3 valent vaccine 09/24/2019     Influenza (IIV3) PF 08/30/2012     Influenza Quad, Recombinant, pf(RIV4) (Flublok) 10/05/2020     Influenza, Quad, High Dose, Pf, 65yr+ (Fluzone HD) 10/08/2021     Pneumo Conj 13-V (2010&after) 09/18/2015     Pneumococcal 23 valent 09/11/2017     TD (ADULT, 7+) 12/02/2009           Anticoagulation Information      Recent INR results: No results for input(s): INR in the last 168 hours.  Warfarin doses (if applicable) or name of other anticoagulant: DOAC      SIGNIFICANT IMAGING FINDINGS     Results for orders placed or performed during the hospital encounter of 07/21/22   Head CT w/o contrast    Impression    IMPRESSION:  1.  No evidence of acute intracranial  hemorrhage.  2.  No evidence of acute calvarial fracture.  3.  Chronic left basal ganglia/white matter infarction.  4.  Moderate presumed chronic small vessel ischemic change.  5.  Moderate atrophy.   Chest XR,  PA & LAT    Impression    IMPRESSION: Negative chest.  The lungs are clear and there are no pleural effusions. Normal heart size.   IR Lower Extremity Angiogram Left    Impression    IMPRESSION:    1. Left lower extremity angiography demonstrates moderate stenosis of the mid/distal superficial femoral artery. There is occlusion of the tibioperoneal trunk. Occluded anterior tibial and posterior tibial arteries. Reconstituted peroneal artery to the   lower leg.  2. Successful crossing and angioplasty of the occluded anterior tibial artery to the dorsalis pedis artery. Unsuccessful crossing of the occluded tibioperoneal trunk. Successful balloon angioplasty of stenosis in the mid/distal superficial femoral   artery. Post intervention angiography demonstrates a one vessel runoff to the foot via the anterior tibial artery.    PLAN: Four hours of bedrest post sheath removal. It is hopeful in-line flow to the left lower leg will allow for wound healing. We will plan for right lower extremity angiography on 7/25/2022.   US AIDAN Doppler No Excersie Portable    Impression    IMPRESSION:  1. LEFT LOWER EXTREMITY: Ankle brachial index of 0.69 reflecting moderate peripheral arterial disease. Unable to obtain toe pressures or waveforms bilaterally.   IR Lower Extremity Angiogram Right    Impression    IMPRESSION:    1. Right lower extremity angiography demonstrates occlusion of the distal superficial femoral and above-knee popliteal arteries. There is a one vessel runoff to the foot via the peroneal artery. Occluded posterior tibial and anterior tibial arteries.  2. Successful crossing and recanalization of the occluded distal superficial femoral/above-knee popliteal arteries utilizing a drug-eluting stent. Successful  crossing and angioplasty of the occluded anterior tibial artery. Post intervention angiography   demonstrates excellent result with in-line flow to the knee with two-vessel runoff via the anterior tibial and peroneal arteries.    PLAN: Four hours of bedrest post sheath removal. Start Plavix 75 mg once daily status post drug-eluting stent placement x 3 months. Okay to resume anticoagulation tonight. Plan for a clinic follow-up in 2-3 weeks to evaluate response.       XR Chest Port 1 View    Impression    IMPRESSION: Right PICC has been placed and extends to the mid SVC and directed posteriorly into the azygos vein on the first radiograph but on the second radiograph is in excellent position in the low SVC. Increased opacity posterior to the heart has   developed and could indicate some degree of consolidation and/or volume loss in the posteromedial basilar segments left lower lobe. Chest otherwise negative.     XR Chest Port 1 View    Impression    IMPRESSION: No acute cardiopulmonary disease. Nonenlarged cardiac silhouette. Prior right PICC has been removed.   XR Chest Port 1 View    Impression    IMPRESSION: Negative chest. Lungs clear.       SIGNIFICANT LABORATORY FINDINGS     Most Recent 3 CBC's:Recent Labs   Lab Test 08/02/22  0431 07/30/22  0838 07/25/22  0440   WBC 13.3* 13.5* 10.4   HGB 15.8 16.0 14.6   MCV 94 93 94    323 371     Most Recent 3 BMP's:Recent Labs   Lab Test 08/08/22  0444 08/07/22  0413 08/06/22  0456 08/03/22  0441 08/02/22  0431 07/31/22  0357 07/30/22  0838 07/26/22  0544 07/25/22  0440   NA  --   --   --   --  137  --  139  139  --  143   POTASSIUM 3.8 4.0 4.0   < > 4.0   < > 4.2  4.2   < > 3.5   CHLORIDE  --   --   --   --  105  --  104  104  --  109*   CO2  --   --   --   --  27  --  27  27  --  28   BUN  --   --   --   --  17  --  19  19  --  13   CR  --   --   --   --  0.79  --  0.98  0.98  --  0.87   ANIONGAP  --   --   --   --  5  --  8  8  --  6   DARON  --   --   --    --  8.1*  --  8.3*  8.3*  --  8.0*   GLC  --   --   --   --  107  --  104  104  --  120    < > = values in this interval not displayed.     Most Recent 2 LFT's:Recent Labs   Lab Test 07/30/22  0838 07/22/22  0516   AST 17 15   ALT 25 11   ALKPHOS 72 52   BILITOTAL 1.0 0.4     Most Recent 3 INR's:Recent Labs   Lab Test 07/21/22  1719 07/21/22  1227 09/21/21  1214   INR 1.07 1.12 2.6*       Discharge Orders        Home Care Referral      Primary Care - Care Coordination Referral      Reason for your hospital stay    Admitted with PVD     Follow-up and recommended labs and tests     Follow up with primary care provider, Verito Lima, within 7 days for hospital follow- up.  No follow up labs or test are needed.    Follow up with  Vascular surgeon    -Demi Denton MD   As advised in  10-14 days     Activity    Your activity upon discharge: activity as tolerated     Discharge - Quarantine/Isolation Instruction    Date of symptom onset:     Date of first positive test:    If you tested positive COVID-19 and show symptoms (fever, cough, body aches or trouble breathing):        Stay home and away from others (self-isolate) until:        At least 10 days have passed since your symptoms started. AND...        You've had no fever-and no medicine that reduces fever for 1 full day (24 hours). AND...         Your other symptoms have resolved (gotten better).  If you tested positive for COVID-19 but don't show symptoms:       Stay home and away from others (self-isolate) until at least 10 days have passed since the date of your first positive COVID-19 test.     General info for SNF    Length of Stay Estimate: Short Term Care: Estimated # of Days <30  Condition at Discharge: Improving  Level of care:skilled   Rehabilitation Potential: Good  Admission H&P remains valid and up-to-date: Yes  Recent Chemotherapy: N/A  Use Nursing Home Standing Orders: Yes     Mantoux instructions    Give two-step Mantoux (PPD) Per  Facility Policy Yes     Follow Up and recommended labs and tests    Follow up with [facility] physician.  The following labs/tests are recommended: Follow-up with Vascular Surgery as recommended and scheduled.    Recommend following up with your own primary care physician within 1-2 weeks for post-hospital visit as well.     Activity - Up with nursing assistance     Reason for your hospital stay    COVID19 viral infection; wound infection and wound care; COPD acute exacerbation.   Vascular surgery and hospital medicine cares.     No CPR- Do NOT Intubate     Physical Therapy Adult Consult    Evaluate and treat as clinically indicated.    Reason:  Physical deconditioning and/or ALDs/iADLs and mobility, fall risk. Recommended by our PT.     Occupational Therapy Adult Consult    Evaluate and treat as clinically indicated.    Reason:  Physical deconditioning and/or ALDs/iADLs and mobility, fall risk. Recommended by our OT.     Fall precautions     Diet    Follow this diet upon discharge: Orders Placed This Encounter      Regular Diet Adult     Advance Diet as Tolerated    Follow this diet upon discharge: Orders Placed This Encounter      Regular Diet Adult      Diet         Examination   Physical Exam   Temp:  [97.7  F (36.5  C)-98.1  F (36.7  C)] 97.7  F (36.5  C)  Pulse:  [90-94] 94  Resp:  [18-20] 18  BP: (110-120)/(56-63) 120/63  SpO2:  [93 %-98 %] 97 %  Wt Readings from Last 1 Encounters:   08/07/22 72.6 kg (160 lb 1.6 oz)       GENERAL:  Alert, appears comfortable, in no acute distress, appears stated age   HEAD:  Normocephalic, without obvious abnormality, atraumatic   EYES:  PERRL, conjunctiva/corneas clear, no scleral icterus, EOM's intact   NOSE: Nares normal, septum midline, mucosa normal, no drainage   THROAT: Lips, mucosa, and tongue normal; teeth and gums normal, mouth moist   NECK: Supple, symmetrical, trachea midline   BACK:   Symmetric, no curvature, ROM normal   LUNGS:   Clear to auscultation  bilaterally, no rales, rhonchi, or wheezing, symmetric chest rise on inhalation, respirations unlabored   CHEST WALL:  No tenderness or deformity   HEART:  Regular rate and rhythm, S1 and S2 normal, no murmur, rub, or gallop    ABDOMEN:   Soft, non-tender, bowel sounds active all four quadrants, no masses, no organomegaly, no rebound or guarding   EXTREMITIES: Extremities atraumatic, no cyanosis or edema   SKIN: Dry to touch, no new exanthems in the visualized areas   NEURO: Alert, oriented x 4, moves all four extremities freely/spontaneously   PSYCH: Cooperative, behavior is appropriate        Please see EMR for more detailed significant labs, imaging, consultant notes etc.    I, Eduard Arredondo MD, personally saw the patient today and spent greater than 30 minutes discharging this patient.    Eduard Arredondo MD  Internal Medicine  [facility]  Phone: #[phone]    CC:Verito Lima

## 2022-08-08 NOTE — PROGRESS NOTES
Care Management Discharge Note    Discharge Date: 08/08/2022       Discharge Disposition:  [facility] (SNF)    Discharge Services:  [facility] ([facility])    Discharge DME:  (per treatment team)    Discharge Transportation: family or friend will provide    PAS Confirmation Code:  ([id number])    Patient/family educated on Medicare website which has current facility and service quality ratings:  yes      Education Provided on the Discharge Plan:  AVS per bedside RN.     Persons Notified of Discharge Plans: patient    Patient/Family in Agreement with the Plan:  yes    Handoff Referral Completed: Yes    Additional Information:  Chart reviewed. CM coordinated with [facility], bedside RN, charge RN and facility. MD placed discharge orders. Family will transport to [facility] ([facility]) at time of hospital discharge.     PAS and copy of prior authorization sent with patient to TCU. CM also faxed prior authorization to [facility].     DEE DEE updated [employer] with the TCU patient is going to: [facility]. Patient will discharge today.11:43 AM  Authorization #- [id number]    DEE DEE completed PAS- [id number]    Nadine Hernandez, GEMA

## 2022-08-08 NOTE — PLAN OF CARE
Problem: Gas Exchange Impaired  Goal: Optimal Gas Exchange  Outcome: Ongoing, Progressing   Pt on room air this shift and tolerating well. Will place on 2L at HS since pt usually wears 02 at night only.   Problem: Oral Intake Inadequate  Goal: Improved Oral Intake  Outcome: Ongoing, Progressing  Pt drinking plenty of water this shift. Ate well at supper.   Pt alert and oriented and calls for assistance. Hourly rounding done. Bed alarm on for safety.

## 2022-08-09 NOTE — PROGRESS NOTES
Clinic Care Coordination Contact  Care Coordination Transition Communication         Clinical Data:   Hospital admission 721-8/8/2022  Principal Problem:    Wound infection  Active Problems:    Atrial fibrillation, unspecified type (H)    Hypertension, unspecified type    CKD (chronic kidney disease) stage 3, GFR 30-59 ml/min (H)    Syncope, unspecified syncope type    Infection due to 2019 novel coronavirus    Cellulitis of left lower extremity    Transition to Facility:              Facility Name:[facility]              Contact name and phone number/fax: faxed CC RN contact to TCU and instructed to call when the patient is discharged     Plan: RN/SW Care Coordinator will await notification from facility staff informing RN/SW Care Coordinator of patient's discharge plans/needs. RN/SW Care Coordinator will review chart and outreach to facility staff every 4 weeks and as needed.     [facility]   Maryam Farrar RN, Care Coordinator   [facility]'s   E-mail [email or web address]   [phone]

## 2022-08-09 NOTE — PLAN OF CARE
Occupational Therapy Discharge Summary    Reason for therapy discharge:    Discharged to transitional care facility.    Progress towards therapy goal(s). See goals on Care Plan in [facility] electronic health record for goal details.  Goals partially met.  Barriers to achieving goals:   discharge from facility.    Therapy recommendation(s):    Continued therapy is recommended.  Rationale/Recommendations:  continue therapy at TCU to return to PLOF.

## 2022-08-09 NOTE — PROGRESS NOTES
[facility] SERVICES       Patient Bradley Liz  MRN: [id number]        Reason for Visit     Chief Complaint   Patient presents with     RECHECK     INITIAL       Code Status     DNR / DNI    Assessment     Left lower extremity cellulitis with an open wound.  Peripheral vascular disease s/p angioplasty interventional radiology on 7/22/2022.  Of the left lower extremity.  Acute COVID infection  Acute hypoxic respiratory failure  COPD exacerbation  Urinary retention requiring Allen catheter  A. fib on chronic anticoagulation with Xarelto  Nonischemic cardiomyopathy  Generalized weakness    Plan     Pt is admitted to TCU for strengthening and rehab.  Patient was diagnosed with acute COVID infection in the hospital.  Course complicated by viral pneumonia and hypoxic respiratory failure.  Also felt to have COPD exacerbation.  Continue with prednisone taper  Advair was added to his regimen.  He continues to have some baseline respiratory distress with cough and congestion.  He has finished his treatment of remdesivir in the hospital.  Continue with his nebs as needed.  Encourage incentive spirometry.  For his left lower extremity cellulitis with wound continue with his wound cares.  For  Does not look infected anymore he has finished Augmentin on 7/31/2021.  Due to urinary retention he has an indwelling Allen catheter.  Continue Flomax.  Outpatient follow-up with urology if he fails a voiding trial.  Status post angioplasty for PAD.  Continue Plavix and Xarelto.  He also takes Xarelto for atrial fibrillation heart rate is controlled but blood pressures remain low will monitor trends  At baseline he is quite weak and debilitated and using a walker  Recheck labs  Continue with PT/OT    History     Patient is a very pleasant 81 year old male who is admitted to TCU  Then with left lower extremity wound infection with cellulitis to the vascular clinic.  He was examined and admitted directly.  He was given IV  antibiotics  In addition he was noted to have COVID-19 infection which was treated.  He had viral pneumonia.  He got dexamethasone and remdesivir.  He developed acute hypoxic respiratory failure requiring oxygen felt to be secondary to COPD exacerbation.  He has peripheral vascular disease and underwent angioplasty on 7/22/2022      Past Medical & Surgical History     PAST MEDICAL HISTORY:   Past Medical History:   Diagnosis Date     Acute kidney injury (H) 8/5/2019      PAST SURGICAL HISTORY:   has a past surgical history that includes Laparoscopic cholecystectomy (N/A, 7/9/2019); IR Lower Extremity Angiogram Left (7/22/2022); PICC/Midline Placement (7/24/2022); and IR Lower Extremity Angiogram Right (7/25/2022).      Past Social History     Reviewed,  reports that he quit smoking about 13 years ago. He quit after 50.00 years of use. He has never used smokeless tobacco. He reports current alcohol use. He reports that he does not use drugs.    Family History     Reviewed, and family history includes C.A.D. in his father; Prostate Cancer in his father; Unknown/Adopted in his mother.    Medication List   Post Discharge Medication Reconciliation Status: Post Discharge Medication Reconciliation Status: discharge medications reconciled, continue medications without change.  Current Outpatient Medications   Medication     clopidogrel (PLAVIX) 75 MG tablet     fluticasone-vilanterol (BREO ELLIPTA) 100-25 MCG/INH inhaler     furosemide (LASIX) 20 MG tablet     ipratropium-albuterol (COMBIVENT RESPIMAT)  MCG/ACT inhaler     metoprolol succinate ER (TOPROL XL) 200 MG 24 hr tablet     order for DME     predniSONE (DELTASONE) 20 MG tablet     rivaroxaban ANTICOAGULANT (XARELTO ANTICOAGULANT) 20 MG TABS tablet     rosuvastatin (CRESTOR) 20 MG tablet     tamsulosin (FLOMAX) 0.4 MG capsule     vitamin C (ASCORBIC ACID) 500 MG tablet     multivitamin, therapeutic (THERA-VIT) TABS tablet     vitamin A 3 MG (24480 UNITS)  "capsule     No current facility-administered medications for this visit.          Allergies     Allergies   Allergen Reactions     Augmentin Diarrhea       Review of Systems   A comprehensive review of 14 systems was done. Pertinent findings noted here and in history of present illness. All the rest negative.  Constitutional: Negative.  Negative for fever, chills, he has  activity change, appetite change and fatigue.   HENT: Negative for congestion and facial swelling.    Eyes: Negative for photophobia, redness and visual disturbance.   Respiratory: Negative for cough and chest tightness.  Does have shortness of breath and needs oxygen  Cardiovascular: Negative for chest pain, palpitations and leg swelling.   Gastrointestinal: Negative for nausea, diarrhea, constipation, blood in stool and abdominal distention.   Genitourinary: Negative.    Musculoskeletal: Uses a walker for ambulation  Skin: Has an open wound on his left lower extremity  Neurological: Negative for dizziness, tremors, syncope, weakness, light-headedness and headaches.   Hematological: Does not bruise/bleed easily.   Psychiatric/Behavioral: Negative.  Mood is impaired and patient is not a good historian      Physical Exam   BP 97/64   Pulse 72   Temp 97.7  F (36.5  C)   Resp 18   Ht 1.753 m (5' 9\")   Wt 72.6 kg (160 lb)   SpO2 98%   BMI 23.63 kg/m     On oxygen 2 L  Constitutional: Oriented to person, place, and time and appears well-developed.   HEENT:  Normocephalic and atraumatic.  Eyes: Conjunctivae and EOM are normal. Pupils are equal, round, and reactive to light. No discharge.  No scleral icterus. Nose normal. Mouth/Throat: Oropharynx is clear and moist. No oropharyngeal exudate.    NECK: Normal range of motion. Neck supple. No JVD present. No tracheal deviation present. No thyromegaly present.   CARDIOVASCULAR: Normal rate, regular rhythm and intact distal pulses.  Exam reveals no gallop and no friction rub.  Systolic murmur " present.  PULMONARY: Effort normal and breath sounds normal. No respiratory distress.No Wheezing or rales.  Coarse breath sounds especially on the right lower quadrant  ABDOMEN: Soft. Bowel sounds are normal. No distension and no mass.  There is no tenderness. There is no rebound and no guarding. No HSM.  MUSCULOSKELETAL: Normal range of motion. No edema and no tenderness. Mild kyphosis, no tenderness.  LYMPH NODES: Has no cervical, supraclavicular, axillary and groin adenopathy.   NEUROLOGICAL: Alert and oriented to person, place, and time. No cranial nerve deficit.  Normal muscle tone. Coordination normal.   GENITOURINARY: Deferred exam.  Has an indwelling Allen  SKIN: Skin is warm and dry. No rash noted. No erythema. No pallor.   Left lower extremity has a large open area with a scab noted no secondary concern for infection  EXTREMITIES: No cyanosis, no clubbing, no edema. No Deformity.  PSYCHIATRIC: Normal mood, affect and behavior.  Recall is somewhat impaired      Lab Results     Recent Results (from the past 240 hour(s))   Potassium    Collection Time: 08/01/22  4:37 AM   Result Value Ref Range    Potassium 3.9 3.5 - 5.0 mmol/L   Magnesium    Collection Time: 08/01/22  4:37 AM   Result Value Ref Range    Magnesium 2.0 1.8 - 2.6 mg/dL   Magnesium    Collection Time: 08/02/22  4:31 AM   Result Value Ref Range    Magnesium 2.2 1.8 - 2.6 mg/dL   Basic metabolic panel    Collection Time: 08/02/22  4:31 AM   Result Value Ref Range    Sodium 137 136 - 145 mmol/L    Potassium 4.0 3.5 - 5.0 mmol/L    Chloride 105 98 - 107 mmol/L    Carbon Dioxide (CO2) 27 22 - 31 mmol/L    Anion Gap 5 5 - 18 mmol/L    Urea Nitrogen 17 8 - 28 mg/dL    Creatinine 0.79 0.70 - 1.30 mg/dL    Calcium 8.1 (L) 8.5 - 10.5 mg/dL    Glucose 107 70 - 125 mg/dL    GFR Estimate 89 >60 mL/min/1.73m2   CBC with platelets    Collection Time: 08/02/22  4:31 AM   Result Value Ref Range    WBC Count 13.3 (H) 4.0 - 11.0 10e3/uL    RBC Count 5.22 4.40 -  5.90 10e6/uL    Hemoglobin 15.8 13.3 - 17.7 g/dL    Hematocrit 48.9 40.0 - 53.0 %    MCV 94 78 - 100 fL    MCH 30.3 26.5 - 33.0 pg    MCHC 32.3 31.5 - 36.5 g/dL    RDW 14.5 10.0 - 15.0 %    Platelet Count 322 150 - 450 10e3/uL   Potassium    Collection Time: 08/03/22  4:41 AM   Result Value Ref Range    Potassium 4.1 3.5 - 5.0 mmol/L   Magnesium    Collection Time: 08/03/22  4:41 AM   Result Value Ref Range    Magnesium 2.2 1.8 - 2.6 mg/dL   Potassium    Collection Time: 08/04/22  4:36 AM   Result Value Ref Range    Potassium 3.9 3.5 - 5.0 mmol/L   Magnesium    Collection Time: 08/04/22  4:36 AM   Result Value Ref Range    Magnesium 2.1 1.8 - 2.6 mg/dL   Potassium    Collection Time: 08/05/22  4:41 AM   Result Value Ref Range    Potassium 3.8 3.5 - 5.0 mmol/L   Magnesium    Collection Time: 08/05/22  4:41 AM   Result Value Ref Range    Magnesium 2.0 1.8 - 2.6 mg/dL   Magnesium    Collection Time: 08/06/22  4:56 AM   Result Value Ref Range    Magnesium 2.3 1.8 - 2.6 mg/dL   Potassium    Collection Time: 08/06/22  4:56 AM   Result Value Ref Range    Potassium 4.0 3.5 - 5.0 mmol/L   Potassium    Collection Time: 08/07/22  4:13 AM   Result Value Ref Range    Potassium 4.0 3.5 - 5.0 mmol/L   Magnesium    Collection Time: 08/07/22  4:13 AM   Result Value Ref Range    Magnesium 1.9 1.8 - 2.6 mg/dL   Potassium    Collection Time: 08/08/22  4:44 AM   Result Value Ref Range    Potassium 3.8 3.5 - 5.0 mmol/L   Magnesium    Collection Time: 08/08/22  4:44 AM   Result Value Ref Range    Magnesium 2.0 1.8 - 2.6 mg/dL             Imaging Results     Chest XR,  PA & LAT    Result Date: 7/21/2022  EXAM: XR CHEST 2 VIEWS LOCATION: [facility] DATE/TIME: 7/21/2022 1:31 PM INDICATION: Cough COMPARISON: Chest x-ray 07/16/2019     IMPRESSION: Negative chest.  The lungs are clear and there are no pleural effusions. Normal heart size.    IR Lower Extremity Angiogram Left    Result Date: 7/22/2022  LOCATION: [facility] DATE: 7/22/2022 PROCEDURE: ABDOMINAL AORTIC, PELVIC AND BILATERAL LOWER EXTREMITY DIAGNOSTIC ARTERIOGRAPHY, BALLOON ANGIOPLASTY OF THE LEFT SUPERFICIAL FEMORAL ARTERY, BALLOON ANGIOPLASTY OF THE LEFT ANTERIOR TIBIAL AND DORSALIS PEDIS ARTERIES, ULTRASOUND GUIDANCE FOR VASCULAR ACCESS, MODERATE SEDATION INTERVENTIONAL RADIOLOGIST: Vilma Coello MD INDICATION: 81-year-old with nonhealing bilateral lower leg wounds/critical limb ischemia. The patient's noninvasive imaging demonstrates inadequate toe pressures bilaterally to allow for wound healing. Plan for left lower extremity angiography with possible intervention. INDICATION FOR DIAGNOSTIC ANGIOGRAPHY: Diagnostic angiography was required to precisely identify plaque morphology and areas of occlusion to assist in management. CONSENT: The risks, benefits and alternatives of the procedure were discussed with the patient  in detail. All questions were answered. Informed consent was given to proceed with the procedure. MODERATE SEDATION: Versed 2 mg IV; Fentanyl 100 mcg IV. During the time out, immediately prior to the administration of medications, the patient was reassessed for adequacy to receive conscious sedation.  Under physician supervision, Versed and fentanyl were administered for moderate sedation. Pulse oximetry, heart rate and blood pressure were continuously monitored by an independent trained observer. The physician spent 125 minutes of face-to-face sedation time with the patient. CONTRAST: 200 cc Visipaque [id number] ANTIBIOTICS: None. ADDITIONAL MEDICATIONS: Heparin, protamine FLUOROSCOPIC TIME: 45.9 minutes. RADIATION DOSE: Air Kerma: 336 mGy. COMPLICATIONS: No immediate complications. UNIVERSAL PROTOCOL: The operative site was marked and any prior imaging was reviewed. Required items including blood products, implants, devices and special equipment was made available. Patient identity was confirmed either verbally, with  demographic information, hospital assigned identification or other identification markers. A timeout was performed immediately prior to the procedure. STERILE BARRIER TECHNIQUE: Maximum sterile barrier technique was used. Cutaneous antisepsis was performed at the operative site with application of 2% chlorhexidine and large sterile drape. Prior to the procedure, the  and assistant performed hand hygiene and wore hat, mask, sterile gown, and sterile gloves during the entire procedure. PROCEDURE:  Access was achieved into the right common femoral artery utilizing real-time ultrasound guidance and a micropuncture access kit. Imaging demonstrates a patent and compressible artery. Permanent images were stored to the patient's medical record. Conversion was made for a 5 Pashto vascular sheath, which was attached to a continuous heparinized saline infusion. A flush catheter was manipulated over a wire the mid abdominal aorta and digital subtraction aortic arteriography was performed. The catheter was repositioned over a wire into the left external iliac artery. From this location, digital subtraction left lower extremity arteriography was obtained. Systemic heparinization was performed and monitored with serial activated clotting times. Over exchange is made for a 5 Pashto x 70 cm vascular sheath which was positioned in the mid left superficial femoral artery. An 018 crossing catheter and guidewire  were advanced through the sheath and selective catheterization of the anterior tibial artery was performed. The guidewire and catheter were advanced to the dorsalis pedis artery. Over-the-wire exchange is made for a 2 mm x 20 cm angioplasty balloon. Angioplasty of the entire anterior tibial artery artery and proximal dorsalis pedis artery was performed. Over-the-wire exchange is made for a 3 mm x 20 cm angioplasty balloon. Angioplasty of the entire anterior tibial and dorsalis pedis arteries was performed. Post  angioplasty angiography demonstrates excellent result with in-line flow to the lower leg and foot. The guidewire and catheter were retracted to the below-knee popliteal artery. Attempts to cross the occluded proximal/mid tibioperoneal trunk were unsuccessful. Over-the-wire exchange is made for an 035 crossing catheter and Glidewire. Attempts to cross the occluded proximal/mid tibioperoneal trunk were unsuccessful. Over the wire exchange is made for a 6 Faroese x 70 cm vascular sheath which was positioned in the distal superficial femoral artery. An 014 guidewire was then advanced through the catheter in the false lumen to the distal tibioperoneal trunk. Over-the-wire exchange is made for a Outback Rentry device. Attempts to gain access into the true lumen of the distal tibioperoneal trunk were unsuccessful. The guidewire and reentry device were removed. An 035 wire and catheter were advanced through the sheath positioned in the left superficial femoral artery to the popliteal artery. Over-the-wire exchange is made for a 5 mm x 8 cm angioplasty balloon. Angioplasty of the mid and distal superficial femoral artery was performed. Post angioplasty angiography demonstrates excellent result with no significant residual stenosis. The catheter and guidewire were removed. The sheath was retracted to the right external iliac artery. Digital subtraction right lower extremity arteriography was performed through the right common femoral arterial sheath. Protamine was administered. The sheath was removed and manual pressure applied until hemostasis. ABDOMEN FINDINGS: Patent and normal caliber abdominal aorta. Patent bilateral renal arteries and inferior mesenteric artery. Patent bilateral common iliac, internal iliac and external iliac arteries. RIGHT LOWER EXTREMITY FINDINGS: Patent common femoral, profunda femoral and superficial femoral arteries. Moderate stenosis involving the mid/distal superficial femoral artery. Patent  above and below knee popliteal arteries. Occluded proximal/mid tibioperoneal trunk. There is occlusion  of all infrapopliteal vessels. Reconstituted peroneal artery to the lower leg. LEFT LOWER EXTREMITY FINDINGS:  Patent common femoral and profunda femoral arteries. Occlusion of the distal superficial femoral and proximal above-knee popliteal arteries. Reconstituted above and below knee popliteal artery. Patent posterior tibial peroneal artery to the lower leg. Occluded anterior tibial and posterior tibial arteries.     IMPRESSION:  1. Left lower extremity angiography demonstrates moderate stenosis of the mid/distal superficial femoral artery. There is occlusion of the tibioperoneal trunk. Occluded anterior tibial and posterior tibial arteries. Reconstituted peroneal artery to the lower leg. 2. Successful crossing and angioplasty of the occluded anterior tibial artery to the dorsalis pedis artery. Unsuccessful crossing of the occluded tibioperoneal trunk. Successful balloon angioplasty of stenosis in the mid/distal superficial femoral artery. Post intervention angiography demonstrates a one vessel runoff to the foot via the anterior tibial artery. PLAN: Four hours of bedrest post sheath removal. It is hopeful in-line flow to the left lower leg will allow for wound healing. We will plan for right lower extremity angiography on 7/25/2022.    IR Lower Extremity Angiogram Right    Result Date: 7/25/2022  LOCATION: [facility] DATE: 7/25/2022 PROCEDURE: RIGHT LOWER EXTREMITY DIAGNOSTIC ANGIOGRAM, ANGIOPLASTY AND DRUG-ELUTING STENT PLACEMENT IN THE DISTAL RIGHT SUPERFICIAL FEMORAL/ABOVE-KNEE POPLITEAL ARTERIES, SELECTIVE CATHETERIZATION/ANGIOGRAPHY/ANGIOPLASTY OF THE RIGHT ANTERIOR TIBIAL AND DORSALIS PEDIS ARTERIES, ULTRASOUND GUIDANCE FOR VASCULAR ACCESS, UTILIZATION OF AN ARTERIAL CLOSURE DEVICE. INTERVENTIONAL RADIOLOGIST: Vilma Coello MD INDICATION: 81-year-old male with nonhealing right  lower extremity wound/critical limb ischemia, plan for angiography with possible intervention. INDICATION FOR DIAGNOSTIC ANGIOGRAPHY: Diagnostic angiography was required to process identified plaque morphology to aid in treatment and management CONSENT: The risks, benefits and alternatives of the procedure were discussed with the patient  in detail. All questions were answered. Informed consent was given to proceed with the procedure. MODERATE SEDATION: Versed 2 mg IV and fentanyl 100 mcg IV were administered by the radiology nurse with continuous vital sign monitoring under my direct supervision. During the time out, immediately prior to the administration of medications, the patient  was reassessed for adequacy to receive conscious sedation. Total moderate sedation time was 75 minutes. CONTRAST: 80 cc Visipaque ANTIBIOTICS: None. ADDITIONAL MEDICATIONS: Heparin, protamine FLUOROSCOPIC TIME: 26.8 minutes. RADIATION DOSE: Air Kerma: 125 mGy. COMPLICATIONS: No immediate complications. UNIVERSAL PROTOCOL: The operative site was marked and any prior imaging was reviewed. Required items including blood products, implants, devices and special equipment was made available. Patient identity was confirmed either verbally, with demographic information, hospital assigned identification or other identification markers. A timeout was performed immediately prior to the procedure. STERILE BARRIER TECHNIQUE: Maximum sterile barrier technique was used. Cutaneous antisepsis was performed at the operative site with application of 2% chlorhexidine and large sterile drape. Prior to the procedure, the  and assistant performed hand hygiene and wore hat, mask, sterile gown, and sterile gloves during the entire procedure. PROCEDURE:  Access was achieved into the left common femoral artery utilizing real-time ultrasound guidance and micropuncture access kit. Imaging demonstrates a patent and compressible vessel. Permanent images  were stored to the patient's medical record. Conversion was made for a 5 Martiniquais vascular sheath, which was attached to a continuous heparinized saline infusion. A flush catheter was manipulated over a wire into the upper abdomen. The catheter was then used to selectively catheterize the right common iliac and external iliac arteries. The flush catheter was positioned in the right common iliac artery. From this location, digital subtraction right lower extremity arteriography was obtained. Imaging demonstrates patent right common femoral and profunda femoral arteries. Moderate stenosis of the proximal/mid superficial femoral artery. There is occlusion of the distal superficial femoral and above-knee popliteal arteries. Reconstitution of the above-knee popliteal artery above the patella. Occlusion of the mid anterior tibial artery. Chronic occlusion occlusion of the entire posterior tibial artery. There is a one vessel runoff to the foot via the peroneal artery. Over the wire exchange is made for a 6 Martiniquais vascular sheath which was positioned in the right common femoral artery. Over the wire exchange is made for a .035 crossing catheter and Mcclure guidewire. The catheter was used to selectively catheterize the superficial femoral artery. The occluded distal right superficial femoral and above-knee popliteal arteries was crossed with the crossing catheter and Mcclure guidewire. The catheter was advanced to the popliteal artery at the knee joint and a small volume  of contrast was injected, confirming intraluminal positioning. The wire exchange is made for a 5 mm x 10 cm angioplasty balloon. Angioplasty of the superficial femoral and above-knee popliteal arteries was performed. Over the wire exchange is made for a  6 mm x 12 cm Marlen drug-eluting stent. The stent was deployed in the distal superficial femoral/above-knee popliteal arteries. Post deployment angioplasty was performed utilizing a 6 mm balloon. A 6 mm x 25 cm  InPact balloon was then advanced over the guidewire and positioned in the proximal/mid superficial femoral artery. Drug-coated balloon angioplasty was performed with inflation time of 3 minutes. Post angioplasty angiography demonstrates excellent result with no significant residual stenosis. Over-the-wire exchange is made for an .018 crossing catheter and selective catheterization of the proximal anterior tibial artery was performed. Angiography was performed, demonstrating occlusion of the mid/distal anterior tibial artery. The occluded anterior tibial artery was crossed utilizing a crossing catheter and a Nitrex guidewire. The guidewire and catheter were advanced to the dorsalis pedis artery. Over-the-wire exchange is made for a 3 mm angioplasty balloon. Angioplasty of the anterior tibial and dorsalis pedis arteries was performed with the 3 mm balloon. Post angioplasty angiography now demonstrates patent vessel runoff to the foot via the anterior tibial and peroneal arteries. The dorsalis pedis artery remains occluded. The catheter and guidewire were removed. The sheath was removed and manual pressure applied until hemostasis.     IMPRESSION:  1. Right lower extremity angiography demonstrates occlusion of the distal superficial femoral and above-knee popliteal arteries. There is a one vessel runoff to the foot via the peroneal artery. Occluded posterior tibial and anterior tibial arteries. 2. Successful crossing and recanalization of the occluded distal superficial femoral/above-knee popliteal arteries utilizing a drug-eluting stent. Successful crossing and angioplasty of the occluded anterior tibial artery. Post intervention angiography demonstrates excellent result with in-line flow to the knee with two-vessel runoff via the anterior tibial and peroneal arteries. PLAN: Four hours of bedrest post sheath removal. Start Plavix 75 mg once daily status post drug-eluting stent placement x 3 months. Okay to resume  anticoagulation tonight. Plan for a clinic follow-up in 2-3 weeks to evaluate response.     US AIDAN Doppler No Excersie Portable    Result Date: 7/22/2022  LOCATION: [facility] [place] DATE: 7/22/2022 EXAM: RESTING ANKLE-BRACHIAL INDICES (ABIs) INDICATION: Nonhealing bilateral lower extremity wound, decreased lower extremity pulses, status post recanalization of the left anterior tibial artery COMPARISON: Prior study dated 07/08/2022 AIDAN FINDINGS: Absent digit waveforms bilaterally.     IMPRESSION: 1. LEFT LOWER EXTREMITY: Ankle brachial index of 0.69 reflecting moderate peripheral arterial disease. Unable to obtain toe pressures or waveforms bilaterally.    XR Chest Port 1 View    Result Date: 8/7/2022  EXAM: XR CHEST PORT 1 VIEW LOCATION: [facility] [place] DATE/TIME: 8/7/2022 10:58 AM INDICATION: Shortness of breath. COMPARISON: 07/30/2022.     IMPRESSION: Negative chest. Lungs clear.    XR Chest Port 1 View    Result Date: 7/30/2022  EXAM: XR CHEST PORT 1 VIEW LOCATION: [facility] [place] DATE/TIME: 7/30/2022 8:45 AM INDICATION: Increased oxygen requirements. COMPARISON: Chest radiograph 07/24/2022.     IMPRESSION: No acute cardiopulmonary disease. Nonenlarged cardiac silhouette. Prior right PICC has been removed.    XR Chest Port 1 View    Result Date: 7/24/2022  EXAM: XR CHEST PORTABLE 1 VIEW LOCATION: [facility] [place] DATE/TIME: 07/24/2022, 2:09 PM INDICATION: RN placed PICC. Verify tip placement. COMPARISON: 07/21/2022.     IMPRESSION: Right PICC has been placed and extends to the mid SVC and directed posteriorly into the azygos vein on the first radiograph but on the second radiograph is in excellent position in the low SVC. Increased opacity posterior to the heart has developed and could indicate some degree of consolidation and/or volume loss in the posteromedial basilar segments left lower lobe. Chest otherwise negative.     Head CT  w/o contrast    Result Date: 7/21/2022  EXAM: CT HEAD W/O CONTRAST LOCATION: [facility] DATE/TIME: 7/21/2022 1:31 PM INDICATION: Fall, trauma, pain. COMPARISON: None available. TECHNIQUE: Routine CT Head without IV contrast. Multiplanar reformats. Dose reduction techniques were used. FINDINGS: INTRACRANIAL CONTENTS: No intracranial hemorrhage, extraaxial collection, or mass effect.  No CT evidence of acute infarct. Chronic left basal ganglia/corona radiata infarction. Moderate presumed chronic small vessel ischemic change. Moderate generalized  volume loss. No hydrocephalus. The sella is unremarkable for technique. The cerebellar tonsils are appropriately positioned. VISUALIZED ORBITS/SINUSES/MASTOIDS: No intraorbital abnormality. Mild to moderate mucosal thickening scattered about the paranasal sinuses. This is most pronounced in the left maxillary sinus. No middle ear or mastoid effusion. BONES/SOFT TISSUES: No scalp hematoma. No skull fracture.     IMPRESSION: 1.  No evidence of acute intracranial hemorrhage. 2.  No evidence of acute calvarial fracture. 3.  Chronic left basal ganglia/white matter infarction. 4.  Moderate presumed chronic small vessel ischemic change. 5.  Moderate atrophy.          Electronically signed by    Saniya Andrade MD

## 2022-08-09 NOTE — PROGRESS NOTES
Code Status:  DNR/DNI  Visit Type: Hospital F/U     Facility:   [facility]) [[id number]]        History of Present Illness:   Hospital Admission Date: 2022     Hospital Discharge Date: 2022      Bradley Liz is a 81 year old male with past medical history for Afib, cardiomyopathy, CKD, hx of CVA, HTN, HLD.  He was recently hospitalized for LLE wound infection and cellulitis.  He was found to be + for COVID19.  He was treated with IV antibiotics. On  he had IR angioplasty of LLE.  covid progressed to pneumonia and was started on dexamethasone and O2.  He was transitioned to a prednisone taper and discharge to U.      Today, he is no longer on O2 and reports is breathing is normal.  He does have a bilateral LL expiratory rub.  He also has a treadwell in that is draining clear yellow urine.  He tells me that he does not have a catheter chronically.  He reports he does not like the food and so he is not eating as well as he typically would.      Past Medical History:   Diagnosis Date     Acute kidney injury (H) 2019     Past Surgical History:   Procedure Laterality Date     IR LOWER EXTREMITY ANGIOGRAM LEFT  2022     IR LOWER EXTREMITY ANGIOGRAM RIGHT  2022     LAPAROSCOPIC CHOLECYSTECTOMY N/A 2019    Procedure: Laparoscopic cholecystectomy;  Surgeon: Ad Peck MD;  Location: [facility] OR     PICC DOUBLE LUMEN PLACEMENT  2022          Family History   Problem Relation Age of Onset     Unknown/Adopted Mother      Prostate Cancer Father      C.A.D. Father         age 77     Social History     Socioeconomic History     Marital status:      Spouse name: Not on file     Number of children: Not on file     Years of education: Not on file     Highest education level: Not on file   Occupational History     Not on file   Tobacco Use     Smoking status: Former Smoker     Years: 50.00     Quit date: 2008     Years since quittin.6     Smokeless tobacco: Never  Used   Vaping Use     Vaping Use: Never used   Substance and Sexual Activity     Alcohol use: Yes     Comment: couple beers occ     Drug use: No     Sexual activity: Yes     Partners: Female   Other Topics Concern     Parent/sibling w/ CABG, MI or angioplasty before 65F 55M? No   Social History Narrative     Not on file     Social Determinants of Health     Financial Resource Strain: Not on file   Food Insecurity: Not on file   Transportation Needs: Not on file   Physical Activity: Not on file   Stress: Not on file   Social Connections: Not on file   Intimate Partner Violence: Not on file   Housing Stability: Not on file       Current Outpatient Medications   Medication Sig Dispense Refill     clopidogrel (PLAVIX) 75 MG tablet Take 1 tablet (75 mg) by mouth daily for 93 days 95 tablet 0     fluticasone-vilanterol (BREO ELLIPTA) 100-25 MCG/INH inhaler Inhale 1 puff into the lungs daily 30 each 0     furosemide (LASIX) 20 MG tablet Take 1 tablet (20 mg) by mouth daily for 30 days 30 tablet 0     ipratropium-albuterol (COMBIVENT RESPIMAT)  MCG/ACT inhaler Inhale 1 puff into the lungs 4 times daily as needed for shortness of breath / dyspnea or wheezing 1 each 0     metoprolol succinate ER (TOPROL XL) 200 MG 24 hr tablet TAKE 1 TABLET BY MOUTH DAILY 90 tablet 0     predniSONE (DELTASONE) 20 MG tablet Take 3 tablets (60 mg) by mouth daily for 3 days, THEN 2 tablets (40 mg) daily for 3 days, THEN 1 tablet (20 mg) daily for 3 days. 18 tablet 0     rivaroxaban ANTICOAGULANT (XARELTO ANTICOAGULANT) 20 MG TABS tablet Take 1 tablet (20 mg) by mouth daily (with dinner) 90 tablet 3     rosuvastatin (CRESTOR) 20 MG tablet Take 1 tablet (20 mg) by mouth every evening 30 tablet 1     tamsulosin (FLOMAX) 0.4 MG capsule Take 1 capsule (0.4 mg) by mouth daily 30 capsule 0     vitamin C (ASCORBIC ACID) 500 MG tablet Take 1 tablet (500 mg) by mouth daily 4 tablet      multivitamin, therapeutic (THERA-VIT) TABS tablet Take 1 tablet  "by mouth daily (Patient not taking: No sig reported)       order for DME Please draw INR as ordered and as needed. Call results to [facility] at (p) [phone] or fax them to (f) [phone] 3 Month 3     vitamin A 3 MG (53662 UNITS) capsule Take 2 capsules (20,000 Units) by mouth daily (Patient not taking: No sig reported) 4 capsule      Allergies   Allergen Reactions     Augmentin Diarrhea     Immunization History   Administered Date(s) Administered     COVID-19,PF,Moderna 11/18/2021, 05/11/2022     Influenza (High Dose) 3 valent vaccine 09/15/2014, 09/08/2015, 09/12/2016, 09/11/2017, 09/24/2019     Influenza (IIV3) PF 11/01/2008, 09/17/2009, 09/20/2010, 08/19/2011, 08/30/2012     Influenza Quad, Recombinant, pf(RIV4) (Flublok) 10/05/2020     Influenza Vaccine, 6+MO IM (QUADRIVALENT W/PRESERVATIVES) 10/01/2017     Influenza, Quad, High Dose, Pf, 65yr+ (Fluzone HD) 10/08/2021     Pneumo Conj 13-V (2010&after) 09/18/2015     Pneumococcal 23 valent 01/06/2008, 09/11/2017     TD (ADULT, 7+) 12/02/2009         Post Discharge Medication Reconciliation Status: discharge medications reconciled, continue medications without change.    Medications list and allergies in the facility chart have been reviewed.  Please see facility EMR for most up to date list.         Review of Systems   Patient denies fever, chills, headache, lightheadedness, dizziness, rhinorrhea, cough, congestion, shortness of breath, chest pain, palpitations, abdominal pain, n/v, diarrhea, constipation, change in sleep pattern, dysuria, frequency, burning or pain with urination.  Other than stated in HPI all other review of systems is negative.         Physical Exam  Vital signs:/65   Pulse 77   Temp 97.5  F (36.4  C)   Resp 18   Ht 1.753 m (5' 9\")   Wt 74.4 kg (164 lb)   SpO2 98%   BMI 24.22 kg/m     GENERAL APPEARANCE: Well developed, well nourished, in no acute distress.  HEENT: normocephalic, atraumatic   sclerae " anicteric, conjunctivae clear and moist, EOM intact  LUNGS: expiratory rubs in BLL,  no adventitious sounds, respiratory effort normal.  CARD: RRR, S1, S2, without murmurs, gallops, rubs  BACK: No kyphosis of the thoracic spine.   ABD: Soft, nondistended and nontender with normal bowel sounds.   MSK: right sided paresis   EXTREMITIES: No cyanosis, clubbing or edema. Erythema of bilateral shins with open area on left leg  NEURO: Alert and oriented x 3. Expressive aphasia,  Face is symmetric.  SKIN: LLE open area shallow with irregular borders and no s/s of infection, 100% granular base.  Surrounding tissue with erythema and hyperpigmentation.   PSYCH: euthymic          Labs:    Last Comprehensive Metabolic Panel:  Sodium   Date Value Ref Range Status   08/02/2022 137 136 - 145 mmol/L Final   07/01/2020 139 133 - 144 mmol/L Final     Potassium   Date Value Ref Range Status   08/08/2022 3.8 3.5 - 5.0 mmol/L Final   07/01/2020 4.1 3.4 - 5.3 mmol/L Final     Chloride   Date Value Ref Range Status   08/02/2022 105 98 - 107 mmol/L Final   07/01/2020 107 94 - 109 mmol/L Final     Carbon Dioxide   Date Value Ref Range Status   07/01/2020 31 20 - 32 mmol/L Final     Carbon Dioxide (CO2)   Date Value Ref Range Status   08/02/2022 27 22 - 31 mmol/L Final     Anion Gap   Date Value Ref Range Status   08/02/2022 5 5 - 18 mmol/L Final   07/01/2020 1 (L) 3 - 14 mmol/L Final     Glucose   Date Value Ref Range Status   08/02/2022 107 70 - 125 mg/dL Final   07/01/2020 73 70 - 99 mg/dL Final     Comment:     Non Fasting     Urea Nitrogen   Date Value Ref Range Status   08/02/2022 17 8 - 28 mg/dL Final   07/01/2020 13 7 - 30 mg/dL Final     Creatinine   Date Value Ref Range Status   08/02/2022 0.79 0.70 - 1.30 mg/dL Final   07/01/2020 1.15 0.66 - 1.25 mg/dL Final     GFR Estimate   Date Value Ref Range Status   08/02/2022 89 >60 mL/min/1.73m2 Final     Comment:     Effective December 21, 2021 eGFRcr in adults is calculated using the  2021 CKD-EPI creatinine equation which includes age and gender (Torin et al., NEJM, DOI: 10.1056/XWGHtf6267290)   07/01/2020 60 (L) >60 mL/min/[1.73_m2] Final     Comment:     Non  GFR Calc  Starting 12/18/2018, serum creatinine based estimated GFR (eGFR) will be   calculated using the Chronic Kidney Disease Epidemiology Collaboration   (CKD-EPI) equation.       Calcium   Date Value Ref Range Status   08/02/2022 8.1 (L) 8.5 - 10.5 mg/dL Final   07/01/2020 8.4 (L) 8.5 - 10.1 mg/dL Final     Lab Results   Component Value Date    WBC 13.3 08/02/2022    WBC 9.5 07/01/2020     Lab Results   Component Value Date    RBC 5.22 08/02/2022    RBC 5.09 07/01/2020     Lab Results   Component Value Date    HGB 15.8 08/02/2022    HGB 16.2 07/01/2020     Lab Results   Component Value Date    HCT 48.9 08/02/2022    HCT 50.9 07/01/2020     Lab Results   Component Value Date    MCV 94 08/02/2022     07/01/2020     Lab Results   Component Value Date    MCH 30.3 08/02/2022    MCH 31.8 07/01/2020     Lab Results   Component Value Date    MCHC 32.3 08/02/2022    MCHC 31.8 07/01/2020     Lab Results   Component Value Date    RDW 14.5 08/02/2022    RDW 15.0 07/01/2020     Lab Results   Component Value Date     08/02/2022     07/01/2020           Assessment/plan:   Cellulitis of left lower extremity  No longer on antibiotics, high risk for further infection.  Has PAD.  Ordered for nursing to schedule vascular follow up in the next 2 weeks.  Continue with nonadherent and gauze dressing changes. Continue on plavix through 11/10    Infection due to 2019 novel coronavirus  Has residual lung rub.  Instructed on IS, order for IS 10x QID, continue on prednisone taper through 8/19.     Chronic systolic heart failure (H)  Compesated, continue with metoprolol and lasix    COPD exacerbation (H)  Continue with Breo and ipratropium inhalers, continue with prednisone taper through 8/19    Atrial fibrillation,  unspecified type (H)  Rate controlled, continue with metoprolol and Xarelto    Stage 3 chronic kidney disease, unspecified whether stage 3a or 3b CKD (H)  Recent GFR 89, monitor and avoid nephrotoxins    History of CVA (cerebrovascular accident)  Right sided paresis and expressive aphasia.  continue with rosuvastatin and xarelto.        Remove catheter and monitor PVRs, if catheter needs to be replaced then urology referral.       Electronically signed by: Thuy Wang NP

## 2022-08-09 NOTE — LETTER
To:             Please give to facility    From:   Maryam Farrar RN, Care Coordinator   [facility]   Maryam Farrar RN, Care Coordinator   [facility]   E-mail [email or web address]   [phone]   Patient Name:  Bradley Liz YOB: 1940   Admit date: 8/8/2022      *Information Needed:  Please contact me when the patient will discharge (or if they will move to long term care)- include the discharge date, disposition, and main diagnosis   - If the patient is discharged with home care services, please provide the name of the agency    Also- Please inform me if a care conference is being held.   [facility]   Maryam Farrar RN, Care Coordinator   [facility]   E-mail [email or web address]   [phone]                             Thank you

## 2022-08-09 NOTE — LETTER
8/9/2022        RE: Bradley Liz  [address] [address]        Code Status:  DNR/DNI  Visit Type: Hospital F/U     Facility:   [facility] ([facility]) [[id number]]        History of Present Illness:   Hospital Admission Date: 7/21/2022     Hospital Discharge Date: 8/8/2022      Bradley Liz is a 81 year old male with past medical history for Afib, cardiomyopathy, CKD, hx of CVA, HTN, HLD.  He was recently hospitalized for LLE wound infection and cellulitis.  He was found to be + for COVID19.  He was treated with IV antibiotics. On 7/22 he had IR angioplasty of LLE.  covid progressed to pneumonia and was started on dexamethasone and O2.  He was transitioned to a prednisone taper and discharge to U.      Today, he is no longer on O2 and reports is breathing is normal.  He does have a bilateral LL expiratory rub.  He also has a treadwell in that is draining clear yellow urine.  He tells me that he does not have a catheter chronically.  He reports he does not like the food and so he is not eating as well as he typically would.      Past Medical History:   Diagnosis Date     Acute kidney injury (H) 8/5/2019     Past Surgical History:   Procedure Laterality Date     IR LOWER EXTREMITY ANGIOGRAM LEFT  7/22/2022     IR LOWER EXTREMITY ANGIOGRAM RIGHT  7/25/2022     LAPAROSCOPIC CHOLECYSTECTOMY N/A 7/9/2019    Procedure: Laparoscopic cholecystectomy;  Surgeon: Ad Peck MD;  Location: [facility] OR     PICC DOUBLE LUMEN PLACEMENT  7/24/2022          Family History   Problem Relation Age of Onset     Unknown/Adopted Mother      Prostate Cancer Father      C.A.D. Father         age 77     Social History     Socioeconomic History     Marital status:      Spouse name: Not on file     Number of children: Not on file     Years of education: Not on file     Highest education level: Not on file   Occupational History     Not on file   Tobacco Use     Smoking status: Former Smoker      Years: 50.00     Quit date: 2008     Years since quittin.6     Smokeless tobacco: Never Used   Vaping Use     Vaping Use: Never used   Substance and Sexual Activity     Alcohol use: Yes     Comment: couple beers occ     Drug use: No     Sexual activity: Yes     Partners: Female   Other Topics Concern     Parent/sibling w/ CABG, MI or angioplasty before 65F 55M? No   Social History Narrative     Not on file     Social Determinants of Health     Financial Resource Strain: Not on file   Food Insecurity: Not on file   Transportation Needs: Not on file   Physical Activity: Not on file   Stress: Not on file   Social Connections: Not on file   Intimate Partner Violence: Not on file   Housing Stability: Not on file       Current Outpatient Medications   Medication Sig Dispense Refill     clopidogrel (PLAVIX) 75 MG tablet Take 1 tablet (75 mg) by mouth daily for 93 days 95 tablet 0     fluticasone-vilanterol (BREO ELLIPTA) 100-25 MCG/INH inhaler Inhale 1 puff into the lungs daily 30 each 0     furosemide (LASIX) 20 MG tablet Take 1 tablet (20 mg) by mouth daily for 30 days 30 tablet 0     ipratropium-albuterol (COMBIVENT RESPIMAT)  MCG/ACT inhaler Inhale 1 puff into the lungs 4 times daily as needed for shortness of breath / dyspnea or wheezing 1 each 0     metoprolol succinate ER (TOPROL XL) 200 MG 24 hr tablet TAKE 1 TABLET BY MOUTH DAILY 90 tablet 0     predniSONE (DELTASONE) 20 MG tablet Take 3 tablets (60 mg) by mouth daily for 3 days, THEN 2 tablets (40 mg) daily for 3 days, THEN 1 tablet (20 mg) daily for 3 days. 18 tablet 0     rivaroxaban ANTICOAGULANT (XARELTO ANTICOAGULANT) 20 MG TABS tablet Take 1 tablet (20 mg) by mouth daily (with dinner) 90 tablet 3     rosuvastatin (CRESTOR) 20 MG tablet Take 1 tablet (20 mg) by mouth every evening 30 tablet 1     tamsulosin (FLOMAX) 0.4 MG capsule Take 1 capsule (0.4 mg) by mouth daily 30 capsule 0     vitamin C (ASCORBIC ACID) 500 MG tablet Take 1 tablet  "(500 mg) by mouth daily 4 tablet      multivitamin, therapeutic (THERA-VIT) TABS tablet Take 1 tablet by mouth daily (Patient not taking: No sig reported)       order for DME Please draw INR as ordered and as needed. Call results to [facility] at (p) [phone] or fax them to (f) [phone] 3 Month 3     vitamin A 3 MG (18773 UNITS) capsule Take 2 capsules (20,000 Units) by mouth daily (Patient not taking: No sig reported) 4 capsule      Allergies   Allergen Reactions     Augmentin Diarrhea     Immunization History   Administered Date(s) Administered     COVID-19,PF,Moderna 11/18/2021, 05/11/2022     Influenza (High Dose) 3 valent vaccine 09/15/2014, 09/08/2015, 09/12/2016, 09/11/2017, 09/24/2019     Influenza (IIV3) PF 11/01/2008, 09/17/2009, 09/20/2010, 08/19/2011, 08/30/2012     Influenza Quad, Recombinant, pf(RIV4) (Flublok) 10/05/2020     Influenza Vaccine, 6+MO IM (QUADRIVALENT W/PRESERVATIVES) 10/01/2017     Influenza, Quad, High Dose, Pf, 65yr+ (Fluzone HD) 10/08/2021     Pneumo Conj 13-V (2010&after) 09/18/2015     Pneumococcal 23 valent 01/06/2008, 09/11/2017     TD (ADULT, 7+) 12/02/2009         Post Discharge Medication Reconciliation Status: discharge medications reconciled, continue medications without change.    Medications list and allergies in the facility chart have been reviewed.  Please see facility EMR for most up to date list.         Review of Systems   Patient denies fever, chills, headache, lightheadedness, dizziness, rhinorrhea, cough, congestion, shortness of breath, chest pain, palpitations, abdominal pain, n/v, diarrhea, constipation, change in sleep pattern, dysuria, frequency, burning or pain with urination.  Other than stated in HPI all other review of systems is negative.         Physical Exam  Vital signs:/65   Pulse 77   Temp 97.5  F (36.4  C)   Resp 18   Ht 1.753 m (5' 9\")   Wt 74.4 kg (164 lb)   SpO2 98%   BMI 24.22 kg/m     GENERAL " APPEARANCE: Well developed, well nourished, in no acute distress.  HEENT: normocephalic, atraumatic   sclerae anicteric, conjunctivae clear and moist, EOM intact  LUNGS: expiratory rubs in BLL,  no adventitious sounds, respiratory effort normal.  CARD: RRR, S1, S2, without murmurs, gallops, rubs  BACK: No kyphosis of the thoracic spine.   ABD: Soft, nondistended and nontender with normal bowel sounds.   MSK: right sided paresis   EXTREMITIES: No cyanosis, clubbing or edema. Erythema of bilateral shins with open area on left leg  NEURO: Alert and oriented x 3. Expressive aphasia,  Face is symmetric.  SKIN: LLE open area shallow with irregular borders and no s/s of infection, 100% granular base.  Surrounding tissue with erythema and hyperpigmentation.   PSYCH: euthymic          Labs:    Last Comprehensive Metabolic Panel:  Sodium   Date Value Ref Range Status   08/02/2022 137 136 - 145 mmol/L Final   07/01/2020 139 133 - 144 mmol/L Final     Potassium   Date Value Ref Range Status   08/08/2022 3.8 3.5 - 5.0 mmol/L Final   07/01/2020 4.1 3.4 - 5.3 mmol/L Final     Chloride   Date Value Ref Range Status   08/02/2022 105 98 - 107 mmol/L Final   07/01/2020 107 94 - 109 mmol/L Final     Carbon Dioxide   Date Value Ref Range Status   07/01/2020 31 20 - 32 mmol/L Final     Carbon Dioxide (CO2)   Date Value Ref Range Status   08/02/2022 27 22 - 31 mmol/L Final     Anion Gap   Date Value Ref Range Status   08/02/2022 5 5 - 18 mmol/L Final   07/01/2020 1 (L) 3 - 14 mmol/L Final     Glucose   Date Value Ref Range Status   08/02/2022 107 70 - 125 mg/dL Final   07/01/2020 73 70 - 99 mg/dL Final     Comment:     Non Fasting     Urea Nitrogen   Date Value Ref Range Status   08/02/2022 17 8 - 28 mg/dL Final   07/01/2020 13 7 - 30 mg/dL Final     Creatinine   Date Value Ref Range Status   08/02/2022 0.79 0.70 - 1.30 mg/dL Final   07/01/2020 1.15 0.66 - 1.25 mg/dL Final     GFR Estimate   Date Value Ref Range Status   08/02/2022 89 >60  mL/min/1.73m2 Final     Comment:     Effective December 21, 2021 eGFRcr in adults is calculated using the 2021 CKD-EPI creatinine equation which includes age and gender (Torin et al., NEJ, DOI: 10.1056/LHGRoh6349653)   07/01/2020 60 (L) >60 mL/min/[1.73_m2] Final     Comment:     Non  GFR Calc  Starting 12/18/2018, serum creatinine based estimated GFR (eGFR) will be   calculated using the Chronic Kidney Disease Epidemiology Collaboration   (CKD-EPI) equation.       Calcium   Date Value Ref Range Status   08/02/2022 8.1 (L) 8.5 - 10.5 mg/dL Final   07/01/2020 8.4 (L) 8.5 - 10.1 mg/dL Final     Lab Results   Component Value Date    WBC 13.3 08/02/2022    WBC 9.5 07/01/2020     Lab Results   Component Value Date    RBC 5.22 08/02/2022    RBC 5.09 07/01/2020     Lab Results   Component Value Date    HGB 15.8 08/02/2022    HGB 16.2 07/01/2020     Lab Results   Component Value Date    HCT 48.9 08/02/2022    HCT 50.9 07/01/2020     Lab Results   Component Value Date    MCV 94 08/02/2022     07/01/2020     Lab Results   Component Value Date    MCH 30.3 08/02/2022    MCH 31.8 07/01/2020     Lab Results   Component Value Date    MCHC 32.3 08/02/2022    MCHC 31.8 07/01/2020     Lab Results   Component Value Date    RDW 14.5 08/02/2022    RDW 15.0 07/01/2020     Lab Results   Component Value Date     08/02/2022     07/01/2020           Assessment/plan:   Cellulitis of left lower extremity  No longer on antibiotics, high risk for further infection.  Has PAD.  Ordered for nursing to schedule vascular follow up in the next 2 weeks.  Continue with nonadherent and gauze dressing changes. Continue on plavix through 11/10    Infection due to 2019 novel coronavirus  Has residual lung rub.  Instructed on IS, order for IS 10x QID, continue on prednisone taper through 8/19.     Chronic systolic heart failure (H)  Compesated, continue with metoprolol and lasix    COPD exacerbation (H)  Continue with  Breo and ipratropium inhalers, continue with prednisone taper through 8/19    Atrial fibrillation, unspecified type (H)  Rate controlled, continue with metoprolol and Xarelto    Stage 3 chronic kidney disease, unspecified whether stage 3a or 3b CKD (H)  Recent GFR 89, monitor and avoid nephrotoxins    History of CVA (cerebrovascular accident)  Right sided paresis and expressive aphasia.  continue with rosuvastatin and xarelto.        Remove catheter and monitor PVRs, if catheter needs to be replaced then urology referral.       Electronically signed by: Thuy Wang NP          Sincerely,        Thuy Wang, NP

## 2022-08-10 NOTE — LETTER
8/10/2022        RE: Bradley Liz  [address] [address]        [facility]ER[facility]ATRIC [facility]       Patient Bradley Liz  MRN: [id number]        Reason for Visit     Chief Complaint   Patient presents with     RECHECK     INITIAL       Code Status     DNR / DNI    Assessment     Left lower extremity cellulitis with an open wound.  Peripheral vascular disease s/p angioplasty interventional radiology on 7/22/2022.  Of the left lower extremity.  Acute COVID infection  Acute hypoxic respiratory failure  COPD exacerbation  Urinary retention requiring Allen catheter  A. fib on chronic anticoagulation with Xarelto  Nonischemic cardiomyopathy  Generalized weakness    Plan     Pt is admitted to TCU for strengthening and rehab.  Patient was diagnosed with acute COVID infection in the hospital.  Course complicated by viral pneumonia and hypoxic respiratory failure.  Also felt to have COPD exacerbation.  Continue with prednisone taper  Advair was added to his regimen.  He continues to have some baseline respiratory distress with cough and congestion.  He has finished his treatment of remdesivir in the hospital.  Continue with his nebs as needed.  Encourage incentive spirometry.  For his left lower extremity cellulitis with wound continue with his wound cares.  For  Does not look infected anymore he has finished Augmentin on 7/31/2021.  Due to urinary retention he has an indwelling Allen catheter.  Continue Flomax.  Outpatient follow-up with urology if he fails a voiding trial.  Status post angioplasty for PAD.  Continue Plavix and Xarelto.  He also takes Xarelto for atrial fibrillation heart rate is controlled but blood pressures remain low will monitor trends  At baseline he is quite weak and debilitated and using a walker  Recheck labs  Continue with PT/OT    History     Patient is a very pleasant 81 year old male who is admitted to TCU  Then with left lower extremity wound infection with  cellulitis to the vascular clinic.  He was examined and admitted directly.  He was given IV antibiotics  In addition he was noted to have COVID-19 infection which was treated.  He had viral pneumonia.  He got dexamethasone and remdesivir.  He developed acute hypoxic respiratory failure requiring oxygen felt to be secondary to COPD exacerbation.  He has peripheral vascular disease and underwent angioplasty on 7/22/2022      Past Medical & Surgical History     PAST MEDICAL HISTORY:   Past Medical History:   Diagnosis Date     Acute kidney injury (H) 8/5/2019      PAST SURGICAL HISTORY:   has a past surgical history that includes Laparoscopic cholecystectomy (N/A, 7/9/2019); IR Lower Extremity Angiogram Left (7/22/2022); PICC/Midline Placement (7/24/2022); and IR Lower Extremity Angiogram Right (7/25/2022).      Past Social History     Reviewed,  reports that he quit smoking about 13 years ago. He quit after 50.00 years of use. He has never used smokeless tobacco. He reports current alcohol use. He reports that he does not use drugs.    Family History     Reviewed, and family history includes C.A.D. in his father; Prostate Cancer in his father; Unknown/Adopted in his mother.    Medication List   Post Discharge Medication Reconciliation Status: Post Discharge Medication Reconciliation Status: discharge medications reconciled, continue medications without change.  Current Outpatient Medications   Medication     clopidogrel (PLAVIX) 75 MG tablet     fluticasone-vilanterol (BREO ELLIPTA) 100-25 MCG/INH inhaler     furosemide (LASIX) 20 MG tablet     ipratropium-albuterol (COMBIVENT RESPIMAT)  MCG/ACT inhaler     metoprolol succinate ER (TOPROL XL) 200 MG 24 hr tablet     order for DME     predniSONE (DELTASONE) 20 MG tablet     rivaroxaban ANTICOAGULANT (XARELTO ANTICOAGULANT) 20 MG TABS tablet     rosuvastatin (CRESTOR) 20 MG tablet     tamsulosin (FLOMAX) 0.4 MG capsule     vitamin C (ASCORBIC ACID) 500 MG tablet  "    multivitamin, therapeutic (THERA-VIT) TABS tablet     vitamin A 3 MG (02316 UNITS) capsule     No current facility-administered medications for this visit.          Allergies     Allergies   Allergen Reactions     Augmentin Diarrhea       Review of Systems   A comprehensive review of 14 systems was done. Pertinent findings noted here and in history of present illness. All the rest negative.  Constitutional: Negative.  Negative for fever, chills, he has  activity change, appetite change and fatigue.   HENT: Negative for congestion and facial swelling.    Eyes: Negative for photophobia, redness and visual disturbance.   Respiratory: Negative for cough and chest tightness.  Does have shortness of breath and needs oxygen  Cardiovascular: Negative for chest pain, palpitations and leg swelling.   Gastrointestinal: Negative for nausea, diarrhea, constipation, blood in stool and abdominal distention.   Genitourinary: Negative.    Musculoskeletal: Uses a walker for ambulation  Skin: Has an open wound on his left lower extremity  Neurological: Negative for dizziness, tremors, syncope, weakness, light-headedness and headaches.   Hematological: Does not bruise/bleed easily.   Psychiatric/Behavioral: Negative.  Mood is impaired and patient is not a good historian      Physical Exam   BP 97/64   Pulse 72   Temp 97.7  F (36.5  C)   Resp 18   Ht 1.753 m (5' 9\")   Wt 72.6 kg (160 lb)   SpO2 98%   BMI 23.63 kg/m     On oxygen 2 L  Constitutional: Oriented to person, place, and time and appears well-developed.   HEENT:  Normocephalic and atraumatic.  Eyes: Conjunctivae and EOM are normal. Pupils are equal, round, and reactive to light. No discharge.  No scleral icterus. Nose normal. Mouth/Throat: Oropharynx is clear and moist. No oropharyngeal exudate.    NECK: Normal range of motion. Neck supple. No JVD present. No tracheal deviation present. No thyromegaly present.   CARDIOVASCULAR: Normal rate, regular rhythm and intact " distal pulses.  Exam reveals no gallop and no friction rub.  Systolic murmur present.  PULMONARY: Effort normal and breath sounds normal. No respiratory distress.No Wheezing or rales.  Coarse breath sounds especially on the right lower quadrant  ABDOMEN: Soft. Bowel sounds are normal. No distension and no mass.  There is no tenderness. There is no rebound and no guarding. No HSM.  MUSCULOSKELETAL: Normal range of motion. No edema and no tenderness. Mild kyphosis, no tenderness.  LYMPH NODES: Has no cervical, supraclavicular, axillary and groin adenopathy.   NEUROLOGICAL: Alert and oriented to person, place, and time. No cranial nerve deficit.  Normal muscle tone. Coordination normal.   GENITOURINARY: Deferred exam.  Has an indwelling Allen  SKIN: Skin is warm and dry. No rash noted. No erythema. No pallor.   Left lower extremity has a large open area with a scab noted no secondary concern for infection  EXTREMITIES: No cyanosis, no clubbing, no edema. No Deformity.  PSYCHIATRIC: Normal mood, affect and behavior.  Recall is somewhat impaired      Lab Results     Recent Results (from the past 240 hour(s))   Potassium    Collection Time: 08/01/22  4:37 AM   Result Value Ref Range    Potassium 3.9 3.5 - 5.0 mmol/L   Magnesium    Collection Time: 08/01/22  4:37 AM   Result Value Ref Range    Magnesium 2.0 1.8 - 2.6 mg/dL   Magnesium    Collection Time: 08/02/22  4:31 AM   Result Value Ref Range    Magnesium 2.2 1.8 - 2.6 mg/dL   Basic metabolic panel    Collection Time: 08/02/22  4:31 AM   Result Value Ref Range    Sodium 137 136 - 145 mmol/L    Potassium 4.0 3.5 - 5.0 mmol/L    Chloride 105 98 - 107 mmol/L    Carbon Dioxide (CO2) 27 22 - 31 mmol/L    Anion Gap 5 5 - 18 mmol/L    Urea Nitrogen 17 8 - 28 mg/dL    Creatinine 0.79 0.70 - 1.30 mg/dL    Calcium 8.1 (L) 8.5 - 10.5 mg/dL    Glucose 107 70 - 125 mg/dL    GFR Estimate 89 >60 mL/min/1.73m2   CBC with platelets    Collection Time: 08/02/22  4:31 AM   Result Value  Ref Range    WBC Count 13.3 (H) 4.0 - 11.0 10e3/uL    RBC Count 5.22 4.40 - 5.90 10e6/uL    Hemoglobin 15.8 13.3 - 17.7 g/dL    Hematocrit 48.9 40.0 - 53.0 %    MCV 94 78 - 100 fL    MCH 30.3 26.5 - 33.0 pg    MCHC 32.3 31.5 - 36.5 g/dL    RDW 14.5 10.0 - 15.0 %    Platelet Count 322 150 - 450 10e3/uL   Potassium    Collection Time: 08/03/22  4:41 AM   Result Value Ref Range    Potassium 4.1 3.5 - 5.0 mmol/L   Magnesium    Collection Time: 08/03/22  4:41 AM   Result Value Ref Range    Magnesium 2.2 1.8 - 2.6 mg/dL   Potassium    Collection Time: 08/04/22  4:36 AM   Result Value Ref Range    Potassium 3.9 3.5 - 5.0 mmol/L   Magnesium    Collection Time: 08/04/22  4:36 AM   Result Value Ref Range    Magnesium 2.1 1.8 - 2.6 mg/dL   Potassium    Collection Time: 08/05/22  4:41 AM   Result Value Ref Range    Potassium 3.8 3.5 - 5.0 mmol/L   Magnesium    Collection Time: 08/05/22  4:41 AM   Result Value Ref Range    Magnesium 2.0 1.8 - 2.6 mg/dL   Magnesium    Collection Time: 08/06/22  4:56 AM   Result Value Ref Range    Magnesium 2.3 1.8 - 2.6 mg/dL   Potassium    Collection Time: 08/06/22  4:56 AM   Result Value Ref Range    Potassium 4.0 3.5 - 5.0 mmol/L   Potassium    Collection Time: 08/07/22  4:13 AM   Result Value Ref Range    Potassium 4.0 3.5 - 5.0 mmol/L   Magnesium    Collection Time: 08/07/22  4:13 AM   Result Value Ref Range    Magnesium 1.9 1.8 - 2.6 mg/dL   Potassium    Collection Time: 08/08/22  4:44 AM   Result Value Ref Range    Potassium 3.8 3.5 - 5.0 mmol/L   Magnesium    Collection Time: 08/08/22  4:44 AM   Result Value Ref Range    Magnesium 2.0 1.8 - 2.6 mg/dL             Imaging Results     Chest XR,  PA & LAT    Result Date: 7/21/2022  EXAM: XR CHEST 2 VIEWS LOCATION: [facility] DATE/TIME: 7/21/2022 1:31 PM INDICATION: Cough COMPARISON: Chest x-ray 07/16/2019     IMPRESSION: Negative chest.  The lungs are clear and there are no pleural effusions. Normal heart size.    IR  Lower Extremity Angiogram Left    Result Date: 7/22/2022  LOCATION: [facility] DATE: 7/22/2022 PROCEDURE: ABDOMINAL AORTIC, PELVIC AND BILATERAL LOWER EXTREMITY DIAGNOSTIC ARTERIOGRAPHY, BALLOON ANGIOPLASTY OF THE LEFT SUPERFICIAL FEMORAL ARTERY, BALLOON ANGIOPLASTY OF THE LEFT ANTERIOR TIBIAL AND DORSALIS PEDIS ARTERIES, ULTRASOUND GUIDANCE FOR VASCULAR ACCESS, MODERATE SEDATION INTERVENTIONAL RADIOLOGIST: Vilma Coello MD INDICATION: 81-year-old with nonhealing bilateral lower leg wounds/critical limb ischemia. The patient's noninvasive imaging demonstrates inadequate toe pressures bilaterally to allow for wound healing. Plan for left lower extremity angiography with possible intervention. INDICATION FOR DIAGNOSTIC ANGIOGRAPHY: Diagnostic angiography was required to precisely identify plaque morphology and areas of occlusion to assist in management. CONSENT: The risks, benefits and alternatives of the procedure were discussed with the patient  in detail. All questions were answered. Informed consent was given to proceed with the procedure. MODERATE SEDATION: Versed 2 mg IV; Fentanyl 100 mcg IV. During the time out, immediately prior to the administration of medications, the patient was reassessed for adequacy to receive conscious sedation.  Under physician supervision, Versed and fentanyl were administered for moderate sedation. Pulse oximetry, heart rate and blood pressure were continuously monitored by an independent trained observer. The physician spent 125 minutes of face-to-face sedation time with the patient. CONTRAST: 200 cc Visipaque [id number] ANTIBIOTICS: None. ADDITIONAL MEDICATIONS: Heparin, protamine FLUOROSCOPIC TIME: 45.9 minutes. RADIATION DOSE: Air Kerma: 336 mGy. COMPLICATIONS: No immediate complications. UNIVERSAL PROTOCOL: The operative site was marked and any prior imaging was reviewed. Required items including blood products, implants, devices and special equipment was  made available. Patient identity was confirmed either verbally, with demographic information, hospital assigned identification or other identification markers. A timeout was performed immediately prior to the procedure. STERILE BARRIER TECHNIQUE: Maximum sterile barrier technique was used. Cutaneous antisepsis was performed at the operative site with application of 2% chlorhexidine and large sterile drape. Prior to the procedure, the  and assistant performed hand hygiene and wore hat, mask, sterile gown, and sterile gloves during the entire procedure. PROCEDURE:  Access was achieved into the right common femoral artery utilizing real-time ultrasound guidance and a micropuncture access kit. Imaging demonstrates a patent and compressible artery. Permanent images were stored to the patient's medical record. Conversion was made for a 5 Cymraes vascular sheath, which was attached to a continuous heparinized saline infusion. A flush catheter was manipulated over a wire the mid abdominal aorta and digital subtraction aortic arteriography was performed. The catheter was repositioned over a wire into the left external iliac artery. From this location, digital subtraction left lower extremity arteriography was obtained. Systemic heparinization was performed and monitored with serial activated clotting times. Over exchange is made for a 5 Cymraes x 70 cm vascular sheath which was positioned in the mid left superficial femoral artery. An 018 crossing catheter and guidewire  were advanced through the sheath and selective catheterization of the anterior tibial artery was performed. The guidewire and catheter were advanced to the dorsalis pedis artery. Over-the-wire exchange is made for a 2 mm x 20 cm angioplasty balloon. Angioplasty of the entire anterior tibial artery artery and proximal dorsalis pedis artery was performed. Over-the-wire exchange is made for a 3 mm x 20 cm angioplasty balloon. Angioplasty of the entire  anterior tibial and dorsalis pedis arteries was performed. Post angioplasty angiography demonstrates excellent result with in-line flow to the lower leg and foot. The guidewire and catheter were retracted to the below-knee popliteal artery. Attempts to cross the occluded proximal/mid tibioperoneal trunk were unsuccessful. Over-the-wire exchange is made for an 035 crossing catheter and Glidewire. Attempts to cross the occluded proximal/mid tibioperoneal trunk were unsuccessful. Over the wire exchange is made for a 6 Burmese x 70 cm vascular sheath which was positioned in the distal superficial femoral artery. An 014 guidewire was then advanced through the catheter in the false lumen to the distal tibioperoneal trunk. Over-the-wire exchange is made for a Outback Rentry device. Attempts to gain access into the true lumen of the distal tibioperoneal trunk were unsuccessful. The guidewire and reentry device were removed. An 035 wire and catheter were advanced through the sheath positioned in the left superficial femoral artery to the popliteal artery. Over-the-wire exchange is made for a 5 mm x 8 cm angioplasty balloon. Angioplasty of the mid and distal superficial femoral artery was performed. Post angioplasty angiography demonstrates excellent result with no significant residual stenosis. The catheter and guidewire were removed. The sheath was retracted to the right external iliac artery. Digital subtraction right lower extremity arteriography was performed through the right common femoral arterial sheath. Protamine was administered. The sheath was removed and manual pressure applied until hemostasis. ABDOMEN FINDINGS: Patent and normal caliber abdominal aorta. Patent bilateral renal arteries and inferior mesenteric artery. Patent bilateral common iliac, internal iliac and external iliac arteries. RIGHT LOWER EXTREMITY FINDINGS: Patent common femoral, profunda femoral and superficial femoral arteries. Moderate stenosis  involving the mid/distal superficial femoral artery. Patent above and below knee popliteal arteries. Occluded proximal/mid tibioperoneal trunk. There is occlusion  of all infrapopliteal vessels. Reconstituted peroneal artery to the lower leg. LEFT LOWER EXTREMITY FINDINGS:  Patent common femoral and profunda femoral arteries. Occlusion of the distal superficial femoral and proximal above-knee popliteal arteries. Reconstituted above and below knee popliteal artery. Patent posterior tibial peroneal artery to the lower leg. Occluded anterior tibial and posterior tibial arteries.     IMPRESSION:  1. Left lower extremity angiography demonstrates moderate stenosis of the mid/distal superficial femoral artery. There is occlusion of the tibioperoneal trunk. Occluded anterior tibial and posterior tibial arteries. Reconstituted peroneal artery to the lower leg. 2. Successful crossing and angioplasty of the occluded anterior tibial artery to the dorsalis pedis artery. Unsuccessful crossing of the occluded tibioperoneal trunk. Successful balloon angioplasty of stenosis in the mid/distal superficial femoral artery. Post intervention angiography demonstrates a one vessel runoff to the foot via the anterior tibial artery. PLAN: Four hours of bedrest post sheath removal. It is hopeful in-line flow to the left lower leg will allow for wound healing. We will plan for right lower extremity angiography on 7/25/2022.    IR Lower Extremity Angiogram Right    Result Date: 7/25/2022  LOCATION: [facility] DATE: 7/25/2022 PROCEDURE: RIGHT LOWER EXTREMITY DIAGNOSTIC ANGIOGRAM, ANGIOPLASTY AND DRUG-ELUTING STENT PLACEMENT IN THE DISTAL RIGHT SUPERFICIAL FEMORAL/ABOVE-KNEE POPLITEAL ARTERIES, SELECTIVE CATHETERIZATION/ANGIOGRAPHY/ANGIOPLASTY OF THE RIGHT ANTERIOR TIBIAL AND DORSALIS PEDIS ARTERIES, ULTRASOUND GUIDANCE FOR VASCULAR ACCESS, UTILIZATION OF AN ARTERIAL CLOSURE DEVICE. INTERVENTIONAL RADIOLOGIST: Vilma  MD Mode INDICATION: 81-year-old male with nonhealing right lower extremity wound/critical limb ischemia, plan for angiography with possible intervention. INDICATION FOR DIAGNOSTIC ANGIOGRAPHY: Diagnostic angiography was required to process identified plaque morphology to aid in treatment and management CONSENT: The risks, benefits and alternatives of the procedure were discussed with the patient  in detail. All questions were answered. Informed consent was given to proceed with the procedure. MODERATE SEDATION: Versed 2 mg IV and fentanyl 100 mcg IV were administered by the radiology nurse with continuous vital sign monitoring under my direct supervision. During the time out, immediately prior to the administration of medications, the patient  was reassessed for adequacy to receive conscious sedation. Total moderate sedation time was 75 minutes. CONTRAST: 80 cc Visipaque ANTIBIOTICS: None. ADDITIONAL MEDICATIONS: Heparin, protamine FLUOROSCOPIC TIME: 26.8 minutes. RADIATION DOSE: Air Kerma: 125 mGy. COMPLICATIONS: No immediate complications. UNIVERSAL PROTOCOL: The operative site was marked and any prior imaging was reviewed. Required items including blood products, implants, devices and special equipment was made available. Patient identity was confirmed either verbally, with demographic information, hospital assigned identification or other identification markers. A timeout was performed immediately prior to the procedure. STERILE BARRIER TECHNIQUE: Maximum sterile barrier technique was used. Cutaneous antisepsis was performed at the operative site with application of 2% chlorhexidine and large sterile drape. Prior to the procedure, the  and assistant performed hand hygiene and wore hat, mask, sterile gown, and sterile gloves during the entire procedure. PROCEDURE:  Access was achieved into the left common femoral artery utilizing real-time ultrasound guidance and micropuncture access kit. Imaging  demonstrates a patent and compressible vessel. Permanent images were stored to the patient's medical record. Conversion was made for a 5 Peruvian vascular sheath, which was attached to a continuous heparinized saline infusion. A flush catheter was manipulated over a wire into the upper abdomen. The catheter was then used to selectively catheterize the right common iliac and external iliac arteries. The flush catheter was positioned in the right common iliac artery. From this location, digital subtraction right lower extremity arteriography was obtained. Imaging demonstrates patent right common femoral and profunda femoral arteries. Moderate stenosis of the proximal/mid superficial femoral artery. There is occlusion of the distal superficial femoral and above-knee popliteal arteries. Reconstitution of the above-knee popliteal artery above the patella. Occlusion of the mid anterior tibial artery. Chronic occlusion occlusion of the entire posterior tibial artery. There is a one vessel runoff to the foot via the peroneal artery. Over the wire exchange is made for a 6 Peruvian vascular sheath which was positioned in the right common femoral artery. Over the wire exchange is made for a .035 crossing catheter and Mcclure guidewire. The catheter was used to selectively catheterize the superficial femoral artery. The occluded distal right superficial femoral and above-knee popliteal arteries was crossed with the crossing catheter and Mcclure guidewire. The catheter was advanced to the popliteal artery at the knee joint and a small volume  of contrast was injected, confirming intraluminal positioning. The wire exchange is made for a 5 mm x 10 cm angioplasty balloon. Angioplasty of the superficial femoral and above-knee popliteal arteries was performed. Over the wire exchange is made for a  6 mm x 12 cm Marlen drug-eluting stent. The stent was deployed in the distal superficial femoral/above-knee popliteal arteries. Post deployment  angioplasty was performed utilizing a 6 mm balloon. A 6 mm x 25 cm InPact balloon was then advanced over the guidewire and positioned in the proximal/mid superficial femoral artery. Drug-coated balloon angioplasty was performed with inflation time of 3 minutes. Post angioplasty angiography demonstrates excellent result with no significant residual stenosis. Over-the-wire exchange is made for an .018 crossing catheter and selective catheterization of the proximal anterior tibial artery was performed. Angiography was performed, demonstrating occlusion of the mid/distal anterior tibial artery. The occluded anterior tibial artery was crossed utilizing a crossing catheter and a Nitrex guidewire. The guidewire and catheter were advanced to the dorsalis pedis artery. Over-the-wire exchange is made for a 3 mm angioplasty balloon. Angioplasty of the anterior tibial and dorsalis pedis arteries was performed with the 3 mm balloon. Post angioplasty angiography now demonstrates patent vessel runoff to the foot via the anterior tibial and peroneal arteries. The dorsalis pedis artery remains occluded. The catheter and guidewire were removed. The sheath was removed and manual pressure applied until hemostasis.     IMPRESSION:  1. Right lower extremity angiography demonstrates occlusion of the distal superficial femoral and above-knee popliteal arteries. There is a one vessel runoff to the foot via the peroneal artery. Occluded posterior tibial and anterior tibial arteries. 2. Successful crossing and recanalization of the occluded distal superficial femoral/above-knee popliteal arteries utilizing a drug-eluting stent. Successful crossing and angioplasty of the occluded anterior tibial artery. Post intervention angiography demonstrates excellent result with in-line flow to the knee with two-vessel runoff via the anterior tibial and peroneal arteries. PLAN: Four hours of bedrest post sheath removal. Start Plavix 75 mg once daily  status post drug-eluting stent placement x 3 months. Okay to resume anticoagulation tonight. Plan for a clinic follow-up in 2-3 weeks to evaluate response.     US AIDAN Doppler No Excersie Portable    Result Date: 7/22/2022  LOCATION: [facility] DATE: 7/22/2022 EXAM: RESTING ANKLE-BRACHIAL INDICES (ABIs) INDICATION: Nonhealing bilateral lower extremity wound, decreased lower extremity pulses, status post recanalization of the left anterior tibial artery COMPARISON: Prior study dated 07/08/2022 AIDAN FINDINGS: Absent digit waveforms bilaterally.     IMPRESSION: 1. LEFT LOWER EXTREMITY: Ankle brachial index of 0.69 reflecting moderate peripheral arterial disease. Unable to obtain toe pressures or waveforms bilaterally.    XR Chest Port 1 View    Result Date: 8/7/2022  EXAM: XR CHEST PORT 1 VIEW LOCATION: [facility] DATE/TIME: 8/7/2022 10:58 AM INDICATION: Shortness of breath. COMPARISON: 07/30/2022.     IMPRESSION: Negative chest. Lungs clear.    XR Chest Port 1 View    Result Date: 7/30/2022  EXAM: XR CHEST PORT 1 VIEW LOCATION: [facility] DATE/TIME: 7/30/2022 8:45 AM INDICATION: Increased oxygen requirements. COMPARISON: Chest radiograph 07/24/2022.     IMPRESSION: No acute cardiopulmonary disease. Nonenlarged cardiac silhouette. Prior right PICC has been removed.    XR Chest Port 1 View    Result Date: 7/24/2022  EXAM: XR CHEST PORTABLE 1 VIEW LOCATION: [facility] DATE/TIME: 07/24/2022, 2:09 PM INDICATION: RN placed PICC. Verify tip placement. COMPARISON: 07/21/2022.     IMPRESSION: Right PICC has been placed and extends to the mid SVC and directed posteriorly into the azygos vein on the first radiograph but on the second radiograph is in excellent position in the low SVC. Increased opacity posterior to the heart has developed and could indicate some degree of consolidation and/or volume loss in the posteromedial  basilar segments left lower lobe. Chest otherwise negative.     Head CT w/o contrast    Result Date: 7/21/2022  EXAM: CT HEAD W/O CONTRAST LOCATION: [facility] DATE/TIME: 7/21/2022 1:31 PM INDICATION: Fall, trauma, pain. COMPARISON: None available. TECHNIQUE: Routine CT Head without IV contrast. Multiplanar reformats. Dose reduction techniques were used. FINDINGS: INTRACRANIAL CONTENTS: No intracranial hemorrhage, extraaxial collection, or mass effect.  No CT evidence of acute infarct. Chronic left basal ganglia/corona radiata infarction. Moderate presumed chronic small vessel ischemic change. Moderate generalized  volume loss. No hydrocephalus. The sella is unremarkable for technique. The cerebellar tonsils are appropriately positioned. VISUALIZED ORBITS/SINUSES/MASTOIDS: No intraorbital abnormality. Mild to moderate mucosal thickening scattered about the paranasal sinuses. This is most pronounced in the left maxillary sinus. No middle ear or mastoid effusion. BONES/SOFT TISSUES: No scalp hematoma. No skull fracture.     IMPRESSION: 1.  No evidence of acute intracranial hemorrhage. 2.  No evidence of acute calvarial fracture. 3.  Chronic left basal ganglia/white matter infarction. 4.  Moderate presumed chronic small vessel ischemic change. 5.  Moderate atrophy.          Electronically signed by    Saniya Andrade MD                             Sincerely,        JONNA Estrella

## 2022-08-16 NOTE — PATIENT INSTRUCTIONS
Jay Huerta,    Thank you for entrusting your care with us today. After your visit today with Dr. Coello this is the plan that was discussed at your appointment.    You will follow-up with Lakisha on 8/19 don't change the dressing until seen- wound care orders followed from 7/19/22        I am including additional information on these things and our contact information if you have any questions or concerns.   Please do not hesitate to reach out to us if you felt we did not answer your questions or you are unsure of the treatment plan after your visit today. Our number is [phone].Thank you for trusting us with your care.         Again thank you for your time.     Christine Chopra RN

## 2022-08-16 NOTE — PROGRESS NOTES
[facility]        Patient is here for a 3 week follow up  to discuss s/p RLE angio 7/25.  Patient currently at TCU at [facility] [place].  Patient states facility physician is managing wound.    Pt is currently taking Plavix and Xarelto.    BP 98/68   Pulse 72   Temp 97.4  F (36.3  C)   Resp 16     The provider has been notified that the patient has no concerns.     Questions patient would like addressed today are: N/A.    Refills are needed: N/A    Has homecare services and agency name:  [facility], [facility]CU at [facility] [place]       Komal Prado RN

## 2022-08-16 NOTE — PROGRESS NOTES
VASCULAR AND INTERVENTIONAL OUTPATIENT CONSULT OR VISIT  PHYSICIAN: Vilma Coello MD  ESTABLISHED PATIENT    LOCATION: [facility]    Bradley Liz   Medical Record #:  [id number]  YOB: 1940  Age:  81 year old     Date of Service: 8/16/2022    PRIMARY CARE PROVIDER: Verito Lima    Reason for visit: Follow-up peripheral arterial disease/bilateral lower extremity angiography    IMPRESSION: 81-year-old male with nonhealing bilateral lower extremity wounds, left greater than right status post bilateral lower extremity revascularization on 7/22/2022 and 7/25/2022.  The patient has subsequently undergone post intervention surveillance imaging.  His ankle-brachial indexes 0.9 on the right and 0.4 on the left.  His right toe pressure is noted to be 24 mmHg and his left toe pressure is noted to be 0 mmHg.  Overall, these toe pressures are concerning for impaired wound healing potential especially on the left.    RECOMMENDATION:    1.  Continue antiplatelet therapy with Plavix 75 mg once daily  2.  Continue statin therapy with Crestor 20 mg once daily  3.  Continue offloading/periodic debridement to encourage wound healing.  The patient will be seen in the wound clinic on 8/19/2022.    I had a long discussion with the patient regarding further revascularization options given both his toe pressures suggest inadequate wound healing potential especially on the left.  He has an occlusion of his left tibial peroneal trunk which limits flow below the knee.  This was unable to be crossed from an endovascular approach.  A potential surgical option would be an endarterectomy with bovine patch versus a short jump graft.  The patient reports he does not wish to undergo any further surgical interventions.  I made clear to the patient that this may result in a below-knee amputation.  The patient reports he understands this risk and wishes to continue conservative management especially since he is  largely wheelchair-bound at this point.    HPI:  Bradley Liz is a 81 year old male who was evaluated today for bilateral lower extremity wounds.  The patient was initially seen last month was he was admitted at [facility] and presented with bilateral lower leg wounds.  The patient underwent bilateral lower extremity revascularization while admitted as an inpatient.  Unfortunately, his left tibial peroneal trunk is occluded as was unable to be crossed from an endovascular approach.  The patient reports some improvement in his lower extremity wounds since undergoing revascularization.  He denies any new wounds or ulcerations.  He reports some improvement in his right lower leg as well.    PHH:    Past Medical History:   Diagnosis Date     Acute kidney injury (H) 8/5/2019        Past Surgical History:   Procedure Laterality Date     IR LOWER EXTREMITY ANGIOGRAM LEFT  7/22/2022     IR LOWER EXTREMITY ANGIOGRAM RIGHT  7/25/2022     LAPAROSCOPIC CHOLECYSTECTOMY N/A 7/9/2019    Procedure: Laparoscopic cholecystectomy;  Surgeon: Ad Peck MD;  Location: [facility] OR     PICC DOUBLE LUMEN PLACEMENT  7/24/2022            ALLERGIES:  Augmentin    MEDS:    Current Outpatient Medications:      clopidogrel (PLAVIX) 75 MG tablet, Take 1 tablet (75 mg) by mouth daily for 93 days, Disp: 95 tablet, Rfl: 0     fluticasone-vilanterol (BREO ELLIPTA) 100-25 MCG/INH inhaler, Inhale 1 puff into the lungs daily, Disp: 30 each, Rfl: 0     furosemide (LASIX) 20 MG tablet, Take 1 tablet (20 mg) by mouth daily for 30 days, Disp: 30 tablet, Rfl: 0     ipratropium-albuterol (COMBIVENT RESPIMAT)  MCG/ACT inhaler, Inhale 1 puff into the lungs 4 times daily as needed for shortness of breath / dyspnea or wheezing, Disp: 1 each, Rfl: 0     metoprolol succinate ER (TOPROL XL) 200 MG 24 hr tablet, TAKE 1 TABLET BY MOUTH DAILY, Disp: 90 tablet, Rfl: 0     order for DME, Please draw INR as ordered and as needed. Call results to  [facility] at (p) [phone] or fax them to (f) [phone], Disp: 3 Month, Rfl: 3     predniSONE (DELTASONE) 20 MG tablet, Take 3 tablets (60 mg) by mouth daily for 3 days, THEN 2 tablets (40 mg) daily for 3 days, THEN 1 tablet (20 mg) daily for 3 days., Disp: 18 tablet, Rfl: 0     rivaroxaban ANTICOAGULANT (XARELTO ANTICOAGULANT) 20 MG TABS tablet, Take 1 tablet (20 mg) by mouth daily (with dinner), Disp: 90 tablet, Rfl: 3     rosuvastatin (CRESTOR) 20 MG tablet, Take 1 tablet (20 mg) by mouth every evening, Disp: 30 tablet, Rfl: 1     tamsulosin (FLOMAX) 0.4 MG capsule, Take 1 capsule (0.4 mg) by mouth daily, Disp: 30 capsule, Rfl: 0     vitamin C (ASCORBIC ACID) 500 MG tablet, Take 1 tablet (500 mg) by mouth daily, Disp: 4 tablet, Rfl:     SOCIAL HABITS:    History   Smoking Status     Former Smoker     Years: 50.00     Quit date: 12/5/2008   Smokeless Tobacco     Never Used     Social History    Substance and Sexual Activity      Alcohol use: Yes        Comment: couple beers occ      History   Drug Use No       FAMILY HISTORY:    Family History   Problem Relation Age of Onset     Unknown/Adopted Mother      Prostate Cancer Father      C.A.D. Father         age 77       ADVANCE CARE DIRECTIVES:    Advance care directives reviewed in the chart and no changes made.     PE:  BP 98/68   Pulse 72   Temp 97.4  F (36.3  C)   Resp 16   Wt Readings from Last 1 Encounters:   08/10/22 72.6 kg (160 lb)     There is no height or weight on file to calculate BMI.    EXAM:  GENERAL: This is a well-developed 81 year old male who appears his stated age  EYES: Grossly normal.  MOUTH: Buccal mucosa normal   MUSCULOSKELETAL: Grossly normal and both lower extremities are intact.  HEME/LYMPH: No lymphedema  NEUROLOGIC: Focally intact, Alert and oriented x 3.   PSYCH: appropriate affect  INTEGUMENT: Bilateral lower leg wounds, left greater than right.  There is some scabbing and old tissue overlying the left  wound.      DIAGNOSTIC STUDIES:     Images:  Chest XR,  PA & LAT    Result Date: 7/21/2022  EXAM: XR CHEST 2 VIEWS LOCATION: [facility] DATE/TIME: 7/21/2022 1:31 PM INDICATION: Cough COMPARISON: Chest x-ray 07/16/2019     IMPRESSION: Negative chest.  The lungs are clear and there are no pleural effusions. Normal heart size.    US Low Ext Arterial Dop Seg Pres w/o Exercise    Result Date: 8/15/2022  BILATERAL RESTING ANKLE-BRACHIAL INDICES (AIDAN'S) (Date: 08/15/22) Indication: Status post angioplasty of occluded right distal superficial femoral/above-knee popliteal arteries utilizing a drug-eluting stent. Angioplasty of right anterior tibial artery. Left angioplasty of the occluded anterior tibial artery to the dorsalis pedis artery. Successful balloon angioplasty of stenosis in the mid/distal left superficial femoral artery.  Previous: 07/08/22 Angioplasty Date: 07/21/22-Bilateral Angioplasty History: Previous Smoker, PAD, Angioplasty and Vascular Ulcers Resting AIDAN's          Right: mmHg Index     Brachial: 97  Ankle-(PT): 91 0.90 Ankle-(DP): 24 0.24          Digit: 24 0.24               Left: mmHg Index     Brachial: 101  Ankle-(PT): >254 NC Ankle-(DP): 40 0.40          Digit: 0 0.00 Resting ankle-brachial index of 0.90 on the right. Toe Pressures of 24 mmHg and TBI of 0.24  Resting ankle-brachial index of NC on the left. Toe Pressures of 0 mmHg and TBI of 0.00  VPR WAVEFORMS: The right volume plethysmography waveforms are moderately abnormal at the lower thigh level, moderately abnormal at the upper calf level and moderately abnormal at the ankle. The left volume plethysmography waveforms are moderately abnormal at the lower thigh level, moderately abnormal at the upper calf level and occluded at the ankle. Impression:  1. RIGHT LOWER EXTREMITY: AIDAN is Normal with an AIDAN of 0.90. and Abnormal toe pressures that are concerning for wound healing of 24 mmHg. 2. LEFT LOWER EXTREMITY: AIDAN is  Abnormal with an AIDAN of 0.40. and Abnormal toe pressures that are concerning for wound healing of 0 mmHg. Reference: Wound classification Grade AIDAN Ankle Systolic Pressure Toe Pressures 0 > 0.80 > 100 mmHg > 60 mmHg 1 0.6 - 0.79 70 - 100 mmHg 40 - 59 mmHg 2 0.4 - 0.59 50-70 mmHg 30 - 39 mmHg 3 < 0.39 < 50 mmHg < 30 mmHg Digit Pressures DBI Disease Category > 0.70 Normal < 0.70 Abnormal > 30 mmHg Potential wound healing < 30 mmHg Impaired wound healing Ankle Brachial Pressures AIDAN Disease Category > 1.3  Likely vessel calcification with monophasic waveforms, non-diagnostic 0.95-1.30 Normal with multiphasic waveforms 0.50-0.95 Single level disease 0.30-0.50 Multilevel disease < 0.30 Critical limb ischema Volume Plethysmography Recording (VPR) at all levels Normal Sharp systolic peak, fast upstroke, prominent dicrotic notch in wave Mild Sharp systolic peak, fast upstroke, absent dicrotic notch in wave Moderate Flattened systolic peak, slowed upstroke, absent dicrotic notch inwave Severe amplitude wave with = upslope and down slope Occluded Flat Line     IR Lower Extremity Angiogram Left    Result Date: 7/22/2022  LOCATION: [facility] DATE: 7/22/2022 PROCEDURE: ABDOMINAL AORTIC, PELVIC AND BILATERAL LOWER EXTREMITY DIAGNOSTIC ARTERIOGRAPHY, BALLOON ANGIOPLASTY OF THE LEFT SUPERFICIAL FEMORAL ARTERY, BALLOON ANGIOPLASTY OF THE LEFT ANTERIOR TIBIAL AND DORSALIS PEDIS ARTERIES, ULTRASOUND GUIDANCE FOR VASCULAR ACCESS, MODERATE SEDATION INTERVENTIONAL RADIOLOGIST: Vilma Coello MD INDICATION: 81-year-old with nonhealing bilateral lower leg wounds/critical limb ischemia. The patient's noninvasive imaging demonstrates inadequate toe pressures bilaterally to allow for wound healing. Plan for left lower extremity angiography with possible intervention. INDICATION FOR DIAGNOSTIC ANGIOGRAPHY: Diagnostic angiography was required to precisely identify plaque morphology and areas of occlusion to assist in  management. CONSENT: The risks, benefits and alternatives of the procedure were discussed with the patient  in detail. All questions were answered. Informed consent was given to proceed with the procedure. MODERATE SEDATION: Versed 2 mg IV; Fentanyl 100 mcg IV. During the time out, immediately prior to the administration of medications, the patient was reassessed for adequacy to receive conscious sedation.  Under physician supervision, Versed and fentanyl were administered for moderate sedation. Pulse oximetry, heart rate and blood pressure were continuously monitored by an independent trained observer. The physician spent 125 minutes of face-to-face sedation time with the patient. CONTRAST: 200 cc Visipaque [id number] ANTIBIOTICS: None. ADDITIONAL MEDICATIONS: Heparin, protamine FLUOROSCOPIC TIME: 45.9 minutes. RADIATION DOSE: Air Kerma: 336 mGy. COMPLICATIONS: No immediate complications. UNIVERSAL PROTOCOL: The operative site was marked and any prior imaging was reviewed. Required items including blood products, implants, devices and special equipment was made available. Patient identity was confirmed either verbally, with demographic information, hospital assigned identification or other identification markers. A timeout was performed immediately prior to the procedure. STERILE BARRIER TECHNIQUE: Maximum sterile barrier technique was used. Cutaneous antisepsis was performed at the operative site with application of 2% chlorhexidine and large sterile drape. Prior to the procedure, the  and assistant performed hand hygiene and wore hat, mask, sterile gown, and sterile gloves during the entire procedure. PROCEDURE:  Access was achieved into the right common femoral artery utilizing real-time ultrasound guidance and a micropuncture access kit. Imaging demonstrates a patent and compressible artery. Permanent images were stored to the patient's medical record. Conversion was made for a 5 [place] vascular sheath, which  was attached to a continuous heparinized saline infusion. A flush catheter was manipulated over a wire the mid abdominal aorta and digital subtraction aortic arteriography was performed. The catheter was repositioned over a wire into the left external iliac artery. From this location, digital subtraction left lower extremity arteriography was obtained. Systemic heparinization was performed and monitored with serial activated clotting times. Over exchange is made for a 5 Malian x 70 cm vascular sheath which was positioned in the mid left superficial femoral artery. An 018 crossing catheter and guidewire  were advanced through the sheath and selective catheterization of the anterior tibial artery was performed. The guidewire and catheter were advanced to the dorsalis pedis artery. Over-the-wire exchange is made for a 2 mm x 20 cm angioplasty balloon. Angioplasty of the entire anterior tibial artery artery and proximal dorsalis pedis artery was performed. Over-the-wire exchange is made for a 3 mm x 20 cm angioplasty balloon. Angioplasty of the entire anterior tibial and dorsalis pedis arteries was performed. Post angioplasty angiography demonstrates excellent result with in-line flow to the lower leg and foot. The guidewire and catheter were retracted to the below-knee popliteal artery. Attempts to cross the occluded proximal/mid tibioperoneal trunk were unsuccessful. Over-the-wire exchange is made for an 035 crossing catheter and Glidewire. Attempts to cross the occluded proximal/mid tibioperoneal trunk were unsuccessful. Over the wire exchange is made for a 6 Malian x 70 cm vascular sheath which was positioned in the distal superficial femoral artery. An 014 guidewire was then advanced through the catheter in the false lumen to the distal tibioperoneal trunk. Over-the-wire exchange is made for a Outback Rentry device. Attempts to gain access into the true lumen of the distal tibioperoneal trunk were unsuccessful. The  guidewire and reentry device were removed. An 035 wire and catheter were advanced through the sheath positioned in the left superficial femoral artery to the popliteal artery. Over-the-wire exchange is made for a 5 mm x 8 cm angioplasty balloon. Angioplasty of the mid and distal superficial femoral artery was performed. Post angioplasty angiography demonstrates excellent result with no significant residual stenosis. The catheter and guidewire were removed. The sheath was retracted to the right external iliac artery. Digital subtraction right lower extremity arteriography was performed through the right common femoral arterial sheath. Protamine was administered. The sheath was removed and manual pressure applied until hemostasis. ABDOMEN FINDINGS: Patent and normal caliber abdominal aorta. Patent bilateral renal arteries and inferior mesenteric artery. Patent bilateral common iliac, internal iliac and external iliac arteries. RIGHT LOWER EXTREMITY FINDINGS: Patent common femoral, profunda femoral and superficial femoral arteries. Moderate stenosis involving the mid/distal superficial femoral artery. Patent above and below knee popliteal arteries. Occluded proximal/mid tibioperoneal trunk. There is occlusion  of all infrapopliteal vessels. Reconstituted peroneal artery to the lower leg. LEFT LOWER EXTREMITY FINDINGS:  Patent common femoral and profunda femoral arteries. Occlusion of the distal superficial femoral and proximal above-knee popliteal arteries. Reconstituted above and below knee popliteal artery. Patent posterior tibial peroneal artery to the lower leg. Occluded anterior tibial and posterior tibial arteries.     IMPRESSION:  1. Left lower extremity angiography demonstrates moderate stenosis of the mid/distal superficial femoral artery. There is occlusion of the tibioperoneal trunk. Occluded anterior tibial and posterior tibial arteries. Reconstituted peroneal artery to the lower leg. 2. Successful crossing  and angioplasty of the occluded anterior tibial artery to the dorsalis pedis artery. Unsuccessful crossing of the occluded tibioperoneal trunk. Successful balloon angioplasty of stenosis in the mid/distal superficial femoral artery. Post intervention angiography demonstrates a one vessel runoff to the foot via the anterior tibial artery. PLAN: Four hours of bedrest post sheath removal. It is hopeful in-line flow to the left lower leg will allow for wound healing. We will plan for right lower extremity angiography on 7/25/2022.    IR Lower Extremity Angiogram Right    Result Date: 7/25/2022  LOCATION: [facility] DATE: 7/25/2022 PROCEDURE: RIGHT LOWER EXTREMITY DIAGNOSTIC ANGIOGRAM, ANGIOPLASTY AND DRUG-ELUTING STENT PLACEMENT IN THE DISTAL RIGHT SUPERFICIAL FEMORAL/ABOVE-KNEE POPLITEAL ARTERIES, SELECTIVE CATHETERIZATION/ANGIOGRAPHY/ANGIOPLASTY OF THE RIGHT ANTERIOR TIBIAL AND DORSALIS PEDIS ARTERIES, ULTRASOUND GUIDANCE FOR VASCULAR ACCESS, UTILIZATION OF AN ARTERIAL CLOSURE DEVICE. INTERVENTIONAL RADIOLOGIST: Vilma Coello MD INDICATION: 81-year-old male with nonhealing right lower extremity wound/critical limb ischemia, plan for angiography with possible intervention. INDICATION FOR DIAGNOSTIC ANGIOGRAPHY: Diagnostic angiography was required to process identified plaque morphology to aid in treatment and management CONSENT: The risks, benefits and alternatives of the procedure were discussed with the patient  in detail. All questions were answered. Informed consent was given to proceed with the procedure. MODERATE SEDATION: Versed 2 mg IV and fentanyl 100 mcg IV were administered by the radiology nurse with continuous vital sign monitoring under my direct supervision. During the time out, immediately prior to the administration of medications, the patient  was reassessed for adequacy to receive conscious sedation. Total moderate sedation time was 75 minutes. CONTRAST: 80 cc Visipaque  ANTIBIOTICS: None. ADDITIONAL MEDICATIONS: Heparin, protamine FLUOROSCOPIC TIME: 26.8 minutes. RADIATION DOSE: Air Kerma: 125 mGy. COMPLICATIONS: No immediate complications. UNIVERSAL PROTOCOL: The operative site was marked and any prior imaging was reviewed. Required items including blood products, implants, devices and special equipment was made available. Patient identity was confirmed either verbally, with demographic information, hospital assigned identification or other identification markers. A timeout was performed immediately prior to the procedure. STERILE BARRIER TECHNIQUE: Maximum sterile barrier technique was used. Cutaneous antisepsis was performed at the operative site with application of 2% chlorhexidine and large sterile drape. Prior to the procedure, the  and assistant performed hand hygiene and wore hat, mask, sterile gown, and sterile gloves during the entire procedure. PROCEDURE:  Access was achieved into the left common femoral artery utilizing real-time ultrasound guidance and micropuncture access kit. Imaging demonstrates a patent and compressible vessel. Permanent images were stored to the patient's medical record. Conversion was made for a 5 [place]an vascular sheath, which was attached to a continuous heparinized saline infusion. A flush catheter was manipulated over a wire into the upper abdomen. The catheter was then used to selectively catheterize the right common iliac and external iliac arteries. The flush catheter was positioned in the right common iliac artery. From this location, digital subtraction right lower extremity arteriography was obtained. Imaging demonstrates patent right common femoral and profunda femoral arteries. Moderate stenosis of the proximal/mid superficial femoral artery. There is occlusion of the distal superficial femoral and above-knee popliteal arteries. Reconstitution of the above-knee popliteal artery above the patella. Occlusion of the mid anterior  tibial artery. Chronic occlusion occlusion of the entire posterior tibial artery. There is a one vessel runoff to the foot via the peroneal artery. Over the wire exchange is made for a 6 Ukrainian vascular sheath which was positioned in the right common femoral artery. Over the wire exchange is made for a .035 crossing catheter and [place] guidewire. The catheter was used to selectively catheterize the superficial femoral artery. The occluded distal right superficial femoral and above-knee popliteal arteries was crossed with the crossing catheter and Mcclure guidewire. The catheter was advanced to the popliteal artery at the knee joint and a small volume  of contrast was injected, confirming intraluminal positioning. The wire exchange is made for a 5 mm x 10 cm angioplasty balloon. Angioplasty of the superficial femoral and above-knee popliteal arteries was performed. Over the wire exchange is made for a  6 mm x 12 cm Marlen drug-eluting stent. The stent was deployed in the distal superficial femoral/above-knee popliteal arteries. Post deployment angioplasty was performed utilizing a 6 mm balloon. A 6 mm x 25 cm InPact balloon was then advanced over the guidewire and positioned in the proximal/mid superficial femoral artery. Drug-coated balloon angioplasty was performed with inflation time of 3 minutes. Post angioplasty angiography demonstrates excellent result with no significant residual stenosis. Over-the-wire exchange is made for an .018 crossing catheter and selective catheterization of the proximal anterior tibial artery was performed. Angiography was performed, demonstrating occlusion of the mid/distal anterior tibial artery. The occluded anterior tibial artery was crossed utilizing a crossing catheter and a Nitrex guidewire. The guidewire and catheter were advanced to the dorsalis pedis artery. Over-the-wire exchange is made for a 3 mm angioplasty balloon. Angioplasty of the anterior tibial and dorsalis pedis  arteries was performed with the 3 mm balloon. Post angioplasty angiography now demonstrates patent vessel runoff to the foot via the anterior tibial and peroneal arteries. The dorsalis pedis artery remains occluded. The catheter and guidewire were removed. The sheath was removed and manual pressure applied until hemostasis.     IMPRESSION:  1. Right lower extremity angiography demonstrates occlusion of the distal superficial femoral and above-knee popliteal arteries. There is a one vessel runoff to the foot via the peroneal artery. Occluded posterior tibial and anterior tibial arteries. 2. Successful crossing and recanalization of the occluded distal superficial femoral/above-knee popliteal arteries utilizing a drug-eluting stent. Successful crossing and angioplasty of the occluded anterior tibial artery. Post intervention angiography demonstrates excellent result with in-line flow to the knee with two-vessel runoff via the anterior tibial and peroneal arteries. PLAN: Four hours of bedrest post sheath removal. Start Plavix 75 mg once daily status post drug-eluting stent placement x 3 months. Okay to resume anticoagulation tonight. Plan for a clinic follow-up in 2-3 weeks to evaluate response.     US Lower Extremity Arterial Duplex Bilateral    Result Date: 8/15/2022  Arterial Duplex Ultrasound (Date: 08/15/22) Lower Extremity Artery Evaluation Indication: Status post angioplasty of occluded right distal superficial femoral/above-knee popliteal arteries utilizing a drug-eluting stent. Angioplasty of right anterior tibial artery. Left angioplasty of the occluded anterior tibial artery to the dorsalis pedis artery. Successful balloon angioplasty of stenosis in the mid/distal left superficial femoral artery.  Previous: 07/08/22 Angioplasty Date: 07/21/22-Bilateral Angioplasty History: Previous Smoker, PAD, Angioplasty and Vascular Ulcers  Technique: Duplex imaging is performed utilizing gray-scale, two-dimensional  images, and color-flow imaging. Doppler waveform analysis and spectral Doppler imaging is also performed.  LOWER EXTREMITY ARTERIAL DUPLEX EXAM WITH WAVEFORMS  Right Leg:(cm/s) Location Velocities Waveforms EIA   212  B CFA   130  B PFA   90  B SFA Proximal   71  B SFA Mid   91  B SFA Distal   91  B Popliteal Artery   71  B PTA   54   M VICTORIA   25  M DPA   33  M Waveforms: T=Triphasic, M=Monophasic, B=Biphasic Left Leg:(cm/s) Location: Velocities Waveforms EIA:   226  B CFA:   147  B PFA:   260  T SFA Proximal:   134  B SFA Mid:   88  B SFA Distal:   81  B Popliteal Artery:   68  B PTA:   13   M VICTORIA:   39  B DPA:   36  M Waveforms: T=Triphasic, M=Monophasic, B=Biphasic Stent Velocities: Stent 1: Left Leg Stent location: SFA DISTAL-POP. ARTERY      Velocity    Waveform Inflow Artery: SFA DISTAL       Proximal to Stent:   91   B Proximal Stent:   55  B Mid Stent:   43  B Distal Stent:   41  B Distal to Stent:   71  B Outflow Artery: POP. A.       Impression: Right Lower Extremity: Biphasic flow in common femoral artery suggesting no inflow disease.  Transition to monophasic flow at the level of tibial arteries suggesting underlying tibial disease but without obvious hemodynamically significant stenosis Left Lower Extremity: Biphasic flow in common femoral artery suggesting no inflow disease.  Left SFA stent is patent without hemodynamically significant stenosis.  Minimal flow is present within the posterior tibial artery suggesting severe disease.  Biphasic flow is noted within anterior tibial artery which is an improvement compared to previous study.  The flow transitions to monophasic within the DPA consistent with known microvascular distal disease. Reference: Category Normal 1-19% 20-49% 50-99% Occluded PSV <160 cm/sec without spectral broadening <160 cm/sec with spectral broadening Increased Increased Absent Flow Ratio N/A N/A < 2.0 >2.0 N/A Post-Stenotic Turbulence No No No Yes N/A     US AIDAN Doppler No Excersie  Portable    Result Date: 7/22/2022  LOCATION: [facility] DATE: 7/22/2022 EXAM: RESTING ANKLE-BRACHIAL INDICES (ABIs) INDICATION: Nonhealing bilateral lower extremity wound, decreased lower extremity pulses, status post recanalization of the left anterior tibial artery COMPARISON: Prior study dated 07/08/2022 AIDAN FINDINGS: Absent digit waveforms bilaterally.     IMPRESSION: 1. LEFT LOWER EXTREMITY: Ankle brachial index of 0.69 reflecting moderate peripheral arterial disease. Unable to obtain toe pressures or waveforms bilaterally.    XR Chest Port 1 View    Result Date: 8/7/2022  EXAM: XR CHEST PORT 1 VIEW LOCATION: [facility] DATE/TIME: 8/7/2022 10:58 AM INDICATION: Shortness of breath. COMPARISON: 07/30/2022.     IMPRESSION: Negative chest. Lungs clear.    XR Chest Port 1 View    Result Date: 7/30/2022  EXAM: XR CHEST PORT 1 VIEW LOCATION: [facility] DATE/TIME: 7/30/2022 8:45 AM INDICATION: Increased oxygen requirements. COMPARISON: Chest radiograph 07/24/2022.     IMPRESSION: No acute cardiopulmonary disease. Nonenlarged cardiac silhouette. Prior right PICC has been removed.    XR Chest Port 1 View    Result Date: 7/24/2022  EXAM: XR CHEST PORTABLE 1 VIEW LOCATION: [facility] DATE/TIME: 07/24/2022, 2:09 PM INDICATION: RN placed PICC. Verify tip placement. COMPARISON: 07/21/2022.     IMPRESSION: Right PICC has been placed and extends to the mid SVC and directed posteriorly into the azygos vein on the first radiograph but on the second radiograph is in excellent position in the low SVC. Increased opacity posterior to the heart has developed and could indicate some degree of consolidation and/or volume loss in the posteromedial basilar segments left lower lobe. Chest otherwise negative.     Head CT w/o contrast    Result Date: 7/21/2022  EXAM: CT HEAD W/O CONTRAST LOCATION: [facility]  DATE/TIME: 7/21/2022 1:31 PM INDICATION: Fall, trauma, pain. COMPARISON: None available. TECHNIQUE: Routine CT Head without IV contrast. Multiplanar reformats. Dose reduction techniques were used. FINDINGS: INTRACRANIAL CONTENTS: No intracranial hemorrhage, extraaxial collection, or mass effect.  No CT evidence of acute infarct. Chronic left basal ganglia/corona radiata infarction. Moderate presumed chronic small vessel ischemic change. Moderate generalized  volume loss. No hydrocephalus. The sella is unremarkable for technique. The cerebellar tonsils are appropriately positioned. VISUALIZED ORBITS/SINUSES/MASTOIDS: No intraorbital abnormality. Mild to moderate mucosal thickening scattered about the paranasal sinuses. This is most pronounced in the left maxillary sinus. No middle ear or mastoid effusion. BONES/SOFT TISSUES: No scalp hematoma. No skull fracture.     IMPRESSION: 1.  No evidence of acute intracranial hemorrhage. 2.  No evidence of acute calvarial fracture. 3.  Chronic left basal ganglia/white matter infarction. 4.  Moderate presumed chronic small vessel ischemic change. 5.  Moderate atrophy.      LABS:      Sodium   Date Value Ref Range Status   08/02/2022 137 136 - 145 mmol/L Final   07/30/2022 139 136 - 145 mmol/L Final   07/30/2022 139 136 - 145 mmol/L Final   07/01/2020 139 133 - 144 mmol/L Final   10/01/2019 142 136 - 145 mmol/L Final   08/15/2019 130 (L) 133 - 144 mmol/L Final     Urea Nitrogen   Date Value Ref Range Status   08/02/2022 17 8 - 28 mg/dL Final   07/30/2022 19 8 - 28 mg/dL Final   07/30/2022 19 8 - 28 mg/dL Final   07/01/2020 13 7 - 30 mg/dL Final   10/01/2019 11 8 - 28 mg/dL Final   08/15/2019 29 7 - 30 mg/dL Final     Hemoglobin   Date Value Ref Range Status   08/02/2022 15.8 13.3 - 17.7 g/dL Final   07/30/2022 16.0 13.3 - 17.7 g/dL Final   07/25/2022 14.6 13.3 - 17.7 g/dL Final   07/01/2020 16.2 13.3 - 17.7 g/dL Final   10/01/2019 13.5 (A) 14.0 - 18.0 g/dL Final   07/26/2019 15.1  13.3 - 17.7 g/dL Final     Platelet Count   Date Value Ref Range Status   08/02/2022 322 150 - 450 10e3/uL Final   07/30/2022 323 150 - 450 10e3/uL Final   07/25/2022 371 150 - 450 10e3/uL Final   07/01/2020 361 150 - 450 10e9/L Final   07/26/2019 347 150 - 450 10e9/L Final   07/16/2019 267 150 - 450 10e9/L Final     BNP   Date Value Ref Range Status   07/30/2022 75 0 - 88 pg/mL Final   07/21/2022 169 (H) 0 - 88 pg/mL Final     INR   Date Value Ref Range Status   07/21/2022 1.07 0.85 - 1.15 Final   07/21/2022 1.12 0.85 - 1.15 Final   09/21/2021 2.6 (H) 0.9 - 1.1 Final   08/24/2021 2.6 (H) 0.9 - 1.1 Final   07/29/2021 1.8 (H) 0.9 - 1.1 Final   06/29/2021 3.00 (H) 0.86 - 1.14 Final     Comment:     This test is intended for monitoring Coumadin therapy.  Results are not   accurate in patients with prolonged INR due to factor deficiency.     06/03/2021 2.30 (H) 0.86 - 1.14 Final     Comment:     This test is intended for monitoring Coumadin therapy.  Results are not   accurate in patients with prolonged INR due to factor deficiency.     05/20/2021 1.90 (H) 0.86 - 1.14 Final     Comment:     This test is intended for monitoring Coumadin therapy.  Results are not   accurate in patients with prolonged INR due to factor deficiency.         This was a in person visit in which 45 minutes of  total time was spent (either in face-to-face or non-face-to-face time).    Vilma Coello MD, [facility]  VASCULAR AND INTERVENTIONAL PHYSICIAN  VASCULAR MEDICINE  INTERNAL MEDICINE  PAGER: [phone]  CALL SERVICE: [phone]

## 2022-08-17 NOTE — PROGRESS NOTES
[facility] GERIATRIC SERVICES       Patient Bradley Liz  MRN: [id number]        Reason for Visit     Chief Complaint   Patient presents with     Nursing Home Acute   Right heel blister    Code Status     DNR / DNI    Assessment     Pressure injury right heel unstageable with an eschar and a blister noted.  Pressure injury left heel stage I  Left lower extremity cellulitis with an open wound.  Peripheral vascular disease s/p angioplasty interventional radiology on 7/22/2022.  Of the left lower extremity.  Acute COVID infection  Acute hypoxic respiratory failure  COPD exacerbation  Urinary retention requiring Allen catheter  A. fib on chronic anticoagulation with Xarelto  Nonischemic cardiomyopathy  Generalized weakness    Plan     Pt is admitted to TCU for strengthening and rehab.  Patient was diagnosed with acute COVID infection in the hospital.  Course complicated by viral pneumonia and hypoxic respiratory failure.  Also felt to have COPD exacerbation.  Continue with prednisone taper  Advair was added to his regimen.  He continues to have some baseline respiratory distress with cough and congestion.  He has finished his treatment of remdesivir in the hospital.  Continue with his nebs as needed.  He was seen for follow-up on his peripheral vascular disease and underwent follow-up with vascular surgery yesterday.  Continue with his Plavix and Crestor.  Offloading and periodic debridement with wound clinic encouraged.  Patient apparently has refused further surgical intervention  However nursing concerned about losing from his feet and examination reveals he has pressure injury blister with an eschar on his right heel with soft heels noted on the left side again consistent with pressure.  Encourage offloading and wound care orders were written  Follow-up with vascular and wound clinic if these wounds do not heal in the light of his peripheral arterial disease  Monitor for any worsening or signs of  infection    History     Patient is a very pleasant 81 year old male who is admitted to TCU  Then with left lower extremity wound infection with cellulitis to the vascular clinic.  He was examined and admitted directly.  He was given IV antibiotics  In addition he was noted to have COVID-19 infection which was treated.  He had viral pneumonia.  He got dexamethasone and remdesivir.  He is doing well with no cough or hypoxia.  He was seen for follow-up on his peripheral vascular disease and underwent follow-up with vascular surgery yesterday.  Continue with his Plavix and Crestor.  Offloading and periodic debridement with wound clinic encouraged.  Patient apparently has refused further surgical intervention  However nursing concerned about losing from his feet and examination reveals he has pressure injury blister with an eschar on his right heel with soft heels noted on the left side again consistent with pressure.  Encourage offloading and wound care orders were written  Follow-up with vascular and wound clinic if these wounds do not heal in the light of his peripheral arterial disease  Monitor for any worsening or signs of infection  Nursing reports limited compliance and most of the time he will refuse to offload his feet  He is on diuretics and his leg edema appears to be improved.  Blood pressures are stable    Past Medical & Surgical History     PAST MEDICAL HISTORY:   Past Medical History:   Diagnosis Date     Acute kidney injury (H) 8/5/2019      PAST SURGICAL HISTORY:   has a past surgical history that includes Laparoscopic cholecystectomy (N/A, 7/9/2019); IR Lower Extremity Angiogram Left (7/22/2022); PICC/Midline Placement (7/24/2022); and IR Lower Extremity Angiogram Right (7/25/2022).      Past Social History     Reviewed,  reports that he quit smoking about 13 years ago. He quit after 50.00 years of use. He has never used smokeless tobacco. He reports current alcohol use. He reports that he does not use  drugs.    Family History     Reviewed, and family history includes C.A.D. in his father; Prostate Cancer in his father; Unknown/Adopted in his mother.    Medication List   Post Discharge Medication Reconciliation Status: Post Discharge Medication Reconciliation Status: discharge medications reconciled, continue medications without change.  Current Outpatient Medications   Medication     clopidogrel (PLAVIX) 75 MG tablet     fluticasone-vilanterol (BREO ELLIPTA) 100-25 MCG/INH inhaler     furosemide (LASIX) 20 MG tablet     ipratropium-albuterol (COMBIVENT RESPIMAT)  MCG/ACT inhaler     metoprolol succinate ER (TOPROL XL) 200 MG 24 hr tablet     order for DME     predniSONE (DELTASONE) 20 MG tablet     rivaroxaban ANTICOAGULANT (XARELTO ANTICOAGULANT) 20 MG TABS tablet     rosuvastatin (CRESTOR) 20 MG tablet     tamsulosin (FLOMAX) 0.4 MG capsule     vitamin C (ASCORBIC ACID) 500 MG tablet     No current facility-administered medications for this visit.          Allergies     Allergies   Allergen Reactions     Augmentin Diarrhea       Review of Systems   A comprehensive review of 14 systems was done. Pertinent findings noted here and in history of present illness. All the rest negative.  Constitutional: Negative.  Negative for fever, chills, he has  activity change, appetite change and fatigue.   HENT: Negative for congestion and facial swelling.    Eyes: Negative for photophobia, redness and visual disturbance.   Respiratory: Negative for cough and chest tightness.  Does have shortness of breath and needs oxygen  Cardiovascular: Negative for chest pain, palpitations and leg swelling.   Gastrointestinal: Negative for nausea, diarrhea, constipation, blood in stool and abdominal distention.   Genitourinary: Negative.    Musculoskeletal: Uses a walker for ambulation  Skin: Has an open wound on his left lower extremity  Blister noted on rt heel which is oozing  Neurological: Negative for dizziness, tremors,  "syncope, weakness, light-headedness and headaches.   Hematological: Does not bruise/bleed easily.   Psychiatric/Behavioral: Negative.  Mood is impaired and patient is not a good historian      Physical Exam   /80   Pulse 81   Temp 97.9  F (36.6  C)   Resp 16   Ht 1.753 m (5' 9\")   Wt 70.8 kg (156 lb)   SpO2 94%   BMI 23.04 kg/m       Constitutional: Oriented to person, place, and time and appears well-developed.   HEENT:  Normocephalic and atraumatic.  Eyes: Conjunctivae and EOM are normal. Pupils are equal, round, and reactive to light. No discharge.  No scleral icterus. Nose normal. Mouth/Throat: Oropharynx is clear and moist. No oropharyngeal exudate.    NECK: Normal range of motion. Neck supple. No JVD present. No tracheal deviation present. No thyromegaly present.   CARDIOVASCULAR: Normal rate, regular rhythm and intact distal pulses.  Exam reveals no gallop and no friction rub.  Systolic murmur present.  PULMONARY: Effort normal and breath sounds normal. No respiratory distress.No Wheezing or rales.  Coarse breath sounds especially on the right lower quadrant  ABDOMEN: Soft. Bowel sounds are normal. No distension and no mass.  There is no tenderness. There is no rebound and no guarding. No HSM.  MUSCULOSKELETAL: Normal range of motion. No edema and no tenderness. Mild kyphosis, no tenderness.  LYMPH NODES: Has no cervical, supraclavicular, axillary and groin adenopathy.   NEUROLOGICAL: Alert and oriented to person, place, and time. No cranial nerve deficit.  Normal muscle tone. Coordination normal.   GENITOURINARY: Deferred exam.  Has an indwelling Allen  SKIN: Skin is warm and dry. No rash noted. No erythema. No pallor.   Left lower extremity has a large open area with a scab noted no secondary concern for infection  HEEL ULCER WITH BLISTER AND ESCHAR NOTED ON RT  SOFT MUSHY LEFT HEEL  EXTREMITIES: No cyanosis, no clubbing, no edema. No Deformity.  PSYCHIATRIC: Normal mood, affect and behavior.  " Recall is somewhat impaired      Lab Results     Recent Results (from the past 240 hour(s))   Potassium    Collection Time: 08/08/22  4:44 AM   Result Value Ref Range    Potassium 3.8 3.5 - 5.0 mmol/L   Magnesium    Collection Time: 08/08/22  4:44 AM   Result Value Ref Range    Magnesium 2.0 1.8 - 2.6 mg/dL           Electronically signed by    Saniya Andrade MD

## 2022-08-17 NOTE — LETTER
8/17/2022        RE: Bradley Liz  [address] [address]        [facility] GERIATRIC SERVICES       Patient Bradley Liz  MRN: [id number]        Reason for Visit     Chief Complaint   Patient presents with     Nursing Home Acute   Right heel blister    Code Status     DNR / DNI    Assessment     Pressure injury right heel unstageable with an eschar and a blister noted.  Pressure injury left heel stage I  Left lower extremity cellulitis with an open wound.  Peripheral vascular disease s/p angioplasty interventional radiology on 7/22/2022.  Of the left lower extremity.  Acute COVID infection  Acute hypoxic respiratory failure  COPD exacerbation  Urinary retention requiring Allen catheter  A. fib on chronic anticoagulation with Xarelto  Nonischemic cardiomyopathy  Generalized weakness    Plan     Pt is admitted to TCU for strengthening and rehab.  Patient was diagnosed with acute COVID infection in the hospital.  Course complicated by viral pneumonia and hypoxic respiratory failure.  Also felt to have COPD exacerbation.  Continue with prednisone taper  Advair was added to his regimen.  He continues to have some baseline respiratory distress with cough and congestion.  He has finished his treatment of remdesivir in the hospital.  Continue with his nebs as needed.  He was seen for follow-up on his peripheral vascular disease and underwent follow-up with vascular surgery yesterday.  Continue with his Plavix and Crestor.  Offloading and periodic debridement with wound clinic encouraged.  Patient apparently has refused further surgical intervention  However nursing concerned about losing from his feet and examination reveals he has pressure injury blister with an eschar on his right heel with soft heels noted on the left side again consistent with pressure.  Encourage offloading and wound care orders were written  Follow-up with vascular and wound clinic if these wounds do not heal in the  light of his peripheral arterial disease  Monitor for any worsening or signs of infection    History     Patient is a very pleasant 81 year old male who is admitted to TCU  Then with left lower extremity wound infection with cellulitis to the vascular clinic.  He was examined and admitted directly.  He was given IV antibiotics  In addition he was noted to have COVID-19 infection which was treated.  He had viral pneumonia.  He got dexamethasone and remdesivir.  He is doing well with no cough or hypoxia.  He was seen for follow-up on his peripheral vascular disease and underwent follow-up with vascular surgery yesterday.  Continue with his Plavix and Crestor.  Offloading and periodic debridement with wound clinic encouraged.  Patient apparently has refused further surgical intervention  However nursing concerned about losing from his feet and examination reveals he has pressure injury blister with an eschar on his right heel with soft heels noted on the left side again consistent with pressure.  Encourage offloading and wound care orders were written  Follow-up with vascular and wound clinic if these wounds do not heal in the light of his peripheral arterial disease  Monitor for any worsening or signs of infection  Nursing reports limited compliance and most of the time he will refuse to offload his feet  He is on diuretics and his leg edema appears to be improved.  Blood pressures are stable    Past Medical & Surgical History     PAST MEDICAL HISTORY:   Past Medical History:   Diagnosis Date     Acute kidney injury (H) 8/5/2019      PAST SURGICAL HISTORY:   has a past surgical history that includes Laparoscopic cholecystectomy (N/A, 7/9/2019); IR Lower Extremity Angiogram Left (7/22/2022); PICC/Midline Placement (7/24/2022); and IR Lower Extremity Angiogram Right (7/25/2022).      Past Social History     Reviewed,  reports that he quit smoking about 13 years ago. He quit after 50.00 years of use. He has never used  smokeless tobacco. He reports current alcohol use. He reports that he does not use drugs.    Family History     Reviewed, and family history includes C.A.D. in his father; Prostate Cancer in his father; Unknown/Adopted in his mother.    Medication List   Post Discharge Medication Reconciliation Status: Post Discharge Medication Reconciliation Status: discharge medications reconciled, continue medications without change.  Current Outpatient Medications   Medication     clopidogrel (PLAVIX) 75 MG tablet     fluticasone-vilanterol (BREO ELLIPTA) 100-25 MCG/INH inhaler     furosemide (LASIX) 20 MG tablet     ipratropium-albuterol (COMBIVENT RESPIMAT)  MCG/ACT inhaler     metoprolol succinate ER (TOPROL XL) 200 MG 24 hr tablet     order for DME     predniSONE (DELTASONE) 20 MG tablet     rivaroxaban ANTICOAGULANT (XARELTO ANTICOAGULANT) 20 MG TABS tablet     rosuvastatin (CRESTOR) 20 MG tablet     tamsulosin (FLOMAX) 0.4 MG capsule     vitamin C (ASCORBIC ACID) 500 MG tablet     No current facility-administered medications for this visit.          Allergies     Allergies   Allergen Reactions     Augmentin Diarrhea       Review of Systems   A comprehensive review of 14 systems was done. Pertinent findings noted here and in history of present illness. All the rest negative.  Constitutional: Negative.  Negative for fever, chills, he has  activity change, appetite change and fatigue.   HENT: Negative for congestion and facial swelling.    Eyes: Negative for photophobia, redness and visual disturbance.   Respiratory: Negative for cough and chest tightness.  Does have shortness of breath and needs oxygen  Cardiovascular: Negative for chest pain, palpitations and leg swelling.   Gastrointestinal: Negative for nausea, diarrhea, constipation, blood in stool and abdominal distention.   Genitourinary: Negative.    Musculoskeletal: Uses a walker for ambulation  Skin: Has an open wound on his left lower extremity  Blister noted  "on rt heel which is oozing  Neurological: Negative for dizziness, tremors, syncope, weakness, light-headedness and headaches.   Hematological: Does not bruise/bleed easily.   Psychiatric/Behavioral: Negative.  Mood is impaired and patient is not a good historian      Physical Exam   /80   Pulse 81   Temp 97.9  F (36.6  C)   Resp 16   Ht 1.753 m (5' 9\")   Wt 70.8 kg (156 lb)   SpO2 94%   BMI 23.04 kg/m       Constitutional: Oriented to person, place, and time and appears well-developed.   HEENT:  Normocephalic and atraumatic.  Eyes: Conjunctivae and EOM are normal. Pupils are equal, round, and reactive to light. No discharge.  No scleral icterus. Nose normal. Mouth/Throat: Oropharynx is clear and moist. No oropharyngeal exudate.    NECK: Normal range of motion. Neck supple. No JVD present. No tracheal deviation present. No thyromegaly present.   CARDIOVASCULAR: Normal rate, regular rhythm and intact distal pulses.  Exam reveals no gallop and no friction rub.  Systolic murmur present.  PULMONARY: Effort normal and breath sounds normal. No respiratory distress.No Wheezing or rales.  Coarse breath sounds especially on the right lower quadrant  ABDOMEN: Soft. Bowel sounds are normal. No distension and no mass.  There is no tenderness. There is no rebound and no guarding. No HSM.  MUSCULOSKELETAL: Normal range of motion. No edema and no tenderness. Mild kyphosis, no tenderness.  LYMPH NODES: Has no cervical, supraclavicular, axillary and groin adenopathy.   NEUROLOGICAL: Alert and oriented to person, place, and time. No cranial nerve deficit.  Normal muscle tone. Coordination normal.   GENITOURINARY: Deferred exam.  Has an indwelling Allen  SKIN: Skin is warm and dry. No rash noted. No erythema. No pallor.   Left lower extremity has a large open area with a scab noted no secondary concern for infection  HEEL ULCER WITH BLISTER AND ESCHAR NOTED ON RT  SOFT MUSHY LEFT HEEL  EXTREMITIES: No cyanosis, no " clubbing, no edema. No Deformity.  PSYCHIATRIC: Normal mood, affect and behavior.  Recall is somewhat impaired      Lab Results     Recent Results (from the past 240 hour(s))   Potassium    Collection Time: 08/08/22  4:44 AM   Result Value Ref Range    Potassium 3.8 3.5 - 5.0 mmol/L   Magnesium    Collection Time: 08/08/22  4:44 AM   Result Value Ref Range    Magnesium 2.0 1.8 - 2.6 mg/dL           Electronically signed by    Saniya Andrade MD                             Sincerely,        JONNA Estrella

## 2022-08-19 NOTE — PROGRESS NOTES
Follow up Vascular Visit       Date of Service:08/19/22      Chief Complaint: BLE leg swelling and L leg wound      Pt returns to [facility] with regards to their BLE leg swelling and L leg wound.  They arrive today alone. They are currently using medihoney, gauze, gauze roll to the wounds. This is being done by facility. They are using nothing for compression. They are feeling well today. Denies fevers, chills. No shortness of breath.     Allergies:   Allergies   Allergen Reactions     Augmentin Diarrhea       Medications:   Current Outpatient Medications:      clopidogrel (PLAVIX) 75 MG tablet, Take 1 tablet (75 mg) by mouth daily for 93 days, Disp: 95 tablet, Rfl: 0     fluticasone-vilanterol (BREO ELLIPTA) 100-25 MCG/INH inhaler, Inhale 1 puff into the lungs daily, Disp: 30 each, Rfl: 0     furosemide (LASIX) 20 MG tablet, Take 1 tablet (20 mg) by mouth daily for 30 days, Disp: 30 tablet, Rfl: 0     ipratropium-albuterol (COMBIVENT RESPIMAT)  MCG/ACT inhaler, Inhale 1 puff into the lungs 4 times daily as needed for shortness of breath / dyspnea or wheezing, Disp: 1 each, Rfl: 0     metoprolol succinate ER (TOPROL XL) 200 MG 24 hr tablet, TAKE 1 TABLET BY MOUTH DAILY, Disp: 90 tablet, Rfl: 0     order for DME, Please draw INR as ordered and as needed. Call results to [facility] at (p) [phone] or fax them to (f) [phone], Disp: 3 Month, Rfl: 3     rivaroxaban ANTICOAGULANT (XARELTO ANTICOAGULANT) 20 MG TABS tablet, Take 1 tablet (20 mg) by mouth daily (with dinner), Disp: 90 tablet, Rfl: 3     rosuvastatin (CRESTOR) 20 MG tablet, Take 1 tablet (20 mg) by mouth every evening, Disp: 30 tablet, Rfl: 1     tamsulosin (FLOMAX) 0.4 MG capsule, Take 1 capsule (0.4 mg) by mouth daily, Disp: 30 capsule, Rfl: 0     vitamin C (ASCORBIC ACID) 500 MG tablet, Take 1 tablet (500 mg) by mouth daily, Disp: 4 tablet, Rfl:     History:   Past Medical History:  "  Diagnosis Date     Acute kidney injury (H) 8/5/2019       Physical Exam:    Pulse 76   Temp 97  F (36.1  C)   Ht 5' 9\" (1.753 m)   Wt 156 lb (70.8 kg)   SpO2 97%   BMI 23.04 kg/m      General:  Patient presents to clinic in no apparent distress.  Head: normocephalic atraumatic  Psychiatric:  Alert and oriented x3.   Respiratory: unlabored breathing; no cough  Integumentary:  Skin is uniformly warm, dry and pink.    Wound #1 Location: L leg scattered  Size: 5L x 7.5W x 0.1depth.  No sinus tract present, Wound base: 75% granulation tissue 25% slough  No undermining present. Wound is full thickness. There is moderate drainage. Periwound: no denudement, erythema, induration, maceration or warmth.      Wound Leg Ulceration Stage 3 (Active)   Wound Bed Other (Comment) 08/08/22 0800   Joan-wound Assessment Other (Comment) 08/08/22 0800   Drainage Amount Scant 08/06/22 1500   Drainage Color/Characteristics Serosanguineous 08/06/22 1500   Wound Care/Cleansing Soap and water 08/06/22 1500   Dressing Dry gauze;Xeroform 08/08/22 0000   Dressing Status Clean, dry, intact 08/08/22 0800   Dressing Change Due 08/08/22 08/08/22 0800   Number of days: 28       VASC Wound left leg (scattered) (Active)   Pre Size Length 5 08/19/22 1000   Pre Size Width 7.5 08/19/22 1000   Pre Size Depth 0.1 08/19/22 1000   Pre Total Sq cm 37.5 08/19/22 1000   Number of days: 38       Incision/Surgical Site 07/10/19 Bilateral Abdomen (Active)   Number of days: 1136            Circumferential volume measures:      No flowsheet data found.    Labs:    I personally reviewed the following lab results today and those on care everywhere    CRP   Date Value Ref Range Status   07/25/2022 3.4 (H) 0.0 - <0.8 mg/dL Final      Sed Rate   Date Value Ref Range Status   01/07/2009 10 0 - 20 mm/h Final      Last Renal Panel:  Sodium   Date Value Ref Range Status   08/02/2022 137 136 - 145 mmol/L Final   07/01/2020 139 133 - 144 mmol/L Final     Potassium   Date " Value Ref Range Status   08/08/2022 3.8 3.5 - 5.0 mmol/L Final   07/01/2020 4.1 3.4 - 5.3 mmol/L Final     Chloride   Date Value Ref Range Status   08/02/2022 105 98 - 107 mmol/L Final   07/01/2020 107 94 - 109 mmol/L Final     Carbon Dioxide   Date Value Ref Range Status   07/01/2020 31 20 - 32 mmol/L Final     Carbon Dioxide (CO2)   Date Value Ref Range Status   08/02/2022 27 22 - 31 mmol/L Final     Anion Gap   Date Value Ref Range Status   08/02/2022 5 5 - 18 mmol/L Final   07/01/2020 1 (L) 3 - 14 mmol/L Final     Glucose   Date Value Ref Range Status   08/02/2022 107 70 - 125 mg/dL Final   07/01/2020 73 70 - 99 mg/dL Final     Comment:     Non Fasting     Urea Nitrogen   Date Value Ref Range Status   08/02/2022 17 8 - 28 mg/dL Final   07/01/2020 13 7 - 30 mg/dL Final     Creatinine   Date Value Ref Range Status   08/02/2022 0.79 0.70 - 1.30 mg/dL Final   07/01/2020 1.15 0.66 - 1.25 mg/dL Final     GFR Estimate   Date Value Ref Range Status   08/02/2022 89 >60 mL/min/1.73m2 Final     Comment:     Effective December 21, 2021 eGFRcr in adults is calculated using the 2021 CKD-EPI creatinine equation which includes age and gender (Torin padilla al., NEJ, DOI: 10.1056/CKZVhy0200736)   07/01/2020 60 (L) >60 mL/min/[1.73_m2] Final     Comment:     Non  GFR Calc  Starting 12/18/2018, serum creatinine based estimated GFR (eGFR) will be   calculated using the Chronic Kidney Disease Epidemiology Collaboration   (CKD-EPI) equation.       Calcium   Date Value Ref Range Status   08/02/2022 8.1 (L) 8.5 - 10.5 mg/dL Final   07/01/2020 8.4 (L) 8.5 - 10.1 mg/dL Final     Albumin   Date Value Ref Range Status   07/30/2022 2.3 (L) 3.5 - 5.0 g/dL Final   07/01/2020 3.4 3.4 - 5.0 g/dL Final      Lab Results   Component Value Date    WBC 13.3 08/02/2022    WBC 9.5 07/01/2020     Lab Results   Component Value Date    RBC 5.22 08/02/2022    RBC 5.09 07/01/2020     Lab Results   Component Value Date    HGB 15.8 08/02/2022     HGB 16.2 07/01/2020     Lab Results   Component Value Date    HCT 48.9 08/02/2022    HCT 50.9 07/01/2020     No components found for: MCT  Lab Results   Component Value Date    MCV 94 08/02/2022     07/01/2020     Lab Results   Component Value Date    MCH 30.3 08/02/2022    MCH 31.8 07/01/2020     Lab Results   Component Value Date    MCHC 32.3 08/02/2022    MCHC 31.8 07/01/2020     Lab Results   Component Value Date    RDW 14.5 08/02/2022    RDW 15.0 07/01/2020     Lab Results   Component Value Date     08/02/2022     07/01/2020      Lab Results   Component Value Date    A1C 5.5 07/11/2019    A1C 5.7 01/29/2018    A1C 6.0 01/07/2009      TSH   Date Value Ref Range Status   07/16/2021 2.13 0.40 - 4.00 mU/L Final   01/27/2018 1.334 0.340 - 5.600 uIU/ml Final      No results found for: VITDT                Impression:  Encounter Diagnoses   Name Primary?     Open wound of left lower extremity, subsequent encounter Yes     PAD (peripheral artery disease) (H)      Localized swelling of both lower legs           8/19/22 L Leg           Are any of these wounds new today: No; Location: na    Assessment/Plan:          1. Debridement: After discussion of risk factors and verbal consent was obtained 2% Lidocaine HCL jelly was applied, under clean conditions, the L leg ulceration(s) were debrided using currette. Devitalized and nonviable tissue, along with any fibrin and slough, was removed to improve granulation tissue formation, stimulate wound healing, decrease overall bacteria load, disrupt biofilm formation and decrease edge senescence.  Total excisional debridement was 37.5 sq cm into the subcutaneous tissue with a depth of 0.1 cm.   Ulcers were improved afterwards and .  Measures were as noted on the flow sheet.       2.  Wound treatment: wound treatment will include irrigation and dressings to promote autolytic debridement which will include: Cleanse with dilute hibiclens 30cc in 500cc NS.  Apply medihoney to wound and cover with abd pad and gauze roll to be changed 3 times per week. If for some reason the patient is not able to get their dressing(s) changed as outlined above (due to illness, lack of supplies, lack of help) please do the following: remove old, soiled dressings; wash the wounds with saline; pat dry; apply ABD pad or other absorbant pad and secure with rolled gauze; avoid tape directly on your skin; patient instructed to call the clinic as soon as possible to let us know what the current issues are in receiving wound care. Improved           3. Edema: Edema stable. Pt received revascularization via angioplasty bilaterally while in the hospital. Will continue with no compression at this time. AIDAN 8/15/22 shows toe pressures of 24 and 0.           4. Nutrition: Low na diet           5. Offloading: Keep pressure off the area     Patient will follow up with me in 3 weeks for reevaluation. They were instructed to call the clinic sooner with any signs or symptoms of infection or any further questions/concerns. Answered all questions.      40 minutes spent on the date of the encounter doing chart review, history and exam, documentation and further activities per the note    Lakisha Ma MS, APRN, AGNP-C, CWCN  [facility]   [phone]        This note was electronically signed by PALOMO Chand CNP

## 2022-08-19 NOTE — PATIENT INSTRUCTIONS
"Wound care supplies were not ordered or needed today.        Wound Care Instructions    3 TIMES PER WEEK and as needed, Cleanse your left leg wound(s) with Dilute hibiclens 30cc in 500cc NS.    Pat Dry with non-sterile gauze    Apply Lotion to the intact skin surrounding your wound and other dry skin locations. Some good lotions include: Remedy Skin Repair Cream, Sarna, Vanicream or Cetaphil    Primary Dressing: Apply medihoney into/onto the left leg wounds, only gauze or abd to right leg open blister    Secondary dressing: Cover with abd pad    Secure with non-sterile roll gauze (4\" x 75\" roll) and tape (1\" roll tape) as needed; avoid adhesive directly on the skin    Elevate legs twice a day above the heart for at least 20 minutes    It is not ok to get your wound wet in the bath or shower    It is recommended that you do not get your ulcer wet when showering.  Listed below are several ways of keeping it dry when you shower.     1. Wrap it with Press and Seal plastic wrap.  It can be found in the stores where the plastic wraps or tin foil is kept.               2.  Some people take a bath and hang their leg/foot out of the tub.                        3  Put your leg in a plastic bag and tape it on.           4. You can purchase a shower cover for casts at some pharmacies and through the Internet.            5. Take a Bed Bath or wash up at the sink            If for some reason you are not able to get your dressing(s) changed as outlined above (due to illness, lack of supplies, lack of help) please do the following: remove old, soiled dressings; wash the wounds with saline; pat dry; apply ABD pad or other absorbant pad and secure with rolled gauze; avoid tape directly on your skin; Call the clinic as soon as possible to let us know what the current issues are in receiving wound care [phone].      SEEK MEDICAL CARE IF:  You have an increase in swelling, pain, or redness around the wound.  You have an increase " in the amount of pus coming from the wound.  There is a bad smell coming from the wound.  The wound appears to be worsening/enlarging  You have a fever greater than 101.5 F      It is ok to continue current wound care treatment/products for the next 2-3 days until new wound care supplies are ordered and arrive. If longer than this please contact our office at [phone].    If you have a 2 layer or 4 layer compression wrap on these are safe to have on for ONLY 7 days. If for some reason you are not able to get the wrap(s) changed (due to illness; lack of supplies, lack of help, lack of transportation) please do the following: unwrap the old 2 or 4 layer compression wrap; avoid using scissors as you could cut your skin and cause wounds; use tubular compression when available. Call to reschedule your home care or clinic visit appointment as soon as possible.    Please NOTE: if you are 15 minutes late to your clinic appointment you will have to be rescheduled. Please call our clinic as soon as possible if you know you will not be able to get to your appointment at [phone].    If you fail to show up to 3 scheduled clinic appointments you will be dismissed from our clinic.              We want to hear from you!  In the next few weeks, you should receive a call or email to complete a survey about your visit at [facility]. Please help us improve your appointment experience by letting us know how we did today. We strive to make your experience good and value any ways in which we could do better.      We value your input and suggestions.    Thank you for choosing the [facility]!

## 2022-08-22 NOTE — PROGRESS NOTES
[facility]       Patient Bradley Lzi  MRN: [id number]        Reason for Visit     Chief Complaint   Patient presents with     RECHECK   Right heel blister    Code Status     DNR / DNI    Assessment     Pressure injury right heel unstageable with an eschar and a blister noted.  Pressure injury left heel stage I  Left lower extremity cellulitis with an open wound.  Peripheral vascular disease s/p angioplasty interventional radiology on 7/22/2022.  Of the left lower extremity.  Acute COVID infection  Acute hypoxic respiratory failure  COPD exacerbation  Urinary retention requiring Allen catheter  A. fib on chronic anticoagulation with Xarelto  Nonischemic cardiomyopathy  Generalized weakness    Plan     Pt is admitted to TCU for strengthening and rehab.  Patient was diagnosed with acute COVID infection in the hospital.  Course complicated by viral pneumonia and hypoxic respiratory failure.  Also felt to have COPD exacerbation.  Doing well with MDI  Saw vascular surgey and underwent debridement of his leg wounds  Edema and wounds stable  Cont wound care orders  Cont plavix ;statins  Encourage offloading  Follow-up with vascular surgery if he decides to go for angioplasty he has refused so far for any further surgical intervention.  He is a DNR/DNI however he is at risk of below the knee amputation and will need a close follow-up  Blood pressures remains low      History     Patient is a very pleasant 81 year old male who is admitted to TCU  Then with left lower extremity wound infection with cellulitis to the vascular clinic.  He was examined and admitted directly.  He was given IV antibiotics was seen by vascular surgery it looks like the infection has healed up nicely  In addition he was noted to have COVID-19 infection which was treated.  He had viral pneumonia.  He got dexamethasone and remdesivir.  He is doing well with no cough or hypoxia.  He was seen for follow-up on his peripheral vascular  disease and underwent follow-up with vascular surgery yesterday.  Continue with his Plavix and Crestor.  Offloading and periodic debridement with wound clinic encouraged.  He had a scheduled follow-up with them and underwent debridement of his wounds.  Wound care orders were again reviewed.  Continue to encourage offloading  Follow-up with vascular and wound clinic if these wounds do not heal in the light of his peripheral arterial disease  Monitor for any worsening or signs of infection  Nursing reports limited compliance and most of the time he will refuse to offload his feet  He is on diuretics and his leg edema appears to be improved.  Blood pressures are stable but low frequently and will monitor trends before adjusting    Past Medical & Surgical History     PAST MEDICAL HISTORY:   Past Medical History:   Diagnosis Date     Acute kidney injury (H) 8/5/2019      PAST SURGICAL HISTORY:   has a past surgical history that includes Laparoscopic cholecystectomy (N/A, 7/9/2019); IR Lower Extremity Angiogram Left (7/22/2022); PICC/Midline Placement (7/24/2022); and IR Lower Extremity Angiogram Right (7/25/2022).      Past Social History     Reviewed,  reports that he quit smoking about 13 years ago. He quit after 50.00 years of use. He has never used smokeless tobacco. He reports current alcohol use. He reports that he does not use drugs.    Family History     Reviewed, and family history includes C.A.D. in his father; Prostate Cancer in his father; Unknown/Adopted in his mother.    Medication List   Post Discharge Medication Reconciliation Status: Post Discharge Medication Reconciliation Status: discharge medications reconciled, continue medications without change.  Current Outpatient Medications   Medication     clopidogrel (PLAVIX) 75 MG tablet     fluticasone-vilanterol (BREO ELLIPTA) 100-25 MCG/INH inhaler     furosemide (LASIX) 20 MG tablet     ipratropium-albuterol (COMBIVENT RESPIMAT)  MCG/ACT inhaler      "metoprolol succinate ER (TOPROL XL) 200 MG 24 hr tablet     order for DME     rivaroxaban ANTICOAGULANT (XARELTO ANTICOAGULANT) 20 MG TABS tablet     rosuvastatin (CRESTOR) 20 MG tablet     tamsulosin (FLOMAX) 0.4 MG capsule     vitamin C (ASCORBIC ACID) 500 MG tablet     No current facility-administered medications for this visit.          Allergies     Allergies   Allergen Reactions     Augmentin Diarrhea       Review of Systems   A comprehensive review of 14 systems was done. Pertinent findings noted here and in history of present illness. All the rest negative.  Constitutional: Negative.  Negative for fever, chills, he has  activity change, appetite change and fatigue.   HENT: Negative for congestion and facial swelling.    Eyes: Negative for photophobia, redness and visual disturbance.   Respiratory: Negative for cough and chest tightness.  Does have shortness of breath and needs oxygen  Cardiovascular: Negative for chest pain, palpitations and leg swelling.   Gastrointestinal: Negative for nausea, diarrhea, constipation, blood in stool and abdominal distention.   Genitourinary: Negative.    Musculoskeletal: Uses a walker for ambulation  Skin: Has an open wound on his left lower extremity  Blister noted on rt heel which is oozing  Neurological: Negative for dizziness, tremors, syncope, weakness, light-headedness and headaches.   Hematological: Does not bruise/bleed easily.   Psychiatric/Behavioral: Negative.  Mood is impaired and patient is not a good historian      Physical Exam   /67   Pulse 94   Temp (!) 96.4  F (35.8  C)   Resp 16   Ht 1.753 m (5' 9\")   Wt 70.8 kg (156 lb)   SpO2 97%   BMI 23.04 kg/m       Constitutional: Oriented to person, place, and time and appears well-developed.   HEENT:  Normocephalic and atraumatic.  Eyes: Conjunctivae and EOM are normal. Pupils are equal, round, and reactive to light. No discharge.  No scleral icterus. Nose normal. Mouth/Throat: Oropharynx is clear and " moist. No oropharyngeal exudate.    NECK: Normal range of motion. Neck supple. No JVD present. No tracheal deviation present. No thyromegaly present.   CARDIOVASCULAR: Normal rate, regular rhythm and intact distal pulses.  Exam reveals no gallop and no friction rub.  Systolic murmur present.  PULMONARY: Effort normal and breath sounds normal. No respiratory distress.No Wheezing or rales.  Coarse breath sounds especially on the right lower quadrant  ABDOMEN: Soft. Bowel sounds are normal. No distension and no mass.  There is no tenderness. There is no rebound and no guarding. No HSM.  MUSCULOSKELETAL: Normal range of motion. No edema and no tenderness. Mild kyphosis, no tenderness.  LYMPH NODES: Has no cervical, supraclavicular, axillary and groin adenopathy.   NEUROLOGICAL: Alert and oriented to person, place, and time. No cranial nerve deficit.  Normal muscle tone. Coordination normal.   GENITOURINARY: Deferred exam.  Has an indwelling Allen  SKIN: Skin is warm and dry. No rash noted. No erythema. No pallor.   Left lower extremity has a large open area with a scab noted no secondary concern for infection  Scabbing noted  Heel ulcer with a blister on the right heel underwent debridement and now with an open area.  Left heel is soft  EXTREMITIES: No cyanosis, no clubbing, no edema. No Deformity.  PSYCHIATRIC: Normal mood, affect and behavior.  Recall is somewhat impaired            Electronically signed by    Saniya Andrade MD

## 2022-08-22 NOTE — LETTER
8/22/2022        RE: Bradley Liz  [address] [address]        [facility] GERIATRIC [facility]       Patient Bradley Liz  MRN: [id number]        Reason for Visit     Chief Complaint   Patient presents with     RECHECK   Right heel blister    Code Status     DNR / DNI    Assessment     Pressure injury right heel unstageable with an eschar and a blister noted.  Pressure injury left heel stage I  Left lower extremity cellulitis with an open wound.  Peripheral vascular disease s/p angioplasty interventional radiology on 7/22/2022.  Of the left lower extremity.  Acute COVID infection  Acute hypoxic respiratory failure  COPD exacerbation  Urinary retention requiring Allen catheter  A. fib on chronic anticoagulation with Xarelto  Nonischemic cardiomyopathy  Generalized weakness    Plan     Pt is admitted to TCU for strengthening and rehab.  Patient was diagnosed with acute COVID infection in the hospital.  Course complicated by viral pneumonia and hypoxic respiratory failure.  Also felt to have COPD exacerbation.  Doing well with MDI  Saw vascular surgey and underwent debridement of his leg wounds  Edema and wounds stable  Cont wound care orders  Cont plavix ;statins  Encourage offloading  Follow-up with vascular surgery if he decides to go for angioplasty he has refused so far for any further surgical intervention.  He is a DNR/DNI however he is at risk of below the knee amputation and will need a close follow-up  Blood pressures remains low      History     Patient is a very pleasant 81 year old male who is admitted to TCU  Then with left lower extremity wound infection with cellulitis to the vascular clinic.  He was examined and admitted directly.  He was given IV antibiotics was seen by vascular surgery it looks like the infection has healed up nicely  In addition he was noted to have COVID-19 infection which was treated.  He had viral pneumonia.  He got dexamethasone and remdesivir.  He  is doing well with no cough or hypoxia.  He was seen for follow-up on his peripheral vascular disease and underwent follow-up with vascular surgery yesterday.  Continue with his Plavix and Crestor.  Offloading and periodic debridement with wound clinic encouraged.  He had a scheduled follow-up with them and underwent debridement of his wounds.  Wound care orders were again reviewed.  Continue to encourage offloading  Follow-up with vascular and wound clinic if these wounds do not heal in the light of his peripheral arterial disease  Monitor for any worsening or signs of infection  Nursing reports limited compliance and most of the time he will refuse to offload his feet  He is on diuretics and his leg edema appears to be improved.  Blood pressures are stable but low frequently and will monitor trends before adjusting    Past Medical & Surgical History     PAST MEDICAL HISTORY:   Past Medical History:   Diagnosis Date     Acute kidney injury (H) 8/5/2019      PAST SURGICAL HISTORY:   has a past surgical history that includes Laparoscopic cholecystectomy (N/A, 7/9/2019); IR Lower Extremity Angiogram Left (7/22/2022); PICC/Midline Placement (7/24/2022); and IR Lower Extremity Angiogram Right (7/25/2022).      Past Social History     Reviewed,  reports that he quit smoking about 13 years ago. He quit after 50.00 years of use. He has never used smokeless tobacco. He reports current alcohol use. He reports that he does not use drugs.    Family History     Reviewed, and family history includes C.A.D. in his father; Prostate Cancer in his father; Unknown/Adopted in his mother.    Medication List   Post Discharge Medication Reconciliation Status: Post Discharge Medication Reconciliation Status: discharge medications reconciled, continue medications without change.  Current Outpatient Medications   Medication     clopidogrel (PLAVIX) 75 MG tablet     fluticasone-vilanterol (BREO ELLIPTA) 100-25 MCG/INH inhaler     furosemide  "(LASIX) 20 MG tablet     ipratropium-albuterol (COMBIVENT RESPIMAT)  MCG/ACT inhaler     metoprolol succinate ER (TOPROL XL) 200 MG 24 hr tablet     order for DME     rivaroxaban ANTICOAGULANT (XARELTO ANTICOAGULANT) 20 MG TABS tablet     rosuvastatin (CRESTOR) 20 MG tablet     tamsulosin (FLOMAX) 0.4 MG capsule     vitamin C (ASCORBIC ACID) 500 MG tablet     No current facility-administered medications for this visit.          Allergies     Allergies   Allergen Reactions     Augmentin Diarrhea       Review of Systems   A comprehensive review of 14 systems was done. Pertinent findings noted here and in history of present illness. All the rest negative.  Constitutional: Negative.  Negative for fever, chills, he has  activity change, appetite change and fatigue.   HENT: Negative for congestion and facial swelling.    Eyes: Negative for photophobia, redness and visual disturbance.   Respiratory: Negative for cough and chest tightness.  Does have shortness of breath and needs oxygen  Cardiovascular: Negative for chest pain, palpitations and leg swelling.   Gastrointestinal: Negative for nausea, diarrhea, constipation, blood in stool and abdominal distention.   Genitourinary: Negative.    Musculoskeletal: Uses a walker for ambulation  Skin: Has an open wound on his left lower extremity  Blister noted on rt heel which is oozing  Neurological: Negative for dizziness, tremors, syncope, weakness, light-headedness and headaches.   Hematological: Does not bruise/bleed easily.   Psychiatric/Behavioral: Negative.  Mood is impaired and patient is not a good historian      Physical Exam   /67   Pulse 94   Temp (!) 96.4  F (35.8  C)   Resp 16   Ht 1.753 m (5' 9\")   Wt 70.8 kg (156 lb)   SpO2 97%   BMI 23.04 kg/m       Constitutional: Oriented to person, place, and time and appears well-developed.   HEENT:  Normocephalic and atraumatic.  Eyes: Conjunctivae and EOM are normal. Pupils are equal, round, and reactive " to light. No discharge.  No scleral icterus. Nose normal. Mouth/Throat: Oropharynx is clear and moist. No oropharyngeal exudate.    NECK: Normal range of motion. Neck supple. No JVD present. No tracheal deviation present. No thyromegaly present.   CARDIOVASCULAR: Normal rate, regular rhythm and intact distal pulses.  Exam reveals no gallop and no friction rub.  Systolic murmur present.  PULMONARY: Effort normal and breath sounds normal. No respiratory distress.No Wheezing or rales.  Coarse breath sounds especially on the right lower quadrant  ABDOMEN: Soft. Bowel sounds are normal. No distension and no mass.  There is no tenderness. There is no rebound and no guarding. No HSM.  MUSCULOSKELETAL: Normal range of motion. No edema and no tenderness. Mild kyphosis, no tenderness.  LYMPH NODES: Has no cervical, supraclavicular, axillary and groin adenopathy.   NEUROLOGICAL: Alert and oriented to person, place, and time. No cranial nerve deficit.  Normal muscle tone. Coordination normal.   GENITOURINARY: Deferred exam.  Has an indwelling Allen  SKIN: Skin is warm and dry. No rash noted. No erythema. No pallor.   Left lower extremity has a large open area with a scab noted no secondary concern for infection  Scabbing noted  Heel ulcer with a blister on the right heel underwent debridement and now with an open area.  Left heel is soft  EXTREMITIES: No cyanosis, no clubbing, no edema. No Deformity.  PSYCHIATRIC: Normal mood, affect and behavior.  Recall is somewhat impaired            Electronically signed by    Saniya Andrade MD                             Sincerely,        JONNA Estrella

## 2022-08-24 NOTE — TELEPHONE ENCOUNTER
[facility] Triage Nurse Telephone Encounter    Provider: JOSELO Nayak  Facility: [facility] Facility Type:  TCU      Call Back Number: [phone]    Allergies:    Allergies   Allergen Reactions     Augmentin Diarrhea        Reason for call: New orders per NP regarding chest x-ray results.  See results below:          Verbal Order/Direction given by Provider: Duo Neb QID.  Pulmicort neb BID(ok to mix with Duo neb).  NP to re-assess patient on 8/26/22.      Provider giving Order:  JOSELO Nayak    Verbal Order given to: Faye([phone])    Deniz Hollingsworth RN

## 2022-08-24 NOTE — PROGRESS NOTES
Code Status:  DNR/DNI  Visit Type: RECHECK     Facility:   [facility] ([facility]) [[id number]]        History of Present Illness:   Hospital Admission Date: 7/21/2022     Hospital Discharge Date: 8/8/2022      Bradley Liz is a 81 year old male with past medical history for Afib, cardiomyopathy, CKD, hx of CVA, HTN, HLD.  He was recently hospitalized for LLE wound infection and cellulitis.  He was found to be + for COVID19.  He was treated with IV antibiotics. On 7/22 he had IR angioplasty of LLE.  covid progressed to pneumonia and was started on dexamethasone and O2.  He was transitioned to a prednisone taper and discharge to U.      Today, I follow up with patient due to nursing reporting BP 78/48.  After 2 hours BP was 97/63, he denies any symptoms of hypotensions.  HR running low 100s but is typically in the 90s.  Upon exam he does have coarse rhonchi throughout left lung.  He denies a cough or SOB.        Current Outpatient Medications   Medication Sig Dispense Refill     clopidogrel (PLAVIX) 75 MG tablet Take 1 tablet (75 mg) by mouth daily for 93 days 95 tablet 0     fluticasone-vilanterol (BREO ELLIPTA) 100-25 MCG/INH inhaler Inhale 1 puff into the lungs daily 30 each 0     furosemide (LASIX) 20 MG tablet Take 1 tablet (20 mg) by mouth daily for 30 days 30 tablet 0     ipratropium-albuterol (COMBIVENT RESPIMAT)  MCG/ACT inhaler Inhale 1 puff into the lungs 4 times daily as needed for shortness of breath / dyspnea or wheezing 1 each 0     metoprolol succinate ER (TOPROL XL) 200 MG 24 hr tablet TAKE 1 TABLET BY MOUTH DAILY 90 tablet 0     order for DME Please draw INR as ordered and as needed. Call results to [facility] at p) [phone] or fax them to (f) [phone] Month 3     rivaroxaban ANTICOAGULANT (XARELTO ANTICOAGULANT) 20 MG TABS tablet Take 1 tablet (20 mg) by mouth daily (with dinner) 90 tablet 3     rosuvastatin (CRESTOR) 20 MG tablet Take 1 tablet (20  "mg) by mouth every evening 30 tablet 1     tamsulosin (FLOMAX) 0.4 MG capsule Take 1 capsule (0.4 mg) by mouth daily 30 capsule 0     vitamin C (ASCORBIC ACID) 500 MG tablet Take 1 tablet (500 mg) by mouth daily 4 tablet      Allergies   Allergen Reactions     Augmentin Diarrhea     Immunization History   Administered Date(s) Administered     COVID-19,PF,Moderna 11/18/2021, 05/11/2022     Influenza (High Dose) 3 valent vaccine 09/15/2014, 09/08/2015, 09/12/2016, 09/11/2017, 09/24/2019     Influenza (IIV3) PF 11/01/2008, 09/17/2009, 09/20/2010, 08/19/2011, 08/30/2012     Influenza Quad, Recombinant, pf(RIV4) (Flublok) 10/05/2020     Influenza Vaccine, 6+MO IM (QUADRIVALENT W/PRESERVATIVES) 10/01/2017     Influenza, Quad, High Dose, Pf, 65yr+ (Fluzone HD) 10/08/2021     Pneumo Conj 13-V (2010&after) 09/18/2015     Pneumococcal 23 valent 01/06/2008, 09/11/2017     TD (ADULT, 7+) 12/02/2009       Medications list and allergies in the facility chart have been reviewed.  Please see facility EMR for most up to date list.       Review of Systems   Patient denies fever, chills, headache, lightheadedness, dizziness, rhinorrhea, cough, congestion, shortness of breath, chest pain, palpitations, abdominal pain, n/v, diarrhea, constipation, change in sleep pattern, dysuria, frequency, burning or pain with urination.  Other than stated in HPI all other review of systems is negative.         Physical Exam  Vital signs:BP 97/63   Pulse 113   Temp 97.9  F (36.6  C)   Resp 16   Ht 1.753 m (5' 9\")   Wt 71.2 kg (157 lb)   SpO2 91%   BMI 23.18 kg/m     GENERAL APPEARANCE: thin, elderly male,  in no acute distress.  HEENT: normocephalic, atraumatic   sclerae anicteric, conjunctivae clear and moist, EOM intact  LUNGS: coarse rhonchi entire left lung.  respiratory effort normal.  CARD: RRR, S1, S2, without murmurs, gallops, rubs  BACK: No kyphosis of the thoracic spine.   ABD: Soft, nondistended and nontender with normal bowel " sounds.   MSK: right sided paresis   EXTREMITIES: No cyanosis, clubbing or edema  NEURO: Alert and oriented x 3. Expressive aphasia,  Face is symmetric.  SKIN: no new open areas, rashes or petechiae   PSYCH: irritable          Labs:    Last Comprehensive Metabolic Panel:  Sodium   Date Value Ref Range Status   08/02/2022 137 136 - 145 mmol/L Final   07/01/2020 139 133 - 144 mmol/L Final     Potassium   Date Value Ref Range Status   08/08/2022 3.8 3.5 - 5.0 mmol/L Final   07/01/2020 4.1 3.4 - 5.3 mmol/L Final     Chloride   Date Value Ref Range Status   08/02/2022 105 98 - 107 mmol/L Final   07/01/2020 107 94 - 109 mmol/L Final     Carbon Dioxide   Date Value Ref Range Status   07/01/2020 31 20 - 32 mmol/L Final     Carbon Dioxide (CO2)   Date Value Ref Range Status   08/02/2022 27 22 - 31 mmol/L Final     Anion Gap   Date Value Ref Range Status   08/02/2022 5 5 - 18 mmol/L Final   07/01/2020 1 (L) 3 - 14 mmol/L Final     Glucose   Date Value Ref Range Status   08/02/2022 107 70 - 125 mg/dL Final   07/01/2020 73 70 - 99 mg/dL Final     Comment:     Non Fasting     Urea Nitrogen   Date Value Ref Range Status   08/02/2022 17 8 - 28 mg/dL Final   07/01/2020 13 7 - 30 mg/dL Final     Creatinine   Date Value Ref Range Status   08/02/2022 0.79 0.70 - 1.30 mg/dL Final   07/01/2020 1.15 0.66 - 1.25 mg/dL Final     GFR Estimate   Date Value Ref Range Status   08/02/2022 89 >60 mL/min/1.73m2 Final     Comment:     Effective December 21, 2021 eGFRcr in adults is calculated using the 2021 CKD-EPI creatinine equation which includes age and gender (Torin et al., NEJM, DOI: 10.1056/YIZIap0807023)   07/01/2020 60 (L) >60 mL/min/[1.73_m2] Final     Comment:     Non  GFR Calc  Starting 12/18/2018, serum creatinine based estimated GFR (eGFR) will be   calculated using the Chronic Kidney Disease Epidemiology Collaboration   (CKD-EPI) equation.       Calcium   Date Value Ref Range Status   08/02/2022 8.1 (L) 8.5 - 10.5  mg/dL Final   07/01/2020 8.4 (L) 8.5 - 10.1 mg/dL Final     Lab Results   Component Value Date    WBC 13.3 08/02/2022    WBC 9.5 07/01/2020     Lab Results   Component Value Date    RBC 5.22 08/02/2022    RBC 5.09 07/01/2020     Lab Results   Component Value Date    HGB 15.8 08/02/2022    HGB 16.2 07/01/2020     Lab Results   Component Value Date    HCT 48.9 08/02/2022    HCT 50.9 07/01/2020     Lab Results   Component Value Date    MCV 94 08/02/2022     07/01/2020     Lab Results   Component Value Date    MCH 30.3 08/02/2022    MCH 31.8 07/01/2020     Lab Results   Component Value Date    MCHC 32.3 08/02/2022    MCHC 31.8 07/01/2020     Lab Results   Component Value Date    RDW 14.5 08/02/2022    RDW 15.0 07/01/2020     Lab Results   Component Value Date     08/02/2022     07/01/2020           Assessment/plan:   Chest congestion  Chest xray to determine dx. ddx chf exacerbation, pneumonia or COPD exacerbation.     Chronic systolic heart failure (H)  Weight  Down 8lbs, due to high HR will add an extra dose of lasix today.  Will change metoprolol to BID to decrease very low Bps. Check BNP tomorrow    Atrial fibrillation, unspecified type (H)  Continues to have high rates, will split Metoprolol BID however may need to add digoxin.  Continue on xarelto    Stage 3 chronic kidney disease, unspecified whether stage 3a or 3b CKD (H)  Check BMP and CBC tomorrow.     Cellulitis of left lower extremity  Resolved. No longer on antibiotics, has PAD and on plavix though 11/10   vascular follow up was done with updated wound care orders.      COPD(H)  Continue with Breo and ipratropium inhalers, pending chest xray may need to add pulmicort and duonebs    History of CVA (cerebrovascular accident)  Right sided paresis and expressive aphasia.  continue with rosuvastatin and xarelto- no change         Electronically signed by: Thuy Wang NP

## 2022-08-24 NOTE — LETTER
8/24/2022        RE: Bradley Liz  [address] [facility] [address]        Code Status:  DNR/DNI  Visit Type: RECHECK     Facility:   [facility] ([facility]) [[id number]]        History of Present Illness:   Hospital Admission Date: 7/21/2022     Hospital Discharge Date: 8/8/2022      Bradley Liz is a 81 year old male with past medical history for Afib, cardiomyopathy, CKD, hx of CVA, HTN, HLD.  He was recently hospitalized for LLE wound infection and cellulitis.  He was found to be + for COVID19.  He was treated with IV antibiotics. On 7/22 he had IR angioplasty of LLE.  covid progressed to pneumonia and was started on dexamethasone and O2.  He was transitioned to a prednisone taper and discharge to U.      Today, I follow up with patient due to nursing reporting BP 78/48.  After 2 hours BP was 97/63, he denies any symptoms of hypotensions.  HR running low 100s but is typically in the 90s.  Upon exam he does have coarse rhonchi throughout left lung.  He denies a cough or SOB.        Current Outpatient Medications   Medication Sig Dispense Refill     clopidogrel (PLAVIX) 75 MG tablet Take 1 tablet (75 mg) by mouth daily for 93 days 95 tablet 0     fluticasone-vilanterol (BREO ELLIPTA) 100-25 MCG/INH inhaler Inhale 1 puff into the lungs daily 30 each 0     furosemide (LASIX) 20 MG tablet Take 1 tablet (20 mg) by mouth daily for 30 days 30 tablet 0     ipratropium-albuterol (COMBIVENT RESPIMAT)  MCG/ACT inhaler Inhale 1 puff into the lungs 4 times daily as needed for shortness of breath / dyspnea or wheezing 1 each 0     metoprolol succinate ER (TOPROL XL) 200 MG 24 hr tablet TAKE 1 TABLET BY MOUTH DAILY 90 tablet 0     order for DME Please draw INR as ordered and as needed. Call results to [facility] at (p) [phone] or fax them to (f) [phone] 3 Month 3     rivaroxaban ANTICOAGULANT (XARELTO ANTICOAGULANT) 20 MG TABS tablet Take 1 tablet (20 mg) by  "mouth daily (with dinner) 90 tablet 3     rosuvastatin (CRESTOR) 20 MG tablet Take 1 tablet (20 mg) by mouth every evening 30 tablet 1     tamsulosin (FLOMAX) 0.4 MG capsule Take 1 capsule (0.4 mg) by mouth daily 30 capsule 0     vitamin C (ASCORBIC ACID) 500 MG tablet Take 1 tablet (500 mg) by mouth daily 4 tablet      Allergies   Allergen Reactions     Augmentin Diarrhea     Immunization History   Administered Date(s) Administered     COVID-19,PF,Moderna 11/18/2021, 05/11/2022     Influenza (High Dose) 3 valent vaccine 09/15/2014, 09/08/2015, 09/12/2016, 09/11/2017, 09/24/2019     Influenza (IIV3) PF 11/01/2008, 09/17/2009, 09/20/2010, 08/19/2011, 08/30/2012     Influenza Quad, Recombinant, pf(RIV4) (Flublok) 10/05/2020     Influenza Vaccine, 6+MO IM (QUADRIVALENT W/PRESERVATIVES) 10/01/2017     Influenza, Quad, High Dose, Pf, 65yr+ (Fluzone HD) 10/08/2021     Pneumo Conj 13-V (2010&after) 09/18/2015     Pneumococcal 23 valent 01/06/2008, 09/11/2017     TD (ADULT, 7+) 12/02/2009       Medications list and allergies in the facility chart have been reviewed.  Please see facility EMR for most up to date list.       Review of Systems   Patient denies fever, chills, headache, lightheadedness, dizziness, rhinorrhea, cough, congestion, shortness of breath, chest pain, palpitations, abdominal pain, n/v, diarrhea, constipation, change in sleep pattern, dysuria, frequency, burning or pain with urination.  Other than stated in HPI all other review of systems is negative.         Physical Exam  Vital signs:BP 97/63   Pulse 113   Temp 97.9  F (36.6  C)   Resp 16   Ht 1.753 m (5' 9\")   Wt 71.2 kg (157 lb)   SpO2 91%   BMI 23.18 kg/m     GENERAL APPEARANCE: thin, elderly male,  in no acute distress.  HEENT: normocephalic, atraumatic   sclerae anicteric, conjunctivae clear and moist, EOM intact  LUNGS: coarse rhonchi entire left lung.  respiratory effort normal.  CARD: RRR, S1, S2, without murmurs, gallops, rubs  BACK: No " kyphosis of the thoracic spine.   ABD: Soft, nondistended and nontender with normal bowel sounds.   MSK: right sided paresis   EXTREMITIES: No cyanosis, clubbing or edema  NEURO: Alert and oriented x 3. Expressive aphasia,  Face is symmetric.  SKIN: no new open areas, rashes or petechiae   PSYCH: irritable          Labs:    Last Comprehensive Metabolic Panel:  Sodium   Date Value Ref Range Status   08/02/2022 137 136 - 145 mmol/L Final   07/01/2020 139 133 - 144 mmol/L Final     Potassium   Date Value Ref Range Status   08/08/2022 3.8 3.5 - 5.0 mmol/L Final   07/01/2020 4.1 3.4 - 5.3 mmol/L Final     Chloride   Date Value Ref Range Status   08/02/2022 105 98 - 107 mmol/L Final   07/01/2020 107 94 - 109 mmol/L Final     Carbon Dioxide   Date Value Ref Range Status   07/01/2020 31 20 - 32 mmol/L Final     Carbon Dioxide (CO2)   Date Value Ref Range Status   08/02/2022 27 22 - 31 mmol/L Final     Anion Gap   Date Value Ref Range Status   08/02/2022 5 5 - 18 mmol/L Final   07/01/2020 1 (L) 3 - 14 mmol/L Final     Glucose   Date Value Ref Range Status   08/02/2022 107 70 - 125 mg/dL Final   07/01/2020 73 70 - 99 mg/dL Final     Comment:     Non Fasting     Urea Nitrogen   Date Value Ref Range Status   08/02/2022 17 8 - 28 mg/dL Final   07/01/2020 13 7 - 30 mg/dL Final     Creatinine   Date Value Ref Range Status   08/02/2022 0.79 0.70 - 1.30 mg/dL Final   07/01/2020 1.15 0.66 - 1.25 mg/dL Final     GFR Estimate   Date Value Ref Range Status   08/02/2022 89 >60 mL/min/1.73m2 Final     Comment:     Effective December 21, 2021 eGFRcr in adults is calculated using the 2021 CKD-EPI creatinine equation which includes age and gender (Torin padilla al., NEJM, DOI: 10.1056/JRMRmi7062539)   07/01/2020 60 (L) >60 mL/min/[1.73_m2] Final     Comment:     Non  GFR Calc  Starting 12/18/2018, serum creatinine based estimated GFR (eGFR) will be   calculated using the Chronic Kidney Disease Epidemiology Collaboration    (CKD-EPI) equation.       Calcium   Date Value Ref Range Status   08/02/2022 8.1 (L) 8.5 - 10.5 mg/dL Final   07/01/2020 8.4 (L) 8.5 - 10.1 mg/dL Final     Lab Results   Component Value Date    WBC 13.3 08/02/2022    WBC 9.5 07/01/2020     Lab Results   Component Value Date    RBC 5.22 08/02/2022    RBC 5.09 07/01/2020     Lab Results   Component Value Date    HGB 15.8 08/02/2022    HGB 16.2 07/01/2020     Lab Results   Component Value Date    HCT 48.9 08/02/2022    HCT 50.9 07/01/2020     Lab Results   Component Value Date    MCV 94 08/02/2022     07/01/2020     Lab Results   Component Value Date    MCH 30.3 08/02/2022    MCH 31.8 07/01/2020     Lab Results   Component Value Date    MCHC 32.3 08/02/2022    MCHC 31.8 07/01/2020     Lab Results   Component Value Date    RDW 14.5 08/02/2022    RDW 15.0 07/01/2020     Lab Results   Component Value Date     08/02/2022     07/01/2020           Assessment/plan:   Chest congestion  Chest xray to determine dx. ddx chf exacerbation, pneumonia or COPD exacerbation.     Chronic systolic heart failure (H)  Weight  Down 8lbs, due to high HR will add an extra dose of lasix today.  Will change metoprolol to BID to decrease very low Bps. Check BNP tomorrow    Atrial fibrillation, unspecified type (H)  Continues to have high rates, will split Metoprolol BID however may need to add digoxin.  Continue on xarelto    Stage 3 chronic kidney disease, unspecified whether stage 3a or 3b CKD (H)  Check BMP and CBC tomorrow.     Cellulitis of left lower extremity  Resolved. No longer on antibiotics, has PAD and on plavix though 11/10   vascular follow up was done with updated wound care orders.      COPD(H)  Continue with Breo and ipratropium inhalers, pending chest xray may need to add pulmicort and duonebs    History of CVA (cerebrovascular accident)  Right sided paresis and expressive aphasia.  continue with rosuvastatin and xarelto- no change         Electronically  signed by: Thuy Wang, NP          Sincerely,        Thuy Wang, NP

## 2022-08-25 NOTE — TELEPHONE ENCOUNTER
[facility] Lab Note     Provider: JOSELO Nayak  Facility: [facility] [place]) Facility Type:  TCU    Allergies   Allergen Reactions     Augmentin Diarrhea       Labs Reviewed by provider: Heme 2, BMP, BNP, Mg     Verbal Order/Direction given by Provider: Give an extra dose of Lasix 20mg x 1 now.  Starting 8/26, give Lasix 40mg daily.  Potassium 40meq x 1 dose now.  Starting 8/26, give potassium 20meq daily.      Provider giving Order:  JOSELO Nayak    Verbal Order given to: Micah([phone])    Deniz Hollingsworth RN

## 2022-08-26 PROBLEM — I48.91 ATRIAL FIBRILLATION WITH RVR (H): Status: ACTIVE | Noted: 2022-01-01

## 2022-08-26 NOTE — ED NOTES
Daughter is at the bedside. With PTs permission MD and RN gave the daughter an update on the PTs care.

## 2022-08-26 NOTE — PROGRESS NOTES
[facility] team is asked to see Bradley Liz in consultation to evaluate atrial fibrillation with rapid ventricular response..      Assessment:     1. Atrial fibrillation with rapid ventricular response in 82-year-old gentleman, newly diagnosed with COVID-19.  Suspect viral infection is etiology of loss of atrial fibrillation rate controlled.  No new pneumonias evident on chest x-ray.  Rate control adequate with IV diltiazem, plan to switch to oral regimen.  2. Hypotension, marginal, previous LV function normal by echo 2018.  ECG shows no acute changes, would aggressively support blood pressure with fluid resuscitation  3. Peripheral vascular disease, continues Xarelto with clopidogrel.  4. Acute COVID-19 infection     Plan:     1. Diltiazem 30 mg orally every 6 hours, hold for systolic blood pressure less than 100.  Discontinue IV diltiazem 1 hour after first oral dose.  Plan to switch to 120 mg of sustained-release preparation tomorrow  2. Continue Xarelto.     Current History:     Bradley Liz is an 82-year-old gentleman with a history of peripheral arterial disease, chronic atrial fibrillation with controlled ventricular response.  He has been maintained on clopidogrel 75 mg daily plus Xarelto 20 mg daily along with Crestor 20 mg daily.  He reports that he is ambulatory with a walker.  He status post bilateral lower extremity revascularization which was partially successful.    He reports that this a.m. he became more short of breath and weak.  He denied chest pain.  He was not aware of racing heart rate.  He has not missed any medications to his knowledge.  In the emergency room he was found to be in atrial fibrillation with rapid ventricular response.  He received IV diltiazem with reduction in his heart rate to 100 bpm.  No pauses demonstrated.  He feels considerably improved, but has a loose cough.  He denies fevers chills or sweats.  COVID-19 test positive.    He  denies any lower extremity edema though his activities are limited because of his peripheral vascular disease.  He denies orthopnea or paroxysmal nocturnal dyspnea.  He denies any sputum production.      Past Medical History:     Past Medical History:   Diagnosis Date     Acute kidney injury (H) 8/5/2019         Past Surgical History:     Past Surgical History:   Procedure Laterality Date     IR LOWER EXTREMITY ANGIOGRAM LEFT  7/22/2022     IR LOWER EXTREMITY ANGIOGRAM RIGHT  7/25/2022     LAPAROSCOPIC CHOLECYSTECTOMY N/A 7/9/2019    Procedure: Laparoscopic cholecystectomy;  Surgeon: Ad Peck MD;  Location: [facility] OR     PICC DOUBLE LUMEN PLACEMENT  7/24/2022            Family History:     Family History   Problem Relation Age of Onset     Unknown/Adopted Mother      Prostate Cancer Father      C.A.D. Father         age 77     Family history reviewed and is not pertinent to the chief complaint or presenting problem    Social History:    reports that he quit smoking about 13 years ago. He quit after 50.00 years of use. He has never used smokeless tobacco. He reports current alcohol use. He reports that he does not use drugs.  Exercise history: Walks with a walker    Meds:     Current Outpatient Medications   Medication Sig Dispense Refill     budesonide (PULMICORT) 1 MG/2ML neb solution Take 1 mg by nebulization 2 times daily       clopidogrel (PLAVIX) 75 MG tablet Take 1 tablet (75 mg) by mouth daily for 93 days 95 tablet 0     fluticasone-vilanterol (BREO ELLIPTA) 100-25 MCG/INH inhaler Inhale 1 puff into the lungs daily 30 each 0     furosemide (LASIX) 40 MG tablet Take 40 mg by mouth daily       ipratropium - albuterol 0.5 mg/2.5 mg/3 mL (DUONEB) 0.5-2.5 (3) MG/3ML neb solution Take 1 vial by nebulization 4 times daily       ipratropium-albuterol (COMBIVENT RESPIMAT)  MCG/ACT inhaler Inhale 1 puff into the lungs 4 times daily as needed for shortness of breath / dyspnea or wheezing 1 each 0      "metoprolol succinate ER (TOPROL XL) 200 MG 24 hr tablet TAKE 1 TABLET BY MOUTH DAILY (Patient taking differently: 100 mg 2 times daily) 90 tablet 0     potassium chloride ER (KLOR-CON M) 20 MEQ CR tablet Take 20 mEq by mouth daily       rivaroxaban ANTICOAGULANT (XARELTO ANTICOAGULANT) 20 MG TABS tablet Take 1 tablet (20 mg) by mouth daily (with dinner) 90 tablet 3     rosuvastatin (CRESTOR) 20 MG tablet Take 1 tablet (20 mg) by mouth every evening 30 tablet 1     tamsulosin (FLOMAX) 0.4 MG capsule Take 1 capsule (0.4 mg) by mouth daily 30 capsule 0     vitamin C (ASCORBIC ACID) 500 MG tablet Take 1 tablet (500 mg) by mouth daily 4 tablet      order for DME Please draw INR as ordered and as needed. Call results to [facility] at (p) [phone] or fax them to (f) [phone] 3 Month 3       Allergies:   Augmentin    Review of Systems:   A 12 point comprehensive review of systems was negative except as noted in the current history.    Objective:      Physical Exam  Wt Readings from Last 3 Encounters:   08/26/22 74.8 kg (165 lb)   08/26/22 70.8 kg (156 lb)   08/24/22 71.2 kg (157 lb)     Body mass index is 24.37 kg/m .  /63   Pulse 102   Temp 98  F (36.7  C) (Temporal)   Resp 20   Ht 1.753 m (5' 9\")   Wt 74.8 kg (165 lb)   SpO2 96%   BMI 24.37 kg/m      General Appearance:  No apparent distress.  HEENT: No scleral icterus; the mucous membranes were pink and moist.  Conjunctiva not injected  Neck:  No cervical bruits, jugular venous distention, or thyromegaly.  Chest:The spine was straight. The chest was symmetric.  Lungs:  Respirations unlabored; coarse cough with upper airway rhonchi.  No audible rales.  Cardiovascular:    Normal point of maximal impulse. Auscultation reveals irregularly irregular first and second heart sounds with no murmurs, rubs, or gallops. Carotid, radial, and dorsalis pedal pulses are intact and symmetric.  Abdomen:  No organomegaly, masses, bruits, or " tenderness. Bowels sounds are present  Extremities: No cyanosis, clubbing, or edema.  Lower extremity dressings in place not removed.  Skin:  No xanthelasma.  Neurologic: Mood and affect are appropriate. Speech is fluent.  Alert, oriented x3      EKG (personally reviewed): 8/26/2022: Atrial fibrillation at 130 bpm.  Nonspecific ST and T wave changes.  No significant change versus prior other than increased rate    Imaging   EXAM: XR CHEST PORT 1 VIEW  LOCATION: [facility]  DATE/TIME: 8/26/2022 11:19 AM  INDICATION: Palpitations.  COMPARISON: 8.7.22. Others.                                                            IMPRESSION:   Lungs are clear. Bilateral paratracheal stripe widening (retrospectively similar to prior), exaggerated by technique and indeterminate on this exam. Chest CT could be considered to evaluate abnormal thickening versus normal variation or mediastinal fat.  No pleural effusion or pneumothorax. Stable upper normal heart size, degenerative by technique.       Cardiac diagnostics    Echocardiogram 2018 at [facility]:  The rhythm was atrial fibrillation.  1. Low normal left ventricular systolic function. Visually estimated LVEF 50-  55%.  2. Grossly normal right ventricular size and systolic function.  3. No hemodynamically significant valve disease.        Lab Review     Lab Results   Component Value Date    HGB 13.6 08/26/2022    HGB 16.2 07/01/2020     Lab Results   Component Value Date     08/26/2022     07/01/2020     Lab Results   Component Value Date    POTASSIUM 4.4 08/26/2022    POTASSIUM 4.1 07/01/2020     Lab Results   Component Value Date    BUN 25 08/26/2022    BUN 13 07/01/2020     Lab Results   Component Value Date    CR 2.02 08/26/2022    CR 1.15 07/01/2020     Lab Results   Component Value Date    CHOL 149 07/16/2021    CHOL 162 07/01/2020    HDL 46 07/16/2021    HDL 46 07/01/2020    LDL 77 07/16/2021    LDL 73 07/01/2020    TRIG 129  07/16/2021    TRIG 216 07/01/2020    CHOLHDLRATIO 3.1 08/25/2015           Jaxon Fitzgerald MD  [facility]  8/26/2022    Note created using Dragon voice recognition software.  Sound alike errors may have escaped editing.    Clinically Significant Risk Factors Present on Admission           # Hypomagnesemia: Mg = 1.4 mg/dL (Ref range: 1.8 - 2.6 mg/dL) on admission, will replace as needed    # Acute Kidney Injury, unspecified: based on a >150% or 0.3 mg/dL increase in creatinine on admission compared to past 90 day average, will monitor renal function  # Coagulation Defect: home medication list includes an anticoagulant medication  # Platelet Defect: home medication list includes an antiplatelet medication

## 2022-08-26 NOTE — LETTER
8/26/2022        RE: Bradley Liz  [address] [facility] [address]        Code Status:  DNR/DNI  Visit Type: RECHECK     Facility:   [facility] [[id number]]        History of Present Illness:   Hospital Admission Date: 7/21/2022     Hospital Discharge Date: 8/8/2022      Bradley Liz is a 81 year old male with past medical history for Afib, cardiomyopathy, CKD, hx of CVA, HTN, HLD.  He was recently hospitalized for LLE wound infection and cellulitis.  He was found to be + for COVID19.  He was treated with IV antibiotics. On 7/22 he had IR angioplasty of LLE.  covid progressed to pneumonia and was started on dexamethasone and O2.  He was transitioned to a prednisone taper and discharge to U.      Today, nursing reports a fall this AM.  SBP 70s, I have been making adjustments to medications this week in hopes of balancing high HR with low Bps.  Plan to start on digoxin for Afib.  Chest congestion this week and xray showed no acute process.  Started on increased doses of lasix for presumed CHF and elevated BNP.  Today, he has significant audible congestion and he endorses SOB.  Nursing reports that nebs were not given as ordered because they hadn't be delivered by pharmacy yet.  He was started on O2 for Sat in the 80s.  He reports feeling weak and fatigued.         Current Outpatient Medications   Medication Sig Dispense Refill     budesonide (PULMICORT) 1 MG/2ML neb solution Take 1 mg by nebulization 2 times daily       clopidogrel (PLAVIX) 75 MG tablet Take 1 tablet (75 mg) by mouth daily for 93 days 95 tablet 0     fluticasone-vilanterol (BREO ELLIPTA) 100-25 MCG/INH inhaler Inhale 1 puff into the lungs daily 30 each 0     furosemide (LASIX) 40 MG tablet Take 40 mg by mouth daily       ipratropium - albuterol 0.5 mg/2.5 mg/3 mL (DUONEB) 0.5-2.5 (3) MG/3ML neb solution Take 1 vial by nebulization 4 times daily       ipratropium-albuterol (COMBIVENT RESPIMAT)  MCG/ACT  inhaler Inhale 1 puff into the lungs 4 times daily as needed for shortness of breath / dyspnea or wheezing 1 each 0     metoprolol succinate ER (TOPROL XL) 200 MG 24 hr tablet TAKE 1 TABLET BY MOUTH DAILY (Patient taking differently: 100 mg 2 times daily) 90 tablet 0     order for DME Please draw INR as ordered and as needed. Call results to [facility] at (p) [phone] or fax them to (f) [phone] 3 Month 3     potassium chloride ER (KLOR-CON M) 20 MEQ CR tablet Take 20 mEq by mouth daily       rivaroxaban ANTICOAGULANT (XARELTO ANTICOAGULANT) 20 MG TABS tablet Take 1 tablet (20 mg) by mouth daily (with dinner) 90 tablet 3     rosuvastatin (CRESTOR) 20 MG tablet Take 1 tablet (20 mg) by mouth every evening 30 tablet 1     tamsulosin (FLOMAX) 0.4 MG capsule Take 1 capsule (0.4 mg) by mouth daily 30 capsule 0     vitamin C (ASCORBIC ACID) 500 MG tablet Take 1 tablet (500 mg) by mouth daily 4 tablet      Allergies   Allergen Reactions     Augmentin Diarrhea     Immunization History   Administered Date(s) Administered     COVID-19,PF,Moderna 11/18/2021, 05/11/2022     Influenza (High Dose) 3 valent vaccine 09/15/2014, 09/08/2015, 09/12/2016, 09/11/2017, 09/24/2019     Influenza (IIV3) PF 11/01/2008, 09/17/2009, 09/20/2010, 08/19/2011, 08/30/2012     Influenza Quad, Recombinant, pf(RIV4) (Flublok) 10/05/2020     Influenza Vaccine, 6+MO IM (QUADRIVALENT W/PRESERVATIVES) 10/01/2017     Influenza, Quad, High Dose, Pf, 65yr+ (Fluzone HD) 10/08/2021     Pneumo Conj 13-V (2010&after) 09/18/2015     Pneumococcal 23 valent 01/06/2008, 09/11/2017     TD (ADULT, 7+) 12/02/2009       Medications list and allergies in the facility chart have been reviewed.  Please see facility EMR for most up to date list.       Review of Systems   Patient denies fever, chills, headache, rhinorrhea, cough,  chest pain, palpitations, abdominal pain, n/v, diarrhea, constipation, change in sleep pattern, dysuria, frequency,  "burning or pain with urination.  Other than stated in HPI all other review of systems is negative.         Physical Exam  Vital signs:BP (!) 84/54   Pulse (!) 156   Temp 99  F (37.2  C)   Resp 16   Ht 1.753 m (5' 9\")   Wt 70.8 kg (156 lb)   SpO2 (!) 80%   BMI 23.04 kg/m     GENERAL APPEARANCE: well nourished, elderly man with dyspnea  HEENT: normocephalic, atraumatic  sclerae anicteric, conjunctivae clear and moist, EOM intact  LUNGS: coarse rhonchi throughout, dyspnea, increase RR.  CARD: difficult to assess due to coarse lung rhonchi, irregularly irregular  ABD: Soft, nondistended and nontender   MSK: Muscle strength and tone were equal bilaterally. Moves all extremities easily and intentionally.   EXTREMITIES: No cyanosis, clubbing or edema.  NEURO: Alert with cognitive impairment.  Face is symmetric.  SKIN: Inspection of the skin reveals no rashes, ulcerations or petechiae.  PSYCH: euthymic            Labs:    Last Comprehensive Metabolic Panel:  Sodium   Date Value Ref Range Status   08/26/2022 135 (L) 136 - 145 mmol/L Final   07/01/2020 139 133 - 144 mmol/L Final     Potassium   Date Value Ref Range Status   08/26/2022 4.4 3.5 - 5.0 mmol/L Final   07/01/2020 4.1 3.4 - 5.3 mmol/L Final     Chloride   Date Value Ref Range Status   08/26/2022 101 98 - 107 mmol/L Final   07/01/2020 107 94 - 109 mmol/L Final     Carbon Dioxide   Date Value Ref Range Status   07/01/2020 31 20 - 32 mmol/L Final     Carbon Dioxide (CO2)   Date Value Ref Range Status   08/26/2022 24 22 - 31 mmol/L Final     Anion Gap   Date Value Ref Range Status   08/26/2022 10 5 - 18 mmol/L Final   07/01/2020 1 (L) 3 - 14 mmol/L Final     Glucose   Date Value Ref Range Status   08/26/2022 140 (H) 70 - 125 mg/dL Final   07/01/2020 73 70 - 99 mg/dL Final     Comment:     Non Fasting     Urea Nitrogen   Date Value Ref Range Status   08/26/2022 25 8 - 28 mg/dL Final   07/01/2020 13 7 - 30 mg/dL Final     Creatinine   Date Value Ref Range Status "   08/26/2022 2.02 (H) 0.70 - 1.30 mg/dL Final   07/01/2020 1.15 0.66 - 1.25 mg/dL Final     GFR Estimate   Date Value Ref Range Status   08/26/2022 32 (L) >60 mL/min/1.73m2 Final     Comment:     Effective December 21, 2021 eGFRcr in adults is calculated using the 2021 CKD-EPI creatinine equation which includes age and gender (Torin et al., NE, DOI: 10.[facility]6/QCHTlx7681344)   07/01/2020 60 (L) >60 mL/min/[1.73_m2] Final     Comment:     Non  GFR Calc  Starting 12/18/2018, serum creatinine based estimated GFR (eGFR) will be   calculated using the Chronic Kidney Disease Epidemiology Collaboration   (CKD-EPI) equation.       Calcium   Date Value Ref Range Status   08/26/2022 8.0 (L) 8.5 - 10.5 mg/dL Final   07/01/2020 8.4 (L) 8.5 - 10.1 mg/dL Final     Lab Results   Component Value Date    WBC 13.3 08/02/2022    WBC 9.5 07/01/2020     Lab Results   Component Value Date    RBC 5.22 08/02/2022    RBC 5.09 07/01/2020     Lab Results   Component Value Date    HGB 15.8 08/02/2022    HGB 16.2 07/01/2020     Lab Results   Component Value Date    HCT 48.9 08/02/2022    HCT 50.9 07/01/2020     Lab Results   Component Value Date    MCV 94 08/02/2022     07/01/2020     Lab Results   Component Value Date    MCH 30.3 08/02/2022    MCH 31.8 07/01/2020     Lab Results   Component Value Date    MCHC 32.3 08/02/2022    MCHC 31.8 07/01/2020     Lab Results   Component Value Date    RDW 14.5 08/02/2022    RDW 15.0 07/01/2020     Lab Results   Component Value Date     08/02/2022     07/01/2020           Assessment/plan:   Chest congestion  Hypoxia  Chronic systolic heart failure (H)  Atrial fibrillation, unspecified type (H)  Orthostatic hypotension  At this point, patient needs emergent evaluation, workup, med adjustment and treatment.  Send to ER.           Electronically signed by: Thuy Wang NP          Sincerely,        Thuy Wang, NP

## 2022-08-26 NOTE — DISCHARGE INSTRUCTIONS
LLE wound care: 3 times weekly  Wound treatment: wound treatment will include irrigation and dressings to promote autolytic debridement which will include: Cleanse with dilute hibiclens 30cc in 500cc NS.   Apply medihoney to wound and cover with abd pad and gauze roll to be changed 3 times per week.   If for some reason the patient is not able to get their dressing(s) changed as outlined above (due to illness, lack of supplies, lack of help) please do the following: remove old, soiled dressings; wash the wounds with saline; pat dry; apply ABD pad or other absorbant pad and secure with rolled gauze; avoid tape directly on your skin; patient instructed to call the clinic as soon as possible to let us know what the current issues are in receiving wound care.     R heel wound care: Every 3 days  Cleanse with saline, gently dry.  Apply Mepilex 4 x 4 to cover wound.    Ensure heels are floating at all times while in bed or recliner. May use pillows or boots to off-load.

## 2022-08-26 NOTE — ED NOTES
Per Dr. Lia MD from Cards, patient Dilt drip should be discharge one hour after admin of Diltiazem Tablet, 30 mg

## 2022-08-26 NOTE — ED TRIAGE NOTES
The patient presents to the ED via EMS from TCU with c/o shortness of breath. Per EMS report, TCU staff reported patient was hypoxic. Patients SpO2 96% on R/A for EMS. Patient has audible crackles and cough. Patient A-Fib w/ RVR en route.

## 2022-08-26 NOTE — PHARMACY-ADMISSION MEDICATION HISTORY
Pharmacy Note - Admission Medication History    Pertinent Provider Information: N/A     ______________________________________________________________________    Prior To Admission (PTA) med list completed and updated in EMR.       PTA Med List   Medication Sig Last Dose     budesonide (PULMICORT) 1 MG/2ML neb solution Take 1 mg by nebulization 2 times daily 8/26/2022 at 0730     clopidogrel (PLAVIX) 75 MG tablet Take 1 tablet (75 mg) by mouth daily for 93 days 8/26/2022 at 0800     fluticasone-vilanterol (BREO ELLIPTA) 100-25 MCG/INH inhaler Inhale 1 puff into the lungs daily 8/26/2022 at 0800     furosemide (LASIX) 40 MG tablet Take 40 mg by mouth daily 8/26/2022 at 0800     ipratropium - albuterol 0.5 mg/2.5 mg/3 mL (DUONEB) 0.5-2.5 (3) MG/3ML neb solution Take 1 vial by nebulization 4 times daily 8/26/2022 at 0730     ipratropium-albuterol (COMBIVENT RESPIMAT)  MCG/ACT inhaler Inhale 1 puff into the lungs 4 times daily as needed for shortness of breath / dyspnea or wheezing Unknown at Unknown time     metoprolol succinate ER (TOPROL XL) 200 MG 24 hr tablet TAKE 1 TABLET BY MOUTH DAILY (Patient taking differently: 100 mg 2 times daily) 8/26/2022 at 0730     potassium chloride ER (KLOR-CON M) 20 MEQ CR tablet Take 20 mEq by mouth daily 8/26/2022 at 0800     rivaroxaban ANTICOAGULANT (XARELTO ANTICOAGULANT) 20 MG TABS tablet Take 1 tablet (20 mg) by mouth daily (with dinner) 8/25/2022 at 1800     rosuvastatin (CRESTOR) 20 MG tablet Take 1 tablet (20 mg) by mouth every evening 8/25/2022 at 2000     tamsulosin (FLOMAX) 0.4 MG capsule Take 1 capsule (0.4 mg) by mouth daily 8/26/2022 at 0800     vitamin C (ASCORBIC ACID) 500 MG tablet Take 1 tablet (500 mg) by mouth daily 8/26/2022 at 0800       Information source(s): Facility ([facility] medication list/MAR  Method of interview communication: N/A    Summary of Changes to PTA Med List  New: N/A  Discontinued: N/A  Changed: N/A    Patient was asked about  OTC/herbal products specifically.  PTA med list reflects this.    In the past week, patient estimated taking medication this percent of the time:  greater than 90%.    Allergies were reviewed, assessed, and updated with the patient.      Patient did not bring any medications to the hospital and can't retrieve from home. No multi-dose medications are available for use during hospital stay.     The information provided in this note is only as accurate as the sources available at the time of the update(s).    Thank you for the opportunity to participate in the care of this patient.    Stephan Mi [facility]  8/26/2022 11:25 AM

## 2022-08-26 NOTE — ED NOTES
MD informed the PTs BP is 90/52 with a MAP of 69 and a HR of 103. Per MD the PT should remain on 5mg of Diltiazem.

## 2022-08-26 NOTE — PROGRESS NOTES
Code Status:  DNR/DNI  Visit Type: RECHECK     Facility:   [facility] ([facility]) [[id number]]        History of Present Illness:   Hospital Admission Date: 7/21/2022     Hospital Discharge Date: 8/8/2022      Bradley Liz is a 81 year old male with past medical history for Afib, cardiomyopathy, CKD, hx of CVA, HTN, HLD.  He was recently hospitalized for LLE wound infection and cellulitis.  He was found to be + for COVID19.  He was treated with IV antibiotics. On 7/22 he had IR angioplasty of LLE.  covid progressed to pneumonia and was started on dexamethasone and O2.  He was transitioned to a prednisone taper and discharge to U.      Today, nursing reports a fall this AM.  SBP 70s, I have been making adjustments to medications this week in hopes of balancing high HR with low Bps.  Plan to start on digoxin for Afib.  Chest congestion this week and xray showed no acute process.  Started on increased doses of lasix for presumed CHF and elevated BNP.  Today, he has significant audible congestion and he endorses SOB.  Nursing reports that nebs were not given as ordered because they hadn't be delivered by pharmacy yet.  He was started on O2 for Sat in the 80s.  He reports feeling weak and fatigued.         Current Outpatient Medications   Medication Sig Dispense Refill     budesonide (PULMICORT) 1 MG/2ML neb solution Take 1 mg by nebulization 2 times daily       clopidogrel (PLAVIX) 75 MG tablet Take 1 tablet (75 mg) by mouth daily for 93 days 95 tablet 0     fluticasone-vilanterol (BREO ELLIPTA) 100-25 MCG/INH inhaler Inhale 1 puff into the lungs daily 30 each 0     furosemide (LASIX) 40 MG tablet Take 40 mg by mouth daily       ipratropium - albuterol 0.5 mg/2.5 mg/3 mL (DUONEB) 0.5-2.5 (3) MG/3ML neb solution Take 1 vial by nebulization 4 times daily       ipratropium-albuterol (COMBIVENT RESPIMAT)  MCG/ACT inhaler Inhale 1 puff into the lungs 4 times daily as needed for shortness of breath / dyspnea  or wheezing 1 each 0     metoprolol succinate ER (TOPROL XL) 200 MG 24 hr tablet TAKE 1 TABLET BY MOUTH DAILY (Patient taking differently: 100 mg 2 times daily) 90 tablet 0     order for DME Please draw INR as ordered and as needed. Call results to [facility] at (p) [phone] or fax them to (f) [phone] 3 Month 3     potassium chloride ER (KLOR-CON M) 20 MEQ CR tablet Take 20 mEq by mouth daily       rivaroxaban ANTICOAGULANT (XARELTO ANTICOAGULANT) 20 MG TABS tablet Take 1 tablet (20 mg) by mouth daily (with dinner) 90 tablet 3     rosuvastatin (CRESTOR) 20 MG tablet Take 1 tablet (20 mg) by mouth every evening 30 tablet 1     tamsulosin (FLOMAX) 0.4 MG capsule Take 1 capsule (0.4 mg) by mouth daily 30 capsule 0     vitamin C (ASCORBIC ACID) 500 MG tablet Take 1 tablet (500 mg) by mouth daily 4 tablet      Allergies   Allergen Reactions     Augmentin Diarrhea     Immunization History   Administered Date(s) Administered     COVID-19,PF,Moderna 11/18/2021, 05/11/2022     Influenza (High Dose) 3 valent vaccine 09/15/2014, 09/08/2015, 09/12/2016, 09/11/2017, 09/24/2019     Influenza (IIV3) PF 11/01/2008, 09/17/2009, 09/20/2010, 08/19/2011, 08/30/2012     Influenza Quad, Recombinant, pf(RIV4) (Flublok) 10/05/2020     Influenza Vaccine, 6+MO IM (QUADRIVALENT W/PRESERVATIVES) 10/01/2017     Influenza, Quad, High Dose, Pf, 65yr+ (Fluzone HD) 10/08/2021     Pneumo Conj 13-V (2010&after) 09/18/2015     Pneumococcal 23 valent 01/06/2008, 09/11/2017     TD (ADULT, 7+) 12/02/2009       Medications list and allergies in the facility chart have been reviewed.  Please see facility EMR for most up to date list.       Review of Systems   Patient denies fever, chills, headache, rhinorrhea, cough,  chest pain, palpitations, abdominal pain, n/v, diarrhea, constipation, change in sleep pattern, dysuria, frequency, burning or pain with urination.  Other than stated in HPI all other review of systems is  "negative.         Physical Exam  Vital signs:BP (!) 84/54   Pulse (!) 156   Temp 99  F (37.2  C)   Resp 16   Ht 1.753 m (5' 9\")   Wt 70.8 kg (156 lb)   SpO2 (!) 80%   BMI 23.04 kg/m     GENERAL APPEARANCE: well nourished, elderly man with dyspnea  HEENT: normocephalic, atraumatic  sclerae anicteric, conjunctivae clear and moist, EOM intact  LUNGS: coarse rhonchi throughout, dyspnea, increase RR.  CARD: difficult to assess due to coarse lung rhonchi, irregularly irregular  ABD: Soft, nondistended and nontender   MSK: Muscle strength and tone were equal bilaterally. Moves all extremities easily and intentionally.   EXTREMITIES: No cyanosis, clubbing or edema.  NEURO: Alert with cognitive impairment.  Face is symmetric.  SKIN: Inspection of the skin reveals no rashes, ulcerations or petechiae.  PSYCH: euthymic            Labs:    Last Comprehensive Metabolic Panel:  Sodium   Date Value Ref Range Status   08/26/2022 135 (L) 136 - 145 mmol/L Final   07/01/2020 139 133 - 144 mmol/L Final     Potassium   Date Value Ref Range Status   08/26/2022 4.4 3.5 - 5.0 mmol/L Final   07/01/2020 4.1 3.4 - 5.3 mmol/L Final     Chloride   Date Value Ref Range Status   08/26/2022 101 98 - 107 mmol/L Final   07/01/2020 107 94 - 109 mmol/L Final     Carbon Dioxide   Date Value Ref Range Status   07/01/2020 31 20 - 32 mmol/L Final     Carbon Dioxide (CO2)   Date Value Ref Range Status   08/26/2022 24 22 - 31 mmol/L Final     Anion Gap   Date Value Ref Range Status   08/26/2022 10 5 - 18 mmol/L Final   07/01/2020 1 (L) 3 - 14 mmol/L Final     Glucose   Date Value Ref Range Status   08/26/2022 140 (H) 70 - 125 mg/dL Final   07/01/2020 73 70 - 99 mg/dL Final     Comment:     Non Fasting     Urea Nitrogen   Date Value Ref Range Status   08/26/2022 25 8 - 28 mg/dL Final   07/01/2020 13 7 - 30 mg/dL Final     Creatinine   Date Value Ref Range Status   08/26/2022 2.02 (H) 0.70 - 1.30 mg/dL Final   07/01/2020 1.15 0.66 - 1.25 mg/dL Final "     GFR Estimate   Date Value Ref Range Status   08/26/2022 32 (L) >60 mL/min/1.73m2 Final     Comment:     Effective December 21, 2021 eGFRcr in adults is calculated using the 2021 CKD-EPI creatinine equation which includes age and gender (Torin et al., NEJM, DOI: 10.1056/ALBImi5985892)   07/01/2020 60 (L) >60 mL/min/[1.73_m2] Final     Comment:     Non  GFR Calc  Starting 12/18/2018, serum creatinine based estimated GFR (eGFR) will be   calculated using the Chronic Kidney Disease Epidemiology Collaboration   (CKD-EPI) equation.       Calcium   Date Value Ref Range Status   08/26/2022 8.0 (L) 8.5 - 10.5 mg/dL Final   07/01/2020 8.4 (L) 8.5 - 10.1 mg/dL Final     Lab Results   Component Value Date    WBC 13.3 08/02/2022    WBC 9.5 07/01/2020     Lab Results   Component Value Date    RBC 5.22 08/02/2022    RBC 5.09 07/01/2020     Lab Results   Component Value Date    HGB 15.8 08/02/2022    HGB 16.2 07/01/2020     Lab Results   Component Value Date    HCT 48.9 08/02/2022    HCT 50.9 07/01/2020     Lab Results   Component Value Date    MCV 94 08/02/2022     07/01/2020     Lab Results   Component Value Date    MCH 30.3 08/02/2022    MCH 31.8 07/01/2020     Lab Results   Component Value Date    MCHC 32.3 08/02/2022    MCHC 31.8 07/01/2020     Lab Results   Component Value Date    RDW 14.5 08/02/2022    RDW 15.0 07/01/2020     Lab Results   Component Value Date     08/02/2022     07/01/2020           Assessment/plan:   Chest congestion  Hypoxia  Chronic systolic heart failure (H)  Atrial fibrillation, unspecified type (H)  Orthostatic hypotension  At this point, patient needs emergent evaluation, workup, med adjustment and treatment.  Send to ER.           Electronically signed by: Thuy Wang NP

## 2022-08-26 NOTE — PROGRESS NOTES
Late Friday consult received. No WOC available for in person assessment at .    Chart review shows patient admitted today with area of injury to R heel which appears to be pressure injury.  Bloody drainage documented to be found on patient's sock.  Will begin Mepilex dressing to site over the weekend.  Heels should be floated at all times while in bed or the recliner.    Patient noted to have chronic LLE injury. Orders written as able (product formulary may be limited) to complete wound cares during hospitalization. Will place OP recommendations to continue OP plan of cares at discharge.    Will plan in person WOC assessment on Monday, 8/29/22.  Cesia Skaggs RN CWOCN

## 2022-08-26 NOTE — ED PROVIDER NOTES
EMERGENCY DEPARTMENT ENCOUNTER      NAME: Bradley Liz  AGE: 82 year old male  YOB: 1940  MRN: [id number]  EVALUATION DATE & TIME: 8/26/2022  9:56 AM    PCP: Verito Lima    ED PROVIDER: Lyle Hardy D.O.      Chief Complaint   Patient presents with     Shortness of Breath       FINAL IMPRESSION:  1. Atrial fibrillation with RVR (H)    2. ANGIE (acute kidney injury) (H)        ED COURSE & MEDICAL DECISION MAKING:    10:14 AM I met with the patient to gather history and to perform my initial exam. I discussed the plan for care while in the Emergency Department.  12:05 PM I rechecked and updated the patient and family.  12:33 PM I discussed the patient with Dr. Vasquez from the hospitalist service who agrees to admit the patient.           Pertinent Labs & Imaging studies reviewed. (See chart for details)  82 year old male presents to the Emergency Department for evaluation of shortness of breath.  Patient does have known COVID recovery from last month, has had a persistent cough.  Chest x-ray does not show any evidence of pneumonia.  Patient did attempt to walk to the bathroom today, and he reports that he stumbled on the way to the bathroom falling onto his buttocks without significant pain or injury or hitting his head.  In the emergency department he was noted to have some increased oxygen demand he was on 1 L by nasal cannula to help keep his oxygen saturations up.  He did have some coarse upper airway sounds, but otherwise was largely unremarkable.  He was noted to be in atrial fibrillation with RVR with an irregular rate and rhythm on exam.  Initial dose of diltiazem, I did improve his heart rate some, but also causes blood pressure to drop significantly, therefore we switched him over to a drip from push doses which he has been tolerating more appropriately.  His BNP is somewhat elevated, which may explain some of his shortness of breath as it does show some likely rate related CHF.  First  troponin is negative and there was no evidence of ischemia on his EKG.  At this time the plan will be for admission for further management of his atrial fibrillation with RVR.  Discussed with the hospitalist who agreed to the admission    At the conclusion of the encounter I discussed the results of all of the tests and the disposition. The questions were answered. The patient or family acknowledged understanding and was agreeable with the care plan.      CRITICAL CARE:  Critical Care  Performed by: ELVI COTTO  Authorized by: ELVI COTTO  Total critical care time: 35 minutes  Critical care time was exclusive of separately billable procedures and treating other patients.  Critical care was necessary to treat or prevent imminent or life-threatening deterioration of the following conditions: atrial fibrillation with RVR  Critical care was time spent personally by me on the following activities: development of treatment plan with patient or surrogate, discussions with consultants, examination of patient, evaluation of patient's response to treatment, obtaining history from patient or surrogate, ordering and performing treatments and interventions, ordering and review of laboratory studies, ordering and review of radiographic studies and re-evaluation of patient's condition, this excludes any separately billable procedures.          HPI    Patient information was obtained from: patient     Use of : N/A      Bradley Liz is a 82 year old male who presents for evaluation of shortness of breath.     Per chart review, patient was admitted to [facility] from 7/21/22 - 8/8/22 (18 days) for left lower extremity wound infection and cellulitis. Patient was directly admitted from vascular surgery clinic. Diagnosed with COVID-19 viral infection. Treated with IV antibiotics for cellulitis and COVID19 with dexamethasone and redmesivir to treat COVID19 Viral Pneumonia (ruled in, treated).  Developed worsening respiratory failure on 7/29/2022 with up titration of oxygen from 2 L to 10 L/min and then back to 2L/min and overall patient appeared stable and improved after treatment for possible COPD exacerbation as well. IR angioplasty on 7/22 of left lower extremities. Left lower extremity cellulitis course eventually fully completed with Augmentin. Completed a prednisone taper with continued O2 weaning at TCU. PCP and vascular surgery follow-up.       Patient presents by EMS from a TCU for shortness of breath, lightheadedness, and weakness. He does not use supplemental oxygen at baseline. O2 sats were 96% on room air for EMS. EKG showed Afib with RVR. Patient states he is otherwise doing well. No pain related complaints at this time. He denies chest pain, palpitations, nausea, abdominal pain, diarrhea. He is unsure about any recent fevers. Former smoker. No other complaints or concerns expressed at this time.    Of note, on additional history patient reported he had a fall this morning. He landed on his buttocks and did not hit his head.       REVIEW OF SYSTEMS  Constitutional: Positive for weakness. Denies fever, chills, weight loss  Eyes:  No pain, discharge, redness  HENT:  Denies sore throat, ear pain, congestion  Respiratory: Positive for shortness of breath. No wheeze  Cardiovascular:  No CP, palpitations  GI:  Denies abdominal pain, nausea, vomiting, diarrhea  : Denies dysuria, hematuria  Musculoskeletal:  Denies any new muscle/joint pain, swelling or loss of function.  Skin:  Denies rash, pallor  Neurologic:  Denies headache, focal weakness, numbness. Positive for lightheadedness.   Lymph: Denies swollen nodes    All other systems negative unless noted in HPI.    PAST MEDICAL HISTORY:  Past Medical History:   Diagnosis Date     Acute kidney injury (H) 8/5/2019       PAST SURGICAL HISTORY:  Past Surgical History:   Procedure Laterality Date     IR LOWER EXTREMITY ANGIOGRAM LEFT  7/22/2022     IR  LOWER EXTREMITY ANGIOGRAM RIGHT  2022     LAPAROSCOPIC CHOLECYSTECTOMY N/A 2019    Procedure: Laparoscopic cholecystectomy;  Surgeon: Ad Peck MD;  Location: [facility] OR     PICC DOUBLE LUMEN PLACEMENT  2022              CURRENT MEDICATIONS:    Current Facility-Administered Medications   Medication     diltiazem (CARDIZEM) 125 mg in sodium chloride 0.7 % 125 mL infusion     Current Outpatient Medications   Medication     budesonide (PULMICORT) 1 MG/2ML neb solution     clopidogrel (PLAVIX) 75 MG tablet     fluticasone-vilanterol (BREO ELLIPTA) 100-25 MCG/INH inhaler     furosemide (LASIX) 40 MG tablet     ipratropium - albuterol 0.5 mg/2.5 mg/3 mL (DUONEB) 0.5-2.5 (3) MG/3ML neb solution     ipratropium-albuterol (COMBIVENT RESPIMAT)  MCG/ACT inhaler     metoprolol succinate ER (TOPROL XL) 200 MG 24 hr tablet     potassium chloride ER (KLOR-CON M) 20 MEQ CR tablet     rivaroxaban ANTICOAGULANT (XARELTO ANTICOAGULANT) 20 MG TABS tablet     rosuvastatin (CRESTOR) 20 MG tablet     tamsulosin (FLOMAX) 0.4 MG capsule     vitamin C (ASCORBIC ACID) 500 MG tablet     order for DME         ALLERGIES:  Allergies   Allergen Reactions     Augmentin Diarrhea       FAMILY HISTORY:  Family History   Problem Relation Age of Onset     Unknown/Adopted Mother      Prostate Cancer Father      C.A.D. Father         age 77       SOCIAL HISTORY:  Social History     Socioeconomic History     Marital status:    Tobacco Use     Smoking status: Former Smoker     Years: 50.00     Quit date: 2008     Years since quittin.7     Smokeless tobacco: Never Used   Vaping Use     Vaping Use: Never used   Substance and Sexual Activity     Alcohol use: Yes     Comment: couple beers occ     Drug use: No     Sexual activity: Yes     Partners: Female   Other Topics Concern     Parent/sibling w/ CABG, MI or angioplasty before 65F 55M? No       VITALS:  Patient Vitals for the past 24 hrs:   BP Temp Temp src Pulse Resp  "SpO2 Height Weight   08/26/22 1242 95/51 -- -- 104 22 96 % -- --   08/26/22 1212 91/51 -- -- 105 22 90 % -- --   08/26/22 1121 92/50 -- -- 104 27 96 % -- --   08/26/22 1100 (!) 83/44 -- -- 103 -- -- -- --   08/26/22 1045 (!) 80/46 -- -- 110 -- 95 % -- --   08/26/22 1030 98/47 -- -- 110 27 95 % -- --   08/26/22 1015 117/60 -- -- (!) 130 29 94 % -- --   08/26/22 1000 104/52 98  F (36.7  C) Temporal (!) 123 28 96 % 1.753 m (5' 9\") 74.8 kg (165 lb)       PHYSICAL EXAM    VITAL SIGNS: BP 95/51   Pulse 104   Temp 98  F (36.7  C) (Temporal)   Resp 22   Ht 1.753 m (5' 9\")   Wt 74.8 kg (165 lb)   SpO2 96%   BMI 24.37 kg/m      General Appearance: Well-appearing, well-nourished, no acute distress   Head:  Normocephalic, without obvious abnormality, atraumatic  Eyes:  PERRL, conjunctiva/corneas clear, EOM's intact,  ENT:  Lips, mucosa, and tongue normal, membranes are moist without pallor  Neck:  Normal ROM, symmetrical, trachea midline    Cardio:  Tachycardic, irregular rhythm, no murmur, rub or gallop, 2+ pulses symmetric in all extremities  Pulm: Coarse upper airway sounds bilaterally, respirations unlabored,  Abdomen:  Soft, non-tender, no rebound or guarding.  Musculoskeletal: Full ROM, no edema, no cyanosis, good ROM of major joints  Integument:  Warm, Dry, No erythema, No rash.    Neurologic:  Alert & oriented.  No focal deficits appreciated.  Ambulatory.  Psychiatric:  Affect normal, Judgment normal, Mood normal.      LABS  Results for orders placed or performed during the hospital encounter of 08/26/22 (from the past 24 hour(s))   CBC with platelets + differential    Narrative    The following orders were created for panel order CBC with platelets + differential.  Procedure                               Abnormality         Status                     ---------                               -----------         ------                     CBC with platelets and d...[[id number]]  Abnormal            Final result      "            Please view results for these tests on the individual orders.   Basic metabolic panel   Result Value Ref Range    Sodium 135 (L) 136 - 145 mmol/L    Potassium 4.4 3.5 - 5.0 mmol/L    Chloride 101 98 - 107 mmol/L    Carbon Dioxide (CO2) 24 22 - 31 mmol/L    Anion Gap 10 5 - 18 mmol/L    Urea Nitrogen 25 8 - 28 mg/dL    Creatinine 2.02 (H) 0.70 - 1.30 mg/dL    Calcium 8.0 (L) 8.5 - 10.5 mg/dL    Glucose 140 (H) 70 - 125 mg/dL    GFR Estimate 32 (L) >60 mL/min/1.73m2   Troponin I (now)   Result Value Ref Range    Troponin I 0.04 0.00 - 0.29 ng/mL   B-Type Natriuretic Peptide ([facility] Only)   Result Value Ref Range     (H) 0 - 91 pg/mL   CBC with platelets and differential   Result Value Ref Range    WBC Count 14.7 (H) 4.0 - 11.0 10e3/uL    RBC Count 4.46 4.40 - 5.90 10e6/uL    Hemoglobin 13.6 13.3 - 17.7 g/dL    Hematocrit 41.4 40.0 - 53.0 %    MCV 93 78 - 100 fL    MCH 30.5 26.5 - 33.0 pg    MCHC 32.9 31.5 - 36.5 g/dL    RDW 16.0 (H) 10.0 - 15.0 %    Platelet Count 310 150 - 450 10e3/uL    % Neutrophils 83 %    % Lymphocytes 10 %    % Monocytes 4 %    % Eosinophils 1 %    % Basophils 0 %    % Immature Granulocytes 2 %    NRBCs per 100 WBC 0 <1 /100    Absolute Neutrophils 12.0 (H) 1.6 - 8.3 10e3/uL    Absolute Lymphocytes 1.5 0.8 - 5.3 10e3/uL    Absolute Monocytes 0.6 0.0 - 1.3 10e3/uL    Absolute Eosinophils 0.1 0.0 - 0.7 10e3/uL    Absolute Basophils 0.0 0.0 - 0.2 10e3/uL    Absolute Immature Granulocytes 0.3 <=0.4 10e3/uL    Absolute NRBCs 0.0 10e3/uL   [place] Draw    Narrative    The following orders were created for panel order [place] Draw.  Procedure                               Abnormality         Status                     ---------                               -----------         ------                     Extra Red Top Tube[[id number]]                               Final result                 Please view results for these tests on the individual orders.   Extra Red Top Tube   Result  Value Ref Range    Hold Specimen JIC    XR Chest Port 1 View    Narrative    EXAM: XR CHEST PORT 1 VIEW  LOCATION: [facility]  DATE/TIME: 8/26/2022 11:19 AM    INDICATION: Palpitations.  COMPARISON: 8.7.22. Others.      Impression    IMPRESSION:     Lungs are clear. Bilateral paratracheal stripe widening (retrospectively similar to prior), exaggerated by technique and indeterminate on this exam. Chest CT could be considered to evaluate abnormal thickening versus normal variation or mediastinal fat.    No pleural effusion or pneumothorax. Stable upper normal heart size, degenerative by technique.              RADIOLOGY  XR Chest Port 1 View   Final Result   IMPRESSION:       Lungs are clear. Bilateral paratracheal stripe widening (retrospectively similar to prior), exaggerated by technique and indeterminate on this exam. Chest CT could be considered to evaluate abnormal thickening versus normal variation or mediastinal fat.      No pleural effusion or pneumothorax. Stable upper normal heart size, degenerative by technique.                    EKG:    Rate: 130 bpm  Rhythm: Atrial fibrillation with RVR  Axis: Normal  Interval: Normal  Conduction: Normal  QRS: Normal  ST: Normal  T-wave: Normal  QT: Not prolonged  Comparison EKG: No significant change compared to 30 July 2022  Impression:  No acute ischemic change   I have independently reviewed and interpreted today's EKG, pending Cardiologist read        MEDICATIONS GIVEN IN THE EMERGENCY:  Medications   diltiazem (CARDIZEM) 125 mg in sodium chloride 0.7 % 125 mL infusion (5 mg/hr Intravenous New Bag 8/26/22 1108)   diltiazem (CARDIZEM) injection 10 mg (10 mg Intravenous Given 8/26/22 1036)   0.9% sodium chloride BOLUS (0 mLs Intravenous Stopped 8/26/22 1111)       NEW PRESCRIPTIONS STARTED AT TODAY'S ER VISIT  New Prescriptions    No medications on file        Lyle Hardy D.O.  Emergency Medicine  [facility]  EMERGENCY ROOM  [address] [place] [id number]  [phone]  Dept: [phone]     Lyle Hardy,   08/26/22 1255       Lyle Hardy,   08/26/22 1257

## 2022-08-27 PROBLEM — N17.9 ACUTE RENAL INJURY (H): Status: ACTIVE | Noted: 2022-01-01

## 2022-08-27 PROBLEM — N17.9 ACUTE RENAL INJURY (H): Chronic | Status: ACTIVE | Noted: 2022-01-01

## 2022-08-27 PROBLEM — Z86.16 HISTORY OF COVID-19: Status: ACTIVE | Noted: 2022-01-01

## 2022-08-27 PROBLEM — Z86.16 HISTORY OF COVID-19: Chronic | Status: ACTIVE | Noted: 2022-01-01

## 2022-08-27 PROBLEM — I48.91 ATRIAL FIBRILLATION WITH RVR (H): Chronic | Status: ACTIVE | Noted: 2022-01-01

## 2022-08-27 PROBLEM — Z66 DNR (DO NOT RESUSCITATE): Chronic | Status: ACTIVE | Noted: 2022-01-01

## 2022-08-27 PROBLEM — I10 HYPERTENSION, UNSPECIFIED TYPE: Chronic | Status: ACTIVE | Noted: 2019-09-17

## 2022-08-27 PROBLEM — Z66 DNR (DO NOT RESUSCITATE): Status: ACTIVE | Noted: 2022-01-01

## 2022-08-27 NOTE — PROGRESS NOTES
[facility] MEDICINE PROGRESS NOTE      Identification/Summary: Bradley Liz is a 82 year old male with a past medical history of multiple medical problems including history of stroke atrial fibrillation with rapid cardiac response history of acute renal insufficiencyTendency towards hypotension COVID infection diagnosed a month ago probably in active currently although still tests PCR positive; resides in a transitional care unit chronic anticoagulation chronic wounds with overall functional status poor CODE STATUS has been DNR       who was admitted on 8/26/2022 for .  Atrial fibrillation with rapid ventricular response hospital course is notable for.  Hypotension originally on diltiazem heart rates up to 150 range loose cough see cardiology note      Plan heart rates 1 5160 today we will restart diltiazem at a low dose to try to avoid hypotension start digoxin  will load him with a total dose of 0.75 mg and 0.125 daily monitoring digoxin level going forward  Oxygen supplementation as required  Hold Flomax given tendency towards hypotension  DNR CODE STATUS noted  Consultation help with infectious disease and cardiology greatly appreciated    Assessment and Plan:  Active Problems:    Atrial fibrillation with RVR (H) (8/26/2022)    Long term (current) use of anticoagulants (3/24/2015)    Chronic systolic heart failure (H) (8/27/2018)    History of CVA (cerebrovascular accident) (8/5/2019)    Infection due to 2019 novel coronavirus (7/21/2022)    History of COVID-19 (8/27/2022)    Acute renal injury (H) (8/27/2022)    DNR (do not resuscitate) (8/27/2022)        Clinically Significant Risk Factors Present on Admission                     Diet: Combination Diet Low Saturated Fat Na <2400mg Diet, No Caffeine Diet  DVT Prophylaxis:  DOAC  Code Status: No CPR- Do NOT Intubate    Anticipated possible discharge in  days to  once  milestones are met.  Disposition Plan         The patient's  care was discussed with the Bedside Nurse and Patient.    Deniz Estes MD  [facility]  Phone: #[phone]    Interval History/Subjective:  He himself is very ill no complaints no issues of pain    Physical Exam/Objective:  Temp:  [97.5  F (36.4  C)-98.3  F (36.8  C)] 98  F (36.7  C)  Pulse:  [] 132  Resp:  [16-26] 16  BP: ()/(38-55) 91/51  SpO2:  [83 %-99 %] 83 %  Body mass index is 23.17 kg/m .  He appears chronically and acutely ill he has a loose cough with some sputum that is difficult to get up    Data reviewed today: I personally reviewed all new medications, labs, imaging/diagnostics reports over the past 24 hours. Pertinent findings include:    Imaging:   No results found for this or any previous visit (from the past 24 hour(s)).    Labs:    Medications:   Personally Reviewed.  Medications     dilTIAZem HCl-Sodium Chloride 5 mg/hr (08/27/22 0517)     - MEDICATION INSTRUCTIONS -         budesonide  1 mg Nebulization BID     clopidogrel  75 mg Oral Daily     [START ON 8/28/2022] digoxin  125 mcg Intravenous Daily     digoxin  250 mcg Intravenous Q4H     fluticasone-vilanterol  1 puff Inhalation Daily     metoprolol succinate ER  100 mg Oral BID     potassium chloride ER  20 mEq Oral Daily     rivaroxaban ANTICOAGULANT  15 mg Oral Daily with supper     rosuvastatin  20 mg Oral QPM     sodium chloride (PF)  3 mL Intracatheter Q8H     tamsulosin  0.4 mg Oral Daily     vitamin C  500 mg Oral Daily

## 2022-08-27 NOTE — PROGRESS NOTES
Care Management Follow Up    Length of Stay (days): 1    Expected Discharge Date:  TBD     Concerns to be Addressed: BP soft        Patient plan of care discussed at interdisciplinary rounds: Yes    Anticipated Discharge Disposition: TCU    Anticipated Discharge Services: TCU    Anticipated Discharge DME:  TBD    Patient/family educated on Medicare website which has current facility and service quality ratings:  (N/A)    Education Provided on the Discharge Plan:  AVS per bedside RN.    Patient/Family in Agreement with the Plan: yes    Referrals Placed by CM/SW:  (N/A)      Additional Information:  Chart reviewed. Patient was at [facility] prior to admission. Anticipate he will discharge to TCU. Transportation TBD. CM will continue to follow.       Nadine Hernandez RN

## 2022-08-27 NOTE — CONSULTS
Care Management Initial Consult    General Information  Assessment completed with: Patient,       Primary Care Provider verified and updated as needed: Yes   Readmission within the last 30 days:  (Discharged from [facility] on 8/8 to [facility] TCU)         Advance Care Planning: Advance Care Planning Reviewed: present on chart, verified with patient        Communication Assessment  Patient's communication style: spoken language (English or Bilingual)       Cognitive  Cognitive/Neuro/Behavioral: WDL                      Living Environment:   People in home: spouse  Barbara  Current living Arrangements: house ([facility])      Able to return to prior arrangements: yes     Family/Social Support:  Care provided by: self  Provides care for: no one  Marital Status:   Wife, Children (2 sons and 1 daughter)  Barbara       Description of Support System: Supportive, Involved      Current Resources:   Patient receiving home care services: No     Community Resources: None  Equipment currently used at home:  (walker only)  Supplies currently used at home: Wound Care Supplies    Employment/Financial:  Employment Status: retired     Employment/ Comments: no  or VA affiliation  Financial Concerns:     Referral to Financial Worker: No     Functional Status:  Prior to admission patient needed assistance:   Dependent ADLs:: Independent  Dependent IADLs:: Independent     Mental Health Status:  Mental Health Status:  (denies)       Chemical Dependency Status:  Chemical Dependency Status:  (denies)           Values/Beliefs:  Spiritual, Cultural Beliefs, Faith Practices, Values that affect care: no               Additional Information:  Chart reviewed. Cardiology and WOC consults. Diltiazem drip with transition to PO.   *Discharged from [facility] on 8/8 to [facility] TCU. Per notes from recent hospital discharge, digna Sampson should be primary family contact.     CM completed  assessment with patient- spoke briefly with wife Barbara (who confirms wanting patient to return to [facility] TCU), unable to reach daughter Camilla. Patient and wife state daughter is at a concert this evening and likely won't answer the phone.     Anticipate will need PT/OT evaluations and likely to resubmit for [employer] insurance prior authorization for TCU placement (CM placed sticky note to MD).   No PAS should be needed for return to facility.   Transport TBD.     Daughter Camilla returned call to  and confirms plan is for patient to return to [facility]CU at [facility].     Margo Carrion RN

## 2022-08-27 NOTE — PHARMACY-VANCOMYCIN DOSING SERVICE
"Pharmacy Vancomycin Initial Note  Date of Service 2022  Patient's  1940  82 year old, male    Indication: Aspiration Pneumonia    Current estimated CrCl = Estimated Creatinine Clearance: 33.5 mL/min (A) (based on SCr of 1.71 mg/dL (H)).    Creatinine for last 3 days  2022:  7:45 AM Creatinine 1.23 mg/dL  2022: 10:31 AM Creatinine 2.02 mg/dL  2022:  1:43 AM Creatinine 1.71 mg/dL    Recent Vancomycin Level(s) for last 3 days  No results found for requested labs within last 72 hours.      Vancomycin IV Administrations (past 72 hours)      No vancomycin orders with administrations in past 72 hours.                Nephrotoxins and other renal medications (From now, onward)    Start     Dose/Rate Route Frequency Ordered Stop    22 1630  piperacillin-tazobactam (ZOSYN) 3.375 g vial to attach to  mL bag        Note to Pharmacy: For [facility], [facility] and [facility]: For Zosyn-naive patients, use the \"Zosyn initial dose + extended infusion\" order panel.    3.375 g  over 240 Minutes Intravenous EVERY 6 HOURS 22 1619      22 1630  vancomycin 1000 mg in 0.9% NaCl 250 mL intermittent infusion 1,000 mg         1,000 mg  over 60 Minutes Intravenous EVERY 24 HOURS 22 1627            Contrast Orders - past 72 hours (72h ago, onward)    None          InsightRX Prediction of Planned Initial Vancomycin Regimen  Regimen: 1000 mg IV every 24 hours.  Start time: 16:26 on 2022  Exposure target: AUC24 (range)400-600 mg/L.hr   AUC24,ss: 557 mg/L.hr  Probability of AUC24 > 400: 85 %  Ctrough,ss: 18.3 mg/L  Probability of Ctrough,ss > 20: 40 %  Probability of nephrotoxicity (Lodise [place] ): 15 %      Plan:  1. Start vancomycin  1000 mg IV q24h.   2. Vancomycin monitoring method: AUC  3. Vancomycin therapeutic monitoring goal: 400-600 mg*h/L  4. Pharmacy will check vancomycin levels as appropriate in 1-3 Days.    5. Serum creatinine levels will be ordered daily for the first week of therapy " and at least twice weekly for subsequent weeks.      Carlene Gasca, [facility]

## 2022-08-27 NOTE — UTILIZATION REVIEW
Admission Status; Secondary Review Determination   Under the authority of the Utilization Management Committee, the utilization review process indicated a secondary review on Bradley Liz. The review outcome is based on review of the medical records, discussions with staff, and applying clinical experience noted on the date of the review.   (x) Inpatient Status Appropriate - This patient's medical care is consistent with medical management for inpatient care and reasonable inpatient medical practice.     RATIONALE FOR DETERMINATION   82-year-old male with uncontrolled RVR from atrial fibrillation and hypoxia.  Rate currently in the 130s.  Now on diltiazem drip with intermittent doses of IV digoxin.  O2 sat currently on room air is 83%.  Blood pressures have been running low thus affecting medication adjustments.  Has been COVID-positive since last month.  Cardiology following.    At the time of admission with the information available to the attending physician more than 2 nights Hospital complex care was anticipated, based on patient risk of adverse outcome if treated as outpatient and complex care required. Inpatient admission is appropriate based on the Medicare guidelines.   The information on this document is developed by the utilization review team in order for the business office to ensure compliance. This only denotes the appropriateness of proper admission status and does not reflect the quality of care rendered.   The definitions of Inpatient Status and Observation Status used in making the determination above are those provided in the CMS Coverage Manual, Chapter 1 and Chapter 6, section 70.4.   Sincerely,   Marc Lima MD  Utilization Review  Physician Advisor  [facility]

## 2022-08-27 NOTE — CONSULTS
CRITICAL CARE CONSULT:    Assessment/Plan:  82 y M hx CKD, prior CVA, afib on xarelto, here initially after a fall at TCU. Have been working on RVR rate control. He is positive for COVID after recent infection and is considered recovered. He had a clear CXR yesterday and was on 1 L O2.    We are consulted today because his O2 need increased acutely to 15L in the setting of RVR to 160-170s and soft Bps. He is COVID positive with recent prior infection, and considered COVID recovered.    Recommendations:    Stat CXR now given acute increase in O2 need. Clear CXR yest. May have flashed in the setting of RVR. BNP 1848 yest. Received IVF yest for low Bps.     Lasix pending Bps and CXR    Bipap versus HFNC pending CXR. Looks comfortable now on facemask    Have asked Dr. Estes to notify cardiology who is following the patient    He has been given dig. His Bps are not tolerating diltiazem. Given another 250 digoxin now for 500 mcg total    Hang neosynephrine at the bedside, give amiodarone bolus now and infusion.     Anticipate brief need for pressor, ok to run peripherally for now if needed.    Update echo tomorrow when Hrs better controlled.    ADDENDUM:  CXR with large focal RUL infiltrate since yesterday. Aspiration?  Add vanco/zosyn  Start HFNC    Give O2 need will change to ICU status.      Lines/Tubes:  PIVs    Prophylaxis:    Thromboembolic: xarelto    Stress Ulcer: N/A      Restraints? no    DISPOSITION: Cardiac telemetry - ICU if requires pressors    CODE STATUS: DNR/DNI    FAMILY COMMUNICATION: Updated patient and son      Total Critical Care Time : 50 minutes    Upon evaluation, this patient is critically ill with a high probability of imminent life threatening deterioration which required my direct personal management.      Haylie Saunders MD  Pulmonary and Critical Care Medicine  [facility]  Office: [phone]    Clinically Significant Risk Factors Present on Admission                 Code Status: No  CPR- Do NOT Intubate       --------------------------    CCx: SOB    HPI:   82 y M hx CKD, prior CVA, afib on xarelto, here initially after a fall at TCU. Have been working on RVR rate control. He is positive for COVID after recent infection and is considered recovered. He had a clear CXR yesterday and was on 1 L O2.    We are consulted today because his O2 need increased acutely to 15L in the setting of RVR to 160-170s.    He is on 15 L and is alert, mild tachypnea. Hypotensive with dilt, received dose of digoxin.     ROS:  A 12-system review was obtained and was negative with the exception of the symptoms endorsed in the history of present illness.    Past Medical History:  Past Medical History:   Diagnosis Date     Acute kidney injury (H) 2019     Past Surgical History:  Past Surgical History:   Procedure Laterality Date     IR LOWER EXTREMITY ANGIOGRAM LEFT  2022     IR LOWER EXTREMITY ANGIOGRAM RIGHT  2022     LAPAROSCOPIC CHOLECYSTECTOMY N/A 2019    Procedure: Laparoscopic cholecystectomy;  Surgeon: Ad Peck MD;  Location: [facility] OR     PICC DOUBLE LUMEN PLACEMENT  2022          Social History:  Social History     Socioeconomic History     Marital status:      Spouse name: Not on file     Number of children: Not on file     Years of education: Not on file     Highest education level: Not on file   Occupational History     Not on file   Tobacco Use     Smoking status: Former Smoker     Years: 50.00     Quit date: 2008     Years since quittin.7     Smokeless tobacco: Never Used   Vaping Use     Vaping Use: Never used   Substance and Sexual Activity     Alcohol use: Yes     Comment: couple beers occ     Drug use: No     Sexual activity: Yes     Partners: Female   Other Topics Concern     Parent/sibling w/ CABG, MI or angioplasty before 65F 55M? No   Social History Narrative     Not on file     Social Determinants of Health     Financial Resource Strain: Not on file  "  Food Insecurity: Not on file   Transportation Needs: Not on file   Physical Activity: Not on file   Stress: Not on file   Social Connections: Not on file   Intimate Partner Violence: Not on file   Housing Stability: Not on file     Family History:  Family History   Problem Relation Age of Onset     Unknown/Adopted Mother      Prostate Cancer Father      C.A.D. Father         age 77     Allergies:  Allergies   Allergen Reactions     Augmentin Diarrhea     Physical Exam:  Resp: 16    BP 92/53   Pulse (!) 151   Temp 98  F (36.7  C)   Resp 16   Ht 1.753 m (5' 9\")   Wt 71.2 kg (156 lb 14.4 oz)   SpO2 91%   BMI 23.17 kg/m      Intake/Output last 3 shifts:  I/O last 3 completed shifts:  In: 854.15 [P.O.:480; I.V.:124.15; IV Piggyback:250]  Out: 750 [Urine:750]  Intake/Output this shift:  No intake/output data recorded.    Physical Exam  Gen: Alert, mild tachypnea  HEENT: NT, no CYRUS  CV: Irreg/irreg, no m/g/r  Resp: Coarse  Abd: soft, nontender, BS+  Skin: no rashes or lesions  Ext: no edema, warm  Neuro: PERRL, nonfocal exam    LABS:  Reviewed in detail    IMAGING:  Personally reviewed and interpreted  CXR yest clear  Last echo 2018                    "

## 2022-08-27 NOTE — PROCEDURES
"PICC Line Insertion Procedure Note  Pt. Name: Gian Liz  MRN: [id number]          Procedure: Insertion of a  triple Lumen  5 fr  Bard SOLO (valved) Power PICC, Lot number [id number]    Indications: Access    Contraindications : n/a    Procedure Details   Patient identified with 2 identifiers and \"Time Out\" conducted.  .     Central line insertion bundle followed: hand hygeine performed prior to procedure, site cleansed with cholraprep, hat, mask, sterile gloves,sterile gown worn, patient draped with maximum barrier head to toe drape, sterile field maintained.    The vein was assessed and found to be compressible and of adequate size. 4 ml 1% Lidocaine administered sq to the insertion site. A 5 Fr PICC was inserted into the brachial vein of the right arm with ultrasound guidance. 1 attempt(s) required to access vein.   Catheter threaded without difficulty. Good blood return noted.    Modified Seldinger Technique used for insertion.    The 8 sharps that are included in the PICC insertion kit were accounted for and disposed of in the sharps container prior to breakdown of the sterile field.    Catheter secured with Statlock, biopatch and Tegaderm dressing applied.    Findings:  Total catheter length  41 cm, with 1 cm exposed. Mid upper arm circumference is 29 cm. Catheter was flushed with 30 cc NS. Patient  tolerated procedure well.    Tip placement verified by xray. Xray read by MONA Yanez . Tip placement in the tip projected over the right atrium.    CLABSI prevention brochure left at bedside.    Patient's primary RN notified PICC is ready for use.    Comments:     This RN will pull back the PICC 5cm.    BONNIE Solis, RN  [facility]  "

## 2022-08-27 NOTE — PROGRESS NOTES
Pt on 1 LPM NC. Saturating in the mid 90's. BS coarse. Will continue to monitor.    nAkit Mckeon, RT

## 2022-08-27 NOTE — PLAN OF CARE
Problem: Risk for Delirium  Goal: Improved Sleep  Outcome: Ongoing, Progressing    Resting comfortably overnight with no c/o pain. Aox4. A-fib on telemetry with rate between 90 to low 100s. Remains on 1LNC, attempted to wean off O2 unsuccessfully. External catheter utilized. Blood pressures on softer side 90s/50s. Discussed with Dr. Mckeon, new lab orders placed.

## 2022-08-28 PROBLEM — J18.9 PNEUMONIA DUE TO INFECTIOUS ORGANISM: Status: ACTIVE | Noted: 2022-01-01

## 2022-08-28 PROBLEM — I95.9 HYPOTENSION, UNSPECIFIED HYPOTENSION TYPE: Chronic | Status: ACTIVE | Noted: 2022-01-01

## 2022-08-28 PROBLEM — I95.9 HYPOTENSION, UNSPECIFIED HYPOTENSION TYPE: Status: ACTIVE | Noted: 2022-01-01

## 2022-08-28 PROBLEM — J18.9 PNEUMONIA DUE TO INFECTIOUS ORGANISM: Chronic | Status: ACTIVE | Noted: 2022-01-01

## 2022-08-28 PROBLEM — J96.01 ACUTE RESPIRATORY FAILURE WITH HYPOXIA (H): Chronic | Status: ACTIVE | Noted: 2022-01-01

## 2022-08-28 PROBLEM — J96.01 ACUTE RESPIRATORY FAILURE WITH HYPOXIA (H): Status: ACTIVE | Noted: 2022-01-01

## 2022-08-28 NOTE — PROGRESS NOTES
Care Management Discharge Note    Discharge Date: 08/31/2022       Discharge Disposition: TCU    Discharge Services: TCU    Discharge DME:  (TBD)    Discharge Transportation:TBD    PAS Confirmation Code:  Not indicated- returning to the same TCU    Patient/family educated on Medicare website which has current facility and service quality ratings:  (N/A)    Education Provided on the Discharge Plan:  AVS per bedside RN.    Persons Notified of Discharge Plans: patient    Patient/Family in Agreement with the Plan: yes        Additional Information:  Chart reviewed. CM following for discharge planning. CM called TCU to see if patient has a bed hold. It is the understanding of CM that patient's wife would like a bed hold and for patient to return to [facility] (SNF). Transportation TBD. CM will continue to follow.         Nadine Hernandez RN

## 2022-08-28 NOTE — PROGRESS NOTES
Pt received pulmicort BID. BS wet/coarse. Pt remains on oxymask 2 lpm. RT will continue to monitor.    Naz Whitney, RT

## 2022-08-28 NOTE — PROGRESS NOTES
[facility] MEDICINE PROGRESS NOTE      Identification/Summary: Bradley Liz is a 82 year old male with a past medical history of stroke syndrome COVID infection atrial fibrillation ischemic cardiomyopathy general decline recently in transitional care unit DNR who was admitted on 8/26/2022 for shortness of breath. Hospital course is notable for persistent atrial fibrillation with rapid ventricular response difficult to control chest x-ray now shows evidence of pneumonia he had a cardiac echo is pending today his oxygen requirements have decreased from yesterday but he continues to require pressor support he is alert oriented has a rather flat affect no complaints other than all the lines and tubes he is attached to  CODE STATUS has been DNR anais Rick was contacted yesterday daughter Kaitlynn was called today message left    Plan: now that his heart rate/(ventricular) is slow we are backing off of the amiodarone and intravenous digoxin his blood pressure still is soft and he is on vasopressin and Rigoberto-Synephrine attempts at weaning are going on this morning  He is covered with broad-spectrum antibiotics vancomycin and Zosyn for what looks like hospital-acquired pneumonia possible aspiration  Overall prognosis somewhat guarded cardiac echo is pending it will be interesting to see if his ejection fraction still is at 50 to 55% when it was last checked 3 years ago; his hypotension may be the result of severe ejection fraction decrease  Message left for his daughter Kaitlynn who is now the primary  anais Rick was spoken to yesterday he was visiting      Assessment and Plan:  Active Problems:    Atrial fibrillation with RVR (H) (8/26/2022)    DNR (do not resuscitate) (8/27/2022)    Acute respiratory failure with hypoxia (H) (8/28/2022)    Pneumonia of right upper lobe due to infectious organism (8/28/2022)    Long term (current) use of anticoagulants (3/24/2015)    Chronic systolic  heart failure (H) (8/27/2018)    History of CVA (cerebrovascular accident) (8/5/2019)    Infection due to 2019 novel coronavirus (7/21/2022)    History of COVID-19 (8/27/2022)    Acute renal injury (H) (8/27/2022)    Hypotension, unspecified hypotension type (8/28/2022)      Diet: Combination Diet Low Saturated Fat Na <2400mg Diet, No Caffeine Diet  DVT Prophylaxis:  DOAC  Code Status: No CPR- Do NOT Intubate    Anticipated possible discharge in  days to  once  milestones are met.  Disposition Plan         The patient's care was discussed with the Bedside Nurse, Patient, Patient's Family and Dr Steel Consultant.    Deniz Estse MD  [facility]  [facility]  Phone: #[phone]    Interval History/Subjective:  Very odd flat affect he has no complaints of pain he is not too pleased to be in the ICU and be at attached to all the tubes he does asked me to call his daughter to update her    Physical Exam/Objective:  Temp:  [97.6  F (36.4  C)-100.7  F (38.2  C)] 98.4  F (36.9  C)  Pulse:  [] 64  Resp:  [24] 24  BP: ()/() 102/57  SpO2:  [81 %-100 %] 99 %  Body mass index is 24.2 kg/m .  Systolic blood pressure 100 with vasopressin and Rigoberto-Synephrine infusion  Atrial fibrillation on monitor with ventricular rate now in the 60s  He has a loose cough; weak chronically ill    Data reviewed today: I personally reviewed all new medications, labs, imaging/diagnostics reports over the past 24 hours. Pertinent findings include:    Imaging:   Recent Results (from the past 24 hour(s))   XR Chest 1 View    Narrative    EXAM: XR CHEST 1 VIEW  LOCATION: [facility]  DATE/TIME: 8/27/2022 4:19 PM    INDICATION: increasing hypoxia; check for infiltrates pulmonary edema. compare with yesterday  COMPARISON: 8/26/2022      Impression    IMPRESSION: Large area of airspace consolidation has developed within the right upper lobe since yesterday's exam,  compatible with pneumonia. The right lower lobe and the left lung remain clear. No effusions.   XR Chest Port 1 View    Narrative    EXAM: XR CHEST PORT 1 VIEW  LOCATION: [facility]  DATE/TIME: 8/27/2022 7:22 PM    INDICATION: RN placed PICC   verify tip placement  COMPARISON: 08/27/2022 at 1604 hours      Impression    IMPRESSION: There is a new right-sided PICC with distal tip projected over the right atrium and this should be pulled back approximately 5 cm. There is less density seen to the infiltrate involving the upper right perihilar region. The remainder of the   right lung infiltrates are stable.       Labs:  Most Recent 3 CBC's:Recent Labs   Lab Test 08/28/22 0517 08/27/22  2242 08/27/22  0143 08/26/22  1031   WBC 12.1*  --  10.5 14.7*   HGB 11.9* 12.3* 12.3* 13.6   MCV 93  --  94 93     --  239 310     Most Recent 3 BMP's:Recent Labs   Lab Test 08/28/22 0517 08/27/22  0143 08/26/22  1031   * 135* 135*   POTASSIUM 3.8 3.8 4.4   CHLORIDE 100 102 101   CO2 24 26 24   BUN 22 24 25   CR 1.41* 1.71* 2.02*   ANIONGAP 8 7 10   DARON 7.6* 7.7* 8.0*   * 89 140*       Medications:   Personally Reviewed.  Medications     amiodarone Stopped (08/28/22 0906)     - MEDICATION INSTRUCTIONS -       phenylephrine 0.9 mcg/kg/min (08/28/22 0817)     vasopressin 2.4 Units/hr (08/28/22 0745)       clopidogrel  75 mg Oral Daily     digoxin  125 mcg Intravenous Daily     fluticasone-vilanterol  1 puff Inhalation Daily     metoprolol succinate ER  50 mg Oral BID     piperacillin-tazobactam  3.375 g Intravenous Q8H     potassium chloride ER  20 mEq Oral Daily     rivaroxaban ANTICOAGULANT  15 mg Oral Daily with supper     rosuvastatin  20 mg Oral QPM     sodium chloride (PF)  3 mL Intracatheter Q8H     vancomycin  1,000 mg Intravenous Q24H     vitamin C  500 mg Oral Daily

## 2022-08-28 NOTE — PLAN OF CARE
A/ox4, flat affect. At beginning of shift, pt having a-fib RVR with HR in the 120s-130s- on amio drip and gave IV diogoxin. Pt was on amio and max dose of nam drip, still not maintaining MAP >65-notified intensivist, vasopressin added and 500cc bolus of LR given. Had new low grade temp- notified MD- blood cultures drawn-results pending. Prn tylenol given x1 with improvement in temp. Pt on 7L O2 via oxymask at beginning of shift-weaning down as able, currently on 3L. LS coarse. Had BM x2 on shift. Allen patent with adequate UOP.      Problem: Plan of Care - These are the overarching goals to be used throughout the patient stay.    Goal: Absence of Hospital-Acquired Illness or Injury  Intervention: Prevent Infection  Recent Flowsheet Documentation  Taken 8/28/2022 0000 by Charmaine Pritchett, RN  Infection Prevention:    visitors restricted/screened    single patient room provided    personal protective equipment utilized  Taken 8/27/2022 2027 by Charmaine Pritchett, RN  Infection Prevention:    visitors restricted/screened    single patient room provided    personal protective equipment utilized

## 2022-08-28 NOTE — PROGRESS NOTES
Chart check and discussed with intensivist.  Developed new significant infiltrate in upper lobe.  Intensivist believes possibly aspiration.  Not consistent with heart failure.    Heart rate well controlled, and even bradycardic on intravenous amiodarone.  Amiodarone being used for rate control as patient has permanent atrial fibrillation.    Echocardiogram is pending today and we will continue to follow.    Diagnosis: Permanent atrial fibrillation, COVID infection, pneumonia possibly aspiration.

## 2022-08-28 NOTE — PROGRESS NOTES
ICU PROGRESS NOTE:    Assessment/Plan:  82 y M hx CKD, prior CVA, afib on xarelto, here initially after a fall at TCU. Have been working on RVR rate control. He is positive for COVID after recent infection and is considered recovered. He had a clear CXR on admission and was on 1 L O2.     8/27 developed worsening RVR, hypotension, and hypoxemia requiring 15L O2.CXR with new RUL infiltrate consistent with PNA. Despite rate control, continues with relatively high pressor needs.     CV:  - Permanent afib, RVR yest, now trending bradycardic. On amiodarone for rate control, digoxin load yest, BB held this am due to bradycardia  - shock on pressor. Lactic 1.6. May be more septic given new PNA.  - BNP 1800, and then 378 on re-check. Lasix x 1 yest    Echo pending for today    Had to hold BB and dig this am due to bradycardia    Continue nam/vaso for MAP > 65. Patient refused arterial line placement today despite explanation for rationale for close BP monitoring    Hold on further lasix right now pending echo, given pressor need    Cardiology is following    Continued on home plavix, xarelto and statin    RESP:  - Acute hypoxemic RF, on 15 L yest, quickly back down to 3 L with improvement in rate control. Does have new RUL infiltrate  - Unclear pulmonary diagnoses/home meds: combivent, pulmicort, breo regimen makes little sense. No PFT. Will continue breo as ordered here, stop pulmicort and continue prn combivent or duoneb    Wean O2 for SpO2 92-94%    Abx per ID    RENAL:  ANGIE on CKD yest in the setting of hypotension/RVR. Improving  Continue to monitor    ID:  New RUL infiltrate, leukocytosis, temps.  COVID recovered  Day 2 vanco/zosyn  Bcx pending, sputum unable to send thus far  U/A, Ucx sent     GI:  Oral diet    NEURO:  No acute issues    HEMATOLOGIC:  Monitor counts, transfuse as needed    ENDOCRINE:  ICU sliding scale prn      ICU Checklist:  Feeding:  Oral    Lines/Tubes:  PICC (8/27)  Tiffany  "(8/27)  PIVs    Prophylaxis:    Thromboembolic: xarelto     Stress Ulcer: N/A    Restraints? no    DISPOSITION: ICU    CODE STATUS: DNR/DNI    FAMILY COMMUNICATION: Spoke with patient directly, son yest, messages left for daughter. Pending progress, consider palliative consult as he does not seem to want prolonged invasive care.    Total Critical Care Time: 50 minutes    Upon evaluation, this patient is critically ill with a high probability of imminent life threatening deterioration which required my direct personal management.      Haylie Saunders MD  Pulmonary and Critical Care Medicine  [facility]  Office: [phone]      Clinically Significant Risk Factors Present on Admission                 Code Status: No CPR- Do NOT Intubate       ----------------------------    Overnight events:  Required addition of vasopressin.  Down to 2-3 L O2    Subjective:  Aggravated with medical discussions  Refuses arterial line placement  Unable to have meaningful GOC discussion, declined to speak further and wants to watch TV  Hrs 50s    Objective:  Physical Exam:  Resp: 24    /54   Pulse 66   Temp 98.4  F (36.9  C) (Axillary)   Resp 24   Ht 1.753 m (5' 9\")   Wt 74.3 kg (163 lb 14.4 oz)   SpO2 100%   BMI 24.20 kg/m      Intake/Output last 3 shifts:  I/O last 3 completed shifts:  In: 1925.05 [P.O.:300; I.V.:1625.05]  Out: 1560 [Urine:1560]  Intake/Output this shift:  No intake/output data recorded.    Physical Exam  Gen: Alert, oriented, no distress  HEENT: no OP lesions, no CYRUS  CV: RRR, no m/g/r  Resp: Coarse, congested  Abd: soft, nontender, BS+  Neuro: PERRL, nonfocal  Ext: no edema, warm    LABS:  Reviewed in detail    IMAGING/STUDIES:    CXR:  There is a new right-sided PICC with distal tip projected over the right atrium and this should be pulled back approximately 5 cm. There is less density seen to the infiltrate involving the upper right perihilar region. The remainder of the   right lung " infiltrates are stable.

## 2022-08-28 NOTE — PROVIDER NOTIFICATION
Notified Intensivist at 2134 PM regarding changes in vital signs.      Spoke with: Dr. Marti    Orders were obtained. Blood cultures x2, one peripheral site and one from PICC line.     Comments: Not low grade fever.         
Notified Intensivist at 2200 PM regarding changes in vital signs.      Spoke with: Dr. Marti    Orders were obtained. 500cc LR bolus x1, lactic acid and hgb lab order, and vasopressin infusion.     Comments: Pt continues to have soft BPs with MAPs < 65. At max dosage for nam infusion. Please advise.         
Paged cross cover notifying them that pts BP was SBP around 80's and MAPs around 60 or lower and that pt was asymptomatic.   Paged Dr. Vasquez after contacting cross cover with no call back about pts BP being low SBP around 80's. MAP around 55-60's. Stated pt had about 1200cc of fluids from previous bolus. Dr. Vasquez ordered 500cc bolus.     
Writer paged in house- Dr. Yeung and Dr. Yeung asked writer to page cross cover.       WW rm [phone] Pt (PM) repage- Pt BP remains low, after previous page contacted Dr. Vasquez- orders put in. Now after bolus BP remains low with MAP of 65 and less. Wondering what next step is? Thank you, Susanna [phone]    Cross cover paged back- Dr. Locke mentioned she did not feel a response was needed d/t pt being asymptomatic. Writer stated that pts MAP was at times anywhere from 55 to 62. Dr. Locke recommended to do a manual BP and as long as pt is asymptomatic with a MAP around 60s or a little lower- to continue to monitor pt. Writer mentioned that pt was given 500mL bolus right before the repage and SBP remained in low 80's and that total at this point received around 1700 ml of IV fluids from boluses throughout the day.   
Writer paged in house- Dr. Yeung. WW rm [phone] Pt (PM) Pt BP 78/45 map (56) just finished 500 CC bolus, total IV fluids so far 1750cc, CHF Hx. wondering what next step is? pressors? PICC? Thank you, [phone]    See other note for call back from Dr. Yeung  
see chief complaint quote

## 2022-08-28 NOTE — PROGRESS NOTES
Pt able to wean O2 from 7 to 4 LPM via Oxymask. Pt O2 saturation in the high 90's. BS clear and diminished. Will continue to follow.    Ankit Mckeon, RT

## 2022-08-29 NOTE — CONSULTS
[facility]  WOC Nurse Inpatient Assessment     Consulted for: LLE    Patient History (according to provider note(s):    Last seen in OP wound clinic 8/19/22. Will continue these orders at discharge. Will use comparable dressings while patient hospitalized.  Wound treatment: wound treatment will include irrigation and dressings to promote autolytic debridement which will include: Cleanse with dilute hibiclens 30cc in 500cc NS. Apply medihoney to wound and cover with abd pad and gauze roll to be changed 3 times per week. If for some reason the patient is not able to get their dressing(s) changed as outlined above (due to illness, lack of supplies, lack of help) please do the following: remove old, soiled dressings; wash the wounds with saline; pat dry; apply ABD pad or other absorbant pad and secure with rolled gauze; avoid tape directly on your skin; patient instructed to call the clinic as soon as possible to let us know what the current issues are in receiving wound care. Improved    Areas Assessed:      LLE wound:  4 cm x 7 cm x 0.1 cm  Wound bed is marbled pink viable tissue with fibrinous sloughing tissue.  Dressing off, waiting for supplies to be delivered but per nursing drainage amount is scant.  Periwound skin intact.    Per nursing (pt up in chair, unable to assess today) - buttocks and scrotum with erythema but no open areas. Continue with Calmoseptine as ordered by provider.    Treatment Plan:     LLE wound care: Every 3 days   Cleanse with Vashe, gently dry.   Apply Medihoney to wound bed, cover with xeroform over wound.   Cover with abd pad and gauze roll.   May change more frequently if needed for excess drainage.           Orders: Written    RECOMMEND PRIMARY TEAM ORDER: None, at this time  Education provided: plan of care  Discussed plan of care with: Patient and Nurse  WOC nurse follow-up plan: weekly  Notify WOC if wound(s) deteriorate.  Nursing to notify the Provider(s)  and re-consult the WO Nurse if new skin concern.    DATA:     Current support surface: Standard  Low air loss mattress  Containment of urine/stool: Incontinence Protocol  BMI: Body mass index is 24.47 kg/m .   Active diet order: Orders Placed This Encounter      Combination Diet Low Saturated Fat Na <2400mg Diet, No Caffeine Diet     Output: I/O last 3 completed shifts:  In: 1742.56 [P.O.:960; I.V.:782.56]  Out: 1100 [Urine:1100]     Labs: Recent Labs   Lab 08/29/22  0512 08/27/22  2242 08/27/22  0143   ALBUMIN  --   --  2.0*   HGB 11.7*   < > 12.3*   WBC 8.4   < > 10.5    < > = values in this interval not displayed.     Pressure injury risk assessment:   Sensory Perception: 4-->no impairment  Moisture: 3-->occasionally moist  Activity: 1-->bedfast  Mobility: 2-->very limited  Nutrition: 2-->probably inadequate  Friction and Shear: 2-->potential problem  Christiano Score: 14    Cesia Skaggs RN CWOCN

## 2022-08-29 NOTE — PROGRESS NOTES
Clinic Care Coordination Contact  Ambulatory Care Coordination to Inpatient Care Management   Hand-In Communication    Date:  August 29, 2022  Name: Bradley Liz is enrolled in Ambulatory Care Coordination program and I am the Lead Care Coordinator.  CC Contact Information: Epic [employer]et + phone  Payor Source: Payor: [facility] / Plan: [facility] MEDICARE ADVANTAGE / Product Type: Medicare /   Current services in place:     Please see the CC Snaphot and Care Management Flowsheets for specific  details of this Bradley Liz care plan.   Additional details/specific concerns r/t this admission:    New to clinic care coordination.  No additional information to share   I will follow this admission in Epic. Please feel free to contact me with questions or for further collaboration in discharge planning.  [facility]   Maryam Farrar RN, Care Coordinator   [facility]'s   E-mail [email or web address]   [phone]

## 2022-08-29 NOTE — PROGRESS NOTES
08/29/22 1530   Quick Adds   Type of Visit Initial Occupational Therapy Evaluation   Living Environment   People in Home spouse   Current Living Arrangements condominium   Home Accessibility no concerns   Transportation Anticipated family or friend will provide   Living Environment Comments WIS with GB, RTS with GB on the L   Self-Care   Usual Activity Tolerance good   Current Activity Tolerance moderate   Equipment Currently Used at Home walker, rolling   Instrumental Activities of Daily Living (IADL)   Previous Responsibilities shopping;finances;driving   General Information   Onset of Illness/Injury or Date of Surgery 08/26/22   Referring Physician Dr. Malinda Mcconnell   Patient/Family Therapy Goal Statement (OT) To return home.   Additional Occupational Profile Info/Pertinent History of Current Problem Wound infection.  COVID recovered.  PMH:  CKD A-fib, ANGIE, h/o CVA, HF.   Existing Precautions/Restrictions no known precautions/restrictions   Limitations/Impairments other (see comments)  (Residual weakness and decreased AROM in the R U/E due to old CVA)   Cognitive Status Examination   Orientation Status orientation to person, place and time   Follows Commands WNL   Visual Perception   Visual Impairment/Limitations corrective lenses for reading   Range of Motion Comprehensive   General Range of Motion upper extremity range of motion deficits identified   General Upper Extremity Assessment (Range of Motion)   Comment: Upper Extremity ROM R shoulder flex 90 degrees, L shoulder flex 110 degrees.  R U/E limited due to old CVA   Upper Extremity: Range of Motion shoulder, right: UE ROM;shoulder, left: UE ROM   Bed Mobility   Bed Mobility rolling right;scooting/bridging;supine-sit;sit-supine   Rolling Right Summit (Bed Mobility) modified independence   Scooting/Bridging [place] (Bed Mobility) dependent (less than 25% patient effort);2 person assist   Supine-Sit [place] (Bed Mobility)  supervision;verbal cues   Sit-Supine Penelope (Bed Mobility) supervision;verbal cues   Assistive Device (Bed Mobility) bed rails;other (see comments)  (HOB elevated.)   Transfers   Transfers bed-chair transfer;sit-stand transfer;toilet transfer   Transfer Skill: Bed to Chair/Chair to Bed   Bed-Chair Penelope (Transfers) minimum assist (75% patient effort);1 person to manage equipment   Assistive Device (Bed-Chair Transfers) rolling walker   Sit-Stand Transfer   Sit-Stand Penelope (Transfers) minimum assist (75% patient effort);1 person to manage equipment   Assistive Device (Sit-Stand Transfers) walker, front-wheeled   Toilet Transfer   Type (Toilet Transfer)   (declined need for toilet)   Balance   Balance Assessment standing balance: static   Balance Comments SBA   Activities of Daily Living   BADL Assessment/Intervention lower body dressing;toileting   Lower Body Dressing Assessment/Training   Penelope Level (Lower Body Dressing) dependent (less than 25% patient effort)   Position (Lower Body Dressing) supported sitting   Comment, (Lower Body Dressing) Unable to reach his feet when in sitting.  Pt reports his wife and Dtr have been assisting him with all L/B dressing.   Toileting   Penelope Level (Toileting)   (declined need.)   Clinical Impression   Criteria for Skilled Therapeutic Interventions Met (OT) Yes, treatment indicated   OT Diagnosis decreased indep with ADLs due to CKD and recent fall at TCU   OT Problem List-Impairments impacting ADL mobility;activity tolerance impaired   Assessment of Occupational Performance 3-5 Performance Deficits   Identified Performance Deficits dressing, toileting, bathing, G/H, trsfs   Planned Therapy Interventions (OT) ADL retraining   Clinical Decision Making Complexity (OT) moderate complexity   Risk & Benefits of therapy have been explained patient;evaluation/treatment results reviewed   OT Discharge Planning   OT Discharge Recommendation (DC Rec) (S)   Transitional Care Facility;home with assist;home with home care occupational therapy   OT Rationale for DC Rec Pt is below his baseline for self-cares.  Needs assist with dressing, toileting, bathing.  Pt was at a TCU prior to this admission due to fall at TCU.  If Pt went home, he would need 24 hour care and assist with all ADLs - dressing, toileting, bathing and G/H at this time.   Total Evaluation Time (Minutes)   Total Evaluation Time (Minutes) 15   OT Goals   Therapy Frequency (OT) Daily   OT Predicted Duration/Target Date for Goal Attainment 09/02/22   OT Goals Upper Body Dressing;Transfers;Bed Mobility;Toilet Transfer/Toileting;Hygiene/Grooming   OT: Hygiene/Grooming modified independent  (standing at sink)   OT: Upper Body Dressing Independent   OT: Bed Mobility Modified independent   OT: Transfer Modified independent   OT: Toilet Transfer/Toileting Modified independent

## 2022-08-29 NOTE — PROGRESS NOTES
"   08/29/22 1500   Quick Adds   Type of Visit Initial PT Evaluation   Living Environment   People in Home spouse   Current Living Arrangements house  (Townhouse)   Home Accessibility no concerns   Transportation Anticipated family or friend will provide   Living Environment Comments Pt reports he lives in a townhouse with his wife. No stairs. All needs met on one level. Per CM note, pt has a new [facility], [place] [facility] [place].   Self-Care   Usual Activity Tolerance good   Current Activity Tolerance moderate   Equipment Currently Used at Home walker, rolling  (FWW)   Fall history within last six months yes   Number of times patient has fallen within last six months 1   Activity/Exercise/Self-Care Comment Pt reports he uses a FWW at baseline.   General Information   Onset of Illness/Injury or Date of Surgery 08/26/22   Referring Physician Raj Reid MD   Patient/Family Therapy Goals Statement (PT) Get stronger   Pertinent History of Current Problem (include personal factors and/or comorbidities that impact the POC) Per chart, \"82 y M hx CKD, prior CVA, afib on xarelto, here initially after a fall at TCU. Course has been complicatedby Afib w RVR.\"   Existing Precautions/Restrictions fall   Weight-Bearing Status - LLE full weight-bearing   Weight-Bearing Status - RLE full weight-bearing   Posture    Posture Forward head position   Range of Motion (ROM)   Range of Motion ROM is WFL   Strength (Manual Muscle Testing)   Strength (Manual Muscle Testing) Deficits observed during functional mobility   Bed Mobility   Bed Mobility supine-sit   Supine-Sit Kinney (Bed Mobility) contact guard   Impairments Contributing to Impaired Bed Mobility decreased strength   Assistive Device (Bed Mobility) bed rails   Transfers   Transfers sit-stand transfer   Impairments Contributing to Impaired Transfers decreased strength   Sit-Stand Transfer   Sit-Stand Kinney (Transfers) minimum assist (75% patient effort)   Assistive " Device (Sit-Stand Transfers) walker, front-wheeled   Gait/Stairs (Locomotion)   Geary Level (Gait) contact guard   Assistive Device (Gait) walker, front-wheeled   Distance in Feet (Required for LE Total Joints) 350'  (Eval first 10' then cueing following.)   Pattern (Gait) step-through   Deviations/Abnormal Patterns (Gait) right sided deviations;stride length decreased;weight shifting decreased   Right Sided Gait Deviations foot drop/toe drag;heel strike decreased   Clinical Impression   Criteria for Skilled Therapeutic Intervention Yes, treatment indicated   PT Diagnosis (PT) Impaired functional mobility   Influenced by the following impairments Decreased strength, impaired balance, impulsive   Functional limitations due to impairments Transfers, gait   Clinical Presentation (PT Evaluation Complexity) Evolving/Changing   Clinical Presentation Rationale Pt presents as medically diagnosed.   Clinical Decision Making (Complexity) moderate complexity   Planned Therapy Interventions (PT) balance training;bed mobility training;gait training;home exercise program;patient/family education;ROM (range of motion);strengthening;transfer training;home program guidelines   Anticipated Equipment Needs at Discharge (PT) walker, rolling  (FWW)   Risk & Benefits of therapy have been explained patient   PT Discharge Planning   PT Discharge Recommendation (DC Rec) (S)  Transitional Care Facility;home with home care physical therapy;home with assist   PT Rationale for DC Rec Pt currently requires assist of 1 for safety with all mobility. If this level of assist is not available at home/[facility], then pt may need to return to TCU.   Plan of Care Review   Plan of Care Reviewed With patient   Total Evaluation Time   Total Evaluation Time (Minutes) 10   Physical Therapy Goals   PT Frequency Daily   PT Predicted Duration/Target Date for Goal Attainment 09/05/22   PT: Transfers Modified independent;Sit to/from stand;Assistive device  (FWW)    PT: Gait Modified independent;Rolling walker;150 feet  (FWW)

## 2022-08-29 NOTE — PROGRESS NOTES
Care Management Follow Up    Length of Stay (days): 3    Expected Discharge Date: 08/31/2022     Concerns to be Addressed: awaiting PT and OT eval, medication adjustments        Patient plan of care discussed at interdisciplinary rounds: Yes    Anticipated Discharge Disposition: TCU vs assisted living     Anticipated Discharge Services: TBD     Anticipated Discharge DME:  TBD    Patient/family educated on Medicare website which has current facility and service quality ratings:  (N/A)    Education Provided on the Discharge Plan:  AVS per bedside RN.     Patient/Family in Agreement with the Plan: yes    Referrals Placed by CM/SW:  TCU        Additional Information:  Chart reviewed. CM following for discontinue needs. CM spoke with Camilla (daughter). Camilla states her first choice for a discharge plan would be to go directly from the hospital to his new assisted living ([facility]). If this is not possible she brian like referrals sent to [facility] (#1 TCU choice) and [facility] (#2 TCU choice). Camilla would like to wait closer to discharge to make a decision on transportation. CM updated Camilla (daughter) that a new prior auth will need to be submitted. PT and OT need to see the patient. 10:28 AM Camilla (daughter) asked CM to send referrals to [facility] and [facility]. Transportation TBD.     Referrals sent  [facility] (SNF)  [facility] ()    Nadine Hernandez RN

## 2022-08-29 NOTE — PROGRESS NOTES
Assessment/Plan:  1.  Atrial fibrillation with rapid ventricular response: The patient was on IV digoxin, metoprolol XL and IV amiodarone for ventricular rate control.  She has episodes of bradycardia last night.  Amiodarone infusion was discontinued.  His ventricular rate is around 100 bpm this morning.  He has off of pressors.  His blood pressure is 110/60 mmHg this morning.  His digoxin level is 0.5.  Discontinue IV digoxin, start oral digoxin 0.125 mg daily.  Start metoprolol XL 25 mg daily for better rate control.  The patient is off amiodarone since last night.  Continue Xarelto 15 mg at suppertime.    2.  Other noncardiac medical conditions: Please see other team management for details.    Subjective:  Interval History:  Has no complaints of chest pain.  Improved shortness of breath.  No palpitations.    Current Medications:  Scheduled Meds:    clopidogrel  75 mg Oral Daily     digoxin  125 mcg Intravenous Daily     fluticasone-vilanterol  1 puff Inhalation Daily     menthol-zinc oxide   Topical TID     metoprolol succinate ER  50 mg Oral BID     piperacillin-tazobactam  3.375 g Intravenous Q8H     potassium chloride ER  20 mEq Oral Daily     rivaroxaban ANTICOAGULANT  15 mg Oral Daily with supper     rosuvastatin  20 mg Oral QPM     sodium chloride (PF)  3 mL Intracatheter Q8H     vancomycin  1,000 mg Intravenous Q24H     vitamin C  500 mg Oral Daily     Continuous Infusions:    amiodarone Stopped (08/28/22 0906)     - MEDICATION INSTRUCTIONS -       PRN Meds:.acetaminophen **OR** acetaminophen, ipratropium-albuterol, levalbuterol, lidocaine 4%, lidocaine 4%, lidocaine (buffered or not buffered), lidocaine (buffered or not buffered), melatonin, - MEDICATION INSTRUCTIONS -, sodium chloride (PF), sodium chloride (PF)    Objective:   Vital signs in last 24 hours:  Temp:  [97.5  F (36.4  C)-98.2  F (36.8  C)] 97.7  F (36.5  C)  Pulse:  [] 103  Resp:  [16-20] 18  BP: ()/(45-61) 110/53  SpO2:  [92  %-100 %] 95 %  Weight:   [unfilled]     Physical Exam:  General Appearance:   Awake, Alert, No acute distress.   HEENT:  No scleral icterus; the mucous membranes were moist.   Neck: No cervical bruits. No jugular venous distention   Lungs:   Lungs are clear to auscultation. No crackles. No wheezing.   Cardiovascular:   IRRR, normal first and second heart sounds with II/VI systolic murmurs at RUSB. No rubs or gallops.     Abdomen:  Soft. No tenderness. Bowels sounds are present   Extremities: No leg edema. Equal posterior tibial pulsese.   Skin: Warm, Dry. No rashes.   Neurologic: Mood and affect are appropriate. No focal deficits.         Cardiographics:   Report: personally reviewed. .      Tele monitoring -atrial fibrillation with ventricular rate around 100 bpm    Echocardiogram on August 27, 2022:  1. The left ventricle is normal in size. Left ventricular function is  decreased. The ejection fraction is 50-55% (borderline). The left ventricle is  not well visualized.  2. The right ventricle is not well visualized. Normal right ventricle size and  systolic function.  3. Mild aortic valve calcification is present. No hemodynamically significant  valvular aortic stenosis.    Lab Results:   personally reviewed.     Lab Results   Component Value Date     08/29/2022     07/01/2020    CO2 27 08/29/2022    CO2 31 07/01/2020    BUN 19 08/29/2022    BUN 13 07/01/2020     Lab Results   Component Value Date    TROPONINI 0.04 08/26/2022     Lab Results   Component Value Date    WBC 8.4 08/29/2022    WBC 9.5 07/01/2020    HGB 11.7 08/29/2022    HGB 16.2 07/01/2020    HCT 35.8 08/29/2022    HCT 50.9 07/01/2020    MCV 93 08/29/2022     07/01/2020     08/29/2022     07/01/2020     Lab Results   Component Value Date    CHOL 149 07/16/2021    CHOL 162 07/01/2020    TRIG 129 07/16/2021    TRIG 216 07/01/2020    HDL 46 07/16/2021    HDL 46 07/01/2020    LDL 77 07/16/2021    LDL 73 07/01/2020

## 2022-08-29 NOTE — PLAN OF CARE
Problem: Plan of Care - These are the overarching goals to be used throughout the patient stay.    Goal: Optimal Comfort and Wellbeing  Intervention: Monitor Pain and Promote Comfort  Recent Flowsheet Documentation  Taken 8/29/2022 0000 by Rafia John RN  Pain Management Interventions: rest     Problem: Infection (Pneumonia)  Goal: Resolution of Infection Signs and Symptoms  Outcome: Ongoing, Progressing     Problem: Respiratory Compromise (Pneumonia)  Goal: Effective Oxygenation and Ventilation  Outcome: Ongoing, Progressing  Intervention: Promote Airway Secretion Clearance  Recent Flowsheet Documentation  Taken 8/29/2022 0000 by Rafia John RN  Cough And Deep Breathing: done independently per patient  Taken 8/28/2022 2200 by Rafia John RN  Cough And Deep Breathing: done independently per patient  Taken 8/28/2022 2000 by Rafia John RN  Cough And Deep Breathing: done independently per patient  Intervention: Optimize Oxygenation and Ventilation  Recent Flowsheet Documentation  Taken 8/29/2022 0000 by Rafia John RN  Head of Bed (HOB) Positioning: HOB at 20-30 degrees  Taken 8/28/2022 2000 by Rafia John RN  Head of Bed (HOB) Positioning: HOB at 20-30 degrees     Problem: Dysrhythmia  Goal: Normalized Cardiac Rhythm  Outcome: Ongoing, Progressing  Intervention: Monitor and Manage Cardiac Rhythm Effect  Recent Flowsheet Documentation  Taken 8/28/2022 2000 by Rafia John RN  VTE Prevention/Management: patient refused intervention   Goal Outcome Evaluation:    Plan of Care Reviewed With: patient     Overall Patient Progress: improving    Outcome Evaluation: Pt's Afib at a controlled rate, weaned off pressors, maintaining MAP >65.  Pt maintaining O2 sats >92% on RA.  Denies pain/discomfort.  Receiving IV abx for PNA, tolerating without issue.

## 2022-08-29 NOTE — PROGRESS NOTES
"/54 (BP Location: Left arm)   Pulse 97   Temp 97.8  F (36.6  C) (Oral)   Resp 16   Ht 1.753 m (5' 9\")   Wt 75.2 kg (165 lb 11.2 oz)   SpO2 94%   BMI 24.47 kg/m      Pt on RA all noc (although requests to keep NC on with O2 off).  Sats maintaining >90 all shift.  BP has normalized without the use pressors, maintaining MAP >65.  HR maintained 80s-110s.  Tele afib.  Remains on K+ and Mag protocols.  K+ replaced this AM, recheck labs next AM.  Pt has excoriated scrotum and buttocks, barrier cream applied x2 this shift.    "

## 2022-08-29 NOTE — PROGRESS NOTES
[facility]    Medicine Progress Note - Hospitalist Service    Date of Admission:  8/26/2022    Assessment & Plan        82 y M hx CKD, prior CVA, afib on xarelto, here initially after a fall at TCU. Course has been complicatedby Afib w RVR. Of note, has positive COVID test after recent infection, currently clear CXR and on 1LNC.    #Afib w/ RVR  -off pressors  -IV digoxin 125mcg, transition to PO 8/30/22  -c/w Metop - 25mg daily   -c/w Xarelto 15mg at bedtime     #Acute hypoxemic RF in setting of Hospital Acquired Pneumonia   -on 1LNC, improving  -Goal SpO2 92-94%  -prior COVID positive  -ABX: Vanc/Zosyn for RUL infiltrate; if MRSA negative, discontinue vanco    #ANGIE on CKD - resolved  -Cr at baseline 1.25 (1.41)  -avoid nephrotoxins    Diet: Combination Diet Low Saturated Fat Na <2400mg Diet, No Caffeine Diet    DVT Prophylaxis: DOAC  Allen Catheter: PRESENT, indication: Strict 1-2 Hour I&O  Central Lines: PRESENT  PICC Triple Lumen 08/27/22 Right Basilic-Site Assessment: WDL  Cardiac Monitoring: None  Code Status: No CPR- Do NOT Intubate      Disposition Plan      Expected Discharge Date: 08/31/2022      Destination: home with family  Discharge Comments: IVABX, pt refusing many cares, O2 needs, TCU  placement.  ? Palliative consult        The patient's care was discussed with the Bedside Nurse and Patient.    Malinda Mcconnell MD  Hospitalist Service  [facility]  Securely message with the Vocera Web Console (learn more here)  Text page via [facility] Paging/Directory     Interval History   No distress  Denies chest pain/sob/NVD  Downgraded from ICU - off pressors      Data reviewed today: I reviewed all medications, new labs and imaging results over the last 24 hours. I personally reviewed no images or EKG's today.    Physical Exam   Vital Signs: Temp: 97.7  F (36.5  C) Temp src: Oral BP: 110/53 Pulse: 104   Resp: 18 SpO2: 95 % O2 Device: None (Room air)    Weight:  165 lbs 11.2 oz    General: No acute distress, breathing comfortably on room air  Neuro: EOMI, PERRLA. Facial muscles symmetric, strength/sensation grossly intact.  HEENT: Anicteric sclera. Oropharynx is clear. No lymphadenopathy.  Chest/Lungs: No accessory respiratory muscle use. Adequate air movement throughout. CTAB.  CV: Normal rate, regular rhythm. nl S1/S2. No m/r/g. Cap refill &lt;2s.  Abd: BS+. Soft, NTND.  Ext: Warm, well-perfused. Strong distal pulses. No pretibial pitting edema. No clubbing.  Skin: No new rashes    Data   Recent Labs   Lab 08/29/22  1105 08/29/22  0512 08/28/22  0517 08/27/22  2242 08/27/22  0143   WBC  --  8.4 12.1*  --  10.5   HGB  --  11.7* 11.9* 12.3* 12.3*   MCV  --  93 93  --  94   PLT  --  248 286  --  239   NA  --  136 132*  --  135*   POTASSIUM 3.4* 3.1* 3.8  --  3.8   CHLORIDE  --  103 100  --  102   CO2  --  27 24  --  26   BUN  --  19 22  --  24   CR  --  1.25 1.41*  --  1.71*   ANIONGAP  --  6 8  --  7   DARON  --  7.7* 7.6*  --  7.7*   GLC  --  79 144*  --  89   ALBUMIN  --   --   --   --  2.0*   PROTTOTAL  --   --   --   --  4.0*   BILITOTAL  --   --   --   --  1.4*   ALKPHOS  --   --   --   --  76   ALT  --   --   --   --  24   AST  --   --   --   --  21

## 2022-08-29 NOTE — PROGRESS NOTES
Intensivist brief update    Patient seen and examined  Stable vitals, off pressors, minimal supplemental O2.    Ordered MRSA nasal swab; if negative would discontinue vanco.    OK to downgrade to med/surg.    Our service will sign off. Please call with questions.  HMS notified.      Raj (Tristian) MD Jeanette  [facility]  Clinic [phone]  Fax [phone]

## 2022-08-29 NOTE — PROGRESS NOTES
"BP 96/48   Pulse 84   Temp 97.5  F (36.4  C) (Oral)   Resp 24   Ht 1.753 m (5' 9\")   Wt 74.3 kg (163 lb 14.4 oz)   SpO2 96%   BMI 24.20 kg/m    Iso:  No active isolations  Diet: Combination Diet Low Saturated Fat Na <2400mg Diet, No Caffeine Diet  Mental Status:     Main Acuity Score: 462.25  O2:  RA  Mg+: 2.7 (08/28 1257)  K:  3.8 (08/28 0517)  PLT: 286 (08/28 0517)  HGB: 11.9 (08/28 0517)  BS: 144 (08/28 0517)  BMx2  Pt has excoriation on scrotum, cream applied  Pressure sore on bolltom, meplex applied WOC consult placed  No components found for: TROPL  Infusions: amiodarone, Last Rate: Stopped (08/28/22 0906)  - MEDICATION INSTRUCTIONS -  phenylephrine, Last Rate: Stopped (08/28/22 1538)  vasopressin, Last Rate: Stopped (08/28/22 1258)          "

## 2022-08-30 NOTE — PROGRESS NOTES
Assessment/Plan:  1.  Atrial fibrillation with rapid ventricular response: The patient's ventricular rate is controlled with digoxin 0.125 mg daily, metoprolol 25 mg twice a day.  Titrate up metoprolol as indicated.  Continue Xarelto 15 mg at suppertime.    2.  Other noncardiac medical conditions: Please see other team management for details.    The patient is stable from cardiology standpoint.  We will sign off at this time.  He is scheduled to follow-up with atrial fibrillation clinic in 2 weeks post hospital discharge.    Subjective:  Interval History:  Has no complaints of chest pain.  Improved shortness of breath.  No palpitations.    Current Medications:  Scheduled Meds:    clopidogrel  75 mg Oral Daily     digoxin  125 mcg Oral Daily     fluticasone-vilanterol  1 puff Inhalation Daily     menthol-zinc oxide   Topical TID     metoprolol succinate ER  25 mg Oral BID     piperacillin-tazobactam  3.375 g Intravenous Q8H     potassium chloride ER  20 mEq Oral Daily     rivaroxaban ANTICOAGULANT  15 mg Oral Daily with supper     rosuvastatin  20 mg Oral QPM     sodium chloride (PF)  3 mL Intracatheter Q8H     vitamin C  500 mg Oral Daily     Continuous Infusions:    amiodarone Stopped (08/28/22 0906)     - MEDICATION INSTRUCTIONS -       PRN Meds:.acetaminophen **OR** acetaminophen, ipratropium-albuterol, levalbuterol, lidocaine 4%, lidocaine 4%, lidocaine (buffered or not buffered), lidocaine (buffered or not buffered), melatonin, - MEDICATION INSTRUCTIONS -, sodium chloride (PF), sodium chloride (PF)    Objective:   Vital signs in last 24 hours:  Temp:  [97.5  F (36.4  C)-98.6  F (37  C)] 98.1  F (36.7  C)  Pulse:  [] 94  Resp:  [18-20] 20  BP: ()/(54-65) 114/59  SpO2:  [95 %-96 %] 95 %  Weight:   [unfilled]     Physical Exam:  General Appearance:   Awake, Alert, No acute distress.   HEENT:  No scleral icterus; the mucous membranes were moist.   Neck: No cervical bruits. No jugular venous  distention   Lungs:   Lungs are clear to auscultation. No crackles. No wheezing.   Cardiovascular:   IRRR, normal first and second heart sounds with I/VI systolic murmurs at RUSB. No rubs or gallops.     Abdomen:  Soft. No tenderness. Bowels sounds are present   Extremities: No leg edema. Equal posterior tibial pulsese.   Skin: Warm, Dry. No rashes.   Neurologic: Mood and affect are appropriate. No focal deficits.         Cardiographics:   Report: personally reviewed. .      Tele monitoring -atrial fibrillation with controlled rate    Echocardiogram on August 27, 2022:  1. The left ventricle is normal in size. Left ventricular function is  decreased. The ejection fraction is 50-55% (borderline). The left ventricle is  not well visualized.  2. The right ventricle is not well visualized. Normal right ventricle size and  systolic function.  3. Mild aortic valve calcification is present. No hemodynamically significant  valvular aortic stenosis.    Lab Results:   personally reviewed.     Lab Results   Component Value Date     08/29/2022     07/01/2020    CO2 27 08/29/2022    CO2 31 07/01/2020    BUN 19 08/29/2022    BUN 13 07/01/2020     Lab Results   Component Value Date    TROPONINI 0.04 08/26/2022     Lab Results   Component Value Date    WBC 8.4 08/29/2022    WBC 9.5 07/01/2020    HGB 11.7 08/29/2022    HGB 16.2 07/01/2020    HCT 35.8 08/29/2022    HCT 50.9 07/01/2020    MCV 93 08/29/2022     07/01/2020     08/29/2022     07/01/2020     Lab Results   Component Value Date    CHOL 149 07/16/2021    CHOL 162 07/01/2020    TRIG 129 07/16/2021    TRIG 216 07/01/2020    HDL 46 07/16/2021    HDL 46 07/01/2020    LDL 77 07/16/2021    LDL 73 07/01/2020

## 2022-08-30 NOTE — PLAN OF CARE
Problem: Infection (Pneumonia)  Goal: Resolution of Infection Signs and Symptoms  Outcome: Ongoing, Progressing     A/Ox. Denies pain, N/V, chest pain. Tele reading a-fib w/HR . VSS on room air. K and Mg protocols, recheck in AM. Pt on alarms for safety. Discharge pending on TCU vs assisted living placement decision.

## 2022-08-30 NOTE — PROGRESS NOTES
[facility]    Medicine Progress Note - Hospitalist Service    Date of Admission:  8/26/2022    Assessment & Plan        82 y M hx CKD, prior CVA, afib on xarelto, here initially after a fall at TCU. Course has been complicatedby Afib w RVR. Of note, has positive COVID test after recent infection, currently clear CXR and on 1LNC.    #Afib w/ RVR  -off pressors  -PO digoxin 125mcg  -c/w Metop - 25mg daily   -c/w Xarelto 15mg at bedtime     #Acute hypoxemic RF in setting of Hospital Acquired Pneumonia   -on 1LNC, improving  -Goal SpO2 92-94%  -prior COVID positive  -ABX: Zosyn for RUL infiltrat (8/27-) -- can stop after 5 day course (8/31) MRSA negative; Vanc discontinued 8/30    #ANGIE on CKD - resolved  -Cr at baseline 1.25 (1.41)  -avoid nephrotoxins    Diet: Combination Diet Low Saturated Fat Na <2400mg Diet, No Caffeine Diet    DVT Prophylaxis: DOAC  Allen Catheter: Not present  Central Lines: PRESENT  PICC Triple Lumen 08/27/22 Right Basilic-Site Assessment: WDL  Cardiac Monitoring: None  Code Status: No CPR- Do NOT Intubate      Disposition Plan     Expected Discharge Date: 08/31/2022      Destination: home with family  Discharge Comments: IVABX, pt refusing many cares, O2 needs, TCU  placement.  ? Palliative consult        The patient's care was discussed with the Bedside Nurse and Patient.    Malinda Mcconnell MD  Hospitalist Service  [facility]  Securely message with the Vocera Web Console (learn more here)  Text page via [facility] Paging/Directory     Interval History   Patient in no acute distress this morning.  Denies any chest pain/sob/NVD.      Data reviewed today: I reviewed all medications, new labs and imaging results over the last 24 hours. I personally reviewed no images or EKG's today.    Physical Exam   Vital Signs: Temp: 98.1  F (36.7  C) Temp src: Oral BP: 114/59 Pulse: 94   Resp: 20 SpO2: 95 % O2 Device: None (Room air) (Simultaneous filing. User  may not have seen previous data.)    Weight: 165 lbs 6.4 oz    General: No acute distress, breathing comfortably on room air  Neuro: EOMI, PERRLA. Facial muscles symmetric, strength/sensation grossly intact.  HEENT: Anicteric sclera. Oropharynx is clear. No lymphadenopathy.  Chest/Lungs: No accessory respiratory muscle use. Adequate air movement throughout. CTAB.  CV: Normal rate, regular rhythm. nl S1/S2. No m/r/g. Cap refill &lt;2s.  Abd: BS+. Soft, NTND.  Ext: Warm, well-perfused. Strong distal pulses. No pretibial pitting edema. No clubbing.  Skin: No new rashes    Data   Recent Labs   Lab 08/30/22  0458 08/29/22 2021 08/29/22  1105 08/29/22  0512 08/28/22  0517 08/27/22  2242 08/27/22  0143   WBC  --   --   --  8.4 12.1*  --  10.5   HGB  --   --   --  11.7* 11.9* 12.3* 12.3*   MCV  --   --   --  93 93  --  94   PLT  --   --   --  248 286  --  239   NA  --   --   --  136 132*  --  135*   POTASSIUM 3.9 3.8 3.4* 3.1* 3.8  --  3.8   CHLORIDE  --   --   --  103 100  --  102   CO2  --   --   --  27 24  --  26   BUN  --   --   --  19 22  --  24   CR  --   --   --  1.25 1.41*  --  1.71*   ANIONGAP  --   --   --  6 8  --  7   DARON  --   --   --  7.7* 7.6*  --  7.7*   GLC  --   --   --  79 144*  --  89   ALBUMIN  --   --   --   --   --   --  2.0*   PROTTOTAL  --   --   --   --   --   --  4.0*   BILITOTAL  --   --   --   --   --   --  1.4*   ALKPHOS  --   --   --   --   --   --  76   ALT  --   --   --   --   --   --  24   AST  --   --   --   --   --   --  21

## 2022-08-30 NOTE — PROGRESS NOTES
Care Management Follow Up    Length of Stay (days): 4    Expected Discharge Date: 08/31/2022     Concerns to be Addressed:       Patient plan of care discussed at interdisciplinary rounds: Yes    Anticipated Discharge Disposition: Home (with wife, daughter can assist with wound cares)  Disposition Comments: Anticipate return home with wife. Declines HC services. One of his children will transport at hospital discharge.  Anticipated Discharge Services: None (he declines offers for HC)  Anticipated Discharge DME:  (TBD)    Patient/family educated on Medicare website which has current facility and service quality ratings:  (N/A)  Education Provided on the Discharge Plan:    Patient/Family in Agreement with the Plan: yes    Referrals Placed by CM/SW:  (N/A)  Private pay costs discussed: Not applicable    Additional Information:  Following pt for TCU placement, follow up calls made to referrals  [facility]-reviewing information  [facility]-reviewing information.      ANGELINE Montgomery

## 2022-08-31 NOTE — PLAN OF CARE
Pt denies any pain or sob during shift. Able to use call light and make needs known. Incontinent of bowel and bladder. LLE kerlex dressing changed due to stool saturation, wound was not fully undressed as the dressing under was CDI. Oxygen saturations in the high 90s on room air. Planned discharge tomorrow.

## 2022-08-31 NOTE — PLAN OF CARE
Problem: Dysrhythmia  Goal: Normalized Cardiac Rhythm  Outcome: Ongoing, Progressing     Resting comfortably overnight with no c/o pain. Aox4. A-fib on telemetry. Lung sounds coarse/diminished on room air, SpO2 = 93%. Receiving zosyn via PICC.

## 2022-08-31 NOTE — PROGRESS NOTES
Care Management Follow Up    Length of Stay (days): 5  Expected Discharge Date: 09/01/2022     Concerns to be Addressed:     Discharge to [facility] over TCU.  Patient plan of care discussed at interdisciplinary rounds: No  Anticipated Discharge Disposition: TCU  Anticipated Discharge Services: In-home PT/OT and skilled nursing assist.  Anticipated Discharge DME:  (TBD)  Patient/family educated on Medicare website which has current facility and service quality ratings:  (N/A)  Education Provided on the Discharge Plan:  Daughter Camilla DAVE has been informed of therapy recommendations.  Patient/Family in Agreement with the Plan: yes  Referrals Placed by CM/SW:  Message left for skilled nursing admissions person [facility] at [phone]  Private pay costs discussed: Yes - current TCU and ERICK.    Additional Information:  Writer received a call from pt's daughter Camilla DAVE(NM) at [phone]. NM states family is currently holding the TCU bed as a back-up if transfer to skilled nursing does not happen.    NM states family is now paying for an [facility] room at [facility] at [phone]. NM and other family would like pt to transfer directly to the ERICK upon discharge and NOT go to TCU if possible.    [facility] admissions person Xavier is at [phone]. Writer has called and left a message requesting a return call and coordination of pt transfer to the skilled nursing at ~1000hrs. CM is waiting for a return call. Information will need to be faxed separately as the aformentioned skilled nursing is not found in [facility].    Family is currently paying for a TCU bed and skilled nursing room and acquiring hospital bills. Family would like pt transferred out as soon as possible to ERICK.    Therapy recommendations are for TCU or Home with assist and home therapy. Pt should be transferred to the ER[facility] with home care services in-place.    Hospitalist states pt is medically ready to discharge today 8/31, pt is appropriate to transfer to an ER[facility] with services, and pt will NOT need IV Abx upon discharge.     At ~1100hrs  writer briefly spoke to nursing home admissions as they could not fully discuss the case and will have to call back. Admissions did state that pt cannot transfer to their facility until 9/1 at the soonest. Writer is awaiting return call to coordinate transfer/discharge for tomorrow 9/1.    CM to coordinate transfer to nursing home when appropriate level of assistance is confirmed with nursing home admissions person. Home care referrals will also need to be made for PT and OT service. Family to transport if appropriate.    At ~1415hrs Writer called again to admissions person Paulina. She stated the pt can transfer to their facility as soon as 9/1 prior to 11am. This is pending her receiving PHYSICALLY SIGNED medication orders and signed home care service orders this evening so they can check first thing in the morning. Xavier further stated the discharge orders ust specifically say transferring to an Assisted Living Facility. Not to home or TCU/SNF. Xavier stated waiting until the morning to receive these will likely delay the discharge one more day. Writer has paged hospitalist to request said information.    Requested orders have been faxed. CM to follow-up early in the day on 9/1 to secure the discharge appropriately. Daughter transport.    Chaparro Raines RN

## 2022-08-31 NOTE — PROGRESS NOTES
[facility]    Medicine Progress Note - Hospitalist Service    Date of Admission:  8/26/2022    Assessment & Plan        82 y M hx CKD, prior CVA, afib on xarelto, here initially after a fall at TCU. Course has been complicatedby Afib w RVR. Of note, has positive COVID test after recent infection, currently clear CXR and on 1LNC.    #Afib w/ RVR - stable  -off pressors  -PO digoxin 125mcg  -c/w Metop - 25mg daily   -c/w Xarelto 15mg at bedtime     #Acute hypoxemic RF in setting of Hospital Acquired Pneumonia - RESOLVED  -on room air, resolved  -Goal SpO2 92-94%  -prior COVID positive  -ABX: Zosyn for RUL infiltrat (8/27-8/31) -- can stop after 5 day course (8/31) MRSA negative; Vanc discontinued 8/30    #ANGIE on CKD - resolved  -Cr at baseline 1.25 (1.41)  -avoid nephrotoxins    Diet: Combination Diet Low Saturated Fat Na <2400mg Diet, No Caffeine Diet    DVT Prophylaxis: DOAC  Allen Catheter: Not present  Central Lines: PRESENT  PICC Triple Lumen 08/27/22 Right Basilic-Site Assessment: WDL  Cardiac Monitoring: None  Code Status: No CPR- Do NOT Intubate      Disposition Plan      Expected Discharge Date: 09/01/2022      Destination: home with family  Discharge Comments: IVABX, pt refusing many cares, O2 needs, TCU  placement.  ? Palliative consult        The patient's care was discussed with the Bedside Nurse and Patient.    Malinda Mcconnell MD  Hospitalist Service  [facility]  Securely message with the Vocera Web Console (learn more here)  Text page via [employer] Paging/Directory     Interval History   Patient in no acute distress this morning.  Denies any chest pain/sob/NVD.  Ready to go home/rehab      Data reviewed today: I reviewed all medications, new labs and imaging results over the last 24 hours. I personally reviewed no images or EKG's today.    Physical Exam   Vital Signs: Temp: 98  F (36.7  C) Temp src: Oral BP: 108/54 Pulse: 71   Resp: 20 SpO2: 95 % O2  Device: None (Room air)    Weight: 175 lbs 4.8 oz    General: No acute distress, breathing comfortably on room air  Neuro: EOMI, PERRLA. Facial muscles symmetric, strength/sensation grossly intact.  HEENT: Anicteric sclera. Oropharynx is clear. No lymphadenopathy.  Chest/Lungs: No accessory respiratory muscle use. Adequate air movement throughout. CTAB.  CV: Normal rate, regular rhythm. nl S1/S2. No m/r/g. Cap refill &lt;2s.  Abd: BS+. Soft, NTND.  Ext: Warm, well-perfused. Strong distal pulses. No pretibial pitting edema. No clubbing.  Skin: No new rashes    Data   Recent Labs   Lab 08/31/22  0605 08/30/22  0458 08/29/22 2021 08/29/22  1105 08/29/22  0512 08/28/22  0517 08/27/22  2242 08/27/22  0143   WBC  --   --   --   --  8.4 12.1*  --  10.5   HGB  --   --   --   --  11.7* 11.9* 12.3* 12.3*   MCV  --   --   --   --  93 93  --  94   PLT  --   --   --   --  248 286  --  239   NA  --   --   --   --  136 132*  --  135*   POTASSIUM 3.6 3.9 3.8   < > 3.1* 3.8  --  3.8   CHLORIDE  --   --   --   --  103 100  --  102   CO2  --   --   --   --  27 24  --  26   BUN  --   --   --   --  19 22  --  24   CR  --   --   --   --  1.25 1.41*  --  1.71*   ANIONGAP  --   --   --   --  6 8  --  7   DARON  --   --   --   --  7.7* 7.6*  --  7.7*   GLC  --   --   --   --  79 144*  --  89   ALBUMIN  --   --   --   --   --   --   --  2.0*   PROTTOTAL  --   --   --   --   --   --   --  4.0*   BILITOTAL  --   --   --   --   --   --   --  1.4*   ALKPHOS  --   --   --   --   --   --   --  76   ALT  --   --   --   --   --   --   --  24   AST  --   --   --   --   --   --   --  21    < > = values in this interval not displayed.

## 2022-09-01 NOTE — PROGRESS NOTES
Occupational Therapy Discharge Summary    Reason for therapy discharge:    Discharged to home with home therapy.    Progress towards therapy goal(s). See goals on Care Plan in [facility] electronic health record for goal details.  Goals partially met.  Barriers to achieving goals:   discharge from facility.    Therapy recommendation(s):    Continued therapy is recommended.  Rationale/Recommendations:  to increase indep with ADLs and trsfs.

## 2022-09-01 NOTE — PLAN OF CARE
Goal Outcome Evaluation:    Plan of Care Reviewed With: patient     Overall Patient Progress: improving    Outcome Evaluation: Plans to discharge to [facility] in am.    Repositioned frequently overnight. Patient did  refuse assessment of lower extremities, but agreeable on second attempt at assessment. Incontinence care provided as needed. Vitals stable. Bed alarm on for safety.

## 2022-09-01 NOTE — PROGRESS NOTES
Physical Therapy Discharge Summary    Reason for therapy discharge:    Discharged to [facility] with home PT    Progress towards therapy goal(s). See goals on Care Plan in [facility] electronic health record for goal details.  Goals partially met.  Barriers to achieving goals:   discharge from facility.    Therapy recommendation(s):    Continued therapy is recommended.  Rationale/Recommendations:  Recommend home PT to progress functional mobility and strength.

## 2022-09-01 NOTE — PROGRESS NOTES
Care Management Discharge Note    Discharge Date: 09/01/2022       Discharge Disposition: Home (with wife, daughter can assist with wound cares)    Discharge Services: None (he declines offers for HC)    Discharge DME:  (TBD)    Discharge Transportation: family or friend will provide    Private pay costs discussed: Not applicable    PAS Confirmation Code:    Patient/family educated on Medicare website which has current facility and service quality ratings:  (N/A)    Education Provided on the Discharge Plan:    Persons Notified of Discharge Plans: Pt, family and Assisted Living  Patient/Family in Agreement with the Plan: yes    Handoff Referral Completed: No    Additional Information:  Spoke with Paulina at [facility] and confirmed they have the needed information and orders and that pt is able to admit to their facility today.  Message left for daughter, Nacee regarding above and regarding family availability to transport pt.        ANGELINE Montgomery

## 2022-09-01 NOTE — PROGRESS NOTES
Writer assumed cares from [id number]. Patient alert and oriented. Irritable at times. VSS on RA. Had 1 stool incontinence occurrence this shift. Loose stool noted. Provided with good akin-cares. Assisted with repositioning and turning. No concerns at this time per patient.

## 2022-09-01 NOTE — PLAN OF CARE
Pt discharged to [facility]. Transported by son. Paperwork for facility faxed ahead of time; hard copies sent. Family provided copy. Opportunity for questions provided. Pt and family confirmed all belongings accounted for. Pt wheeled to main entrance and assisted into son's vehicle by writer and NA. New walker sent with pt.

## 2022-09-01 NOTE — DISCHARGE SUMMARY
[facility] MEDICINE  DISCHARGE SUMMARY     Primary Care Physician: Verito Lima  Admission Date: 8/26/2022   Discharge Provider: Kurt Westbrook MD Discharge Date: 9/1/2022   Diet:   Active Diet and Nourishment Order   Procedures     Combination Diet Low Saturated Fat Na <2400mg Diet, No Caffeine Diet     Diet     Diet       Code Status: No CPR- Do NOT Intubate   Activity: DCACTIVITY: Activity as tolerated        Condition at Discharge: Stable     REASON FOR PRESENTATION(See Admission Note for Details)   Shortness of breath    PRINCIPAL & ACTIVE DISCHARGE DIAGNOSES     Active Problems:    Long term (current) use of anticoagulants    Chronic systolic heart failure (H)    History of CVA (cerebrovascular accident)    Infection due to 2019 novel coronavirus    Atrial fibrillation with RVR (H)    History of COVID-19    Acute renal injury (H)    DNR (do not resuscitate)    Hypotension, unspecified hypotension type    Acute respiratory failure with hypoxia (H)    Pneumonia of right upper lobe due to infectious organism      PENDING LABS     Unresulted Labs Ordered in the Past 30 Days of this Admission     Date and Time Order Name Status Description    8/27/2022  9:39 PM Blood Culture Line, arterial Preliminary     8/27/2022  9:39 PM Blood Culture Peripheral Blood Preliminary             PROCEDURES ( this hospitalization only)          RECOMMENDATIONS TO OUTPATIENT PROVIDER FOR F/U VISIT     Follow-up Appointments     Follow Up and recommended labs and tests      Follow up with [facility] physician.  The following labs/tests are   recommended: BMP, HGB         Follow-up with primary care or facility physician within 1 week.  DISPOSITION     Assisted Living Facility    SUMMARY OF HOSPITAL COURSE:    HPI: 82-year-old male with a history of recent COVID-positive status and left lower extremity cellulitis presented from facility after a fall at the TCU.  In the ER he was found to be  in A. fib flutter with heart rate in the 150s.  He was admitted.    Atrial fibrillation with rapid ventricular response  Patient was admitted and seen by cardiology.  Multiple medication adjustments were made.  Was on an amnioinfusion for a period of time.  Ultimately he will discharge on p.o. digoxin 125 mcg daily, metoprolol 50 mg p.o. twice daily and Xarelto 15 mg at bedtime for anticoagulation.  Initially patient had been on diltiazem but developed issues with hypotension and thus this was discontinued.  Currently rate controlled and anticoagulated.    Acute hypoxic respiratory failure/hospital-acquired pneumonia  On 8/27 patient had acute respiratory failure requiring increase of O2 to 15 L.  He was found to have a right upper lobe infiltrate and started on broad-spectrum antibiotics.  Completed a 5-day course of Zosyn.  MRSA swab was negative and Vanco thus discontinued early in treatment.  Patient is on room air at the time of discharge.  No antibiotics at discharge.    Septic shock  When the patient developed acute hypoxic respiratory failure also developed hypotension requiring pressors.  Shock resolved quite quickly with treatment of acute infection and improvement in rate control with multiple medications for atrial fibrillation.  Patient was downgraded from the ICU and is now off antibiotics.    COVID recovered    Acute kidney injury/chronic kidney disease  Creatinine peaked at 2.02 and returned to baseline of 1.25 at discharge.  Acute kidney injury due to septic shock resolved.    Generalized weakness  Patient is discharging to assisted living facility.  Recommend continued PT and OT.    Discharge Medications with Med changes:     Current Discharge Medication List      START taking these medications    Details   digoxin (LANOXIN) 125 MCG tablet Take 1 tablet (125 mcg) by mouth daily  Qty: 30 tablet, Refills: 0         CONTINUE these medications which have CHANGED    Details   metoprolol succinate ER  (TOPROL XL) 25 MG 24 hr tablet Take 2 tablets (50 mg) by mouth 2 times daily    Associated Diagnoses: Atrial fibrillation, unspecified type (H)      rivaroxaban ANTICOAGULANT (XARELTO) 15 MG TABS tablet Take 1 tablet (15 mg) by mouth daily (with dinner)         CONTINUE these medications which have NOT CHANGED    Details   budesonide (PULMICORT) 1 MG/2ML neb solution Take 1 mg by nebulization 2 times daily      clopidogrel (PLAVIX) 75 MG tablet Take 1 tablet (75 mg) by mouth daily for 93 days  Qty: 95 tablet, Refills: 0    Associated Diagnoses: Cellulitis of left lower extremity      fluticasone-vilanterol (BREO ELLIPTA) 100-25 MCG/INH inhaler Inhale 1 puff into the lungs daily  Qty: 30 each, Refills: 0    Associated Diagnoses: COPD with acute exacerbation (H)      furosemide (LASIX) 40 MG tablet Take 40 mg by mouth daily      ipratropium-albuterol (COMBIVENT RESPIMAT)  MCG/ACT inhaler Inhale 1 puff into the lungs 4 times daily as needed for shortness of breath / dyspnea or wheezing  Qty: 1 each, Refills: 0    Associated Diagnoses: COPD with acute exacerbation (H)      potassium chloride ER (KLOR-CON M) 20 MEQ CR tablet Take 20 mEq by mouth daily      rosuvastatin (CRESTOR) 20 MG tablet Take 1 tablet (20 mg) by mouth every evening  Qty: 30 tablet, Refills: 1    Associated Diagnoses: History of CVA (cerebrovascular accident); CARDIOVASCULAR SCREENING; LDL GOAL LESS THAN 100; Completed stroke (H)      tamsulosin (FLOMAX) 0.4 MG capsule Take 1 capsule (0.4 mg) by mouth daily  Qty: 30 capsule, Refills: 0    Associated Diagnoses: Benign prostatic hyperplasia with incomplete bladder emptying      vitamin C (ASCORBIC ACID) 500 MG tablet Take 1 tablet (500 mg) by mouth daily  Qty: 4 tablet    Associated Diagnoses: Infection due to 2019 novel coronavirus      order for DME Please draw INR as ordered and as needed. Call results to [facility] at (p) [phone] or fax them to (f) [phone]  Qty:  3 Month, Refills: 3    Associated Diagnoses: Long term current use of anticoagulant therapy; Completed stroke (H); Atrial fibrillation, unspecified type (H)         STOP taking these medications       ipratropium - albuterol 0.5 mg/2.5 mg/3 mL (DUONEB) 0.5-2.5 (3) MG/3ML neb solution Comments:   Reason for Stopping:                     Rationale for medication changes:      Metoprolol and digoxin for A. fib with RVR        Consults       WOUND OSTOMY CONTINENCE NURSE  IP CONSULT  CARDIOLOGY IP CONSULT  CARE MANAGEMENT / SOCIAL WORK IP CONSULT  INTENSIVIST IP CONSULT  PHARMACY TO DOSE VANCO  VASCULAR ACCESS ADULT IP CONSULT  WOUND OSTOMY CONTINENCE NURSE  IP CONSULT  PHYSICAL THERAPY ADULT IP CONSULT  OCCUPATIONAL THERAPY ADULT IP CONSULT  PHYSICAL THERAPY ADULT IP CONSULT  OCCUPATIONAL THERAPY ADULT IP CONSULT  PHYSICAL THERAPY ADULT IP CONSULT  OCCUPATIONAL THERAPY ADULT IP CONSULT    Immunizations given this encounter     Most Recent Immunizations   Administered Date(s) Administered     COVID-19,PF,Moderna 05/11/2022     Influenza (High Dose) 3 valent vaccine 09/24/2019     Influenza (IIV3) PF 08/30/2012     Influenza Quad, Recombinant, pf(RIV4) (Flublok) 10/05/2020     Influenza Vaccine, 6+MO IM (QUADRIVALENT W/PRESERVATIVES) 10/01/2017     Influenza, Quad, High Dose, Pf, 65yr+ (Fluzone HD) 10/08/2021     Pneumo Conj 13-V (2010&after) 09/18/2015     Pneumococcal 23 valent 09/11/2017     TD (ADULT, 7+) 12/02/2009           Anticoagulation Information      Recent INR results: No results for input(s): INR in the last 168 hours.  Warfarin doses (if applicable) or name of other anticoagulant: Xarelto      SIGNIFICANT IMAGING FINDINGS     Results for orders placed or performed during the hospital encounter of 08/26/22   XR Chest Port 1 View    Impression    IMPRESSION:     Lungs are clear. Bilateral paratracheal stripe widening (retrospectively similar to prior), exaggerated by technique and indeterminate on this exam.  Chest CT could be considered to evaluate abnormal thickening versus normal variation or mediastinal fat.    No pleural effusion or pneumothorax. Stable upper normal heart size, degenerative by technique.        XR Chest 1 View    Impression    IMPRESSION: Large area of airspace consolidation has developed within the right upper lobe since yesterday's exam, compatible with pneumonia. The right lower lobe and the left lung remain clear. No effusions.   XR Chest Port 1 View    Impression    IMPRESSION: There is a new right-sided PICC with distal tip projected over the right atrium and this should be pulled back approximately 5 cm. There is less density seen to the infiltrate involving the upper right perihilar region. The remainder of the   right lung infiltrates are stable.   Echocardiogram Limited   Result Value Ref Range    LVEF  50-55% (borderline)        SIGNIFICANT LABORATORY FINDINGS     Most Recent 3 CBC's:Recent Labs   Lab Test 08/29/22  0512 08/28/22  0517 08/27/22  2242 08/27/22  0143   WBC 8.4 12.1*  --  10.5   HGB 11.7* 11.9* 12.3* 12.3*   MCV 93 93  --  94    286  --  239     Most Recent 3 BMP's:Recent Labs   Lab Test 09/01/22  0441 08/31/22  0605 08/30/22  0458 08/29/22  1105 08/29/22  0512 08/28/22  0517 08/27/22  0143   NA  --   --   --   --  136 132* 135*   POTASSIUM 3.5 3.6 3.9   < > 3.1* 3.8 3.8   CHLORIDE  --   --   --   --  103 100 102   CO2  --   --   --   --  27 24 26   BUN  --   --   --   --  19 22 24   CR  --   --   --   --  1.25 1.41* 1.71*   ANIONGAP  --   --   --   --  6 8 7   DARON  --   --   --   --  7.7* 7.6* 7.7*   GLC  --   --   --   --  79 144* 89    < > = values in this interval not displayed.     Most Recent 2 LFT's:Recent Labs   Lab Test 08/27/22  0143 07/30/22  0838   AST 21 17   ALT 24 25   ALKPHOS 76 72   BILITOTAL 1.4* 1.0     Most Recent 3 INR's:Recent Labs   Lab Test 07/21/22  1719 07/21/22  1227 09/21/21  1214   INR 1.07 1.12 2.6*           Discharge Orders         Reason for your hospital stay    82 y M hx CKD, prior CVA, afib on xarelto, here initially after a fall at TCU. Course has been complicatedby Afib w RVR. Of note, has positive COVID test after recent infection, currently clear CXR and on room air     Activity - Up ad julienne     General info for SNF    Length of Stay Estimate: Short Term Care: Estimated # of Days <30  Condition at Discharge: Improving  Level of care:skilled   Rehabilitation Potential: Good  Admission H&P remains valid and up-to-date: Yes  Recent Chemotherapy: N/A  Use Nursing Home Standing Orders: Yes     Mantoux instructions    Give two-step Mantoux (PPD) Per Facility Policy Yes     Follow Up and recommended labs and tests    Follow up with [facility] physician.  The following labs/tests are recommended: BMP, HGB     No CPR- Do NOT Intubate     Physical Therapy Adult Consult    Evaluate and treat as clinically indicated.    Reason:gait     Occupational Therapy Adult Consult    Evaluate and treat as clinically indicated.    Reason:  adls     Diet    Follow this diet upon discharge: Orders Placed This Encounter      Combination Diet Low Saturated Fat Na <2400mg Diet, No Caffeine Diet     Diet    Follow this diet upon discharge: Orders Placed This Encounter      Combination Diet Low Saturated Fat Na <2400mg Diet,       Diet       Examination   Physical Exam   Temp:  [97.3  F (36.3  C)-98.3  F (36.8  C)] 98.3  F (36.8  C)  Pulse:  [82-97] 96  Resp:  [18-20] 20  BP: (103-134)/(56-62) 131/60  SpO2:  [92 %-96 %] 94 %  Wt Readings from Last 1 Encounters:   08/31/22 79.5 kg (175 lb 4.8 oz)       General Appearance: No apparent distress  Respiratory: Clear to auscultation bilaterally  Cardiovascular: Irregular rate controlled  GI: Soft and nontender  Skin: Visualized skin is normal  Other: Good eye contact and normal affect excited to leave the hospital      Please see EMR for more detailed significant labs, imaging, consultant notes etc.    Kurt BLOCK  MD Pietro, personally saw the patient today and spent greater than 30 minutes discharging this patient.    Kurt Westbrook MD  [facility]    CC:Verito Lima

## 2022-09-06 NOTE — PROGRESS NOTES
Clinic Care Coordination Contact    Situation: Patient chart reviewed by care coordinator.    Background:   Hospital admission 8/26-9/1/2022  prior CVA, afib on xarelto, here initially after a fall at TCU. Course has been complicatedby Afib w RVR. Of note, has positive COVID test after recent infection, currently clear CXR and on room air    Assessment: Transferred back to [facility] TCU     Plan/Recommendations: CC RN will follow up when discharged from TCU     [facility]   Maryam Farrar RN, Care Coordinator   [facility]'s   E-mail [email or web address]   [phone]

## 2022-09-08 NOTE — PATIENT INSTRUCTIONS
Wound care supplies should be ordered by your home care agency. Please contact clinic if home care agency is not able to order supplies.     Please call Central Scheduling [phone] to schedule your test which should be completed prior to your next appointment.     Wound Care Instructions    2 TIMES PER WEEK and as needed, Cleanse your leg, knee, heel, hand wound(s) with Dilute hibiclens 30cc in 500cc NS.    Pat Dry with non-sterile gauze    Apply Lotion to the intact skin surrounding your wound and other dry skin locations. Some good lotions include: Remedy Skin Repair Cream, Sarna, Vanicream or Cetaphil    Primary Dressing: Apply medihoney into/onto the wounds right heel, left leg; xeroform to right knee, left and right hands; betadine to left heel. Triad paste to buttocks    Secondary dressing: Cover with foam border    Remain non weight bearing status by utilizing wheelchair    It is not ok to get your wound wet in the bath or shower    It is recommended that you do not get your ulcer wet when showering.  Listed below are several ways of keeping it dry when you shower.     1. Wrap it with Press and Seal plastic wrap.  It can be found in the stores where the plastic wraps or tin foil is kept.               2.  Some people take a bath and hang their leg/foot out of the tub.                        3  Put your leg in a plastic bag and tape it on.           4. You can purchase a shower cover for casts at some pharmacies and through the Internet.            5. Take a Bed Bath or wash up at the sink           If for some reason you are not able to get your dressing(s) changed as outlined above (due to illness, lack of supplies, lack of help) please do the following: remove old, soiled dressings; wash the wounds with saline; pat dry; apply ABD pad or other absorbant pad and secure with rolled gauze; avoid tape directly on your skin; Call the clinic as soon as possible to let us know what the current issues are in  receiving wound care [phone].      SEEK MEDICAL CARE IF:  You have an increase in swelling, pain, or redness around the wound.  You have an increase in the amount of pus coming from the wound.  There is a bad smell coming from the wound.  The wound appears to be worsening/enlarging  You have a fever greater than 101.5 F      It is ok to continue current wound care treatment/products for the next 2-3 days until new wound care supplies are ordered and arrive. If longer than this please contact our office at [phone].    If you have a 2 layer or 4 layer compression wrap on these are safe to have on for ONLY 7 days. If for some reason you are not able to get the wrap(s) changed (due to illness; lack of supplies, lack of help, lack of transportation) please do the following: unwrap the old 2 or 4 layer compression wrap; avoid using scissors as you could cut your skin and cause wounds; use tubular compression when available. Call to reschedule your home care or clinic visit appointment as soon as possible.    Please NOTE: if you are 15 minutes late to your clinic appointment you will have to be rescheduled. Please call our clinic as soon as possible if you know you will not be able to get to your appointment at [phone].    If you fail to show up to 3 scheduled clinic appointments you will be dismissed from our clinic.              We want to hear from you!  In the next few weeks, you should receive a call or email to complete a survey about your visit at [facility]. Please help us improve your appointment experience by letting us know how we did today. We strive to make your experience good and value any ways in which we could do better.      We value your input and suggestions.    Thank you for choosing the [facility]!

## 2022-09-08 NOTE — PROGRESS NOTES
Pt here with daughter in law. Left leg wound had oil emulsion, telfa, abd, roll gauze. Right heel had alginate, foam, abd, roll gauze, coban. Bilateral hands had oil emulsion and foam adhesive. Right knee had alginate and foam adhesive.     Right heel wounds developed in TCU in mid-August.   Living at assisted living with homecare services. Had a fall at assisted living shortly after he moved in causing the skin tears on hands and knee. Right knee does not appear open.     Uses [facility] care: [phone]. Fax: [phone].

## 2022-09-08 NOTE — PROGRESS NOTES
Follow up Vascular Visit       Date of Service:09/08/22      Chief Complaint: Bilateral heels, left leg, bilateral hands, and right knee, buttocks wounds       Pt returns to [facility] with regards to their Bilateral heels, left leg, bilateral hands, and right knee, buttocks wounds  .  They arrive today with daughter in law. They are currently using oil emulsion, telfa, alginate, abd, roll gauze to the wounds, see nurse notes. This is being done by home health. They are using nothing for compression. They are feeling well today. Denies fevers, chills. No shortness of breath. Pt developed heel wounds while in TCU. Pt recently moved into assisted living and fell causing skin tears to hands and right knee. Pt is currently receiving home health. Pt was in the hospital 8/26/22 to 9/1/22 due to A fib with RVR, acute kidney injury, acute respiratory failure, and septic shock.    Allergies:   Allergies   Allergen Reactions     Augmentin Diarrhea       Medications:   Current Outpatient Medications:      budesonide (PULMICORT) 1 MG/2ML neb solution, Take 1 mg by nebulization 2 times daily, Disp: , Rfl:      clopidogrel (PLAVIX) 75 MG tablet, Take 1 tablet (75 mg) by mouth daily for 93 days, Disp: 95 tablet, Rfl: 0     digoxin (LANOXIN) 125 MCG tablet, Take 1 tablet (125 mcg) by mouth daily, Disp: 30 tablet, Rfl: 0     fluticasone-vilanterol (BREO ELLIPTA) 100-25 MCG/INH inhaler, Inhale 1 puff into the lungs daily, Disp: 30 each, Rfl: 0     furosemide (LASIX) 40 MG tablet, Take 40 mg by mouth daily, Disp: , Rfl:      ipratropium-albuterol (COMBIVENT RESPIMAT)  MCG/ACT inhaler, Inhale 1 puff into the lungs 4 times daily as needed for shortness of breath / dyspnea or wheezing, Disp: 1 each, Rfl: 0     metoprolol succinate ER (TOPROL XL) 25 MG 24 hr tablet, Take 2 tablets (50 mg) by mouth 2 times daily, Disp: , Rfl:      potassium chloride ER (KLOR-CON M) 20 MEQ CR tablet, Take 20 mEq by mouth  daily, Disp: , Rfl:      rivaroxaban ANTICOAGULANT (XARELTO) 15 MG TABS tablet, Take 1 tablet (15 mg) by mouth daily (with dinner), Disp: , Rfl:      rosuvastatin (CRESTOR) 20 MG tablet, Take 1 tablet (20 mg) by mouth every evening, Disp: 30 tablet, Rfl: 1     tamsulosin (FLOMAX) 0.4 MG capsule, Take 1 capsule (0.4 mg) by mouth daily, Disp: 30 capsule, Rfl: 0     vitamin C (ASCORBIC ACID) 500 MG tablet, Take 1 tablet (500 mg) by mouth daily, Disp: 4 tablet, Rfl:     History:   Past Medical History:   Diagnosis Date     Acute kidney injury (H) 8/5/2019       Physical Exam:    /50   Pulse 64   Temp 98.1  F (36.7  C)   Resp 20     General:  Patient presents to clinic in no apparent distress.  Head: normocephalic atraumatic  Psychiatric:  Alert and oriented x3.   Respiratory: unlabored breathing; no cough  Integumentary:  Skin is uniformly warm, dry and pink. Excoriation to buttocks  Wound #1 Location: L hand  Size: 2L x 2.2W x 0.1depth.  No sinus tract present, Wound base: scabbed  No undermining present. Wound is partial thickness. There is scant drainage. Periwound: no denudement, erythema, induration, maceration or warmth.    Wound #2 Location: R hand  Size: 0.2L x 0.5W x 0.1depth.  No sinus tract present, Wound base: scabbed  No undermining present. Wound is partial thickness. There is scant drainage. Periwound: no denudement, erythema, induration, maceration or warmth.    Wound #3 Location: R heel mid  Size: 2.8L x 3W x 0.1depth.  No sinus tract present, Wound base: eschar  No undermining present. Wound is full thickness. There is moderate drainage. Periwound: no denudement, erythema, induration, maceration or warmth.    Wound #4 Location: R heel lateral  Size: 3.4L x 4W x 1depth.  No sinus tract present, Wound base: 100% slough/eschar; probed to bone  No undermining present. Wound is full thickness. There is moderate drainage. Periwound: no denudement, erythema, induration, maceration or warmth.    Wound  #5 Location: L Leg scattered  Size: 5L x 7.5W x 0.1depth.  No sinus tract present, Wound base: slough and granulation mix  No undermining present. Wound is full thickness. There is moderate drainage. Periwound: no denudement, erythema, induration, maceration or warmth.  PICC Triple Lumen 08/27/22 Right Basilic (Active)   Site Assessment WDL 09/01/22 0800   External Cath Length (cm) 6 cm 08/27/22 1946   Extremity Circumference (cm) 29 cm 08/27/22 1802   Dressing Intervention Chlorhexidine patch;Transparent 09/01/22 0800   Dressing Change Due 09/04/22 08/28/22 0600   PICC Comment PICC is ready to use 08/27/22 1946   Raygoza - Status saline locked 09/01/22 0800   Raygoza - Cap Change Due 09/03/22 08/29/22 0800   Red - Status infusing 09/01/22 0800   Red - Cap Change Due 09/03/22 08/29/22 0800   White - Status saline locked 09/01/22 0800   White - Cap Change Due 09/03/22 08/29/22 0800   Extravasation? No 09/01/22 0800   Line Necessity Yes, meets criteria 09/01/22 0800   Number of days: 12       Wound Leg Ulceration Stage 3 (Active)   Wound Bed Other (Comment) 09/01/22 0800   Joan-wound Assessment Other (Comment) 08/30/22 0400   Drainage Amount None 09/01/22 0003   Drainage Color/Characteristics UTV 08/29/22 1615   Wound Care/Cleansing Normal saline;Wound cleanser 08/29/22 1433   Dressing Dry gauze 09/01/22 0003   Dressing Status Clean, dry, intact 09/01/22 0800   Dressing Change Due 09/01/22 08/30/22 1220   Number of days: 48       Wound Heel Pressure injury community acquired (Active)   Wound Bed Blister- blood filled 09/01/22 0430   Joan-wound Assessment Other (Comment) 08/30/22 0400   Drainage Amount Small 09/01/22 0430   Drainage Color/Characteristics Serosanguineous 09/01/22 0430   Wound Care/Cleansing Normal saline 08/29/22 1433   Dressing Foam 09/01/22 0430   Dressing Status New dressing 09/01/22 0430   Dressing Change Due 09/01/22 08/29/22 2406   Number of days: 13       Wound Scrotum Extravasation;Moisture damage NA  (Active)   Wound Bed Red;Morganza 09/01/22 0800   Drainage Amount None 09/01/22 0800   Drainage Color/Characteristics Sanguinous 08/29/22 2015   Wound Care/Cleansing Barrier applied  09/01/22 0003   Dressing Open to air 09/01/22 0800   Dressing Status Reinforced 08/29/22 0800   Number of days: 11       Wound Buttocks Abrasion(s);Moisture damage NA (Active)   Wound Bed Red 09/01/22 0800   Joan-wound Assessment Erythema 09/01/22 0800   Drainage Amount Scant 09/01/22 0800   Drainage Color/Characteristics Sanguinous 09/01/22 0800   Wound Care/Cleansing Barrier applied  09/01/22 0800   Dressing Open to air 09/01/22 0003   Dressing Status Clean, dry, intact 09/01/22 0800   Number of days: 11       VASC Wound left leg (scattered) (Active)   Pre Size Length 5 09/08/22 1300   Pre Size Width 7.5 09/08/22 1300   Pre Size Depth 0.1 09/08/22 1300   Pre Total Sq cm 37.5 09/08/22 1300   Number of days: 58       VASC Wound LEFT HAND (Active)   Pre Size Length 2 09/08/22 1300   Pre Size Width 2.2 09/08/22 1300   Pre Size Depth 0.1 09/08/22 1300   Pre Total Sq cm 4.4 09/08/22 1300   Number of days: 0       VASC Wound RIGHT HAND (Active)   Pre Size Length 0.2 09/08/22 1300   Pre Size Width 0.5 09/08/22 1300   Pre Size Depth 0.1 09/08/22 1300   Pre Total Sq cm 0.1 09/08/22 1300   Number of days: 0       VASC Wound RIGHT HEEL MID (Active)   Pre Size Length 2.8 09/08/22 1300   Pre Size Width 3 09/08/22 1300   Pre Size Depth 0.1 09/08/22 1300   Pre Total Sq cm 8.4 09/08/22 1300   Number of days: 0       VASC Wound RIGHT HEEL LATERAL (Active)   Pre Size Length 3.4 09/08/22 1300   Pre Size Width 4 09/08/22 1300   Pre Size Depth 0.1 09/08/22 1300   Pre Total Sq cm 13.6 09/08/22 1300   Number of days: 0       Incision/Surgical Site 07/10/19 Bilateral Abdomen (Active)   Number of days: 1156            Circumferential volume measures:      Circumferential Measures 9/8/2022   Right just above MTP 26.2   Right Ankle 28   Right Widest Calf 36.3   Left  - just above MTP 24.5   Left Ankle 22.8   Left Widest Calf 33       Labs:    I personally reviewed the following lab results today and those on care everywhere    CRP   Date Value Ref Range Status   07/25/2022 3.4 (H) 0.0 - <0.8 mg/dL Final      Sed Rate   Date Value Ref Range Status   01/07/2009 10 0 - 20 mm/h Final      Last Renal Panel:  Sodium   Date Value Ref Range Status   08/29/2022 136 136 - 145 mmol/L Final   07/01/2020 139 133 - 144 mmol/L Final     Potassium   Date Value Ref Range Status   09/01/2022 3.5 3.5 - 5.0 mmol/L Final   07/01/2020 4.1 3.4 - 5.3 mmol/L Final     Chloride   Date Value Ref Range Status   08/29/2022 103 98 - 107 mmol/L Final   07/01/2020 107 94 - 109 mmol/L Final     Carbon Dioxide   Date Value Ref Range Status   07/01/2020 31 20 - 32 mmol/L Final     Carbon Dioxide (CO2)   Date Value Ref Range Status   08/29/2022 27 22 - 31 mmol/L Final     Anion Gap   Date Value Ref Range Status   08/29/2022 6 5 - 18 mmol/L Final   07/01/2020 1 (L) 3 - 14 mmol/L Final     Glucose   Date Value Ref Range Status   08/29/2022 79 70 - 125 mg/dL Final   07/01/2020 73 70 - 99 mg/dL Final     Comment:     Non Fasting     Urea Nitrogen   Date Value Ref Range Status   08/29/2022 19 8 - 28 mg/dL Final   07/01/2020 13 7 - 30 mg/dL Final     Creatinine   Date Value Ref Range Status   08/29/2022 1.25 0.70 - 1.30 mg/dL Final   07/01/2020 1.15 0.66 - 1.25 mg/dL Final     GFR Estimate   Date Value Ref Range Status   08/29/2022 57 (L) >60 mL/min/1.73m2 Final     Comment:     Effective December 21, 2021 eGFRcr in adults is calculated using the 2021 CKD-EPI creatinine equation which includes age and gender (Torin et al., NEJM, DOI: 10.1056/DOQEnu0829284)   07/01/2020 60 (L) >60 mL/min/[1.73_m2] Final     Comment:     Non  GFR Calc  Starting 12/18/2018, serum creatinine based estimated GFR (eGFR) will be   calculated using the Chronic Kidney Disease Epidemiology Collaboration   (CKD-EPI) equation.        Calcium   Date Value Ref Range Status   08/29/2022 7.7 (L) 8.5 - 10.5 mg/dL Final   07/01/2020 8.4 (L) 8.5 - 10.1 mg/dL Final     Albumin   Date Value Ref Range Status   08/27/2022 2.0 (L) 3.5 - 5.0 g/dL Final   07/01/2020 3.4 3.4 - 5.0 g/dL Final      Lab Results   Component Value Date    WBC 8.4 08/29/2022    WBC 9.5 07/01/2020     Lab Results   Component Value Date    RBC 3.86 08/29/2022    RBC 5.09 07/01/2020     Lab Results   Component Value Date    HGB 11.7 08/29/2022    HGB 16.2 07/01/2020     Lab Results   Component Value Date    HCT 35.8 08/29/2022    HCT 50.9 07/01/2020     No components found for: MCT  Lab Results   Component Value Date    MCV 93 08/29/2022     07/01/2020     Lab Results   Component Value Date    MCH 30.3 08/29/2022    MCH 31.8 07/01/2020     Lab Results   Component Value Date    MCHC 32.7 08/29/2022    MCHC 31.8 07/01/2020     Lab Results   Component Value Date    RDW 16.1 08/29/2022    RDW 15.0 07/01/2020     Lab Results   Component Value Date     08/29/2022     07/01/2020      Lab Results   Component Value Date    A1C 5.5 07/11/2019    A1C 5.7 01/29/2018    A1C 6.0 01/07/2009      TSH   Date Value Ref Range Status   07/16/2021 2.13 0.40 - 4.00 mU/L Final   01/27/2018 1.334 0.340 - 5.600 uIU/ml Final      No results found for: VITDT                Impression:  Encounter Diagnoses   Name Primary?     PAD (peripheral artery disease) (H) Yes     Open wound of left lower extremity, subsequent encounter      Pressure injury of right heel, unstageable (H)      Pressure injury of right heel, stage 4 (H)      Stage II pressure ulcer of left heel (H)      Skin tear of right hand without complication, initial encounter      Skin tear of left hand without complication, initial encounter      Excoriation of buttock, initial encounter      Abrasion, right knee, initial encounter         9/8/22 L leg scattered       9/8/22 R heel      9/8/22 BLE      9/8/22 R knee      9/8/22  R hand      9/8/22 L hand             Are any of these wounds new today: No; Location: na    Assessment/Plan:          1. Debridement: After discussion of risk factors and verbal consent was obtained 2% Lidocaine HCL jelly was applied, under clean conditions, the BLE, bilateral hands ulceration(s) were debrided using currette. Devitalized and nonviable tissue, along with any fibrin and slough, was removed to improve granulation tissue formation, stimulate wound healing, decrease overall bacteria load, disrupt biofilm formation and decrease edge senescence.  Total excisional debridement was 37.5 sq cm into the subcutaneous tissue with a depth of 1 cm.   Ulcers were improved afterwards and .  Measures were as noted on the flow sheet.       2.  Wound treatment: wound treatment will include irrigation and dressings to promote autolytic debridement which will include: Cleanse with dilute hibiclens 30cc in 500cc NS. Apply medihoney into/onto the wounds right heel, left leg; xeroform to right knee, left and right hands; betadine to left heel, cover with foam border to be changed twice a week. Triad paste to excoriation on buttocks daily. If for some reason the patient is not able to get their dressing(s) changed as outlined above (due to illness, lack of supplies, lack of help) please do the following: remove old, soiled dressings; wash the wounds with saline; pat dry; apply ABD pad or other absorbant pad and secure with rolled gauze; avoid tape directly on your skin; patient instructed to call the clinic as soon as possible to let us know what the current issues are in receiving wound care. Worsened L lateral heel probes to bone. Wound culture obtained, MRI ordered, Infectious Disease Referral ordered. ADDENDUM:  Wound culture positive for Morganella morganii, and staphylococcus aureus. Will start pt on Bactrim -160mg PO BID x 10 days.           3. Edema: na           4. Nutrition: Focus on protein            5. Offloading: Instructed pt non weight bearing to BLE; utilized wheelchair. Pt is a high fall risk so unable to put pt in a PRAFO d/t history of falls. Recommended pt buy prevalon boot to protect heel while sitting and laying in bed.      Patient will follow up with me in 2 weeks for reevaluation. They were instructed to call the clinic sooner with any signs or symptoms of infection or any further questions/concerns. Answered all questions.      60 minutes spent on the date of the encounter doing chart review, history and exam, documentation and further activities per the note    Lakisha Ma MS, APRN, AGNP-C, CWCN  [facility]   [phone]        This note was electronically signed by PALOMO Chand CNP

## 2022-09-09 NOTE — TELEPHONE ENCOUNTER
Janette is calling from  wondering if the patient is non weight bearing on the right heel? She can be reached at  [phone]

## 2022-09-09 NOTE — TELEPHONE ENCOUNTER
[facility] is calling stating they need written and signed orders regarding the information provided by Christine to Janette.  Bev can be reached [phone]    Fax number [phone]

## 2022-09-09 NOTE — TELEPHONE ENCOUNTER
Janette updated on orders from yesterday- she had no further questions.    Offloading: Instructed pt non weight bearing to BLE; utilized wheelchair. Pt is a high fall risk so unable to put pt in a PRAFO d/t history of falls. Recommended pt buy prevalon boot to protect heel while sitting and laying in bed.

## 2022-09-09 NOTE — TELEPHONE ENCOUNTER
Patient is not able to pivot transfer without maintaining the non weightbearing status.  They are asking if he can be partial weightbearing for pivot transfers only.  Okay to fax these orders with the RN ones that were requested a few minutes ago.    Paulina is also asking for a call back with verbal orders.  [phone]

## 2022-09-12 NOTE — TELEPHONE ENCOUNTER
----- Message from PALOMO Chand CNP sent at 9/12/2022 12:10 PM CDT -----  Can you let daughter in law (pt's mobile) and pt know wound culture positive will start on Bactrim -160mg PO BID x 10 days? Also please confirm pharmacy, pt recently moved into assisted living.

## 2022-09-12 NOTE — TELEPHONE ENCOUNTER
Updated daughter in law and the nurse at his assisted [facility].  The nurse asked for a signed copy of the order faxed to them at [phone] and also change the pharmacy to:  [facility]  Fax [phone]      The nurse is also concerned because patient has been having diarrhea and new wounds on his bottom. She is concerned the antibiotics are going to worse the diarrhea. She will call PCP and also have the wound nurse email photos to Lakisha at [email or web address].

## 2022-09-15 NOTE — PROGRESS NOTES
Clinic Care Coordination Contact    Situation: Patient chart reviewed by care coordinator.    Background:  9/13/2022 ED visit Diarrhea and open wound on right heel   8/26/2022 - 9/1/2022 (6 days)  [facility]    REASON FOR PRESENTATION(See Admission Note for Details)   Shortness of breath     PRINCIPAL & ACTIVE DISCHARGE DIAGNOSES      Active Problems:    Long term (current) use of anticoagulants    Chronic systolic heart failure (H)    History of CVA (cerebrovascular accident)    Infection due to 2019 novel coronavirus    Atrial fibrillation with RVR (H)    History of COVID-19    Acute renal injury (H)    DNR (do not resuscitate)    Hypotension, unspecified hypotension type    Acute respiratory failure with hypoxia (H)    Pneumonia of right upper lobe due to infectious organism        Assessment: Patient back to the ED today with multiple falls in the last 24 hours     Plan/Recommendations: CC RN will follow up when discharged from the hospital     [facility]   Maryam Farrar RN, Care Coordinator   [facility]   E-mail [email or web address]   [phone]

## 2022-09-15 NOTE — TELEPHONE ENCOUNTER
Reason for call:  Falls    Symptom or request: Pt's daughter-in-law Shannon calling.  Pt has been declining for a few weeks now and has been much worse the last 24 hours - Weakness, fatigue, diarrhea and wound issues.  Pt was seen in ED 9/13 and has fallen at home 5x in the past 24 hours.  Family did find an Asst. Living bed for pt in [place], but the facility is apprehensive to admit the pt, due to rapid decline.  Pt has virtual appt with Dr. Lima at 5pm today, but a physical exam and any testing cannot be done virtually, so appt was cancelled.  Huddled with Dr. Lima who felt that pt would be best evaluated in the ED, due to pt's increasing decline and 5 falls in last 24 hours.  Kaitlynn will bring pt to ED now and will call Asst. Living to put a hold on the admit until pt can be evaluated in ED and is deemed medically stable.      Routed to PCP as FYI - No need to call patient's daughter-in-law back, unless there are questions or problems.        Duration (how long have symptoms been present): ongoing    Have you been treated for this before? Yes    Additional comments:     Call taken on 9/15/2022 at 9:35 AM by Margaret Forrester

## 2022-09-19 NOTE — PROGRESS NOTES
Clinic Care Coordination Contact  CC RN spoke to Kaitlynn Daughter in Law    Kaitlynn reports the patient is admitted into the [facility] in [place] for multiple falls   Family is working closely with hospital  to find a skilled facility and will discuss Hospice     Kaitlynn given CC RN contact to call back with an update      [facility]   Maryam Farrar RN, Care Coordinator   [facility]'s   E-mail [email or web address]   [phone]

## 2022-09-21 NOTE — TELEPHONE ENCOUNTER
Patient is at at nursing home, [facility] is pharmacy of record. Trying to get refills for patient, have gotten no reply for PAOLA Trotter. Christine Reyes on 9/21/2022 at 11:25 AM    Medication Question or Refill    Contacts       Type Contact Phone/Fax    09/21/2022 11:21 AM CDT Phone (Incoming) Fran  [phone]     [facility]          What medication are you calling about (include dose and sig)?: 12 medications    Controlled Substance Agreement on file:   CSA -- Patient Level:    CSA: None found at the patient level.       Who prescribed the medication?: Dr. MAX Lima    Do you need a refill? Yes:     When did you use the medication last? Daily    Patient offered an appointment? No    Do you have any questions or concerns?  No    Preferred Pharmacy:     [facility] [place] [id number] [address]  Phone: [phone] Fax: [phone]      Could we send this information to you in [facility] or would you prefer to receive a phone call?:   Patient would prefer a phone call   Okay to leave a detailed message?: Yes at Other phone number:  [facility], [phone]

## 2022-09-21 NOTE — TELEPHONE ENCOUNTER
Spoke with Fran at [facility].   Patient was at TCU, now transferred back to Hospital    Refills not needed at this time    Susan Ashford RN on 9/21/2022 at 1:23 PM

## 2022-09-21 NOTE — PROGRESS NOTES
Clinic Care Coordination Contact    Situation: Patient chart reviewed by care coordinator.    Background: [facility] in [place] admission 9/15-9/20/2022 Covid 19 Falls frequently     Assessment: Back to [facility] TCU     Plan/Recommendations: CC RN will follow up when discharged from TCU or Kaitlynn daughter in law will call with an update     [facility]   Maryam Farrar RN, Care Coordinator   [facility]'s   E-mail [email or web address]   [phone]

## 2022-09-26 NOTE — TELEPHONE ENCOUNTER
Reason for Call:  Form, our goal is to have forms completed with 72 hours, however, some forms may require a visit or additional information.    Type of letter, form or note:  Home Health Certification    Who is the form from?: Home care    Where did the form come from: form was faxed in    What clinic location was the form placed at?: [facility]    Where the form was placed: Given to physician    What number is listed as a contact on the form?: [phone]       Additional comments: Orders to be signed and  Faxed place on providers desk    Call taken on 9/26/2022 at 10:27 AM by Alejandra Potter

## 2022-09-27 NOTE — TELEPHONE ENCOUNTER
Clinic Care Coordination Contact    Situation: Patient chart reviewed by care coordinator.    Background:   Background: [facility] in [place] admission 9/15-9/20/2022 Covid 19 Falls frequently      Assessment: Back to [facility] TCU        Assessment: Patient is in Hospice Care    Plan/Recommendations: No further outreaches will be made     [facility]   Maryam Farrar RN, Care Coordinator   [facility]'s   E-mail [email or web address]   [phone]

## 2022-10-07 NOTE — TELEPHONE ENCOUNTER
Contacted daughter in law Kaitlynn  Patient is in a nursing home and on hospice and they are managing medications.   Xarelto refill denied.     Susan Ashford RN on 10/7/2022 at 11:49 AM

## 2022-10-12 NOTE — TELEPHONE ENCOUNTER
Dr Lima  Some forms from [facility] were sent to you for a signature. I see notes that the pt was called and told us they are no longer with them as pt is in hospice so no orders needed. Christine from [facility] states they do still need to be signed as they were from sept and they were orders that were already done on pt.     I was unsure if the orders were tossed so she is resending to your fax.       Molina Mac RN

## 2022-10-13 NOTE — TELEPHONE ENCOUNTER
Faxed signed orders back with note from MD.  Also called Christine at [facility] and informed her that Dr. Lima will not sign ANY further orders for this pt, unless he is seen in clinic for an appt.

## 2022-10-13 NOTE — TELEPHONE ENCOUNTER
Paperwork signed.     Let the homecare agency know that this will be the last time I sign forms unless patient is seen.     He is now on hospice    Verito Lima M.D.

## 2023-01-29 NOTE — H&P
"Hospital Medicine Service History and Physical  [facility]: [facility][1]    Bradley Liz is a 82 year old male who  has a past medical history of Acute kidney injury (H) (8/5/2019).   Chief Complaint: sob    Assessment and Plan  Afib RVR  Know Afib Hx  On dilt gtt in ED  check mag, check UA  Wt stable with prior  Last Echo 2019, EF 55%  On AC, PE less likely, no sob or hypoxia  Given BP's and home metoprolol, will consult Cards to assist in mgmt  Have re-ordered home po metoprolol for this evening  Daily wt, I's & O's    Acute kidney injury on CKD  UA as above  Renal U/S  Trial IVF, hold home lasix for now    Pressure wound  Right heel, WOC  If felt by WOC to be infectious contributing to afib, can add Abx    H/o CVA  Has right UE motor defecits, at baseline    Paratrachial stripe widening  Consider outpt CT    DVTP: home DOAC  Code Status: Prior DNR/DNI confirmed  Disposition: Inpatient   Estimated body mass index is 24.37 kg/m  as calculated from the following:    Height as of this encounter: 1.753 m (5' 9\").    Weight as of this encounter: 74.8 kg (165 lb).    History of Present Illness  Bradley Lzi presents to the ED from TCU. He was recently discharged from  after being treated for Left LE cellulitis and incidentally positive for COVID at that time. He was intermittently hypotensive at TCU, but has had no Sx otherwise. This morning he woke up, fell on his bottom going to the bathroom without LOC or head/neck trauma. He provides limited Hx, though is alert and appropriate.    He has a significant smoking Hx. Denies EtOH.    ROS + cough (chronic), fall, heel ulcer  ROS - N/V/D, melena, dysuria, CP, LHDN  All other systems reviewed and are negative    In the ED, patient had softer BP's and was 150's afib/flutter  Na 135, Cr 2, , WBC 14.7  CXR lungs clear, questionable thickening of mediastinal fat  Started on dilt gtt    Physical exam:  Appears nad in the bed  Anicteric conjunctiva, " Given brief report to the new 1:1 sitter       PERRL  Semi-dry mucous membranes, poor dentition  Trachea midline, no JVD  Coarse b/l, Respiratory effort normal on RA  irregular, no murmur  Abdomen soft, nondistended, nontender  minimal edema, clubbing of nails minimal  Pressure ulcer of right heel, malodorous, no purulence  Skin normal temperature, dry  normal affect, alert  Vital signs reviewed by me    Wt Readings from Last 4 Encounters:   08/26/22 74.8 kg (165 lb)   08/26/22 70.8 kg (156 lb)   08/24/22 71.2 kg (157 lb)   08/22/22 70.8 kg (156 lb)        reports that he quit smoking about 13 years ago. He quit after 50.00 years of use. He has never used smokeless tobacco. He reports current alcohol use. He reports that he does not use drugs.  family history includes C.A.D. in his father; Prostate Cancer in his father; Unknown/Adopted in his mother.   has a past surgical history that includes Laparoscopic cholecystectomy (N/A, 7/9/2019); IR Lower Extremity Angiogram Left (7/22/2022); PICC/Midline Placement (7/24/2022); and IR Lower Extremity Angiogram Right (7/25/2022).   Allergies   Allergen Reactions     Augmentin Diarrhea       Mehul Vasquez MD, MPH  [facility]  [facility]  [facility]   Phone: #[phone]\

## 2023-09-11 NOTE — PROGRESS NOTES
ANTICOAGULATION FOLLOW-UP CLINIC VISIT    Patient Name:  Bradley Liz  Date:  2019  Contact Type:  Face to Face    SUBJECTIVE:  Patient Findings     Comments:   No changes in medications, activity, or diet noted. No concerns with clotting, bleeding, or increased bruising noted. Took warfarin as prescribed by Arnoldo.   Patient had 30 mg in the last 7 days, will adjust dose to 30mg by next INR check in 3 weeks. Pt will need an order signed by the provider for his Fl trip - he will leave beginning of  and return around April.  Patient verbalizes understanding and agrees to plan. No further questions or concerns.          Clinical Outcomes     Negatives:   Major bleeding event, Thromboembolic event, Anticoagulation-related hospital admission, Anticoagulation-related ED visit, Anticoagulation-related fatality    Comments:   No changes in medications, activity, or diet noted. No concerns with clotting, bleeding, or increased bruising noted. Took warfarin as prescribed by Arnoldo.   Patient had 30 mg in the last 7 days, will adjust dose to 30mg by next INR check in 3 weeks. Pt will need an order signed by the provider for his Fl trip - he will leave beginning of  and return around April.  Patient verbalizes understanding and agrees to plan. No further questions or concerns.             OBJECTIVE    INR Protime   Date Value Ref Range Status   2019 2.5 (A) 0.86 - 1.14 Final       ASSESSMENT / PLAN  INR assessment THER    Recheck INR In: 3 WEEKS    INR Location Clinic      Anticoagulation Summary  As of 2019    INR goal:   2.0-3.0   TTR:   57.1 % (4.5 y)   INR used for dosin.5 (2019)   Warfarin maintenance plan:   2.5 mg (5 mg x 0.5) every Tue, Fri; 5 mg (5 mg x 1) all other days   Full warfarin instructions:   2.5 mg every Tue, Fri; 5 mg all other days   Weekly warfarin total:   30 mg   Plan last modified:   Fanny Elizalde RN (2019)   Next INR check:   12/10/2019   [facility]  ER records reviewed.  CT showed uncomplicated sigmoid diverticulitis. end date:   Indefinite    Indications    Completed stroke (H) [I63.9]  Long term (current) use of anticoagulants [Z79.01]             Anticoagulation Episode Summary     INR check location:       Preferred lab:       Send INR reminders to:   [place] PHONE ANTICOAG POOL    Comments:   * Takes warfarin in the AM. Leaves for [place] at the beginning of Jan to April (will need snowbird - Dr. Mustafa has to sign paperwork!)       Anticoagulation Care Providers     Provider Role Specialty Phone number    Alex Mustafa MD [facility] [phone]            See the Encounter Report to view Anticoagulation Flowsheet and Dosing Calendar (Go to Encounters tab in chart review, and find the Anticoagulation Therapy Visit)        Fanny Elizalde RN

## 2024-09-10 NOTE — PROGRESS NOTES
Chart reviewed and Dr. Mccullough's note reviewed.  Patient has permanent atrial fibrillation and a rapid ventricular response in the hospital while being treated for COVID.  Blood pressures have been relatively low.  Patient was on 200 mg of metoprolol each day at home according to his home medication list.  He is now on 100 mg and I would give that today.  If his heart rates continue to be high we may need to get further boluses of fluid and some additional AV london blocking agents.  Could consider digoxin or try once again low doses of diltiazem.    Will continue to follow his rhythm.    Began chart review at 9:55 AM and finished documentation at 10:11 AM.  Discussed with bedside nurse as well.  Did not examine or talk to the patient because of active treatment with COVID.   Patient oriented to Same Day department and admitted to Same Day Surgery room 6.   Patient verbalized approval for first name, last initial with physician name on unit whiteboard.     Plan of care reviewed with patient.   Patient room whiteboard filled out and discussed with patient and responsible adult.   Patient and responsible adult offered Same Day Welcome Packet to review.    Call light in reach.   Bed in lowest position, locked, with one bed rail up.   SCDs and warming blanket in place.  Appropriate arm bands on patient.   Bathroom offered.   All questions and concerns of patient addressed.        Meds to Beds:   Patient informed of [facility] Meds to Beds program during admission. Patient is agreeable to program.   Contact information for the pharmacy and the Meds to Beds program:   Name: Riley   Relationship to patient:patient   Phone number: in house
